# Patient Record
Sex: MALE | Race: BLACK OR AFRICAN AMERICAN | Employment: OTHER | URBAN - METROPOLITAN AREA
[De-identification: names, ages, dates, MRNs, and addresses within clinical notes are randomized per-mention and may not be internally consistent; named-entity substitution may affect disease eponyms.]

---

## 2017-01-27 ENCOUNTER — HOSPITAL ENCOUNTER (EMERGENCY)
Age: 62
Discharge: HOME | End: 2017-01-27
Attending: INTERNAL MEDICINE
Payer: MEDICARE

## 2017-01-27 VITALS
HEIGHT: 71 IN | DIASTOLIC BLOOD PRESSURE: 73 MMHG | RESPIRATION RATE: 18 BRPM | SYSTOLIC BLOOD PRESSURE: 168 MMHG | WEIGHT: 195 LBS | HEART RATE: 91 BPM | TEMPERATURE: 98.2 F | BODY MASS INDEX: 27.3 KG/M2 | OXYGEN SATURATION: 99 %

## 2017-01-27 DIAGNOSIS — K08.89 PAIN, DENTAL: Primary | ICD-10-CM

## 2017-01-27 PROCEDURE — 99282 EMERGENCY DEPT VISIT SF MDM: CPT

## 2017-01-27 RX ORDER — OXYCODONE AND ACETAMINOPHEN 5; 325 MG/1; MG/1
1 TABLET ORAL
Qty: 5 TAB | Refills: 0 | Status: SHIPPED | OUTPATIENT
Start: 2017-01-27 | End: 2017-04-03 | Stop reason: SDUPTHER

## 2017-01-27 RX ORDER — TRAMADOL HYDROCHLORIDE 50 MG/1
50 TABLET ORAL
Qty: 20 TAB | Refills: 0 | Status: SHIPPED | OUTPATIENT
Start: 2017-01-27 | End: 2018-05-23

## 2017-01-27 RX ORDER — PANTOPRAZOLE SODIUM 20 MG/1
20 TABLET, DELAYED RELEASE ORAL DAILY
Status: ON HOLD | COMMUNITY
End: 2018-10-09

## 2017-01-27 NOTE — Clinical Note
Dr. Melani Muñiz, [address] 
[phone] [facility]: 
[address] [facility], 101 [facility] 
[phone] Fillings, Cleanings, and Extractions 4815 [facility] [address], [address] 
[phone] Fillings, Cleanings, and Extractions [facility] [address] 
[phone] Fillings, Cleaning, and Extractions [facility] Department [address] [facility] 115 , 3947 [facility] 
[phone] [address], [address] [place] [address] Road 
[phone] Ages 3-18, if attending [facility] 450 [facility] 
201 [facility], 130[address] [facility] [address] 
[phone] Extractions, Fillings, [address] [facility], [address] [facility] [address] 
[phone] Oral Surgery - $70 required Cleanings, Fillings, Extractions - $100 [address],  [address] 
[phone] [employer] Only 1184 [facility] [employer] and [address], [address] Dental Clinic Open September to June

## 2017-01-27 NOTE — ED NOTES
Pt c/o dental pain \"tooth 14\" for which he is being treated by dentist with antibiotics. Reports he has follow up with dentist but that abx are not controlling pain.

## 2017-01-27 NOTE — ED TRIAGE NOTES
Pt states \" I have to pain to tooth number #14. I am on an antibiotic and they gave me ibuprofen but its just not helping the pain. \"

## 2017-01-27 NOTE — DISCHARGE INSTRUCTIONS
Dental Pain: After Your Visit  Your Care Instructions  The most common cause of dental pain is tooth decay. It can also be caused by an infection of the tooth (abscess) or gum, a tooth that has not broken all the way through the gum (impacted tooth), or a problem with the nerve-filled center of the tooth. Follow-up care is a key part of your treatment and safety. Be sure to make and go to all appointments, and call your doctor if you are having problems. Its also a good idea to know your test results and keep a list of the medicines you take. How can you care for yourself at home? · Contact a dentist for follow-up care. · Put ice or a cold pack on the outside of your mouth for 10 to 20 minutes at a time to reduce pain and swelling. Put a thin cloth between the ice and your skin. · Take an over-the-counter pain medicine, such as acetaminophen (Tylenol), ibuprofen (Advil, Motrin), or naproxen (Aleve). Read and follow all instructions on the label. · Do not take two or more pain medicines at the same time unless the doctor told you to. Many pain medicines have acetaminophen, which is Tylenol. Too much acetaminophen (Tylenol) can be harmful. · Rinse your mouth with warm salt water every 2 hours to help relieve pain and swelling from an infected tooth. Mix 1 teaspoon of salt in 8 ounces of water. · If your doctor prescribed antibiotics, take them as directed. Do not stop taking them just because you feel better. You need to take the full course of antibiotics. When should you call for help? Call your doctor now or seek immediate medical care if:  · You have signs of infection, such as:  ¨ Increased pain, swelling, warmth, or redness. ¨ Pus draining from the gum, tooth, or face. ¨ A fever. Watch closely for changes in your health, and be sure to contact your doctor if:  · You do not get better as expected. Where can you learn more?    Go to [employer]  Enter V964 in the search box to learn more about \"Dental Pain: After Your Visit. \"   © 7837-4919 Healthwise, Incorporated. Care instructions adapted under license by [employer] (which disclaims liability or warranty for this information). This care instruction is for use with your licensed healthcare professional. If you have questions about a medical condition or this instruction, always ask your healthcare professional. [address] any warranty or liability for your use of this information. Content Version: 41.7.796398;  Last Revised: May 17, 2013

## 2017-01-27 NOTE — ED PROVIDER NOTES
HPI Comments:   1:03 AM  59 y.o. male presents to ED C/O upper left sided dental pain onset 2 weeks ago. Pt states that he has pain to tooth number #14. Pt states that he was seen by his dentist and prescribed Ibuprofen and Amoxicillin without relief of symptoms. Pt states that he takes Oxycodone for his sciatica and has been taking the medication for the dental pain. Pt states that he has ran out of his prescription of Oxycodone and can't get a refill until next week. Pt states that he does see pain management. PMHx of HTN, dyslipidemia DM, and GERD. Pt denies any other Sx or complaints. Written by AMOS Marquez, as dictated by Isac Lopez PA-C      Patient is a 59 y.o. male presenting with dental problem. The history is provided by the patient. No  was used. Dental Pain    This is a new problem. The current episode started more than 1 week ago (2 weeks ago). The problem occurs constantly. The problem has not changed since onset. Treatments tried: Percocet. The treatment provided no relief.         Past Medical History:   Diagnosis Date    BPH (benign prostatic hyperplasia)     Chronic pain      BACK NEUROPATHY FEET HANDS    DM neuropathy, type II diabetes mellitus ([address]     DM type 2 causing CKD stage 3 ([address] 12/17/2012    DM type 2 causing vascular disease ([address]     Dyslipidemia     Foot ulcer due to secondary DM ([address] 12/1/2012    GERD (gastroesophageal reflux disease)     HTN     S/P BKA (below knee amputation) ([address]      right BKA due to non-healing ulcer    Sleep apnea     Thromboembolus ([address] 1970'S     BLOOD CLOT LEFT LEG       Past Surgical History:   Procedure Laterality Date    Echo stress  4/09     7 min; normal    Echo 2d adult  8/09     normal; LVEF 60%    Stress test myoview  8/09     walked 8:46; normal; LVEF 51%    Endoscopy, colon, diagnostic      Hx below knee amputation Right     Pr abdomen surgery proc unlisted       pilonidal cyst    Hx amputation  2012     left great toe    Hx amputation  2007     BKA right    Hx orthopaedic  2006     ACDF C4-C7    Hx cervical fusion  2009     x2         Family History:   Problem Relation Age of Onset    Hypertension Mother    [de-identified] Gout Mother     Cancer Father      leukemia    Diabetes Father     Hypertension Sister     Diabetes Maternal Grandmother     Hypertension Maternal Grandmother     Diabetes Paternal Grandmother     Hypertension Paternal Grandmother     Anesth Problems Neg Hx        Social History     Social History    Marital status: SINGLE     Spouse name: N/A    Number of children: N/A    Years of education: N/A     Occupational History    Not on file. Social History Main Topics    Smoking status: Never Smoker    Smokeless tobacco: Never Used    Alcohol use No    Drug use: No    Sexual activity: Yes     Partners: Female     Birth control/ protection: None     Other Topics Concern    Not on file     Social History Narrative         ALLERGIES: Adhesive tape-silicones and Sulfa (sulfonamide antibiotics)    Review of Systems   HENT: Positive for dental problem (dental pain). All other systems reviewed and are negative. Vitals:    01/27/17 0048   BP: 168/73   Pulse: 91   Resp: 18   Temp: 98.2 °F (36.8 °C)   SpO2: 99%   Weight: 88.5 kg (195 lb)   Height: 5' 11\" (1.803 m)            Physical Exam   Constitutional: Vital signs are normal. He appears well-developed and well-nourished. No distress. No facial swelling, able to open & close mouth easily; appears comfortable & in NAD   HENT:   Head: Normocephalic and atraumatic. Right Ear: External ear normal.   Left Ear: External ear normal.   Mouth/Throat: Uvula is midline and mucous membranes are normal. No oral lesions. No trismus in the jaw. Abnormal dentition. Dental caries present. No dental abscesses or uvula swelling. No oropharyngeal exudate.        Eyes: Conjunctivae are normal. Pupils are equal, round, and reactive to light. Neck: Normal range of motion. Neck supple. Musculoskeletal: Normal range of motion. Neurological: He is alert. Skin: Skin is warm and dry. Psychiatric: He has a normal mood and affect. His behavior is normal.   Nursing note and vitals reviewed. RESULTS:    No orders to display        Labs Reviewed - No data to display    No results found for this or any previous visit (from the past 12 hour(s)). MDM  Number of Diagnoses or Management Options    ED Course     MEDICATIONS GIVEN:  Medications - No data to display     Procedures    PROGRESS NOTE:  1:03 AM  Initial assessment performed. Written by Alida Lei ED Scribe, as dictated by Amrit Krueger PA-C    DISCHARGE NOTE:  1:12 AM  Viki Mcdonald Jr's  results have been reviewed with him. He has been counseled regarding his diagnosis, treatment, and plan. He verbally conveys understanding and agreement of the signs, symptoms, diagnosis, treatment and prognosis and additionally agrees to follow up as discussed. He also agrees with the care-plan and conveys that all of his questions have been answered. I have also provided discharge instructions for him that include: educational information regarding their diagnosis and treatment, and list of reasons why they would want to return to the ED prior to their follow-up appointment, should his condition change. Proper ED utilization discussed with the pt. The patient and/or family has been provided with education for proper Emergency Department utilization. CLINICAL IMPRESSION:    1.  Pain, dental        PLAN: DISCHARGE HOME    Follow-up Information     Follow up With Details Comments Contact Info    Please refer to list above for dental follow up  Schedule an appointment as soon as possible for a visit      THE [facility] EMERGENCY DEPT Go to As needed, If symptoms worsen [address] [id number]  [phone]          Current Discharge Medication List      START taking these medications    Details   traMADol (ULTRAM) 50 mg tablet Take 1 Tab by mouth every six (6) hours as needed for Pain. Max Daily Amount: 200 mg. Qty: 20 Tab, Refills: 0         CONTINUE these medications which have CHANGED    Details   oxyCODONE-acetaminophen (PERCOCET) 5-325 mg per tablet Take 1 Tab by mouth every four (4) hours as needed for Pain. Max Daily Amount: 6 Tabs. Qty: 5 Tab, Refills: 0         CONTINUE these medications which have NOT CHANGED    Details   pantoprazole (PROTONIX) 20 mg tablet Take 20 mg by mouth daily. rosuvastatin (CRESTOR) 40 mg tablet Take 1 Tab by mouth daily for 90 days. Qty: 90 Tab, Refills: 0    Associated Diagnoses: Dyslipidemia; Neuropathy      lisinopril (PRINIVIL, ZESTRIL) 10 mg tablet Take 1 Tab by mouth daily for 90 days. Qty: 90 Tab, Refills: 0      aspirin 81 mg chewable tablet Take 81 mg by mouth daily. insulin lispro (HUMALOG KWIKPEN) 100 unit/mL kwikpen Pt to inject 0.2 ml before breakfast, lunch and dinner please provide a 30 day supply  Qty: 1 Package, Refills: 2    Comments: This replaced Novolog denied due to insurance formulary      ergocalciferol (VITAMIN D2) 50,000 unit capsule Take 1 Cap by mouth every seven (7) days. Needs appt for further refills  Qty: 4 Cap, Refills: 0      ferrous sulfate 325 mg (65 mg iron) tablet Take 1 Tab by mouth two (2) times a day. Qty: 60 Tab, Refills: 5    Associated Diagnoses: Anemia      ketoconazole (NIZORAL) 2 % topical cream Apply  to affected area daily. Qty: 30 g, Refills: 3      docusate sodium (COLACE) 100 mg capsule Take 100 mg by mouth two (2) times daily as needed. pregabalin (LYRICA) 75 mg capsule Take 1 capsule nightly for 30 days  Qty: 90 Cap, Refills: 0    Associated Diagnoses: Neuropathy      albuterol (PROVENTIL HFA, VENTOLIN HFA, PROAIR HFA) 90 mcg/actuation inhaler Take 2 Puffs by inhalation every four (4) hours as needed for Wheezing for up to 30 days.   Qty: 1 Inhaler, Refills: 2      LANTUS SOLOSTAR 100 unit/mL (3 mL) pen INJECT 70 UNITS EACH NIGHT AS DIRECTED  Qty: 15 mL, Refills: 2    Associated Diagnoses: DM type 2 causing CKD stage 3 (HCC)      insulin aspart (NOVOLOG) 100 unit/mL inpn 0.2 mL by SubCUTAneous route Before breakfast, lunch, dinner and at bedtime for 30 days. Please give a month's supply  Qty: 24 mL, Refills: 3    Associated Diagnoses: DM type 2 causing CKD stage 3 (HCC)      TESTOSTERONE IM by IntraMUSCular route. simethicone (MYLICON) 80 mg chewable tablet Take 80 mg by mouth every six (6) hours as needed for Flatulence. omega-3 acid ethyl esters (LOVAZA) 1 gram capsule Take 2 capsules by mouth two (2) times daily (with meals) for 30 days. Qty: 120 capsule, Refills: 0    Comments: NO MORE REFILLS AFTER THIS. NEEDS APPT  Associated Diagnoses: Neuropathy; Dyslipidemia; Type II or unspecified type diabetes mellitus without mention of complication, uncontrolled             ATTESTATIONS:  This note is prepared by Giuliana Cardenas, acting as Scribe for Vickey Villalobos PA-C. Vickey Villalobos PA-C: The scribe's documentation has been prepared under my direction and personally reviewed by me in its entirety. I confirm that the note above accurately reflects all work, treatment, procedures, and medical decision making performed by me. I was personally available for consultation in the emergency department. I have reviewed the chart  and agree with the documentation recorded by the [facility], including the assessment, treatment plan, and disposition.

## 2017-01-31 DIAGNOSIS — G62.9 NEUROPATHY: Chronic | ICD-10-CM

## 2017-01-31 RX ORDER — OXYCODONE AND ACETAMINOPHEN 5; 325 MG/1; MG/1
1 TABLET ORAL
Qty: 5 TAB | Refills: 0 | Status: CANCELLED | OUTPATIENT
Start: 2017-01-31

## 2017-02-01 RX ORDER — OXYCODONE AND ACETAMINOPHEN 5; 325 MG/1; MG/1
1 TABLET ORAL
Qty: 90 TAB | Refills: 0 | Status: SHIPPED | OUTPATIENT
Start: 2017-02-01 | End: 2017-02-28 | Stop reason: SDUPTHER

## 2017-02-27 DIAGNOSIS — G62.9 NEUROPATHY: Chronic | ICD-10-CM

## 2017-02-27 DIAGNOSIS — E78.5 DYSLIPIDEMIA: ICD-10-CM

## 2017-02-27 RX ORDER — OXYCODONE AND ACETAMINOPHEN 5; 325 MG/1; MG/1
1 TABLET ORAL
Qty: 5 TAB | Refills: 0 | Status: CANCELLED | OUTPATIENT
Start: 2017-02-27

## 2017-02-28 RX ORDER — ROSUVASTATIN CALCIUM 40 MG/1
TABLET, COATED ORAL
Qty: 90 TAB | Refills: 0 | Status: SHIPPED | OUTPATIENT
Start: 2017-02-28 | End: 2018-01-24 | Stop reason: SDUPTHER

## 2017-02-28 RX ORDER — LISINOPRIL 10 MG/1
TABLET ORAL
Qty: 90 TAB | Refills: 0 | Status: SHIPPED | OUTPATIENT
Start: 2017-02-28 | End: 2018-01-24 | Stop reason: SDUPTHER

## 2017-02-28 RX ORDER — OXYCODONE AND ACETAMINOPHEN 5; 325 MG/1; MG/1
1 TABLET ORAL
Qty: 90 TAB | Refills: 0 | Status: SHIPPED | OUTPATIENT
Start: 2017-02-28 | End: 2017-06-30 | Stop reason: SDUPTHER

## 2017-04-04 RX ORDER — OXYCODONE AND ACETAMINOPHEN 5; 325 MG/1; MG/1
1 TABLET ORAL
Qty: 90 TAB | Refills: 0 | Status: SHIPPED | OUTPATIENT
Start: 2017-04-04 | End: 2017-04-26 | Stop reason: SDUPTHER

## 2017-04-05 ENCOUNTER — TELEPHONE (OUTPATIENT)
Dept: INTERNAL MEDICINE CLINIC | Age: 62
End: 2017-04-05

## 2017-04-05 NOTE — TELEPHONE ENCOUNTER
Pt dropped off a \"serious medical condition form\" for Dr Leisa Hidalgo to complete. I placed in drs mailbox.

## 2017-04-18 ENCOUNTER — OFFICE VISIT (OUTPATIENT)
Dept: INTERNAL MEDICINE CLINIC | Age: 62
End: 2017-04-18

## 2017-04-18 ENCOUNTER — HOSPITAL ENCOUNTER (OUTPATIENT)
Dept: LAB | Age: 62
Discharge: HOME | End: 2017-04-18
Payer: MEDICARE

## 2017-04-18 VITALS
HEART RATE: 96 BPM | TEMPERATURE: 98.3 F | SYSTOLIC BLOOD PRESSURE: 113 MMHG | WEIGHT: 183.4 LBS | OXYGEN SATURATION: 99 % | BODY MASS INDEX: 25.68 KG/M2 | RESPIRATION RATE: 14 BRPM | DIASTOLIC BLOOD PRESSURE: 55 MMHG | HEIGHT: 71 IN

## 2017-04-18 DIAGNOSIS — D64.9 ANEMIA, UNSPECIFIED TYPE: ICD-10-CM

## 2017-04-18 DIAGNOSIS — K21.9 GASTROESOPHAGEAL REFLUX DISEASE WITHOUT ESOPHAGITIS: ICD-10-CM

## 2017-04-18 DIAGNOSIS — Z79.4 TYPE 2 DIABETES MELLITUS WITH STAGE 3 CHRONIC KIDNEY DISEASE, WITH LONG-TERM CURRENT USE OF INSULIN (HCC): Primary | Chronic | ICD-10-CM

## 2017-04-18 DIAGNOSIS — L75.0 URINARY BODY ODOR: ICD-10-CM

## 2017-04-18 DIAGNOSIS — R63.4 WEIGHT LOSS: ICD-10-CM

## 2017-04-18 DIAGNOSIS — E11.22 TYPE 2 DIABETES MELLITUS WITH STAGE 3 CHRONIC KIDNEY DISEASE, WITH LONG-TERM CURRENT USE OF INSULIN (HCC): Primary | Chronic | ICD-10-CM

## 2017-04-18 DIAGNOSIS — I10 ESSENTIAL HYPERTENSION: Chronic | ICD-10-CM

## 2017-04-18 DIAGNOSIS — N18.30 TYPE 2 DIABETES MELLITUS WITH STAGE 3 CHRONIC KIDNEY DISEASE, WITH LONG-TERM CURRENT USE OF INSULIN (HCC): Primary | Chronic | ICD-10-CM

## 2017-04-18 PROCEDURE — 85027 COMPLETE CBC AUTOMATED: CPT

## 2017-04-18 PROCEDURE — 83036 HEMOGLOBIN GLYCOSYLATED A1C: CPT

## 2017-04-18 PROCEDURE — 36415 COLL VENOUS BLD VENIPUNCTURE: CPT

## 2017-04-18 PROCEDURE — 80053 COMPREHEN METABOLIC PANEL: CPT

## 2017-04-18 PROCEDURE — 84443 ASSAY THYROID STIM HORMONE: CPT

## 2017-04-18 NOTE — PROGRESS NOTES
HISTORY OF PRESENT ILLNESS  Griselda Mends is a 59 y.o. male. HPI     Diabetic Review of Systems - medication compliance: compliant all of the time, diabetic diet compliance: compliant most of the time, home glucose monitoring: is not performed because he needs strips for his new meter, further diabetic ROS: no polyuria or polydipsia, no chest pain, dyspnea or TIA's, no numbness, tingling or pain in extremities. Other symptoms and concerns: His last A1C was 11% in 12/2016. He has had labs with Dr. Robert Jay since 12/2016. Reports he has been watching what he is eating and using his insulin. He requests a prescription for the test strips and states he got a brand new meter from the [place]. His endocrinologist is Dr. Robert Jay. Dr. Robert Jay recommended pt see [facility] downstairs and he has appointment today. GERD-He had an endoscopy performed by Dr. Brandyn Dan which showed Schatzki's ring, his esophagus was stretched, and his medication was changed to Protonix. He denies cough since endoscopy and states that he was only coughing when he ate because he was having trouble swallowing. Pt denies shortness of breath. Hypertension ROS: taking medications as instructed, no medication side effects noted, no TIA's, no chest pain on exertion, no dyspnea on exertion, no swelling of ankles. New concerns: Stable- bp was 113/55 in the office today. Reports when he urinates he has air come out at the end of his stream. Pt states that this started 1-2 weeks ago and has not had this happen in the past. He has history of UTI's and states odor of his urine has changed. He denies problems urinating or pain. His urologist is Dr. Rolando Germain. Pt states that Dr. Robert Jay sent labs to Dr. Lloyd Tillman to discuss anemia and kidney function. Pt has lost 14 pounds since 12/2016. Reports his appetite is good and he is eating. Pt is having a lot of dental work performed and they are pulling his teeth on left side.  Pt is eating on right side and denies a change in his ability to eat. Pt denies bloody or darker colored stools, issues with BM's, and states he has had two colonoscopies in his life. Pt denies fever, chills, or shortness of breath. Review of Systems   All other systems reviewed and are negative. Physical Exam   Constitutional: He is oriented to person, place, and time. He appears well-developed and well-nourished. HENT:   Head: Normocephalic and atraumatic. Right Ear: External ear normal.   Left Ear: External ear normal.   Nose: Nose normal.   Mouth/Throat: Oropharynx is clear and moist.   Eyes: Conjunctivae and EOM are normal.   Neck: Normal range of motion. Neck supple. Carotid bruit is not present. No thyroid mass and no thyromegaly present. Cardiovascular: Normal rate, regular rhythm, normal heart sounds and intact distal pulses. Pulmonary/Chest: Effort normal and breath sounds normal.   Abdominal: Soft. Bowel sounds are normal.   Genitourinary: Testes normal.   Musculoskeletal: Normal range of motion. Right leg prosthesis   Neurological: He is alert and oriented to person, place, and time. He has normal strength. No cranial nerve deficit or sensory deficit. Skin: Skin is warm and dry. Psychiatric: He has a normal mood and affect. His behavior is normal. Judgment and thought content normal.   Nursing note and vitals reviewed. ASSESSMENT and PLAN  Tu Mackey was seen today for weight loss. Diagnoses and all orders for this visit:    Type 2 diabetes mellitus with stage 3 chronic kidney disease, with long-term current use of insulin ([facility]  Continue current medications and being followed by Dr. Yuly Brumfield. I wrote pt a prescription for strips. -     CBC W/O DIFF  -     METABOLIC PANEL, COMPREHENSIVE  -     HEMOGLOBIN A1C WITH EAG    Gastroesophageal reflux disease without esophagitis  Stable and improved since esophagus was stretched and medication switched to Protonix- Continue current medications.     Essential hypertension  BP is at goal. I do not recommend any change in medications. Urinary body odor  I will check urine today to rule out infection although I am concerned that some of the changes he is noticing with his urine are related to the drop in his kidney function. Pt will see nephrologist Dr. Jeet Ross. -     AMB POC URINALYSIS DIP STICK AUTO W/O MICRO    Anemia, unspecified type  Pt should do stool cards but I think the anemia is due to underlying kidney disease caused by the poorly controlled diabetes and seeing a nephrologist will be important   -     OCCULT BLOOD, IMMUNOASSAY (FIT)    Weight loss  Advised pt that he may be losing weight due to elevated sugars. His endoscopy was normal and he is followed by nephrologist. Will check labs today. He does not have any other systemic signs or symtpoms but has had dental work done which affects his ability to eat on one side so could cause some issues with dietary intake as well  -     TSH 3RD GENERATION      lab results and schedule of future lab studies reviewed with patient  reviewed diet, exercise and weight control  reviewed medications and side effects in detail     Written by Lonny Matta, as dictated by Blanca Nichols MD.     Current diagnosis and concerns discussed with pt at length. Understands risks and benefits or current treatment plan and medications and accepts the treatment and medication with any possible risks. Pt asks appropriate questions which were answered. Pt instructed to call with any concerns or problems.

## 2017-04-19 LAB
ALBUMIN SERPL-MCNC: 3.5 G/DL (ref 3.6–4.8)
ALBUMIN/GLOB SERPL: 0.9 {RATIO} (ref 1.2–2.2)
ALP SERPL-CCNC: 92 IU/L (ref 39–117)
ALT SERPL-CCNC: 13 IU/L (ref 0–44)
AST SERPL-CCNC: 12 IU/L (ref 0–40)
BILIRUB SERPL-MCNC: 0.2 MG/DL (ref 0–1.2)
BUN SERPL-MCNC: 30 MG/DL (ref 8–27)
BUN/CREAT SERPL: 17 (ref 10–24)
CALCIUM SERPL-MCNC: 9.3 MG/DL (ref 8.6–10.2)
CHLORIDE SERPL-SCNC: 104 MMOL/L (ref 96–106)
CO2 SERPL-SCNC: 21 MMOL/L (ref 18–29)
CREAT SERPL-MCNC: 1.79 MG/DL (ref 0.76–1.27)
ERYTHROCYTE [DISTWIDTH] IN BLOOD BY AUTOMATED COUNT: 14.5 % (ref 12.3–15.4)
EST. AVERAGE GLUCOSE BLD GHB EST-MCNC: 332 MG/DL
GLOBULIN SER CALC-MCNC: 3.9 G/DL (ref 1.5–4.5)
GLUCOSE SERPL-MCNC: 290 MG/DL (ref 65–99)
HBA1C MFR BLD: 13.2 % (ref 4.8–5.6)
HCT VFR BLD AUTO: 30.5 % (ref 37.5–51)
HGB BLD-MCNC: 9.7 G/DL (ref 12.6–17.7)
INTERPRETATION: NORMAL
Lab: NORMAL
MCH RBC QN AUTO: 28 PG (ref 26.6–33)
MCHC RBC AUTO-ENTMCNC: 31.8 G/DL (ref 31.5–35.7)
MCV RBC AUTO: 88 FL (ref 79–97)
PLATELET # BLD AUTO: 214 X10E3/UL (ref 150–379)
POTASSIUM SERPL-SCNC: 5.1 MMOL/L (ref 3.5–5.2)
PROT SERPL-MCNC: 7.4 G/DL (ref 6–8.5)
RBC # BLD AUTO: 3.47 X10E6/UL (ref 4.14–5.8)
SODIUM SERPL-SCNC: 139 MMOL/L (ref 134–144)
TSH SERPL DL<=0.005 MIU/L-ACNC: 3.44 UIU/ML (ref 0.45–4.5)
WBC # BLD AUTO: 4.6 X10E3/UL (ref 3.4–10.8)

## 2017-04-21 ENCOUNTER — TELEPHONE (OUTPATIENT)
Dept: INTERNAL MEDICINE CLINIC | Age: 62
End: 2017-04-21

## 2017-04-24 NOTE — TELEPHONE ENCOUNTER
Contacted pt and advised of lab results with follow ups needed with nephrology and endo. Pt understood and has made those appt. Pt sustained leg wound and is inquiring of going to wound clinic. Advised that our office has to refer and appt is needed. Appt provided.

## 2017-04-26 ENCOUNTER — OFFICE VISIT (OUTPATIENT)
Dept: INTERNAL MEDICINE CLINIC | Age: 62
End: 2017-04-26

## 2017-04-26 VITALS
DIASTOLIC BLOOD PRESSURE: 64 MMHG | HEIGHT: 71 IN | SYSTOLIC BLOOD PRESSURE: 126 MMHG | BODY MASS INDEX: 26.71 KG/M2 | WEIGHT: 190.8 LBS | OXYGEN SATURATION: 99 % | HEART RATE: 94 BPM | RESPIRATION RATE: 14 BRPM | TEMPERATURE: 98.2 F

## 2017-04-26 DIAGNOSIS — D64.9 ANEMIA, UNSPECIFIED: ICD-10-CM

## 2017-04-26 DIAGNOSIS — I10 ESSENTIAL HYPERTENSION: Chronic | ICD-10-CM

## 2017-04-26 DIAGNOSIS — Z79.4 TYPE 2 DIABETES MELLITUS WITH STAGE 3 CHRONIC KIDNEY DISEASE, WITH LONG-TERM CURRENT USE OF INSULIN (HCC): Primary | Chronic | ICD-10-CM

## 2017-04-26 DIAGNOSIS — N18.30 TYPE 2 DIABETES MELLITUS WITH STAGE 3 CHRONIC KIDNEY DISEASE, WITH LONG-TERM CURRENT USE OF INSULIN (HCC): Primary | Chronic | ICD-10-CM

## 2017-04-26 DIAGNOSIS — D50.9 IRON DEFICIENCY ANEMIA, UNSPECIFIED IRON DEFICIENCY ANEMIA TYPE: ICD-10-CM

## 2017-04-26 DIAGNOSIS — E11.22 TYPE 2 DIABETES MELLITUS WITH STAGE 3 CHRONIC KIDNEY DISEASE, WITH LONG-TERM CURRENT USE OF INSULIN (HCC): Primary | Chronic | ICD-10-CM

## 2017-04-26 DIAGNOSIS — L08.9 ABRASION, LEG W/ INFECTION, LEFT, INITIAL ENCOUNTER: ICD-10-CM

## 2017-04-26 DIAGNOSIS — S80.812A ABRASION, LEG W/ INFECTION, LEFT, INITIAL ENCOUNTER: ICD-10-CM

## 2017-04-26 DIAGNOSIS — M48.02 SPINAL STENOSIS IN CERVICAL REGION: ICD-10-CM

## 2017-04-26 RX ORDER — AMOXICILLIN AND CLAVULANATE POTASSIUM 875; 125 MG/1; MG/1
1 TABLET, FILM COATED ORAL EVERY 12 HOURS
Qty: 20 TAB | Refills: 0 | Status: SHIPPED | OUTPATIENT
Start: 2017-04-26 | End: 2017-12-20 | Stop reason: ALTCHOICE

## 2017-04-26 RX ORDER — OXYCODONE AND ACETAMINOPHEN 5; 325 MG/1; MG/1
1 TABLET ORAL
Qty: 90 TAB | Refills: 0 | Status: SHIPPED | OUTPATIENT
Start: 2017-04-26 | End: 2017-05-31 | Stop reason: SDUPTHER

## 2017-04-26 RX ORDER — LANOLIN ALCOHOL/MO/W.PET/CERES
325 CREAM (GRAM) TOPICAL 2 TIMES DAILY
Qty: 60 TAB | Refills: 5 | Status: SHIPPED | OUTPATIENT
Start: 2017-04-26 | End: 2018-05-07 | Stop reason: SDUPTHER

## 2017-04-26 NOTE — PROGRESS NOTES
HISTORY OF PRESENT ILLNESS  Griselda Mends is a 59 y.o. male. HPI     Diabetic Review of Systems - medication compliance: compliant all of the time, diabetic diet compliance: compliant most of the time, further diabetic ROS: no polyuria or polydipsia, no chest pain, dyspnea or TIA's, no numbness, tingling or pain in extremities. Other symptoms and concerns: His A1C was 13.2% on 4/18/17. Pt has had a difficult time finding a pharmacy that will fill his strips to fit his meter. He thinks he may need a new meter. Pt states that he is watching what he eats and will start eating smoothies with greens and decrease red meat intake. He does not eat fast food. Pt is followed by Dr. Robert Jay. Anemia- he continues to take iron. Pt will see nephrologist on 5/1/17. Hypertension ROS: taking medications as instructed, no medication side effects noted, no TIA's, no chest pain on exertion, no dyspnea on exertion, no swelling of ankles. New concerns: Stable- bp was 126/64 in the office today. Reports he thinks he bumped his leg although he is unsure due to neuropathy. Pt noticed a small abrasion on left lower leg and cleaned this with iodine and put dressing on it. He has noticed that abrasion started to spread and he has been cleaning this and keeping it wrapped. Pt has previously had wound care at [facility] and would like to go back as he has had issues with wounds in the past.    Back pain- pt continues to take Percocet. Review of Systems   All other systems reviewed and are negative. Physical Exam   Constitutional: He is oriented to person, place, and time. He appears well-developed and well-nourished. HENT:   Head: Normocephalic and atraumatic. Right Ear: External ear normal.   Left Ear: External ear normal.   Nose: Nose normal.   Mouth/Throat: Oropharynx is clear and moist.   Eyes: Conjunctivae and EOM are normal.   Neck: Normal range of motion. Neck supple. Carotid bruit is not present.  No thyroid mass and no thyromegaly present. Cardiovascular: Normal rate, regular rhythm, normal heart sounds and intact distal pulses. Pulmonary/Chest: Effort normal and breath sounds normal.   Abdominal: Soft. Bowel sounds are normal.   Musculoskeletal: Normal range of motion. Neurological: He is alert and oriented to person, place, and time. He has normal strength. No cranial nerve deficit or sensory deficit. Skin: Skin is warm and dry. Abrasion (2 x 2 cm on anterior left lower leg- pink and no discharge) noted. Psychiatric: He has a normal mood and affect. His behavior is normal. Judgment and thought content normal.   Nursing note and vitals reviewed. ASSESSMENT and PLAN  Emerald Palm was seen today for wound check. Diagnoses and all orders for this visit:    Type 2 diabetes mellitus with stage 3 chronic kidney disease, with long-term current use of insulin ([facility])  A1C was 13.2% and pt really needs to work on compliance with medications, checking his sugars, and following up with Dr. Junito Kaplan. Advised pt that we need to get his sugars under control now or his kidneys will be affected in a few years and he will need dialysis. Pt should wait to see if pharmacy fills the strips and I wrote pt a generic prescription for a glucometer and test strips if he needs to get a new one. Pt is working on his diet and will follow up with Dr. Junito Kaplan. Iron deficiency anemia, unspecified iron deficiency anemia type  Stable- Continue current medications and will see nephrologist on 5/1/17.  -     ferrous sulfate 325 mg (65 mg iron) tablet; Take 1 Tab by mouth two (2) times a day. Essential hypertension  BP is at goal. I do not recommend any change in medications. Anemia, unspecified  Stable- Continue current medications.     Abrasion, leg w/ infection, left, initial encounter  Abrasion does not look infected and I will call and get pt an appointment at [facility] [address] [facility]. Pt has had issues with wounds in the past and given his diabetes this abrasion should be monitored. -     amoxicillin-clavulanate (AUGMENTIN) 875-125 mg per tablet; Take 1 Tab by mouth every twelve (12) hours. Spinal stenosis in cervical region  Stable- continue taking Percocet as doing. He will not fill this prescription until next week. -     oxyCODONE-acetaminophen (PERCOCET) 5-325 mg per tablet; Take 1 Tab by mouth every four (4) hours as needed for Pain for up to 30 days. Max Daily Amount: 6 Tabs. lab results and schedule of future lab studies reviewed with patient  reviewed diet, exercise and weight control  reviewed medications and side effects in detail     Written by Jan Torres, as dictated by Leandro Mitchell MD.     Current diagnosis and concerns discussed with pt at length. Understands risks and benefits or current treatment plan and medications and accepts the treatment and medication with any possible risks. Pt asks appropriate questions which were answered. Pt instructed to call with any concerns or problems.

## 2017-05-05 LAB — HEMOCCULT STL QL IA: NEGATIVE

## 2017-05-31 DIAGNOSIS — M48.02 SPINAL STENOSIS IN CERVICAL REGION: ICD-10-CM

## 2017-06-01 RX ORDER — OXYCODONE AND ACETAMINOPHEN 5; 325 MG/1; MG/1
1 TABLET ORAL
Qty: 90 TAB | Refills: 0 | Status: SHIPPED | OUTPATIENT
Start: 2017-06-01 | End: 2017-06-30 | Stop reason: SDUPTHER

## 2017-06-30 DIAGNOSIS — M48.02 SPINAL STENOSIS IN CERVICAL REGION: ICD-10-CM

## 2017-06-30 RX ORDER — OXYCODONE AND ACETAMINOPHEN 5; 325 MG/1; MG/1
1 TABLET ORAL
Qty: 90 TAB | Refills: 0 | Status: SHIPPED | OUTPATIENT
Start: 2017-06-30 | End: 2017-08-02 | Stop reason: SDUPTHER

## 2017-06-30 RX ORDER — OXYCODONE AND ACETAMINOPHEN 5; 325 MG/1; MG/1
1 TABLET ORAL
Qty: 90 TAB | Refills: 0 | Status: CANCELLED | OUTPATIENT
Start: 2017-06-30 | End: 2017-07-30

## 2017-07-05 DIAGNOSIS — G62.9 NEUROPATHY: Chronic | ICD-10-CM

## 2017-07-05 RX ORDER — PREGABALIN 75 MG/1
CAPSULE ORAL
Qty: 30 CAP | Refills: 4 | OUTPATIENT
Start: 2017-07-05 | End: 2018-09-06 | Stop reason: SDUPTHER

## 2017-08-02 DIAGNOSIS — M48.02 SPINAL STENOSIS IN CERVICAL REGION: ICD-10-CM

## 2017-08-03 RX ORDER — OXYCODONE AND ACETAMINOPHEN 5; 325 MG/1; MG/1
1 TABLET ORAL
Qty: 90 TAB | Refills: 0 | Status: SHIPPED | OUTPATIENT
Start: 2017-08-03 | End: 2017-08-30 | Stop reason: SDUPTHER

## 2017-08-30 DIAGNOSIS — M48.02 SPINAL STENOSIS IN CERVICAL REGION: ICD-10-CM

## 2017-08-31 RX ORDER — OXYCODONE AND ACETAMINOPHEN 5; 325 MG/1; MG/1
1 TABLET ORAL
Qty: 90 TAB | Refills: 0 | Status: SHIPPED | OUTPATIENT
Start: 2017-08-31 | End: 2017-09-29 | Stop reason: SDUPTHER

## 2017-09-29 DIAGNOSIS — M48.02 SPINAL STENOSIS IN CERVICAL REGION: ICD-10-CM

## 2017-09-29 NOTE — TELEPHONE ENCOUNTER
Patient is requesting a refill on his oxyCODONE-acetaminophen (PERCOCET) 5-325 mg per tablet  His no is [phone]

## 2017-10-02 RX ORDER — OXYCODONE AND ACETAMINOPHEN 5; 325 MG/1; MG/1
1 TABLET ORAL
Qty: 90 TAB | Refills: 0 | Status: SHIPPED | OUTPATIENT
Start: 2017-10-02 | End: 2017-11-01 | Stop reason: SDUPTHER

## 2017-10-04 ENCOUNTER — TELEPHONE (OUTPATIENT)
Dept: INTERNAL MEDICINE CLINIC | Age: 62
End: 2017-10-04

## 2017-10-04 NOTE — TELEPHONE ENCOUNTER
Pt is a walk in today, requesting order to get prosthetic socket as the one he has is too big.  Wants it ordered thru [facility], [address]    Fax [phone]     Contact for pt [phone]

## 2017-10-20 ENCOUNTER — DOCUMENTATION ONLY (OUTPATIENT)
Dept: INTERNAL MEDICINE CLINIC | Age: 62
End: 2017-10-20

## 2017-11-01 DIAGNOSIS — M48.02 SPINAL STENOSIS IN CERVICAL REGION: ICD-10-CM

## 2017-11-02 RX ORDER — OXYCODONE AND ACETAMINOPHEN 5; 325 MG/1; MG/1
1 TABLET ORAL
Qty: 90 TAB | Refills: 0 | Status: SHIPPED | OUTPATIENT
Start: 2017-11-02 | End: 2017-11-30 | Stop reason: SDUPTHER

## 2017-11-21 ENCOUNTER — DOCUMENTATION ONLY (OUTPATIENT)
Dept: INTERNAL MEDICINE CLINIC | Age: 62
End: 2017-11-21

## 2017-11-30 DIAGNOSIS — M48.02 SPINAL STENOSIS IN CERVICAL REGION: ICD-10-CM

## 2017-11-30 NOTE — TELEPHONE ENCOUNTER
----- Message from Kenan Sheth sent at 11/30/2017 11:44 AM EST -----  Regarding: Dr. [facility] Telephone  Patient would like a Rx refill for oxycodone. Please call him back at [phone] when it is ready for .

## 2017-12-01 RX ORDER — OXYCODONE AND ACETAMINOPHEN 5; 325 MG/1; MG/1
1 TABLET ORAL
Qty: 90 TAB | Refills: 0 | Status: SHIPPED | OUTPATIENT
Start: 2017-12-01 | End: 2017-12-29 | Stop reason: SDUPTHER

## 2017-12-07 ENCOUNTER — DOCUMENTATION ONLY (OUTPATIENT)
Dept: INTERNAL MEDICINE CLINIC | Age: 62
End: 2017-12-07

## 2017-12-20 ENCOUNTER — OFFICE VISIT (OUTPATIENT)
Dept: INTERNAL MEDICINE CLINIC | Age: 62
End: 2017-12-20

## 2017-12-20 VITALS
SYSTOLIC BLOOD PRESSURE: 122 MMHG | WEIGHT: 187.8 LBS | TEMPERATURE: 98.7 F | RESPIRATION RATE: 14 BRPM | HEART RATE: 97 BPM | BODY MASS INDEX: 26.29 KG/M2 | HEIGHT: 71 IN | DIASTOLIC BLOOD PRESSURE: 66 MMHG | OXYGEN SATURATION: 99 %

## 2017-12-20 DIAGNOSIS — Z79.4 TYPE 2 DIABETES MELLITUS WITH STAGE 3 CHRONIC KIDNEY DISEASE, WITH LONG-TERM CURRENT USE OF INSULIN (HCC): Chronic | ICD-10-CM

## 2017-12-20 DIAGNOSIS — L08.9 TOE INFECTION: Primary | ICD-10-CM

## 2017-12-20 DIAGNOSIS — E11.22 TYPE 2 DIABETES MELLITUS WITH STAGE 3 CHRONIC KIDNEY DISEASE, WITH LONG-TERM CURRENT USE OF INSULIN (HCC): Chronic | ICD-10-CM

## 2017-12-20 DIAGNOSIS — N18.30 TYPE 2 DIABETES MELLITUS WITH STAGE 3 CHRONIC KIDNEY DISEASE, WITH LONG-TERM CURRENT USE OF INSULIN (HCC): Chronic | ICD-10-CM

## 2017-12-20 DIAGNOSIS — I10 ESSENTIAL HYPERTENSION: Chronic | ICD-10-CM

## 2017-12-20 RX ORDER — ALBUTEROL SULFATE 90 UG/1
AEROSOL, METERED RESPIRATORY (INHALATION)
Qty: 1 INHALER | Refills: 5 | Status: SHIPPED | OUTPATIENT
Start: 2017-12-20 | End: 2018-12-03 | Stop reason: SDUPTHER

## 2017-12-20 RX ORDER — LEVOFLOXACIN 500 MG/1
500 TABLET, FILM COATED ORAL DAILY
COMMUNITY
End: 2018-04-09 | Stop reason: ALTCHOICE

## 2017-12-20 RX ORDER — CEPHALEXIN 500 MG/1
500 CAPSULE ORAL 3 TIMES DAILY
Qty: 42 CAP | Refills: 0 | Status: SHIPPED | OUTPATIENT
Start: 2017-12-20 | End: 2018-04-09 | Stop reason: ALTCHOICE

## 2017-12-20 NOTE — PROGRESS NOTES
HISTORY OF PRESENT ILLNESS  Zulema Cano is a 57 y.o. male. Presents with wife. HPI   Patient reports infection in left big toe. He states he had half of toe amputated. He states he followed up with urgent care last week because he had noticed a soft callus where the amputation was. He states he went because there was drainage and a foul smell coming from the toe. Patient was put on Levaquin per urgent care. Patient states he followed up with [facility] wound care with Dr. Denisse Bunch. He states he had MRI that showed infection of toe. He recommend patient follow up with hospital to be admitted. Patient denies fever or chills. He states Levaquin has stopped drainage and smell. Diabetic Review of Systems - medication compliance: compliant all of the time, diabetic diet compliance: compliant most of the time, home glucose monitoring: is performed sporadically, further diabetic ROS: no polyuria or polydipsia, no chest pain, dyspnea or TIA's, no numbness, tingling or pain in extremities. Other symptoms and concerns: Patient reports he needs to manage insurance before he follows up with Dr. Harrison Roy. Patient states his sugars have been erratic and not maintained because of infection. Hypertension ROS: taking medications as instructed, no medication side effects noted, no TIA's, no chest pain on exertion, no dyspnea on exertion, no swelling of ankles. New concerns:  Patient's BP in office today is 122/56. Review of Systems   All other systems reviewed and are negative. Physical Exam   Constitutional: He is oriented to person, place, and time. He appears well-developed and well-nourished. HENT:   Head: Normocephalic and atraumatic. Right Ear: External ear normal.   Left Ear: External ear normal.   Nose: Nose normal.   Mouth/Throat: Oropharynx is clear and moist.   Eyes: Conjunctivae and EOM are normal.   Neck: Normal range of motion. Neck supple.    Cardiovascular: Normal rate, regular rhythm, normal heart sounds and intact distal pulses. Pulmonary/Chest: Effort normal and breath sounds normal.   Abdominal: Soft. Bowel sounds are normal.   Genitourinary: Testes normal.   Musculoskeletal: Normal range of motion. His toe was wrapped in a sock so it was not looked at   Neurological: He is alert and oriented to person, place, and time. Skin: Skin is warm and dry. Psychiatric: He has a normal mood and affect. His behavior is normal. Judgment and thought content normal.   Nursing note and vitals reviewed. ASSESSMENT and PLAN  Diagnoses and all orders for this visit:    1. Toe infection  Discussed patient should follow up with Orthopedic to discuss treatment plan. If patient develops fever or chills, patient will follow up with ER immediately. Will put patient on Keflex to help contain infection. -     cephALEXin (KEFLEX) 500 mg capsule; Take 1 Cap by mouth three (3) times daily.  -     CBC W/O DIFF    2. Type 2 diabetes mellitus with stage 3 chronic kidney disease, with long-term current use of insulin (HCC)  Sugars have not been maintained because of infection. Encouraged patient to follow up with endocrinology and to monitor diet more closely. Continue current medications.   -     HEMOGLOBIN A1C WITH EAG  -     MICROALBUMIN, UR, RAND    3. Essential hypertension  BP is at goal. I do not recommend any change in medications.  -     LIPID PANEL  -     METABOLIC PANEL, COMPREHENSIVE    lab results and schedule of future lab studies reviewed with patient  reviewed diet, exercise and weight control    Written by Carisa Jerez, as dictated by Alpesh Dutta MD.     Current diagnosis and concerns discussed with pt at length. Understands risks and benefits or current treatment plan and medications and accepts the treatment and medication with any possible risks. Pt asks appropriate questions which were answered. Pt instructed to call with any concerns or problems.

## 2017-12-21 ENCOUNTER — HOSPITAL ENCOUNTER (OUTPATIENT)
Dept: LAB | Age: 62
Discharge: HOME | End: 2017-12-21
Payer: MEDICARE

## 2017-12-21 PROCEDURE — 80053 COMPREHEN METABOLIC PANEL: CPT

## 2017-12-21 PROCEDURE — 85027 COMPLETE CBC AUTOMATED: CPT

## 2017-12-21 PROCEDURE — 36415 COLL VENOUS BLD VENIPUNCTURE: CPT

## 2017-12-21 PROCEDURE — 80061 LIPID PANEL: CPT

## 2017-12-21 PROCEDURE — 82043 UR ALBUMIN QUANTITATIVE: CPT

## 2017-12-22 ENCOUNTER — TELEPHONE (OUTPATIENT)
Dept: INTERNAL MEDICINE CLINIC | Age: 62
End: 2017-12-22

## 2017-12-22 LAB
ALBUMIN SERPL-MCNC: 3.2 G/DL (ref 3.6–4.8)
ALBUMIN/GLOB SERPL: 0.9 {RATIO} (ref 1.2–2.2)
ALP SERPL-CCNC: 99 IU/L (ref 39–117)
ALT SERPL-CCNC: 12 IU/L (ref 0–44)
AST SERPL-CCNC: 11 IU/L (ref 0–40)
BILIRUB SERPL-MCNC: <0.2 MG/DL (ref 0–1.2)
BUN SERPL-MCNC: 21 MG/DL (ref 8–27)
BUN/CREAT SERPL: 12 (ref 10–24)
CALCIUM SERPL-MCNC: 8.2 MG/DL (ref 8.6–10.2)
CHLORIDE SERPL-SCNC: 105 MMOL/L (ref 96–106)
CHOLEST SERPL-MCNC: 215 MG/DL (ref 100–199)
CO2 SERPL-SCNC: 22 MMOL/L (ref 18–29)
CREAT SERPL-MCNC: 1.76 MG/DL (ref 0.76–1.27)
ERYTHROCYTE [DISTWIDTH] IN BLOOD BY AUTOMATED COUNT: 13.3 % (ref 12.3–15.4)
EST. AVERAGE GLUCOSE BLD GHB EST-MCNC: 321 MG/DL
GLOBULIN SER CALC-MCNC: 3.7 G/DL (ref 1.5–4.5)
GLUCOSE SERPL-MCNC: 351 MG/DL (ref 65–99)
HBA1C MFR BLD: 12.8 % (ref 4.8–5.6)
HCT VFR BLD AUTO: 29.5 % (ref 37.5–51)
HDLC SERPL-MCNC: 29 MG/DL
HGB BLD-MCNC: 9 G/DL (ref 13–17.7)
INTERPRETATION, 910389: NORMAL
INTERPRETATION: NORMAL
LDLC SERPL CALC-MCNC: ABNORMAL MG/DL (ref 0–99)
Lab: NORMAL
MCH RBC QN AUTO: 27.7 PG (ref 26.6–33)
MCHC RBC AUTO-ENTMCNC: 30.5 G/DL (ref 31.5–35.7)
MCV RBC AUTO: 91 FL (ref 79–97)
MICROALBUMIN UR-MCNC: 700.6 UG/ML
PDF IMAGE, 910387: NORMAL
PLATELET # BLD AUTO: 395 X10E3/UL (ref 150–379)
POTASSIUM SERPL-SCNC: 4.7 MMOL/L (ref 3.5–5.2)
PROT SERPL-MCNC: 6.9 G/DL (ref 6–8.5)
RBC # BLD AUTO: 3.25 X10E6/UL (ref 4.14–5.8)
SODIUM SERPL-SCNC: 141 MMOL/L (ref 134–144)
TRIGL SERPL-MCNC: 447 MG/DL (ref 0–149)
VLDLC SERPL CALC-MCNC: ABNORMAL MG/DL (ref 5–40)
WBC # BLD AUTO: 7.1 X10E3/UL (ref 3.4–10.8)

## 2017-12-22 NOTE — TELEPHONE ENCOUNTER
Pt is seeing Dr. Naomi Miles for his toe and needs labs faxed over and any other information that pertains to his toe.  [phone]

## 2017-12-29 DIAGNOSIS — M48.02 SPINAL STENOSIS IN CERVICAL REGION: ICD-10-CM

## 2017-12-29 RX ORDER — OXYCODONE AND ACETAMINOPHEN 5; 325 MG/1; MG/1
1 TABLET ORAL
Qty: 90 TAB | Refills: 0 | Status: SHIPPED | OUTPATIENT
Start: 2017-12-29 | End: 2018-02-01 | Stop reason: SDUPTHER

## 2018-01-03 ENCOUNTER — DOCUMENTATION ONLY (OUTPATIENT)
Dept: INTERNAL MEDICINE CLINIC | Age: 63
End: 2018-01-03

## 2018-02-01 DIAGNOSIS — M48.02 SPINAL STENOSIS IN CERVICAL REGION: ICD-10-CM

## 2018-02-02 RX ORDER — OXYCODONE AND ACETAMINOPHEN 5; 325 MG/1; MG/1
1 TABLET ORAL
Qty: 90 TAB | Refills: 0 | Status: SHIPPED | OUTPATIENT
Start: 2018-02-02 | End: 2018-02-28 | Stop reason: SDUPTHER

## 2018-02-19 ENCOUNTER — DOCUMENTATION ONLY (OUTPATIENT)
Dept: INTERNAL MEDICINE CLINIC | Age: 63
End: 2018-02-19

## 2018-02-28 DIAGNOSIS — M48.02 SPINAL STENOSIS IN CERVICAL REGION: ICD-10-CM

## 2018-03-01 RX ORDER — OXYCODONE AND ACETAMINOPHEN 5; 325 MG/1; MG/1
1 TABLET ORAL
Qty: 90 TAB | Refills: 0 | Status: SHIPPED | OUTPATIENT
Start: 2018-03-01 | End: 2018-04-02 | Stop reason: SDUPTHER

## 2018-03-05 ENCOUNTER — DOCUMENTATION ONLY (OUTPATIENT)
Dept: INTERNAL MEDICINE CLINIC | Age: 63
End: 2018-03-05

## 2018-04-02 DIAGNOSIS — M48.02 SPINAL STENOSIS IN CERVICAL REGION: ICD-10-CM

## 2018-04-03 RX ORDER — OXYCODONE AND ACETAMINOPHEN 5; 325 MG/1; MG/1
1 TABLET ORAL
Qty: 90 TAB | Refills: 0 | Status: SHIPPED | OUTPATIENT
Start: 2018-04-03 | End: 2018-04-30 | Stop reason: SDUPTHER

## 2018-04-04 RX ORDER — INSULIN LISPRO 100 [IU]/ML
INJECTION, SOLUTION INTRAVENOUS; SUBCUTANEOUS
Qty: 15 ML | Refills: 0 | Status: SHIPPED | OUTPATIENT
Start: 2018-04-04 | End: 2018-07-25 | Stop reason: SDUPTHER

## 2018-04-05 ENCOUNTER — DOCUMENTATION ONLY (OUTPATIENT)
Dept: INTERNAL MEDICINE CLINIC | Age: 63
End: 2018-04-05

## 2018-04-09 ENCOUNTER — OFFICE VISIT (OUTPATIENT)
Dept: INTERNAL MEDICINE CLINIC | Age: 63
End: 2018-04-09

## 2018-04-09 VITALS
WEIGHT: 185 LBS | BODY MASS INDEX: 25.9 KG/M2 | SYSTOLIC BLOOD PRESSURE: 110 MMHG | HEART RATE: 92 BPM | OXYGEN SATURATION: 96 % | HEIGHT: 71 IN | TEMPERATURE: 97.3 F | RESPIRATION RATE: 14 BRPM | DIASTOLIC BLOOD PRESSURE: 52 MMHG

## 2018-04-09 DIAGNOSIS — I10 ESSENTIAL HYPERTENSION: Chronic | ICD-10-CM

## 2018-04-09 DIAGNOSIS — Z79.4 TYPE 2 DIABETES MELLITUS WITH STAGE 3 CHRONIC KIDNEY DISEASE, WITH LONG-TERM CURRENT USE OF INSULIN (HCC): Primary | Chronic | ICD-10-CM

## 2018-04-09 DIAGNOSIS — G62.9 NEUROPATHY: Chronic | ICD-10-CM

## 2018-04-09 DIAGNOSIS — E11.22 TYPE 2 DIABETES MELLITUS WITH STAGE 3 CHRONIC KIDNEY DISEASE, WITH LONG-TERM CURRENT USE OF INSULIN (HCC): Primary | Chronic | ICD-10-CM

## 2018-04-09 DIAGNOSIS — N18.30 TYPE 2 DIABETES MELLITUS WITH STAGE 3 CHRONIC KIDNEY DISEASE, WITH LONG-TERM CURRENT USE OF INSULIN (HCC): Primary | Chronic | ICD-10-CM

## 2018-04-09 DIAGNOSIS — Z89.412 STATUS POST AMPUTATION OF LEFT GREAT TOE (HCC): ICD-10-CM

## 2018-04-09 DIAGNOSIS — E78.5 DYSLIPIDEMIA: ICD-10-CM

## 2018-04-09 PROBLEM — Z89.619 STATUS POST ABOVE KNEE AMPUTATION: Status: ACTIVE | Noted: 2018-04-09

## 2018-04-09 NOTE — PROGRESS NOTES
HISTORY OF PRESENT ILLNESS  Sofie Bowen is a 57 y.o. male. HPI   Patient reports he needs to find new endocrinologist and podiatrist because of insurance change. Patient states amputation of toe on left foot has resolved and healed. Diabetic Review of Systems - medication compliance: compliant all of the time, diabetic diet compliance: compliant most of the time, home glucose monitoring: is performed regularly, further diabetic ROS: no polyuria or polydipsia, no chest pain, dyspnea or TIA's, no numbness, tingling or pain in extremities. Other symptoms and concerns: Last A1C was 12.8%. Patient reports sugars run in 200s when he in on his schedule. He notes sugars spike into 400s when he is not on his schedule. Patient notes neuropathy is more stable in cooler months, and is exacerbated in warmer seasons. Patient states he occasionally needs help with bathing and clothing hisself because of neuropathy in hands. Hypertension ROS: taking medications as instructed, no medication side effects noted, no TIA's, no chest pain on exertion, no dyspnea on exertion, no swelling of ankles. New concerns:  Patient's BP in office today is 110/52. Dyslipidemia:  Cardiovascular risk analysis - 57 y.o. male LDL goal is under 130. ROS: taking medications as instructed, no medication side effects noted, no TIA's, no chest pain on exertion, no dyspnea on exertion, no swelling of ankles. Tolerating meds, no myalgias or other side effects noted  New concerns: He continues rosuvastatin 40 mg.     Review of Systems   All other systems reviewed and are negative. Physical Exam   Constitutional: He is oriented to person, place, and time. He appears well-developed and well-nourished. HENT:   Head: Normocephalic and atraumatic.    Right Ear: External ear normal.   Left Ear: External ear normal.   Nose: Nose normal.   Mouth/Throat: Oropharynx is clear and moist.   Eyes: Conjunctivae and EOM are normal.   Neck: Normal range of motion. Neck supple. Cardiovascular: Normal rate, regular rhythm, normal heart sounds and intact distal pulses. Pulmonary/Chest: Effort normal and breath sounds normal.   Abdominal: Soft. Bowel sounds are normal.   Genitourinary: Testes normal.   Musculoskeletal: Normal range of motion. Right leg prosthesis, left foot toe amputation- decreased pulse, decreased sensation in left foot- callus formation   Neurological: He is alert and oriented to person, place, and time. Skin: Skin is warm and dry. Psychiatric: He has a normal mood and affect. His behavior is normal. Judgment and thought content normal.   Nursing note and vitals reviewed. ASSESSMENT and PLAN  Diagnoses and all orders for this visit:    1. Type 2 diabetes mellitus with stage 3 chronic kidney disease, with long-term current use of insulin (HCC)  Diabetes is poorly controlled and he will need to follow up with endocrinology, referred to Dr. Tanner Ware. Last A1C was 12.8%. Continue current medications. [facility] ref [id number]    2. Essential hypertension  BP is at goal. I do not recommend any change in medications. 3. Neuropathy  He has evidence of callus formation. 4. Dyslipidemia  Stable, patient tolerating meds, no myalgias. I do not recommend any change in medications. 5. Status post amputation of left great toe ([facility] [address]  Had previous foot ulceration on left foot with amputation of left greater toe.     lab results and schedule of future lab studies reviewed with patient  reviewed diet, exercise and weight control    Written by Marietta Guevara, as dictated by Marty Santacruz MD.     Current diagnosis and concerns discussed with pt at length. Understands risks and benefits or current treatment plan and medications and accepts the treatment and medication with any possible risks. Pt asks appropriate questions which were answered. Pt instructed to call with any concerns or problems.

## 2018-04-30 ENCOUNTER — TELEPHONE (OUTPATIENT)
Dept: INTERNAL MEDICINE CLINIC | Age: 63
End: 2018-04-30

## 2018-04-30 DIAGNOSIS — M48.02 SPINAL STENOSIS IN CERVICAL REGION: ICD-10-CM

## 2018-04-30 NOTE — TELEPHONE ENCOUNTER
Patient states [id number] [address] is requesting his office visit foot examination notes and his script for his diabetic shoes. Patient did not have a fax number states we should have it.

## 2018-05-01 RX ORDER — OXYCODONE AND ACETAMINOPHEN 5; 325 MG/1; MG/1
1 TABLET ORAL
Qty: 90 TAB | Refills: 0 | Status: SHIPPED | OUTPATIENT
Start: 2018-05-01 | End: 2018-05-23

## 2018-05-07 ENCOUNTER — TELEPHONE (OUTPATIENT)
Dept: INTERNAL MEDICINE CLINIC | Age: 63
End: 2018-05-07

## 2018-05-07 DIAGNOSIS — D50.9 IRON DEFICIENCY ANEMIA, UNSPECIFIED IRON DEFICIENCY ANEMIA TYPE: ICD-10-CM

## 2018-05-07 RX ORDER — LANOLIN ALCOHOL/MO/W.PET/CERES
CREAM (GRAM) TOPICAL
Qty: 60 TAB | Refills: 5 | Status: SHIPPED | OUTPATIENT
Start: 2018-05-07 | End: 2019-04-02 | Stop reason: SDUPTHER

## 2018-05-07 NOTE — TELEPHONE ENCOUNTER
Pt states that  got the form for diabetic shoes but they also need the Rx and exam notes to complete the process.

## 2018-05-20 ENCOUNTER — DOCUMENTATION ONLY (OUTPATIENT)
Dept: INTERNAL MEDICINE CLINIC | Age: 63
End: 2018-05-20

## 2018-05-23 ENCOUNTER — HOSPITAL ENCOUNTER (EMERGENCY)
Age: 63
Discharge: HOME | End: 2018-05-23
Attending: EMERGENCY MEDICINE
Payer: MEDICARE

## 2018-05-23 VITALS
BODY MASS INDEX: 27.58 KG/M2 | DIASTOLIC BLOOD PRESSURE: 75 MMHG | OXYGEN SATURATION: 100 % | WEIGHT: 197 LBS | RESPIRATION RATE: 16 BRPM | SYSTOLIC BLOOD PRESSURE: 104 MMHG | HEIGHT: 71 IN | TEMPERATURE: 98.2 F | HEART RATE: 94 BPM

## 2018-05-23 DIAGNOSIS — G89.29 OTHER CHRONIC PAIN: ICD-10-CM

## 2018-05-23 DIAGNOSIS — K08.89 DENTALGIA: ICD-10-CM

## 2018-05-23 DIAGNOSIS — R51.9 LEFT FACIAL PAIN: Primary | ICD-10-CM

## 2018-05-23 PROCEDURE — 99282 EMERGENCY DEPT VISIT SF MDM: CPT

## 2018-05-23 RX ORDER — AMOXICILLIN 500 MG/1
500 TABLET, FILM COATED ORAL 3 TIMES DAILY
Qty: 30 TAB | Refills: 0 | Status: SHIPPED | OUTPATIENT
Start: 2018-05-23 | End: 2018-06-02

## 2018-05-23 RX ORDER — HYDROCODONE BITARTRATE AND ACETAMINOPHEN 5; 325 MG/1; MG/1
1-2 TABLET ORAL
Qty: 8 TAB | Refills: 0 | Status: ON HOLD | OUTPATIENT
Start: 2018-05-23 | End: 2018-10-09

## 2018-05-23 NOTE — ED PROVIDER NOTES
[address]  EMERGENCY DEPARTMENT HISTORY AND PHYSICAL EXAM    Date: 5/23/2018  Patient Name: Ander Austin  YOB: 1955  Medical Record Number: [id number]     History of Presenting Illness     Chief Complaint   Patient presents with    Dental Pain       History Provided By: Patient    Chief Complaint: Dental pain  Duration: 1 Days  Timing:  Constant  Location: Left upper dentition  Quality: Aching  Severity: 10 out of 10  Modifying Factors: No relief with BC powder  Associated Symptoms: Left sided facial swelling    Additional History (Context):   5:34 PM  Ander Austin is a 57 y.o. male with PMHX HTN, dyslipidemia, BPH, DM, GERD, & chronic pain, who presents to the emergency department C/O left upper dental pain onset yesterday. Associated symptoms include left facial swelling. Has taken [facility] [employer] Powder without relief. Reports his dentist was unable to see him today and was instructed to go to ER or Urgent Care for abx. NKDA. Pt denies any other Sx or complaints. PCP: Jeyson Ordonez MD    Current Outpatient Prescriptions   Medication Sig Dispense Refill    HYDROcodone-acetaminophen (NORCO) 5-325 mg per tablet Take 1-2 Tabs by mouth every four (4) hours as needed for Pain. Max Daily Amount: 12 Tabs. 8 Tab 0    amoxicillin 500 mg tab Take 500 mg by mouth three (3) times daily for 10 days.  30 Tab 0    ferrous sulfate 325 mg (65 mg iron) tablet TAKE ONE TABLET BY MOUTH 2 TIMES A DAY 60 Tab 5    HUMALOG KWIKPEN INSULIN 100 unit/mL kwikpen INJECT 18 TO 30 UNITS SUBCUTANEOUSLY 3 TIMES DAILY BEFORE BREAKFAST, LUNCH, AND DINNER 15 mL 0    lisinopril (PRINIVIL, ZESTRIL) 10 mg tablet TAKE ONE TABLET BY MOUTH EVERY DAY 90 Tab 1    rosuvastatin (CRESTOR) 40 mg tablet TAKE ONE TABLET BY MOUTH EVERY DAY 90 Tab 1    VENTOLIN HFA 90 mcg/actuation inhaler TAKE 2 PUFFS BY INHALATION EVERY 4 HOURS AS NEEDED FOR WHEEZING 1 Inhaler 5    VITAMIN D2 50,000 unit capsule TAKE ONE CAPSULE BY MOUTH EVERY WEEK 4 Cap 4    LANTUS SOLOSTAR 100 unit/mL (3 mL) inpn INJECT 40 TO 50 UNITS SUBCUTANEOUSLY TWICE DAILY AS DIRECTED 15 mL 3    LYRICA 75 mg capsule TAKE ONE CAPSULE BY MOUTH EVERY NIGHT 30 Cap 4    pantoprazole (PROTONIX) 20 mg tablet Take 20 mg by mouth daily.  aspirin 81 mg chewable tablet Take 81 mg by mouth daily.  insulin aspart (NOVOLOG) 100 unit/mL inpn 0.2 mL by SubCUTAneous route Before breakfast, lunch, dinner and at bedtime for 30 days. Please give a month's supply 24 mL 3    pantoprazole (PROTONIX) 40 mg tablet Take 1 Tab by mouth daily for 30 days. 90 Tab 1    TESTOSTERONE IM by IntraMUSCular route.  simethicone (MYLICON) 80 mg chewable tablet Take 80 mg by mouth every six (6) hours as needed for Flatulence.  omega-3 acid ethyl esters (LOVAZA) 1 gram capsule Take 2 capsules by mouth two (2) times daily (with meals) for 30 days. 120 capsule 0    ketoconazole (NIZORAL) 2 % topical cream Apply  to affected area daily. 30 g 3    docusate sodium (COLACE) 100 mg capsule Take 100 mg by mouth two (2) times daily as needed.          Past History     Past Medical History:  Past Medical History:   Diagnosis Date    BPH (benign prostatic hyperplasia)     Chronic pain     BACK NEUROPATHY FEET HANDS    DM neuropathy, type II diabetes mellitus ([address]     DM type 2 causing CKD stage 3 ([address] 12/17/2012    DM type 2 causing vascular disease ([address]     Dyslipidemia     Foot ulcer due to secondary DM ([address] 12/1/2012    GERD (gastroesophageal reflux disease)     HTN     S/P BKA (below knee amputation) ([address]     right BKA due to non-healing ulcer    Sleep apnea     Thromboembolus ([address] 1970'S    BLOOD CLOT LEFT LEG       Past Surgical History:  Past Surgical History:   Procedure Laterality Date    ABDOMEN SURGERY PROC UNLISTED      pilonidal cyst    ECHO 2D ADULT  8/09    normal; LVEF 60%    ECHO STRESS  4/09    7 min; normal    ENDOSCOPY, COLON, DIAGNOSTIC      HX AMPUTATION  2012    left great toe    HX AMPUTATION  2007    BKA right    HX BELOW KNEE AMPUTATION Right     HX CERVICAL FUSION  2009    x2    HX ORTHOPAEDIC  2006    ACDF C4-C7    STRESS TEST MYOVIEW  8/09    walked 8:46; normal; LVEF 51%       Family History:  Family History   Problem Relation Age of Onset    Hypertension Mother    [facility] Gout Mother     Cancer Father      leukemia    Diabetes Father     Hypertension Sister     Diabetes Maternal Grandmother     Hypertension Maternal Grandmother     Diabetes Paternal Grandmother     Hypertension Paternal Grandmother     Anesth Problems Neg Hx        Social History:  Social History   Substance Use Topics    Smoking status: Never Smoker    Smokeless tobacco: Never Used    Alcohol use No       Allergies: Allergies   Allergen Reactions    Adhesive Tape-Silicones Hives    Sulfa (Sulfonamide Antibiotics) Swelling     Lips eyes         Review of Systems     Review of Systems   Constitutional: Negative for fever. HENT: Positive for dental problem and facial swelling. Skin: Negative for color change and wound. Neurological: Negative for headaches. Hematological: Negative for adenopathy. All other systems reviewed and are negative. Physical Exam     Vitals:    05/23/18 1708   BP: 104/75   Pulse: 94   Resp: 16   Temp: 98.2 °F (36.8 °C)   SpO2: 100%   Weight: 89.4 kg (197 lb)   Height: 5' 11\" (1.803 m)     Physical Exam   Constitutional: He is oriented to person, place, and time. He appears well-developed and well-nourished. No distress. AA male in NAD. Alert. Appears comfortable. In fast track room   HENT:   Head: Normocephalic and atraumatic. Right Ear: External ear normal.   Left Ear: External ear normal.   Nose: Nose normal. No mucosal edema or rhinorrhea. Mouth/Throat: Uvula is midline, oropharynx is clear and moist and mucous membranes are normal. Oral lesions present. No trismus in the jaw. No dental abscesses or uvula swelling.  No posterior oropharyngeal edema, posterior oropharyngeal erythema or tonsillar abscesses. Eyes: Conjunctivae are normal.   Neck: Normal range of motion. Cardiovascular: Normal rate, regular rhythm, normal heart sounds and intact distal pulses. Exam reveals no gallop and no friction rub. No murmur heard. Pulmonary/Chest: Effort normal. No accessory muscle usage. No tachypnea. He has no decreased breath sounds. He has no rhonchi. Musculoskeletal: Normal range of motion. Lymphadenopathy:     He has no cervical adenopathy. Neurological: He is alert and oriented to person, place, and time. Skin: Skin is warm and dry. He is not diaphoretic. Psychiatric: He has a normal mood and affect. Judgment normal.   Nursing note and vitals reviewed. Diagnostic Study Results     Labs -   No results found for this or any previous visit (from the past 12 hour(s)). Radiologic Studies -   No orders to display       Medications Given in the ED:  Medications - No data to display     Medical Decision Making     I am the first provider for this patient. I reviewed the vital signs, available nursing notes, past medical history, past surgical history, family history and social history. Records Reviewed: Nursing Notes    Vital Signs-Reviewed the patient's vital signs. Pulse Oximetry Analysis - Normal 100% on RA       Provider Notes (Medical Decision Making): dental caries, dental abscess, dental fx, TMJ, sinusitis    Procedures:  Procedures    ED Course:   5:34 PM Initial assessment performed. Pt and/or pt's family are aware of the plan of care and are in agreement. Diagnosis and Disposition     No dental abscess. Will tx for dental caries. Reasons to RTED discussed with pt. All questions answered. Pt feels comfortable going home at this time. Pt expressed understanding and he agrees with plan. Discharge Note:  5:38 PM  Viktoria Nevarez Jr's  results have been reviewed with him.   He has been counseled regarding his diagnosis, treatment, and plan. He verbally conveys understanding and agreement of the signs, symptoms, diagnosis, treatment and prognosis and additionally agrees to follow up as discussed. He also agrees with the care-plan and conveys that all of his questions have been answered. I have also provided discharge instructions for him that include: educational information regarding their diagnosis and treatment, and list of reasons why they would want to return to the ED prior to their follow-up appointment, should his condition change. He has been provided with education for proper emergency department utilization. Clinical Impression:    1. Left facial pain    2. Dentalgia    3. Other chronic pain        PLAN:  1. D/C Home  2. Discharge Medication List as of 5/23/2018  5:40 PM      START taking these medications    Details   HYDROcodone-acetaminophen (NORCO) 5-325 mg per tablet Take 1-2 Tabs by mouth every four (4) hours as needed for Pain. Max Daily Amount: 12 Tabs., Print, Disp-8 Tab, R-0      amoxicillin 500 mg tab Take 500 mg by mouth three (3) times daily for 10 days. , Print, Disp-30 Tab, R-0         CONTINUE these medications which have NOT CHANGED    Details   ferrous sulfate 325 mg (65 mg iron) tablet TAKE ONE TABLET BY MOUTH 2 TIMES A DAY, Normal, Disp-60 Tab, R-5      HUMALOG KWIKPEN INSULIN 100 unit/mL kwikpen INJECT 18 TO 30 UNITS SUBCUTANEOUSLY 3 TIMES DAILY BEFORE BREAKFAST, LUNCH, AND DINNER, Normal, Disp-15 mL, R-0      lisinopril (PRINIVIL, ZESTRIL) 10 mg tablet TAKE ONE TABLET BY MOUTH EVERY DAY, Normal, Disp-90 Tab, R-1      rosuvastatin (CRESTOR) 40 mg tablet TAKE ONE TABLET BY MOUTH EVERY DAY, Normal, Disp-90 Tab, R-1      VENTOLIN HFA 90 mcg/actuation inhaler TAKE 2 PUFFS BY INHALATION EVERY 4 HOURS AS NEEDED FOR WHEEZING, Normal, Disp-1 Inhaler, R-5      VITAMIN D2 50,000 unit capsule TAKE ONE CAPSULE BY MOUTH EVERY WEEK, Normal, Disp-4 Cap, R-4      LANTUS SOLOSTAR 100 unit/mL (3 mL) inpn INJECT 40 TO 50 UNITS SUBCUTANEOUSLY TWICE DAILY AS DIRECTED, Normal, Disp-15 mL, R-3      LYRICA 75 mg capsule TAKE ONE CAPSULE BY MOUTH EVERY NIGHT, Phone In, Disp-30 Cap, R-4      pantoprazole (PROTONIX) 20 mg tablet Take 20 mg by mouth daily. , Historical Med      aspirin 81 mg chewable tablet Take 81 mg by mouth daily. , Historical Med      insulin aspart (NOVOLOG) 100 unit/mL inpn 0.2 mL by SubCUTAneous route Before breakfast, lunch, dinner and at bedtime for 30 days. Please give a month's supply, Normal, Disp-24 mL, R-3      TESTOSTERONE IM by IntraMUSCular route., Historical Med      simethicone (MYLICON) 80 mg chewable tablet Take 80 mg by mouth every six (6) hours as needed for Flatulence., Historical Med      omega-3 acid ethyl esters (LOVAZA) 1 gram capsule Take 2 capsules by mouth two (2) times daily (with meals) for 30 days. , NormalNO MORE REFILLS AFTER THIS. NEEDS APPTDisp-120 capsule, R-0      ketoconazole (NIZORAL) 2 % topical cream Apply  to affected area daily. , Normal, Disp-30 g, R-3      docusate sodium (COLACE) 100 mg capsule Take 100 mg by mouth two (2) times daily as needed., Historical Med         STOP taking these medications       oxyCODONE-acetaminophen (PERCOCET) 5-325 mg per tablet Comments:   Reason for Stopping:             3.   Follow-up Information     Follow up With Details Comments Contact Info    DENTIST Schedule an appointment as soon as possible for a visit For follow up with dental reference listed     [facility] Go to As needed, if symptoms worsen [address] [address]  [phone]        _______________________________    Attestations: This note is prepared by Tia Huynh, acting as Scribe for Juan Watson PA-C. Juan Watson PA-C:  The scribe's documentation has been prepared under my direction and personally reviewed by me in its entirety.   I confirm that the note above accurately reflects all work, treatment, procedures, and medical decision making performed by me.  _______________________________

## 2018-05-23 NOTE — ED NOTES
Patient armband removed and shredded. Pt given scripts x2 with discharge instructions and verbalizes understanding. Pt discharged home ambulatory with family member, no distress noted.

## 2018-05-23 NOTE — DISCHARGE INSTRUCTIONS
Tooth and Gum Pain: Care Instructions  Your Care Instructions    The most common causes of dental pain are tooth decay and gum disease. Pain can also be caused by an infection of the tooth (abscess) or the gums. Or you may have pain from a broken or cracked tooth. Other causes of pain include infection and damage to a tooth from nervous grinding of your teeth. A wisdom tooth can be painful when it is coming in but cannot break through the gum. It can also be painful when the tooth is only partway in and extra gum tissue has formed around it. The tissue can get inflamed (pericoronitis), and sometimes it gets infected. Prompt dental care can help find the cause of your toothache and keep the tooth from dying or gum disease from getting worse. Self-care at home may reduce your pain and discomfort. Follow-up care is a key part of your treatment and safety. Be sure to make and go to all appointments, and call your dentist or doctor if you are having problems. It's also a good idea to know your test results and keep a list of the medicines you take. How can you care for yourself at home? · To reduce pain and facial swelling, put an ice or cold pack on the outside of your cheek for 10 to 20 minutes at a time. Put a thin cloth between the ice and your skin. Do not use heat. · If your doctor prescribed antibiotics, take them as directed. Do not stop taking them just because you feel better. You need to take the full course of antibiotics. · Ask your doctor if you can take an over-the-counter pain medicine, such as acetaminophen (Tylenol), ibuprofen (Advil, Motrin), or naproxen (Aleve). Be safe with medicines. Read and follow all instructions on the label. · Avoid very hot, cold, or sweet foods and drinks if they increase your pain. · Rinse your mouth with warm salt water every 2 hours to help relieve pain and swelling. Mix 1 teaspoon of salt in 8 ounces of water.   · Talk to your dentist about using special toothpaste for sensitive teeth. To reduce pain on contact with heat or cold or when brushing, brush with this toothpaste regularly or rub a small amount of the paste on the sensitive area with a clean finger 2 or 3 times a day. Floss gently between your teeth. · Do not smoke or use spit tobacco. Tobacco use can make gum problems worse, decreases your ability to fight infection in your gums, and delays healing. If you need help quitting, talk to your doctor about stop-smoking programs and medicines. These can increase your chances of quitting for good. When should you call for help? Call 911 anytime you think you may need emergency care. For example, call if:  ? · You have trouble breathing. ?Call your dentist or doctor now or seek immediate medical care if:  ? · You have signs of infection, such as:  ¨ Increased pain, swelling, warmth, or redness. ¨ Red streaks leading from the area. ¨ Pus draining from the area. ¨ A fever. ? Watch closely for changes in your health, and be sure to contact your doctor if:  ? · You do not get better as expected. Where can you learn more? Go to http://sanaz-colt.info/. Enter [id number] [id number] in the search box to learn more about \"Tooth and Gum Pain: Care Instructions. \"  Current as of: May 12, 2017  Content Version: 11.4  © 8512-4318 [employer]. Care instructions adapted under license by [employer] (which disclaims liability or warranty for this information). If you have questions about a medical condition or this instruction, always ask your healthcare professional. [place] [address] any warranty or liability for your use of this information.

## 2018-05-23 NOTE — ED TRIAGE NOTES
Patient complaining of pain to left upper teeth onset yesterday. Patient states he called his dentist today but they were unable to see him. Patient states he was instructed to go to an Urgent Care or the ED for pain medications and antibiotics.

## 2018-05-24 ENCOUNTER — PATIENT OUTREACH (OUTPATIENT)
Dept: INTERNAL MEDICINE CLINIC | Age: 63
End: 2018-05-24

## 2018-05-24 NOTE — PROGRESS NOTES
ED Follow-Up      Date/Time:  2018 2:59 PM    Patient was seen to [facility] on 2018 for Dental Pain. The physician discharge summary was available at the time of outreach. Patient was contacted within 1 business days of discharge. Top Challenges reviewed with the provider   Needs to follow up with Dentist     Method of communication with provider :none      Nurse Navigator (NN) contacted the patient by telephone to perform post hospital discharge assessment. Verified name and  with patient as identifiers. Provided introduction to self, and explanation of the Nurse Navigator role. Reviewed discharge instructions and red flags with patient who verbalized understanding. Patient given an opportunity to ask questions and does not have any further questions or concerns at this time. The patient agrees to contact the PCP office for questions related to their healthcare. NN provided contact information for future reference. Advance Care Planning:   Does patient have an Advance Directive:  not on file     Medication:     New Medications at Discharge: amoxicillin, hydrocodone  Changed Medications at Discharge: None  Discontinued Medications at Discharge: None    Confirmed he is taking antibiotic. Will schedule dental appointment at [place] [id number]. Current Outpatient Prescriptions   Medication Sig    amoxicillin 500 mg tab Take 500 mg by mouth three (3) times daily for 10 days.  HYDROcodone-acetaminophen (NORCO) 5-325 mg per tablet Take 1-2 Tabs by mouth every four (4) hours as needed for Pain. Max Daily Amount: 12 Tabs.     ferrous sulfate 325 mg (65 mg iron) tablet TAKE ONE TABLET BY MOUTH 2 TIMES A DAY    HUMALOG KWIKPEN INSULIN 100 unit/mL kwikpen INJECT 18 TO 30 UNITS SUBCUTANEOUSLY 3 TIMES DAILY BEFORE BREAKFAST, LUNCH, AND DINNER    lisinopril (PRINIVIL, ZESTRIL) 10 mg tablet TAKE ONE TABLET BY MOUTH EVERY DAY    rosuvastatin (CRESTOR) 40 mg tablet TAKE ONE TABLET BY MOUTH EVERY DAY    VENTOLIN HFA 90 mcg/actuation inhaler TAKE 2 PUFFS BY INHALATION EVERY 4 HOURS AS NEEDED FOR WHEEZING    VITAMIN D2 50,000 unit capsule TAKE ONE CAPSULE BY MOUTH EVERY WEEK    LANTUS SOLOSTAR 100 unit/mL (3 mL) inpn INJECT 40 TO 50 UNITS SUBCUTANEOUSLY TWICE DAILY AS DIRECTED    LYRICA 75 mg capsule TAKE ONE CAPSULE BY MOUTH EVERY NIGHT    pantoprazole (PROTONIX) 20 mg tablet Take 20 mg by mouth daily.  aspirin 81 mg chewable tablet Take 81 mg by mouth daily.  insulin aspart (NOVOLOG) 100 unit/mL inpn 0.2 mL by SubCUTAneous route Before breakfast, lunch, dinner and at bedtime for 30 days. Please give a month's supply    pantoprazole (PROTONIX) 40 mg tablet Take 1 Tab by mouth daily for 30 days.  TESTOSTERONE IM by IntraMUSCular route.  simethicone (MYLICON) 80 mg chewable tablet Take 80 mg by mouth every six (6) hours as needed for Flatulence.  omega-3 acid ethyl esters (LOVAZA) 1 gram capsule Take 2 capsules by mouth two (2) times daily (with meals) for 30 days.  ketoconazole (NIZORAL) 2 % topical cream Apply  to affected area daily.  docusate sodium (COLACE) 100 mg capsule Take 100 mg by mouth two (2) times daily as needed. No current facility-administered medications for this visit. There are no discontinued medications. PCP/Specialist follow up: No future appointments. Patient will call to follow up with Dr. Rebekah Borja but declines to schedule an appointment at this time.     Goals      Follow up with Dentist, Endocrinologist, and  Podiatrist. (pt-stated)            5/24/2018 Patient understands need to follow up with dentist. He did report elevated blood sugars and is waiting for appointment with Endocrinologist as well as the podiatrist. PAC

## 2018-06-01 DIAGNOSIS — M48.02 SPINAL STENOSIS IN CERVICAL REGION: ICD-10-CM

## 2018-06-01 DIAGNOSIS — R51.9 LEFT FACIAL PAIN: ICD-10-CM

## 2018-06-01 RX ORDER — OXYCODONE AND ACETAMINOPHEN 5; 325 MG/1; MG/1
1 TABLET ORAL
Qty: 90 TAB | Refills: 0 | OUTPATIENT
Start: 2018-06-01 | End: 2018-07-01

## 2018-06-04 RX ORDER — OXYCODONE AND ACETAMINOPHEN 5; 325 MG/1; MG/1
1 TABLET ORAL
Qty: 90 TAB | Refills: 0 | Status: SHIPPED | OUTPATIENT
Start: 2018-06-04 | End: 2018-06-29 | Stop reason: SDUPTHER

## 2018-06-05 ENCOUNTER — DOCUMENTATION ONLY (OUTPATIENT)
Dept: INTERNAL MEDICINE CLINIC | Age: 63
End: 2018-06-05

## 2018-06-29 DIAGNOSIS — M48.02 SPINAL STENOSIS IN CERVICAL REGION: ICD-10-CM

## 2018-06-29 RX ORDER — OXYCODONE AND ACETAMINOPHEN 5; 325 MG/1; MG/1
1 TABLET ORAL
Qty: 90 TAB | Refills: 0 | Status: SHIPPED | OUTPATIENT
Start: 2018-06-29 | End: 2018-07-31 | Stop reason: SDUPTHER

## 2018-06-29 NOTE — TELEPHONE ENCOUNTER
----- Message from Kathia Murcia sent at 6/28/2018  5:20 PM EDT -----  Regarding: Dr. Espinal Hernandez: [phone]  Pt need a refill of Rx Oxycodone, please contact when script is ready for pickup. Marylou Soriano

## 2018-07-06 ENCOUNTER — DOCUMENTATION ONLY (OUTPATIENT)
Dept: INTERNAL MEDICINE CLINIC | Age: 63
End: 2018-07-06

## 2018-07-24 ENCOUNTER — TELEPHONE (OUTPATIENT)
Dept: WOUND CARE | Age: 63
End: 2018-07-24

## 2018-07-25 ENCOUNTER — HOSPITAL ENCOUNTER (OUTPATIENT)
Dept: WOUND CARE | Age: 63
End: 2018-07-25

## 2018-07-25 ENCOUNTER — OFFICE VISIT (OUTPATIENT)
Dept: INTERNAL MEDICINE CLINIC | Age: 63
End: 2018-07-25

## 2018-07-25 ENCOUNTER — APPOINTMENT (OUTPATIENT)
Dept: WOUND CARE | Age: 63
End: 2018-07-25

## 2018-07-25 VITALS
HEART RATE: 91 BPM | DIASTOLIC BLOOD PRESSURE: 81 MMHG | BODY MASS INDEX: 25.2 KG/M2 | HEIGHT: 71 IN | SYSTOLIC BLOOD PRESSURE: 143 MMHG | OXYGEN SATURATION: 97 % | TEMPERATURE: 98 F | WEIGHT: 180 LBS | RESPIRATION RATE: 16 BRPM

## 2018-07-25 DIAGNOSIS — L97.912 ULCER OF RIGHT LOWER EXTREMITY WITH FAT LAYER EXPOSED (HCC): ICD-10-CM

## 2018-07-25 DIAGNOSIS — E11.22 DM TYPE 2 CAUSING CKD STAGE 3 (HCC): Chronic | ICD-10-CM

## 2018-07-25 DIAGNOSIS — N18.30 DM TYPE 2 CAUSING CKD STAGE 3 (HCC): Chronic | ICD-10-CM

## 2018-07-25 DIAGNOSIS — Z89.9 S/P AMPUTATION OF LIMB: ICD-10-CM

## 2018-07-25 DIAGNOSIS — S81.801A WOUND OF RIGHT LOWER EXTREMITY, INITIAL ENCOUNTER: Primary | ICD-10-CM

## 2018-07-25 RX ORDER — INSULIN GLARGINE 100 [IU]/ML
INJECTION, SOLUTION SUBCUTANEOUS
Qty: 15 ML | Refills: 0 | Status: SHIPPED | OUTPATIENT
Start: 2018-07-25 | End: 2018-10-05 | Stop reason: SDUPTHER

## 2018-07-25 RX ORDER — LISINOPRIL 10 MG/1
TABLET ORAL
Qty: 90 TAB | Refills: 0 | Status: SHIPPED | OUTPATIENT
Start: 2018-07-25 | End: 2019-04-01 | Stop reason: SDUPTHER

## 2018-07-25 RX ORDER — INSULIN LISPRO 100 [IU]/ML
INJECTION, SOLUTION INTRAVENOUS; SUBCUTANEOUS
Qty: 15 ML | Refills: 0 | Status: SHIPPED | OUTPATIENT
Start: 2018-07-25 | End: 2018-09-06 | Stop reason: SDUPTHER

## 2018-07-25 RX ORDER — SILVER SULFADIAZINE 10 G/1000G
CREAM TOPICAL DAILY
Qty: 85 G | Refills: 0 | Status: SHIPPED | OUTPATIENT
Start: 2018-07-25 | End: 2019-05-13 | Stop reason: SDUPTHER

## 2018-07-25 NOTE — PROGRESS NOTES
Jaleesa Lu is a 57 y.o. male who presents today for Laceration and Leg Pain  . 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 Today patient is here for f/u. Problem visit:  Jaelesa Lu is here for complaint of wound to amputated leg. Problem began 3 week(s) ago. Severity is moderate  Character of problem: Open medial wound. No pus or cellulitis. Associated symptoms include: mild discomfort. This started after new prosthetic in June. Has since stopped wearing this. Using topical treatment. BG have been stable. Was late for wound care today due to flat tire and was refused to be seen. Would like to make sure that this is doing well. ROS  Review of Systems   Constitutional: Negative for chills, fever and weight loss. Respiratory: Negative for cough and shortness of breath. Cardiovascular: Negative for chest pain, palpitations and leg swelling. Gastrointestinal: Negative for abdominal pain, nausea and vomiting. Skin:        Ulcer   Neurological: Negative. Endo/Heme/Allergies: Does not bruise/bleed easily. Psychiatric/Behavioral: Negative for depression. The patient is not nervous/anxious.         Visit Vitals    /81 (BP 1 Location: Left arm, BP Patient Position: Sitting)    Pulse 91    Temp 98 °F (36.7 °C) (Oral)    Resp 16    Ht 5' 11\" (1.803 m)    Wt 180 lb (81.6 kg)    SpO2 97%    BMI 25.1 kg/m2       Physical Exam   Constitutional: He is oriented to person, place, and time. He appears well-developed and well-nourished. HENT:   Head: Normocephalic and atraumatic. Eyes: EOM are normal. Pupils are equal, round, and reactive to light. Cardiovascular: Normal rate and regular rhythm. No murmur heard. Pulmonary/Chest: Effort normal and breath sounds normal. No respiratory distress. Musculoskeletal: Normal range of motion. Legs:  One healed ulcer and second open ulcer where noted. Good clean tissue. No surrounding cellulitis. R BKA. Neurological: He is alert and oriented to person, place, and time. Skin: Skin is warm and dry. He is not diaphoretic. Psychiatric: He has a normal mood and affect. His behavior is normal.         Current Outpatient Prescriptions   Medication Sig    silver sulfADIAZINE (SILVADENE) 1 % topical cream Apply  to affected area daily.  oxyCODONE-acetaminophen (PERCOCET) 5-325 mg per tablet Take 1 Tab by mouth every four (4) hours as needed for Pain for up to 30 days. Max Daily Amount: 6 Tabs.  glucose blood VI test strips (PRODIGY NO CODING) strip Use to test blood sugar twice daily.  Dx: E11.22, N18.3    ferrous sulfate 325 mg (65 mg iron) tablet TAKE ONE TABLET BY MOUTH 2 TIMES A DAY    HUMALOG KWIKPEN INSULIN 100 unit/mL kwikpen INJECT 18 TO 30 UNITS SUBCUTANEOUSLY 3 TIMES DAILY BEFORE BREAKFAST, LUNCH, AND DINNER    lisinopril (PRINIVIL, ZESTRIL) 10 mg tablet TAKE ONE TABLET BY MOUTH EVERY DAY    rosuvastatin (CRESTOR) 40 mg tablet TAKE ONE TABLET BY MOUTH EVERY DAY    VENTOLIN HFA 90 mcg/actuation inhaler TAKE 2 PUFFS BY INHALATION EVERY 4 HOURS AS NEEDED FOR WHEEZING    VITAMIN D2 50,000 unit capsule TAKE ONE CAPSULE BY MOUTH EVERY WEEK    LANTUS SOLOSTAR 100 unit/mL (3 mL) inpn INJECT 40 TO 50 UNITS SUBCUTANEOUSLY TWICE DAILY AS DIRECTED    LYRICA 75 mg capsule TAKE ONE CAPSULE BY MOUTH EVERY NIGHT    pantoprazole (PROTONIX) 20 mg tablet Take 20 mg by mouth daily.  aspirin 81 mg chewable tablet Take 81 mg by mouth daily.  insulin aspart (NOVOLOG) 100 unit/mL inpn 0.2 mL by SubCUTAneous route Before breakfast, lunch, dinner and at bedtime for 30 days. Please give a month's supply    docusate sodium (COLACE) 100 mg capsule Take 100 mg by mouth two (2) times daily as needed.  HYDROcodone-acetaminophen (NORCO) 5-325 mg per tablet Take 1-2 Tabs by mouth every four (4) hours as needed for Pain. Max Daily Amount: 12 Tabs.  pantoprazole (PROTONIX) 40 mg tablet Take 1 Tab by mouth daily for 30 days.  TESTOSTERONE IM by IntraMUSCular route.  simethicone (MYLICON) 80 mg chewable tablet Take 80 mg by mouth every six (6) hours as needed for Flatulence.  omega-3 acid ethyl esters (LOVAZA) 1 gram capsule Take 2 capsules by mouth two (2) times daily (with meals) for 30 days.  ketoconazole (NIZORAL) 2 % topical cream Apply  to affected area daily. No current facility-administered medications for this visit.          Past Medical History:   Diagnosis Date    BPH (benign prostatic hyperplasia)     Chronic pain     BACK NEUROPATHY FEET HANDS    DM neuropathy, type II diabetes mellitus ([address]     DM type 2 causing CKD stage 3 ([address] 12/17/2012    DM type 2 causing vascular disease ([address]     Dyslipidemia     Foot ulcer due to secondary DM ([address] 12/1/2012    GERD (gastroesophageal reflux disease)     HTN     S/P BKA (below knee amputation) ([address]     right BKA due to non-healing ulcer    Sleep apnea     Thromboembolus ([address] 1970'S    BLOOD CLOT LEFT LEG      Past Surgical History:   Procedure Laterality Date    ABDOMEN SURGERY PROC UNLISTED      pilonidal cyst    ECHO 2D ADULT  8/09    normal; LVEF 60%    ECHO STRESS  4/09    7 min; normal    ENDOSCOPY, COLON, DIAGNOSTIC      HX AMPUTATION  2012    left great toe    HX AMPUTATION  2007    BKA right    HX BELOW KNEE AMPUTATION Right     HX CERVICAL FUSION  2009    x2    HX ORTHOPAEDIC  2006    ACDF C4-C7    STRESS TEST MYOVIEW  8/09    walked 8:46; normal; LVEF 51%      Social History   Substance Use Topics    Smoking status: Never Smoker    Smokeless tobacco: Never Used    Alcohol use No      Family History   Problem Relation Age of Onset    Hypertension Mother    [facility] Gout Mother     Cancer Father      leukemia    Diabetes Father     Hypertension Sister     Diabetes Maternal Grandmother     Hypertension Maternal Grandmother     Diabetes Paternal Grandmother     Hypertension Paternal Grandmother     Anesth Problems Neg Hx         Allergies   Allergen Reactions    Adhesive Tape-Silicones Hives    Sulfa (Sulfonamide Antibiotics) Swelling     Lips eyes        Assessment/Plan  Diagnoses and all orders for this visit:    1. Wound of right lower extremity, initial encounter - 2/2 new prosthetic. No longer wearing this. Will get in to see wound care. In the meantime keep clean. -     silver sulfADIAZINE (SILVADENE) 1 % topical cream; Apply  to affected area daily. 2. S/P amputation of limb  -     silver sulfADIAZINE (SILVADENE) 1 % topical cream; Apply  to affected area daily. 3. Ulcer of right lower extremity with fat layer exposed ([address]  -     silver sulfADIAZINE (SILVADENE) 1 % topical cream; Apply  to affected area daily. Follow-up Disposition:  Return if symptoms worsen or fail to improve.     oMreno Dotson MD  7/25/2018

## 2018-07-25 NOTE — MR AVS SNAPSHOT
[address] [place] [address] 
 
 
 [address] [place] [address] 
[phone] Patient: Zulema Cano MRN: [id number] MARGE:[birth year] Visit Information Date & Time Provider Department Dept. Phone Encounter #  
 7/25/2018 11:45 AM Macy Childers MD [facility] [address] [id number] Upcoming Health Maintenance Date Due ZOSTER VACCINE AGE 60> 6/10/2013 EYE EXAM RETINAL OR DILATED Q1 4/18/2018 HEMOGLOBIN A1C Q6M 6/21/2018 MEDICARE YEARLY EXAM 7/24/2018 Influenza Age 5 to Adult 8/1/2018 FOOT EXAM Q1 12/20/2018 MICROALBUMIN Q1 12/21/2018 LIPID PANEL Q1 12/21/2018 COLONOSCOPY 5/3/2019 Pneumococcal 19-64 Highest Risk (3 of 3 - PPSV23) 10/3/2021 DTaP/Tdap/Td series (3 - Td) 11/7/2026 Allergies as of 7/25/2018  Review Complete On: 6/04/5308 By: Onofre Adan Severity Noted Reaction Type Reactions Adhesive Tape-silicones  88/63/8713    Hives Sulfa (Sulfonamide Antibiotics)  02/02/2012    Swelling Lips eyes Current Immunizations  Reviewed on 10/14/2014 Name Date Influenza Vaccine 10/14/2014 Influenza Vaccine (Quad) PF 10/3/2016, 11/4/2015 Pneumococcal Conjugate (PCV-13) 11/4/2015 Tdap 11/7/2016  3:41 PM, 2/12/2013 Not reviewed this visit You Were Diagnosed With   
  
 Codes Comments Wound of right lower extremity, initial encounter    -  Primary ICD-10-CM: F86.012I ICD-9-CM: 894.0 S/P amputation of limb     ICD-10-CM: Z89.9 ICD-9-CM: V49.5 Ulcer of right lower extremity with fat layer exposed ([facility]     ICD-10-CM: C22.684 ICD-9-CM: 707.10 Vitals BP Pulse Temp Resp Height(growth percentile) Weight(growth percentile) 143/81 (BP 1 Location: Left arm, BP Patient Position: Sitting) 91 98 °F (36.7 °C) (Oral) 16 5' 11\" (1.803 m) 180 lb (81.6 kg) SpO2 BMI Smoking Status 97% 25.1 kg/m2 Never Smoker Vitals History BMI and BSA Data Body Mass Index Body Surface Area  
 25.1 kg/m 2 2.02 m 2 Preferred Pharmacy Pharmacy Name Phone [facility] - [address] [phone] Your Updated Medication List  
  
   
This list is accurate as of 7/25/18 12:17 PM.  Always use your most recent med list.  
  
  
  
  
 aspirin 81 mg chewable tablet Take 81 mg by mouth daily. docusate sodium 100 mg capsule Commonly known as:  Kylie Ximena Take 100 mg by mouth two (2) times daily as needed. ferrous sulfate 325 mg (65 mg iron) tablet TAKE ONE TABLET BY MOUTH 2 TIMES A DAY  
  
 glucose blood VI test strips strip Commonly known as:  PRODIGY NO CODING Use to test blood sugar twice daily. Dx: E11.22, N18.3 HumaLOG KwikPen Insulin 100 unit/mL kwikpen Generic drug:  insulin lispro INJECT 18 TO 30 UNITS SUBCUTANEOUSLY 3 TIMES DAILY BEFORE BREAKFAST, LUNCH, AND DINNER HYDROcodone-acetaminophen 5-325 mg per tablet Commonly known as:  Pili MALDONADOyster Take 1-2 Tabs by mouth every four (4) hours as needed for Pain. Max Daily Amount: 12 Tabs. insulin aspart U-100 100 unit/mL Inpn Commonly known as:  NOVOLOG  
0.2 mL by SubCUTAneous route Before breakfast, lunch, dinner and at bedtime for 30 days. Please give a month's supply  
  
 ketoconazole 2 % topical cream  
Commonly known as:  NIZORAL Apply  to affected area daily. LANTUS SOLOSTAR U-100 INSULIN 100 unit/mL (3 mL) Inpn Generic drug:  insulin glargine INJECT 40 TO 50 UNITS SUBCUTANEOUSLY TWICE DAILY AS DIRECTED  
  
 lisinopril 10 mg tablet Commonly known as:  PRINIVIL, ZESTRIL  
TAKE ONE TABLET BY MOUTH EVERY DAY  
  
 LYRICA 75 mg capsule Generic drug:  pregabalin TAKE ONE CAPSULE BY MOUTH EVERY NIGHT  
  
 omega-3 acid ethyl esters 1 gram capsule Commonly known as:  Lien Quezada Take 2 capsules by mouth two (2) times daily (with meals) for 30 days. oxyCODONE-acetaminophen 5-325 mg per tablet Commonly known as:  PERCOCET Take 1 Tab by mouth every four (4) hours as needed for Pain for up to 30 days. Max Daily Amount: 6 Tabs. * pantoprazole 20 mg tablet Commonly known as:  PROTONIX Take 20 mg by mouth daily. * pantoprazole 40 mg tablet Commonly known as:  PROTONIX Take 1 Tab by mouth daily for 30 days. rosuvastatin 40 mg tablet Commonly known as:  CRESTOR  
TAKE ONE TABLET BY MOUTH EVERY DAY  
  
 silver sulfADIAZINE 1 % topical cream  
Commonly known as:  SILVADENE Apply  to affected area daily. simethicone 80 mg chewable tablet Commonly known as:  Osmany Crest Take 80 mg by mouth every six (6) hours as needed for Flatulence. TESTOSTERONE IM  
by IntraMUSCular route. VENTOLIN HFA 90 mcg/actuation inhaler Generic drug:  albuterol TAKE 2 PUFFS BY INHALATION EVERY 4 HOURS AS NEEDED FOR WHEEZING  
  
 VITAMIN D2 50,000 unit capsule Generic drug:  ergocalciferol TAKE ONE CAPSULE BY MOUTH EVERY WEEK * Notice: This list has 2 medication(s) that are the same as other medications prescribed for you. Read the directions carefully, and ask your doctor or other care provider to review them with you. Prescriptions Sent to Pharmacy Refills  
 silver sulfADIAZINE (SILVADENE) 1 % topical cream 0 Sig: Apply  to affected area daily. Class: Normal  
 Pharmacy: [address] #: [phone] Route: Topical  
  
Introducing [facility]! Dear Marla De Paz: 
Thank you for requesting a [employer] account. Our records indicate that you already have an active [employer] account. You can access your account anytime at [email or web address] Did you know that you can access your hospital and ER discharge instructions at any time in [employer]? You can also review all of your test results from your hospital stay or ER visit. Additional Information If you have questions, please visit the Frequently Asked Questions section of the [email or web address] website at [email or web address] Remember, [employer] is NOT to be used for urgent needs. For medical emergencies, dial 911. Now available from your iPhone and Android! Please provide this summary of care documentation to your next provider. Your primary care clinician is listed as Charlestine Goodell. If you have any questions after today's visit, please call [phone].

## 2018-07-31 ENCOUNTER — TELEPHONE (OUTPATIENT)
Dept: INTERNAL MEDICINE CLINIC | Age: 63
End: 2018-07-31

## 2018-07-31 DIAGNOSIS — M48.02 SPINAL STENOSIS IN CERVICAL REGION: ICD-10-CM

## 2018-07-31 NOTE — TELEPHONE ENCOUNTER
----- Message from Mathew Hamlin sent at 7/31/2018  4:55 PM EDT -----  Regarding: Dr. Charlie Jurado / Rx refill  Contact: [phone]  Pt requested a Rx refill on Oxycodone 325 mgs, Rx on file. Please call once ready for pickup.

## 2018-08-01 RX ORDER — OXYCODONE AND ACETAMINOPHEN 5; 325 MG/1; MG/1
1 TABLET ORAL
Qty: 90 TAB | Refills: 0 | Status: SHIPPED | OUTPATIENT
Start: 2018-08-01 | End: 2018-08-30 | Stop reason: SDUPTHER

## 2018-08-01 NOTE — TELEPHONE ENCOUNTER
Prescription approved & placed at the  for pick. V/m left for patient notifying his script is available.

## 2018-08-06 ENCOUNTER — DOCUMENTATION ONLY (OUTPATIENT)
Dept: INTERNAL MEDICINE CLINIC | Age: 63
End: 2018-08-06

## 2018-08-30 DIAGNOSIS — M48.02 SPINAL STENOSIS IN CERVICAL REGION: ICD-10-CM

## 2018-08-30 RX ORDER — OXYCODONE AND ACETAMINOPHEN 5; 325 MG/1; MG/1
1 TABLET ORAL
Qty: 90 TAB | Refills: 0 | Status: SHIPPED | OUTPATIENT
Start: 2018-08-30 | End: 2018-10-02 | Stop reason: SDUPTHER

## 2018-09-06 DIAGNOSIS — G62.9 NEUROPATHY: Chronic | ICD-10-CM

## 2018-09-06 RX ORDER — INSULIN LISPRO 100 [IU]/ML
INJECTION, SOLUTION INTRAVENOUS; SUBCUTANEOUS
Qty: 15 ML | Refills: 5 | Status: SHIPPED | OUTPATIENT
Start: 2018-09-06 | End: 2019-03-05 | Stop reason: SDUPTHER

## 2018-09-06 RX ORDER — PREGABALIN 75 MG/1
CAPSULE ORAL
Qty: 30 CAP | Refills: 5 | OUTPATIENT
Start: 2018-09-06 | End: 2019-12-03 | Stop reason: SDUPTHER

## 2018-09-07 ENCOUNTER — DOCUMENTATION ONLY (OUTPATIENT)
Dept: INTERNAL MEDICINE CLINIC | Age: 63
End: 2018-09-07

## 2018-10-01 DIAGNOSIS — M48.02 SPINAL STENOSIS IN CERVICAL REGION: ICD-10-CM

## 2018-10-01 DIAGNOSIS — R51.9 LEFT FACIAL PAIN: ICD-10-CM

## 2018-10-01 RX ORDER — HYDROCODONE BITARTRATE AND ACETAMINOPHEN 5; 325 MG/1; MG/1
1-2 TABLET ORAL
Qty: 8 TAB | Refills: 0 | Status: CANCELLED | OUTPATIENT
Start: 2018-10-01

## 2018-10-01 NOTE — TELEPHONE ENCOUNTER
----- Message from Lizzy Paz sent at 10/1/2018  1:00 PM EDT -----  Regarding: Dr. Natasha Beck  The patient is requesting to  Rx Oxycodone.  (c)[phone]

## 2018-10-02 RX ORDER — OXYCODONE AND ACETAMINOPHEN 5; 325 MG/1; MG/1
1 TABLET ORAL
Qty: 90 TAB | Refills: 0 | Status: SHIPPED | OUTPATIENT
Start: 2018-10-02 | End: 2018-10-29 | Stop reason: SDUPTHER

## 2018-10-08 NOTE — PERIOP NOTES
[address]  Endoscopy Preprocedure Instructions      1. On the day of your surgery, please report to registration located on the 2nd floor of the  [facility]. yes    2. You must have a responsible adult to drive you to the hospital, stay at the hospital during your procedure and drive you home. If they leave your procedure will not be started (no exceptions). yes    3. Do not have anything to eat or drink (including water, gum, mints, coffee, and juice) after midnight. This does not apply to the medications you were instructed to take by your physician. yesIf you are currently taking Plavix, Coumadin, Aspirin, or other blood-thinning agents, contact your physician for special instructions. yes,    4. If you are having a procedure that requires bowel prep: We recommend that you have only clear liquids the day before your procedure and begin your bowel prep by 5:00 pm.  You may continue to drink clear liquids until midnight. If for any reason you are not able to complete your prep please notify your physician immediately. yes    5. Have a list of all current medications, including vitamins, herbal supplements and any other over the counter medications. yes    6. If you wear glasses, contacts, dentures and/or hearing aids, they may be removed prior to procedure, please bring a case to store them in. yes    7. You should understand that if you do not follow these instructions your procedure may be cancelled. If your physical condition changes (I.e. fever, cold or flu) please contact your doctor as soon as possible. 8. It is important that you be on time.   If for any reason you are unable to keep your appointment please call )- the day of or your physicians office prior to your scheduled procedure

## 2018-10-09 ENCOUNTER — ANESTHESIA EVENT (OUTPATIENT)
Dept: ENDOSCOPY | Age: 63
End: 2018-10-09
Payer: MEDICARE

## 2018-10-09 ENCOUNTER — ANESTHESIA (OUTPATIENT)
Dept: ENDOSCOPY | Age: 63
End: 2018-10-09
Payer: MEDICARE

## 2018-10-09 ENCOUNTER — HOSPITAL ENCOUNTER (OUTPATIENT)
Age: 63
Setting detail: OUTPATIENT SURGERY
Discharge: HOME | End: 2018-10-09
Attending: SPECIALIST | Admitting: SPECIALIST
Payer: MEDICARE

## 2018-10-09 VITALS
RESPIRATION RATE: 13 BRPM | OXYGEN SATURATION: 100 % | BODY MASS INDEX: 25.9 KG/M2 | HEART RATE: 85 BPM | HEIGHT: 71 IN | SYSTOLIC BLOOD PRESSURE: 109 MMHG | DIASTOLIC BLOOD PRESSURE: 68 MMHG | TEMPERATURE: 98.5 F | WEIGHT: 185 LBS

## 2018-10-09 LAB
ANION GAP BLD CALC-SCNC: 4 MMOL/L (ref 10–20)
BUN BLD-MCNC: 32 MG/DL (ref 9–20)
CA-I BLD-MCNC: 1.12 MMOL/L (ref 1.12–1.32)
CHLORIDE BLD-SCNC: 118 MMOL/L (ref 98–107)
CO2 BLD-SCNC: 24 MMOL/L (ref 21–32)
CREAT BLD-MCNC: 2.3 MG/DL (ref 0.6–1.3)
GLUCOSE BLD STRIP.AUTO-MCNC: 205 MG/DL (ref 65–100)
GLUCOSE BLD-MCNC: 193 MG/DL (ref 65–100)
HCT VFR BLD CALC: 26 % (ref 36.6–50.3)
POTASSIUM BLD-SCNC: 3.9 MMOL/L (ref 3.5–5.1)
SERVICE CMNT-IMP: ABNORMAL
SERVICE CMNT-IMP: ABNORMAL
SODIUM BLD-SCNC: 140 MMOL/L (ref 136–145)

## 2018-10-09 PROCEDURE — C1726 CATH, BAL DIL, NON-VASCULAR: HCPCS | Performed by: SPECIALIST

## 2018-10-09 PROCEDURE — 82962 GLUCOSE BLOOD TEST: CPT

## 2018-10-09 PROCEDURE — 74011250636 HC RX REV CODE- 250/636

## 2018-10-09 PROCEDURE — 80047 BASIC METABLC PNL IONIZED CA: CPT

## 2018-10-09 PROCEDURE — 76060000031 HC ANESTHESIA FIRST 0.5 HR: Performed by: SPECIALIST

## 2018-10-09 PROCEDURE — 76040000019: Performed by: SPECIALIST

## 2018-10-09 PROCEDURE — 77030009426 HC FCPS BIOP ENDOSC BSC -B: Performed by: SPECIALIST

## 2018-10-09 PROCEDURE — 77030018712 HC DEV BLLN INFL BSC -B: Performed by: SPECIALIST

## 2018-10-09 PROCEDURE — 88305 TISSUE EXAM BY PATHOLOGIST: CPT | Performed by: SPECIALIST

## 2018-10-09 PROCEDURE — 74011250636 HC RX REV CODE- 250/636: Performed by: PHYSICIAN ASSISTANT

## 2018-10-09 RX ORDER — EPINEPHRINE 0.1 MG/ML
1 INJECTION INTRACARDIAC; INTRAVENOUS
Status: DISCONTINUED | OUTPATIENT
Start: 2018-10-09 | End: 2018-10-09 | Stop reason: HOSPADM

## 2018-10-09 RX ORDER — PROPOFOL 10 MG/ML
INJECTION, EMULSION INTRAVENOUS AS NEEDED
Status: DISCONTINUED | OUTPATIENT
Start: 2018-10-09 | End: 2018-10-09 | Stop reason: HOSPADM

## 2018-10-09 RX ORDER — LIDOCAINE HYDROCHLORIDE 20 MG/ML
INJECTION, SOLUTION EPIDURAL; INFILTRATION; INTRACAUDAL; PERINEURAL AS NEEDED
Status: DISCONTINUED | OUTPATIENT
Start: 2018-10-09 | End: 2018-10-09 | Stop reason: HOSPADM

## 2018-10-09 RX ORDER — MIDAZOLAM HYDROCHLORIDE 1 MG/ML
.25-5 INJECTION, SOLUTION INTRAMUSCULAR; INTRAVENOUS AS NEEDED
Status: DISCONTINUED | OUTPATIENT
Start: 2018-10-09 | End: 2018-10-09 | Stop reason: HOSPADM

## 2018-10-09 RX ORDER — DEXTROMETHORPHAN/PSEUDOEPHED 2.5-7.5/.8
1.2 DROPS ORAL
Status: DISCONTINUED | OUTPATIENT
Start: 2018-10-09 | End: 2018-10-09 | Stop reason: HOSPADM

## 2018-10-09 RX ORDER — ATROPINE SULFATE 0.1 MG/ML
0.5 INJECTION INTRAVENOUS
Status: DISCONTINUED | OUTPATIENT
Start: 2018-10-09 | End: 2018-10-09 | Stop reason: HOSPADM

## 2018-10-09 RX ORDER — NALOXONE HYDROCHLORIDE 0.4 MG/ML
0.4 INJECTION, SOLUTION INTRAMUSCULAR; INTRAVENOUS; SUBCUTANEOUS
Status: DISCONTINUED | OUTPATIENT
Start: 2018-10-09 | End: 2018-10-09 | Stop reason: HOSPADM

## 2018-10-09 RX ORDER — FLUMAZENIL 0.1 MG/ML
0.2 INJECTION INTRAVENOUS
Status: DISCONTINUED | OUTPATIENT
Start: 2018-10-09 | End: 2018-10-09 | Stop reason: HOSPADM

## 2018-10-09 RX ORDER — FENTANYL CITRATE 50 UG/ML
25 INJECTION, SOLUTION INTRAMUSCULAR; INTRAVENOUS AS NEEDED
Status: DISCONTINUED | OUTPATIENT
Start: 2018-10-09 | End: 2018-10-09 | Stop reason: HOSPADM

## 2018-10-09 RX ORDER — SODIUM CHLORIDE 9 MG/ML
50 INJECTION, SOLUTION INTRAVENOUS CONTINUOUS
Status: DISCONTINUED | OUTPATIENT
Start: 2018-10-09 | End: 2018-10-09 | Stop reason: HOSPADM

## 2018-10-09 RX ADMIN — PROPOFOL 50 MG: 10 INJECTION, EMULSION INTRAVENOUS at 07:28

## 2018-10-09 RX ADMIN — SODIUM CHLORIDE: 9 INJECTION, SOLUTION INTRAVENOUS at 07:00

## 2018-10-09 RX ADMIN — PROPOFOL 50 MG: 10 INJECTION, EMULSION INTRAVENOUS at 07:26

## 2018-10-09 RX ADMIN — LIDOCAINE HYDROCHLORIDE 60 MG: 20 INJECTION, SOLUTION EPIDURAL; INFILTRATION; INTRACAUDAL; PERINEURAL at 07:26

## 2018-10-09 NOTE — PROCEDURES
[address]  [phone]      2018    Esophagogastroduodenoscopy (EGD) Procedure Note  Marina Villagomez  : 1955  Shamar Acevedo Medical Record Number: [id number]      Indications:    Dysphagia/odynophagia  Referring Physician:  Isabella Wick MD  Anesthesia/Sedation:  Conscious sedation/deep sedation/monitored anesthesia -- see notes. Endoscopist:  Dr. Tamara Lambert  Complications:  None  Estimated Blood Loss:  None    Permit:  The indications, risks, benefits and alternatives were reviewed with the patient or their decision maker who was provided an opportunity to ask questions and all questions were answered. The specific risks of esophagogastroduodenoscopy with conscious sedation were reviewed, including but not limited to anesthetic complication, bleeding, adverse drug reaction, missed lesion, infection, IV site reactions, and intestinal perforation which would lead to the need for surgical repair. Alternatives to EGD including radiographic imaging, observation without testing, or laboratory testing were reviewed as well as the limitations of those alternatives discussed. After considering the options and having all their questions answered, the patient or their decision maker provided both verbal and written consent to proceed. Procedure in Detail:  After obtaining informed consent, positioning of the patient in the left lateral decubitus position, and conduction of a pre-procedure pause or \"time out\" the endoscope was introduced into the mouth and advanced to the duodenum. A careful inspection was made, and findings or interventions are described below. Findings:   Esophagus:Hiatal hernia 2cm and above this an acquired Schatzki's ring which is biopsied and dilated with 20mm balloon. Stomach: Food in stomach but pylorus is wide open.   Duodenum/jejunum: normal      Specimens: See above    Impression: Hiatus hernia, ring, food in stomach           Recommendations:  -Acid suppression with a proton pump inhibitor. , -GE study      Thank you for entrusting me with this patient's care. Please do not hesitate to contact me with any questions or if I can be of assistance with any of your other patients' GI needs.   Signed By: Vickie Valerio MD                        October 9, 2018     Surgical assistant none

## 2018-10-09 NOTE — PERIOP NOTES
CRE balloon dilatation of the esophagus   18 mm Balloon inflated to 3 ATMs and held for 30 seconds. 19 mm Balloon inflated to 4.5 ATMs and held for 30 seconds. 30 mm Balloon inflated to 6 ATMs and held for 30 seconds. No subcutaneous crepitus of the chest or cervical region was noted post dilatation.

## 2018-10-09 NOTE — INTERVAL H&P NOTE
Pre-Endoscopy H&P Update  Chief complaint/HPI/ROS:  The indication for the procedure, the patient's history and the patient's current medications are reviewed prior to the procedure and that data is reported on the H&P to which this document is attached. Any significant complaints with regard to organ systems will be noted, and if not mentioned then a review of systems is not contributory.   Past Medical History:   Diagnosis Date    Arthritis     BPH (benign prostatic hyperplasia)     Chronic kidney disease     Chronic pain     BACK NEUROPATHY FEET HANDS    DM neuropathy, type II diabetes mellitus ([address]     DM type 2 causing CKD stage 3 ([address] 12/17/2012    DM type 2 causing vascular disease ([address]     Dyslipidemia     Foot ulcer due to secondary DM ([address] 12/1/2012    GERD (gastroesophageal reflux disease)     HTN     Ill-defined condition 2013    MRSA in wound right leg (BKA)    S/P BKA (below knee amputation) ([address]     right BKA due to non-healing ulcer    Sleep apnea     Thromboembolus ([facility] 1970[facility]S    BLOOD CLOT LEFT LEG      Past Surgical History:   Procedure Laterality Date    ABDOMEN SURGERY PROC UNLISTED      pilonidal cyst    ECHO 2D ADULT  8/09    normal; LVEF 60%    ECHO STRESS  4/09    7 min; normal    ENDOSCOPY, COLON, DIAGNOSTIC      HX AMPUTATION  2012    left great toe    HX AMPUTATION  2007    BKA right    HX BELOW KNEE AMPUTATION Right     HX CERVICAL FUSION  2009    x2    HX ORTHOPAEDIC  2006    ACDF C4-C7    STRESS TEST MYOVIEW  8/09    walked 8:46; normal; LVEF 51%     Social   Social History   Substance Use Topics    Smoking status: Never Smoker    Smokeless tobacco: Never Used    Alcohol use No      Family History   Problem Relation Age of Onset    Hypertension Mother    [facility] Gout Mother     Cancer Father      leukemia    Diabetes Father     Hypertension Sister     Diabetes Maternal Grandmother     Hypertension Maternal Grandmother     Diabetes Paternal Grandmother  Hypertension Paternal Grandmother     Anesth Problems Neg Hx       Allergies   Allergen Reactions    Adhesive Tape-Silicones Hives    Sulfa (Sulfonamide Antibiotics) Swelling     Lips eyes      Prior to Admission Medications   Prescriptions Last Dose Informant Patient Reported? Taking? HUMALOG KWIKPEN INSULIN 100 unit/mL kwikpen 10/8/2018 at pm  No Yes   Sig: INJECT 18 TO 30 UNITS SUBCUTANEOUSLY 3 TIMES DAILY BEFORE BREAKFAST, LUNCH, AND DINNER   LANTUS SOLOSTAR U-100 INSULIN 100 unit/mL (3 mL) inpn 10/7/2018 at pm  No Yes   Sig: INJECT 40 TO 50 UNITS SUBCUTANEOUSLY TWICE DAILY AS DIRECTED   LYRICA 75 mg capsule 9/9/2018 at Unknown time  No Yes   Sig: TAKE ONE CAPSULE BY MOUTH EVERY NIGHT   VENTOLIN HFA 90 mcg/actuation inhaler 10/8/2018 at pm  No Yes   Sig: TAKE 2 PUFFS BY INHALATION EVERY 4 HOURS AS NEEDED FOR WHEEZING   VITAMIN D2 50,000 unit capsule 10/8/2018 at am  No Yes   Sig: TAKE ONE CAPSULE BY MOUTH EVERY WEEK   aspirin 81 mg chewable tablet 9/9/2018 at Unknown time  Yes Yes   Sig: Take 81 mg by mouth daily. ferrous sulfate 325 mg (65 mg iron) tablet 9/9/2018 at Unknown time  No Yes   Sig: TAKE ONE TABLET BY MOUTH 2 TIMES A DAY   glucose blood VI test strips (PRODIGY NO CODING) strip 10/9/2018 at Unknown time  No Yes   Sig: Use to test blood sugar twice daily. Dx: E11.22, N18.3   ketoconazole (NIZORAL) 2 % topical cream 9/9/2018 at Unknown time  No Yes   Sig: Apply  to affected area daily. lisinopril (PRINIVIL, ZESTRIL) 10 mg tablet 10/8/2018 at pm  No Yes   Sig: TAKE ONE TABLET BY MOUTH EVERY DAY   oxyCODONE-acetaminophen (PERCOCET) 5-325 mg per tablet 10/6/2018 at pm  No Yes   Sig: Take 1 Tab by mouth every four (4) hours as needed for Pain for up to 30 days. Max Daily Amount: 6 Tabs. pantoprazole (PROTONIX) 40 mg tablet 10/9/2018 at am  No Yes   Sig: Take 1 Tab by mouth daily for 30 days.    rosuvastatin (CRESTOR) 40 mg tablet 10/7/2018 at am  No Yes   Sig: TAKE ONE TABLET BY MOUTH EVERY DAY   silver sulfADIAZINE (SILVADENE) 1 % topical cream 10/9/2018 at am  No Yes   Sig: Apply  to affected area daily. simethicone (MYLICON) 80 mg chewable tablet 10/6/2018 at am  Yes Yes   Sig: Take 80 mg by mouth every six (6) hours as needed for Flatulence. Facility-Administered Medications: None       PHYSICAL EXAM:  The patient is examined immediately prior to the procedure. Visit Vitals    /60    Pulse 93    Temp 98.4 °F (36.9 °C)    Resp 13    Ht 5' 11\" (1.803 m)    Wt 83.9 kg (185 lb)    SpO2 98%    BMI 25.8 kg/m2     Gen: Appears comfortable, no distress. Pulm: comfortable respirations with no abnormal audible breath sounds  HEART: well perfused, no abnormal audible heart sounds  GI: abdomen flat. PLAN:  Informed consent discussion held, patient afforded an opportunity to ask questions and all questions answered. After being advised of the risks, benefits, and alternatives, the patient requested that we proceed and indicated so on a written consent form. Will proceed with procedure as planned.   Tamara Lambert MD

## 2018-10-09 NOTE — DISCHARGE INSTRUCTIONS
[address] [facility] YOLIS Harper MD  [phone]      October 9, 2018     Allison Page  YOB: 1955    ENDOSCOPY DISCHARGE INSTRUCTIONS    If there is redness at IV site you should apply warm compress to area. If redness or soreness persist contact Dr. Vicky Harper' or your primary care doctor. Gaseous discomfort may develop, but walking, belching will help relieve this. You may not operate a vehicle for 12 hours  You may not operate machinery or dangerous appliances for rest of today  You may not drink alcoholic beverages for 12 hours  Avoid making any critical decisions for 24 hours    DIET:  You may resume your normal diet, but some patients find that heavy or large meals may lead to indigestion or vomiting. I suggest a light meal as first food intake. MEDICATIONS:  The use of some over-the-counter pain medication may lead to bleeding after biopsies or other procedures you may have had done. Tylenol (also called acetaminophen) is safe to take and will not lead to bleeding. Based on the procedure you had today you may not safely take aspirin or aspirin-like products for the next ten (10) days. ACTIVITY:  You may resume your normal household activities, but it is recommended that you spend the remainder of the day resting -  avoid any strenuous activity. CALL DR. Bhargavi Barrett' OFFICE IF:  Increasing pain, nausea, vomiting  Abdominal distension (swelling)  Significant new or increased bleeding (oral or rectal)  Fever/Chills  Chest pain/shortness of breath                   Additional instructions:   No aspirin 10 days  We found that the esophagus ring had come back, so we dilated/stretched it again today. Also there is food in the stomach, which is unexpected. My office will arrange an xray test to check to see if the stomach is emptying too slowly. It was an honor to be your doctor today.   Please let me or my office staff know if you have any feedback about today's procedure.     Araceli Mitchell MD

## 2018-10-09 NOTE — PERIOP NOTES
Patient resting comfortably on stretcher, HOB elevated, VS stable, call bell within reach, wife at bedside, waiting for procedure start, will continue to monitor pt.

## 2018-10-09 NOTE — H&P
Date: 2018 3:00 PM   Patient Name: Zoila Kinsey. Account #: [id number]    Gender: Male    (age): 1955 (70)       Provider:     Joanie Cash. Dori Garcia MD        Referring Physician:     Minoo Sahu MD  [address] [place] [address]  [phone] (phone)  [phone] (fax)        Chief Complaint: Dysphagia           History of Present Illness:   70year old with trouble swallowing, located at xiphod. Solds > liquids. No weight loss or blood in stool. Also complains of loose stool with occasional fecal incontinence. ? 70year old with trouble swallowing, located at xiphod. Solds > liquids. No weight loss or blood in stool. Also complains of loose stool with occasional fecal incontinence. ? Past Medical History      Medical Conditions: Anemia  Arthritis  Daily activites, walking, moving from bed to chair?right leg B.K.A. Diabetes Mellitus  High blood pressure  High cholesterol  Kidney disease  MRSA  Sleep apnea   Surgical Procedures:    Dx Studies: Colonoscopy   Medications: Albuterol 90 mcg/actuation Inhaler 2 Puffs Q4h  cefadroxil 500 mg  docusate sodium 100 mg Take 1 tablet by mouth twice a day  Lantus U-100 Insulin 100 unit/mL Inject 70 units subcutaneously every night  lisinopril 10 mg Take 1 tablet by mouth once a day  Lyrica 75 mg Take 1 capsule by mouth once a day  Novolog 20 unit Inject 1 unit subcutaneously single dose before meals  oxycodone-acetaminophen 5-325 mg Take 1 tablet by mouth once a day as needed  pantoprazole 40 mg TAKE ONE TABLET BY MOUTH EVERY DAY BEFORE BREAKFAST  rosuvastatin 40 mg Take 1 tablet by mouth once a day   Allergies: Adhesive Tape  Sulfa (Sulfonamide Antibiotics)   Immunizations: No Immunizations      Social History      Alcohol: None   Tobacco: Never smoker   Drugs: None   Exercise: Exercise 3 or more times a week. Walking 1-3 miles 1x times a day. Caffeine: Coffee or Tea # of cups per day:1. Weekly.    Marital Status: Occupation:               Family History No history of Celiac sprue, Colon Cancer, Colon Polyps, Esophageal Cancer, GI Cancers, Inflammatory bowel disease (Crohn's or Ulcerative Colitis), Liver disease, Pancreatic Cancer, Stomach Cancer  Other: Diagnosed with Pancreatitis; Review of Systems:   Cardiovascular: Presents suffers from Jaylan Schein. Denies chest pain, irregular heart beat, palpitations, peripheral edema, syncope. Constitutional: Presents suffers from weight loss. Denies fatigue, fever, loss of appetite, weight gain. ENMT: Denies nose bleeds, sore throat, hearing loss. Endocrine: Denies excessive thirst, heat intolerance. Eyes: Denies loss of vision. Gastrointestinal: Presents suffers from change in bowel habits, diarrhea, Stool incontinence. Denies abdominal pain, abdominal swelling, constipation, Bloating/gas, heartburn, jaundice, nausea, rectal bleeding, stomach cramps, vomiting, dysphagia, rectal pain, hematemesis. Genitourinary: Denies dark urine, dysuria, frequent urination, hematuria, incontinence. Hematologic/Lymphatic: Denies easy bruising, prolonged bleeding. Integumentary: Presents suffers from itching, rashes. Denies sun sensitivity. Musculoskeletal: Presents suffers from arthritis, back pain, joint pain. Denies gout, muscle weakness, stiffness. Neurological: Denies dizziness, fainting, frequent headaches, memory loss. Psychiatric: Denies anxiety, depression, difficulty sleeping, hallucinations, nervousness, panic attacks, paranoia. Respiratory: Presents suffers from cough. Denies dyspnea, wheezing. Vital Signs:   BP  (mmHg)  Pulse  (ppm) Rhythm Weight (lbs/oz) Height (ft/in) BMI Resp/min Temp   134/73 94 Regular 184 /  5 / 11 25.66 12 97.3 (F)      Physical Exam:   Constitutional:      Appearance: No distress, appears comfortable. Communication: Understands/receives spoken information. Skin:      Inspection: No rash, no jaundice.    Head/face: Inspection: Normacephalic, atraumatic. Eyes:      Conjunctivae/lids: Normal.   Pupils/irises: Pupils equal, round and normal.   ENMT:      External: Normal.   Hearing: Normal.   Neck:      Neck: Normal appearance, trachea midline. Jugular veins: No JVD noted. Respiratory:      Effort: Normal respiratory effort, comfortable, speaks in complete sentences. Musculoskeletal:      Gait/station: normal.   Digits/nails: Normal, no spooning of nails, clubbing, or splinter hemorrhages, no clubbing, cyanosis, petechiae or other inflammatory conditions. Psychiatric:      Judgment/insight: Normal, normal judgement, normal insight. Orientation: oriented to time, space and person. Lab Results: No Electronic Results      Impressions: Esophageal dysphagia  Diarrhea, unspecified? ? Esophageal dysphagia? ?  ? ? Diarrhea, unspecified? Assessment: ?         Plan: Upper Endoscopy  Align 4 mg Take 1 capsule by mouth once a day? ? Upper Endoscopy? ?  ? ? Align? ?4 mg? ? Take 1 capsule by mouth once a day? ? Risk & Medical Necessity: The patient requires Low to Moderate Severity care for this visit. Diagnosis and management options are Minimal. The amount of data reviewed and/or ordered is Minimal/None. The level of risk is Minimal.           Notes:              Rosie Mcleod. Yumiko Grijalva MD     Electronically signed on 2018 4:06:25 PM by Rosie Mcleod.  Yumiko Grijalva MD                                 Tran Blake, MRN [id number],  1955 Follow Up, 2018

## 2018-10-09 NOTE — ANESTHESIA POSTPROCEDURE EVALUATION
Post-Anesthesia Evaluation and Assessment    Patient: Buck Bellamy MRN: [id number]  SSN: [id number]    YOB: 1955  Age: 70 y.o. Sex: male       Cardiovascular Function/Vital Signs  Visit Vitals    /68    Pulse 85    Temp 36.9 °C (98.5 °F)    Resp 13    Ht 5' 11\" (1.803 m)    Wt 83.9 kg (185 lb)    SpO2 100%    BMI 25.8 kg/m2       Patient is status post MAC anesthesia for Procedure(s):  ESOPHAGOGASTRODUODENOSCOPY (EGD)  ESOPHAGOGASTRODUODENAL (EGD) BIOPSY  ESOPHAGEAL DILATION. Nausea/Vomiting: None    Postoperative hydration reviewed and adequate. Pain:  Pain Scale 1: Numeric (0 - 10) (10/09/18 0802)  Pain Intensity 1: 0 (10/09/18 0802)   Managed    Neurological Status: At baseline    Mental Status and Level of Consciousness: Arousable    Pulmonary Status:   O2 Device: Room air (10/09/18 0802)   Adequate oxygenation and airway patent    Complications related to anesthesia: None    Post-anesthesia assessment completed.  No concerns    Signed By: Yanet Merrill MD     October 9, 2018

## 2018-10-09 NOTE — ANESTHESIA PREPROCEDURE EVALUATION
Anesthetic History   No history of anesthetic complications            Review of Systems / Medical History  Patient summary reviewed and pertinent labs reviewed    Pulmonary        Sleep apnea: No treatment    Asthma : well controlled    Comments: Pt has GABI - has been noncompliant with CPAP  Asthma - mild, uses albuterol several times per month   Neuro/Psych   Within defined limits           Cardiovascular    Hypertension          Hyperlipidemia    Exercise tolerance: >4 METS  Comments: PVD - s/p BKA   GI/Hepatic/Renal     GERD    Renal disease: CRI      Comments: GERD - well controlled  Dysphagia  Pt has a history of CRI - pt was last seen by nephrology 2 weeks ago with labs drawn. Per pt, dr Yanet King reported that renal function has improved.   Labs OK per pt report Endo/Other    Diabetes: well controlled, type 2    Arthritis     Other Findings   Comments:  - took 12 U novolog this am (half of morning dose)    OA   H/o cervical fusion x 2         Physical Exam    Airway  Mallampati: II  TM Distance: 4 - 6 cm  Neck ROM: normal range of motion   Mouth opening: Normal     Cardiovascular  Regular rate and rhythm,  S1 and S2 normal,  no murmur, click, rub, or gallop  Rhythm: regular  Rate: normal         Dental  No notable dental hx    Comments: Several missing teeth   Pulmonary  Breath sounds clear to auscultation               Abdominal         Other Findings            Anesthetic Plan    ASA: 3  Anesthesia type: MAC          Induction: Intravenous  Anesthetic plan and risks discussed with: Patient      Allergies - adhesive and sulfa  Pt brought albuterol - will take 2 puffs prior to procedure (now)  K 3.9

## 2018-10-09 NOTE — ROUTINE PROCESS
Angela Ortega  1955  [id number]    Situation:  Verbal report received from: Cristobal Chu RN  Procedure: Procedure(s):  ESOPHAGOGASTRODUODENOSCOPY (EGD)  ESOPHAGOGASTRODUODENAL (EGD) BIOPSY  ESOPHAGEAL DILATION    Background:    Preoperative diagnosis: ESOPHAGEAL DYSPHAGIA  Postoperative diagnosis: hiatal hernia, food in stomach, schatskis ring    :  Dr. Abundio Obregon  Assistant(s): Endoscopy Technician-1: Teresa Muñoz  Endoscopy RN-1: Aamir Farooq RN    Specimens:   ID Type Source Tests Collected by Time Destination   1 : biopsy GE junction Preservative   Vickie Valerio MD 10/9/2018 0732 Pathology     H. Pylori  no    Assessment:  Intra-procedure medications     Anesthesia gave intra-procedure sedation and medications, see anesthesia flow sheet yes    Intravenous fluids: NS@ KVO     Vital signs stable     Abdominal assessment: round and soft   No crepitus felt around collarbones    Recommendation:  Discharge patient per MD order. Family or Friend   Permission to share finding with family or friend yes    Endoscopy discharge instructions have been reviewed and given to patient and spouse. The patient and spouse verbalized understanding and acceptance of instructions.

## 2018-10-09 NOTE — IP AVS SNAPSHOT
[address] [facility] 104 1007 [facility] 
[phone] Patient: Magno Means MRN: [id number] About your hospitalization You were admitted on:  October 9, 2018 You last received care in the:  [facility] ENDOSCOPY You were discharged on:  October 9, 2018 Why you were hospitalized Your primary diagnosis was:  Not on File Follow-up Information None Your Scheduled Appointments Thursday November 08, 2018  1:30 PM EST New Patient with MD Eulogio [facility]) [facility] [address] [address] [phone] Discharge Orders None A check madhuri indicates which time of day the medication should be taken. My Medications CONTINUE taking these medications Instructions Each Dose to Equal  
 Morning Noon Evening Bedtime  
 aspirin 81 mg chewable tablet Your last dose was: Your next dose is: Take 81 mg by mouth daily. 81 mg  
    
   
   
   
  
 ferrous sulfate 325 mg (65 mg iron) tablet Your last dose was: Your next dose is: TAKE ONE TABLET BY MOUTH 2 TIMES A DAY  
     
   
   
   
  
 glucose blood VI test strips strip Commonly known as:  PRODIGY NO CODING Your last dose was: Your next dose is:    
   
   
 Use to test blood sugar twice daily. Dx: E11.22, N18.3 HumaLOG KwikPen Insulin 100 unit/mL kwikpen Generic drug:  insulin lispro Your last dose was: Your next dose is: INJECT 18 TO 30 UNITS SUBCUTANEOUSLY 3 TIMES DAILY BEFORE BREAKFAST, LUNCH, AND DINNER  
     
   
   
   
  
 ketoconazole 2 % topical cream  
Commonly known as:  NIZORAL Your last dose was: Your next dose is:    
   
   
 Apply  to affected area daily. LANTUS SOLOSTAR U-100 INSULIN 100 unit/mL (3 mL) Inpn Generic drug:  insulin glargine Your last dose was: Your next dose is: INJECT 40 TO 50 UNITS SUBCUTANEOUSLY TWICE DAILY AS DIRECTED  
     
   
   
   
  
 lisinopril 10 mg tablet Commonly known as:  Tj Anderson Your last dose was: Your next dose is: TAKE ONE TABLET BY MOUTH EVERY DAY  
     
   
   
   
  
 LYRICA 75 mg capsule Generic drug:  pregabalin Your last dose was: Your next dose is: TAKE ONE CAPSULE BY MOUTH EVERY NIGHT  
     
   
   
   
  
 oxyCODONE-acetaminophen 5-325 mg per tablet Commonly known as:  PERCOCET Your last dose was: Your next dose is: Take 1 Tab by mouth every four (4) hours as needed for Pain for up to 30 days. Max Daily Amount: 6 Tabs. 1 Tab  
    
   
   
   
  
 pantoprazole 40 mg tablet Commonly known as:  PROTONIX Your last dose was: Your next dose is: Take 1 Tab by mouth daily for 30 days. 40 mg  
    
   
   
   
  
 rosuvastatin 40 mg tablet Commonly known as:  CRESTOR Your last dose was: Your next dose is: TAKE ONE TABLET BY MOUTH EVERY DAY  
     
   
   
   
  
 silver sulfADIAZINE 1 % topical cream  
Commonly known as:  SILVADENE Your last dose was: Your next dose is:    
   
   
 Apply  to affected area daily. simethicone 80 mg chewable tablet Commonly known as:  Iván Bermudez Your last dose was: Your next dose is: Take 80 mg by mouth every six (6) hours as needed for Flatulence. 80 mg VENTOLIN HFA 90 mcg/actuation inhaler Generic drug:  albuterol Your last dose was: Your next dose is: TAKE 2 PUFFS BY INHALATION EVERY 4 HOURS AS NEEDED FOR WHEEZING  
     
   
   
   
  
 VITAMIN D2 50,000 unit capsule Generic drug:  ergocalciferol Your last dose was: Your next dose is: TAKE ONE CAPSULE BY MOUTH EVERY WEEK Opioid Education Prescription Opioids: What You Need to Know: 
 
Prescription opioids can be used to help relieve moderate-to-severe pain and are often prescribed following a surgery or injury, or for certain health conditions. These medications can be an important part of treatment but also come with serious risks. Opioids are strong pain medicines. Examples include hydrocodone, oxycodone, fentanyl, and morphine. Heroin is an example of an illegal opioid. It is important to work with your health care provider to make sure you are getting the safest, most effective care. WHAT ARE THE RISKS AND SIDE EFFECTS OF OPIOID USE? Prescription opioids carry serious risks of addiction and overdose, especially with prolonged use. An opioid overdose, often marked by slow breathing, can cause sudden death. The use of prescription opioids can have a number of side effects as well, even when taken as directed. · Tolerance-meaning you might need to take more of a medication for the same pain relief · Physical dependence-meaning you have symptoms of withdrawal when the medication is stopped. Withdrawal symptoms can include nausea, sweating, chills, diarrhea, stomach cramps, and muscle aches. Withdrawal can last up to several weeks, depending on which drug you took and how long you took it. · Increased sensitivity to pain · Constipation · Nausea, vomiting, and dry mouth · Sleepiness and dizziness · Confusion · Depression · Low levels of testosterone that can result in lower sex drive, energy, and strength · Itching and sweating RISKS ARE GREATER WITH:      
· History of drug misuse, substance use disorder, or overdose · Mental health conditions (such as depression or anxiety) · Sleep apnea · Older age (72 years or older) · Pregnancy Avoid alcohol while taking prescription opioids. Also, unless specifically advised by your health care provider, medications to avoid include: · Benzodiazepines (such as Xanax or Valium) · Muscle relaxants (such as Soma or Flexeril) · Hypnotics (such as Ambien or Lunesta) · Other prescription opioids KNOW YOUR OPTIONS Talk to your health care provider about ways to manage your pain that don't involve prescription opioids. Some of these options may actually work better and have fewer risks and side effects. Consult your physician before adding or stopping any medications, treatments, or physical activity. Options may include: 
· Pain relievers such as acetaminophen, ibuprofen, and naproxen · Some medications that are also used for depression or seizures · Physical therapy and exercise · Counseling to help patients learn how to cope better with triggers of pain and stress. · Application of heat or cold compress · Massage therapy · Relaxation techniques Be Informed Make sure you know the name of your medication, how much and how often to take it, and its potential risks & side effects. IF YOU ARE PRESCRIBED OPIOIDS FOR PAIN: 
· Never take opioids in greater amounts or more often than prescribed. Remember the goal is not to be pain-free but to manage your pain at a tolerable level. · Follow up with your primary care provider to: · Work together to create a plan on how to manage your pain. · Talk about ways to help manage your pain that don't involve prescription opioids. · Talk about any and all concerns and side effects. · Help prevent misuse and abuse. · Never sell or share prescription opioids · Help prevent misuse and abuse. · Store prescription opioids in a secure place and out of reach of others (this may include visitors, children, friends, and family).  
· Safely dispose of unused/unwanted prescription opioids: Find your community drug take-back program or your pharmacy mail-back program, or flush them down the toilet, following guidance from the Food and Drug Administration (www.fda.gov/Drugs/ResourcesForYou). · Visit www.cdc.gov/drugoverdose to learn about the risks of opioid abuse and overdose. · If you believe you may be struggling with addiction, tell your health care provider and ask for guidance or call [employer] at 7-003-448-VKMC. Discharge Instructions [address] Mary Ann Wilson. Wallace Nguyen, [address] [place] [address] 
[phone] October 9, 2018 Jaron Reed 
YOB: 1955 ENDOSCOPY DISCHARGE INSTRUCTIONS If there is redness at IV site you should apply warm compress to area. If redness or soreness persist contact Dr. Wallace Nguyen' or your primary care doctor. Gaseous discomfort may develop, but walking, belching will help relieve this. You may not operate a vehicle for 12 hours You may not operate machinery or dangerous appliances for rest of today You may not drink alcoholic beverages for 12 hours Avoid making any critical decisions for 24 hours DIET: 
You may resume your normal diet, but some patients find that heavy or large meals may lead to indigestion or vomiting. I suggest a light meal as first food intake. MEDICATIONS: 
The use of some over-the-counter pain medication may lead to bleeding after biopsies or other procedures you may have had done. Tylenol (also called acetaminophen) is safe to take and will not lead to bleeding. Based on the procedure you had today you may not safely take aspirin or aspirin-like products for the next ten (10) days. ACTIVITY: 
You may resume your normal household activities, but it is recommended that you spend the remainder of the day resting -  avoid any strenuous activity. CALL DR. Krupa Cardona' OFFICE IF: Increasing pain, nausea, vomiting Abdominal distension (swelling) Significant new or increased bleeding (oral or rectal) Fever/Chills Chest pain/shortness of breath Additional instructions:  
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 Also there is food in the stomach, which is unexpected. My office will arrange an xray test to check to see if the stomach is emptying too slowly. It was an honor to be your doctor today. Please let me or my office staff know if you have any feedback about today's procedure. Samantha Villegas MD 
 
 
 
 
ACO Transitions of Care Introducing [employer] [address] Reny Gonsalez offers a voluntary care coordination program to provide high quality service and care to [facility] fee-for-service beneficiaries. Lynnette Hardwick was designed to help you enhance your health and well-being through the following services: ? Transitions of Care  support for individuals who are transitioning from one care setting to another (example: Hospital to home). ? Chronic and Complex Care Coordination  support for individuals and caregivers of those with serious or chronic illnesses or with more than one chronic (ongoing) condition and those who take a number of different medications. If you meet specific medical criteria, a [facility] may call you directly to coordinate your care with your primary care physician and your other care providers. For questions about the [facility] programs, please, contact your physicians office. For general questions or additional information about Accountable Care Organizations: 
Please visit www.medicare.gov/acos. html or call 1-800-MEDICARE (6-153.476.3493) TTY users should call 9-132.276.1749. Introducing [facility] & HEALTH SERVICES! Dear Trell Moy: Thank you for requesting a Solum account. Our records indicate that you already have an active Solum account. You can access your account anytime at [email or web address] Did you know that you can access your hospital and ER discharge instructions at any time in Solum? You can also review all of your test results from your hospital stay or ER visit. Additional Information If you have questions, please visit the Frequently Asked Questions section of the Solum website at [email or web address] Remember, Solum is NOT to be used for urgent needs. For medical emergencies, dial 911. Now available from your iPhone and Android! Introducing Sebastián He As a [facility] patient, I wanted to make you aware of our electronic visit tool called Sebastián He. [employer] allows you to connect within minutes with a medical provider 24 hours a day, seven days a week via a mobile device or tablet or logging into a secure website from your computer. You can access Sebastián He from anywhere in the [place]. A virtual visit might be right for you when you have a simple condition and feel like you just dont want to get out of bed, or cant get away from work for an appointment, when your regular [facility] provider is not available (evenings, weekends or holidays), or when youre out of town and need minor care. Electronic visits cost only $49 and if the [employer] provider determines a prescription is needed to treat your condition, one can be electronically transmitted to a nearby pharmacy*. Please take a moment to enroll today if you have not already done so. The enrollment process is free and takes just a few minutes. To enroll, please download the [employer] guilherme to your tablet or phone, or visit [email or web address] to enroll on your computer. And, as an [facility] patient with a [employer] account, the results of your visits will be scanned into your electronic medical record and your primary care provider will be able to view the scanned results. We urge you to continue to see your regular Karena Jung provider for your ongoing medical care. And while your primary care provider may not be the one available when you seek a Sebastián He virtual visit, the peace of mind you get from getting a real diagnosis real time can be priceless. For more information on Sebastián Maloneyfin, view our Frequently Asked Questions (FAQs) at [email or web address]. Sincerely, 
 
Carolyn Mobley MD 
Chief Medical Officer Tatiana Reny Gonsalez *:  certain medications cannot be prescribed via Sebastián Haiderfin Providers Seen During Your Hospitalization Provider Specialty Primary office phone Yuridia France MD Gastroenterology [phone] Your Primary Care Physician (PCP) Primary Care Physician Office Phone Office Fax GRISELDA, [address] [phone] You are allergic to the following Allergen Reactions Adhesive Tape-Silicones Hives Sulfa (Sulfonamide Antibiotics) Swelling Lips eyes Recent Documentation Height Weight BMI Smoking Status 1.803 m 83.9 kg 25.8 kg/m2 Never Smoker Emergency Contacts Name Discharge Info Relation Home Work Mobile Arthur Simple CAREGIVER [3] Girlfriend [18] [phone] [facility] DISCHARGE CAREGIVER [3] Parent [1] [phone] Patient Belongings The following personal items are in your possession at time of discharge: 
  Dental Appliances: None  Visual Aid: None Please provide this summary of care documentation to your next provider.  
  
  
 
  
Signatures-by signing, you are acknowledging that this After Visit Summary has been reviewed with you and you have received a copy. Patient Signature:  ____________________________________________________________ Date:  ____________________________________________________________  
  
Larey Clutter Provider Signature:  ____________________________________________________________ Date:  ____________________________________________________________

## 2018-10-09 NOTE — PERIOP NOTES
Patient tolerated procedure without problems. Abdomen soft and patient arousable and voices no complaints Report received from CRNA, see anesthesia note. Patient transported to endoscopy recovery area and verbal bedside report given to Aggie Hutson RN.

## 2018-10-16 ENCOUNTER — DOCUMENTATION ONLY (OUTPATIENT)
Dept: INTERNAL MEDICINE CLINIC | Age: 63
End: 2018-10-16

## 2018-10-18 ENCOUNTER — HOSPITAL ENCOUNTER (OUTPATIENT)
Dept: NUCLEAR MEDICINE | Age: 63
Discharge: HOME | End: 2018-10-18
Attending: SPECIALIST
Payer: MEDICARE

## 2018-10-18 DIAGNOSIS — R13.19 ESOPHAGEAL DYSPHAGIA: ICD-10-CM

## 2018-10-18 DIAGNOSIS — R19.7 DIARRHEA: ICD-10-CM

## 2018-10-18 PROCEDURE — 78264 GASTRIC EMPTYING IMG STUDY: CPT

## 2018-10-29 DIAGNOSIS — M48.02 SPINAL STENOSIS IN CERVICAL REGION: ICD-10-CM

## 2018-10-30 RX ORDER — OXYCODONE AND ACETAMINOPHEN 5; 325 MG/1; MG/1
1 TABLET ORAL
Qty: 90 TAB | Refills: 0 | Status: SHIPPED | OUTPATIENT
Start: 2018-10-30 | End: 2018-11-30 | Stop reason: SDUPTHER

## 2018-11-08 ENCOUNTER — OFFICE VISIT (OUTPATIENT)
Dept: ENDOCRINOLOGY | Age: 63
End: 2018-11-08

## 2018-11-08 VITALS
WEIGHT: 179.8 LBS | BODY MASS INDEX: 25.17 KG/M2 | SYSTOLIC BLOOD PRESSURE: 117 MMHG | DIASTOLIC BLOOD PRESSURE: 68 MMHG | HEIGHT: 71 IN | HEART RATE: 90 BPM

## 2018-11-08 DIAGNOSIS — E11.42 TYPE 2 DIABETES MELLITUS WITH DIABETIC POLYNEUROPATHY, WITH LONG-TERM CURRENT USE OF INSULIN (HCC): Primary | ICD-10-CM

## 2018-11-08 DIAGNOSIS — Z79.4 TYPE 2 DIABETES MELLITUS WITH DIABETIC POLYNEUROPATHY, WITH LONG-TERM CURRENT USE OF INSULIN (HCC): Primary | ICD-10-CM

## 2018-11-08 DIAGNOSIS — I10 ESSENTIAL HYPERTENSION: ICD-10-CM

## 2018-11-08 NOTE — PATIENT INSTRUCTIONS
1) Lantus 60 units every night.   2) Humalog 20 units with each meal  Humalog \"sliding scale\"  Blood sugar  Extra Insulin Dose  150-199   2 units  200-249   4 units  250-299   6 units  300-349   8 units  350-399   10 unit  400-449   12 units  450-499   14 units  500-549   16 units  550 +     18 units

## 2018-11-08 NOTE — PROGRESS NOTES
Chief Complaint   Patient presents with    Diabetes     pcp and pharmacy verified   Records reviewed  History of Present Illness: Leigha Erickson is a 70 y.o. male who I was asked to see in consult, byu Dr. Benedict Kawasaki for evaluation of diabetes. Will request records from Dr. Buck Miller. Was diagnosed with diabetes \"over 30 years ago\". He was previously followed another endocrinologist, but he can not recall his name. Current regimen is Lantus 50-70 units in the AM or HS. \"Normaly I take it at bedtime\" and Humalog 20 units with each meal plus \"sliding scale\". Checks blood sugars fasting and HS. His FBGs have been in the 170's range and his HS BGs run in the 250's range. A typical day is as follows:  Pt wakes around 730AM  - breakfast: He has breakfast around 9AM, this AM he had a bowl of cereal and milk. - He denies mid-morning snacking  - lunch: He has lunch around 2PM, yesterday he had a chicken sandwich, fries and fruit punch  - He denies mid-afternoon snacking.  - dinner: He has dinner around 6PM, last night he had baked chicken, green peas, mashed potatoes and cranberry juice. - He will have an HS snack, last night he had PB crackers and coffee (splenda). Exercise consists of walking. Pt has Right BKA in 2011 for a non-healing ulcer. Pt has hx of CKD, he is followed at [facility]. \"I just saw him two months ago, he said my kidney function has improved some\". He follows with Dr. Alex Montilla. Last eye exam was September 2018, no hx of retinopathy, will request these records. Pt has hx of neuropathy in his hands and feet. Pt has been to a DM nutritionist and \"I am good from that standpoint\".     Past Medical History:   Diagnosis Date    Arthritis     BPH (benign prostatic hyperplasia)     Chronic kidney disease     Chronic pain     BACK NEUROPATHY FEET HANDS    DM neuropathy, type II diabetes mellitus ([address]     DM type 2 causing CKD stage 3 ([address] 12/17/2012    DM type [facility] vascular disease [facility]     Dyslipidemia     Foot ulcer due to secondary DM ([facility] 12/1/2012    GERD (gastroesophageal reflux disease)     HTN     Ill-defined condition 2013    MRSA in wound right leg (BKA)    S/P BKA (below knee amputation) ([facility]     right BKA due to non-healing ulcer    Sleep apnea     Thromboembolus ([facility] 1970'S    BLOOD CLOT LEFT LEG     Past Surgical History:   Procedure Laterality Date    ABDOMEN SURGERY PROC UNLISTED      pilonidal cyst    ECHO 2D ADULT  8/09    normal; LVEF 60%    ECHO STRESS  4/09    7 min; normal    ENDOSCOPY, COLON, DIAGNOSTIC      HX AMPUTATION  2012    left great toe    HX AMPUTATION  2007    BKA right    HX BELOW KNEE AMPUTATION Right     HX CERVICAL FUSION  2009    x2    HX ORTHOPAEDIC  2006    ACDF C4-C7    STRESS TEST MYOVIEW  8/09    walked 8:46; normal; LVEF 51%     Current Outpatient Medications   Medication Sig    oxyCODONE-acetaminophen (PERCOCET) 5-325 mg per tablet Take 1 Tab by mouth every four (4) hours as needed for Pain for up to 30 days. Max Daily Amount: 6 Tabs.  LANTUS SOLOSTAR U-100 INSULIN 100 unit/mL (3 mL) inpn INJECT 40 TO 50 UNITS SUBCUTANEOUSLY TWICE DAILY AS DIRECTED    HUMALOG KWIKPEN INSULIN 100 unit/mL kwikpen INJECT 18 TO 30 UNITS SUBCUTANEOUSLY 3 TIMES DAILY BEFORE BREAKFAST, LUNCH, AND DINNER    LYRICA 75 mg capsule TAKE ONE CAPSULE BY MOUTH EVERY NIGHT    lisinopril (PRINIVIL, ZESTRIL) 10 mg tablet TAKE ONE TABLET BY MOUTH EVERY DAY    silver sulfADIAZINE (SILVADENE) 1 % topical cream Apply  to affected area daily.  glucose blood VI test strips (PRODIGY NO CODING) strip Use to test blood sugar twice daily.  Dx: E11.22, N18.3    ferrous sulfate 325 mg (65 mg iron) tablet TAKE ONE TABLET BY MOUTH 2 TIMES A DAY    rosuvastatin (CRESTOR) 40 mg tablet TAKE ONE TABLET BY MOUTH EVERY DAY    VENTOLIN HFA 90 mcg/actuation inhaler TAKE 2 PUFFS BY INHALATION EVERY 4 HOURS AS NEEDED FOR WHEEZING    VITAMIN D2 50,000 unit capsule TAKE ONE CAPSULE BY MOUTH EVERY WEEK    aspirin 81 mg chewable tablet Take 81 mg by mouth daily.  pantoprazole (PROTONIX) 40 mg tablet Take 1 Tab by mouth daily for 30 days. No current facility-administered medications for this visit.       Allergies   Allergen Reactions    Adhesive Tape-Silicones Hives    Sulfa (Sulfonamide Antibiotics) Swelling     Lips eyes     Family History   Problem Relation Age of Onset    Hypertension Mother    [facility] G[facility] Mother     Cancer Father         leukemia    Diabetes Father     Hypertension Sister     Diabetes Maternal Grandmother     Hypertension Maternal Grandmother     Diabetes Paternal Grandmother     Hypertension Paternal Grandmother     Anesth Problems Neg Hx      Social History     Socioeconomic History    Marital status: SINGLE     Spouse name: Not on file    Number of children: Not on file    Years of education: Not on file    Highest education level: Not on file   Social Needs    Financial resource strain: Not on file    Food insecurity - worry: Not on file    Food insecurity - inability: Not on file   [employer] needs - medical: Not on file   [employer] needs - non-medical: Not on file   Occupational History    Not on file   Tobacco Use    Smoking status: Never Smoker    Smokeless tobacco: Never Used   Substance and Sexual Activity    Alcohol use: No    Drug use: No    Sexual activity: Yes     Partners: Female     Birth control/protection: None   Other Topics Concern    Not on file   Social History Narrative    Not on file     Review of Systems:  - Constitutional Symptoms: no fevers, chills, weight loss  - Eyes: no blurry vision or double vision  - Cardiovascular: no chest pain or palpitations  - Respiratory: no cough or shortness of breath  - Gastrointestinal: no dysphagia or abdominal pain  - Musculoskeletal: no joint pains or weakness  - Integumentary: no rashes  - Neurological: no numbness, tingling, or headaches  - Psychiatric: no depression or anxiety  - Endocrine: no heat or cold intolerance, no polyuria or polydipsia    Physical Examination:  Blood pressure 117/68, pulse 90, height 5' 11\" (1.803 m), weight 179 lb 12.8 oz (81.6 kg).   - General: pleasant, no distress, good eye contact  - HEENT: no exopthalmos, no periorbital edema, no scleral/conjunctival injection, EOMI, no lid lag or stare  - Neck: supple, no thyromegaly, masses, lymph nodes, or carotid bruits, no supraclavicular or dorsocervical fat pads  - Cardiovascular: regular, normal rate, normal S1 and S2, no murmurs/rubs/gallops, 2+ dorsalis pedis pulses bilaterally  - Respiratory: clear to auscultation bilaterally  - Gastrointestinal: soft, nontender, nondistended, no masses, no hepatosplenomegaly  - Musculoskeletal: no proximal muscle weakness in upper or lower extremities  - Integumentary: no acanthosis nigricans,, no rashes, no edema, no foot ulcers on left foot  - Neurological: DTR 2 + no tremors  - Psychiatric: normal mood and affect    Diabetic foot exam:     Left Foot:   Visual Exam: amputation- Great toe   Pulse DP: 2+ (normal)   Filament test: absent sensation    Vibratory sensation: diminished      Right Foot:   Visual Exam: amputation- Right BKA         Data Reviewed:   Component      Latest Ref Rng & Units 10/9/2018 12/21/2017 12/21/2017           7:16 AM  8:11 AM  8:11 AM   Calcium, ionized (POC)      1.12 - 1.32 mmol/L 1.12     Sodium (POC)      136 - 145 mmol/L 140     Potassium (POC)      3.5 - 5.1 mmol/L 3.9     Chloride, POC      98 - 107 mmol/L 118 (H)     CO2 (POC)      21 - 32 mmol/L 24     Anion gap, POC      10 - 20 mmol/L 4 (L)     GLUCOSE,FAST - POC      65 - 100 mg/dL 193 (H)     BUN (POC)      9 - 20 mg/dL 32 (H)     Creatinine, POC      0.6 - 1.3 mg/dL 2.3 (H)     GFRAA, POC      >60 ml/min/1.73m2 35 (L)     GFRNA, POC      >60 ml/min/1.73m2 29 (L)     Hematocrit (POC)      36.6 - 50.3 % 26 (L)     Comment       Comment Not Indicated. Hemoglobin A1c, (calculated)      4.8 - 5.6 %   12.8 (H)   Estimated average glucose      mg/dL   321   Microalbumin, urine      Not Estab. ug/mL  700.6      Component      Latest Ref Rng & Units 12/21/2017           8:11 AM   Cholesterol, total      100 - 199 mg/dL 215 (H)   Triglyceride      0 - 149 mg/dL 447 (H)   HDL Cholesterol      >39 mg/dL 29 (L)   VLDL, calculated      5 - 40 mg/dL Comment   LDL, calculated      0 - 99 mg/dL Comment       Assessment/Plan:   1. Type 2 diabetes mellitus with diabetic polyneuropathy, with long-term current use of insulin ([address]    2. Essential hypertension    1) Pt reports his FBGs are in the 170's and HS BGs in the 250's. Pt instructed to take Lantus 60 units EVERY NIGHT and to take Humalog 20 units WITH EACH MEAL plus at 2:50 correction for BGs > 150. Pt to check his BGs 4 times per day and mail his BG logs to me in 2 weeks. With his hx of amputations and neuropathy, he would benefit from DM shoes and inserts. Will check an A1C and CMP today. 2) BP at goal on his current regimen. Pt is on Lisinopril for Renal protection. Will check an BARBI today. Pt voices understanding and agreement with the plan. RTC 3 months    Patient Instructions   1) Lantus 60 units every night. 2) Humalog 20 units with each meal  Humalog \"sliding scale\"  Blood sugar  Extra Insulin Dose  150-199   2 units  200-249   4 units  250-299   6 units  300-349   8 units  350-399   10 unit  400-449   12 units  450-499   14 units  500-549   16 units  550 +     18 units    Follow-up Disposition:  Return in about 3 months (around 2/8/2019).     Copy sent to:  Dr. Beba Bragg

## 2018-11-08 NOTE — LETTER
11/8/2018 2:07 PM 
 
Patient:  Stanislav Tirado YOB: 1955 Date of Visit: 11/8/2018 Dear MD [place] [address] [address] [facility] [place] [address] VIA In Basket [place] MD 
[address] [facility] [address] [facility] [address] [address] VIA Facsimile: [phone] 
 : Thank you for referring Mr. Stanislav Tirado to me for evaluation/treatment. Below are the relevant portions of my assessment and plan of care. If you have questions, please do not hesitate to call me. I look forward to following Mr. Hayden Rico along with you. Sincerely, Luis Pastrana MD

## 2018-11-09 LAB
ALBUMIN SERPL-MCNC: 3.9 G/DL (ref 3.6–4.8)
ALBUMIN/CREAT UR: 690.5 MG/G CREAT (ref 0–30)
ALBUMIN/GLOB SERPL: 1.3 {RATIO} (ref 1.2–2.2)
ALP SERPL-CCNC: 89 IU/L (ref 39–117)
ALT SERPL-CCNC: 14 IU/L (ref 0–44)
AST SERPL-CCNC: 15 IU/L (ref 0–40)
BILIRUB SERPL-MCNC: 0.2 MG/DL (ref 0–1.2)
BUN SERPL-MCNC: 23 MG/DL (ref 8–27)
BUN/CREAT SERPL: 12 (ref 10–24)
CALCIUM SERPL-MCNC: 8.8 MG/DL (ref 8.6–10.2)
CHLORIDE SERPL-SCNC: 106 MMOL/L (ref 96–106)
CO2 SERPL-SCNC: 25 MMOL/L (ref 20–29)
CREAT SERPL-MCNC: 2 MG/DL (ref 0.76–1.27)
CREAT UR-MCNC: 113.4 MG/DL
EST. AVERAGE GLUCOSE BLD GHB EST-MCNC: 283 MG/DL
GLOBULIN SER CALC-MCNC: 3.1 G/DL (ref 1.5–4.5)
GLUCOSE SERPL-MCNC: 322 MG/DL (ref 65–99)
HBA1C MFR BLD: 11.5 % (ref 4.8–5.6)
INTERPRETATION: NORMAL
Lab: NORMAL
MICROALBUMIN UR-MCNC: 783 UG/ML
POTASSIUM SERPL-SCNC: 5.2 MMOL/L (ref 3.5–5.2)
PROT SERPL-MCNC: 7 G/DL (ref 6–8.5)
SODIUM SERPL-SCNC: 144 MMOL/L (ref 134–144)

## 2018-11-30 DIAGNOSIS — M48.02 SPINAL STENOSIS IN CERVICAL REGION: ICD-10-CM

## 2018-12-03 RX ORDER — OXYCODONE AND ACETAMINOPHEN 5; 325 MG/1; MG/1
1 TABLET ORAL
Qty: 90 TAB | Refills: 0 | Status: SHIPPED | OUTPATIENT
Start: 2018-12-03 | End: 2019-01-02 | Stop reason: SDUPTHER

## 2018-12-03 RX ORDER — ALBUTEROL SULFATE 90 UG/1
AEROSOL, METERED RESPIRATORY (INHALATION)
Qty: 1 INHALER | Refills: 3 | Status: SHIPPED | OUTPATIENT
Start: 2018-12-03 | End: 2019-04-01 | Stop reason: SDUPTHER

## 2018-12-06 ENCOUNTER — DOCUMENTATION ONLY (OUTPATIENT)
Dept: INTERNAL MEDICINE CLINIC | Age: 63
End: 2018-12-06

## 2019-01-02 DIAGNOSIS — M48.02 SPINAL STENOSIS IN CERVICAL REGION: ICD-10-CM

## 2019-01-02 RX ORDER — OXYCODONE AND ACETAMINOPHEN 5; 325 MG/1; MG/1
1 TABLET ORAL
Qty: 90 TAB | Refills: 0 | Status: SHIPPED | OUTPATIENT
Start: 2019-01-02 | End: 2019-02-01 | Stop reason: SDUPTHER

## 2019-01-02 NOTE — TELEPHONE ENCOUNTER
Patient called to request a refill on oxycodone 5-325 mg per tablet     Please call patient when ready for .     [phone]

## 2019-01-05 RX ORDER — PEN NEEDLE, DIABETIC 32GX 5/32"
NEEDLE, DISPOSABLE MISCELLANEOUS
Qty: 100 PEN NEEDLE | Refills: 3 | Status: SHIPPED | OUTPATIENT
Start: 2019-01-05 | End: 2019-04-02 | Stop reason: SDUPTHER

## 2019-01-11 ENCOUNTER — DOCUMENTATION ONLY (OUTPATIENT)
Dept: INTERNAL MEDICINE CLINIC | Age: 64
End: 2019-01-11

## 2019-02-01 DIAGNOSIS — M48.02 SPINAL STENOSIS IN CERVICAL REGION: ICD-10-CM

## 2019-02-01 RX ORDER — OXYCODONE AND ACETAMINOPHEN 5; 325 MG/1; MG/1
1 TABLET ORAL
Qty: 90 TAB | Refills: 0 | Status: SHIPPED | OUTPATIENT
Start: 2019-02-01 | End: 2019-02-27 | Stop reason: SDUPTHER

## 2019-02-01 NOTE — TELEPHONE ENCOUNTER
Patient called to request RX  for his pain medication.      Oxycodone-acetaminophen 5-325 mg per tablet     Please call when ready for .   [phone]

## 2019-02-04 ENCOUNTER — TELEPHONE (OUTPATIENT)
Dept: INTERNAL MEDICINE CLINIC | Age: 64
End: 2019-02-04

## 2019-02-04 NOTE — TELEPHONE ENCOUNTER
Pt dropped off Serious Medical Condition Form for Dr Lilian Dominguez to complete and mail. I placed in drs mailbox.

## 2019-02-08 ENCOUNTER — DOCUMENTATION ONLY (OUTPATIENT)
Dept: INTERNAL MEDICINE CLINIC | Age: 64
End: 2019-02-08

## 2019-02-13 ENCOUNTER — TELEPHONE (OUTPATIENT)
Dept: INTERNAL MEDICINE CLINIC | Age: 64
End: 2019-02-13

## 2019-02-13 NOTE — TELEPHONE ENCOUNTER
----- Message from Alvarez Piedad sent at 2/13/2019  3:04 PM EST -----  Regarding: Dr. Licha Ferguson states that his electric company has received the form needed but it was very much incomplete. They would need the form to be resent with the information completed.  Phone: [phone]

## 2019-02-27 DIAGNOSIS — M48.02 SPINAL STENOSIS IN CERVICAL REGION: ICD-10-CM

## 2019-02-27 NOTE — TELEPHONE ENCOUNTER
----- Message from Katya Cadet sent at 2/27/2019  2:56 PM EST -----  Regarding: Guy Hernandes MD/ telephone  Pt would need a prescription for oxycodone. Pt would need to know when he can come and pick that up. Pt contact [phone].

## 2019-03-01 RX ORDER — OXYCODONE AND ACETAMINOPHEN 5; 325 MG/1; MG/1
1 TABLET ORAL
Qty: 90 TAB | Refills: 0 | Status: SHIPPED | OUTPATIENT
Start: 2019-03-01 | End: 2019-03-29 | Stop reason: SDUPTHER

## 2019-03-08 ENCOUNTER — DOCUMENTATION ONLY (OUTPATIENT)
Dept: INTERNAL MEDICINE CLINIC | Age: 64
End: 2019-03-08

## 2019-03-29 DIAGNOSIS — M48.02 SPINAL STENOSIS IN CERVICAL REGION: ICD-10-CM

## 2019-03-29 NOTE — TELEPHONE ENCOUNTER
Patient needs to  script on Monday, was informed both PCP and her nurse are gone for the day       Please call patient at [phone] when script is ready for

## 2019-04-01 ENCOUNTER — TELEPHONE (OUTPATIENT)
Dept: INTERNAL MEDICINE CLINIC | Age: 64
End: 2019-04-01

## 2019-04-01 DIAGNOSIS — D50.9 IRON DEFICIENCY ANEMIA, UNSPECIFIED IRON DEFICIENCY ANEMIA TYPE: ICD-10-CM

## 2019-04-01 DIAGNOSIS — K21.9 GASTROESOPHAGEAL REFLUX DISEASE WITHOUT ESOPHAGITIS: ICD-10-CM

## 2019-04-01 RX ORDER — OXYCODONE AND ACETAMINOPHEN 5; 325 MG/1; MG/1
1 TABLET ORAL
Qty: 90 TAB | Refills: 0 | Status: SHIPPED | OUTPATIENT
Start: 2019-04-01 | End: 2019-04-30 | Stop reason: SDUPTHER

## 2019-04-01 RX ORDER — LISINOPRIL 10 MG/1
TABLET ORAL
Qty: 90 TAB | Refills: 0 | Status: SHIPPED | OUTPATIENT
Start: 2019-04-01 | End: 2019-08-05 | Stop reason: SDUPTHER

## 2019-04-01 RX ORDER — ALBUTEROL SULFATE 90 UG/1
AEROSOL, METERED RESPIRATORY (INHALATION)
Qty: 1 INHALER | Refills: 4 | Status: SHIPPED | OUTPATIENT
Start: 2019-04-01 | End: 2020-05-29 | Stop reason: SDUPTHER

## 2019-04-02 RX ORDER — LANOLIN ALCOHOL/MO/W.PET/CERES
CREAM (GRAM) TOPICAL
Qty: 180 TAB | Refills: 0 | Status: SHIPPED | OUTPATIENT
Start: 2019-04-02 | End: 2020-09-18

## 2019-04-02 RX ORDER — PANTOPRAZOLE SODIUM 40 MG/1
40 TABLET, DELAYED RELEASE ORAL DAILY
Qty: 90 TAB | Refills: 0 | Status: SHIPPED | OUTPATIENT
Start: 2019-04-02 | End: 2019-08-05 | Stop reason: SDUPTHER

## 2019-04-02 RX ORDER — PEN NEEDLE, DIABETIC 31 GX3/16"
NEEDLE, DISPOSABLE MISCELLANEOUS
Qty: 100 PEN NEEDLE | Refills: 3 | Status: SHIPPED | OUTPATIENT
Start: 2019-04-02 | End: 2019-11-20 | Stop reason: SDUPTHER

## 2019-04-04 ENCOUNTER — DOCUMENTATION ONLY (OUTPATIENT)
Dept: INTERNAL MEDICINE CLINIC | Age: 64
End: 2019-04-04

## 2019-04-15 ENCOUNTER — TELEPHONE (OUTPATIENT)
Dept: INTERNAL MEDICINE CLINIC | Age: 64
End: 2019-04-15

## 2019-04-15 NOTE — TELEPHONE ENCOUNTER
----- Message from Neha Griggs sent at 4/15/2019  3:12 PM EDT -----  Regarding: DR Brian Head / TELEPHONE  Pt is follow up on an order requested earlier in the month to get a fitting for a prosthetic leg.     Best contact: [phone]

## 2019-04-30 DIAGNOSIS — M48.02 SPINAL STENOSIS IN CERVICAL REGION: ICD-10-CM

## 2019-04-30 RX ORDER — OXYCODONE AND ACETAMINOPHEN 5; 325 MG/1; MG/1
1 TABLET ORAL
Qty: 90 TAB | Refills: 0 | Status: SHIPPED | OUTPATIENT
Start: 2019-04-30 | End: 2019-06-06 | Stop reason: SDUPTHER

## 2019-04-30 NOTE — TELEPHONE ENCOUNTER
Patient called requesting a refill on Percocet 5/325 Mg per tablet - 6 tabs a day for . Patient also would like to know if RX to [facility] was done and faxed for leg?      PT Contact#  [phone]

## 2019-05-01 NOTE — TELEPHONE ENCOUNTER
Contact pt and advised that the order had been faxed. Representative at [employer] [facility] is stating they cannot find. Paperwork resent.

## 2019-05-08 ENCOUNTER — DOCUMENTATION ONLY (OUTPATIENT)
Dept: INTERNAL MEDICINE CLINIC | Age: 64
End: 2019-05-08

## 2019-05-11 ENCOUNTER — HOSPITAL ENCOUNTER (EMERGENCY)
Age: 64
Discharge: LEFT BEFORE DISCHARGE | End: 2019-05-11
Attending: EMERGENCY MEDICINE
Payer: MEDICAID

## 2019-05-11 VITALS
DIASTOLIC BLOOD PRESSURE: 88 MMHG | HEART RATE: 87 BPM | SYSTOLIC BLOOD PRESSURE: 109 MMHG | HEIGHT: 71 IN | BODY MASS INDEX: 27.3 KG/M2 | RESPIRATION RATE: 20 BRPM | TEMPERATURE: 98.5 F | WEIGHT: 195 LBS | OXYGEN SATURATION: 100 %

## 2019-05-11 PROCEDURE — 75810000275 HC EMERGENCY DEPT VISIT NO LEVEL OF CARE

## 2019-05-13 DIAGNOSIS — Z89.9 S/P AMPUTATION OF LIMB: ICD-10-CM

## 2019-05-13 DIAGNOSIS — S81.801A WOUND OF RIGHT LOWER EXTREMITY, INITIAL ENCOUNTER: ICD-10-CM

## 2019-05-13 DIAGNOSIS — L97.912 ULCER OF RIGHT LOWER EXTREMITY WITH FAT LAYER EXPOSED (HCC): ICD-10-CM

## 2019-05-14 RX ORDER — SILVER SULFADIAZINE 10 G/1000G
CREAM TOPICAL DAILY
Qty: 85 G | Refills: 0 | Status: SHIPPED | OUTPATIENT
Start: 2019-05-14 | End: 2020-05-29 | Stop reason: SDUPTHER

## 2019-06-03 ENCOUNTER — TELEPHONE (OUTPATIENT)
Dept: INTERNAL MEDICINE CLINIC | Age: 64
End: 2019-06-03

## 2019-06-03 NOTE — TELEPHONE ENCOUNTER
----- Message from Sal Hua sent at 6/3/2019 11:42 AM EDT -----  Regarding: Dr. Torie Geiger or Refill   Pt is requesting to  a refill for \"Oxycodone\". Pt's best contact is [phone].

## 2019-06-04 ENCOUNTER — TELEPHONE (OUTPATIENT)
Dept: INTERNAL MEDICINE CLINIC | Age: 64
End: 2019-06-04

## 2019-06-04 NOTE — TELEPHONE ENCOUNTER
Patient has to reschedule appointment and the next available with Dr. Neha Christie is 6/10/19. He stated he cannot wait that long as he will be out of Oxycodone. Requesting a call from the nurse. Thanks.

## 2019-06-05 NOTE — TELEPHONE ENCOUNTER
----- Message from Jostin Bonilla Page sent at 6/5/2019 11:13 AM EDT -----  Regarding: Dr. Jese Adams is requesting a return call, regarding  for Rx \"Oxycodone\". Pt tried to schedule appt, no appt was available in preferred timeframe. Pt states they left message on yesterday and noone return call. Best contact number [phone].

## 2019-06-06 ENCOUNTER — HOSPITAL ENCOUNTER (OUTPATIENT)
Dept: LAB | Age: 64
Discharge: HOME | End: 2019-06-06
Payer: MEDICARE

## 2019-06-06 ENCOUNTER — OFFICE VISIT (OUTPATIENT)
Dept: INTERNAL MEDICINE CLINIC | Age: 64
End: 2019-06-06

## 2019-06-06 VITALS
BODY MASS INDEX: 25.37 KG/M2 | DIASTOLIC BLOOD PRESSURE: 80 MMHG | HEART RATE: 91 BPM | SYSTOLIC BLOOD PRESSURE: 130 MMHG | WEIGHT: 181.2 LBS | HEIGHT: 71 IN | TEMPERATURE: 98 F | RESPIRATION RATE: 18 BRPM | OXYGEN SATURATION: 100 %

## 2019-06-06 DIAGNOSIS — Z79.4 TYPE 2 DIABETES MELLITUS WITH STAGE 3 CHRONIC KIDNEY DISEASE, WITH LONG-TERM CURRENT USE OF INSULIN (HCC): ICD-10-CM

## 2019-06-06 DIAGNOSIS — M54.41 CHRONIC BILATERAL LOW BACK PAIN WITH BILATERAL SCIATICA: ICD-10-CM

## 2019-06-06 DIAGNOSIS — M54.42 CHRONIC BILATERAL LOW BACK PAIN WITH BILATERAL SCIATICA: ICD-10-CM

## 2019-06-06 DIAGNOSIS — G89.29 CHRONIC BILATERAL LOW BACK PAIN WITH BILATERAL SCIATICA: ICD-10-CM

## 2019-06-06 DIAGNOSIS — I10 ESSENTIAL HYPERTENSION: ICD-10-CM

## 2019-06-06 DIAGNOSIS — N18.30 CKD (CHRONIC KIDNEY DISEASE) STAGE 3, GFR 30-59 ML/MIN (HCC): ICD-10-CM

## 2019-06-06 DIAGNOSIS — Z00.00 MEDICARE ANNUAL WELLNESS VISIT, SUBSEQUENT: Primary | ICD-10-CM

## 2019-06-06 DIAGNOSIS — S81.801S WOUND OF RIGHT LOWER EXTREMITY, SEQUELA: ICD-10-CM

## 2019-06-06 DIAGNOSIS — E11.22 TYPE 2 DIABETES MELLITUS WITH STAGE 3 CHRONIC KIDNEY DISEASE, WITH LONG-TERM CURRENT USE OF INSULIN (HCC): ICD-10-CM

## 2019-06-06 DIAGNOSIS — N18.30 TYPE 2 DIABETES MELLITUS WITH STAGE 3 CHRONIC KIDNEY DISEASE, WITH LONG-TERM CURRENT USE OF INSULIN (HCC): ICD-10-CM

## 2019-06-06 DIAGNOSIS — E78.5 DYSLIPIDEMIA: ICD-10-CM

## 2019-06-06 PROCEDURE — 82043 UR ALBUMIN QUANTITATIVE: CPT

## 2019-06-06 PROCEDURE — 83036 HEMOGLOBIN GLYCOSYLATED A1C: CPT

## 2019-06-06 PROCEDURE — 80061 LIPID PANEL: CPT

## 2019-06-06 PROCEDURE — 85027 COMPLETE CBC AUTOMATED: CPT

## 2019-06-06 PROCEDURE — 36415 COLL VENOUS BLD VENIPUNCTURE: CPT

## 2019-06-06 PROCEDURE — 80053 COMPREHEN METABOLIC PANEL: CPT

## 2019-06-06 RX ORDER — INSULIN LISPRO 100 [IU]/ML
INJECTION, SOLUTION INTRAVENOUS; SUBCUTANEOUS
Qty: 15 ML | Refills: 0 | Status: SHIPPED | OUTPATIENT
Start: 2019-06-06 | End: 2019-07-02 | Stop reason: SDUPTHER

## 2019-06-06 RX ORDER — OXYCODONE AND ACETAMINOPHEN 5; 325 MG/1; MG/1
1 TABLET ORAL
Qty: 90 TAB | Refills: 0 | Status: SHIPPED | OUTPATIENT
Start: 2019-06-06 | End: 2019-07-01 | Stop reason: SDUPTHER

## 2019-06-06 NOTE — PROGRESS NOTES
HISTORY OF PRESENT ILLNESS  Quique Baez is a 70 y.o. male. HPI   Blister: Pt notes he had a blister on his right leg from his prosthetic leg. He notes it healed up well and he is getting a better fitting prosthetic socket and a thicker sleeve. Sciatica: Pt reports his sciatica lately has been more severe. He has been increasing his usage of Percocet (2-5/day). He notes the [facility] doesn't want to do surgery but is going to try acupuncture. Hypertension ROS: taking medications as instructed, no medication side effects noted, no TIA's, no chest pain on exertion, no dyspnea on exertion, no swelling of ankles. New concerns: BP in office today is 187/99. Pt reports that his BP has been great every time he went to the doctors avg of 130/80. Diabetic Review of Systems - further diabetic ROS: no polyuria or polydipsia, no chest pain, dyspnea or TIA's, no numbness, tingling or pain in extremities. Other symptoms and concerns: Pt notes he has a new treatment and specialist at the [facility]. CKD: Pt notes improvement. Pt continues with his nephrologist.       Review of Systems   All other systems reviewed and are negative. Physical Exam   Constitutional: He is oriented to person, place, and time. He appears well-developed and well-nourished. HENT:   Head: Normocephalic and atraumatic. Right Ear: External ear normal.   Left Ear: External ear normal.   Nose: Nose normal.   Mouth/Throat: Oropharynx is clear and moist.   Eyes: Pupils are equal, round, and reactive to light. Conjunctivae and EOM are normal.   Neck: Normal range of motion. Neck supple. Cardiovascular: Normal rate, regular rhythm, normal heart sounds and intact distal pulses. Pulmonary/Chest: Effort normal and breath sounds normal.   Abdominal: Soft.  Bowel sounds are normal.   Genitourinary: Rectum normal, prostate normal, testes normal and penis normal. Rectal exam shows anal tone normal.   Musculoskeletal:   Right lower leg prosthesis Neurological: He is alert and oriented to person, place, and time. Skin: Skin is warm and dry. Psychiatric: He has a normal mood and affect. His behavior is normal. Judgment and thought content normal.   Nursing note and vitals reviewed. ASSESSMENT and PLAN  Diagnoses and all orders for this visit:    1. Type 2 diabetes mellitus with stage 3 chronic kidney disease, with long-term current use of insulin ([address]  Discussed with pt that the probable underlying cause for his sx is the DM and how important it is to get his DM under control. Pt will continue to f/u with specialist.   -     METABOLIC PANEL, COMPREHENSIVE  -     MICROALBUMIN, UR, RAND W/ MICROALB/CREAT RATIO  -     HEMOGLOBIN A1C WITH EAG    2. CKD (chronic kidney disease) stage 3, GFR 30-59 ml/min (HCC)  Stable and well-managed. Pt will continue to f/u with nephrologist.   -     CBC W/O DIFF    3. Essential hypertension  BP is at goal at home. I do not recommend any change in medications. 4. Dyslipidemia  Stable, patient is tolerating pain medications, no myalgias. I do not recommend any change in medications.   -     LIPID PANEL    5. Wound of right lower extremity, sequela  Stable and well-managed. Will continue to monitor for improvements or changes. Pt will continue to f/u with specialist.     6. Chronic bilateral low back pain with bilateral sciatica  Discussed that other options such as spinal injections should be considered as a long term treatment. Decided that if he is to continue with Percocet he will have to come into the office to f/u every 3 mo. -     oxyCODONE-acetaminophen (PERCOCET) 5-325 mg per tablet; Take 1 Tab by mouth every four (4) hours as needed for Pain for up to 30 days. Max Daily Amount: 6 Tabs. Lab results and schedule of future lab studies reviewed with patient. Reviewed diet, exercise and weight control.     Written by Perfecto Barbosa, as dictated by Tommie Victor MD.     Current diagnosis and concerns discussed with pt at length. Understands risks and benefits or current treatment plan and medications and accepts the treatment and medication with any possible risks. Pt asks appropriate questions which were answered. Pt instructed to call with any concerns or problems. This note will not be viewable in [address]

## 2019-06-06 NOTE — PATIENT INSTRUCTIONS
Medicare Wellness Visit, Male    The best way to live healthy is to have a lifestyle where you eat a well-balanced diet, exercise regularly, limit alcohol use, and quit all forms of tobacco/nicotine, if applicable. Regular preventive services are another way to keep healthy. Preventive services (vaccines, screening tests, monitoring & exams) can help personalize your care plan, which helps you manage your own care. Screening tests can find health problems at the earliest stages, when they are easiest to treat. [address] [facility] follows the current, evidence-based guidelines published by the [facility] Reynaldo Jovani ([facility]) when recommending preventive services for our patients. Because we follow these guidelines, sometimes recommendations change over time as research supports it. (For example, a prostate screening blood test is no longer routinely recommended for men with no symptoms.)  Of course, you and your doctor may decide to screen more often for some diseases, based on your risk and co-morbidities (chronic disease you are already diagnosed with). Preventive services for you include:  - Medicare offers their members a free annual wellness visit, which is time for you and your primary care provider to discuss and plan for your preventive service needs. Take advantage of this benefit every year!  -All adults over age 72 should receive the recommended pneumonia vaccines. Current USPSTF guidelines recommend a series of two vaccines for the best pneumonia protection.   -All adults should have a flu vaccine yearly and an ECG.  All adults age 61 and older should receive a shingles vaccine once in their lifetime.    -All adults age 38-68 who are overweight should have a diabetes screening test once every three years.   -Other screening tests & preventive services for persons with diabetes include: an eye exam to screen for diabetic retinopathy, a kidney function test, a foot exam, and stricter control over your cholesterol.   -Cardiovascular screening for adults with routine risk involves an electrocardiogram (ECG) at intervals determined by the provider.   -Colorectal cancer screening should be done for adults age 54-65 with no increased risk factors for colorectal cancer. There are a number of acceptable methods of screening for this type of cancer. Each test has its own benefits and drawbacks. Discuss with your provider what is most appropriate for you during your annual wellness visit. The different tests include: colonoscopy (considered the best screening method), a fecal occult blood test, a fecal DNA test, and sigmoidoscopy.  -All adults born between [facility] should be screened once for Hepatitis C.  -An Abdominal Aortic Aneurysm (AAA) Screening is recommended for men age 73-68 who has ever smoked in their lifetime.      Here is a list of your current Health Maintenance items (your personalized list of preventive services) with a due date:  Health Maintenance Due   Topic Date Due    Shingles Vaccine (1 of 2) 08/10/2003    Pneumococcal Vaccine (2 of 2 - PPSV23) 08/10/2018    Annual Well Visit  10/29/2018    Cholesterol Test   12/21/2018    Colonoscopy  05/03/2019    Hemoglobin A1C    05/08/2019

## 2019-06-07 LAB
ALBUMIN SERPL-MCNC: 3.6 G/DL (ref 3.6–4.8)
ALBUMIN/CREAT UR: 1367.8 MG/G CREAT (ref 0–30)
ALBUMIN/GLOB SERPL: 1 {RATIO} (ref 1.2–2.2)
ALP SERPL-CCNC: 105 IU/L (ref 39–117)
ALT SERPL-CCNC: 14 IU/L (ref 0–44)
AST SERPL-CCNC: 13 IU/L (ref 0–40)
BILIRUB SERPL-MCNC: 0.3 MG/DL (ref 0–1.2)
BUN SERPL-MCNC: 23 MG/DL (ref 8–27)
BUN/CREAT SERPL: 14 (ref 10–24)
CALCIUM SERPL-MCNC: 9.3 MG/DL (ref 8.6–10.2)
CHLORIDE SERPL-SCNC: 101 MMOL/L (ref 96–106)
CHOLEST SERPL-MCNC: 290 MG/DL (ref 100–199)
CO2 SERPL-SCNC: 23 MMOL/L (ref 20–29)
CREAT SERPL-MCNC: 1.62 MG/DL (ref 0.76–1.27)
CREAT UR-MCNC: 33.9 MG/DL
ERYTHROCYTE [DISTWIDTH] IN BLOOD BY AUTOMATED COUNT: 14.1 % (ref 12.3–15.4)
EST. AVERAGE GLUCOSE BLD GHB EST-MCNC: 303 MG/DL
GLOBULIN SER CALC-MCNC: 3.6 G/DL (ref 1.5–4.5)
GLUCOSE SERPL-MCNC: 418 MG/DL (ref 65–99)
HBA1C MFR BLD: 12.2 % (ref 4.8–5.6)
HCT VFR BLD AUTO: 30.8 % (ref 37.5–51)
HDLC SERPL-MCNC: 38 MG/DL
HGB BLD-MCNC: 10 G/DL (ref 13–17.7)
INTERPRETATION, 910389: NORMAL
INTERPRETATION: NORMAL
LDLC SERPL CALC-MCNC: ABNORMAL MG/DL (ref 0–99)
Lab: NORMAL
MCH RBC QN AUTO: 29 PG (ref 26.6–33)
MCHC RBC AUTO-ENTMCNC: 32.5 G/DL (ref 31.5–35.7)
MCV RBC AUTO: 89 FL (ref 79–97)
MICROALBUMIN UR-MCNC: 463.7 UG/ML
PDF IMAGE, 910387: NORMAL
PLATELET # BLD AUTO: 216 X10E3/UL (ref 150–450)
POTASSIUM SERPL-SCNC: 5.1 MMOL/L (ref 3.5–5.2)
PROT SERPL-MCNC: 7.2 G/DL (ref 6–8.5)
RBC # BLD AUTO: 3.45 X10E6/UL (ref 4.14–5.8)
SODIUM SERPL-SCNC: 138 MMOL/L (ref 134–144)
TRIGL SERPL-MCNC: 599 MG/DL (ref 0–149)
VLDLC SERPL CALC-MCNC: ABNORMAL MG/DL (ref 5–40)
WBC # BLD AUTO: 5.1 X10E3/UL (ref 3.4–10.8)

## 2019-07-01 DIAGNOSIS — M54.41 CHRONIC BILATERAL LOW BACK PAIN WITH BILATERAL SCIATICA: ICD-10-CM

## 2019-07-01 DIAGNOSIS — G89.29 CHRONIC BILATERAL LOW BACK PAIN WITH BILATERAL SCIATICA: ICD-10-CM

## 2019-07-01 DIAGNOSIS — M54.42 CHRONIC BILATERAL LOW BACK PAIN WITH BILATERAL SCIATICA: ICD-10-CM

## 2019-07-01 RX ORDER — OXYCODONE AND ACETAMINOPHEN 5; 325 MG/1; MG/1
1 TABLET ORAL
Qty: 90 TAB | Refills: 0 | Status: SHIPPED | OUTPATIENT
Start: 2019-07-01 | End: 2019-07-31 | Stop reason: SDUPTHER

## 2019-07-01 NOTE — TELEPHONE ENCOUNTER
Pt requesting refill on Oxycodone to be picked up today. Please let me know when script is ready. Thanks.

## 2019-07-02 DIAGNOSIS — N18.30 DM TYPE 2 CAUSING CKD STAGE 3 (HCC): Chronic | ICD-10-CM

## 2019-07-02 DIAGNOSIS — E11.22 DM TYPE 2 CAUSING CKD STAGE 3 (HCC): Chronic | ICD-10-CM

## 2019-07-02 RX ORDER — INSULIN GLARGINE 100 [IU]/ML
INJECTION, SOLUTION SUBCUTANEOUS
Qty: 15 ML | Refills: 0 | Status: SHIPPED | OUTPATIENT
Start: 2019-07-02 | End: 2019-08-05 | Stop reason: SDUPTHER

## 2019-07-02 RX ORDER — INSULIN LISPRO 100 [IU]/ML
INJECTION, SOLUTION INTRAVENOUS; SUBCUTANEOUS
Qty: 15 ML | Refills: 0 | Status: SHIPPED | OUTPATIENT
Start: 2019-07-02 | End: 2019-08-05 | Stop reason: SDUPTHER

## 2019-07-15 ENCOUNTER — DOCUMENTATION ONLY (OUTPATIENT)
Dept: INTERNAL MEDICINE CLINIC | Age: 64
End: 2019-07-15

## 2019-07-31 DIAGNOSIS — M54.41 CHRONIC BILATERAL LOW BACK PAIN WITH BILATERAL SCIATICA: ICD-10-CM

## 2019-07-31 DIAGNOSIS — G89.29 CHRONIC BILATERAL LOW BACK PAIN WITH BILATERAL SCIATICA: ICD-10-CM

## 2019-07-31 DIAGNOSIS — M54.42 CHRONIC BILATERAL LOW BACK PAIN WITH BILATERAL SCIATICA: ICD-10-CM

## 2019-07-31 RX ORDER — OXYCODONE AND ACETAMINOPHEN 5; 325 MG/1; MG/1
1 TABLET ORAL
Qty: 90 TAB | Refills: 0 | Status: SHIPPED | OUTPATIENT
Start: 2019-07-31 | End: 2019-08-27 | Stop reason: SDUPTHER

## 2019-07-31 NOTE — TELEPHONE ENCOUNTER
Patient called requesting a refill on his Percocet 5/325 MG Per Tablet.  Please call patient to advise when at  for . [phone]

## 2019-08-05 DIAGNOSIS — N18.30 DM TYPE 2 CAUSING CKD STAGE 3 (HCC): Chronic | ICD-10-CM

## 2019-08-05 DIAGNOSIS — K21.9 GASTROESOPHAGEAL REFLUX DISEASE WITHOUT ESOPHAGITIS: ICD-10-CM

## 2019-08-05 DIAGNOSIS — E11.22 DM TYPE 2 CAUSING CKD STAGE 3 (HCC): Chronic | ICD-10-CM

## 2019-08-05 RX ORDER — INSULIN GLARGINE 100 [IU]/ML
INJECTION, SOLUTION SUBCUTANEOUS
Qty: 15 ML | Refills: 0 | Status: SHIPPED | OUTPATIENT
Start: 2019-08-05 | End: 2019-08-22 | Stop reason: SDUPTHER

## 2019-08-05 RX ORDER — LISINOPRIL 10 MG/1
TABLET ORAL
Qty: 90 TAB | Refills: 0 | Status: SHIPPED | OUTPATIENT
Start: 2019-08-05 | End: 2020-06-23 | Stop reason: SDUPTHER

## 2019-08-05 RX ORDER — INSULIN LISPRO 100 [IU]/ML
INJECTION, SOLUTION INTRAVENOUS; SUBCUTANEOUS
Qty: 15 ML | Refills: 0 | Status: SHIPPED | OUTPATIENT
Start: 2019-08-05 | End: 2019-08-22 | Stop reason: SDUPTHER

## 2019-08-05 RX ORDER — PANTOPRAZOLE SODIUM 40 MG/1
TABLET, DELAYED RELEASE ORAL
Qty: 90 TAB | Refills: 0 | Status: SHIPPED | OUTPATIENT
Start: 2019-08-05 | End: 2019-11-20 | Stop reason: SDUPTHER

## 2019-08-08 ENCOUNTER — DOCUMENTATION ONLY (OUTPATIENT)
Dept: INTERNAL MEDICINE CLINIC | Age: 64
End: 2019-08-08

## 2019-08-26 DIAGNOSIS — E11.22 DM TYPE 2 CAUSING CKD STAGE 3 (HCC): Chronic | ICD-10-CM

## 2019-08-26 DIAGNOSIS — N18.30 DM TYPE 2 CAUSING CKD STAGE 3 (HCC): Chronic | ICD-10-CM

## 2019-08-26 RX ORDER — INSULIN GLARGINE 100 [IU]/ML
INJECTION, SOLUTION SUBCUTANEOUS
Qty: 15 ML | Refills: 0 | Status: SHIPPED | OUTPATIENT
Start: 2019-08-26 | End: 2019-09-23 | Stop reason: SDUPTHER

## 2019-08-26 RX ORDER — INSULIN LISPRO 100 [IU]/ML
INJECTION, SOLUTION INTRAVENOUS; SUBCUTANEOUS
Qty: 15 ML | Refills: 0 | Status: SHIPPED | OUTPATIENT
Start: 2019-08-26 | End: 2019-09-23 | Stop reason: SDUPTHER

## 2019-08-27 DIAGNOSIS — G89.29 CHRONIC BILATERAL LOW BACK PAIN WITH BILATERAL SCIATICA: ICD-10-CM

## 2019-08-27 DIAGNOSIS — M54.42 CHRONIC BILATERAL LOW BACK PAIN WITH BILATERAL SCIATICA: ICD-10-CM

## 2019-08-27 DIAGNOSIS — M54.41 CHRONIC BILATERAL LOW BACK PAIN WITH BILATERAL SCIATICA: ICD-10-CM

## 2019-08-27 NOTE — TELEPHONE ENCOUNTER
Patient called to request a refill on his pain medication.      Percocet 5/325 mg per tablet     Please call patient when ready for .   [phone]

## 2019-08-28 RX ORDER — OXYCODONE AND ACETAMINOPHEN 5; 325 MG/1; MG/1
1 TABLET ORAL
Qty: 90 TAB | Refills: 0 | Status: SHIPPED | OUTPATIENT
Start: 2019-08-28 | End: 2019-10-03 | Stop reason: SDUPTHER

## 2019-08-28 NOTE — TELEPHONE ENCOUNTER
Attempted to call patient to tell him his rx is ready for  at the . No answer and v/m is full. Rx put at .

## 2019-08-30 ENCOUNTER — DOCUMENTATION ONLY (OUTPATIENT)
Dept: INTERNAL MEDICINE CLINIC | Age: 64
End: 2019-08-30

## 2019-09-23 DIAGNOSIS — N18.30 DM TYPE 2 CAUSING CKD STAGE 3 (HCC): Chronic | ICD-10-CM

## 2019-09-23 DIAGNOSIS — E11.22 DM TYPE 2 CAUSING CKD STAGE 3 (HCC): Chronic | ICD-10-CM

## 2019-09-23 RX ORDER — INSULIN GLARGINE 100 [IU]/ML
INJECTION, SOLUTION SUBCUTANEOUS
Qty: 15 ML | Refills: 0 | Status: SHIPPED | OUTPATIENT
Start: 2019-09-23 | End: 2019-10-23 | Stop reason: SDUPTHER

## 2019-09-23 RX ORDER — INSULIN LISPRO 100 [IU]/ML
INJECTION, SOLUTION INTRAVENOUS; SUBCUTANEOUS
Qty: 15 ML | Refills: 0 | Status: SHIPPED | OUTPATIENT
Start: 2019-09-23 | End: 2019-10-23 | Stop reason: SDUPTHER

## 2019-10-03 DIAGNOSIS — M54.42 CHRONIC BILATERAL LOW BACK PAIN WITH BILATERAL SCIATICA: ICD-10-CM

## 2019-10-03 DIAGNOSIS — M54.41 CHRONIC BILATERAL LOW BACK PAIN WITH BILATERAL SCIATICA: ICD-10-CM

## 2019-10-03 DIAGNOSIS — G89.29 CHRONIC BILATERAL LOW BACK PAIN WITH BILATERAL SCIATICA: ICD-10-CM

## 2019-10-03 RX ORDER — OXYCODONE AND ACETAMINOPHEN 5; 325 MG/1; MG/1
1 TABLET ORAL
Qty: 90 TAB | Refills: 0 | Status: SHIPPED | OUTPATIENT
Start: 2019-10-03 | End: 2019-10-31 | Stop reason: SDUPTHER

## 2019-10-03 NOTE — TELEPHONE ENCOUNTER
Patient called to request a refill on his pain  Medication for . Please call patient to advise when ready.    [phone]      Percocet 5 MG Tablet

## 2019-10-04 ENCOUNTER — DOCUMENTATION ONLY (OUTPATIENT)
Dept: INTERNAL MEDICINE CLINIC | Age: 64
End: 2019-10-04

## 2019-10-04 RX ORDER — ERGOCALCIFEROL 1.25 MG/1
CAPSULE ORAL
Qty: 4 CAP | Refills: 0 | Status: SHIPPED | OUTPATIENT
Start: 2019-10-04 | End: 2019-11-02 | Stop reason: SDUPTHER

## 2019-10-23 DIAGNOSIS — E11.22 DM TYPE 2 CAUSING CKD STAGE 3 (HCC): Chronic | ICD-10-CM

## 2019-10-23 DIAGNOSIS — N18.30 DM TYPE 2 CAUSING CKD STAGE 3 (HCC): Chronic | ICD-10-CM

## 2019-10-23 RX ORDER — INSULIN LISPRO 100 [IU]/ML
INJECTION, SOLUTION INTRAVENOUS; SUBCUTANEOUS
Qty: 15 ML | Refills: 0 | Status: SHIPPED | OUTPATIENT
Start: 2019-10-23 | End: 2019-11-21 | Stop reason: SDUPTHER

## 2019-10-23 RX ORDER — INSULIN LISPRO 100 [IU]/ML
INJECTION, SOLUTION INTRAVENOUS; SUBCUTANEOUS
Qty: 15 ML | Refills: 0 | Status: SHIPPED | OUTPATIENT
Start: 2019-10-23 | End: 2019-10-23 | Stop reason: SDUPTHER

## 2019-10-23 RX ORDER — INSULIN GLARGINE 100 [IU]/ML
INJECTION, SOLUTION SUBCUTANEOUS
Qty: 15 ML | Refills: 0 | Status: SHIPPED | OUTPATIENT
Start: 2019-10-23 | End: 2019-11-21 | Stop reason: SDUPTHER

## 2019-10-31 DIAGNOSIS — M54.42 CHRONIC BILATERAL LOW BACK PAIN WITH BILATERAL SCIATICA: ICD-10-CM

## 2019-10-31 DIAGNOSIS — G89.29 CHRONIC BILATERAL LOW BACK PAIN WITH BILATERAL SCIATICA: ICD-10-CM

## 2019-10-31 DIAGNOSIS — M54.41 CHRONIC BILATERAL LOW BACK PAIN WITH BILATERAL SCIATICA: ICD-10-CM

## 2019-10-31 RX ORDER — OXYCODONE AND ACETAMINOPHEN 5; 325 MG/1; MG/1
1 TABLET ORAL
Qty: 90 TAB | Refills: 0 | Status: SHIPPED | OUTPATIENT
Start: 2019-10-31 | End: 2019-12-03 | Stop reason: SDUPTHER

## 2019-10-31 NOTE — TELEPHONE ENCOUNTER
Patient called requesting a refill for his Percocet 5-325 MG per tablet     Please call patient when script is ready for . [phone]

## 2019-11-20 DIAGNOSIS — K21.9 GASTROESOPHAGEAL REFLUX DISEASE WITHOUT ESOPHAGITIS: ICD-10-CM

## 2019-11-20 RX ORDER — PEN NEEDLE, DIABETIC 31 GX3/16"
NEEDLE, DISPOSABLE MISCELLANEOUS
Qty: 100 PEN NEEDLE | Refills: 3 | Status: SHIPPED | OUTPATIENT
Start: 2019-11-20 | End: 2020-07-27

## 2019-11-20 RX ORDER — PANTOPRAZOLE SODIUM 40 MG/1
TABLET, DELAYED RELEASE ORAL
Qty: 90 TAB | Refills: 0 | Status: SHIPPED | OUTPATIENT
Start: 2019-11-20 | End: 2020-10-13

## 2019-11-29 DIAGNOSIS — G89.29 CHRONIC BILATERAL LOW BACK PAIN WITH BILATERAL SCIATICA: ICD-10-CM

## 2019-11-29 DIAGNOSIS — M54.42 CHRONIC BILATERAL LOW BACK PAIN WITH BILATERAL SCIATICA: ICD-10-CM

## 2019-11-29 DIAGNOSIS — M54.41 CHRONIC BILATERAL LOW BACK PAIN WITH BILATERAL SCIATICA: ICD-10-CM

## 2019-11-29 NOTE — TELEPHONE ENCOUNTER
Reached out to patient to inquire if medication was needed prior to PCP leaving for the weekend, per patient requesting to  medicationk Monday 12/02.

## 2019-11-29 NOTE — TELEPHONE ENCOUNTER
----- Message from Roselyn Hernandez sent at 11/29/2019 12:32 PM EST -----  Regarding: Dr. Henrietta Tejeda / Dylon Monique contact number(s): [phone]  Name of medication and dosage if known: Oxycodone - unknown  Is patient out of this medication (yes/no): Yes  Pharmacy name: N/A  Pharmacy listed in chart? (yes/no): N/A  Pharmacy phone number: N/A  Pharmacy Fax Number : N/A  Date of last visit: June 06, 2019  Details to clarify: Pt. Will  prescription from office when ready.

## 2019-12-02 RX ORDER — OXYCODONE AND ACETAMINOPHEN 5; 325 MG/1; MG/1
1 TABLET ORAL
Qty: 90 TAB | Refills: 0 | Status: CANCELLED | OUTPATIENT
Start: 2019-12-02 | End: 2020-01-01

## 2019-12-03 ENCOUNTER — OFFICE VISIT (OUTPATIENT)
Dept: INTERNAL MEDICINE CLINIC | Age: 64
End: 2019-12-03

## 2019-12-03 VITALS
HEIGHT: 71 IN | WEIGHT: 177 LBS | BODY MASS INDEX: 24.78 KG/M2 | RESPIRATION RATE: 16 BRPM | OXYGEN SATURATION: 100 % | HEART RATE: 95 BPM | TEMPERATURE: 98.1 F | DIASTOLIC BLOOD PRESSURE: 62 MMHG | SYSTOLIC BLOOD PRESSURE: 91 MMHG

## 2019-12-03 DIAGNOSIS — I10 ESSENTIAL HYPERTENSION: ICD-10-CM

## 2019-12-03 DIAGNOSIS — G62.9 NEUROPATHY: Chronic | ICD-10-CM

## 2019-12-03 DIAGNOSIS — Z23 ENCOUNTER FOR IMMUNIZATION: ICD-10-CM

## 2019-12-03 DIAGNOSIS — M54.41 CHRONIC BILATERAL LOW BACK PAIN WITH BILATERAL SCIATICA: ICD-10-CM

## 2019-12-03 DIAGNOSIS — E11.22 TYPE 2 DIABETES MELLITUS WITH STAGE 3 CHRONIC KIDNEY DISEASE, WITH LONG-TERM CURRENT USE OF INSULIN (HCC): Primary | ICD-10-CM

## 2019-12-03 DIAGNOSIS — K21.9 GASTROESOPHAGEAL REFLUX DISEASE WITHOUT ESOPHAGITIS: ICD-10-CM

## 2019-12-03 DIAGNOSIS — G89.29 CHRONIC BILATERAL LOW BACK PAIN WITH BILATERAL SCIATICA: ICD-10-CM

## 2019-12-03 DIAGNOSIS — M54.42 CHRONIC BILATERAL LOW BACK PAIN WITH BILATERAL SCIATICA: ICD-10-CM

## 2019-12-03 DIAGNOSIS — Z79.4 TYPE 2 DIABETES MELLITUS WITH STAGE 3 CHRONIC KIDNEY DISEASE, WITH LONG-TERM CURRENT USE OF INSULIN (HCC): Primary | ICD-10-CM

## 2019-12-03 DIAGNOSIS — N18.30 TYPE 2 DIABETES MELLITUS WITH STAGE 3 CHRONIC KIDNEY DISEASE, WITH LONG-TERM CURRENT USE OF INSULIN (HCC): Primary | ICD-10-CM

## 2019-12-03 DIAGNOSIS — Z89.9 S/P AMPUTATION OF LIMB: ICD-10-CM

## 2019-12-03 RX ORDER — PREGABALIN 75 MG/1
75 CAPSULE ORAL 2 TIMES DAILY
Qty: 30 CAP | Refills: 5 | Status: SHIPPED | OUTPATIENT
Start: 2019-12-03 | End: 2020-05-29 | Stop reason: SDUPTHER

## 2019-12-03 RX ORDER — OXYCODONE AND ACETAMINOPHEN 5; 325 MG/1; MG/1
1 TABLET ORAL
Qty: 90 TAB | Refills: 0 | Status: SHIPPED | OUTPATIENT
Start: 2019-12-03 | End: 2020-01-03 | Stop reason: SDUPTHER

## 2019-12-03 NOTE — PROGRESS NOTES
HISTORY OF PRESENT ILLNESS  Blas Serrato is a 75 y.o. male. HPI Presents with wife  Hypertension ROS: taking medications as instructed, no medication side effects noted, no TIA's, no chest pain on exertion, no dyspnea on exertion, no swelling of ankles. New concerns: BP in office today is 91/62. Pt reports that he was only taking his BP meds PRN. His nephrologist increased his meds due to increased retinal pressure. Diabetic Review of Systems - further diabetic ROS: no polyuria or polydipsia, no chest pain, dyspnea or TIA's, no numbness, tingling or pain in extremities. Other symptoms and concerns: He notes his a1c was 8%. His BG is never above 200. He notes he DC fast food and has started to eat healthier foods. Low back pain: Pt reports that his back \"acts up\" often. Continues with Percocet PRN. He notes using Percocet before his long walks to avoid pain. Neuropathy: Stable, pt continues to comply with Lyrica. GERD: Stable, pt continues to comply with Protonix. Denies receiving flu vaccine. Denies f/c. Cannot remember when his last colonoscopy was (Dr. Ezio Piper). Review of Systems   All other systems reviewed and are negative. Physical Exam  Vitals signs and nursing note reviewed. Constitutional:       Appearance: He is well-developed. HENT:      Head: Normocephalic and atraumatic. Right Ear: External ear normal.      Left Ear: External ear normal.      Nose: Nose normal.   Eyes:      Conjunctiva/sclera: Conjunctivae normal.      Pupils: Pupils are equal, round, and reactive to light. Neck:      Musculoskeletal: Normal range of motion and neck supple. Cardiovascular:      Rate and Rhythm: Normal rate and regular rhythm. Heart sounds: Normal heart sounds. Pulmonary:      Effort: Pulmonary effort is normal.      Breath sounds: Normal breath sounds. Abdominal:      General: Bowel sounds are normal.      Palpations: Abdomen is soft.    Genitourinary:     Penis: Normal. Scrotum/Testes: Normal.      Prostate: Normal.      Rectum: Normal. Normal anal tone. Musculoskeletal: Normal range of motion. Skin:     General: Skin is warm and dry. Neurological:      Mental Status: He is alert and oriented to person, place, and time. Psychiatric:         Behavior: Behavior normal.         Thought Content: Thought content normal.         Judgment: Judgment normal.         ASSESSMENT and PLAN  Diagnoses and all orders for this visit:    1. Type 2 diabetes mellitus with stage 3 chronic kidney disease, with long-term current use of insulin (HCC)  Sugars stable with improvement in a1c (decreased from 12.2% to 8%). Encouraged pt to continue to follow diabetic diet and monitor sugars. 2. Neuropathy  Stable and well-managed with Lyrica. No change in medications. -     pregabalin (LYRICA) 75 mg capsule; Take 1 Cap by mouth two (2) times a day. Max Daily Amount: 150 mg.    3. Essential hypertension  BP is at goal. I do not recommend any change in medications. 4. Encounter for immunization  Administered high dose flu injection today in office.   -     INFLUENZA VACCINE INACTIVATED (IIV), SUBUNIT, ADJUVANTED, IM    5. Chronic bilateral low back pain with bilateral sciatica  Stable and well-managed. No change in medications. Informed pt that he is on the max dose of Percocet- and if it stops working there is not many options. Will continue to monitor for improvements or changes. -     oxyCODONE-acetaminophen (PERCOCET) 5-325 mg per tablet; Take 1 Tab by mouth every four (4) hours as needed for Pain for up to 30 days. Max Daily Amount: 6 Tabs. 6. Gastroesophageal reflux disease without esophagitis  Stable and well-managed with Protonix. No change in medications. Advised pt to call Dr. Ezio Piper and check if he is due for a colonoscopy. Advised pt to give out office a copy of his most recent labs and eye exam report from the [place]. Pt is due for f/u in 3 mo.      Lab results and schedule of future lab studies reviewed with patient. Reviewed diet, exercise and weight control. Written by Sailaja Ceron, as dictated by Emeline Shone, MD.     Current diagnosis and concerns discussed with pt at length. Understands risks and benefits or current treatment plan and medications and accepts the treatment and medication with any possible risks. Pt asks appropriate questions which were answered. Pt instructed to call with any concerns or problems. This note will not be viewable in [address]

## 2020-01-03 DIAGNOSIS — M54.41 CHRONIC BILATERAL LOW BACK PAIN WITH BILATERAL SCIATICA: ICD-10-CM

## 2020-01-03 DIAGNOSIS — M54.42 CHRONIC BILATERAL LOW BACK PAIN WITH BILATERAL SCIATICA: ICD-10-CM

## 2020-01-03 DIAGNOSIS — G89.29 CHRONIC BILATERAL LOW BACK PAIN WITH BILATERAL SCIATICA: ICD-10-CM

## 2020-01-03 RX ORDER — OXYCODONE AND ACETAMINOPHEN 5; 325 MG/1; MG/1
1 TABLET ORAL
Qty: 90 TAB | Refills: 0 | Status: SHIPPED | OUTPATIENT
Start: 2020-01-03 | End: 2020-01-31 | Stop reason: SDUPTHER

## 2020-01-03 NOTE — TELEPHONE ENCOUNTER
Patient is requesting to  his script today from the pharmacy , he can be reached at [phone] when the script has been sent in to the pharmacy on file

## 2020-01-31 DIAGNOSIS — M54.41 CHRONIC BILATERAL LOW BACK PAIN WITH BILATERAL SCIATICA: ICD-10-CM

## 2020-01-31 DIAGNOSIS — G89.29 CHRONIC BILATERAL LOW BACK PAIN WITH BILATERAL SCIATICA: ICD-10-CM

## 2020-01-31 DIAGNOSIS — M54.42 CHRONIC BILATERAL LOW BACK PAIN WITH BILATERAL SCIATICA: ICD-10-CM

## 2020-01-31 RX ORDER — OXYCODONE AND ACETAMINOPHEN 5; 325 MG/1; MG/1
1 TABLET ORAL
Qty: 90 TAB | Refills: 0 | Status: SHIPPED | OUTPATIENT
Start: 2020-01-31 | End: 2020-03-04 | Stop reason: SDUPTHER

## 2020-01-31 NOTE — TELEPHONE ENCOUNTER
----- Message from Eli First sent at 1/31/2020 10:00 AM EST -----  Regarding: Dr. Kyle Johansen / Jailyn Keenan  Mr. Blake is requesting a refill for Oxycodone - 5/325 mg be sent to his pharmacy on file. Patient is not completely of medication, he is requesting the subsequent refill. Pt's best contact number is [phone].

## 2020-03-04 ENCOUNTER — TELEPHONE (OUTPATIENT)
Dept: INTERNAL MEDICINE CLINIC | Age: 65
End: 2020-03-04

## 2020-03-04 ENCOUNTER — OFFICE VISIT (OUTPATIENT)
Dept: INTERNAL MEDICINE CLINIC | Age: 65
End: 2020-03-04

## 2020-03-04 VITALS
DIASTOLIC BLOOD PRESSURE: 80 MMHG | HEIGHT: 71 IN | SYSTOLIC BLOOD PRESSURE: 130 MMHG | HEART RATE: 94 BPM | WEIGHT: 184.8 LBS | BODY MASS INDEX: 25.87 KG/M2 | RESPIRATION RATE: 16 BRPM | TEMPERATURE: 97.7 F | OXYGEN SATURATION: 100 %

## 2020-03-04 DIAGNOSIS — E78.5 DYSLIPIDEMIA: ICD-10-CM

## 2020-03-04 DIAGNOSIS — Z89.9 S/P AMPUTATION OF LIMB: ICD-10-CM

## 2020-03-04 DIAGNOSIS — M54.41 CHRONIC BILATERAL LOW BACK PAIN WITH BILATERAL SCIATICA: ICD-10-CM

## 2020-03-04 DIAGNOSIS — G89.29 CHRONIC BILATERAL LOW BACK PAIN WITH BILATERAL SCIATICA: ICD-10-CM

## 2020-03-04 DIAGNOSIS — I10 ESSENTIAL HYPERTENSION: ICD-10-CM

## 2020-03-04 DIAGNOSIS — E11.59 DM TYPE 2 CAUSING VASCULAR DISEASE (HCC): Primary | ICD-10-CM

## 2020-03-04 DIAGNOSIS — K21.9 GASTROESOPHAGEAL REFLUX DISEASE WITHOUT ESOPHAGITIS: ICD-10-CM

## 2020-03-04 DIAGNOSIS — M54.42 CHRONIC BILATERAL LOW BACK PAIN WITH BILATERAL SCIATICA: ICD-10-CM

## 2020-03-04 DIAGNOSIS — Z12.11 SCREENING FOR COLON CANCER: ICD-10-CM

## 2020-03-04 PROBLEM — H52.4 PRESBYOPIA: Status: ACTIVE | Noted: 2017-04-25

## 2020-03-04 PROBLEM — E11.9 TYPE 2 DIABETES MELLITUS WITHOUT RETINOPATHY (HCC): Status: ACTIVE | Noted: 2017-04-25

## 2020-03-04 RX ORDER — OXYCODONE AND ACETAMINOPHEN 10; 325 MG/1; MG/1
TABLET ORAL
COMMUNITY
End: 2020-05-29 | Stop reason: SDUPTHER

## 2020-03-04 RX ORDER — OXYCODONE AND ACETAMINOPHEN 5; 325 MG/1; MG/1
1 TABLET ORAL
Qty: 90 TAB | Refills: 0 | Status: SHIPPED | OUTPATIENT
Start: 2020-03-04 | End: 2020-04-02 | Stop reason: SDUPTHER

## 2020-03-04 NOTE — PROGRESS NOTES
HISTORY OF PRESENT ILLNESS  Adelia Solomon is a 75 y.o. male who presents with wife. HPI  Diabetic Review of Systems - medication compliance: compliant all of the time, home glucose monitoring: is performed regularly, further diabetic ROS: no polyuria or polydipsia, no chest pain, dyspnea or TIA's, no numbness, tingling or pain in extremities. Other symptoms and concerns: Pt's last a1c was 12.2 with avg BG of 303 on 6/07/19. Continues to f/u through the [facility]. He is going in next week to do labs. He has been checking his BG regularly and his values are usually in the mid 150's with the highest value being 232. Continues to f/u with nephrologist.      Hypertension ROS: taking medications as instructed, no medication side effects noted, no TIA's, no chest pain on exertion, no dyspnea on exertion, no swelling of ankles. New concerns: BP in office today is 163/74. Continues on Lisinopril 10 mg. He notes his BG is usually at goal.     Low back pain: Stable with no relief noted with acupuncture through [facility]. He has only gone a few times and is not sure if it works. Continues on Percocet no more than BID. S/p limb amputation: Stable. He notes that he needs new liners and sleeves for his prosthetic. Neuropathy: Stable, pt continues to comply with Lyrica 75 mg. General health: He notes during his last eye exam there was some slight DM changes, so he is getting photos and scans of his eyes on the 19th. He cannot remember when he last got his colonoscopy with Dr Dasia Cornell. Of note, he had an upper endoscopy with Dr. Citlali Zhang in 10/08/18 and upon further chart review, found colonoscopy records from 10/12/12 from [facility] with Dr. Dasia Cornell. His colonoscopy was normal, with no polyps and is not due until 2022. Review of Systems   All other systems reviewed and are negative. Physical Exam  Constitutional:       Appearance: Normal appearance.    HENT:      Right Ear: Hearing, tympanic membrane and external ear normal. Left Ear: Hearing, tympanic membrane and external ear normal.      Mouth/Throat:      Mouth: Mucous membranes are moist.      Pharynx: Oropharynx is clear. Cardiovascular:      Rate and Rhythm: Normal rate and regular rhythm. Pulmonary:      Effort: Pulmonary effort is normal.      Breath sounds: Normal breath sounds and air entry. Musculoskeletal: Normal range of motion. Skin:     General: Skin is warm and dry. Neurological:      General: No focal deficit present. Mental Status: He is alert and oriented to person, place, and time. Psychiatric:         Mood and Affect: Mood normal.         Behavior: Behavior normal.         ASSESSMENT and PLAN  Diagnoses and all orders for this visit:    1. DM type 2 causing vascular disease ([facility] [address]  Sugars presumed stable, pt usually does labs at the [place]. Ordered labs since we do not have records since June. Continue to follow diabetic diet and monitor sugars.   -     MICROALBUMIN, UR, RAND W/ MICROALB/CREAT RATIO; Future  -     HEMOGLOBIN A1C WITH EAG; Future    2. Essential hypertension  BP is at goal. I do not recommend any change in medications.   -     METABOLIC PANEL, COMPREHENSIVE; Future    3. Dyslipidemia  Presumed stable, will recheck labs today. Patient is tolerating medications with no myalgias. I do not recommend any change in treatment plan. -     LIPID PANEL; Future    4. S/P amputation of limb  Stable and well-managed. No change in medications. Wrote a script for new liners for pt's prosthetic.   -     oxyCODONE-acetaminophen (PERCOCET) 5-325 mg per tablet; Take 1 Tab by mouth every four (4) hours as needed for Pain for up to 30 days. Max Daily Amount: 6 Tabs. 5. Gastroesophageal reflux disease without esophagitis  Stable and well-managed. No change in medications. -     CBC W/O DIFF; Future    6. Chronic bilateral low back pain with bilateral sciatica  Stable and well-managed. No change in medications.  Discussed with pt that most people seem to notice a difference in pain after 4-6 weeks of regular acupuncture visits, and not immediately. -     oxyCODONE-acetaminophen (PERCOCET) 5-325 mg per tablet; Take 1 Tab by mouth every four (4) hours as needed for Pain for up to 30 days. Max Daily Amount: 6 Tabs. 7. Screening for colon cancer  Ordered stool cards to r/o underlying concerns. Pt is due for colonoscopy in 2022. Will continue to monitor for improvements or changes. -     OCCULT BLOOD IMMUNOASSAY,DIAGNOSTIC; Future    Lab results and schedule of future lab studies reviewed with patient. Reviewed diet, exercise and weight control. Written by Gaby Snow, as dictated by Alpesh Dutta MD.     Current diagnosis and concerns discussed with pt at length. Understands risks and benefits or current treatment plan and medications and accepts the treatment and medication with any possible risks. Pt asks appropriate questions which were answered. Pt instructed to call with any concerns or problems. This note will not be viewable in [address]

## 2020-03-04 NOTE — TELEPHONE ENCOUNTER
Can you put in colon report in  from 10/10/2012- encounter see from hospital - not sure who does it but since it is [facility]

## 2020-03-17 ENCOUNTER — TELEPHONE (OUTPATIENT)
Dept: INTERNAL MEDICINE CLINIC | Age: 65
End: 2020-03-17

## 2020-03-17 RX ORDER — CEPHALEXIN 500 MG/1
500 CAPSULE ORAL 4 TIMES DAILY
Qty: 40 CAP | Refills: 0 | Status: SHIPPED | OUTPATIENT
Start: 2020-03-17 | End: 2020-03-17 | Stop reason: SDUPTHER

## 2020-03-17 RX ORDER — CEPHALEXIN 500 MG/1
500 CAPSULE ORAL 4 TIMES DAILY
Qty: 40 CAP | Refills: 0 | Status: SHIPPED | OUTPATIENT
Start: 2020-03-17 | End: 2020-06-09

## 2020-03-17 NOTE — TELEPHONE ENCOUNTER
Pt cut his arm open and was seen at [facility]. He received 6 stitches and requesting antibiotics be sent to [facility]) due to him having Diabetes (easy to get infections). Please inform pt once sent to pharmacy. Thanks.

## 2020-03-23 ENCOUNTER — OFFICE VISIT (OUTPATIENT)
Dept: INTERNAL MEDICINE CLINIC | Age: 65
End: 2020-03-23

## 2020-03-23 VITALS
SYSTOLIC BLOOD PRESSURE: 148 MMHG | WEIGHT: 185 LBS | RESPIRATION RATE: 16 BRPM | OXYGEN SATURATION: 99 % | HEART RATE: 95 BPM | TEMPERATURE: 98.1 F | DIASTOLIC BLOOD PRESSURE: 84 MMHG | HEIGHT: 71 IN | BODY MASS INDEX: 25.9 KG/M2

## 2020-03-23 DIAGNOSIS — E78.5 DYSLIPIDEMIA: ICD-10-CM

## 2020-03-23 DIAGNOSIS — E11.59 DM TYPE 2 CAUSING VASCULAR DISEASE (HCC): Primary | ICD-10-CM

## 2020-03-23 DIAGNOSIS — I10 ESSENTIAL HYPERTENSION: ICD-10-CM

## 2020-03-23 DIAGNOSIS — Z48.02 VISIT FOR SUTURE REMOVAL: ICD-10-CM

## 2020-03-23 NOTE — PROGRESS NOTES
HISTORY OF PRESENT ILLNESS  Jaleesa Lu is a 75 y.o. male. HPI  Pt was sawing a pipe and it went out of hand. He presented to [facility] ED on 3/15/20 and had his wound cleaned and sutured. He notes that there was some drainage at first but it completely dissipated after complying with his abx that were called in by our office. Hypertension ROS: taking medications as instructed, no medication side effects noted, no TIA's, no chest pain on exertion, no dyspnea on exertion, no swelling of ankles. New concerns: BP in office today is 148/84. Diabetic Review of Systems - further diabetic ROS: no polyuria or polydipsia, no chest pain, dyspnea or TIA's, no numbness, tingling or pain in extremities. Other symptoms and concerns: He notes his BG have been stable (approx 120) and he has been careful with his diet choices. Hyperlipidemia:  Cardiovascular risk analysis - 75 y.o. male LDL goal is under 100. ROS: taking medications as instructed, no medication side effects noted, no TIA's, no chest pain on exertion, no dyspnea on exertion, no swelling of ankles. Tolerating meds, no myalgias or other side effects noted  New concerns: Pt's last LDL was unable to be calculated due to elevated triglycerides (599) on 6/06/19. Pt reports he went to [facility] in [place] near [address]- possibly this one [facility], [address] [facility] [phone]. Review of Systems   All other systems reviewed and are negative. Physical Exam  Constitutional:       Appearance: Normal appearance. HENT:      Right Ear: Hearing, tympanic membrane and external ear normal.      Left Ear: Hearing, tympanic membrane and external ear normal.      Mouth/Throat:      Mouth: Mucous membranes are moist.      Pharynx: Oropharynx is clear. Cardiovascular:      Rate and Rhythm: Normal rate and regular rhythm.    Pulmonary:      Effort: Pulmonary effort is normal.      Breath sounds: Normal breath sounds and air entry. Musculoskeletal: Normal range of motion. Skin:     General: Skin is warm and dry. Findings: Wound (Healing well) present. Comments: Removed 6 sutures- wound healing well with no infection noted. Neurological:      General: No focal deficit present. Mental Status: He is alert and oriented to person, place, and time. Psychiatric:         Mood and Affect: Mood normal.         Behavior: Behavior normal.         ASSESSMENT and PLAN  Diagnoses and all orders for this visit:    1. DM type 2 causing vascular disease ([facility] [address]  Sugars presumed stable, waiting on labs to come back. Continue to follow diabetic diet and monitor sugars. 2. Essential hypertension  BP is at goal. I do not recommend any change in medications. 3. Dyslipidemia  Presumed stable, waiting on lab results to come back. Patient is tolerating medications with no myalgias. I do not recommend any change in treatment plan. 4. Visit for suture removal  Removed 6 sutures today in office with great signs of healing. Informed pt that if he notices any change, f/c, and other signs of infection to call our office immediately. Will continue to monitor for improvements or changes.  -     REMOVAL OF SUTURES     Lab results and schedule of future lab studies reviewed with patient. Reviewed diet, exercise and weight control. Written by Jadiel Campos, as dictated by Claire Campos MD.     Current diagnosis and concerns discussed with pt at length. Understands risks and benefits or current treatment plan and medications and accepts the treatment and medication with any possible risks. Pt asks appropriate questions which were answered. Pt instructed to call with any concerns or problems.

## 2020-03-28 ENCOUNTER — HOSPITAL ENCOUNTER (OUTPATIENT)
Dept: LAB | Age: 65
Discharge: HOME | End: 2020-03-28
Payer: MEDICARE

## 2020-03-28 PROCEDURE — 82270 OCCULT BLOOD FECES: CPT

## 2020-03-31 LAB
ALBUMIN SERPL-MCNC: 3.6 G/DL (ref 3.8–4.8)
ALBUMIN/CREAT UR: 1210 MG/G CREAT (ref 0–29)
ALBUMIN/GLOB SERPL: 1.2 {RATIO} (ref 1.2–2.2)
ALP SERPL-CCNC: 83 IU/L (ref 39–117)
ALT SERPL-CCNC: 12 IU/L (ref 0–44)
AST SERPL-CCNC: 11 IU/L (ref 0–40)
BILIRUB SERPL-MCNC: 0.2 MG/DL (ref 0–1.2)
BUN SERPL-MCNC: 29 MG/DL (ref 8–27)
BUN/CREAT SERPL: 15 (ref 10–24)
CALCIUM SERPL-MCNC: 8.5 MG/DL (ref 8.6–10.2)
CHLORIDE SERPL-SCNC: 108 MMOL/L (ref 96–106)
CHOLEST SERPL-MCNC: 239 MG/DL (ref 100–199)
CO2 SERPL-SCNC: 19 MMOL/L (ref 20–29)
CREAT SERPL-MCNC: 1.96 MG/DL (ref 0.76–1.27)
CREAT UR-MCNC: 95.5 MG/DL
ERYTHROCYTE [DISTWIDTH] IN BLOOD BY AUTOMATED COUNT: 13.9 % (ref 11.6–15.4)
EST. AVERAGE GLUCOSE BLD GHB EST-MCNC: 280 MG/DL
GLOBULIN SER CALC-MCNC: 2.9 G/DL (ref 1.5–4.5)
GLUCOSE SERPL-MCNC: 281 MG/DL (ref 65–99)
HBA1C MFR BLD: 11.4 % (ref 4.8–5.6)
HCT VFR BLD AUTO: 29.2 % (ref 37.5–51)
HDLC SERPL-MCNC: 40 MG/DL
HGB BLD-MCNC: 9.7 G/DL (ref 13–17.7)
INTERPRETATION, 910389: NORMAL
INTERPRETATION: NORMAL
LDLC SERPL CALC-MCNC: 130 MG/DL (ref 0–99)
Lab: NORMAL
MCH RBC QN AUTO: 28.7 PG (ref 26.6–33)
MCHC RBC AUTO-ENTMCNC: 33.2 G/DL (ref 31.5–35.7)
MCV RBC AUTO: 86 FL (ref 79–97)
MICROALBUMIN UR-MCNC: 1155.2 UG/ML
PDF IMAGE, 910387: NORMAL
PLATELET # BLD AUTO: 196 X10E3/UL (ref 150–450)
POTASSIUM SERPL-SCNC: 5 MMOL/L (ref 3.5–5.2)
PROT SERPL-MCNC: 6.5 G/DL (ref 6–8.5)
RBC # BLD AUTO: 3.38 X10E6/UL (ref 4.14–5.8)
SODIUM SERPL-SCNC: 138 MMOL/L (ref 134–144)
TRIGL SERPL-MCNC: 345 MG/DL (ref 0–149)
VLDLC SERPL CALC-MCNC: 69 MG/DL (ref 5–40)
WBC # BLD AUTO: 5.1 X10E3/UL (ref 3.4–10.8)

## 2020-04-02 DIAGNOSIS — Z89.9 S/P AMPUTATION OF LIMB: ICD-10-CM

## 2020-04-02 DIAGNOSIS — M54.42 CHRONIC BILATERAL LOW BACK PAIN WITH BILATERAL SCIATICA: ICD-10-CM

## 2020-04-02 DIAGNOSIS — M54.41 CHRONIC BILATERAL LOW BACK PAIN WITH BILATERAL SCIATICA: ICD-10-CM

## 2020-04-02 DIAGNOSIS — G89.29 CHRONIC BILATERAL LOW BACK PAIN WITH BILATERAL SCIATICA: ICD-10-CM

## 2020-04-02 RX ORDER — OXYCODONE AND ACETAMINOPHEN 5; 325 MG/1; MG/1
1 TABLET ORAL
Qty: 90 TAB | Refills: 0 | Status: SHIPPED | OUTPATIENT
Start: 2020-04-02 | End: 2020-05-06 | Stop reason: SDUPTHER

## 2020-04-02 NOTE — TELEPHONE ENCOUNTER
----- Message from Merline Ambrosia sent at 4/2/2020 10:21 AM EDT -----  Regarding: Dr. Sheree Worrell: [phone] (if not patient): N/A  Relationship of caller (if not patient): N/A  Best contact number(s): [phone]  Name of medication and dosage if known: Oxycodone, pt was unsure of dosage  Is patient out of this medication (yes/no): Yes  Pharmacy name: Pt stated it is in the building downstairs from [address] [place] [address] listed in chart? (yes/no): Yes  Pharmacy phone number: N/A  Date of last visit: March 23, 2020  Details to clarify the request: N/A

## 2020-04-03 DIAGNOSIS — N18.30 DM TYPE 2 CAUSING CKD STAGE 3 (HCC): Chronic | ICD-10-CM

## 2020-04-03 DIAGNOSIS — E11.22 DM TYPE 2 CAUSING CKD STAGE 3 (HCC): Chronic | ICD-10-CM

## 2020-04-03 RX ORDER — INSULIN GLARGINE 100 [IU]/ML
INJECTION, SOLUTION SUBCUTANEOUS
Qty: 15 ML | Refills: 3 | Status: SHIPPED | OUTPATIENT
Start: 2020-04-03 | End: 2020-04-28

## 2020-04-03 RX ORDER — INSULIN LISPRO 100 [IU]/ML
INJECTION, SOLUTION INTRAVENOUS; SUBCUTANEOUS
Qty: 15 ML | Refills: 3 | Status: SHIPPED | OUTPATIENT
Start: 2020-04-03 | End: 2020-04-28

## 2020-04-03 RX ORDER — ERGOCALCIFEROL 1.25 MG/1
CAPSULE ORAL
Qty: 4 CAP | Refills: 3 | Status: SHIPPED | OUTPATIENT
Start: 2020-04-03 | End: 2020-05-29 | Stop reason: SDUPTHER

## 2020-04-03 NOTE — TELEPHONE ENCOUNTER
Patient requesting medication refills to be picked up on Monday April 6, 2020 please. Patient's pharmacy is [facility] [place]. Patient verbalized appreciation of assistance. Refill request forwarded to PCP.

## 2020-04-05 LAB — HEMOCCULT STL QL IA: NEGATIVE

## 2020-04-24 ENCOUNTER — TELEPHONE (OUTPATIENT)
Dept: INTERNAL MEDICINE CLINIC | Age: 65
End: 2020-04-24

## 2020-04-24 NOTE — TELEPHONE ENCOUNTER
Pt would like a call from nurse today regarding his right arm. A vein is his arm is itching and then gets bigger. It does go down but then itches again. Please call [phone] to advise.

## 2020-04-24 NOTE — TELEPHONE ENCOUNTER
Spoke with patient and he states that he does not recall brushing up against anything that may be causing an allergic reaction. He did confirm applying hydrocortisone cream to the area and it did seem to help. Advised patient to monitor the area and continue to apply cream.  Also recommended he take a benadryl or zyrtec to see if that improves the area. Pt will call the office early Monday if he is still having the issue.

## 2020-04-28 DIAGNOSIS — N18.30 DM TYPE 2 CAUSING CKD STAGE 3 (HCC): Chronic | ICD-10-CM

## 2020-04-28 DIAGNOSIS — E11.22 DM TYPE 2 CAUSING CKD STAGE 3 (HCC): Chronic | ICD-10-CM

## 2020-04-28 RX ORDER — INSULIN LISPRO 100 [IU]/ML
INJECTION, SOLUTION INTRAVENOUS; SUBCUTANEOUS
Qty: 15 ML | Refills: 0 | Status: SHIPPED | OUTPATIENT
Start: 2020-04-28 | End: 2020-04-29

## 2020-04-28 RX ORDER — INSULIN GLARGINE 100 [IU]/ML
INJECTION, SOLUTION SUBCUTANEOUS
Qty: 15 ML | Refills: 0 | Status: SHIPPED | OUTPATIENT
Start: 2020-04-28 | End: 2020-04-29

## 2020-05-04 DIAGNOSIS — E11.22 DM TYPE 2 CAUSING CKD STAGE 3 (HCC): Chronic | ICD-10-CM

## 2020-05-04 DIAGNOSIS — N18.30 DM TYPE 2 CAUSING CKD STAGE 3 (HCC): Chronic | ICD-10-CM

## 2020-05-04 RX ORDER — INSULIN LISPRO 100 [IU]/ML
INJECTION, SOLUTION INTRAVENOUS; SUBCUTANEOUS
Qty: 15 ML | Refills: 0 | Status: SHIPPED | OUTPATIENT
Start: 2020-05-04 | End: 2020-08-26

## 2020-05-04 RX ORDER — INSULIN GLARGINE 100 [IU]/ML
INJECTION, SOLUTION SUBCUTANEOUS
Qty: 15 ML | Refills: 0 | Status: SHIPPED | OUTPATIENT
Start: 2020-05-04 | End: 2020-08-26

## 2020-05-06 DIAGNOSIS — Z89.9 S/P AMPUTATION OF LIMB: ICD-10-CM

## 2020-05-06 DIAGNOSIS — G89.29 CHRONIC BILATERAL LOW BACK PAIN WITH BILATERAL SCIATICA: ICD-10-CM

## 2020-05-06 DIAGNOSIS — M54.42 CHRONIC BILATERAL LOW BACK PAIN WITH BILATERAL SCIATICA: ICD-10-CM

## 2020-05-06 DIAGNOSIS — M54.41 CHRONIC BILATERAL LOW BACK PAIN WITH BILATERAL SCIATICA: ICD-10-CM

## 2020-05-06 RX ORDER — OXYCODONE AND ACETAMINOPHEN 10; 325 MG/1; MG/1
1 TABLET ORAL
Status: CANCELLED | OUTPATIENT
Start: 2020-05-06

## 2020-05-06 RX ORDER — OXYCODONE AND ACETAMINOPHEN 5; 325 MG/1; MG/1
1 TABLET ORAL
Qty: 90 TAB | Refills: 0 | Status: SHIPPED | OUTPATIENT
Start: 2020-05-06 | End: 2020-05-29 | Stop reason: ALTCHOICE

## 2020-05-06 NOTE — TELEPHONE ENCOUNTER
----- Message from Dallas Mcgarry sent at 5/6/2020  1:09 PM EDT -----  Regarding: Dr. Adelia Valentino refill  Contact: [phone] (if not patient):n/a  Relationship of caller (if not patient):n/a  Best contact number(s): [phone]  Name of medication and dosage if known: \" OXYCODONE\"   Is patient out of this medication (yes/no):yes  Pharmacy name: [facility]  [address] listed in chart? (yes/no):YES  Pharmacy phone number :n/a  Date of last visit:3/23/20  Details to clarify the request:n/a

## 2020-05-28 ENCOUNTER — HOSPITAL ENCOUNTER (OUTPATIENT)
Dept: GENERAL RADIOLOGY | Age: 65
Discharge: HOME | End: 2020-05-28
Attending: PODIATRIST
Payer: MEDICARE

## 2020-05-28 DIAGNOSIS — L97.914 NON-PRESSURE CHRONIC ULCER OF RIGHT LOWER LEG WITH NECROSIS OF BONE (HCC): ICD-10-CM

## 2020-05-28 PROCEDURE — 73590 X-RAY EXAM OF LOWER LEG: CPT

## 2020-05-29 ENCOUNTER — VIRTUAL VISIT (OUTPATIENT)
Dept: INTERNAL MEDICINE CLINIC | Age: 65
End: 2020-05-29

## 2020-05-29 VITALS — DIASTOLIC BLOOD PRESSURE: 66 MMHG | HEART RATE: 95 BPM | TEMPERATURE: 97.3 F | SYSTOLIC BLOOD PRESSURE: 171 MMHG

## 2020-05-29 DIAGNOSIS — L97.912 ULCER OF RIGHT LOWER EXTREMITY WITH FAT LAYER EXPOSED (HCC): ICD-10-CM

## 2020-05-29 DIAGNOSIS — G62.9 NEUROPATHY: ICD-10-CM

## 2020-05-29 DIAGNOSIS — Z89.9 S/P AMPUTATION OF LIMB: ICD-10-CM

## 2020-05-29 DIAGNOSIS — M54.42 CHRONIC BILATERAL LOW BACK PAIN WITH BILATERAL SCIATICA: ICD-10-CM

## 2020-05-29 DIAGNOSIS — E78.5 DYSLIPIDEMIA: ICD-10-CM

## 2020-05-29 DIAGNOSIS — I10 ESSENTIAL HYPERTENSION: ICD-10-CM

## 2020-05-29 DIAGNOSIS — M54.41 CHRONIC BILATERAL LOW BACK PAIN WITH BILATERAL SCIATICA: ICD-10-CM

## 2020-05-29 DIAGNOSIS — G89.29 CHRONIC BILATERAL LOW BACK PAIN WITH BILATERAL SCIATICA: ICD-10-CM

## 2020-05-29 DIAGNOSIS — E11.59 DM TYPE 2 CAUSING VASCULAR DISEASE (HCC): Primary | ICD-10-CM

## 2020-05-29 RX ORDER — SILVER SULFADIAZINE 10 G/1000G
CREAM TOPICAL DAILY
Qty: 85 G | Refills: 0 | Status: SHIPPED | OUTPATIENT
Start: 2020-05-29 | End: 2020-12-07 | Stop reason: SDUPTHER

## 2020-05-29 RX ORDER — ALBUTEROL SULFATE 90 UG/1
2 AEROSOL, METERED RESPIRATORY (INHALATION)
Qty: 1 INHALER | Refills: 4 | Status: SHIPPED | OUTPATIENT
Start: 2020-05-29 | End: 2020-12-07 | Stop reason: SDUPTHER

## 2020-05-29 RX ORDER — OXYCODONE AND ACETAMINOPHEN 10; 325 MG/1; MG/1
1 TABLET ORAL
Qty: 90 TAB | Refills: 0 | Status: SHIPPED | OUTPATIENT
Start: 2020-05-29 | End: 2020-05-29 | Stop reason: CLARIF

## 2020-05-29 RX ORDER — OXYCODONE AND ACETAMINOPHEN 5; 325 MG/1; MG/1
1 TABLET ORAL
Qty: 90 TAB | Refills: 0 | Status: SHIPPED | OUTPATIENT
Start: 2020-05-29 | End: 2020-06-30 | Stop reason: SDUPTHER

## 2020-05-29 RX ORDER — PREGABALIN 75 MG/1
75 CAPSULE ORAL 2 TIMES DAILY
Qty: 30 CAP | Refills: 5 | Status: SHIPPED | OUTPATIENT
Start: 2020-05-29 | End: 2021-02-01

## 2020-05-29 RX ORDER — ERGOCALCIFEROL 1.25 MG/1
CAPSULE ORAL
Qty: 4 CAP | Refills: 3 | Status: SHIPPED | OUTPATIENT
Start: 2020-05-29 | End: 2020-10-02

## 2020-05-29 RX ORDER — OXYCODONE AND ACETAMINOPHEN 5; 325 MG/1; MG/1
1 TABLET ORAL
Qty: 90 TAB | Refills: 0 | Status: SHIPPED | OUTPATIENT
Start: 2020-05-29 | End: 2020-05-29 | Stop reason: SDUPTHER

## 2020-05-29 NOTE — PROGRESS NOTES
HISTORY OF PRESENT ILLNESS  Yandel Nye is a 75 y.o. male. HPI   R leg ulcer: Pt reports that he has an infection in his amputation site and is complying with Keflex regimen. He notes that yesterday he saw podiatrist and wound care (Dr. Ni Contreras) Monday and yesterday. He got an XR and has an MRI scheduled for 6/04. He is concerned because he thinks he is able to see the bone in his infection. Pt requests refill of Silvadene. R leg amputation: Stable but currently infected. Pt reports that he needs to receive a new prosthetic to further prevent future infections and ulcers. Diabetic Review of Systems - further diabetic ROS: no polyuria or polydipsia, no chest pain, dyspnea or TIA's. Other symptoms and concerns: Pt's last a1c was 11.4 on 3/30/20 with an estimated BG of 280. Pt reports that he has been checking his BG often due to his wounds. He inquires about his most recent a1c. Pt thinks that he has been doing well with his DM for the last month. Continues on Lantus BID. Hypertension ROS: taking medications as instructed, no medication side effects noted, no TIA's, no chest pain on exertion, no dyspnea on exertion, no swelling of ankles. New concerns: BP at home today is 171/66. Pt thinks this might be due to his infection. Continues on Lisinopril 10 mg. Hyperlipidemia:  Cardiovascular risk analysis - 75 y.o. male LDL goal is under 100. ROS: taking medications as instructed, no medication side effects noted, no TIA's, no chest pain on exertion, no dyspnea on exertion, no swelling of ankles. Tolerating meds, no myalgias or other side effects noted. New concerns: Pt's last LDL was 130 on 3/30/20. Continues on Rosuvastatin 40 mg. Neuropathy: Stable, pt continues to comply with Lyrica in a stable and consistent pattern. Pt requests a refill. Back pain: Stable, pt continues to comply with Percocet BID in a stable and consistent pattern. Pt requests a refill.     Of note, pt requests a refill of his albuterol inhaler. Pt does not have any other acute concerns today. Review of Systems   All other systems reviewed and are negative. Physical Exam  Vitals signs reviewed. Constitutional:       General: He is not in acute distress. Appearance: Normal appearance. He is not ill-appearing, toxic-appearing or diaphoretic. HENT:      Right Ear: Hearing normal.      Left Ear: Hearing normal.      Nose: Nose normal.      Mouth/Throat:      Mouth: Mucous membranes are moist.      Pharynx: Oropharynx is clear. Eyes:      Conjunctiva/sclera: Conjunctivae normal.   Neck:      Musculoskeletal: Normal range of motion. Pulmonary:      Effort: No respiratory distress. Breath sounds: Normal air entry. Musculoskeletal: Normal range of motion. Skin:     General: Skin is warm and dry. Neurological:      General: No focal deficit present. Mental Status: He is alert and oriented to person, place, and time. Mental status is at baseline. Psychiatric:         Mood and Affect: Mood normal.         Behavior: Behavior normal.         Thought Content: Thought content normal.         Judgment: Judgment normal.         ASSESSMENT and PLAN  Diagnoses and all orders for this visit:    1. DM type 2 causing vascular disease ([address]  Sugars presumed stable. Continue to follow diabetic diet and monitor sugars. Discussed with pt that he should come back in for labs in 1 month since he has been closely monitoring his DM. Advised pt to continue to monitor his BG closely. Will continue to monitor for improvements or changes. 2. Essential hypertension  BP is at elevated and not at goal- likely due to infection. I do not recommend any change in medications. 3. Dyslipidemia  Stable, patient is tolerating medications, no myalgias. I do not recommend any change in medications. 4. Neuropathy  Refilled Lyrica. Stable and well-managed with Lyrica 75 mg BID in a stable and consistent pattern.  No change in medications. -     pregabalin (Lyrica) 75 mg capsule; Take 1 Cap by mouth two (2) times a day. Max Daily Amount: 150 mg.    5. Chronic bilateral low back pain with bilateral sciatica  Refilled Percocet. Stable and well-managed with Percocet BID in a stable and consistent pattern. No change in medications. -     oxyCODONE-acetaminophen (Percocet) 5-325 mg per tablet; Take 1 Tab by mouth every four (4) hours as needed for Pain for up to 30 days. Max Daily Amount: 6 Tabs. 6. Ulcer of right lower extremity with fat layer exposed ([address]  Pt is currently on Keflex. Closely monitored by wound care. Pt saw Dr. Eunice Guevara at wound care on Monday and again yesterday. Pt has an ulcer on her R LE near his amputation site. Pt continues to f/u with wound care. Prescribed Silvadene. Reminded pt how important it is to keep his BG under control for proper healing. Will continue to monitor for improvements or changes. -     silver sulfADIAZINE (SILVADENE) 1 % topical cream; Apply  to affected area daily. 7. S/P amputation of limb  Refilled Percocet. Stable and well-managed with Percocet BID in a stable and consistent pattern. No change in medications. -     oxyCODONE-acetaminophen (Percocet) 5-325 mg per tablet; Take 1 Tab by mouth every four (4) hours as needed for Pain for up to 30 days. Max Daily Amount: 6 Tabs. Other orders  -     albuterol (Ventolin HFA) 90 mcg/actuation inhaler; Take 2 Puffs by inhalation every four (4) hours as needed for Wheezing.  -     ergocalciferol (Vitamin D2) 1,250 mcg (50,000 unit) capsule; TAKE ONE CAPSULE BY MOUTH EVERY WEEK    Lab results and schedule of future lab studies reviewed with patient. Reviewed diet, exercise and weight control. Written by Jeannette Arroyo, as dictated by Concetta Jo MD.     Current diagnosis and concerns discussed with pt at length.  Understands risks and benefits or current treatment plan and medications and accepts the treatment and medication with any possible risks. Pt asks appropriate questions which were answered. Pt instructed to call with any concerns or problems.

## 2020-06-02 ENCOUNTER — HOSPITAL ENCOUNTER (OUTPATIENT)
Dept: WOUND CARE | Age: 65
Discharge: HOME | End: 2020-06-02
Admitting: PODIATRIST
Payer: MEDICARE

## 2020-06-02 VITALS
DIASTOLIC BLOOD PRESSURE: 57 MMHG | HEART RATE: 95 BPM | RESPIRATION RATE: 18 BRPM | TEMPERATURE: 97.8 F | SYSTOLIC BLOOD PRESSURE: 119 MMHG

## 2020-06-02 PROCEDURE — 74011000250 HC RX REV CODE- 250: Performed by: PODIATRIST

## 2020-06-02 PROCEDURE — 99203 OFFICE O/P NEW LOW 30 MIN: CPT | Performed by: PODIATRIST

## 2020-06-02 RX ADMIN — Medication: at 14:41

## 2020-06-02 NOTE — WOUND CARE
[address] [facility]:     [address] [facility] [address] [facility] [address] f: [phone] p: [phone] [email or web address]     Ordering Center:     [address]  [address] [place] [address] [phone]  WOUND CARE [phone]  FAX NUMBER [phone]    Patient Information:      Kassidy Gee [place] [id number] [phone] (M)  [phone] (H)  : 1955  AGE: 75 y.o. GENDER: male   TODAYS DATE:  2020    Insurance:      PRIMARY INSURANCE:  Plan: @PrimPlan@  Coverage: @PrimCov@  Effective Date: @Primeffdate@  [id number] - (Medicare)    SECONDARY INSURANCE:  Plan: @SecPlan@  Coverage: @SecCov@  Effective Date: @Seceffdate@  [unfilled]    [unfilled]   [unfilled]     Patient Wound Information:      Problem List Items Addressed This Visit     None          WOUNDS REQUIRING DRESSING SUPPLIES:     Wound Foot Left (Active)   Number of days:        Wound Neck Posterior;Mid (Active)   Number of days:        Wound Leg Lower Right;Lateral (Active)   Dressing Status  Removed 2020  2:39 PM   Dressing Type  Gauze;Gauze wrap (greg) 2020  2:39 PM   Wound Length (cm) 1.5 cm 2020  2:39 PM   Wound Width (cm) 1 cm 2020  2:39 PM   Wound Depth (cm) 1.4 2020  2:39 PM   Wound Surface area (cm^2) 1.5 cm^2 2020  2:39 PM   Condition of Base Other (comment) 2020  2:39 PM   Condition of Edges Open 2020  2:39 PM   Drainage Amount  Moderate 2020  2:39 PM   Drainage Color Serosanguinous 2020  2:39 PM   Wound Odor None 2020  2:39 PM   Periwound Skin Condition Intact; Other (comment) 2020  2:39 PM   Dressing Type Applied Gauze;Gauze wrap (greg); Absorptive 2020  3:30 PM   Procedure Tolerated Well 2020  3:13 PM   Number of days: 0        Supplies Requested :      WOUND #:1   PRIMARY DRESSING:  Other: Mesalt Packing strip    4X4 gauze pad, Conforming roll gauze and Other Optilock      FREQUENCY OF DRESSING CHANGES:  Daily         ADDITIONAL ITEMS:  [] Gloves Small  [] Gloves Medium [x] Gloves Large [] Gloves Laguerre Last  [] Tape 1\" [] Tape 2\" [] Tape 3\"  [x] Medipore Tape  [x] Saline  [] Skin Prep   [] Adhesive Remover   [x] Cotton Tip Applicators   [] Other:    Patient Wound(s) Debrided: [x] Yes if yes please add date 6/2/2020   [] No    Debribement Type: Non-excisional/Selective    Patient currently being seen by Home Health: [] Yes   [x] No    Duration for needed supplies:  []15  []30  [x]60  []90 Days    Provider Information:      PROVIDER'S NAME: Dr. Greg Joe     NPI: [id number]

## 2020-06-02 NOTE — LETTER
Letter of Medical Necessity [address] [facility] RECORD NUMBER:  [id number] AGE: 75 y.o. GENDER: male : 1955 TODAYS DATE:  6/15/2020 To Whom It May Concern: 
 
I am writing to notify you of my intent to treat Mr. Hayden Terrazas with Hyperbaric Oxygen Therapy, which is used to treat Non pressure chronic ulcer right leg to the level of bone (L97.914) 
  
Nichole Grade 3 ulcer The patients medical history is as follows: 
 
Pt evaluated and treated. - Exposed fibula through wound right leg. No acute infection. 
- Preformed wound debridement with bone biopsy. Biopsy results sent for culture. 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 
  
Subjective: 
Pt complains of wound to right amputation site. At present he is packing the wound with mesalt. Pt denies a recent h/o fever, chills, nausea, vomiting, chest pain, shortness of breath.   
  
HPI: Mr. Bree Oneil is a 74 yo AA male with a PMH of arthritis, BPH, CKD, chronic pain, DM2, dyslipidemia, GERD, HTN, s/p rt BKA, sleep apnea, thromboembolus lt leg who presents with a right BKA stump wound. Wound formed from ill fitting prosthetic. Pt has had wounds in the past from in this same manner. General Description of HBO Therapy: Treatment consists of a patient entering a HBO Therapy monoplace chamber for a physician prescribed therapeutic oxygen level and length of time. The patient will breathe 100% oxygen and the chambers surrounding air pressure will be raised to two (2) to three (3) times normal air pressure. The treatment results in a higher than normal level of oxygen in the patients blood and body tissues; this level will persist during treatment and for variable times after the treatment. Potential Benefits of HBO Therapy:  
 Enhance wound healing  Reduce risk of amputation and infection  Reduce symptoms related to post radiation therapy I believe my patient will benefit from this therapy. Please feel free to contact the [address] [facility],[address] if additional information is required to process my request for coverage. Sincerely, 
Dr. Harpreet Larios [address] [place] [address] Pkwy and Hyperbaric Oxygen Therapy Provider

## 2020-06-02 NOTE — WOUND CARE
06/02/20 1439   Wound Leg Lower Right;Lateral   Date First Assessed/Time First Assessed: 06/02/20 1439   POA: Yes  Location: Leg Lower  Wound Description (Optional): #1  Orientation: Right;Lateral   Dressing Status  Removed   Dressing Type  Gauze;Gauze wrap (greg)  (gentian violet)   Wound Length (cm) 1.5 cm   Wound Width (cm) 1 cm   Wound Depth (cm) 1.4   Wound Surface area (cm^2) 1.5 cm^2   Condition of Base Other (comment)  (gentian violet staining)   Condition of Edges Open   Drainage Amount  Moderate   Drainage Color Serosanguinous   Wound Odor None   Periwound Skin Condition Intact; Other (comment)  (gentian violet staining)     Visit Vitals  /57   Pulse 95   Temp 97.8 °F (36.6 °C)   Resp 18

## 2020-06-02 NOTE — WOUND CARE
06/02/20 1530   Wound Leg Lower Right;Lateral   Date First Assessed/Time First Assessed: 06/02/20 1439   POA: Yes  Location: Leg Lower  Wound Description (Optional): #1  Orientation: Right;Lateral   Dressing Type Applied Gauze;Gauze wrap (greg); Absorptive  (mesalt packing)   Discharge Condition: Stable     Pain: 0    Ambulatory Status: Walking    Discharge Destination: Home     Transportation: Car    Accompanied by: Self  and Family/Caregiver     Discharge instructions reviewed with Patient and Family/Caregiver  and copy or written instructions have been provided. All questions/concerns have been addressed at this time.

## 2020-06-03 NOTE — WOUND CARE
Jeannine Marte, DPM - Henrene Richardson K. Brooks Barthel. 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 - Exposed fibula through wound. No acute infection.  - Dressing consisting of mesalt packing  - Pt has returned to wearing his old prosthetic   - F/u MRI results  - F/U 1 week. Subjective:  Pt complains of wound to right amputation site. At present he is packing the wound with mesalt. He recently had an x-ray and is currently awaiting an MRI. Pt denies a recent h/o fever, chills, nausea, vomiting, chest pain, shortness of breath. HPI: Mr. Danish Potter is a 74 yo AA male with a PMH of arthritis, BPH, CKD, chronic pain, DM2, dyslipidemia, GERD, HTN, s/p rt BKA, sleep apnea, thromboembolus lt leg who presents with a right BKA stump wound. Wound formed from ill fitting prosthetic. Pt has had wounds in the past from in this same manner. ROS:  Consitutional: no weight loss, night sweats, fatigue / malaise / lethargy. Musculoskeletal: no joint / extremity pain, misalignment, stiffness, decreased ROM, crepitus. Integument: No pruritis, rashes, lesions, right BKA stump wound.   Psychiatric: No depression, anxiety, paranoia      History:  Wound Care  Allergies   Allergen Reactions    Adhesive Tape-Silicones Hives    Sulfa (Sulfonamide Antibiotics) Swelling     Lips eyes     Family History   Problem Relation Age of Onset    Hypertension Mother    [de-identified] Gout Mother     Cancer Father         leukemia    Diabetes Father     Hypertension Sister     Diabetes Maternal Grandmother     Hypertension Maternal Grandmother     Diabetes Paternal Grandmother     Hypertension Paternal Grandmother     Anesth Problems Neg Hx       Past Medical History: Diagnosis Date    Arthritis     BPH (benign prostatic hyperplasia)     Chronic kidney disease     Chronic pain     BACK NEUROPATHY FEET HANDS    DM neuropathy, type II diabetes mellitus ([facility]     DM type 2 causing CKD stage 3 ([facility] 12/17/2012    DM type 2 causing vascular disease ([facility]     Dyslipidemia     Foot ulcer due to secondary DM ([facility] 12/1/2012    GERD (gastroesophageal reflux disease)     HTN     Ill-defined condition 2013    MRSA in wound right leg (BKA)    S/P BKA (below knee amputation) ([facility]     right BKA due to non-healing ulcer    Sleep apnea     Thromboembolus ([facility] 1970'S    BLOOD CLOT LEFT LEG     Past Surgical History:   Procedure Laterality Date    ABDOMEN SURGERY PROC UNLISTED      pilonidal cyst    ECHO 2D ADULT  8/09    normal; LVEF 60%    ECHO STRESS  4/09    7 min; normal    ENDOSCOPY, COLON, DIAGNOSTIC      HX AMPUTATION  2012    left great toe    HX AMPUTATION  2007    BKA right    HX BELOW KNEE AMPUTATION Right     HX CERVICAL FUSION  2009    x2    HX ORTHOPAEDIC  2006    ACDF C4-C7    STRESS TEST MYOVIEW  8/09    walked 8:46; normal; LVEF 51%     Social History     Tobacco Use    Smoking status: Never Smoker    Smokeless tobacco: Never Used   Substance Use Topics    Alcohol use: No       Social History     Substance and Sexual Activity   Alcohol Use No     Social History     Substance and Sexual Activity   Drug Use No      Social History     Tobacco Use   Smoking Status Never Smoker   Smokeless Tobacco Never Used     Current Outpatient Medications   Medication Sig    silver sulfADIAZINE (SILVADENE) 1 % topical cream Apply  to affected area daily.  albuterol (Ventolin HFA) 90 mcg/actuation inhaler Take 2 Puffs by inhalation every four (4) hours as needed for Wheezing.  pregabalin (Lyrica) 75 mg capsule Take 1 Cap by mouth two (2) times a day.  Max Daily Amount: 150 mg.    ergocalciferol (Vitamin D2) 1,250 mcg (50,000 unit) capsule TAKE ONE CAPSULE BY MOUTH EVERY WEEK    oxyCODONE-acetaminophen (Percocet) 5-325 mg per tablet Take 1 Tab by mouth every four (4) hours as needed for Pain for up to 30 days. Max Daily Amount: 6 Tabs.  Lantus Solostar U-100 Insulin 100 unit/mL (3 mL) inpn INJECT 40 TO 50 UNITS SUBCUTANEOUSLY TWICE DAILY AS DIRECTED    insulin lispro (HUMALOG) 100 unit/mL kwikpen INJECT 18 TO 30 UNITS SUBCUTANEOUSLY 3 TIMES DAILY BEFORE BREAKFAST, LUNCH, AND DINNER    cephALEXin (KEFLEX) 500 mg capsule Take 1 Cap by mouth four (4) times daily.  glucose blood VI test strips (PRODIGY NO CODING) strip TEST BLOOD SUGAR 2 TIMES A DAY    pantoprazole (PROTONIX) 40 mg tablet TAKE ONE TABLET BY MOUTH EVERY DAY BEFORE BREAKFAST    Insulin Needles, Disposable, (OLIVIA PEN NEEDLE) 32 gauge x 5/32\" ndle USE AS DIRECTED 4 TIMES DAILY    lisinopril (PRINIVIL, ZESTRIL) 10 mg tablet TAKE ONE TABLET BY MOUTH EVERY DAY    ferrous sulfate 325 mg (65 mg iron) tablet TAKE ONE TABLET BY MOUTH 2 TIMES A DAY    rosuvastatin (CRESTOR) 40 mg tablet TAKE ONE TABLET BY MOUTH EVERY DAY    aspirin 81 mg chewable tablet Take 81 mg by mouth daily. No current facility-administered medications for this encounter. Objective:  Visit Vitals  /57   Pulse 95   Temp 97.8 °F (36.6 °C)   Resp 18       Vascular:  left LE  DP 1/4; PT 1/4  capillary fill time brisk, pitting edema is present, skin temperature is cool, varicosities are present. Dermatological:    Wound: 1  Location: right BKA stump   Measurements: per RN note  Margins: intact  Drainage: serous  Odor: no  Wound base: graunular  Lymphangitic streaking? No.  Undermining? No.  Sinus tracts? No.  Exposed bone? yes  Subcutaneous crepitation on palpation? No.    Skin is dry and scaly, exhibits hemosiderin deposition. There is no maceration of the interspaces of the feet b/l.       Neurological:  DTR are present, protective sensation per 5.07 [place] Mauricio monofilament is intact, patient is AAOx3, mood is normal. Epicritic sensation is intact. Orthopedic:  Left LE are symmetric, ROM of ankle, STJ, 1st MTPJ is limited, MMT 5 out of 5 for B/L LE. Right BKA. Has prosthesis. Constitutional: Pt is a well developed, pleasant AA male. Lymphatics: negative tenderness to palpation of neck/axillary/inguinal nodes.     Imaging / Labs / Cx / Px:  5/28 tib/fib XR: soft tissue ulcer extending to the rt distal fibula

## 2020-06-04 ENCOUNTER — HOSPITAL ENCOUNTER (OUTPATIENT)
Dept: MRI IMAGING | Age: 65
Discharge: HOME | End: 2020-06-04
Attending: PODIATRIST
Payer: MEDICARE

## 2020-06-04 DIAGNOSIS — L98.499 DIABETES MELLITUS WITH SKIN ULCER (HCC): ICD-10-CM

## 2020-06-04 DIAGNOSIS — E11.622 DIABETES MELLITUS WITH SKIN ULCER (HCC): ICD-10-CM

## 2020-06-04 DIAGNOSIS — L97.914 NON-PRESSURE CHRONIC ULCER OF RIGHT LOWER LEG WITH NECROSIS OF BONE (HCC): ICD-10-CM

## 2020-06-04 PROCEDURE — 74011250636 HC RX REV CODE- 250/636: Performed by: PODIATRIST

## 2020-06-04 PROCEDURE — 73720 MRI LWR EXTREMITY W/O&W/DYE: CPT

## 2020-06-04 PROCEDURE — A9575 INJ GADOTERATE MEGLUMI 0.1ML: HCPCS | Performed by: PODIATRIST

## 2020-06-04 RX ORDER — GADOTERATE MEGLUMINE 376.9 MG/ML
18 INJECTION INTRAVENOUS
Status: COMPLETED | OUTPATIENT
Start: 2020-06-04 | End: 2020-06-04

## 2020-06-04 RX ADMIN — GADOTERATE MEGLUMINE 18 ML: 376.9 INJECTION INTRAVENOUS at 17:32

## 2020-06-09 ENCOUNTER — HOSPITAL ENCOUNTER (OUTPATIENT)
Dept: WOUND CARE | Age: 65
Discharge: HOME | End: 2020-06-09
Payer: MEDICARE

## 2020-06-09 VITALS
TEMPERATURE: 97 F | DIASTOLIC BLOOD PRESSURE: 88 MMHG | RESPIRATION RATE: 20 BRPM | HEART RATE: 103 BPM | SYSTOLIC BLOOD PRESSURE: 187 MMHG

## 2020-06-09 PROCEDURE — 74011000250 HC RX REV CODE- 250: Performed by: PODIATRIST

## 2020-06-09 PROCEDURE — 11044 DBRDMT BONE 1ST 20 SQ CM/<: CPT | Performed by: PODIATRIST

## 2020-06-09 RX ADMIN — Medication: at 15:04

## 2020-06-09 NOTE — WOUND CARE
Brianna Cody DPM - Andrew Vega. 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 - Exposed fibula through wound right leg. No acute infection.  - Preformed wound debridement with bone biopsy. Biopsy results sent for culture. - Rx Keflex  - Dressing consisting of mesalt packing  - Pt has returned to wearing his old prosthetic   - 6/4 Rt Tib/Fib MRI result findings compatible with OM of fibula  - Looking to start patient in HBO therapy  - F/U 1 week. Subjective:  Pt complains of wound to right amputation site. At present he is packing the wound with mesalt. Pt denies a recent h/o fever, chills, nausea, vomiting, chest pain, shortness of breath. HPI: Mr. Stephanie Gallardo is a 74 yo AA male with a PMH of arthritis, BPH, CKD, chronic pain, DM2, dyslipidemia, GERD, HTN, s/p rt BKA, sleep apnea, thromboembolus lt leg who presents with a right BKA stump wound. Wound formed from ill fitting prosthetic. Pt has had wounds in the past from in this same manner. ROS:  Consitutional: no weight loss, night sweats, fatigue / malaise / lethargy. Musculoskeletal: no joint / extremity pain, misalignment, stiffness, decreased ROM, crepitus. Integument: No pruritis, rashes, lesions, right BKA stump wound.   Psychiatric: No depression, anxiety, paranoia      History:  Wound Care  Allergies   Allergen Reactions    Adhesive Tape-Silicones Hives    Sulfa (Sulfonamide Antibiotics) Swelling     Lips eyes     Family History   Problem Relation Age of Onset    Hypertension Mother    [facility] Gout Mother     Cancer Father         leukemia    Diabetes Father     Hypertension Sister     Diabetes Maternal Grandmother     Hypertension Maternal Grandmother     Diabetes Paternal Grandmother     Hypertension Paternal Grandmother     Anesth Problems Neg Hx       Past Medical History:   Diagnosis Date    Arthritis     BPH (benign prostatic hyperplasia)     Chronic kidney disease     Chronic pain     BACK NEUROPATHY FEET HANDS    DM neuropathy, type II diabetes mellitus ([facility]     DM type 2 causing CKD stage 3 ([address] 12/17/2012    DM type 2 causing vascular disease ([facility] [facility]     Dyslipidemia     Foot ulcer due to secondary DM ([facility] [facility] 12/1/2012    GERD (gastroesophageal reflux disease)     HTN     Ill-defined condition 2013    MRSA in wound right leg (BKA)    S/P BKA (below knee amputation) ([address]     right BKA due to non-healing ulcer    Sleep apnea     Thromboembolus ([facility] [facility] 1970'S    BLOOD CLOT LEFT LEG     Past Surgical History:   Procedure Laterality Date    ABDOMEN SURGERY PROC UNLISTED      pilonidal cyst    ECHO 2D ADULT  8/09    normal; LVEF 60%    ECHO STRESS  4/09    7 min; normal    ENDOSCOPY, COLON, DIAGNOSTIC      HX AMPUTATION  2012    left great toe    HX AMPUTATION  2007    BKA right    HX BELOW KNEE AMPUTATION Right     HX CERVICAL FUSION  2009    x2    HX ORTHOPAEDIC  2006    ACDF C4-C7    STRESS TEST MYOVIEW  8/09    walked 8:46; normal; LVEF 51%     Social History     Tobacco Use    Smoking status: Never Smoker    Smokeless tobacco: Never Used   Substance Use Topics    Alcohol use: No       Social History     Substance and Sexual Activity   Alcohol Use No     Social History     Substance and Sexual Activity   Drug Use No      Social History     Tobacco Use   Smoking Status Never Smoker   Smokeless Tobacco Never Used     Current Outpatient Medications   Medication Sig    pregabalin (Lyrica) 75 mg capsule Take 1 Cap by mouth two (2) times a day.  Max Daily Amount: 150 mg.    ergocalciferol (Vitamin D2) 1,250 mcg (50,000 unit) capsule TAKE ONE CAPSULE BY MOUTH EVERY WEEK    oxyCODONE-acetaminophen (Percocet) 5-325 mg per tablet Take 1 Tab by mouth every four (4) hours as needed for Pain for up to 30 days. Max Daily Amount: 6 Tabs.  Lantus Solostar U-100 Insulin 100 unit/mL (3 mL) inpn INJECT 40 TO 50 UNITS SUBCUTANEOUSLY TWICE DAILY AS DIRECTED    insulin lispro (HUMALOG) 100 unit/mL kwikpen INJECT 18 TO 30 UNITS SUBCUTANEOUSLY 3 TIMES DAILY BEFORE BREAKFAST, LUNCH, AND DINNER    pantoprazole (PROTONIX) 40 mg tablet TAKE ONE TABLET BY MOUTH EVERY DAY BEFORE BREAKFAST    lisinopril (PRINIVIL, ZESTRIL) 10 mg tablet TAKE ONE TABLET BY MOUTH EVERY DAY    ferrous sulfate 325 mg (65 mg iron) tablet TAKE ONE TABLET BY MOUTH 2 TIMES A DAY    rosuvastatin (CRESTOR) 40 mg tablet TAKE ONE TABLET BY MOUTH EVERY DAY    aspirin 81 mg chewable tablet Take 81 mg by mouth daily.  silver sulfADIAZINE (SILVADENE) 1 % topical cream Apply  to affected area daily.  albuterol (Ventolin HFA) 90 mcg/actuation inhaler Take 2 Puffs by inhalation every four (4) hours as needed for Wheezing.  glucose blood VI test strips (PRODIGY NO CODING) strip TEST BLOOD SUGAR 2 TIMES A DAY    Insulin Needles, Disposable, (OLIVIA PEN NEEDLE) 32 gauge x 5/32\" ndle USE AS DIRECTED 4 TIMES DAILY     No current facility-administered medications for this encounter. Objective:  Visit Vitals  /88   Pulse (!) 103   Temp 97 °F (36.1 °C)   Resp 20       Vascular:  left LE  DP 1/4; PT 1/4  capillary fill time brisk, pitting edema is present, skin temperature is cool, varicosities are present. Dermatological:    Wound: 1  Location: right BKA stump   Measurements: per RN note  Margins: intact  Drainage: serous  Odor: no  Wound base: graunular  Lymphangitic streaking? No.  Undermining? No.  Sinus tracts? No.  Exposed bone? yes  Subcutaneous crepitation on palpation? No.    Skin is dry and scaly, exhibits hemosiderin deposition. There is no maceration of the interspaces of the feet b/l.       Neurological:  DTR are present, protective sensation per 5.07 [place] Mauricio monofilament is intact, patient is AAOx3, mood is normal. Epicritic sensation is intact. Orthopedic:  Left LE are symmetric, ROM of ankle, STJ, 1st MTPJ is limited, MMT 5 out of 5 for B/L LE. Right BKA. Has prosthesis. Constitutional: Pt is a well developed, pleasant AA male. Lymphatics: negative tenderness to palpation of neck/axillary/inguinal nodes. Imaging / Labs / Cx / Px:  5/28 tib/fib XR: soft tissue ulcer extending to the rt distal fibula    Procedure Note:  Excisional debridement through level of bone. Location / Ulcer: left leg  Indication: to remove non-viable tissue from wound bed. Consent in chart. Anesthesia: 4% xylocaine gel  Instrument: roncheur and curette   Bleeding: minimal  Hemostasis: pressure  Pre-Procedure Pain: 1  Post-Procedure Pain: 2  Area debrided < 20 cm sq. Pre-Debridement measurements: see nursing notes  Post-Debridement measurements: see nursing notes  This is part of a series of staged procedures in an attempt at limb salvage.

## 2020-06-09 NOTE — WOUND CARE
06/09/20 1541   Wound Leg Lower Right;Lateral   Date First Assessed/Time First Assessed: 06/02/20 1439   POA: Yes  Location: Leg Lower  Wound Description (Optional): #1  Orientation: Right;Lateral   Dressing Type Applied Gauze;Gauze wrap (greg); Special tape (comment)  (mesalt packing)   Procedure Tolerated Well   Discharge Condition: Stable     Pain: 0    Ambulatory Status: Crutches     Discharge Destination: Home     Transportation: Car    Accompanied by: Family/Caregiver     Discharge instructions reviewed with Patient and Family/Caregiver  and copy or written instructions have been provided. All questions/concerns have been addressed at this time.

## 2020-06-09 NOTE — WOUND CARE
06/09/20 1501   Wound Leg Lower Right;Lateral   Date First Assessed/Time First Assessed: 06/02/20 1439   POA: Yes  Location: Leg Lower  Wound Description (Optional): #1  Orientation: Right;Lateral   Dressing Status  Removed   Dressing Type  Gauze;Gauze wrap (greg);Packing   Wound Length (cm) 1.4 cm   Wound Width (cm) 1.3 cm   Wound Depth (cm) 1.4   Wound Surface area (cm^2) 1.82 cm^2   Change in Wound Size % -21.33   Condition of Base Bone exposed;Pink;Slough   Condition of Edges Open   Direction of Tunnel 12 o'clock;4 o'clock   Depth of Tunnel/Sinus (cm) 1.3 cm  (4 o'clock- 1. 4)   Drainage Amount  Moderate   Drainage Color Serosanguinous   Wound Odor None   Cleansing and Cleansing Agents  Normal saline     Visit Vitals  /88   Pulse (!) 103   Temp 97 °F (36.1 °C)   Resp 20

## 2020-06-09 NOTE — WOUND CARE
RN HYPERBARIC OXYGEN THERAPY RISK ASSESSMENT TOOL   [facility]     Mandy Mc  MEDICAL RECORD NUMBER:  [id number]  AGE: 75 y.o.    GENDER: male  : 1955  EPISODE DATE:  2020       PAST MEDICAL HISTORY    Past Medical History:   Diagnosis Date    Arthritis     BPH (benign prostatic hyperplasia)     Chronic kidney disease     Chronic pain     BACK NEUROPATHY FEET HANDS    DM neuropathy, type II diabetes mellitus ([facility]     DM type 2 causing CKD stage 3 ([facility] 2012    DM type 2 causing vascular disease ([facility]     Dyslipidemia     Foot ulcer due to secondary DM ([facility] 2012    GERD (gastroesophageal reflux disease)     HTN     Ill-defined condition     MRSA in wound right leg (BKA)    S/P BKA (below knee amputation) ([facility]     right BKA due to non-healing ulcer    Sleep apnea     Thromboembolus ([facility]     BLOOD CLOT LEFT LEG        PAST SURGICAL HISTORY  Past Surgical History:   Procedure Laterality Date    ABDOMEN SURGERY PROC UNLISTED      pilonidal cyst    ECHO 2D ADULT      normal; LVEF 60%    ECHO STRESS      7 min; normal    ENDOSCOPY, COLON, DIAGNOSTIC      HX AMPUTATION  2012    left great toe    HX AMPUTATION  2007    BKA right    HX BELOW KNEE AMPUTATION Right     HX CERVICAL FUSION  2009    x2    HX ORTHOPAEDIC  2006    ACDF C4-C7    STRESS TEST MYOVIEW      walked 8:46; normal; LVEF 51%       FAMILY HISTORY  Family History   Problem Relation Age of Onset    Hypertension Mother    Dossie Tiara Gout Mother     Cancer Father         leukemia    Diabetes Father     Hypertension Sister     Diabetes Maternal Grandmother     Hypertension Maternal Grandmother     Diabetes Paternal Grandmother     Hypertension Paternal Grandmother     Anesth Problems Neg Hx        SOCIAL HISTORY  Social History     Tobacco Use    Smoking status: Never Smoker    Smokeless tobacco: Never Used   Substance Use Topics    Alcohol use: No    Drug use: No       ALLERGIES  Allergies   Allergen Reactions    Adhesive Tape-Silicones Hives    Sulfa (Sulfonamide Antibiotics) Swelling     Lips eyes       MEDICATIONS  Current Outpatient Medications on File Prior to Encounter   Medication Sig Dispense Refill    pregabalin (Lyrica) 75 mg capsule Take 1 Cap by mouth two (2) times a day. Max Daily Amount: 150 mg. 30 Cap 5    ergocalciferol (Vitamin D2) 1,250 mcg (50,000 unit) capsule TAKE ONE CAPSULE BY MOUTH EVERY WEEK 4 Cap 3    oxyCODONE-acetaminophen (Percocet) 5-325 mg per tablet Take 1 Tab by mouth every four (4) hours as needed for Pain for up to 30 days. Max Daily Amount: 6 Tabs. 90 Tab 0    Lantus Solostar U-100 Insulin 100 unit/mL (3 mL) inpn INJECT 40 TO 50 UNITS SUBCUTANEOUSLY TWICE DAILY AS DIRECTED 15 mL 0    insulin lispro (HUMALOG) 100 unit/mL kwikpen INJECT 18 TO 30 UNITS SUBCUTANEOUSLY 3 TIMES DAILY BEFORE BREAKFAST, LUNCH, AND DINNER 15 mL 0    pantoprazole (PROTONIX) 40 mg tablet TAKE ONE TABLET BY MOUTH EVERY DAY BEFORE BREAKFAST 90 Tab 0    lisinopril (PRINIVIL, ZESTRIL) 10 mg tablet TAKE ONE TABLET BY MOUTH EVERY DAY 90 Tab 0    ferrous sulfate 325 mg (65 mg iron) tablet TAKE ONE TABLET BY MOUTH 2 TIMES A  Tab 0    rosuvastatin (CRESTOR) 40 mg tablet TAKE ONE TABLET BY MOUTH EVERY DAY 90 Tab 1    aspirin 81 mg chewable tablet Take 81 mg by mouth daily.  silver sulfADIAZINE (SILVADENE) 1 % topical cream Apply  to affected area daily. 85 g 0    albuterol (Ventolin HFA) 90 mcg/actuation inhaler Take 2 Puffs by inhalation every four (4) hours as needed for Wheezing. 1 Inhaler 4    [DISCONTINUED] cephALEXin (KEFLEX) 500 mg capsule Take 1 Cap by mouth four (4) times daily.  40 Cap 0    glucose blood VI test strips (PRODIGY NO CODING) strip TEST BLOOD SUGAR 2 TIMES A DAY 50 Strip 0    Insulin Needles, Disposable, (OLIVIA PEN NEEDLE) 32 gauge x 5/32\" ndle USE AS DIRECTED 4 TIMES DAILY 100 Pen Needle 3     No current facility-administered medications on file prior to encounter. LABS  HgBA1c:  No components found for: LABA1C         Please add comments to any \"yes\" answers. Do you have a history of: Comments   Seizure NO   Congenital spherocytosis NO   Optic Neuritis NO   Cataracts YES reports small cataracts developing   Eye Surgery NO   Ear problems NO   Ear reconstructive surgery NO   Sinus problems YES related to seasonal allergies   Asthma YES reports occasional wheezing   Bronchitis YES last episode 5 yrs ago   Emphysema NO   Pneumothorax NO   Tuberculosis NO   Other lung problems NO   Hypertension YES HTN   Pacemaker/AICD NO                                             Congestive heart failure NO   EF% >30% NO   Any implanted device YES neck fusion/upper lumbar with hardware                                              Dialysis NO   Current pregnancy NO   Claustrophobia NO   Diabetes YES insulin dependent   Currently using these medications:     a. Disulfiram (Antabuse®) NO    b. Mafenide acetate        (Sulfamylon®) NO    c.  Diuretics for CHF NO    d. Amiodarone dose > 400mg/day NO    e. Lanoxin/Digoxin NO    f.  Current Steroid use     NO   Cancer:  NO    a. Surgery for Cancer NO    b. Radiation therapy NO    c.  Chemotherapy NO    If yes, did you receive:     a. Doxorubicin (Adriamycin®) NO    b.   Cisplatin (Platinol AQ®) NO    c.  Bleomycin (Blenoxane®) NO     The above was reviewed with: Patient    Electronically signed by Vianey Mckay RN  on 6/9/2020 at 3:42 PM

## 2020-06-09 NOTE — DISCHARGE INSTRUCTIONS
Discharge Instructions for  [facility]  [place] [address], [address] [facility]  Telephone: [phone]    NAME:  Scar Singh OF BIRTH:  1955  DATE:  2020    [] Wound and dressing supply provider: {RITO provider:[id number]}          Wound Cleansing:   Do not scrub or use excessive force. Cleanse wound prior to applying a clean dressing with:    [x] Normal Saline   [] Keep Wound Dry in Shower      [x] Wound Cleanser (***)  [] May Shower at Discharge   [] cleanse with Beny Shelly and Beny Shelly baby shampoo lather leave 2-3 then rinse with water, pat dry and redress wound. Dressings:           Wound Location left lat leg     Apply Primary Dressin [address]                                                   []     Change dressing:   [x] Daily      [] Every Other Day   [] Three times per week  [] Once a week   [] Do Not Change Dressing     [] Other:    Off-Loading:   [x] Off-loading when [x] walking  [] in bed [] sitting    Dietary:  [x] Diet as tolerated: [] Diabetic Diet:   [] Increase Protein: examples ( Meat, cheese, eggs, greek yogurt, premier protein drink, fish, nuts )   [] Other:    Return Appointment:  [] Wound assessment with Nurse at wound center in *** days     [] Return Appointment: With Dr. Chinyere Posada 1 WEEK    [] Ordered tests: {OP Wound Vascular Studies:51593} {OP Wound F/U Imagin}     Electronically signed on 2020 at 10:37 AM     [address] [place] [address] Information: Should you experience any significant changes in your wound(s) or have questions about your wound care, please contact the [facility] at [address] [place] [address] 8:00 am - 4:30. If you need help with your wound outside these hours and cannot wait until we are again available, contact your PCP or go to the hospital emergency room.      PLEASE NOTE: IF YOU ARE UNABLE TO OBTAIN WOUND SUPPLIES, CONTINUE TO USE THE SUPPLIES YOU HAVE AVAILABLE UNTIL YOU ARE ABLE TO REACH US. IT IS MOST IMPORTANT TO KEEP THE WOUND COVERED AT ALL TIMES.      Physician Signature:_______________________  Dr. Polo Res

## 2020-06-10 PROBLEM — E11.69 DIABETIC FOOT ULCER WITH OSTEOMYELITIS (HCC): Status: ACTIVE | Noted: 2020-06-10

## 2020-06-10 PROBLEM — E11.621 DIABETIC FOOT ULCER WITH OSTEOMYELITIS (HCC): Status: ACTIVE | Noted: 2020-06-10

## 2020-06-10 PROBLEM — M86.9 DIABETIC FOOT ULCER WITH OSTEOMYELITIS (HCC): Status: ACTIVE | Noted: 2020-06-10

## 2020-06-10 PROBLEM — L97.214: Status: ACTIVE | Noted: 2020-06-10

## 2020-06-10 PROBLEM — L97.509 DIABETIC FOOT ULCER WITH OSTEOMYELITIS (HCC): Status: ACTIVE | Noted: 2020-06-10

## 2020-06-11 DIAGNOSIS — E78.5 DYSLIPIDEMIA: ICD-10-CM

## 2020-06-11 DIAGNOSIS — G62.9 NEUROPATHY: Chronic | ICD-10-CM

## 2020-06-11 RX ORDER — ROSUVASTATIN CALCIUM 40 MG/1
40 TABLET, COATED ORAL DAILY
Qty: 90 TAB | Refills: 1 | Status: SHIPPED | OUTPATIENT
Start: 2020-06-11 | End: 2020-12-10

## 2020-06-11 NOTE — TELEPHONE ENCOUNTER
Pt requesting an order for wound care to have an EKG done as he cannot start treatment until they get the results. Pt needs the order asap and asked to be notified once order has been placed. Thanks.

## 2020-06-15 ENCOUNTER — TELEPHONE (OUTPATIENT)
Dept: INTERNAL MEDICINE CLINIC | Age: 65
End: 2020-06-15

## 2020-06-15 NOTE — TELEPHONE ENCOUNTER
Spoke with patient and he states that he has an appointment with wound care on Thursday and they are requesting an EKG be done prior to his appt. They are going to be doing treatments in the hyperbaric chamber. Appt scheduled with Marla Marcial NP as Dr. Stefan Acevedo did not have any available appts. Pt understood and was thankful for the call.

## 2020-06-15 NOTE — TELEPHONE ENCOUNTER
----- Message from Joey Hawkins sent at 6/15/2020  4:08 PM EDT -----  Regarding: Mery Spencer MD  General Message/Vendor Calls    Caller's first and last name: Alivia Fernandez        Reason for call: Treatment planning      Callback required yes/no and why: Yes      Best contact number(s): [phone]      Details to clarify the request: calling to discuss pt's treatment plan and scheduling.        Darío Oconnor

## 2020-06-15 NOTE — TELEPHONE ENCOUNTER
----- Message from [employer] sent at 6/15/2020 10:26 AM EDT -----  Regarding: /Telephone  General Message/Vendor Calls    Caller's first and last name:      Reason for call:  Needs an EKG set up    Callback required yes/no and why:  Yes, to get EKG set up, Pt needs to have this done before his wound care appointment on the 17th. Pt had a message sent over last week but has not heard anything back yet.       Best contact number(s):  [phone]    Details to clarify the request:      [employer]

## 2020-06-16 ENCOUNTER — TELEPHONE (OUTPATIENT)
Dept: WOUND CARE | Age: 65
End: 2020-06-16

## 2020-06-16 ENCOUNTER — OFFICE VISIT (OUTPATIENT)
Dept: INTERNAL MEDICINE CLINIC | Age: 65
End: 2020-06-16

## 2020-06-16 ENCOUNTER — HOSPITAL ENCOUNTER (OUTPATIENT)
Dept: GENERAL RADIOLOGY | Age: 65
Discharge: HOME | End: 2020-06-16
Attending: PODIATRIST
Payer: MEDICARE

## 2020-06-16 VITALS
DIASTOLIC BLOOD PRESSURE: 83 MMHG | HEART RATE: 98 BPM | OXYGEN SATURATION: 98 % | WEIGHT: 184 LBS | SYSTOLIC BLOOD PRESSURE: 146 MMHG | HEIGHT: 71 IN | BODY MASS INDEX: 25.76 KG/M2 | TEMPERATURE: 98.6 F | RESPIRATION RATE: 18 BRPM

## 2020-06-16 DIAGNOSIS — Z79.4 TYPE 2 DIABETES MELLITUS WITH OTHER SKIN ULCER, WITH LONG-TERM CURRENT USE OF INSULIN (HCC): ICD-10-CM

## 2020-06-16 DIAGNOSIS — E11.622 TYPE 2 DIABETES MELLITUS WITH OTHER SKIN ULCER, WITH LONG-TERM CURRENT USE OF INSULIN (HCC): ICD-10-CM

## 2020-06-16 DIAGNOSIS — F41.9 ANXIETY: ICD-10-CM

## 2020-06-16 DIAGNOSIS — Z01.818 PRE-OP EXAM: ICD-10-CM

## 2020-06-16 DIAGNOSIS — L97.914 NON-PRESSURE CHRONIC ULCER OF RIGHT LOWER LEG WITH NECROSIS OF BONE (HCC): Primary | ICD-10-CM

## 2020-06-16 LAB — ELECTROCARDIOGRAM,EKG: NORMAL

## 2020-06-16 PROCEDURE — 71046 X-RAY EXAM CHEST 2 VIEWS: CPT

## 2020-06-16 RX ORDER — BUSPIRONE HYDROCHLORIDE 5 MG/1
5 TABLET ORAL
Qty: 90 TAB | Refills: 0 | Status: SHIPPED | OUTPATIENT
Start: 2020-06-16 | End: 2020-07-29

## 2020-06-16 NOTE — PROGRESS NOTES
HISTORY OF PRESENT ILLNESS  Omer Campoverde. is a 75 y.o. male. HPI  Patient of Dr Elba Dexter who presents to have EKG done in office in preparation for Hyperbaric treatment course for chronic ulcer to his right LE amputation site. Reports he will have treatments 5 days per week for 6 weeks and then reassess. Wound culture is pending and he was told that he most likely will need IV antibiotics through port or PICC line. Reports he was able to locate Dr Anayeli Chavira, who was the original orthopedic surgeon who performed his amputation and his office is located in [facility]. He has been in contact with him and will be keeping him informed about progress of ulcer healing. Has been feeling more anxious over the past month since he started having issues with ulcers and is unable to shut his mind down at night due to racing thoughts. Finds that he has been more \"snappy\" at family and this is unlike him. He is reluctant to take medication and reports that Amitriptyline that he has been given in the past made him too drowsy and he does not want to take anything similar to that. Reports he is more worried about current health issues. Denies depression or harmful thoughts towards self or others. Reports his blood sugars have been well controlled and he is getting fasting sugars in low 100's. He has been very diligent about his diet in light of his wound and is requesting name of another endocrinologist.  He has seen Dr Kristi Crowley in the past but was told that office is no longer accepting his insurance.      Patient Active Problem List   Diagnosis Code    Neuropathy G62.9    HTN (hypertension) I10    Dyslipidemia E78.5    DM type 2 causing vascular disease ([facility] E11.59    DM neuropathy, type II diabetes mellitus ([facility] E11.40    Foot ulcer due to secondary DM ([facility] E13.621, L97.509    Anemia, unspecified D64.9    ARF (acute renal failure) (HCC) N17.9    DM type 2 causing CKD stage 3 (HCC) E11.22, N18.3  CKD (chronic kidney disease) stage 3, GFR 30-59 ml/min ([facility]) N18.3    Disc herniation [id number]    Spinal stenosis in cervical region M48.02    GABI (obstructive sleep apnea) G47.33    Status post above knee amputation Z89.619    Status post amputation of left great toe ([facility]) Z89.412    Cataract, nuclear [id number]    Presbyopia H52.4    Type 2 diabetes mellitus without retinopathy ([facility] E11.9    Diabetic foot ulcer with osteomyelitis ([facility] E11.621, E11.69, L97.509, M86.9    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 ([facility] L97.214     Past Surgical History:   Procedure Laterality Date    ABDOMEN SURGERY PROC UNLISTED      pilonidal cyst    ECHO 2D ADULT  8/09    normal; LVEF 60%    ECHO STRESS  4/09    7 min; normal    ENDOSCOPY, COLON, DIAGNOSTIC      HX AMPUTATION  2012    left great toe    HX AMPUTATION  2007    BKA right    HX BELOW KNEE AMPUTATION Right     HX CERVICAL FUSION  2009    x2    HX ORTHOPAEDIC  2006    ACDF C4-C7    STRESS TEST MYOVIEW  8/09    walked 8:46; normal; LVEF 51%     Social History     Socioeconomic History    Marital status:      Spouse name: Not on file    Number of children: Not on file    Years of education: Not on file    Highest education level: Not on file   Occupational History    Not on file   Social Needs    Financial resource strain: Not on file    Food insecurity     Worry: Not on file     Inability: Not on file    Transportation needs     Medical: Not on file     Non-medical: Not on file   Tobacco Use    Smoking status: Never Smoker    Smokeless tobacco: Never Used   Substance and Sexual Activity    Alcohol use: No    Drug use: No    Sexual activity: Yes     Partners: Female     Birth control/protection: None   Lifestyle    Physical activity     Days per week: Not on file     Minutes per session: Not on file    Stress: Not on file   Relationships    Social connections     Talks on phone: Not on file     Gets together: Not on file     Attends [employer] service: Not on file     Active member of club or organization: Not on file     Attends meetings of clubs or organizations: Not on file     Relationship status: Not on file    Intimate partner violence     Fear of current or ex partner: Not on file     Emotionally abused: Not on file     Physically abused: Not on file     Forced sexual activity: Not on file   Other Topics Concern    Not on file   Social History Narrative    Not on file     Family History   Problem Relation Age of Onset    Hypertension Mother    [facility] Gout Mother     Cancer Father         leukemia    Diabetes Father     Hypertension Sister     Diabetes Maternal Grandmother     Hypertension Maternal Grandmother     Diabetes Paternal Grandmother     Hypertension Paternal Grandmother     Anesth Problems Neg Hx      Current Outpatient Medications   Medication Sig    busPIRone (BUSPAR) 5 mg tablet Take 1 Tab by mouth three (3) times daily as needed (anxiety).  rosuvastatin (CRESTOR) 40 mg tablet Take 1 Tab by mouth daily.  silver sulfADIAZINE (SILVADENE) 1 % topical cream Apply  to affected area daily.  albuterol (Ventolin HFA) 90 mcg/actuation inhaler Take 2 Puffs by inhalation every four (4) hours as needed for Wheezing.  pregabalin (Lyrica) 75 mg capsule Take 1 Cap by mouth two (2) times a day. Max Daily Amount: 150 mg.    ergocalciferol (Vitamin D2) 1,250 mcg (50,000 unit) capsule TAKE ONE CAPSULE BY MOUTH EVERY WEEK    oxyCODONE-acetaminophen (Percocet) 5-325 mg per tablet Take 1 Tab by mouth every four (4) hours as needed for Pain for up to 30 days. Max Daily Amount: 6 Tabs.     Lantus Solostar U-100 Insulin 100 unit/mL (3 mL) inpn INJECT 40 TO 50 UNITS SUBCUTANEOUSLY TWICE DAILY AS DIRECTED    insulin lispro (HUMALOG) 100 unit/mL kwikpen INJECT 18 TO 30 UNITS SUBCUTANEOUSLY 3 TIMES DAILY BEFORE BREAKFAST, LUNCH, AND DINNER    pantoprazole (PROTONIX) 40 mg tablet TAKE ONE TABLET BY MOUTH EVERY DAY BEFORE BREAKFAST    Insulin Needles, Disposable, (OLIVIA PEN NEEDLE) 32 gauge x 5/32\" ndle USE AS DIRECTED 4 TIMES DAILY    lisinopril (PRINIVIL, ZESTRIL) 10 mg tablet TAKE ONE TABLET BY MOUTH EVERY DAY    ferrous sulfate 325 mg (65 mg iron) tablet TAKE ONE TABLET BY MOUTH 2 TIMES A DAY    aspirin 81 mg chewable tablet Take 81 mg by mouth daily.  glucose blood VI test strips (Prodigy No Coding) strip TEST BLOOD SUGAR 2 TIMES A DAY     No current facility-administered medications for this visit. Allergies   Allergen Reactions    Adhesive Tape-Silicones Hives    Sulfa (Sulfonamide Antibiotics) Swelling     Lips eyes     Immunization History   Administered Date(s) Administered    Influenza Vaccine 10/14/2014    Influenza Vaccine (Quad) PF 11/04/2015, 10/03/2016    Influenza Vaccine (Tri) Adjuvanted 12/03/2019    Pneumococcal Conjugate (PCV-13) 11/04/2015    Pneumococcal Polysaccharide (PPSV-23) 04/06/2019    Tdap 02/12/2013, 11/07/2016        Review of Systems   Constitutional: Negative for chills and fever. HENT: Negative for congestion and sore throat. Respiratory: Negative for cough and shortness of breath. Cardiovascular: Negative for chest pain and palpitations. Gastrointestinal: Negative for nausea and vomiting. Musculoskeletal: Negative for joint pain. /83   Pulse 98   Temp 98.6 °F (37 °C)   Resp 18   Ht 5' 11\" (1.803 m)   Wt 184 lb (83.5 kg)   SpO2 98%   BMI 25.66 kg/m²   Physical Exam  Vitals signs and nursing note reviewed. Constitutional:       Appearance: Normal appearance. Neck:      Musculoskeletal: Normal range of motion and neck supple. Cardiovascular:      Rate and Rhythm: Normal rate and regular rhythm. Pulmonary:      Effort: Pulmonary effort is normal.      Breath sounds: Normal breath sounds. Musculoskeletal:      Comments: Right LE amputation   Neurological:      General: No focal deficit present.       Mental Status: He is alert and oriented to person, place, and time. Psychiatric:         Attention and Perception: Attention and perception normal.         Mood and Affect: Mood is anxious. Speech: Speech normal.         ASSESSMENT and PLAN  Diagnoses and all orders for this visit:    1. Non-pressure chronic ulcer of right lower leg with necrosis of bone (HCC) -- EKG done in office today in preparation for Hyperbaric treatment course. 2. Pre-op exam  -     AMB POC EKG ROUTINE W/ 12 LEADS, INTER & REP    3. Anxiety -- will start with low dose Buspar and avoid Benzos in light of chronic narcotic use. -     busPIRone (BUSPAR) 5 mg tablet; Take 1 Tab by mouth three (3) times daily as needed (anxiety). 4. Type 2 diabetes mellitus with other skin ulcer, with long-term current use of insulin ([address] -to have labs drawn in 2 weeks. -     METABOLIC PANEL, COMPREHENSIVE; Future  -     HEMOGLOBIN A1C WITH EAG;  Future      lab results and schedule of future lab studies reviewed with patient  reviewed diet, exercise and weight control  reviewed medications and side effects in detail  specific diabetic recommendations: glycohemoglobin and other lab monitoring discussed and long term diabetic complications discussed

## 2020-06-16 NOTE — TELEPHONE ENCOUNTER
New Patient Appointment Reminder and COVID-23 Screening     Have you or anyone in your house hold been tested for or have had confirmed positive Covid-19 test or suspected of or have you been around anyone that has had confirmed or suspected Covid-19? No     Does Patient have fever and/or lower respiratory symptoms? (shortness of breath, difficulty breathing, cough) If yes continue with travel screening questions and cancel patient appointment, and refer to to PCP or ED. No     Referred to PCP or to the closest ED and notified ED of pending patient arrival:  No   Open travel questions and document patient responses. If No stop here and give patient reminder time for appointment and ask them please limit to having only 1 person accompany to appointment if necessary, no children allowed at visits. Remind all patients and caretakers they must wear a mask when entering into the wound clinic. Reminder Appointment time given: Yes    If Yes then please ask travel screening questions and cancel patient appointment:     Do they have the following?  eatures & Epidemiologic Risk  Fever or signs/symptoms of lower  respiratory illness (e.g., cough or  shortness of breath)  AND  Close contact with a laboratoryconfirmed  COVID-19 patient within  14 days of symptom onset    Fever and signs/symptoms of lower  respiratory illness (e.g., cough or  shortness of breath) requiring  hospitalization  AND  History of travel from affected  geographic areas within 14 days of  symptom onset (current areas include:  [place], [place], [place], [facility], [place])    Fever with severe acute lower respiratory  illness (e.g., pneumonia, ARDS) requiring  hospitalization and without alternative  explanatory diagnosis (e.g., influenza)  AND No source of exposure identified    ns and document patient responses. Not COVID-19. Proceed with scheduling  appointment    1. Provider instructs patient to travel directly to inpatient ED.   2. Provider to notify local ED of patients pending arrival.  3. Notify local health department and infection prevention immediately. 4. Obtain exposure list to include staff, others in waiting room and transport staff and  submit to infection prevention. Communication to the patient:   Due to your recent history and reported symptoms, we ask that you go to an emergency  department for further evaluation.  You should wear a facemask when you are in the same room with other people and  when you visit a health care provider. If you cannot wear a facemask, other practical  means of protection can be used, such as a wash cloth.  Cover your mouth and nose with a tissue when you cough or sneeze, or you can  cough or sneeze into your sleeve. Throw used tissues away, and immediately wash  your hands with soap and water for at least 20 seconds.  Wash your hands often and thoroughly with soap and water for at least 20 seconds. You can use an alcohol-based hand , if soap and water are not available.

## 2020-06-16 NOTE — PATIENT INSTRUCTIONS

## 2020-06-16 NOTE — TELEPHONE ENCOUNTER
Spoke with Shyann and she just wanted to confirm that patient was coming in to get an EKG done. Advised her that he is scheduled today. She understood and was thankful for the return call.

## 2020-06-17 ENCOUNTER — HOSPITAL ENCOUNTER (OUTPATIENT)
Dept: WOUND CARE | Age: 65
Discharge: HOME | End: 2020-06-17
Payer: MEDICARE

## 2020-06-17 VITALS
SYSTOLIC BLOOD PRESSURE: 169 MMHG | DIASTOLIC BLOOD PRESSURE: 77 MMHG | HEART RATE: 99 BPM | TEMPERATURE: 97.3 F | RESPIRATION RATE: 20 BRPM

## 2020-06-17 DIAGNOSIS — E11.69 DIABETIC FOOT ULCER WITH OSTEOMYELITIS (HCC): ICD-10-CM

## 2020-06-17 DIAGNOSIS — L97.509 DIABETIC FOOT ULCER WITH OSTEOMYELITIS (HCC): ICD-10-CM

## 2020-06-17 DIAGNOSIS — M86.9 DIABETIC FOOT ULCER WITH OSTEOMYELITIS (HCC): ICD-10-CM

## 2020-06-17 DIAGNOSIS — L97.214 NON-PRESSURE CHRONIC ULCER OF RIGHT CALF WITH NECROSIS OF BONE (HCC): ICD-10-CM

## 2020-06-17 DIAGNOSIS — E11.621 DIABETIC FOOT ULCER WITH OSTEOMYELITIS (HCC): ICD-10-CM

## 2020-06-17 PROCEDURE — 11047 DBRDMT BONE EACH ADDL: CPT

## 2020-06-17 PROCEDURE — 11044 DBRDMT BONE 1ST 20 SQ CM/<: CPT

## 2020-06-17 PROCEDURE — 74011000250 HC RX REV CODE- 250: Performed by: INTERNAL MEDICINE

## 2020-06-17 RX ADMIN — Medication: at 10:49

## 2020-06-17 NOTE — PROGRESS NOTES
[facility] Infectious Diseases Outpatient  IV Antibiotic Orders    1. Diagnosis:  Rt fibula osteomyelitis, CKD, DM  2. Routine PICC/ Murali/ Portacath Care including PRN cath-flow/activase to declot   3. Antibiotic:  Daptomycin 450 mg IV q 24 hours through 8/6/20    4. Last labs  Lab Results   Component Value Date/Time    WBC 5.1 03/30/2020 02:32 PM    Hemoglobin, POC 11.2 (L) 08/20/2014 11:05 PM    HGB 9.7 (L) 03/30/2020 02:32 PM    Hematocrit (POC) 26 (L) 10/09/2018 07:16 AM    Hematocrit, POC 33 (L) 08/20/2014 11:05 PM    HCT 29.2 (L) 03/30/2020 02:32 PM    PLATELET 147 48/37/2677 02:32 PM    MCV 86 03/30/2020 02:32 PM     Lab Results   Component Value Date/Time    Sodium 138 03/30/2020 02:32 PM    Potassium 5.0 03/30/2020 02:32 PM    Chloride 108 (H) 03/30/2020 02:32 PM    CO2 19 (L) 03/30/2020 02:32 PM    Anion gap 8 02/19/2016 07:50 PM    Glucose 281 (H) 03/30/2020 02:32 PM    BUN 29 (H) 03/30/2020 02:32 PM    Creatinine 1.96 (H) 03/30/2020 02:32 PM    BUN/Creatinine ratio 15 03/30/2020 02:32 PM    GFR est AA 40 (L) 03/30/2020 02:32 PM    GFR est non-AA 35 (L) 03/30/2020 02:32 PM    Calcium 8.5 (L) 03/30/2020 02:32 PM    Bilirubin, total 0.2 03/30/2020 02:32 PM    Alk. phosphatase 83 03/30/2020 02:32 PM    Protein, total 6.5 03/30/2020 02:32 PM    Albumin 3.6 (L) 03/30/2020 02:32 PM    Globulin 4.5 (H) 02/19/2016 07:50 PM    A-G Ratio 1.2 03/30/2020 02:32 PM    ALT (SGPT) 12 03/30/2020 02:32 PM         5. ___ Weekly Labs:          _+__CBC/diff/platelets   _+__CPK   _+__BUN/CRT   _+__ESR   _+__CRP         6. Fax Final lab results and critical values to Mike @[phone]  7. Call urgent lab results to [phone]    8. Allergies: Allergies   Allergen Reactions    Adhesive Tape-Silicones Hives    Sulfa (Sulfonamide Antibiotics) Swelling     Lips eyes     9. Pharmacy: Consult for home IV an [facility] 46 antibiotics         10.  Pharmacy Consult for Vancomycin/Aminoglycoside Dosing  11. First Dose protocol as needed.    12. At end of therapy send patient to IR for [facility] removal     Home Health: Call Angio at [facility] to schedule Murali Removal :  P: [phone]    Fax: [phone] or [phone]        Geovanny Batista MD

## 2020-06-17 NOTE — DISCHARGE INSTRUCTIONS
Discharge Instructions for  [facility]  [place] [address], [address] [facility] [address]  Telephone: [phone]    NAME:  Maya Sandoval OF BIRTH:  1955  DATE:  2020    [] Wound and dressing supply provider: {RITO provider:38919}          Wound Cleansing:   Do not scrub or use excessive force. Cleanse wound prior to applying a clean dressing with:    [x] Normal Saline   [] Keep Wound Dry in Shower      [x] Wound Cleanser (***)  [] May Shower at Discharge   [] cleanse with Emi Metro and Emi Metro baby shampoo lather leave 2-3 then rinse with water, pat dry and redress wound. Dressings:           Wound Location right lat leg     Apply Primary Dressin [address]                                                   []     Change dressing:   [x] Daily      [] Every Other Day   [] Three times per week  [] Once a week   [] Do Not Change Dressing     [] Other:    Off-Loading:   [x] Off-loading when [x] walking  [] in bed [] sitting    Dietary:  [x] Diet as tolerated: [] Diabetic Diet:   [] Increase Protein: examples ( Meat, cheese, eggs, greek yogurt, premier protein drink, fish, nuts )   [] Other:    Return Appointment:  [] Wound assessment with Nurse at wound center in *** days     [] Return Appointment: With Dr. Harpreet Larios 1 WEEK    [] Ordered tests: {OP Wound Vascular Studies:39735} {OP Wound F/U Imagin}     Electronically signed on 2020 at 10:37 AM     [address] [place] [address] Road Information: Should you experience any significant changes in your wound(s) or have questions about your wound care, please contact the [facility] Main at [address] [place] [address] 8:00 am - 4:30. If you need help with your wound outside these hours and cannot wait until we are again available, contact your PCP or go to the hospital emergency room.      PLEASE NOTE: IF YOU ARE UNABLE TO OBTAIN WOUND SUPPLIES, CONTINUE TO USE THE SUPPLIES YOU HAVE AVAILABLE UNTIL YOU ARE ABLE TO REACH US. IT IS MOST IMPORTANT TO KEEP THE WOUND COVERED AT ALL TIMES.      Physician Signature:_______________________  Dr. Moe Lee

## 2020-06-17 NOTE — WOUND CARE
06/17/20 1046   Wound Leg Lower Right;Lateral   Date First Assessed/Time First Assessed: 06/02/20 1439   POA: Yes  Location: Leg Lower  Wound Description (Optional): #1  Orientation: Right;Lateral   Dressing Status  Removed   Dressing Type  Packing;Gauze   Wound Length (cm) 1.5 cm   Wound Width (cm) 1.2 cm   Wound Depth (cm) 1   Wound Surface area (cm^2) 1.8 cm^2   Change in Wound Size % -20   Condition of Base Pink;Slough; Bone exposed   Condition of Edges Open   Direction of Tunnel 4 o'clock;12 o'clock   Depth of Tunnel/Sinus (cm) 1.5 cm  (12 o'clock-1.7)   Drainage Amount  Moderate   Drainage Color Serosanguinous   Wound Odor None   Periwound Skin Condition Macerated   Cleansing and Cleansing Agents  Normal saline     Visit Vitals  /77 (BP 1 Location: Left arm, BP Patient Position: Sitting)   Pulse 99   Temp 97.3 °F (36.3 °C)   Resp 20

## 2020-06-17 NOTE — WOUND CARE
[address]:     [employer] [id number] [facility], [address] [place] [address] [facility] [place] p: [phone] f: [phone]    Ordering Center:     [address]  [address] [id number]  WOUND CARE [phone]  FAX NUMBER [phone]    Patient Information:      Russel Redding [facility]    [phone]  : 1955  AGE: 75 y.o. 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 ([address]          WOUNDS REQUIRING DRESSING SUPPLIES:     Wound Foot Left (Active)   Number of days: 2754       Wound Neck Posterior;Mid (Active)   Number of days: 2043       Wound Leg Lower Right;Lateral (Active)   Dressing Status  Removed 2020 10:46 AM   Dressing Type  Packing;Gauze 2020 10:46 AM   Wound Length (cm) 1.5 cm 2020 10:46 AM   Wound Width (cm) 1.2 cm 2020 10:46 AM   Wound Depth (cm) 1 2020 10:46 AM   Wound Surface area (cm^2) 1.8 cm^2 2020 10:46 AM   Change in Wound Size % -20 2020 10:46 AM   Condition of Base Pink;Slough; Bone exposed 2020 10:46 AM   Condition of Edges Open 2020 10:46 AM   Direction of Tunnel 4 o'clock;12 o'clock 2020 10:46 AM   Depth of Tunnel/Sinus (cm) 1.5 cm 2020 10:46 AM   Drainage Amount  Moderate 2020 10:46 AM   Drainage Color Serosanguinous 2020 10:46 AM   Wound Odor None 2020 10:46 AM   Periwound Skin Condition Macerated 2020 10:46 AM   Cleansing and Cleansing Agents  Normal saline 2020 10:46 AM   Dressing Type Applied Packing;Gauze;Gauze wrap (greg) 6/17/2020 12:11 PM   Wound Procedure Type Debridement- Surgical 6/9/2020  3:29 PM   Procedure Time Out 4697 6/9/2020  3:29 PM   Consent Obtained  Yes 6/9/2020  3:29 PM   Procedure Bleeding Minimal 6/9/2020  3:29 PM   Procedure Hemostasis  Pressure 6/9/2020  3:29 PM   Type of Tissue Removed  Devitalized 6/9/2020  3:29 PM   Procedure Instrument  Rongeur 6/9/2020  3:29 PM   Procedure Pain Scale Numeric 0/10 6/9/2020  3:29 PM   Debridement Procedure Performed by charlette 6/9/2020  3:29 PM   Post-Procedure Length (cm) 1.4 cm 6/9/2020  3:29 PM   Post-Procedure Width (cm) 1.3 cm 6/9/2020  3:29 PM   Post-Procedure Depth (cm) 1.5 cm 6/9/2020  3:29 PM   Post-Procedure Volume (cm^3) 2.73 cm^3 6/9/2020  3:29 PM   Post-Procedure Surface Area (cm^2) 1.82 cm^2 6/9/2020  3:29 PM   Procedure Tolerated Well 6/9/2020  3:41 PM   Number of days: 15        Supplies Requested :      WOUND #:1   PRIMARY DRESSING:  Other: Mesalt packing    4X4 gauze pad and Roll gauze     FREQUENCY OF DRESSING CHANGES:  Daily         ADDITIONAL ITEMS:  [] Gloves Small  [x] Gloves Medium [] Gloves Large [] Gloves Jessica Bailee  [] Tape 1\" [] Tape 2\" [] Tape 3\"  [x] Medipore Tape  [x] Saline  [] Skin Prep   [] Adhesive Remover   [] Cotton Tip Applicators   [] Other:    Patient Wound(s) Debrided: [x] Yes if yes please add date 6/17/2020   [] No    Debribement Type:  Other bone debridement     Patient currently being seen by Home Health: [] Yes   [x] No    Duration for needed supplies:  []15  []30  []60  [x]90 Days    Provider Information:      PROVIDER'S NAME: Dr. Mariajose Colón    NPI: [id number]

## 2020-06-17 NOTE — WOUND CARE
06/17/20 1211   Wound Leg Lower Right;Lateral   Date First Assessed/Time First Assessed: 06/02/20 1439   POA: Yes  Location: Leg Lower  Wound Description (Optional): #1  Orientation: Right;Lateral   Dressing Type Applied Packing;Gauze;Gauze wrap (greg)  (mesalt packing)   Discharge Condition: Stable     Pain: 0    Ambulatory Status: Crutches     Discharge Destination: Home     Transportation: Car    Accompanied by: Self  and Family/Caregiver     Discharge instructions reviewed with Patient and Family/Caregiver  and copy or written instructions have been provided. All questions/concerns have been addressed at this time.

## 2020-06-17 NOTE — WOUND CARE
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 Exposed bone) and he will need [facility] cath placement. Indications, complications, risk, benefits, alternatives dw pt, voiced understanding, and agree with current plan  Continue to follow up with  for wound care  Follow up with me at 6 weeks for Abx      Patient is a 75 y.o. male who was admitted on 6/17/2020 with complaints of rt stump wound and fibula osteomyelitis. Patient has this wound on rt stump for 1 months with some drainage. NO fever/chills. Bone is exposed. He is on PO keflex. Wound started with friction from his prosthetic boot. He is known to have few MRSA infection in past but nothing recently.       Patient Active Problem List   Diagnosis Code    Neuropathy G62.9    HTN (hypertension) I10    Dyslipidemia E78.5    DM type 2 causing vascular disease ([address] E11.59    DM neuropathy, type II diabetes mellitus ([address] E11.40    Foot ulcer due to secondary DM ([address] E13.621, L97.509    Anemia, unspecified D64.9    ARF (acute renal failure) (HCC) N17.9    DM type 2 causing CKD stage 3 (HCC) E11.22, N18.3    CKD (chronic kidney disease) stage 3, GFR 30-59 ml/min (HCC) N18.3    Disc herniation [id number]    Spinal stenosis in cervical region M48.02    GABI (obstructive sleep apnea) G47.33    Status post above knee amputation Z89.619    Status post amputation of left great toe (HCC) Z89.412    Cataract, nuclear [id number]    Presbyopia H52.4    Type 2 diabetes mellitus without retinopathy ([address] E11.9    Diabetic foot ulcer with osteomyelitis ([address] E11.621, E11.69, L97.509, M86.9    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 ([address] L97.214     Past Medical History:   Diagnosis Date    Arthritis     BPH (benign prostatic hyperplasia)     Chronic kidney disease     Chronic pain     BACK NEUROPATHY FEET HANDS    DM neuropathy, type II diabetes mellitus ([address]     DM type 2 causing CKD stage 3 ([address] 12/17/2012    DM type 2 causing vascular disease ([address]     Dyslipidemia     Foot ulcer due to secondary DM ([address] 12/1/2012    GERD (gastroesophageal reflux disease)     HTN     Ill-defined condition 2013    MRSA in wound right leg (BKA)    S/P BKA (below knee amputation) ([address]     right BKA due to non-healing ulcer    Sleep apnea     Thromboembolus ([address] 1970'S    BLOOD CLOT LEFT LEG      Family History   Problem Relation Age of Onset    Hypertension Mother    [address] [facility] [address] Gout Mother     Cancer Father         leukemia    Diabetes Father     Hypertension Sister     Diabetes Maternal Grandmother     Hypertension Maternal Grandmother     Diabetes Paternal Grandmother     Hypertension Paternal Grandmother     Anesth Problems Neg Hx       Social History     Tobacco Use    Smoking status: Never Smoker    Smokeless tobacco: Never Used   Substance Use Topics    Alcohol use: No     Past Surgical History:   Procedure Laterality Date    ABDOMEN SURGERY PROC UNLISTED      pilonidal cyst    ECHO 2D ADULT  8/09    normal; LVEF 60%    ECHO STRESS  4/09    7 min; normal    ENDOSCOPY, COLON, DIAGNOSTIC      HX AMPUTATION  2012    left great toe    HX AMPUTATION  2007    BKA right    HX BELOW KNEE AMPUTATION Right     HX CERVICAL FUSION  2009    x2    HX ORTHOPAEDIC  2006    ACDF C4-C7    STRESS TEST MYOVIEW  8/09    walked 8:46; normal; LVEF 51%      Current Outpatient Medications   Medication Sig Dispense    busPIRone (BUSPAR) 5 mg tablet Take 1 Tab by mouth three (3) times daily as needed (anxiety). 90 Tab    rosuvastatin (CRESTOR) 40 mg tablet Take 1 Tab by mouth daily. 90 Tab    pregabalin (Lyrica) 75 mg capsule Take 1 Cap by mouth two (2) times a day. Max Daily Amount: 150 mg. 30 Cap    ergocalciferol (Vitamin D2) 1,250 mcg (50,000 unit) capsule TAKE ONE CAPSULE BY MOUTH EVERY WEEK 4 Cap    pantoprazole (PROTONIX) 40 mg tablet TAKE ONE TABLET BY MOUTH EVERY DAY BEFORE BREAKFAST 90 Tab    lisinopril (PRINIVIL, ZESTRIL) 10 mg tablet TAKE ONE TABLET BY MOUTH EVERY DAY 90 Tab    ferrous sulfate 325 mg (65 mg iron) tablet TAKE ONE TABLET BY MOUTH 2 TIMES A  Tab    aspirin 81 mg chewable tablet Take 81 mg by mouth daily.  glucose blood VI test strips (Prodigy No Coding) strip TEST BLOOD SUGAR 2 TIMES A DAY 50 Strip    silver sulfADIAZINE (SILVADENE) 1 % topical cream Apply  to affected area daily. 85 g    albuterol (Ventolin HFA) 90 mcg/actuation inhaler Take 2 Puffs by inhalation every four (4) hours as needed for Wheezing. 1 Inhaler    oxyCODONE-acetaminophen (Percocet) 5-325 mg per tablet Take 1 Tab by mouth every four (4) hours as needed for Pain for up to 30 days. Max Daily Amount: 6 Tabs. 90 Tab    Lantus Solostar U-100 Insulin 100 unit/mL (3 mL) inpn INJECT 40 TO 50 UNITS SUBCUTANEOUSLY TWICE DAILY AS DIRECTED 15 mL    insulin lispro (HUMALOG) 100 unit/mL kwikpen INJECT 18 TO 30 UNITS SUBCUTANEOUSLY 3 TIMES DAILY BEFORE BREAKFAST, LUNCH, AND DINNER 15 mL    Insulin Needles, Disposable, (OLIVIA PEN NEEDLE) 32 gauge x 5/32\" ndle USE AS DIRECTED 4 TIMES DAILY 100 Pen Needle     No current facility-administered medications for this encounter.       Allergies   Allergen Reactions    Adhesive Tape-Silicones Hives    Sulfa (Sulfonamide Antibiotics) Swelling     Lips eyes        Review of Systems:  A comprehensive review of systems was negative except for that written in the History of Present Illness. Other than noted in the HPI above, a 12 point review of systems is negative for any other constitutional, opthalmologic, ENT, cardiovascular, pulmonary, gastrointestinal, urinary, neurologic, psychiatric, lymphatic, hematologic, oncologic, integument or musculoskeletal issues. Exam:  General:  alert, cooperative, no distress, appears stated age  [de-identified]: pallor, NO icteus  Chest: CTA b/l  Heart: RRR S1S2  Abdomen: soft, NT, ND, BS+  Extremities: rt stump lateral aspect wound 1.5 x 1.2 x 1cm deep wound, bone exposed, NO drainage    Procedure Note:  Bone culture bone. Location / Ulcer: Rt leg  Indication: to remove non-viable tissue from wound bed. Consent in chart. Anesthesia: 4% xylocaine gel  Instrument: roncheur and curette   Bleeding: minimal  Hemostasis: pressure  Pre-Procedure Pain: 1  Post-Procedure Pain: 2  Area debrided < 20 cm sq. Pre-Debridement measurements: see nursing notes  Post-Debridement measurements: see nursing notes  This is part of a series of staged procedures in an attempt at limb salvage. Objective:   Blood pressure 169/77, pulse 99, temperature 97.3 °F (36.3 °C), resp. rate 20. Temp (24hrs), Av.3 °F (36.3 °C), Min:97.3 °F (36.3 °C), Max:97.3 °F (36.3 °C)      Microbiology:      Lab Results   Component Value Date/Time    Culture result: MRSA PRESENT 2016 09:40 AM    Culture result:  2016 09:40 AM         Screening of patient nares for MRSA is for surveillance purposes and, if positive, to facilitate isolation considerations in high risk settings. It is not intended for automatic decolonization interventions per se as regimens are not sufficiently effective to warrant routine use.     Culture result: MRSA NOT PRESENT 10/22/2014 12:15 PM    Culture result:  10/22/2014 12:15 PM         Screening of patient nares for MRSA is for surveillance purposes and, if positive, to facilitate isolation considerations in high risk settings. It is not intended for automatic decolonization interventions per se as regimens are not sufficiently effective to warrant routine use. Studies:  MRI: 6/4/20 with wound deep to bone and fibula osteomyelitis      We appreciate your consult and the opportunity to participate in your patient's care. Please call us with any questions or concerns.        Padmini Nguyễn MD    Signed By: June 17, 2020 June 17, 2020

## 2020-06-18 ENCOUNTER — TELEPHONE (OUTPATIENT)
Dept: INTERNAL MEDICINE CLINIC | Age: 65
End: 2020-06-18

## 2020-06-18 NOTE — PROGRESS NOTES
Patient had EKG with PCP this week, writer called MD to see if cardiac clearance was needed prior to HBO and nurse Alek Can returned call stating MD did not think patient needed cardiac clearance and she felt that patient was able to be cleared for HBO. Labs, and EKG called to [facility] who has verbally cleared patient to start HBO on Monday. Labs faxed to PCP. Patient notified of the above.

## 2020-06-18 NOTE — TELEPHONE ENCOUNTER
Left v/m for Crystal that Dr. Timmy Schultz feels comfortable clearing Mr. Blake according to his recent EKG and OV note from his visit with Rohit Chin NP. She is to return my call if she has any further questions.

## 2020-06-19 ENCOUNTER — HOSPITAL ENCOUNTER (OUTPATIENT)
Dept: INTERVENTIONAL RADIOLOGY/VASCULAR | Age: 65
Discharge: HOME | End: 2020-06-19
Attending: INTERNAL MEDICINE | Admitting: STUDENT IN AN ORGANIZED HEALTH CARE EDUCATION/TRAINING PROGRAM
Payer: MEDICARE

## 2020-06-19 VITALS
RESPIRATION RATE: 16 BRPM | BODY MASS INDEX: 25.9 KG/M2 | TEMPERATURE: 98.4 F | DIASTOLIC BLOOD PRESSURE: 90 MMHG | OXYGEN SATURATION: 98 % | SYSTOLIC BLOOD PRESSURE: 186 MMHG | HEART RATE: 91 BPM | HEIGHT: 71 IN | WEIGHT: 185 LBS

## 2020-06-19 PROCEDURE — 36558 INSERT TUNNELED CV CATH: CPT

## 2020-06-19 PROCEDURE — C1751 CATH, INF, PER/CENT/MIDLINE: HCPCS

## 2020-06-19 PROCEDURE — 77030002996 HC SUT SLK J&J -A

## 2020-06-19 PROCEDURE — 74011000250 HC RX REV CODE- 250: Performed by: STUDENT IN AN ORGANIZED HEALTH CARE EDUCATION/TRAINING PROGRAM

## 2020-06-19 PROCEDURE — 77030010507 HC ADH SKN DERMBND J&J -B

## 2020-06-19 PROCEDURE — C1894 INTRO/SHEATH, NON-LASER: HCPCS

## 2020-06-19 PROCEDURE — 74011250636 HC RX REV CODE- 250/636: Performed by: STUDENT IN AN ORGANIZED HEALTH CARE EDUCATION/TRAINING PROGRAM

## 2020-06-19 RX ORDER — HEPARIN 100 UNIT/ML
300 SYRINGE INTRAVENOUS AS NEEDED
Status: DISCONTINUED | OUTPATIENT
Start: 2020-06-19 | End: 2020-06-19 | Stop reason: HOSPADM

## 2020-06-19 RX ORDER — SODIUM CHLORIDE 9 MG/ML
50 INJECTION, SOLUTION INTRAVENOUS CONTINUOUS
Status: DISCONTINUED | OUTPATIENT
Start: 2020-06-19 | End: 2020-06-19 | Stop reason: HOSPADM

## 2020-06-19 RX ORDER — LIDOCAINE HYDROCHLORIDE 20 MG/ML
20 INJECTION, SOLUTION INFILTRATION; PERINEURAL ONCE
Status: COMPLETED | OUTPATIENT
Start: 2020-06-19 | End: 2020-06-19

## 2020-06-19 RX ORDER — FENTANYL CITRATE 50 UG/ML
100 INJECTION, SOLUTION INTRAMUSCULAR; INTRAVENOUS
Status: DISCONTINUED | OUTPATIENT
Start: 2020-06-19 | End: 2020-06-19

## 2020-06-19 RX ADMIN — HEPARIN 500 UNITS: 100 SYRINGE at 15:18

## 2020-06-19 RX ADMIN — FENTANYL CITRATE 50 MCG: 50 INJECTION INTRAMUSCULAR; INTRAVENOUS at 15:02

## 2020-06-19 RX ADMIN — SODIUM CHLORIDE 50 ML/HR: 900 INJECTION, SOLUTION INTRAVENOUS at 14:00

## 2020-06-19 RX ADMIN — LIDOCAINE HYDROCHLORIDE 10 ML: 20 INJECTION, SOLUTION INFILTRATION; PERINEURAL at 15:16

## 2020-06-19 RX ADMIN — FENTANYL CITRATE 25 MCG: 50 INJECTION INTRAMUSCULAR; INTRAVENOUS at 15:10

## 2020-06-19 NOTE — H&P
Radiology History and Physical    Patient: Fay Leslie. 75 y.o. male       Chief Complaint: No chief complaint on file.       History of Present Illness: Powerline placement for antibiotics    History:    Past Medical History:   Diagnosis Date    Arthritis     BPH (benign prostatic hyperplasia)     Chronic kidney disease     Chronic pain     BACK NEUROPATHY FEET HANDS    DM neuropathy, type II diabetes mellitus [facility]     DM type 2 causing CKD stage 3 ([facility] 12/17/2012    DM type 2 causing vascular disease ([facility]     Dyslipidemia     Foot ulcer due to secondary DM ([facility] 12/1/2012    GERD (gastroesophageal reflux disease)     HTN     Ill-defined condition 2013    MRSA in wound right leg (BKA)    S/P BKA (below knee amputation) ([facility]     right BKA due to non-healing ulcer    Sleep apnea     Thromboembolus ([facility] 1970'S    BLOOD CLOT LEFT LEG     Family History   Problem Relation Age of Onset    Hypertension Mother    [facility] Gout Mother     Cancer Father         leukemia    Diabetes Father     Hypertension Sister     Diabetes Maternal Grandmother     Hypertension Maternal Grandmother     Diabetes Paternal Grandmother     Hypertension Paternal Grandmother     Anesth Problems Neg Hx      Social History     Socioeconomic History    Marital status:      Spouse name: Not on file    Number of children: Not on file    Years of education: Not on file    Highest education level: Not on file   Occupational History    Not on file   Social Needs    Financial resource strain: Not on file    Food insecurity     Worry: Not on file     Inability: Not on file    Transportation needs     Medical: Not on file     Non-medical: Not on file   Tobacco Use    Smoking status: Never Smoker    Smokeless tobacco: Never Used   Substance and Sexual Activity    Alcohol use: No    Drug use: No    Sexual activity: Yes     Partners: Female     Birth control/protection: None   Lifestyle    Physical activity     Days per week: Not on file     Minutes per session: Not on file    Stress: Not on file   Relationships    Social connections     Talks on phone: Not on file     Gets together: Not on file     Attends Orthodox service: Not on file     Active member of club or organization: Not on file     Attends meetings of clubs or organizations: Not on file     Relationship status: Not on file    Intimate partner violence     Fear of current or ex partner: Not on file     Emotionally abused: Not on file     Physically abused: Not on file     Forced sexual activity: Not on file   Other Topics Concern    Not on file   Social History Narrative    Not on file       Allergies: Allergies   Allergen Reactions    Adhesive Tape-Silicones Hives    Sulfa (Sulfonamide Antibiotics) Swelling     Lips eyes       Current Medications:  No current facility-administered medications for this encounter. Physical Exam:  There were no vitals taken for this visit. GENERAL: alert, cooperative, no distress, appears stated age  LUNG: clear to auscultation bilaterally  HEART: regular rate and rhythm  ABD: Non tender, non distended. Alerts:    Hospital Problems  Date Reviewed: 6/17/2020    None          Laboratory:    No results for input(s): HGB, HCT, WBC, PLT, INR, BUN, CREA, K, CRCLT, HGBEXT, HCTEXT, PLTEXT, INREXT in the last 72 hours. No lab exists for component: PTT, PT      Plan of Care/Planned Procedure:  Risks, benefits, and alternatives reviewed with patient and he agrees to proceed with the procedure. Deemed appropriate or moderate sedation with versed and fentanyl.       Avis Mohamud MD

## 2020-06-19 NOTE — ROUTINE PROCESS
Pt. Discharged to home and transported to d/c lot via w/c. Post central line discharge instructions given and emergency sx to call for as well as phone number for problems given. powerline card given to pt.

## 2020-06-19 NOTE — PROGRESS NOTES
Pt arrives via w/c to angio department accompanied by wife for powerline procedure. All assessments completed and consent was reviewed. Education given was regarding procedure, narcotic sedation, post-procedure care and  management/follow-up. Opportunity for questions was provided and all questions and concerns were addressed.

## 2020-06-19 NOTE — DISCHARGE INSTRUCTIONS
Patient Education        Peripherally Inserted Central Catheter [facility]): Care Instructions  Your Care Instructions     A peripherally inserted central catheter (PICC) is a soft, flexible tube that runs under your skin from a vein in your arm to a large vein near your heart. One end of the catheter stays outside your body. It is a type of central venous catheter, or central venous line. You may have it for weeks or months. A PICC is used to give you medicine, blood products, nutrients, or fluids. A PICC makes doing these things more comfortable for you because they are put directly into the catheter. So you will not be stuck with a needle every time. A PICC may be used to draw blood for tests only if another vein, such as in the hand or arm, can't be used. The end of the PICC sometimes has two or three openings so that you can get more than one type of fluid or medicine at a time. Your doctor may give you medicine to make you feel relaxed. You may feel a little pain when your doctor numbs your arm. Your doctor will then thread the catheter up a vein in your arm to a larger vein. You will not feel any pain. The doctor may use stitches or other devices to hold the catheter in place where it exits your arm. After the procedure, the site may be sore for a day or two. Follow-up care is a key part of your treatment and safety. Be sure to make and go to all appointments, and call your doctor if you are having problems. It's also a good idea to know your test results and keep a list of the medicines you take. How can you care for yourself at home? · Do not wear jewelry, such as necklaces, that can catch on the catheter. · If the catheter breaks, follow the instructions your doctor gave you. If you have no instructions, clamp or tie off the catheter. Then see a doctor as soon as possible. · To help prevent infection, take a shower instead of a bath. Do not go swimming with the catheter. · Try to keep the area dry. When you shower, cover the area with waterproof material, such as plastic wrap. · Never touch the open end of the catheter if the cap is off. · Never use scissors, knives, pins, or other sharp objects near the catheter or other tubing. · If your catheter has a clamp, keep it clamped when you are not using it. · Fasten or tape the catheter to your body to prevent pulling or dangling. · Avoid clothing that rubs or pulls on your catheter. · Avoid bending or crimping your catheter. · Always wash your hands before you touch your catheter. · Wear loose clothing over the catheter for the first 10 to 14 days. When getting dressed, be careful not to pull on the catheter. How to change the dressing  Since the PICC is in one of your arms, you will not be able to change the dressing on your own. You will need someone to help you change the dressing using the same instructions that your doctor or nurse gave you. Your PICC dressing should be changed at least once a week. If the dressing becomes loose, wet, or dirty, it must be changed more often to prevent infection. Your doctor may also give you instructions for when to change the dressing. Be sure you have all your supplies ready. These include medical tape, a surgical mask, sterile gloves, and your dressing kit. The names and brands of the items will vary. Your doctor or nurse may give you specific instructions for changing the dressing. Here are basic tips for how to change the dressing. You will need help changing it. 1. Wash your hands with soap and water for 15 seconds. Dry your hands with paper towels. 2. Put on the surgical mask. 3. Loosen and remove your old dressing. Peel the dressing toward the PICC, not away from it. You may need to use an adhesive remover if your dressing does not come off easily. 4. Look at the site carefully for redness, swelling, drainage, tenderness, or warmth. If you notice any of these, call your doctor.   5. Wash your hands again, and open your dressing kit. Put on the sterile gloves. 6. Clean the site with the supplies in the dressing kit. 7. Use the dressing that your doctor gave you, and place it over the site. 8. Tape the PICC tubing to your skin so that it does not dangle or pull. When should you call for help? [id number] anytime you think you may need emergency care. For example, call if:  · You passed out (lost consciousness). · You have severe trouble breathing. · You have sudden chest pain and shortness of breath, or you cough up blood. · You have a fast or uneven pulse. Call your doctor now or seek immediate medical care if:  · You have signs of infection, such as:  ? Increased pain, swelling, warmth, or redness. ? Red streaks leading from the area. ? Pus or blood draining from the area. ? A fever. · You have swelling in your face, chest, neck, or arm on the side where the catheter is. · You have signs of a blood clot, such as bulging veins near the catheter. · Your catheter is leaking, cracked, or clogged. · You feel resistance when you inject medicine or fluids into your catheter. · Your catheter is out of place. This may happen after severe coughing or vomiting, or if you pull on the catheter. · You have chest pain or shortness of breath. Watch closely for changes in your health, and be sure to contact your doctor if:  · You have any concerns about your catheter. Where can you learn more? Go to http://sanaz-colt.info/  Enter L935 in the search box to learn more about \"Peripherally Inserted Central Catheter (PICC): Care Instructions. \"  Current as of: June 26, 2019               Content Version: 12.5  © 2943-6841 [employer]. Care instructions adapted under license by [employer] (which disclaims liability or warranty for this information).  If you have questions about a medical condition or this instruction, always ask your healthcare professional. Noah Page, [facility] disclaims any warranty or liability for your use of this information. Side effects of sedation medications and other medications used today have been reviewed. Notify us of nausea, itching, hives, dizziness, or anything else out of the ordinary. Should you experience any of these significant changes, please call [phone] between the hours of 7:30 am and 10 pm or [phone] after hours. After hours, ask the  to page the X-ray Technologist, and describe the problem to the technologist.    Side effects of sedation medications and other medications used today have been reviewed. Notify us of nausea, itching, hives, dizziness, or anything else out of the ordinary. Should you experience any of these significant changes, please call [phone] between the hours of 7:30 am and 10 pm or [phone] after hours.  After hours, ask the  to page the [address] Technologist, and describe the problem to the technologist.

## 2020-06-22 ENCOUNTER — TELEPHONE (OUTPATIENT)
Dept: INTERNAL MEDICINE CLINIC | Age: 65
End: 2020-06-22

## 2020-06-22 ENCOUNTER — HOSPITAL ENCOUNTER (OUTPATIENT)
Dept: WOUND CARE | Age: 65
Discharge: HOME | End: 2020-06-22
Payer: MEDICARE

## 2020-06-22 VITALS
TEMPERATURE: 97.7 F | DIASTOLIC BLOOD PRESSURE: 97 MMHG | SYSTOLIC BLOOD PRESSURE: 200 MMHG | HEART RATE: 100 BPM | RESPIRATION RATE: 16 BRPM

## 2020-06-22 PROBLEM — L98.499 DIABETES MELLITUS WITH SKIN ULCER (HCC): Status: ACTIVE | Noted: 2020-06-22

## 2020-06-22 PROBLEM — E11.621 DIABETIC FOOT ULCER WITH OSTEOMYELITIS (HCC): Status: RESOLVED | Noted: 2020-06-10 | Resolved: 2020-06-22

## 2020-06-22 PROBLEM — M86.9 DIABETIC FOOT ULCER WITH OSTEOMYELITIS (HCC): Status: RESOLVED | Noted: 2020-06-10 | Resolved: 2020-06-22

## 2020-06-22 PROBLEM — E11.622 DIABETES MELLITUS WITH SKIN ULCER (HCC): Status: ACTIVE | Noted: 2020-06-22

## 2020-06-22 PROBLEM — E11.69 DIABETIC FOOT ULCER WITH OSTEOMYELITIS (HCC): Status: RESOLVED | Noted: 2020-06-10 | Resolved: 2020-06-22

## 2020-06-22 PROBLEM — L97.509 DIABETIC FOOT ULCER WITH OSTEOMYELITIS (HCC): Status: RESOLVED | Noted: 2020-06-10 | Resolved: 2020-06-22

## 2020-06-22 PROBLEM — M86.461: Status: ACTIVE | Noted: 2020-06-22

## 2020-06-22 LAB
GLUCOSE BLD STRIP.AUTO-MCNC: 146 MG/DL (ref 65–100)
GLUCOSE BLD STRIP.AUTO-MCNC: 256 MG/DL (ref 65–100)
SERVICE CMNT-IMP: ABNORMAL
SERVICE CMNT-IMP: ABNORMAL

## 2020-06-22 PROCEDURE — 82962 GLUCOSE BLOOD TEST: CPT

## 2020-06-22 NOTE — TELEPHONE ENCOUNTER
Received phone call from [facility] wound clinic stating Pt was unable to tolerate hyperbaric O2 therapy and will be referred to ENT to have ear tubes placed. Analilia states Pt's BP is 200/97. Recent abnormal lab results have been faxed to office. Advised Analilia as per Dr Michell Us Pt did not need to go to ED, and he could continue therapy if able to tolerate. F/u has been scheduled for 6/23/20.

## 2020-06-22 NOTE — HYPERBARIC MEDICINE NOTE
Patient in for HBO therapy, upon start of treatment patient c/o ear pain and pressure. TEED was R-0 and L-0 prior to treatment. Patient was unable to clear ears using valsava or by drinking water. MD was notified and at just under 1.5 ROSALINDA treatment was terminated. TEED was then L-0 and R-1. Patients blood pressure was elevated at the end of treatment. Rechecked and was still elevated at 200/97. Patient denied headache or dizziness. MD advised he wanted staff to notify PCP. PCP was notified of B/P and also reviewed labs and per her nurse said it was ok to discharge patient home and he will follow up in her office tomorrow. Wound MD notified and is in agreement.

## 2020-06-23 ENCOUNTER — OFFICE VISIT (OUTPATIENT)
Dept: INTERNAL MEDICINE CLINIC | Age: 65
End: 2020-06-23

## 2020-06-23 VITALS
DIASTOLIC BLOOD PRESSURE: 82 MMHG | TEMPERATURE: 98.3 F | OXYGEN SATURATION: 98 % | SYSTOLIC BLOOD PRESSURE: 158 MMHG | HEART RATE: 96 BPM | WEIGHT: 175 LBS | HEIGHT: 71 IN | RESPIRATION RATE: 16 BRPM | BODY MASS INDEX: 24.5 KG/M2

## 2020-06-23 DIAGNOSIS — N18.30 CKD (CHRONIC KIDNEY DISEASE) STAGE 3, GFR 30-59 ML/MIN (HCC): ICD-10-CM

## 2020-06-23 DIAGNOSIS — L97.914 NON-PRESSURE CHRONIC ULCER OF RIGHT LOWER LEG WITH NECROSIS OF BONE (HCC): Primary | ICD-10-CM

## 2020-06-23 DIAGNOSIS — I10 ESSENTIAL HYPERTENSION: ICD-10-CM

## 2020-06-23 DIAGNOSIS — R61 NIGHT SWEATS: ICD-10-CM

## 2020-06-23 DIAGNOSIS — E11.59 DM TYPE 2 CAUSING VASCULAR DISEASE (HCC): ICD-10-CM

## 2020-06-23 RX ORDER — LISINOPRIL 10 MG/1
10 TABLET ORAL 2 TIMES DAILY
Qty: 180 TAB | Refills: 1 | Status: SHIPPED | OUTPATIENT
Start: 2020-06-23 | End: 2020-12-07 | Stop reason: SDUPTHER

## 2020-06-23 NOTE — PROGRESS NOTES
HISTORY OF PRESENT ILLNESS  Daxa Ortiz is a 75 y.o. male. HPI   Hyperlipidemia:  Cardiovascular risk analysis - 75 y.o. male LDL goal is under 100. ROS: taking medications as instructed, no medication side effects noted, no TIA's, no chest pain on exertion, no dyspnea on exertion, no swelling of ankles. Tolerating meds, no myalgias or other side effects noted. New concerns: Pt's last LDL was 130 on 3/30/20. Continues on Crestor. Diabetic Review of Systems - further diabetic ROS: no polyuria or polydipsia, no chest pain, dyspnea or TIA's, no numbness, tingling or pain in extremities. Other symptoms and concerns: Pt's last a1c was 11.4 on 3/30/20 with an estimated BG of 280. Pt notes that yesterday his BG was 140. Pt was given Ensure at his hypobaric wound therapy appt to raise his BG. Continues on Lantus, Humalog. Pt inquires about seeing an endocrinologist to manage his A1c.     R leg Wound: Pt notes that he was refused hypobaric wound therapy yesterday since his BP was elevated in office. Specialists asked PCP to evaluate him. Pt reports ear pain and bleeding during hypobaric wound therapy yesterday. Pt states that he is going to be f/u with ENT to address his ear pain and bleeding during therapy. Continues on Percocet and Lyrica. Hypertension ROS: taking medications as instructed, no medication side effects noted, no TIA's, no chest pain on exertion, no dyspnea on exertion, no swelling of ankles. New concerns: BP in office today is 158/82. Pt reports that his BP has been elevated at home and when went to get hypobaric therapy yesterday. Continues on lisinopril. Night sweats: Pt reports night sweats intermittent for a year. He says she changes his shirt 3x a night. CKD: Pt follows with Dr. Eric Jin to address his kidney function and discuss his abx regimen. Review of Systems   All other systems reviewed and are negative. Physical Exam  Vitals signs reviewed.    Constitutional: General: He is not in acute distress. Appearance: Normal appearance. He is not ill-appearing, toxic-appearing or diaphoretic. HENT:      Right Ear: Hearing normal.      Left Ear: Hearing normal.      Nose: Nose normal.      Mouth/Throat:      Mouth: Mucous membranes are moist.      Pharynx: Oropharynx is clear. Eyes:      Conjunctiva/sclera: Conjunctivae normal.   Neck:      Musculoskeletal: Normal range of motion. Pulmonary:      Effort: No respiratory distress. Breath sounds: Normal air entry. Musculoskeletal:      Comments: rle prosthesis in place   Skin:     General: Skin is warm and dry. Neurological:      General: No focal deficit present. Mental Status: He is alert and oriented to person, place, and time. Mental status is at baseline. Psychiatric:         Mood and Affect: Mood normal.         Behavior: Behavior normal.         Thought Content: Thought content normal.         Judgment: Judgment normal.         ASSESSMENT and PLAN  Diagnoses and all orders for this visit:    1. 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 Will continue to monitor for improvements or changes. 2. DM type 2 causing vascular disease (HCC)  Elevated A1c and uncontrolled BG. Directed pt to f/u with endocrinologist to control his BG. Will continue to monitor for improvements or changes.  -     REFERRAL TO ENDOCRINOLOGY    3. Essential hypertension  Elevated B. Increased pt's lisinopril to 10 mg BID. Will continue to monitor for improvements or changes. -     lisinopriL (PRINIVIL, ZESTRIL) 10 mg tablet; Take 1 Tab by mouth two (2) times a day. 4. CKD (chronic kidney disease) stage 3, GFR 30-59 ml/min ([facility])  Followed by Dr. Gerard Thakur.  Pt has an upcoming appt to address his kidney function. Will continue to monitor for improvements or changes. 5. Night sweats  Stable with no recent changes. Discussed with pt that since his night sweats have been unchanged over a year that I do not recommend treatment due to his existing conditions and current medication compliance. Informed pt that his uncontrolled BG could be contributing to his night sweats and advised him to ask about it during his next endocrinology visit. Will continue to monitor for improvements or changes. Lab results and schedule of future lab studies reviewed with patient. Reviewed diet, exercise and weight control. Written by Tamiko Das, as dictated by Gerson Urias MD.     Current diagnosis and concerns discussed with pt at length. Understands risks and benefits or current treatment plan and medications and accepts the treatment and medication with any possible risks. Pt asks appropriate questions which were answered. Pt instructed to call with any concerns or problems.

## 2020-06-28 NOTE — WOUND CARE
HBO PROGRESS NOTE  NAME: Marycarmen Myers. MEDICAL RECORD NUMBER:  [id number]  AGE: 75 y.o. GENDER: male  : 1955  EPISODE DATE:  2020     Subjective     HBO Treatment Number: 1 out of Total Treatments: 30    HBO Diagnosis:  Indications: Lower Extremity Diabetic Wound ___(site)  Safety checks performed prior to treatment. See doc flowsheets for documentation. Objective           Lab Results   Component Value Date/Time    Glucose (POC) 256 (H) 2020 02:21 PM    Glucose (POC) 146 (H) 2020 01:31 PM       Pre treatment Vital Signs       Temp: 98.9 °F (37.2 °C)     Pulse (Heart Rate): 100     Resp Rate: 16   BP: 150/76       Post treatment Vital Signs  Temp: 97.7 °F (36.5 °C)  Pulse (Heart Rate): 100  Resp Rate: 16  BP: (!) 200/97(MD aware, attempting to call PCP)      Assessment        Physical Exam:  General Appearance: healthy, alert, well developed and well nourished    Tympanic Membrane Assessment:  Pre-Treatment Left Left: Grade 0 Right Right: Grade 0  Post-treatment Left Left: Grade 0 Right Right: Grade I    Pulmonary/Chest: lungs clear to auscultation, breath sounds equal and symmetric, no rhonchi, rales or wheezes    Cardiovascular: regular rate and rhythm    Patient place in a fully body Monoplace serial number Chamber #: [id number]       Treatment Start Time:  Door Closed      Pressure Reached  ROSALINDA Depth: 2.5 ROSALINDA  Number of Air Breaks: None        Decompression Time   Treatment End Time: 1410 Door Opened         Length of Treatment: 90 Minutes    Adverse Event: Treatment Completion Status: Treatment Aborted/Not Restarted Due to Adverse Event(MD notified, patient unable to tolerate ear pressure, valsava technique and all other ear clearing techniques did not releive pressure.)    I was present on these premises and immediately available to furnish assistance & direction throughout the procedure.      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-L97.214   Chronic osteomyelitis with draining sinus, right tibia and fibula-M86.461  Type 2 diabetes mellitus with other skin ulcer-E11.622     Plan          Jax Encarnacion. is a 75 y.o. male  did not successfully complete today's hyperbaric oxygen treatment at [address] In my clinical judgement, ongoing HBO therapy is necessary at this time, given a threat to patient function, limb or life from the current condition. Supervision and attendance of Hyperbaric Oxygen Therapy provided. Continue HBO treatment as outlined in the treatment plan after visit with ENT. Patient's PCP was reached prior to patient leaving and blood pressure readings were discussed. PCP said to let patient go home and they would follow up with patient. Hyperbaric Oxygen: Jax Encarnacion. Did not tolerate Treatment Number: 1 today.      Electronically signed by Bruce Carreon MD on 6/22/2020 at 4:37 PM

## 2020-06-29 ENCOUNTER — HOSPITAL ENCOUNTER (OUTPATIENT)
Dept: WOUND CARE | Age: 65
Discharge: HOME | End: 2020-06-29
Payer: MEDICARE

## 2020-06-29 VITALS
SYSTOLIC BLOOD PRESSURE: 186 MMHG | TEMPERATURE: 98.3 F | DIASTOLIC BLOOD PRESSURE: 88 MMHG | RESPIRATION RATE: 16 BRPM | HEART RATE: 94 BPM

## 2020-06-29 LAB
GLUCOSE BLD STRIP.AUTO-MCNC: 272 MG/DL (ref 65–100)
GLUCOSE BLD STRIP.AUTO-MCNC: 273 MG/DL (ref 65–100)
SERVICE CMNT-IMP: ABNORMAL
SERVICE CMNT-IMP: ABNORMAL

## 2020-06-29 PROCEDURE — G0277 HBOT, FULL BODY CHAMBER, 30M: HCPCS | Performed by: FAMILY MEDICINE

## 2020-06-29 PROCEDURE — 82962 GLUCOSE BLOOD TEST: CPT

## 2020-06-29 NOTE — DISCHARGE INSTRUCTIONS
Discharge Instructions for  Hyperbaric Oxygen Therapy  [address] [facility] [address]  Franny Pickens  Telephone: [phone]    NAME:  Roderick García. YOB: 1955  MEDICAL RECORD NUMBER:  [id number]  DATE:  6/29/2020    You have been treated with 100% oxygen in the Hyperbaric Chamber at the [address] [place] [address]. There are usually no adverse effects or problems which follow this treatment. However, for comfort and safety, we strongly recommend these guidelines are followed:     It is perfectly normal to feel tired after a hyperbaric treatment. This tiredness should go away 2 to 3 days after your last treatment.  Avoid heavy exertion, exercise or other unnecessary activity if you are feeling tired.  Some people develop a feeling of fullness or stuffiness in their ears. This is a result of a small amount of fluid build-up in the middle ear. You may take an over the counter decongestant if approved by your doctor. Follow the instructions in the medicine package for the amount to take. If this problem lasts for more than 48 hours, please call your personal doctor. You also may call or return to the [facility] and have a Hyperbaric doctor examine you.  In rare cases, patients may have so called late effects after the treatments. Please call the [address] [place] [address] Road if you have any of these symptoms:    [x]Headache that is not relieved by over the counter pain medicine. [x]Changes in vision. During the course of many hyperbaric treatments (20 or more) some patients may complain of a temporary problem with sharp focus on distant objects. This is a result of changes in the shape of the lens. Your vision should return to normal in 6 to 8 weeks. [x]Severe pain in your ears. [x]Nausea or vomiting. [x]Difficulty concentrating.   [x]Clumsiness, difficulty walking or numbness and tingling of your arms and legs. [x]If you are on prescription medicines from your personal doctor, you may continue to take these as directed. Wound Care Center Information:    If you have questions or develop any of the symptoms mentioned, please contact the [facility] at [phone] [address]  8:00 am - 4:30 pm.  If you need help outside these hours and cannot wait until we are again available, contact your PCP or go to the hospital emergency room. Patient Signature:_______________________Date:_________Time:________    [] Patient unable to sign Discharge Instructions given to ECF/Transportation/POA    The information contained in the After Visit Summary has been reviewed with me, the patient and/or responsible adult, by my health care provider(s). I had the opportunity to ask questions regarding this information.   I have elected to receive;  [x]  After Visit Summary  [x]  Comprehensive Discharge Instruction        Electronically signed Uriel Galeana LPN on 8/37/7304 at 8:38 PM

## 2020-06-30 ENCOUNTER — HOSPITAL ENCOUNTER (OUTPATIENT)
Dept: WOUND CARE | Age: 65
Discharge: HOME | End: 2020-06-30
Payer: MEDICARE

## 2020-06-30 ENCOUNTER — TELEPHONE (OUTPATIENT)
Dept: INTERNAL MEDICINE CLINIC | Age: 65
End: 2020-06-30

## 2020-06-30 VITALS
DIASTOLIC BLOOD PRESSURE: 85 MMHG | HEART RATE: 89 BPM | RESPIRATION RATE: 18 BRPM | SYSTOLIC BLOOD PRESSURE: 186 MMHG | TEMPERATURE: 97.5 F

## 2020-06-30 VITALS
DIASTOLIC BLOOD PRESSURE: 72 MMHG | RESPIRATION RATE: 18 BRPM | SYSTOLIC BLOOD PRESSURE: 121 MMHG | HEART RATE: 99 BPM | TEMPERATURE: 97.4 F

## 2020-06-30 DIAGNOSIS — M54.41 CHRONIC BILATERAL LOW BACK PAIN WITH BILATERAL SCIATICA: ICD-10-CM

## 2020-06-30 DIAGNOSIS — M54.42 CHRONIC BILATERAL LOW BACK PAIN WITH BILATERAL SCIATICA: ICD-10-CM

## 2020-06-30 DIAGNOSIS — G89.29 CHRONIC BILATERAL LOW BACK PAIN WITH BILATERAL SCIATICA: ICD-10-CM

## 2020-06-30 DIAGNOSIS — Z89.9 S/P AMPUTATION OF LIMB: Primary | ICD-10-CM

## 2020-06-30 DIAGNOSIS — E11.59 DM TYPE 2 CAUSING VASCULAR DISEASE (HCC): Primary | ICD-10-CM

## 2020-06-30 DIAGNOSIS — Z89.9 S/P AMPUTATION OF LIMB: ICD-10-CM

## 2020-06-30 LAB
GLUCOSE BLD STRIP.AUTO-MCNC: 232 MG/DL (ref 65–100)
GLUCOSE BLD STRIP.AUTO-MCNC: 281 MG/DL (ref 65–100)
SERVICE CMNT-IMP: ABNORMAL
SERVICE CMNT-IMP: ABNORMAL

## 2020-06-30 PROCEDURE — 11042 DBRDMT SUBQ TIS 1ST 20SQCM/<: CPT | Performed by: PODIATRIST

## 2020-06-30 PROCEDURE — 82962 GLUCOSE BLOOD TEST: CPT

## 2020-06-30 PROCEDURE — 74011000250 HC RX REV CODE- 250: Performed by: PODIATRIST

## 2020-06-30 PROCEDURE — G0277 HBOT, FULL BODY CHAMBER, 30M: HCPCS | Performed by: PODIATRIST

## 2020-06-30 RX ORDER — OXYCODONE AND ACETAMINOPHEN 5; 325 MG/1; MG/1
1 TABLET ORAL
Qty: 90 TAB | Refills: 0 | Status: SHIPPED | OUTPATIENT
Start: 2020-06-30 | End: 2020-08-03 | Stop reason: SDUPTHER

## 2020-06-30 RX ADMIN — Medication: at 13:20

## 2020-06-30 NOTE — WOUND CARE
Lashay Louise DPM - Bunny Matson. [place], [address] [facility]     Assessment/Plan:  Type 2 Diabetes with leg ulcer (E11.622)    Non pressure chronic ulcer right leg to the level of bone (L97.914)    Nichole Grade 3 ulcer    - Pt evaluated and treated. - Pt presents with right BKA stump wound. Stagnant.  - Preformed wound debridement. See procedure note below  - Pt receiving abx via PICC  - Pt has started daily HBO  - Dressing consisting of mesalt packing  - Pt has returned to wearing his old prosthetic   - 6/4 Rt Tib/Fib MRI result findings compatible with OM of fibula  - F/U 1 week. Subjective:  Pt complains of wound to right amputation site. At present he is packing the wound with mesalt. Pt denies a recent h/o fever, chills, nausea, vomiting, chest pain, shortness of breath. HPI: Mr. Andrea Landon is a 74 yo AA male with a PMH of arthritis, BPH, CKD, chronic pain, DM2, dyslipidemia, GERD, HTN, s/p rt BKA, sleep apnea, thromboembolus lt leg who presents with a right BKA stump wound. Wound formed from ill fitting prosthetic. Pt has had wounds in the past from in this same manner. ROS:  Consitutional: no weight loss, night sweats, fatigue / malaise / lethargy. Musculoskeletal: no joint / extremity pain, misalignment, stiffness, decreased ROM, crepitus. Integument: No pruritis, rashes, lesions, right BKA stump wound.   Psychiatric: No depression, anxiety, paranoia      History:  Wound Care  Allergies   Allergen Reactions    Adhesive Tape-Silicones Hives    Sulfa (Sulfonamide Antibiotics) Swelling     Lips eyes     Family History   Problem Relation Age of Onset    Hypertension Mother    [facility] Gout Mother     Cancer Father         leukemia    Diabetes Father     Hypertension Sister     Diabetes Maternal Grandmother     Hypertension Maternal Grandmother     Diabetes Paternal Grandmother     Hypertension Paternal Grandmother     Anesth Problems Neg Hx       Past Medical History:   Diagnosis Date    Arthritis     BPH (benign prostatic hyperplasia)     Chronic kidney disease     Chronic pain     BACK NEUROPATHY FEET HANDS    DM neuropathy, type II diabetes mellitus [facility]     DM type 2 causing CKD stage 3 ([facility] 12/17/2012    DM type 2 causing vascular disease ([facility]     Dyslipidemia     Foot ulcer due to secondary DM ([facility] 12/1/2012    GERD (gastroesophageal reflux disease)     HTN     Ill-defined condition 2013    MRSA in wound right leg (BKA)    S/P BKA (below knee amputation) ([facility]     right BKA due to non-healing ulcer    Sleep apnea     Thromboembolus ([facility] 1970'S    BLOOD CLOT LEFT LEG     Past Surgical History:   Procedure Laterality Date    ABDOMEN SURGERY PROC UNLISTED      pilonidal cyst    ECHO 2D ADULT  8/09    normal; LVEF 60%    ECHO STRESS  4/09    7 min; normal    ENDOSCOPY, COLON, DIAGNOSTIC      HX AMPUTATION  2012    left great toe    HX AMPUTATION  2007    BKA right    HX BELOW KNEE AMPUTATION Right     HX CERVICAL FUSION  2009    x2    HX ORTHOPAEDIC  2006    ACDF C4-C7    IR INSERT TUNL CVC W/O PORT OVER 5 YR  6/19/2020    STRESS TEST MYOVIEW  8/09    walked 8:46; normal; LVEF 51%     Social History     Tobacco Use    Smoking status: Never Smoker    Smokeless tobacco: Never Used   Substance Use Topics    Alcohol use: No       Social History     Substance and Sexual Activity   Alcohol Use No     Social History     Substance and Sexual Activity   Drug Use No      Social History     Tobacco Use   Smoking Status Never Smoker   Smokeless Tobacco Never Used     Current Outpatient Medications   Medication Sig    oxyCODONE-acetaminophen (Percocet) 5-325 mg per tablet Take 1 Tab by mouth every four (4) hours as needed for Pain for up to 30 days. Max Daily Amount: 6 Tabs.     lisinopriL (PRINIVIL, ZESTRIL) 10 mg tablet Take 1 Tab by mouth two (2) times a day.  busPIRone (BUSPAR) 5 mg tablet Take 1 Tab by mouth three (3) times daily as needed (anxiety).  glucose blood VI test strips (Prodigy No Coding) strip TEST BLOOD SUGAR 2 TIMES A DAY    rosuvastatin (CRESTOR) 40 mg tablet Take 1 Tab by mouth daily.  silver sulfADIAZINE (SILVADENE) 1 % topical cream Apply  to affected area daily.  albuterol (Ventolin HFA) 90 mcg/actuation inhaler Take 2 Puffs by inhalation every four (4) hours as needed for Wheezing.  pregabalin (Lyrica) 75 mg capsule Take 1 Cap by mouth two (2) times a day. Max Daily Amount: 150 mg.    ergocalciferol (Vitamin D2) 1,250 mcg (50,000 unit) capsule TAKE ONE CAPSULE BY MOUTH EVERY WEEK    Lantus Solostar U-100 Insulin 100 unit/mL (3 mL) inpn INJECT 40 TO 50 UNITS SUBCUTANEOUSLY TWICE DAILY AS DIRECTED    insulin lispro (HUMALOG) 100 unit/mL kwikpen INJECT 18 TO 30 UNITS SUBCUTANEOUSLY 3 TIMES DAILY BEFORE BREAKFAST, LUNCH, AND DINNER    pantoprazole (PROTONIX) 40 mg tablet TAKE ONE TABLET BY MOUTH EVERY DAY BEFORE BREAKFAST    Insulin Needles, Disposable, (OLIVIA PEN NEEDLE) 32 gauge x 5/32\" ndle USE AS DIRECTED 4 TIMES DAILY    ferrous sulfate 325 mg (65 mg iron) tablet TAKE ONE TABLET BY MOUTH 2 TIMES A DAY    aspirin 81 mg chewable tablet Take 81 mg by mouth daily. No current facility-administered medications for this encounter. Objective:  Visit Vitals  /72   Pulse 99   Temp 97.4 °F (36.3 °C)   Resp 18       Vascular:  left LE  DP 1/4; PT 1/4  capillary fill time brisk, pitting edema is present, skin temperature is cool, varicosities are present. Dermatological:    Wound: 1  Location: right BKA stump   Measurements: per RN note  Margins: intact  Drainage: serous  Odor: no  Wound base: graunular  Lymphangitic streaking? No.  Undermining? yes  Sinus tracts? No.  Exposed bone? yes  Subcutaneous crepitation on palpation? No.    Skin is dry and scaly, exhibits hemosiderin deposition. There is no maceration of the interspaces of the feet b/l. Neurological:  DTR are present, protective sensation per 5.07 [place] Mauricio monofilament is intact, patient is AAOx3, mood is normal. Epicritic sensation is intact. Orthopedic:  Left LE are symmetric, ROM of ankle, STJ, 1st MTPJ is limited, MMT 5 out of 5 for B/L LE. Right BKA. Has prosthesis. Constitutional: Pt is a well developed, pleasant AA male. Lymphatics: negative tenderness to palpation of neck/axillary/inguinal nodes. Imaging / Labs / Cx / Px:  5/28 tib/fib XR: soft tissue ulcer extending to the rt distal fibula    Procedure Note:  Excisional debridement through level of subcutaneous fat. Location / Ulcer: left leg  Indication: to remove non-viable tissue from wound bed. Consent in chart. Anesthesia: 4% xylocaine gel  Instrument: roncheur and curette   Bleeding: minimal  Hemostasis: pressure  Pre-Procedure Pain: 1  Post-Procedure Pain: 2  Area debrided < 20 cm sq. Pre-Debridement measurements: see nursing notes  Post-Debridement measurements: see nursing notes  This is part of a series of staged procedures in an attempt at limb salvage.

## 2020-06-30 NOTE — WOUND CARE
HBO PROGRESS NOTE  NAME: Checo Workman MEDICAL RECORD NUMBER:  [id number]  AGE: 75 y.o. GENDER: male  : 1955  EPISODE DATE:  2020     Subjective     HBO Treatment Number: 2 out of Total Treatments: 30    HBO Diagnosis:  Indications: Lower Extremity Diabetic Wound ___(site)(RLE)  Safety checks performed prior to treatment. See doc flowsheets for documentation. Objective           Lab Results   Component Value Date/Time    Glucose (POC) 281 (H) 2020 11:32 AM    Glucose (POC) 232 (H) 2020 08:48 AM       Pre treatment Vital Signs       Temp: 97.4 °F (36.3 °C)     Pulse (Heart Rate): 99     Resp Rate: 16   BP: 138/66       Post treatment Vital Signs  Temp: 97.5 °F (36.4 °C)  Pulse (Heart Rate): 89  Resp Rate: 18  BP: 186/85      Assessment        Physical Exam:  General Appearance: alert    Tympanic Membrane Assessment:  Pre-Treatment Left   Right    Post-treatment Left Left: Grade 0 Right Right: Grade 0      Patient place in a fully body Monoplace serial number Chamber #: [id number]       Treatment Start Time: 3734 Door Closed    Pressure Reached Time: 0928 Pressure Reached  ROSALINDA Depth: 2.5 ROSALINDA  Number of Air Breaks: Two 5 minute air breaks      Decompression Time: 1108 Decompression Time   Treatment End Time: 59 Door Opened    Symptoms Noted During Treatment: Nausea    Length of Treatment: 90 Minutes    Adverse Event: Treatment Completion Status: Treatment Completed without Adverse Event    I was present on these premises and immediately available to furnish assistance & direction throughout the procedure. Plan          Checo Miguel. is a 75 y.o. male  did successfully complete today's hyperbaric oxygen treatment at [address] In my clinical judgement, ongoing HBO therapy is necessary at this time, given a threat to patient function, limb or life from the current condition.       Supervision and attendance of Hyperbaric Oxygen Therapy provided. Continue HBO treatment as outlined in the treatment plan. Hyperbaric Oxygen: [employer] [employer]. tolerated Treatment Number: 2 well today without complications.      Electronically signed by Veronica Rizzo DPM on 6/30/2020 at 12:07 PM

## 2020-06-30 NOTE — DISCHARGE INSTRUCTIONS
Discharge Instructions for  [facility]  [place], [address] [facility] [address]  Telephone: [phone]    NAME:  Richard Ying OF BIRTH:  1955  DATE:  2020      Probiotics while on antibiotics  Will call Angio at [facility] to set up Murali catheter placement and place order with [employer] for antibiotic therapy. [x] Wound and dressing supply provider: [facility]          Wound Cleansing:   Do not scrub or use excessive force. Cleanse wound prior to applying a clean dressing with:    [x] Normal Saline   [] Keep Wound Dry in Shower      [x] Wound Cleanser (***)  [] May Shower at Discharge   [] cleanse with Acosta Gammons and Acosta Gammons baby shampoo lather leave 2-3 then rinse with water, pat dry and redress wound. Dressings:           Wound Location right lat leg     Apply Primary Dressin [address]                                                   []     Change dressing:   [x] Daily      [] Every Other Day   [] Three times per week  [] Once a week   [] Do Not Change Dressing     [] Other:    Off-Loading:   [x] Off-loading when [x] walking  [] in bed [] sitting    Dietary:  [x] Diet as tolerated: [] Diabetic Diet:   [] Increase Protein: examples ( Meat, cheese, eggs, greek yogurt, premier protein drink, fish, nuts )   [] Other:    Return Appointment:  [] Wound assessment with Nurse at wound center in *** days     [] Return Appointment: With Dr. Lani Silva 1 WEEK, 8 weeks with Dr. Mayda Pratt at [facility] call [phone]    [] Ordered tests: {OP Wound Vascular Studies:98297} {OP Wound F/U Imagin}     Electronically signed on 2020     [address] Information: Should you experience any significant changes in your wound(s) or have questions about your wound care, please contact the [facility] at [address] [place] [address] 8:00 am - 4:30.   If you need help with your wound outside these hours and cannot wait until we are again available, contact your PCP or go to the hospital emergency room. PLEASE NOTE: IF YOU ARE UNABLE TO OBTAIN WOUND SUPPLIES, CONTINUE TO USE THE SUPPLIES YOU HAVE AVAILABLE UNTIL YOU ARE ABLE TO REACH US. IT IS MOST IMPORTANT TO KEEP THE WOUND COVERED AT ALL TIMES.      Physician Signature:_______________________  Dr. David Germain

## 2020-06-30 NOTE — WOUND CARE
06/30/20 1338   Wound Leg Lower Right;Lateral   Date First Assessed/Time First Assessed: 06/02/20 1439   POA: Yes  Location: Leg Lower  Wound Description (Optional): #1  Orientation: Right;Lateral   Dressing Type Applied Packing;Gauze;Gauze wrap (greg); Special tape (comment)  (mesalt packing)   Procedure Tolerated Well   Discharge Condition: Stable     Pain: 0    Ambulatory Status: Crutches     Discharge Destination: Home     Transportation: Car    Accompanied by: Family/Caregiver     Discharge instructions reviewed with Patient and Family/Caregiver  and copy or written instructions have been provided. All questions/concerns have been addressed at this time.

## 2020-06-30 NOTE — WOUND CARE
06/30/20 1315   Wound Leg Lower Right;Lateral   Date First Assessed/Time First Assessed: 06/02/20 1439   POA: Yes  Location: Leg Lower  Wound Description (Optional): #1  Orientation: Right;Lateral   Dressing Status  Removed   Dressing Type  Gauze;Gauze wrap (greg);Packing; Absorptive   Wound Length (cm) 1.5 cm   Wound Width (cm) 1.1 cm   Wound Depth (cm) 1   Wound Surface area (cm^2) 1.65 cm^2   Change in Wound Size % -10   Condition of Base Bone exposed;Pink;Slough   Condition of Edges Rolled/curled   Direction of Undermining 1 o'clock;12 o'clock   Depth of Undermining (cm) 2.1   Drainage Amount  Moderate   Drainage Color Serosanguinous   Wound Odor None   Periwound Skin Condition Intact; Macerated   Cleansing and Cleansing Agents  Normal saline     Visit Vitals  /72   Pulse 99   Temp 97.4 °F (36.3 °C)   Resp 18

## 2020-07-01 ENCOUNTER — HOSPITAL ENCOUNTER (OUTPATIENT)
Dept: WOUND CARE | Age: 65
Discharge: HOME | End: 2020-07-01
Payer: MEDICARE

## 2020-07-01 VITALS
DIASTOLIC BLOOD PRESSURE: 83 MMHG | RESPIRATION RATE: 16 BRPM | HEART RATE: 99 BPM | TEMPERATURE: 96.9 F | SYSTOLIC BLOOD PRESSURE: 189 MMHG

## 2020-07-01 LAB
GLUCOSE BLD STRIP.AUTO-MCNC: 102 MG/DL (ref 65–100)
GLUCOSE BLD STRIP.AUTO-MCNC: 121 MG/DL (ref 65–100)
GLUCOSE BLD STRIP.AUTO-MCNC: 216 MG/DL (ref 65–100)
GLUCOSE BLD STRIP.AUTO-MCNC: 96 MG/DL (ref 65–100)
SERVICE CMNT-IMP: ABNORMAL
SERVICE CMNT-IMP: NORMAL

## 2020-07-01 PROCEDURE — G0277 HBOT, FULL BODY CHAMBER, 30M: HCPCS | Performed by: EMERGENCY MEDICINE

## 2020-07-01 PROCEDURE — 82962 GLUCOSE BLOOD TEST: CPT

## 2020-07-01 NOTE — WOUND CARE
This is a late entry note. 6/22/20  Wound Cx results showing MRSA, Enterococcus, Enterobacter  Continue Daptomycin, called [facility] and called patient as well, to start Cefepime IV with Daptomycin.

## 2020-07-01 NOTE — PROGRESS NOTES
HBO PROGRESS NOTE  NAME: Chinyere Terrell MEDICAL RECORD NUMBER:  [id number]  AGE: 75 y.o. GENDER: male  : 1955  EPISODE DATE:  2020     Subjective     HBO Treatment Number: 3 out of Total Treatments: 30    HBO Diagnosis:  Indications: Lower Extremity Diabetic Wound ___(site)(RLE)  Safety checks performed prior to treatment. See doc flowsheets for documentation. Objective           Lab Results   Component Value Date/Time    Glucose (POC) 216 (H) 2020 04:28 PM    Glucose (POC) 121 (H) 2020 01:53 PM       Pre treatment Vital Signs       Temp: 97.7 °F (36.5 °C)     Pulse (Heart Rate): 99     Resp Rate: 16   BP: 135/80       Post treatment Vital Signs  Temp: 96.9 °F (36.1 °C)  Pulse (Heart Rate): 99  Resp Rate: 16  BP: 189/83      Assessment        Physical Exam:  General Appearance: alert, well developed and in no apparent distress    Tympanic Membrane Assessment:  Pre-Treatment Left Left: Grade 0 Right Right: Grade 0  Post-treatment Left Left: Grade 0 Right Right: Grade 0    Pulmonary/Chest: no accessory muscle use    Cardiovascular: normal    Patient place in a fully body Monoplace serial number Chamber #: [id number]       Treatment Start Time: 2538 Door Closed    Pressure Reached Time: 1428 Pressure Reached  ROSALINDA Depth: 2.5 ROSALINDA  Number of Air Breaks: Two 5 minute air breaks      Decompression Time: 1608 Decompression Time     Door Opened    Symptoms Noted During Treatment: None    Length of Treatment: 90 Minutes    Adverse Event: Treatment Completion Status: Treatment Completed without Adverse Event    I was present on these premises and immediately available to furnish assistance & direction throughout the procedure. Plan          Chinyere Gar. is a 75 y.o. male  did successfully complete today's hyperbaric oxygen treatment at [address]     In my clinical judgement, ongoing HBO therapy is necessary at this time, given a threat to patient function, limb or life from the current condition. Supervision and attendance of Hyperbaric Oxygen Therapy provided. Continue HBO treatment as outlined in the treatment plan. Hyperbaric Oxygen: Sonny Helms. tolerated Treatment Number: 3 well today without complications.      Electronically signed by Lucendia Paget., MD on 7/1/2020 at 4:51 PM

## 2020-07-02 ENCOUNTER — HOSPITAL ENCOUNTER (OUTPATIENT)
Dept: WOUND CARE | Age: 65
Discharge: HOME | End: 2020-07-02

## 2020-07-05 NOTE — WOUND CARE
HBO PROGRESS NOTE  NAME: Aysha Kelsey MEDICAL RECORD NUMBER:  [id number]  AGE: 75 y.o. GENDER: male  : 1955  EPISODE DATE:  2020     Subjective     HBO Treatment Number: 1 out of Total Treatments: 30    HBO Diagnosis:  Indications: Lower Extremity Diabetic Wound ___(site)(RLE)  Safety checks performed prior to treatment. See doc flowsheets for documentation. Objective           Lab Results   Component Value Date/Time    Glucose (POC) 216 (H) 2020 04:28 PM    Glucose (POC) 121 (H) 2020 01:53 PM       Pre treatment Vital Signs       Temp: 98.7 °F (37.1 °C)     Pulse (Heart Rate): 99     Resp Rate: 16   BP: 146/71       Post treatment Vital Signs  Temp: 98.3 °F (36.8 °C)  Pulse (Heart Rate): 94  Resp Rate: 16  BP: 186/88      Assessment        Physical Exam:  General Appearance: healthy, alert, well developed and well nourished    Tympanic Membrane Assessment:  Pre-Treatment Left Left: Grade 0 Right Right: Grade 0  Post-treatment Left Left: Grade 0 Right Right: Grade 0    Pulmonary/Chest: lungs clear to auscultation, breath sounds equal and symmetric, no rhonchi, rales or wheezes, no accessory muscle use    Cardiovascular: regular rate and rhythm    Patient place in a fully body Monoplace serial number Chamber #: [id number]       Treatment Start Time: 8207 Door Closed    Pressure Reached Time: 1425 Pressure Reached  ROSALINDA Depth: 2.5 ROSALINDA  Number of Air Breaks: Two 5 minute air breaks      Decompression Time: 1605 Decompression Time   Treatment End Time: 1628 Door Opened    Symptoms Noted During Treatment: None    Length of Treatment: 90 Minutes    Adverse Event: Treatment Completion Status: Treatment Completed without Adverse Event    I was present on these premises and immediately available to furnish assistance & direction throughout the procedure.      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-L97.214   Chronic osteomyelitis with draining sinus, right tibia and RXRNIR-N95.535  Type 2 diabetes mellitus with other skin ulcer-E11.622        Plan          Thor Gonzalez. is a 75 y.o. male  did successfully complete today's hyperbaric oxygen treatment at [address] In my clinical judgement, ongoing HBO therapy is necessary at this time, given a threat to patient function, limb or life from the current condition. Supervision and attendance of Hyperbaric Oxygen Therapy provided. Continue HBO treatment as outlined in the treatment plan. Hyperbaric Oxygen: Thor Allen tolerated Treatment Number: 1 well today without complications.      Electronically signed by Umberto Muhammad MD on 6/29/2020 at 7:48 PM

## 2020-07-06 ENCOUNTER — HOSPITAL ENCOUNTER (OUTPATIENT)
Dept: WOUND CARE | Age: 65
Discharge: HOME | End: 2020-07-06
Payer: MEDICARE

## 2020-07-06 VITALS
DIASTOLIC BLOOD PRESSURE: 64 MMHG | HEART RATE: 100 BPM | SYSTOLIC BLOOD PRESSURE: 140 MMHG | RESPIRATION RATE: 14 BRPM | TEMPERATURE: 98.3 F

## 2020-07-06 LAB
GLUCOSE BLD STRIP.AUTO-MCNC: 188 MG/DL (ref 65–100)
GLUCOSE BLD STRIP.AUTO-MCNC: 243 MG/DL (ref 65–100)
SERVICE CMNT-IMP: ABNORMAL
SERVICE CMNT-IMP: ABNORMAL

## 2020-07-06 PROCEDURE — 82962 GLUCOSE BLOOD TEST: CPT

## 2020-07-06 PROCEDURE — G0277 HBOT, FULL BODY CHAMBER, 30M: HCPCS | Performed by: FAMILY MEDICINE

## 2020-07-07 ENCOUNTER — HOSPITAL ENCOUNTER (OUTPATIENT)
Dept: WOUND CARE | Age: 65
Discharge: HOME | End: 2020-07-07
Payer: MEDICARE

## 2020-07-07 VITALS — SYSTOLIC BLOOD PRESSURE: 181 MMHG | TEMPERATURE: 98.3 F | DIASTOLIC BLOOD PRESSURE: 88 MMHG | RESPIRATION RATE: 16 BRPM

## 2020-07-07 VITALS
SYSTOLIC BLOOD PRESSURE: 141 MMHG | RESPIRATION RATE: 14 BRPM | HEART RATE: 100 BPM | TEMPERATURE: 97.6 F | DIASTOLIC BLOOD PRESSURE: 66 MMHG

## 2020-07-07 LAB
GLUCOSE BLD STRIP.AUTO-MCNC: 274 MG/DL (ref 65–100)
GLUCOSE BLD STRIP.AUTO-MCNC: 314 MG/DL (ref 65–100)
SERVICE CMNT-IMP: ABNORMAL
SERVICE CMNT-IMP: ABNORMAL

## 2020-07-07 PROCEDURE — 11043 DBRDMT MUSC&/FSCA 1ST 20/<: CPT | Performed by: PODIATRIST

## 2020-07-07 PROCEDURE — 82962 GLUCOSE BLOOD TEST: CPT

## 2020-07-07 PROCEDURE — G0277 HBOT, FULL BODY CHAMBER, 30M: HCPCS | Performed by: PODIATRIST

## 2020-07-07 PROCEDURE — 74011000250 HC RX REV CODE- 250: Performed by: PODIATRIST

## 2020-07-07 RX ADMIN — Medication: at 16:05

## 2020-07-07 NOTE — WOUND CARE
HBO PROGRESS NOTE  NAME: Citlali Branch MEDICAL RECORD NUMBER:  [id number]  AGE: 75 y.o. GENDER: male  : 1955  EPISODE DATE:  2020     Subjective     HBO Treatment Number: 4 out of Total Treatments: 30    HBO Diagnosis:  Indications: Lower Extremity Diabetic Wound ___(site)(RLE)  Safety checks performed prior to treatment. See doc flowsheets for documentation. Objective           Lab Results   Component Value Date/Time    Glucose (POC) 243 (H) 2020 03:30 PM    Glucose (POC) 188 (H) 2020 12:39 PM       Pre treatment Vital Signs       Temp: 98.3 °F (36.8 °C)     Pulse (Heart Rate): 100     Resp Rate: 14   BP: 140/64       Post treatment Vital Signs  Temp: 98.3 °F (36.8 °C)  Pulse (Heart Rate): 100  Resp Rate: 14  BP: 140/64      Assessment        Physical Exam:  General Appearance: healthy, alert, well developed and well nourished    Tympanic Membrane Assessment:  Pre-Treatment Left Left: Grade 0 Right Right: Grade 0  Post-treatment Left Left: Grade 0 Right Right: Grade 0    Pulmonary/Chest: lungs clear to auscultation, breath sounds equal and symmetric, no rhonchi, rales or wheezes, no accessory muscle use    Cardiovascular: regular rate and rhythm    Patient place in a fully body Monoplace serial number Chamber #: [id number]       Treatment Start Time: 4568 Door Closed    Pressure Reached Time: 1323 Pressure Reached  ROSALINDA Depth: 2.5 ROSALINDA  Number of Air Breaks: Two 5 minute air breaks        Decompression Time   Treatment End Time: 1521 Door Opened    Symptoms Noted During Treatment: None    Length of Treatment: 90 Minutes    Adverse Event: Treatment Completion Status: Treatment Completed without Adverse Event    I was present on these premises and immediately available to furnish assistance & direction throughout the procedure.      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 is a 75 y.o. male  did successfully complete today's hyperbaric oxygen treatment at [address] In my clinical judgement, ongoing HBO therapy is necessary at this time, given a threat to patient function, limb or life from the current condition. Supervision and attendance of Hyperbaric Oxygen Therapy provided. Continue HBO treatment as outlined in the treatment plan. Hyperbaric Oxygen: Cristopher Coughlin. tolerated Treatment Number: 4 well today without complications.      Electronically signed by Adilia Schneider MD on 7/6/2020 at 8:26 PM

## 2020-07-07 NOTE — HYPERBARIC MEDICINE NOTE
HBO PROGRESS NOTE  NAME: Samanta Sinclair MEDICAL RECORD NUMBER:  [id number]  AGE: 75 y.o. GENDER: male  : 1955  EPISODE DATE:  2020     Subjective     HBO 5 out of 30    HBO Diagnosis: Nichole Grade III ulceration, Type II DM with foot ulcer     Safety checks performed prior to treatment. See doc flowsheets for documentation. Objective           Lab Results   Component Value Date/Time    Glucose (POC) 274 (H) 2020 03:51 PM    Glucose (POC) 314 (H) 2020 01:22 PM       Pre treatment Vital Signs       Temp: 98.3 °F (36.8 °C)           Resp Rate: 16   BP: 181/88       Post treatment Vital Signs  Temp: 98.3 °F (36.8 °C)     Resp Rate: 16  BP: 181/88      Assessment        Physical Exam:  General Appearance: healthy, alert, well developed and in no apparent distress    Tympanic Membrane Assessment:  Pre-Treatment- ear tubes in place both ears   Post-treatment ear tubes in place both ears     Pulmonary/Chest: lungs clear to auscultation, no accessory muscle use    Cardiovascular: normal    Patient place in a fully body Monoplace serial number           Door Closed      Pressure Reached    ROSALINDA  Number of Air Breaks: Two 5 minute air breaks      Decompression Time: 1532 Decompression Time     Door Opened        Adverse Event:      I was present on these premises and immediately available to furnish assistance & direction throughout the procedure. Plan          Samanta Sinclair is a 75 y.o. male  did successfully complete today's hyperbaric oxygen treatment at [address] In my clinical judgement, ongoing HBO therapy is necessary at this time, given a threat to patient function, limb or life from the current condition. Supervision and attendance of Hyperbaric Oxygen Therapy provided. Continue HBO treatment as outlined in the treatment plan. Hyperbaric Oxygen: Samanta Sinclair tolerated   well today without complications.      Electronically signed by Letitia Rivera DPM on 7/7/2020 at 4:04 PM

## 2020-07-07 NOTE — WOUND CARE
07/07/20 1601   Wound Leg Lower Right;Lateral   Date First Assessed/Time First Assessed: 06/02/20 1439   POA: Yes  Location: Leg Lower  Wound Description (Optional): #1  Orientation: Right;Lateral   Dressing Status  Old drainage   Dressing Type  Gauze;Gauze wrap (greg)   Wound Length (cm) 1.2 cm   Wound Width (cm) 1.2 cm   Wound Depth (cm) 1.2   Wound Surface area (cm^2) 1.44 cm^2   Change in Wound Size % 4   Condition of Base Bone exposed;Pink   Condition of Edges Rolled/curled   Direction of Undermining 1 o'clock;12 o'clock   Depth of Undermining (cm) 2.6   Drainage Amount  Moderate   Drainage Color Serosanguinous   Wound Odor None   Periwound Skin Condition Intact; Macerated   Cleansing and Cleansing Agents  Normal saline   T 98.3  P 93  R 16  B/P 181/88

## 2020-07-07 NOTE — HYPERBARIC MEDICINE NOTE
Patient declined printed discharge instructions, reviewed verbally.  Patient to be seen for wound visit post HBO

## 2020-07-07 NOTE — WOUND CARE
07/07/20 1633   Wound Leg Lower Right;Lateral   Date First Assessed/Time First Assessed: 06/02/20 1439   POA: Yes  Location: Leg Lower  Wound Description (Optional): #1  Orientation: Right;Lateral   Dressing Type Applied Packing;Gauze;Gauze wrap (greg)  (mesalt packing)   Discharge Condition: Stable     Pain: 0    Ambulatory Status: Crutches     Discharge Destination: Home     Transportation: Car    Accompanied by: Self     Discharge instructions reviewed with Patient and copy or written instructions have been provided. All questions/concerns have been addressed at this time.

## 2020-07-08 ENCOUNTER — HOSPITAL ENCOUNTER (OUTPATIENT)
Dept: WOUND CARE | Age: 65
Discharge: HOME | End: 2020-07-08
Payer: MEDICARE

## 2020-07-08 VITALS
HEART RATE: 96 BPM | SYSTOLIC BLOOD PRESSURE: 136 MMHG | RESPIRATION RATE: 14 BRPM | DIASTOLIC BLOOD PRESSURE: 66 MMHG | TEMPERATURE: 97.8 F

## 2020-07-08 LAB
GLUCOSE BLD STRIP.AUTO-MCNC: 202 MG/DL (ref 65–100)
GLUCOSE BLD STRIP.AUTO-MCNC: 206 MG/DL (ref 65–100)
SERVICE CMNT-IMP: ABNORMAL
SERVICE CMNT-IMP: ABNORMAL

## 2020-07-08 PROCEDURE — G0277 HBOT, FULL BODY CHAMBER, 30M: HCPCS | Performed by: EMERGENCY MEDICINE

## 2020-07-08 PROCEDURE — 82962 GLUCOSE BLOOD TEST: CPT

## 2020-07-08 NOTE — DISCHARGE INSTRUCTIONS
Discharge Instructions for  [facility]  [address] [place], [address] [facility]  Telephone: [phone]    NAME:  Amber Come OF BIRTH:  1955  DATE:  2020      Probiotics while on antibiotics  Will call Angio at [facility] to set up Murali catheter placement and place order with [employer] for antibiotic therapy. [x] Wound and dressing supply provider: [facility]          Wound Cleansing:   Do not scrub or use excessive force. Cleanse wound prior to applying a clean dressing with:    [x] Normal Saline   [] Keep Wound Dry in Shower      [x] Wound Cleanser (***)  [] May Shower at Discharge   [] cleanse with Christine Pheasant and Christine Pheasant baby shampoo lather leave 2-3 then rinse with water, pat dry and redress wound. Dressings:           Wound Location right lat leg     Apply Primary Dressin [facility],Ground Floor TAPE IN OFFICE                                                  [x] HOME HEALTH: WOUND VAC SET  [facility] ON CONSTANT VAC BASE OF WOUND A NONADHERENT THEN WHITE FOAM BLACK GRANULAR FOAM  Change dressing:   [x] Daily      [] Every Other Day   [] Three times per week  [] Once a week   [] Do Not Change Dressing     [] Other:    Off-Loading:   [x] Off-loading when [x] walking  [] in bed [] sitting    Dietary:  [x] Diet as tolerated: [] Diabetic Diet:   [] Increase Protein: examples ( Meat, cheese, eggs, greek yogurt, premier protein drink, fish, nuts )   [] Other:    Return Appointment:      [x] Return Appointment: With Dr. Cassandra Pascual 1 WEEK,          Electronically signed on 2020     [address] [place] [address] Information: Should you experience any significant changes in your wound(s) or have questions about your wound care, please contact the [facility] Main at [address] [place] [address] 8:00 am - 4:30.   If you need help with your wound outside these hours and cannot wait until we are again available, contact your PCP or go to the hospital emergency room. PLEASE NOTE: IF YOU ARE UNABLE TO OBTAIN WOUND SUPPLIES, CONTINUE TO USE THE SUPPLIES YOU HAVE AVAILABLE UNTIL YOU ARE ABLE TO REACH US. IT IS MOST IMPORTANT TO KEEP THE WOUND COVERED AT ALL TIMES.      Physician Signature:_______________________  Dr. Donell uL

## 2020-07-10 ENCOUNTER — HOSPITAL ENCOUNTER (OUTPATIENT)
Dept: WOUND CARE | Age: 65
Discharge: HOME | End: 2020-07-10
Payer: MEDICARE

## 2020-07-10 VITALS
SYSTOLIC BLOOD PRESSURE: 166 MMHG | DIASTOLIC BLOOD PRESSURE: 77 MMHG | HEART RATE: 97 BPM | TEMPERATURE: 96.9 F | RESPIRATION RATE: 16 BRPM

## 2020-07-10 LAB
GLUCOSE BLD STRIP.AUTO-MCNC: 380 MG/DL (ref 65–100)
GLUCOSE BLD STRIP.AUTO-MCNC: 383 MG/DL (ref 65–100)
SERVICE CMNT-IMP: ABNORMAL
SERVICE CMNT-IMP: ABNORMAL

## 2020-07-10 PROCEDURE — G0277 HBOT, FULL BODY CHAMBER, 30M: HCPCS | Performed by: FAMILY MEDICINE

## 2020-07-10 PROCEDURE — 82962 GLUCOSE BLOOD TEST: CPT

## 2020-07-10 NOTE — PROGRESS NOTES
HBO PROGRESS NOTE  NAME: Amrit Pop MEDICAL RECORD NUMBER:  [id number]  AGE: 75 y.o. GENDER: male  : 1955  EPISODE DATE:  7/10/2020     Subjective     HBO Treatment Number: 7 out of Total Treatments: 30    HBO Diagnosis:  Indications: Lower Extremity Diabetic Wound ___(site)  Safety checks performed prior to treatment. See doc flowsheets for documentation. Objective           Lab Results   Component Value Date/Time    Glucose (POC) 380 (H) 07/10/2020 11:23 AM    Glucose (POC) 383 (H) 07/10/2020 08:47 AM       Pre treatment Vital Signs       Temp: 96.9 °F (36.1 °C)     Pulse (Heart Rate): 97     Resp Rate: 16   BP: 166/77       Post treatment Vital Signs  Temp: 96.9 °F (36.1 °C)  Pulse (Heart Rate): 97  Resp Rate: 16  BP: 166/77      Assessment        Physical Exam:  General Appearance: healthy and in no apparent distress    Tympanic Membrane Assessment:  Pre-Treatment Left   Right Right: Grade 0  Post-treatment Left   Right      Pulmonary/Chest: no accessory muscle use    Cardiovascular: normal    Patient place in a fully body Monoplace serial number Chamber #: [id number]       Treatment Start Time: 8113 Door Closed    Pressure Reached Time: 0923 Pressure Reached  ROSALINDA Depth: 2.5 ROSALINDA  Number of Air Breaks: Two 5 minute air breaks      Decompression Time: 1103 Decompression Time   Treatment End Time: 1118 Door Opened              Adverse Event: Treatment Completion Status: Treatment Completed without Adverse Event    I was present on these premises and immediately available to furnish assistance & direction throughout the procedure. Plan          Amrit Pop is a 75 y.o. male  did successfully complete today's hyperbaric oxygen treatment at [address] In my clinical judgement, ongoing HBO therapy is necessary at this time, given a threat to patient function, limb or life from the current condition.       Supervision and attendance of Hyperbaric Oxygen Therapy provided. Continue HBO treatment as outlined in the treatment plan. Hyperbaric Oxygen: Janelle Nuñez. tolerated Treatment Number: 7 well today without complications.      Electronically signed by Romayne Berliner, MD on 7/10/2020 at 11:34 AM

## 2020-07-13 ENCOUNTER — HOSPITAL ENCOUNTER (OUTPATIENT)
Dept: WOUND CARE | Age: 65
Discharge: HOME | End: 2020-07-13
Payer: MEDICARE

## 2020-07-13 VITALS
TEMPERATURE: 97.5 F | HEART RATE: 94 BPM | RESPIRATION RATE: 18 BRPM | SYSTOLIC BLOOD PRESSURE: 186 MMHG | DIASTOLIC BLOOD PRESSURE: 84 MMHG

## 2020-07-13 LAB
GLUCOSE BLD STRIP.AUTO-MCNC: 211 MG/DL (ref 65–100)
GLUCOSE BLD STRIP.AUTO-MCNC: 213 MG/DL (ref 65–100)
SERVICE CMNT-IMP: ABNORMAL
SERVICE CMNT-IMP: ABNORMAL

## 2020-07-13 PROCEDURE — G0277 HBOT, FULL BODY CHAMBER, 30M: HCPCS | Performed by: FAMILY MEDICINE

## 2020-07-13 PROCEDURE — 82962 GLUCOSE BLOOD TEST: CPT

## 2020-07-13 NOTE — WOUND CARE
HBO PROGRESS NOTE  NAME: Jremaine Joshua MEDICAL RECORD NUMBER:  [id number]  AGE: 75 y.o. GENDER: male  : 1955  EPISODE DATE:  2020     Subjective     HBO Treatment Number: 8 out of Total Treatments: 30    HBO Diagnosis:  Indications: Lower Extremity Diabetic Wound ___(site)(RLE)  Safety checks performed prior to treatment. See doc flowsheets for documentation. Objective           Lab Results   Component Value Date/Time    Glucose (POC) 211 (H) 2020 03:26 PM    Glucose (POC) 213 (H) 2020 12:50 PM       Pre treatment Vital Signs       Temp: 97.5 °F (36.4 °C)     Pulse (Heart Rate): 74     Resp Rate: 14   BP: 145/82       Post treatment Vital Signs  Temp: 97.5 °F (36.4 °C)  Pulse (Heart Rate): 94  Resp Rate: 18  BP: 186/84      Assessment        Physical Exam:  General Appearance: healthy, alert, well developed and well nourished    Tympanic Membrane Assessment:  Pre-Treatment  Left: Grade 0  Right: Grade 0  Post-treatment Left  Grade 0  Right  Grade 0    Pulmonary/Chest: lungs clear to auscultation, breath sounds equal and symmetric, no rhonchi, rales or wheezes, no accessory muscle use    Cardiovascular: regular rate and rhythm    Patient place in a fully body Monoplace serial number Chamber #: [id number]       Treatment Start Time: 7856 Door Closed    Pressure Reached Time: 1321 Pressure Reached  ROSALINDA Depth: 2.5 ROSALINDA  Number of Air Breaks: Two 5 minute air breaks      Decompression Time: 1501 Decompression Time   Treatment End Time: 1518 Door Opened         Length of Treatment: 90 Minutes    Adverse Event: Treatment Completion Status: Treatment Completed without Adverse Event    I was present on these premises and immediately available to furnish assistance & direction throughout the procedure.      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 is a 75 y.o. male  did successfully complete today's hyperbaric oxygen treatment at [address] In my clinical judgement, ongoing HBO therapy is necessary at this time, given a threat to patient function, limb or life from the current condition. Supervision and attendance of Hyperbaric Oxygen Therapy provided. Continue HBO treatment as outlined in the treatment plan. Hyperbaric Oxygen: Amrit Summers. tolerated Treatment Number: 8 well today without complications.      Electronically signed by Nathaniel Ferraro MD on 7/13/2020 at 7:51 PM

## 2020-07-14 ENCOUNTER — HOSPITAL ENCOUNTER (OUTPATIENT)
Dept: WOUND CARE | Age: 65
Discharge: HOME | End: 2020-07-14
Payer: MEDICARE

## 2020-07-14 VITALS
BODY MASS INDEX: 26.6 KG/M2 | HEIGHT: 71 IN | RESPIRATION RATE: 15 BRPM | TEMPERATURE: 97.4 F | HEART RATE: 92 BPM | SYSTOLIC BLOOD PRESSURE: 178 MMHG | DIASTOLIC BLOOD PRESSURE: 90 MMHG | WEIGHT: 190 LBS

## 2020-07-14 LAB
GLUCOSE BLD STRIP.AUTO-MCNC: 141 MG/DL (ref 65–100)
GLUCOSE BLD STRIP.AUTO-MCNC: 160 MG/DL (ref 65–100)
SERVICE CMNT-IMP: ABNORMAL
SERVICE CMNT-IMP: ABNORMAL

## 2020-07-14 PROCEDURE — G0277 HBOT, FULL BODY CHAMBER, 30M: HCPCS | Performed by: PODIATRIST

## 2020-07-14 PROCEDURE — 82962 GLUCOSE BLOOD TEST: CPT

## 2020-07-14 PROCEDURE — 11042 DBRDMT SUBQ TIS 1ST 20SQCM/<: CPT

## 2020-07-14 NOTE — WOUND CARE
07/14/20 1537   Wound Leg Lower Right;Lateral   Date First Assessed/Time First Assessed: 06/02/20 1439   POA: Yes  Location: Leg Lower  Wound Description (Optional): #1  Orientation: Right;Lateral   Dressing Status  Old drainage   Dressing Type  Absorptive;Packing;Gauze;Gauze wrap (greg)   Wound Length (cm) 1 cm   Wound Width (cm) 1 cm   Wound Depth (cm) 1.2   Wound Surface area (cm^2) 1 cm^2   Change in Wound Size % 33.33   Condition of Base Bone exposed;Pink   Condition of Edges Open   Direction of Undermining 1 o'clock;12 o'clock   Depth of Undermining (cm) 2.6   Drainage Amount  Moderate   Drainage Color Serosanguinous   Wound Odor None   Periwound Skin Condition Intact   Cleansing and Cleansing Agents  Normal saline       T97.4  P92  R 15  B/P 178/90  Pain 0/0

## 2020-07-14 NOTE — WOUND CARE
07/14/20 1612   Wound Leg Lower Right;Lateral   Date First Assessed/Time First Assessed: 06/02/20 1439   POA: Yes  Location: Leg Lower  Wound Description (Optional): #1  Orientation: Right;Lateral   Dressing Type Applied Collagens/cell matrix; Vacuum dressing   Discharge Condition: Stable     Pain: 0    Ambulatory Status: Walking    Discharge Destination: Home     Transportation: Car    Accompanied by: Self     Discharge instructions reviewed with Patient and copy or written instructions have been provided. All questions/concerns have been addressed at this time.

## 2020-07-14 NOTE — DISCHARGE INSTRUCTIONS
Discharge Instructions for  [facility]  [place] [address], [address] [facility]  Telephone: [phone]    NAME:  Shaneka Keller. YOB: 1955  DATE:  7/14/2020    : Katya Morris RN    [] Wound and dressing supply provider: {DME provider:56712}    [] Home Healthcare: {Home Health :39941}    [] Supplements : {VITAMINS W (Optional):98668}    Wound Cleansing:   Do not scrub or use excessive force. Cleanse wound prior to applying a clean dressing with:    [] Normal Saline   [x] Keep Wound Dry in Shower      [] Wound Cleanser (***)  [] Cleanse wound with Mild Soap & Water (ie:yellow dial)  [] May Shower at Discharge   [] Do not shower  [] cleanse with Florestine Jo and Florestine Jo baby shampoo lather leave 2-3 then rinse with water, pat dry and redress wound. Topical Treatments:  Do not apply lotions, creams, or ointments to wound bed unless directed.      [] Apply moisturizing lotion {arias lotions :31974} to skin surrounding the wound prior to dressing change.  [] Bactroban or Mupirocin  [] Gentamicin ointment    [] Other: gentian violet to wound bed and periwound  [] Other:     Dressings:           Wound Location right thigh      Apply Primary Dressing:        [] MediHoney Gel [] Medihoney CA Alginate    [] Alginate with Silver (Aquacel AG) [] Alginate    [] Endoform [x] Collagen with Silver (Susana)     [] Santyl with Moisten saline gauze      [] Hydrocolloid   [] Mepilex Foam     [] Mepitel One  [] Adaptic  [] Xeroform     [] Hydrofera Blue Classic (moisten with saline)   [] Hydrafera Blue Ready  [] Hydrafera Blue Transfer       [] Hydrogel   [] Intrasite     [x] Other: Snap vac            Change dressing:     [] Daily      [] Every Other Day   [] Three times per week  [] Once a week   [x] Do Not Change Dressing     [] Other:       Negative Pressure:           Wound Location: ***    [] Pressure@ {vacpressure:23020}mm/Hg    [] Continuous [] Intermittent  [] Black             [] White Foam   [] Other:   Change dressing:  [] Two times per week  []Three times per week    []Other:      [x] Off-loading when [] walking  [x] in bed [] sitting    Dietary:  [] Diet as tolerated: [] Diabetic Diet:   [x] Increase Protein: examples ( Meat, cheese, eggs, greek yogurt, premier protein drink, fish, nuts )   [] Dial a Dietician : Call [employer] at [phone] enter code ([phone]) when prompted. M-F 9am-5pm EST. [] Other:    Return Appointment:    [] Return Appointment: With Dr. Rangel Hunter  1 week     [] Wound assessment with Nurse at wound center in *** days     [] Ordered tests: {OP Wound Vascular Studies:55768} {OP Wound F/U Imagin}     Electronically signed on 2020 at 4:08 PM     Irina [address] 281: Should you experience any significant changes in your wound(s) or have questions about your wound care, please contact the [facility] Main at [address] [place] [address] 8:00 am - 4:30. If you need help with your wound outside these hours and cannot wait until we are again available, contact your PCP or go to the hospital emergency room. PLEASE NOTE: IF YOU ARE UNABLE TO OBTAIN WOUND SUPPLIES, CONTINUE TO USE THE SUPPLIES YOU HAVE AVAILABLE UNTIL YOU ARE ABLE TO REACH US. IT IS MOST IMPORTANT TO KEEP THE WOUND COVERED AT ALL TIMES.      Physician Signature:_______________________  Dr. Shoaib Rogers

## 2020-07-15 ENCOUNTER — HOSPITAL ENCOUNTER (OUTPATIENT)
Dept: WOUND CARE | Age: 65
Discharge: HOME | End: 2020-07-15
Payer: MEDICARE

## 2020-07-15 VITALS
RESPIRATION RATE: 16 BRPM | DIASTOLIC BLOOD PRESSURE: 83 MMHG | TEMPERATURE: 97.2 F | SYSTOLIC BLOOD PRESSURE: 153 MMHG | HEART RATE: 100 BPM

## 2020-07-15 PROCEDURE — 99211 OFF/OP EST MAY X REQ PHY/QHP: CPT | Performed by: EMERGENCY MEDICINE

## 2020-07-15 NOTE — HYPERBARIC MEDICINE NOTE
HBO PROGRESS NOTE  NAME: Margi Sosa MEDICAL RECORD NUMBER:  [id number]  AGE: 75 y.o. GENDER: male  : 1955  EPISODE DATE:  2020     Subjective     HBO Treatment Number: 9 out of Total Treatments: 30    HBO Diagnosis:  Indications: Lower Extremity Diabetic Wound ___(site)(RLE)  Safety checks performed prior to treatment. See doc flowsheets for documentation. Objective           Lab Results   Component Value Date/Time    Glucose (POC) 160 (H) 2020 03:19 PM    Glucose (POC) 141 (H) 2020 12:41 PM       Pre treatment Vital Signs       Temp: 96.9 °F (36.1 °C)     Pulse (Heart Rate): 100     Resp Rate: 16   BP: (!) 184/91       Post treatment Vital Signs  Temp: 97.4 °F (36.3 °C)  Pulse (Heart Rate): 92  Resp Rate: 15  BP: 178/90      Assessment        Physical Exam:  General Appearance: healthy, well developed, cooperative, smiling, pleasant and in no apparent distress    Tympanic Membrane Assessment:  Pre-Treatment Left Left: Grade 0 Right Right: Grade 0  Post-treatment Left   Grade 0 Right  Grade 0    Pulmonary/Chest: lungs clear to auscultation, no accessory muscle use    Cardiovascular: normal    Patient place in a fully body Monoplace serial number Chamber #: [id number]       Treatment Start Time: 76 Door Closed    Pressure Reached Time: 1317 Pressure Reached  ROSALINDA Depth: 2.5 ROSALINDA  Number of Air Breaks: Two 5 minute air breaks      Decompression Time: 1457 Decompression Time   Treatment End Time: 5954 Door Opened    Symptoms Noted During Treatment: None    Length of Treatment: 90 Minutes    Adverse Event: Treatment Completion Status: Treatment Completed without Adverse Event    I was present on these premises and immediately available to furnish assistance & direction throughout the procedure. Plan          Margi Teague. is a 75 y.o. male  did successfully complete today's hyperbaric oxygen treatment at [address]     In my clinical judgement, ongoing HBO therapy is necessary at this time, given a threat to patient function, limb or life from the current condition. Supervision and attendance of Hyperbaric Oxygen Therapy provided. Continue HBO treatment as outlined in the treatment plan. Hyperbaric Oxygen: Libby Mckeon. tolerated Treatment Number: 9 well today without complications.      Electronically signed by Elia Murray DPM on 7/14/2020 at 1:54 PM

## 2020-07-15 NOTE — HYPERBARIC MEDICINE NOTE
Patient arrived 30 minutes late for HBO today c/o diarrhea. States he thinks he had too much coffee this morning. Patient on IV antibiotics and is on a probiotic. Patient advised to call prescribing MD and notify of her of the diarrhea. Patient denies pain VSS. Per MD, HBO was cancelled for today. SNAP VAC that was placed yesterday to RLE was checked and is in place with appropriate suction.    Visit Vitals  /83 (BP 1 Location: Left arm, BP Patient Position: Sitting)   Pulse 100   Temp 97.2 °F (36.2 °C)   Resp 16

## 2020-07-15 NOTE — WOUND CARE
Yasmany Liao, DPPEDRO - Radha Blas. Floy, 8 alfred Scales - Follow up     Assessment/Plan:  Type 2 Diabetes with leg ulcer (E11.622)    Non pressure chronic ulcer right leg to the level of bone (W88.683)    Chronic osteomyelitis of RLE (M86.461)    Nichole Grade 3 ulcer    - Pt evaluated and treated. - t BKA stump wound stagnant.  - Preformed wound debridement. See procedure note below  - Pt receiving abx via PICC- Currently on week 3.  - HBO therapy in progress. - Dressing consisting of promogran  - NPWT applied using SNAP VAC to be changed once weekly at the Wound care center. Discussed with patient once IV abx complete will consider traditional Wound VAC since homehealth companies can be changed. - F/U tomorrow for HBO. Subjective:  Pt complains of wound to right amputation site. Patient is here for HBO therapy and wound care visit. Pt denies a recent h/o fever, chills, nausea, vomiting, chest pain, shortness of breath. HPI: Mr. Robb Dexter is a 76 yo AA male with a PMH of arthritis, BPH, CKD, chronic pain, DM2, dyslipidemia, GERD, HTN, s/p rt BKA, sleep apnea, thromboembolus lt leg who presents with a right BKA stump wound. Wound formed from ill fitting prosthetic. Pt has had wounds in the past from in this same manner. ROS:  Consitutional: no weight loss, night sweats, fatigue / malaise / lethargy. Musculoskeletal: no joint / extremity pain, misalignment, stiffness, decreased ROM, crepitus. Integument: right BKA stump wound. No pruritis, rashes, lesions, .   Psychiatric: No depression, anxiety, paranoia      History:  Wound Care  Allergies   Allergen Reactions    Adhesive Tape-Silicones Hives    Sulfa (Sulfonamide Antibiotics) Swelling     Lips eyes     Family History   Problem Relation Age of Onset    Hypertension Mother    [facility] Gout Mother    [facility] Cancer Father         leukemia    Diabetes Father     Hypertension Sister     Diabetes Maternal Grandmother     Hypertension Maternal Grandmother     Diabetes Paternal Grandmother     Hypertension Paternal Grandmother     Anesth Problems Neg Hx       Past Medical History:   Diagnosis Date    Arthritis     BPH (benign prostatic hyperplasia)     Chronic kidney disease     Chronic pain     BACK NEUROPATHY FEET HANDS    DM neuropathy, type II diabetes mellitus ([address]     DM type 2 causing CKD stage 3 ([address] 12/17/2012    DM type 2 causing vascular disease ([address]     Dyslipidemia     Foot ulcer due to secondary DM ([address] 12/1/2012    GERD (gastroesophageal reflux disease)     HTN     Ill-defined condition 2013    MRSA in wound right leg (BKA)    S/P BKA (below knee amputation) ([facility]     right BKA due to non-healing ulcer    Sleep apnea     Thromboembolus ([facility] 1970'S    BLOOD CLOT LEFT LEG     Past Surgical History:   Procedure Laterality Date    ABDOMEN SURGERY PROC UNLISTED      pilonidal cyst    ECHO 2D ADULT  8/09    normal; LVEF 60%    ECHO STRESS  4/09    7 min; normal    ENDOSCOPY, COLON, DIAGNOSTIC      HX AMPUTATION  2012    left great toe    HX AMPUTATION  2007    BKA right    HX BELOW KNEE AMPUTATION Right     HX CERVICAL FUSION  2009    x2    HX ORTHOPAEDIC  2006    ACDF C4-C7    IR INSERT TUNL CVC W/O PORT OVER 5 YR  6/19/2020    STRESS TEST MYOVIEW  8/09    walked 8:46; normal; LVEF 51%     Social History     Tobacco Use    Smoking status: Never Smoker    Smokeless tobacco: Never Used   Substance Use Topics    Alcohol use: No       Social History     Substance and Sexual Activity   Alcohol Use No     Social History     Substance and Sexual Activity   Drug Use No      Social History     Tobacco Use   Smoking Status Never Smoker   Smokeless Tobacco Never Used     Current Outpatient Medications   Medication Sig    oxyCODONE-acetaminophen (Percocet) 5-325 mg per tablet Take 1 Tab by mouth every four (4) hours as needed for Pain for up to 30 days. Max Daily Amount: 6 Tabs.  lisinopriL (PRINIVIL, ZESTRIL) 10 mg tablet Take 1 Tab by mouth two (2) times a day.  busPIRone (BUSPAR) 5 mg tablet Take 1 Tab by mouth three (3) times daily as needed (anxiety).  glucose blood VI test strips (Prodigy No Coding) strip TEST BLOOD SUGAR 2 TIMES A DAY    rosuvastatin (CRESTOR) 40 mg tablet Take 1 Tab by mouth daily.  silver sulfADIAZINE (SILVADENE) 1 % topical cream Apply  to affected area daily.  albuterol (Ventolin HFA) 90 mcg/actuation inhaler Take 2 Puffs by inhalation every four (4) hours as needed for Wheezing.  pregabalin (Lyrica) 75 mg capsule Take 1 Cap by mouth two (2) times a day. Max Daily Amount: 150 mg.    ergocalciferol (Vitamin D2) 1,250 mcg (50,000 unit) capsule TAKE ONE CAPSULE BY MOUTH EVERY WEEK    Lantus Solostar U-100 Insulin 100 unit/mL (3 mL) inpn INJECT 40 TO 50 UNITS SUBCUTANEOUSLY TWICE DAILY AS DIRECTED    insulin lispro (HUMALOG) 100 unit/mL kwikpen INJECT 18 TO 30 UNITS SUBCUTANEOUSLY 3 TIMES DAILY BEFORE BREAKFAST, LUNCH, AND DINNER    pantoprazole (PROTONIX) 40 mg tablet TAKE ONE TABLET BY MOUTH EVERY DAY BEFORE BREAKFAST    Insulin Needles, Disposable, (OLIVIA PEN NEEDLE) 32 gauge x 5/32\" ndle USE AS DIRECTED 4 TIMES DAILY    ferrous sulfate 325 mg (65 mg iron) tablet TAKE ONE TABLET BY MOUTH 2 TIMES A DAY    aspirin 81 mg chewable tablet Take 81 mg by mouth daily. No current facility-administered medications for this encounter. Objective: There were no vitals taken for this visit. Vascular:  left LE  DP 1/4; PT 1/4  capillary fill time brisk, pitting edema is present, skin temperature is cool, varicosities are present. Dermatological:    Wound: 1  Location: right BKA stump   Measurements: per RN note  Margins: intact  Drainage: serous  Odor: no  Wound base: graunular  Lymphangitic streaking? No.  Undermining? yes  Sinus tracts? No.  Exposed bone? yes  Subcutaneous crepitation on palpation? No.    Skin is dry and scaly, exhibits hemosiderin deposition. There is no maceration of the interspaces of the feet RLE      Neurological:  DTR are present, protective sensation per 5.07 Crouse Mauricio monofilament is lost, patient is AAOx3, mood is normal. Epicritic sensation is intact. Orthopedic:   LE are asymmetric, RLE ROM of ankle, STJ, 1st MTPJ is limited, MMT 5 out of 5 for RLE. Right BKA. Has prosthesis. Constitutional: Pt is a well developed, pleasant AA male. Lymphatics: negative tenderness to palpation of neck/axillary/inguinal nodes. Imaging / Labs / Cx / Px:    5/28/2020   tib/fib XR: soft tissue ulcer extending to the rt distal fibula    6/4/2020  Right tib fib MRI       EXAM: MRI right lower leg. Sequences include multiplanar T1 and T2-weighted  images with and without fat saturation and before and after the intravenous  demonstration of 18 cc of Dotarem. Comparison 5/28/2020     FINDINGS: There is a soft tissue ulceration lateral lower leg at the level of  the fibular stump. This extends to the level the bone where there is cortical  irregularity and edema throughout the entire fibular stump. There are subtle  small foci of low signal throughout the bone which may indicate gas. There is  edema and enhancement throughout the muscles of the calf with sparing of the  medial margin of the gastrocnemius and soleus. A drainable fluid collection is  not identified.     There is reactive marrow change at the tip of the tibial stump without edema  extending proximally. There is chronic appearing periostitis.       IMPRESSION:  1. Lateral lower leg ulceration with sinus tract to the adjacent fibula. Findings compatible with osteomyelitis of the residual fibula and adjacent  myositis. No drainable abscess  2. Reactive changes in the distal tibia.  Early osteomyelitis is difficult to  exclude but not favored      Procedure Note:  Excisional debridement through level of subcutaneous muscle  Location / Ulcer: left leg  Indication: to remove non-viable tissue from wound bed. Consent in chart. Anesthesia: 4% xylocaine gel  Instrument: roncheur and curette   Bleeding: minimal  Hemostasis: pressure  Pre-Procedure Pain: 1  Post-Procedure Pain: 2  Area debrided < 20 cm sq. Pre-Debridement measurements: see nursing notes  Post-Debridement measurements: see nursing notes  This is part of a series of staged procedures in an attempt at limb salvage.

## 2020-07-16 ENCOUNTER — HOSPITAL ENCOUNTER (OUTPATIENT)
Dept: WOUND CARE | Age: 65
Discharge: HOME | End: 2020-07-16
Payer: MEDICARE

## 2020-07-16 VITALS
DIASTOLIC BLOOD PRESSURE: 90 MMHG | HEART RATE: 99 BPM | RESPIRATION RATE: 16 BRPM | SYSTOLIC BLOOD PRESSURE: 178 MMHG | TEMPERATURE: 97.6 F

## 2020-07-16 LAB
GLUCOSE BLD STRIP.AUTO-MCNC: 281 MG/DL (ref 65–100)
GLUCOSE BLD STRIP.AUTO-MCNC: 306 MG/DL (ref 65–100)
SERVICE CMNT-IMP: ABNORMAL
SERVICE CMNT-IMP: ABNORMAL

## 2020-07-16 PROCEDURE — 97607 NEG PRS WND THR NDME<=50SQCM: CPT

## 2020-07-16 PROCEDURE — G0277 HBOT, FULL BODY CHAMBER, 30M: HCPCS | Performed by: FAMILY MEDICINE

## 2020-07-16 PROCEDURE — 82962 GLUCOSE BLOOD TEST: CPT

## 2020-07-16 NOTE — PROGRESS NOTES
HBO PROGRESS NOTE  NAME: Taylor Harrington MEDICAL RECORD NUMBER:  [id number]  AGE: 75 y.o. GENDER: male  : 1955  EPISODE DATE:  2020     Subjective     HBO Treatment Number: 10 out of Total Treatments: 30    HBO Diagnosis:  Indications: Lower Extremity Diabetic Wound ___(site)(RLE)  Safety checks performed prior to treatment. See doc flowsheets for documentation. Objective           Lab Results   Component Value Date/Time    Glucose (POC) 281 (H) 2020 03:13 PM    Glucose (POC) 306 (H) 2020 12:34 PM       Pre treatment Vital Signs       Temp: 97 °F (36.1 °C)     Pulse (Heart Rate): 99     Resp Rate: 16   BP: 171/89       Post treatment Vital Signs  Temp: 97.6 °F (36.4 °C)  Pulse (Heart Rate): 99  Resp Rate: 16  BP: 178/90      Assessment        Physical Exam:  General Appearance: healthy and in no apparent distress    Tympanic Membrane Assessment:  Pre-Treatment Left Left: Grade 0 Right Right: Grade 0  Post-treatment Left   Right      Pulmonary/Chest: no accessory muscle use    Cardiovascular: normal    Patient place in a fully body Monoplace serial number Chamber #: [id number]       Treatment Start Time: 2563 Door Closed    Pressure Reached Time: 1314 Pressure Reached  ROSALINDA Depth: 2.5 ROSALINDA  Number of Air Breaks: Two 5 minute air breaks      Decompression Time: 1454 Decompression Time   Treatment End Time: 1510 Door Opened         Length of Treatment: 90 Minutes    Adverse Event: Treatment Completion Status: Treatment Completed without Adverse Event    I was present on these premises and immediately available to furnish assistance & direction throughout the procedure. Plan          Taylor Lopez. is a 75 y.o. male  did successfully complete today's hyperbaric oxygen treatment at [address] In my clinical judgement, ongoing HBO therapy is necessary at this time, given a threat to patient function, limb or life from the current condition. Supervision and attendance of Hyperbaric Oxygen Therapy provided. Continue HBO treatment as outlined in the treatment plan. Hyperbaric Oxygen: Norrine Pu. tolerated Treatment Number: 10 well today without complications.      Electronically signed by Hannah Gonzalez MD on 7/16/2020 at 3:44 PM

## 2020-07-20 ENCOUNTER — HOSPITAL ENCOUNTER (OUTPATIENT)
Dept: WOUND CARE | Age: 65
Discharge: HOME | End: 2020-07-20
Payer: MEDICARE

## 2020-07-20 VITALS
DIASTOLIC BLOOD PRESSURE: 90 MMHG | RESPIRATION RATE: 14 BRPM | TEMPERATURE: 98.2 F | SYSTOLIC BLOOD PRESSURE: 176 MMHG | HEART RATE: 96 BPM

## 2020-07-20 LAB
GLUCOSE BLD STRIP.AUTO-MCNC: 201 MG/DL (ref 65–100)
GLUCOSE BLD STRIP.AUTO-MCNC: 229 MG/DL (ref 65–100)
SERVICE CMNT-IMP: ABNORMAL
SERVICE CMNT-IMP: ABNORMAL

## 2020-07-20 PROCEDURE — 82962 GLUCOSE BLOOD TEST: CPT

## 2020-07-20 PROCEDURE — G0277 HBOT, FULL BODY CHAMBER, 30M: HCPCS | Performed by: FAMILY MEDICINE

## 2020-07-21 ENCOUNTER — HOSPITAL ENCOUNTER (OUTPATIENT)
Dept: WOUND CARE | Age: 65
Discharge: HOME | End: 2020-07-21
Payer: MEDICARE

## 2020-07-21 VITALS
TEMPERATURE: 97.1 F | SYSTOLIC BLOOD PRESSURE: 192 MMHG | HEART RATE: 89 BPM | DIASTOLIC BLOOD PRESSURE: 86 MMHG | RESPIRATION RATE: 18 BRPM

## 2020-07-21 LAB
GLUCOSE BLD STRIP.AUTO-MCNC: 189 MG/DL (ref 65–100)
GLUCOSE BLD STRIP.AUTO-MCNC: 189 MG/DL (ref 65–100)
SERVICE CMNT-IMP: ABNORMAL
SERVICE CMNT-IMP: ABNORMAL

## 2020-07-21 PROCEDURE — 97605 NEG PRS WND THER DME<=50SQCM: CPT | Performed by: PODIATRIST

## 2020-07-21 PROCEDURE — 82962 GLUCOSE BLOOD TEST: CPT

## 2020-07-21 PROCEDURE — 11042 DBRDMT SUBQ TIS 1ST 20SQCM/<: CPT | Performed by: PODIATRIST

## 2020-07-21 PROCEDURE — G0277 HBOT, FULL BODY CHAMBER, 30M: HCPCS | Performed by: PODIATRIST

## 2020-07-21 NOTE — WOUND CARE
Alida Beckett DPM - Rebecca PenaJoe Robison, [address] [facility]     Assessment/Plan:  Type 2 Diabetes with leg ulcer (E11.622)    Non pressure chronic ulcer right leg to the level of bone (Z47.975)    Chronic osteomyelitis of RLE (M86.461)    Nichole Grade 3 ulcer    - Pt evaluated and treated. - Pt presents with right BKA stump wound - undermining has improved   - Preformed wound debridement. See procedure note below  - Abx on hold for now until new blood labs are done  - Pt receiving HBO therapy  - Dressing consisting of Susana. Snap VAC. - Pt has returned to wearing his old prosthetic   - F/U 1 week. Subjective:  Pt complains of wound to right amputation site. At present he is packing the wound with mesalt and receiving HBO therapy. Pt denies a recent h/o fever, chills, nausea, vomiting, chest pain, shortness of breath. HPI: Mr. Brendon Najjar is a 74 yo AA male with a PMH of arthritis, BPH, CKD, chronic pain, DM2, dyslipidemia, GERD, HTN, s/p rt BKA, sleep apnea, thromboembolus lt leg who presents with a right BKA stump wound. Wound formed from ill fitting prosthetic. Pt has had wounds in the past from in this same manner. ROS:  Consitutional: no weight loss, night sweats, fatigue / malaise / lethargy. Musculoskeletal: no joint / extremity pain, misalignment, stiffness, decreased ROM, crepitus. Integument: No pruritis, rashes, lesions, right BKA stump wound.   Psychiatric: No depression, anxiety, paranoia      History:  Wound Care  Allergies   Allergen Reactions    Adhesive Tape-Silicones Hives    Sulfa (Sulfonamide Antibiotics) Swelling     Lips eyes     Family History   Problem Relation Age of Onset    Hypertension Mother    Shanti Peabody Gout Mother     Cancer Father         leukemia    Diabetes Father     Hypertension Sister     Diabetes Maternal Grandmother  Hypertension Maternal Grandmother     Diabetes Paternal Grandmother     Hypertension Paternal Grandmother     Anesth Problems Neg Hx       Past Medical History:   Diagnosis Date    Arthritis     BPH (benign prostatic hyperplasia)     Chronic kidney disease     Chronic pain     BACK NEUROPATHY FEET HANDS    DM neuropathy, type II diabetes mellitus [facility]     DM type 2 causing CKD stage 3 ([address] 12/17/2012    DM type 2 causing vascular disease ([facility] [address] [facility]     Dyslipidemia     Foot ulcer due to secondary DM ([address] 12/1/2012    GERD (gastroesophageal reflux disease)     HTN     Ill-defined condition 2013    MRSA in wound right leg (BKA)    S/P BKA (below knee amputation) ([address]     right BKA due to non-healing ulcer    Sleep apnea     Thromboembolus ([facility] [address] [facility] 1970'S    BLOOD CLOT LEFT LEG     Past Surgical History:   Procedure Laterality Date    ABDOMEN SURGERY PROC UNLISTED      pilonidal cyst    ECHO 2D ADULT  8/09    normal; LVEF 60%    ECHO STRESS  4/09    7 min; normal    ENDOSCOPY, COLON, DIAGNOSTIC      HX AMPUTATION  2012    left great toe    HX AMPUTATION  2007    BKA right    HX BELOW KNEE AMPUTATION Right     HX CERVICAL FUSION  2009    x2    HX ORTHOPAEDIC  2006    ACDF C4-C7    IR INSERT TUNL CVC W/O PORT OVER 5 YR  6/19/2020    STRESS TEST MYOVIEW  8/09    walked 8:46; normal; LVEF 51%     Social History     Tobacco Use    Smoking status: Never Smoker    Smokeless tobacco: Never Used   Substance Use Topics    Alcohol use: No       Social History     Substance and Sexual Activity   Alcohol Use No     Social History     Substance and Sexual Activity   Drug Use No      Social History     Tobacco Use   Smoking Status Never Smoker   Smokeless Tobacco Never Used     Current Outpatient Medications   Medication Sig    oxyCODONE-acetaminophen (Percocet) 5-325 mg per tablet Take 1 Tab by mouth every four (4) hours as needed for Pain for up to 30 days. Max Daily Amount: 6 Tabs.     lisinopriL (PRINIVIL, ZESTRIL) 10 mg tablet Take 1 Tab by mouth two (2) times a day.  busPIRone (BUSPAR) 5 mg tablet Take 1 Tab by mouth three (3) times daily as needed (anxiety).  glucose blood VI test strips (Prodigy No Coding) strip TEST BLOOD SUGAR 2 TIMES A DAY    rosuvastatin (CRESTOR) 40 mg tablet Take 1 Tab by mouth daily.  silver sulfADIAZINE (SILVADENE) 1 % topical cream Apply  to affected area daily.  albuterol (Ventolin HFA) 90 mcg/actuation inhaler Take 2 Puffs by inhalation every four (4) hours as needed for Wheezing.  pregabalin (Lyrica) 75 mg capsule Take 1 Cap by mouth two (2) times a day. Max Daily Amount: 150 mg.    ergocalciferol (Vitamin D2) 1,250 mcg (50,000 unit) capsule TAKE ONE CAPSULE BY MOUTH EVERY WEEK    Lantus Solostar U-100 Insulin 100 unit/mL (3 mL) inpn INJECT 40 TO 50 UNITS SUBCUTANEOUSLY TWICE DAILY AS DIRECTED    insulin lispro (HUMALOG) 100 unit/mL kwikpen INJECT 18 TO 30 UNITS SUBCUTANEOUSLY 3 TIMES DAILY BEFORE BREAKFAST, LUNCH, AND DINNER    pantoprazole (PROTONIX) 40 mg tablet TAKE ONE TABLET BY MOUTH EVERY DAY BEFORE BREAKFAST    Insulin Needles, Disposable, (OLIVIA PEN NEEDLE) 32 gauge x 5/32\" ndle USE AS DIRECTED 4 TIMES DAILY    ferrous sulfate 325 mg (65 mg iron) tablet TAKE ONE TABLET BY MOUTH 2 TIMES A DAY    aspirin 81 mg chewable tablet Take 81 mg by mouth daily. No current facility-administered medications for this encounter. Objective:  Visit Vitals  /86   Pulse 89   Temp 97.1 °F (36.2 °C)   Resp 18       Vascular:  left LE  DP 1/4; PT 1/4  capillary fill time brisk, pitting edema is present, skin temperature is cool, varicosities are present. Dermatological:    Wound: 1  Location: right BKA stump   Measurements: per RN note  Margins: intact  Drainage: serous  Odor: no  Wound base: graunular  Lymphangitic streaking? No.  Undermining? yes  Sinus tracts? No.  Exposed bone? yes  Subcutaneous crepitation on palpation?  No.    Skin is dry and scaly, exhibits hemosiderin deposition. There is no maceration of the interspaces of the feet b/l. Neurological:  DTR are present, protective sensation per 5.07 Cyril Mauricio monofilament is intact, patient is AAOx3, mood is normal. Epicritic sensation is intact. Orthopedic:  Left LE are symmetric, ROM of ankle, STJ, 1st MTPJ is limited, MMT 5 out of 5 for B/L LE. Right BKA. Has prosthesis. Constitutional: Pt is a well developed, pleasant AA male. Lymphatics: negative tenderness to palpation of neck/axillary/inguinal nodes. Imaging / Labs / Cx / Px:  5/28 tib/fib XR: soft tissue ulcer extending to the rt distal fibula    Procedure Note:  Excisional debridement through level of subcutaneous fat. Location / Ulcer: left leg  Indication: to remove non-viable tissue from wound bed. Consent in chart. Anesthesia: 4% xylocaine gel  Instrument: roncheur and curette   Bleeding: minimal  Hemostasis: pressure  Pre-Procedure Pain: 1  Post-Procedure Pain: 2  Area debrided < 20 cm sq. Pre-Debridement measurements: see nursing notes  Post-Debridement measurements: see nursing notes  This is part of a series of staged procedures in an attempt at limb salvage.

## 2020-07-21 NOTE — DISCHARGE INSTRUCTIONS
Discharge Instructions for  [facility]  [address] [place], [address] [facility]  Telephone: [phone]    NAME:  Alec Carr OF BIRTH:  1955  DATE:  2020      Probiotics while on antibiotics  Will call Angio at [facility] to set up Murali catheter placement and place order with [employer] for antibiotic therapy. [x] Wound and dressing supply provider: [facility]          Wound Cleansing:   Do not scrub or use excessive force. Cleanse wound prior to applying a clean dressing with:    [x] Normal Saline   [] Keep Wound Dry in Shower      [x] Wound Cleanser (***)  [] May Shower at Discharge   [] cleanse with Shearon Im and Shearon Im baby shampoo lather leave 2-3 then rinse with water, pat dry and redress wound. Dressings:           Wound Location right lat leg     Apply Primary Dressin [address]                                                   []     Change dressing:   [x] Daily      [] Every Other Day   [] Three times per week  [] Once a week   [] Do Not Change Dressing     [] Other:    Off-Loading:   [x] Off-loading when [x] walking  [] in bed [] sitting    Dietary:  [x] Diet as tolerated: [] Diabetic Diet:   [] Increase Protein: examples ( Meat, cheese, eggs, greek yogurt, premier protein drink, fish, nuts )   [] Other:    Return Appointment:      [] Return Appointment: With Dr. Alexandria Rubio 1 WEEK,     [] Ordered tests: {OP Wound Vascular Studies:52710} {OP Wound F/U Imagin}     Electronically signed on 2020     [address] [place] [address] Road Information: Should you experience any significant changes in your wound(s) or have questions about your wound care, please contact the [facility] at [address] [place] [address] 8:00 am - 4:30.   If you need help with your wound outside these hours and cannot wait until we are again available, contact your PCP or go to the hospital emergency room.     PLEASE NOTE: IF YOU ARE UNABLE TO OBTAIN WOUND SUPPLIES, CONTINUE TO USE THE SUPPLIES YOU HAVE AVAILABLE UNTIL YOU ARE ABLE TO REACH US. IT IS MOST IMPORTANT TO KEEP THE WOUND COVERED AT ALL TIMES.      Physician Signature:_______________________  Dr. Cain Miller

## 2020-07-21 NOTE — WOUND CARE
HBO PROGRESS NOTE  NAME: Gala Chery MEDICAL RECORD NUMBER:  [id number]  AGE: 75 y.o. GENDER: male  : 1955  EPISODE DATE:  2020     Subjective     HBO Treatment Number: 11 out of Total Treatments: 30    HBO Diagnosis:  Indications: Lower Extremity Diabetic Wound ___(site)(saldivar grade 3 RLE)  Safety checks performed prior to treatment. See doc flowsheets for documentation. Objective           Lab Results   Component Value Date/Time    Glucose (POC) 201 (H) 2020 03:35 PM    Glucose (POC) 229 (H) 2020 12:45 PM       Pre treatment Vital Signs       Temp: 98 °F (36.7 °C)     Pulse (Heart Rate): 99     Resp Rate: 18   BP: 151/81       Post treatment Vital Signs  Temp: 98.2 °F (36.8 °C)  Pulse (Heart Rate): 96  Resp Rate: 14  BP: 176/90      Assessment        Physical Exam:  General Appearance: healthy, alert, well developed and well nourished    Tympanic Membrane Assessment:  Pre-Treatment Left   Grade 0 Right: Grade 0  Post-treatment Left Left: Grade 0 Right Right: Grade 0    Pulmonary/Chest: lungs clear to auscultation, breath sounds equal and symmetric, no rhonchi, rales or wheezes, no accessory muscle use    Cardiovascular: regular rate and rhythm    Patient place in a fully body Monoplace serial number Chamber #: [id number]       Treatment Start Time: 1308 Door Closed    Pressure Reached Time: 1326 Pressure Reached  ROSALINDA Depth: 2.5 ROSALINDA  Number of Air Breaks: Two 5 minute air breaks      Decompression Time: 1506 Decompression Time   Treatment End Time: 1523 Door Opened    Symptoms Noted During Treatment: None    Length of Treatment: 90 Minutes    Adverse Event: Treatment Completion Status: Treatment Completed without Adverse Event    I was present on these premises and immediately available to furnish assistance & direction throughout the procedure.      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-L97.214   Chronic osteomyelitis with draining sinus, right tibia and QMZTTV-N60.789  Type 2 diabetes mellitus with other skin ulcer-E11.622     Plan          Lucita Sahni. is a 75 y.o. male  did successfully complete today's hyperbaric oxygen treatment at [address] In my clinical judgement, ongoing HBO therapy is necessary at this time, given a threat to patient function, limb or life from the current condition. Supervision and attendance of Hyperbaric Oxygen Therapy provided. Continue HBO treatment as outlined in the treatment plan. Hyperbaric Oxygen: Lucita Sahni. tolerated Treatment Number: 87 well today without complications.      Electronically signed by Amon Boxer, MD on 7/20/2020 at 8:11 PM

## 2020-07-21 NOTE — WOUND CARE
HBO PROGRESS NOTE  NAME: Lucita Mortensen MEDICAL RECORD NUMBER:  [id number]  AGE: 75 y.o. GENDER: male  : 1955  EPISODE DATE:  2020     Subjective     HBO Treatment Number: 12 out of Total Treatments: 30    HBO Diagnosis:  Indications: Lower Extremity Diabetic Wound ___(site)(Nichole grade 3 RLE)  Safety checks performed prior to treatment. See doc flowsheets for documentation. Objective           Lab Results   Component Value Date/Time    Glucose (POC) 189 (H) 2020 11:22 AM    Glucose (POC) 189 (H) 2020 08:45 AM       Pre treatment Vital Signs                               Post treatment Vital Signs                  Assessment        Physical Exam:  General Appearance: alert    Tympanic Membrane Assessment:  Pre-Treatment Left Left: Grade 0 Right Right: Grade 0  Post-treatment Left   Right        Patient place in a fully body Monoplace serial number Chamber #: [id number]       Treatment Start Time: 2286 Door Closed    Pressure Reached Time: 0913 Pressure Reached  ROSALINDA Depth: 2.5 ROSALINDA  Number of Air Breaks: Two 5 minute air breaks      Decompression Time: 1053 Decompression Time   Treatment End Time: 1110 Door Opened    Symptoms Noted During Treatment: None    Length of Treatment: 90 Minutes    Adverse Event: Treatment Completion Status: Treatment Completed without Adverse Event    I was present on these premises and immediately available to furnish assistance & direction throughout the procedure. Plan          Lucita Sahni. is a 75 y.o. male  did successfully complete today's hyperbaric oxygen treatment at [address] In my clinical judgement, ongoing HBO therapy is necessary at this time, given a threat to patient function, limb or life from the current condition. Supervision and attendance of Hyperbaric Oxygen Therapy provided. Continue HBO treatment as outlined in the treatment plan.     Hyperbaric Oxygen: Lucita Sahni. tolerated Treatment Number: 12 well today without complications.      Electronically signed by Rhea Vieira DPM on 7/21/2020 at 11:32 AM

## 2020-07-21 NOTE — WOUND CARE
07/21/20 1127   Wound Leg Lower Right;Lateral   Date First Assessed/Time First Assessed: 06/02/20 1439   POA: Yes  Location: Leg Lower  Wound Description (Optional): #1  Orientation: Right;Lateral   Dressing Status  Old drainage   Dressing Type  Gauze;Gauze wrap (greg)   Wound Length (cm) 1 cm   Wound Width (cm) 0.8 cm   Wound Depth (cm) 1.1   Wound Surface area (cm^2) 0.8 cm^2   Change in Wound Size % 46.67   Condition of Base Bone exposed;Pink   Condition of Edges Open   Direction of Undermining 1 o'clock;12 o'clock   Depth of Undermining (cm) 1.5   Drainage Amount  Moderate   Drainage Color Serosanguinous   Wound Odor None     Visit Vitals  /86   Pulse 89   Temp 97.1 °F (36.2 °C)   Resp 18

## 2020-07-23 ENCOUNTER — HOSPITAL ENCOUNTER (OUTPATIENT)
Dept: WOUND CARE | Age: 65
Discharge: HOME | End: 2020-07-23
Payer: MEDICARE

## 2020-07-23 VITALS
DIASTOLIC BLOOD PRESSURE: 84 MMHG | TEMPERATURE: 98.5 F | RESPIRATION RATE: 18 BRPM | HEART RATE: 92 BPM | SYSTOLIC BLOOD PRESSURE: 192 MMHG

## 2020-07-23 PROCEDURE — 97607 NEG PRS WND THR NDME<=50SQCM: CPT

## 2020-07-23 NOTE — WOUND CARE
07/23/20 0951   Wound Leg Lower Right;Lateral   Date First Assessed/Time First Assessed: 06/02/20 1439   POA: Yes  Location: Leg Lower  Wound Description (Optional): #1  Orientation: Right;Lateral   Dressing Status  Old drainage   Dressing Type  Adhesive wound dressing (Band-Aid)   Dressing Type Applied Collagens/cell matrix; Vacuum dressing     Visit Vitals  /84   Pulse 92   Temp 98.5 °F (36.9 °C)   Resp 18     Discharge Condition: Stable     Pain: 0    Ambulatory Status: Walking    Discharge Destination: Home     Transportation: Car    Accompanied by: Self     Discharge instructions reviewed with Patient and copy or written instructions have been provided. All questions/concerns have been addressed at this time.

## 2020-07-24 ENCOUNTER — HOSPITAL ENCOUNTER (OUTPATIENT)
Dept: WOUND CARE | Age: 65
Discharge: HOME | End: 2020-07-24
Admitting: PODIATRIST
Payer: MEDICARE

## 2020-07-24 VITALS
DIASTOLIC BLOOD PRESSURE: 81 MMHG | SYSTOLIC BLOOD PRESSURE: 170 MMHG | HEART RATE: 97 BPM | TEMPERATURE: 97.9 F | RESPIRATION RATE: 16 BRPM

## 2020-07-24 LAB
GLUCOSE BLD STRIP.AUTO-MCNC: 109 MG/DL (ref 65–100)
GLUCOSE BLD STRIP.AUTO-MCNC: 114 MG/DL (ref 65–100)
GLUCOSE BLD STRIP.AUTO-MCNC: 87 MG/DL (ref 65–100)
GLUCOSE BLD STRIP.AUTO-MCNC: 87 MG/DL (ref 65–100)
SERVICE CMNT-IMP: ABNORMAL
SERVICE CMNT-IMP: ABNORMAL
SERVICE CMNT-IMP: NORMAL
SERVICE CMNT-IMP: NORMAL

## 2020-07-24 PROCEDURE — 82962 GLUCOSE BLOOD TEST: CPT

## 2020-07-24 PROCEDURE — 99212 OFFICE O/P EST SF 10 MIN: CPT | Performed by: FAMILY MEDICINE

## 2020-07-24 NOTE — HYPERBARIC MEDICINE NOTE
Patient arrived for HBO therapy with a blood glucose of 87, stated he had taken 15 units of Novolog insulin at 0730 and had had a lite breakfast at that time as well. MD notified. Patient unable to drink Ensure due to GI upset related to IV antibiotics. Patient had a breakfast with him and he consumed an apple, cheerios with milk and a Slovak. Blood sugar rechecked 30 mins post eating and was 114. MD notified and HBO therapy was cancelled for today. VSS, denies pain, SNAP VAC functioning properly to RLE.

## 2020-07-27 ENCOUNTER — HOSPITAL ENCOUNTER (OUTPATIENT)
Dept: WOUND CARE | Age: 65
Discharge: HOME | End: 2020-07-27
Admitting: FAMILY MEDICINE
Payer: MEDICARE

## 2020-07-27 DIAGNOSIS — F41.9 ANXIETY: ICD-10-CM

## 2020-07-27 LAB
GLUCOSE BLD STRIP.AUTO-MCNC: 204 MG/DL (ref 65–100)
GLUCOSE BLD STRIP.AUTO-MCNC: 252 MG/DL (ref 65–100)
SERVICE CMNT-IMP: ABNORMAL
SERVICE CMNT-IMP: ABNORMAL

## 2020-07-27 PROCEDURE — 97607 NEG PRS WND THR NDME<=50SQCM: CPT | Performed by: FAMILY MEDICINE

## 2020-07-27 PROCEDURE — 82962 GLUCOSE BLOOD TEST: CPT

## 2020-07-27 PROCEDURE — G0277 HBOT, FULL BODY CHAMBER, 30M: HCPCS | Performed by: FAMILY MEDICINE

## 2020-07-27 RX ORDER — PEN NEEDLE, DIABETIC 31 GX3/16"
NEEDLE, DISPOSABLE MISCELLANEOUS
Qty: 100 PEN NEEDLE | Refills: 5 | Status: SHIPPED | OUTPATIENT
Start: 2020-07-27 | End: 2021-02-01

## 2020-07-27 NOTE — WOUND CARE
HBO PROGRESS NOTE  NAME: Young Casas MEDICAL RECORD NUMBER:  [id number]  AGE: 75 y.o. GENDER: male  : 1955  EPISODE DATE:  2020     Subjective     HBO Treatment Number: 13 out of Total Treatments: 30    HBO Diagnosis:  Indications: Lower Extremity Diabetic Wound ___(site)(saldivar grade 3)  Safety checks performed prior to treatment. See doc flowsheets for documentation. Objective           Lab Results   Component Value Date/Time    Glucose (POC) 204 (H) 2020 03:33 PM    Glucose (POC) 252 (H) 2020 12:52 PM       Pre treatment Vital Signs       Temp: 97.6 °F (36.4 °C)     Pulse (Heart Rate): 92     Resp Rate: 14   BP: 153/73       Post treatment Vital Signs  Temp: 97.4 °F (36.3 °C)  Pulse (Heart Rate): 86  Resp Rate: 16  BP: 160/84      Assessment        Physical Exam:  General Appearance: healthy, alert, well developed and well nourished    Tympanic Membrane Assessment:  Pre-Treatment Left: Grade 0 Right: Grade 0  Post-treatment Left  Grade 0 Right  Grade 0    Pulmonary/Chest: lungs clear to auscultation, breath sounds equal and symmetric, no rhonchi, rales or wheezes, no accessory muscle use    Cardiovascular: regular rate and rhythm    Patient place in a fully body Monoplace serial number Chamber #: [id number]       Treatment Start Time: 1022 Door Closed    Pressure Reached Time: 1331 Pressure Reached  ROSALINDA Depth: 2.5 ROSALINDA  Number of Air Breaks: Two 5 minute air breaks      Decompression Time: 1511 Decompression Time   Treatment End Time: 1529 Door Opened    Symptoms Noted During Treatment: None    Length of Treatment: 90 Minutes    Adverse Event: Treatment Completion Status: Treatment Completed without Adverse Event    I was present on these premises and immediately available to furnish assistance & direction throughout the procedure.      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-L97.214   Chronic osteomyelitis with draining sinus, right tibia and QCVHQO-T24.342  Type 2 diabetes mellitus with other skin ulcer-E11.622        Plan          Janelle Nuñez. is a 75 y.o. male  did successfully complete today's hyperbaric oxygen treatment at [address] In my clinical judgement, ongoing HBO therapy is necessary at this time, given a threat to patient function, limb or life from the current condition. Supervision and attendance of Hyperbaric Oxygen Therapy provided. Continue HBO treatment as outlined in the treatment plan. Hyperbaric Oxygen: Janelle Nuñez. tolerated Treatment Number: 18 well today without complications.      Electronically signed by Catie Ayala MD on 7/27/2020 at 7:40 PM

## 2020-07-28 ENCOUNTER — HOSPITAL ENCOUNTER (OUTPATIENT)
Dept: WOUND CARE | Age: 65
Discharge: HOME | End: 2020-07-28
Payer: MEDICARE

## 2020-07-28 VITALS
HEART RATE: 91 BPM | DIASTOLIC BLOOD PRESSURE: 80 MMHG | TEMPERATURE: 97.9 F | RESPIRATION RATE: 18 BRPM | SYSTOLIC BLOOD PRESSURE: 170 MMHG

## 2020-07-28 VITALS
TEMPERATURE: 97.5 F | DIASTOLIC BLOOD PRESSURE: 79 MMHG | HEART RATE: 85 BPM | SYSTOLIC BLOOD PRESSURE: 168 MMHG | RESPIRATION RATE: 14 BRPM

## 2020-07-28 LAB
GLUCOSE BLD STRIP.AUTO-MCNC: 163 MG/DL (ref 65–100)
GLUCOSE BLD STRIP.AUTO-MCNC: 234 MG/DL (ref 65–100)
SERVICE CMNT-IMP: ABNORMAL
SERVICE CMNT-IMP: ABNORMAL

## 2020-07-28 PROCEDURE — G0277 HBOT, FULL BODY CHAMBER, 30M: HCPCS | Performed by: PODIATRIST

## 2020-07-28 PROCEDURE — 11043 DBRDMT MUSC&/FSCA 1ST 20/<: CPT | Performed by: PODIATRIST

## 2020-07-28 PROCEDURE — 82962 GLUCOSE BLOOD TEST: CPT

## 2020-07-28 NOTE — PROGRESS NOTES
HBO PROGRESS NOTE  NAME: Taylor Lopez. MEDICAL RECORD NUMBER:  [id number]  AGE: 75 y.o. GENDER: male  : 1955  EPISODE DATE:  2020     Subjective     HBO Treatment Number: 6 out of Total Treatments: 30    HBO Diagnosis:  Indications: Lower Extremity Diabetic Wound ___(site)(RLE)  Safety checks performed prior to treatment. See doc flowsheets for documentation. Objective           Lab Results   Component Value Date/Time    Glucose (POC) 163 (H) 2020 10:58 AM    Glucose (POC) 234 (H) 2020 08:20 AM       Pre treatment Vital Signs       Temp: 97.7 °F (36.5 °C)     Pulse (Heart Rate): 99     Resp Rate: 18   BP: 113/72       Post treatment Vital Signs  Temp: 97.8 °F (36.6 °C)  Pulse (Heart Rate): 96  Resp Rate: 14  BP: 136/66      Assessment        Physical Exam:  General Appearance: cooperative and in no apparent distress    Tympanic Membrane Assessment:  Pre-Treatment Left Left: Grade 0 Right Right: Grade 0  Post-treatment Left Left: Grade 0 Right Right: Grade 0    Pulmonary/Chest: no accessory muscle use    Cardiovascular: normal    Patient place in a fully body Monoplace serial number Chamber #: [id number]       Treatment Start Time: 1000 Door Closed    Pressure Reached Time: 1016 Pressure Reached  ROSALINDA Depth: 2.5 ROSALINDA  Number of Air Breaks: Two 5 minute air breaks      Decompression Time: 1157 Decompression Time   Treatment End Time: 1214 Door Opened    Symptoms Noted During Treatment: None    Length of Treatment: 90 Minutes    Adverse Event: Treatment Completion Status: Treatment Completed without Adverse Event    I was present on these premises and immediately available to furnish assistance & direction throughout the procedure. Plan        did  Taylor Lopez. is a 75 y.o. male   successfully complete today's hyperbaric oxygen treatment at [address]     In my clinical judgement, ongoing HBO therapy is necessary at this time, given a threat to patient function, limb or life from the current condition. Supervision and attendance of Hyperbaric Oxygen Therapy provided. Continue HBO treatment as outlined in the treatment plan. Hyperbaric Oxygen: Claude Kick. tolerated Treatment Number: 6 well today without complications.      Electronically signed by Aysha Kidd MD on 7/8/2020 at 11:08 AM

## 2020-07-28 NOTE — WOUND CARE
Hai White, TIANNA - Ulisses Ortega. [place], [address] [facility]     Assessment/Plan:  Type 2 Diabetes with leg ulcer (E11.622)    Non pressure chronic ulcer right leg to the level of bone (C05.359)    Chronic osteomyelitis of RLE (M86.461)    Nichole Grade 3 ulcer    - Pt evaluated and treated. - Pt presents with right BKA stump wound - wound dimensions the same, but more of bone is now covered  - Preformed wound debridement. See procedure note below  - Pt on PICC  - Pt receiving HBO therapy  - Dressing consisting of Susana. Snap VAC. - Pt has returned to wearing his old prosthetic   - F/U 1 week. Subjective:  Pt complains of wound to right amputation site. At present he is packing the wound with mesalt and receiving HBO therapy. Pt denies a recent h/o fever, chills, nausea, vomiting, chest pain, shortness of breath. HPI: Mr. Davidson Garcia is a 74 yo AA male with a PMH of arthritis, BPH, CKD, chronic pain, DM2, dyslipidemia, GERD, HTN, s/p rt BKA, sleep apnea, thromboembolus lt leg who presents with a right BKA stump wound. Wound formed from ill fitting prosthetic. Pt has had wounds in the past from in this same manner. ROS:  Consitutional: no weight loss, night sweats, fatigue / malaise / lethargy. Musculoskeletal: no joint / extremity pain, misalignment, stiffness, decreased ROM, crepitus. Integument: No pruritis, rashes, lesions, right BKA stump wound.   Psychiatric: No depression, anxiety, paranoia      History:  Wound Care  Allergies   Allergen Reactions    Adhesive Tape-Silicones Hives    Sulfa (Sulfonamide Antibiotics) Swelling     Lips eyes     Family History   Problem Relation Age of Onset    Hypertension Mother    Nas.Popper Gout Mother     Cancer Father         leukemia    Diabetes Father     Hypertension Sister     Diabetes Maternal Grandmother     Hypertension Maternal Grandmother     Diabetes Paternal Grandmother     Hypertension Paternal Grandmother     Anesth Problems Neg Hx       Past Medical History:   Diagnosis Date    Arthritis     BPH (benign prostatic hyperplasia)     Chronic kidney disease     Chronic pain     BACK NEUROPATHY FEET HANDS    DM neuropathy, type II diabetes mellitus ([facility]     DM type 2 causing CKD stage 3 ([address] 12/17/2012    DM type 2 causing vascular disease ([facility]     Dyslipidemia     Foot ulcer due to secondary DM ([facility] 12/1/2012    GERD (gastroesophageal reflux disease)     HTN     Ill-defined condition 2013    MRSA in wound right leg (BKA)    S/P BKA (below knee amputation) ([address]     right BKA due to non-healing ulcer    Sleep apnea     Thromboembolus ([facility] 1970'S    BLOOD CLOT LEFT LEG     Past Surgical History:   Procedure Laterality Date    ABDOMEN SURGERY PROC UNLISTED      pilonidal cyst    ECHO 2D ADULT  8/09    normal; LVEF 60%    ECHO STRESS  4/09    7 min; normal    ENDOSCOPY, COLON, DIAGNOSTIC      HX AMPUTATION  2012    left great toe    HX AMPUTATION  2007    BKA right    HX BELOW KNEE AMPUTATION Right     HX CERVICAL FUSION  2009    x2    HX ORTHOPAEDIC  2006    ACDF C4-C7    IR INSERT TUNL CVC W/O PORT OVER 5 YR  6/19/2020    STRESS TEST MYOVIEW  8/09    walked 8:46; normal; LVEF 51%     Social History     Tobacco Use    Smoking status: Never Smoker    Smokeless tobacco: Never Used   Substance Use Topics    Alcohol use: No       Social History     Substance and Sexual Activity   Alcohol Use No     Social History     Substance and Sexual Activity   Drug Use No      Social History     Tobacco Use   Smoking Status Never Smoker   Smokeless Tobacco Never Used     Current Outpatient Medications   Medication Sig    Insulin Needles, Disposable, (Bee Pen Needle) 32 gauge x 5/32\" ndle USE AS DIRECTED 4 TIMES DAILY    cefTARoline (TEFLARO) injection 300 mg by IntraVENous route every twelve (12) hours every twelve (12) hours.  oxyCODONE-acetaminophen (Percocet) 5-325 mg per tablet Take 1 Tab by mouth every four (4) hours as needed for Pain for up to 30 days. Max Daily Amount: 6 Tabs.  lisinopriL (PRINIVIL, ZESTRIL) 10 mg tablet Take 1 Tab by mouth two (2) times a day.  busPIRone (BUSPAR) 5 mg tablet Take 1 Tab by mouth three (3) times daily as needed (anxiety).  glucose blood VI test strips (Prodigy No Coding) strip TEST BLOOD SUGAR 2 TIMES A DAY    rosuvastatin (CRESTOR) 40 mg tablet Take 1 Tab by mouth daily.  silver sulfADIAZINE (SILVADENE) 1 % topical cream Apply  to affected area daily.  albuterol (Ventolin HFA) 90 mcg/actuation inhaler Take 2 Puffs by inhalation every four (4) hours as needed for Wheezing.  pregabalin (Lyrica) 75 mg capsule Take 1 Cap by mouth two (2) times a day. Max Daily Amount: 150 mg.    ergocalciferol (Vitamin D2) 1,250 mcg (50,000 unit) capsule TAKE ONE CAPSULE BY MOUTH EVERY WEEK    Lantus Solostar U-100 Insulin 100 unit/mL (3 mL) inpn INJECT 40 TO 50 UNITS SUBCUTANEOUSLY TWICE DAILY AS DIRECTED    insulin lispro (HUMALOG) 100 unit/mL kwikpen INJECT 18 TO 30 UNITS SUBCUTANEOUSLY 3 TIMES DAILY BEFORE BREAKFAST, LUNCH, AND DINNER    pantoprazole (PROTONIX) 40 mg tablet TAKE ONE TABLET BY MOUTH EVERY DAY BEFORE BREAKFAST    ferrous sulfate 325 mg (65 mg iron) tablet TAKE ONE TABLET BY MOUTH 2 TIMES A DAY    aspirin 81 mg chewable tablet Take 81 mg by mouth daily. Current Facility-Administered Medications   Medication Dose Route Frequency    lidocaine (ALOCANE) 4 % topical gel   Topical NOW        Objective: There were no vitals taken for this visit. Vascular:  left LE  DP 1/4; PT 1/4  capillary fill time brisk, pitting edema is present, skin temperature is cool, varicosities are present.     Dermatological:    Wound: 1  Location: right BKA stump   Measurements: per RN note  Margins: intact  Drainage: serous  Odor: no  Wound base: graunular  Lymphangitic streaking? No.  Undermining? yes  Sinus tracts? No.  Exposed bone? yes  Subcutaneous crepitation on palpation? No.    Skin is dry and scaly, exhibits hemosiderin deposition. There is no maceration of the interspaces of the feet b/l. Neurological:  DTR are present, protective sensation per 5.07 Wayne Mauricio monofilament is intact, patient is AAOx3, mood is normal. Epicritic sensation is intact. Orthopedic:  Left LE are symmetric, ROM of ankle, STJ, 1st MTPJ is limited, MMT 5 out of 5 for B/L LE. Right BKA. Has prosthesis. Constitutional: Pt is a well developed, pleasant AA male. Lymphatics: negative tenderness to palpation of neck/axillary/inguinal nodes. Imaging / Labs / Cx / Px:  5/28 tib/fib XR: soft tissue ulcer extending to the rt distal fibula    Procedure Note:  Excisional debridement through level of muscle. Location / Ulcer: left leg  Indication: to remove non-viable tissue from wound bed. Consent in chart. Anesthesia: 4% xylocaine gel  Instrument: roncheur and curette   Bleeding: minimal  Hemostasis: pressure  Pre-Procedure Pain: 1  Post-Procedure Pain: 2  Area debrided < 20 cm sq. Pre-Debridement measurements: see nursing notes  Post-Debridement measurements: see nursing notes  This is part of a series of staged procedures in an attempt at limb salvage.

## 2020-07-28 NOTE — WOUND CARE
07/28/20 1117   Wound Leg Lower Right;Lateral   Date First Assessed/Time First Assessed: 06/02/20 1439   POA: Yes  Location: Leg Lower  Wound Description (Optional): #1  Orientation: Right;Lateral   Dressing Status  Old drainage   Dressing Type    (SNAP VAC)   Wound Length (cm) 1 cm   Wound Width (cm) 0.8 cm   Wound Depth (cm) 1.1   Wound Surface area (cm^2) 0.8 cm^2   Change in Wound Size % 46.67   Condition of Base Bone exposed;Pink   Condition of Edges Open   Direction of Undermining 1 o'clock;12 o'clock   Depth of Undermining (cm) 1.6   Drainage Amount  Moderate   Drainage Color Serosanguinous   Wound Odor None   7 97.5  P 85  R 14  B/P 168/79  Pain 0/10

## 2020-07-28 NOTE — WOUND CARE
HBO PROGRESS NOTE  NAME: Shaneka Shelton MEDICAL RECORD NUMBER:  [id number]  AGE: 75 y.o. GENDER: male  : 1955  EPISODE DATE:  2020     Subjective     HBO Treatment Number: 14 out of Total Treatments: 30    HBO Diagnosis:  Indications: Lower Extremity Diabetic Wound ___(site)(WWagner grade 3 RLE)  Safety checks performed prior to treatment. See doc flowsheets for documentation. Objective           Lab Results   Component Value Date/Time    Glucose (POC) 163 (H) 2020 10:58 AM    Glucose (POC) 234 (H) 2020 08:20 AM       Pre treatment Vital Signs       Temp: 97.5 °F (36.4 °C)     Pulse (Heart Rate): 85     Resp Rate: 14   BP: 168/79       Post treatment Vital Signs  Temp: 97.5 °F (36.4 °C)  Pulse (Heart Rate): 85  Resp Rate: 14  BP: 168/79      Assessment        Physical Exam:  General Appearance: alert    Tympanic Membrane Assessment:  Pre-Treatment Left Left: Grade I Right Right: Grade 0  Post-treatment Left   Right          Patient place in a fully body Monoplace serial number Chamber #: [id number]       Treatment Start Time: 05 Door Closed    Pressure Reached Time: 0858 Pressure Reached  ROSALINDA Depth: 2.5 ROSALINDA  Number of Air Breaks: Two 5 minute air breaks      Decompression Time: 1038 Decompression Time   Treatment End Time: 1055 Door Opened    Symptoms Noted During Treatment: None    Length of Treatment: 90 Minutes    Adverse Event: Treatment Completion Status: Treatment Completed without Adverse Event    I was present on these premises and immediately available to furnish assistance & direction throughout the procedure. Plan          Shaneka Shelton is a 75 y.o. male  did successfully complete today's hyperbaric oxygen treatment at [address] In my clinical judgement, ongoing HBO therapy is necessary at this time, given a threat to patient function, limb or life from the current condition.       Supervision and attendance of Hyperbaric Oxygen Therapy provided. Continue HBO treatment as outlined in the treatment plan. Hyperbaric Oxygen: Gem Holloway. tolerated Treatment Number: 14 well today without complications.      Electronically signed by Rhea Vieira DPM on 7/28/2020 at 11:23 AM

## 2020-07-28 NOTE — DISCHARGE INSTRUCTIONS
Discharge Instructions for  [facility]  [address] [place], [address] [facility]  Telephone: [phone]    NAME:  Mika Kulkarni OF BIRTH:  1955  DATE:  2020      Probiotics while on antibiotics  Will call Angio at [facility] to set up Murali catheter placement and place order with [employer] for antibiotic therapy. [x] Wound and dressing supply provider: [facility]          Wound Cleansing:   Do not scrub or use excessive force. Cleanse wound prior to applying a clean dressing with:    [x] Normal Saline   [] Keep Wound Dry in Shower      [x] Wound Cleanser (***)  [] May Shower at Discharge   [] cleanse with Belen Chum and Belen Chum baby shampoo lather leave 2-3 then rinse with water, pat dry and redress wound. Dressings:           Wound Location right lat leg     Apply Primary Dressing:unruly snap vac                                               []     Change dressing:   [x] Daily      [] Every Other Day   [] Three times per week  [] Once a week   [] Do Not Change Dressing     [] Other:    Off-Loading:   [x] Off-loading when [x] walking  [] in bed [] sitting    Dietary:  [x] Diet as tolerated: [] Diabetic Diet:   [] Increase Protein: examples ( Meat, cheese, eggs, greek yogurt, premier protein drink, fish, nuts )   [] Other:    Return Appointment:      [] Return Appointment: With Dr. Regino Moraes 1 WEEK,     [] Ordered tests: {OP Wound Vascular Studies:86761} {OP Wound F/U Imagin}     Electronically signed on 2020     [address] [place] [address] Information: Should you experience any significant changes in your wound(s) or have questions about your wound care, please contact the [facility] Main at [address] [place] [address] Street 8:00 am - 4:30. If you need help with your wound outside these hours and cannot wait until we are again available, contact your PCP or go to the hospital emergency room.      PLEASE NOTE: IF YOU ARE UNABLE TO OBTAIN WOUND SUPPLIES, CONTINUE TO USE THE SUPPLIES YOU HAVE AVAILABLE UNTIL YOU ARE ABLE TO REACH US. IT IS MOST IMPORTANT TO KEEP THE WOUND COVERED AT ALL TIMES.      Physician Signature:_______________________  Dr. Maicol Jiménez

## 2020-07-28 NOTE — WOUND CARE
07/28/20 1144   Wound Leg Lower Right;Lateral   Date First Assessed/Time First Assessed: 06/02/20 1439   POA: Yes  Location: Leg Lower  Wound Description (Optional): #1  Orientation: Right;Lateral   Cleansing and Cleansing Agents  Normal saline   Dressing Type Applied Collagens/cell matrix; Negative pressure wound therapy;Silver products  (unruly/snap vac)   Procedure Tolerated Well   Discharge Condition: Stable     Pain: 0    Ambulatory Status: Walking    Discharge Destination: Home     Transportation: Car    Accompanied by: Self     Discharge instructions reviewed with Patient and copy or written instructions have been provided. All questions/concerns have been addressed at this time.

## 2020-07-29 ENCOUNTER — HOSPITAL ENCOUNTER (OUTPATIENT)
Dept: WOUND CARE | Age: 65
Discharge: HOME | End: 2020-07-29
Payer: MEDICARE

## 2020-07-29 VITALS
HEART RATE: 82 BPM | DIASTOLIC BLOOD PRESSURE: 81 MMHG | TEMPERATURE: 96.9 F | SYSTOLIC BLOOD PRESSURE: 166 MMHG | RESPIRATION RATE: 17 BRPM

## 2020-07-29 LAB
GLUCOSE BLD STRIP.AUTO-MCNC: 174 MG/DL (ref 65–100)
GLUCOSE BLD STRIP.AUTO-MCNC: 174 MG/DL (ref 65–100)
SERVICE CMNT-IMP: ABNORMAL
SERVICE CMNT-IMP: ABNORMAL

## 2020-07-29 PROCEDURE — G0277 HBOT, FULL BODY CHAMBER, 30M: HCPCS | Performed by: EMERGENCY MEDICINE

## 2020-07-29 PROCEDURE — 82962 GLUCOSE BLOOD TEST: CPT

## 2020-07-29 RX ORDER — BUSPIRONE HYDROCHLORIDE 5 MG/1
TABLET ORAL
Qty: 90 TAB | Refills: 0 | Status: SHIPPED | OUTPATIENT
Start: 2020-07-29 | End: 2020-12-07

## 2020-07-29 NOTE — PROGRESS NOTES
HBO PROGRESS NOTE  NAME: Tomás Bai. MEDICAL RECORD NUMBER:  [id number]  AGE: 75 y.o. GENDER: male  : 1955  EPISODE DATE:  2020     Subjective     HBO Treatment Number: 15 out of Total Treatments: 30    HBO Diagnosis:  Indications: Lower Extremity Diabetic Wound ___(site)(saldivar grade 3 RLE)  Safety checks performed prior to treatment. See doc flowsheets for documentation. Objective           Lab Results   Component Value Date/Time    Glucose (POC) 174 (H) 2020 10:45 AM    Glucose (POC) 174 (H) 2020 08:52 AM       Pre treatment Vital Signs       Temp: 96.9 °F (36.1 °C)     Pulse (Heart Rate): 82     Resp Rate: 17   BP: 166/81       Post treatment Vital Signs  Temp: 96.9 °F (36.1 °C)  Pulse (Heart Rate): 82  Resp Rate: 17  BP: 166/81      Assessment        Physical Exam:  General Appearance: alert, cooperative and in no apparent distress    Tympanic Membrane Assessment:  Pre-Treatment Left Left: Grade 0 Right Right: Grade 0  Post-treatment Left Left: Grade I Right Right: Grade 0    Pulmonary/Chest: no accessory muscle use    Cardiovascular: normal    Patient place in a fully body Monoplace serial number Chamber #: [id number]       Treatment Start Time: 0906 Door Closed    Pressure Reached Time: 09 Pressure Reached  ROSALINDA Depth: 2.5 ROSALINDA  Number of Air Breaks: None      Decompression Time: 1008(patient c/o urgent need to have BM, MD notified, Socorro Sayer terminated) Decompression Time   Treatment End Time: 1025 Door Opened    Symptoms Noted During Treatment: Other (Comment)(Decompression began at 1008 due to patients urgent need to have BM. MD aware)    Length of Treatment: 90 Minutes    Adverse Event: Treatment Completion Status: Treatment Aborted/Not Restarted Due to Adverse Event(Treament aborted due to patients need to urgently have BM)    I was present on these premises and immediately available to furnish assistance & direction throughout the procedure. Plan          Tomás Bai. is a 75 y.o. male  did not successfully complete today's hyperbaric oxygen treatment at [address] In my clinical judgement, ongoing HBO therapy is necessary at this time, given a threat to patient function, limb or life from the current condition. Supervision and attendance of Hyperbaric Oxygen Therapy provided. Continue HBO treatment as outlined in the treatment plan. Note technician note, patient with fecal urgency aborted the full duration of HBO Rx, no other complication noted. Hyperbaric Oxygen: Margi Teague. tolerated Treatment Number: 15 well today without complications.      Electronically signed by Gareth Mead MD on 7/29/2020 at 12:06 PM

## 2020-07-29 NOTE — HYPERBARIC MEDICINE NOTE
HBO treatment #15 today had to be terminated early due to urgent need to have bowel movement. Patient is on IV antibiotics and reports he is also taking a probiotic. MD was notified and orders given to terminate treatment for today. VSS stable upo  Discharge, denies pain, blood sugar 174 post treatment.

## 2020-07-30 ENCOUNTER — HOSPITAL ENCOUNTER (OUTPATIENT)
Dept: WOUND CARE | Age: 65
End: 2020-07-30

## 2020-07-31 ENCOUNTER — HOSPITAL ENCOUNTER (OUTPATIENT)
Dept: WOUND CARE | Age: 65
Discharge: HOME | End: 2020-07-31
Payer: MEDICARE

## 2020-07-31 VITALS
SYSTOLIC BLOOD PRESSURE: 165 MMHG | DIASTOLIC BLOOD PRESSURE: 82 MMHG | RESPIRATION RATE: 16 BRPM | TEMPERATURE: 98 F | HEART RATE: 93 BPM

## 2020-07-31 LAB
GLUCOSE BLD STRIP.AUTO-MCNC: 220 MG/DL (ref 65–100)
GLUCOSE BLD STRIP.AUTO-MCNC: 268 MG/DL (ref 65–100)
SERVICE CMNT-IMP: ABNORMAL
SERVICE CMNT-IMP: ABNORMAL

## 2020-07-31 PROCEDURE — G0277 HBOT, FULL BODY CHAMBER, 30M: HCPCS | Performed by: FAMILY MEDICINE

## 2020-07-31 PROCEDURE — 82962 GLUCOSE BLOOD TEST: CPT

## 2020-07-31 NOTE — PROGRESS NOTES
HBO PROGRESS NOTE  NAME: Citlali Branch MEDICAL RECORD NUMBER:  [id number]  AGE: 75 y.o. GENDER: male  : 1955  EPISODE DATE:  2020     Subjective     HBO Treatment Number: 16 out of Total Treatments: 30    HBO Diagnosis:  Indications: Lower Extremity Diabetic Wound ___(site)(Nichole grade 3 RLE)  Safety checks performed prior to treatment. See doc flowsheets for documentation. Objective           see flowsheets for Blood glucose    Pre treatment Vital Signs       Temp: 97.7 °F (36.5 °C)     Pulse (Heart Rate): 93     Resp Rate: 16   BP: 158/78       Post treatment Vital Signs  see flowsheets for vital signs    Assessment        Physical Exam:  General Appearance: healthy, alert and in no apparent distress    Tympanic Membrane Assessment:  Pre-Treatment Left   Right Right: Grade 0  Post-treatment Left   Right      Pulmonary/Chest: no accessory muscle use    Cardiovascular: normal    Patient place in a fully body Monoplace serial number Chamber #: [id number]       Treatment Start Time: 7895 Door Closed    Pressure Reached Time: 0903 Pressure Reached  ROSALINDA Depth: 2.5 ROSALINDA  Number of Air Breaks: Two 5 minute air breaks      Decompression Time: 1043 Decompression Time     Door Opened              Adverse Event:  none    I was present on these premises and immediately available to furnish assistance & direction throughout the procedure. Plan          Citlali Branch is a 75 y.o. male  did successfully complete today's hyperbaric oxygen treatment at [address] In my clinical judgement, ongoing HBO therapy is necessary at this time, given a threat to patient function, limb or life from the current condition. Supervision and attendance of Hyperbaric Oxygen Therapy provided. Continue HBO treatment as outlined in the treatment plan. Hyperbaric Oxygen: Citlali Branch tolerated Treatment Number: 02 well today without complications.      Electronically signed by Richard De Jesus MD on 7/31/2020 at 11:03 AM

## 2020-08-03 ENCOUNTER — HOSPITAL ENCOUNTER (OUTPATIENT)
Dept: WOUND CARE | Age: 65
Discharge: HOME | End: 2020-08-03
Admitting: PODIATRIST
Payer: MEDICARE

## 2020-08-03 VITALS
SYSTOLIC BLOOD PRESSURE: 178 MMHG | RESPIRATION RATE: 16 BRPM | DIASTOLIC BLOOD PRESSURE: 88 MMHG | TEMPERATURE: 97.4 F | HEART RATE: 85 BPM

## 2020-08-03 DIAGNOSIS — M79.605 PAIN IN BOTH LOWER EXTREMITIES: Primary | ICD-10-CM

## 2020-08-03 DIAGNOSIS — G89.29 CHRONIC BILATERAL LOW BACK PAIN WITH BILATERAL SCIATICA: ICD-10-CM

## 2020-08-03 DIAGNOSIS — M54.42 CHRONIC BILATERAL LOW BACK PAIN WITH BILATERAL SCIATICA: ICD-10-CM

## 2020-08-03 DIAGNOSIS — M79.604 PAIN IN BOTH LOWER EXTREMITIES: Primary | ICD-10-CM

## 2020-08-03 DIAGNOSIS — Z89.9 S/P AMPUTATION OF LIMB: ICD-10-CM

## 2020-08-03 DIAGNOSIS — M54.41 CHRONIC BILATERAL LOW BACK PAIN WITH BILATERAL SCIATICA: ICD-10-CM

## 2020-08-03 LAB
GLUCOSE BLD STRIP.AUTO-MCNC: 219 MG/DL (ref 65–100)
GLUCOSE BLD STRIP.AUTO-MCNC: 222 MG/DL (ref 65–100)
SERVICE CMNT-IMP: ABNORMAL
SERVICE CMNT-IMP: ABNORMAL

## 2020-08-03 PROCEDURE — 82962 GLUCOSE BLOOD TEST: CPT

## 2020-08-03 RX ORDER — OXYCODONE AND ACETAMINOPHEN 5; 325 MG/1; MG/1
1 TABLET ORAL
Qty: 90 TAB | Refills: 0 | Status: SHIPPED | OUTPATIENT
Start: 2020-08-03 | End: 2020-09-02

## 2020-08-03 RX ORDER — OXYCODONE AND ACETAMINOPHEN 5; 325 MG/1; MG/1
TABLET ORAL
Qty: 90 TAB | Refills: 0 | Status: SHIPPED | OUTPATIENT
Start: 2020-08-03 | End: 2020-09-02

## 2020-08-03 RX ORDER — OXYCODONE HYDROCHLORIDE 10 MG/1
TABLET ORAL
COMMUNITY
End: 2020-12-07 | Stop reason: SDUPTHER

## 2020-08-04 ENCOUNTER — HOSPITAL ENCOUNTER (OUTPATIENT)
Dept: WOUND CARE | Age: 65
Discharge: HOME | End: 2020-08-04
Payer: MEDICARE

## 2020-08-04 VITALS
HEART RATE: 81 BPM | DIASTOLIC BLOOD PRESSURE: 91 MMHG | TEMPERATURE: 96.3 F | RESPIRATION RATE: 16 BRPM | SYSTOLIC BLOOD PRESSURE: 189 MMHG

## 2020-08-04 LAB
GLUCOSE BLD STRIP.AUTO-MCNC: 184 MG/DL (ref 65–100)
GLUCOSE BLD STRIP.AUTO-MCNC: 189 MG/DL (ref 65–100)
SERVICE CMNT-IMP: ABNORMAL
SERVICE CMNT-IMP: ABNORMAL

## 2020-08-04 PROCEDURE — G0277 HBOT, FULL BODY CHAMBER, 30M: HCPCS

## 2020-08-04 PROCEDURE — 97605 NEG PRS WND THER DME<=50SQCM: CPT | Performed by: PODIATRIST

## 2020-08-04 PROCEDURE — 74011000250 HC RX REV CODE- 250: Performed by: PODIATRIST

## 2020-08-04 PROCEDURE — 82962 GLUCOSE BLOOD TEST: CPT

## 2020-08-04 PROCEDURE — 11042 DBRDMT SUBQ TIS 1ST 20SQCM/<: CPT | Performed by: PODIATRIST

## 2020-08-04 RX ADMIN — Medication: at 11:50

## 2020-08-04 NOTE — WOUND CARE
HBO PROGRESS NOTE  NAME: Thor Allen MEDICAL RECORD NUMBER:  [id number]  AGE: 75 y.o. GENDER: male  : 1955  EPISODE DATE:  8/3/2020     Subjective     HBO Treatment Number: 17 out of Total Treatments: 30    HBO Diagnosis:  Indications: Lower Extremity Diabetic Wound ___(site)(saldivar grade 3 RLE)  Safety checks performed prior to treatment. See doc flowsheets for documentation. Objective           Lab Results   Component Value Date/Time    Glucose (POC) 184 (H) 2020 11:43 AM    Glucose (POC) 189 (H) 2020 09:00 AM       Pre treatment Vital Signs       Temp: 97.7 °F (36.5 °C)     Pulse (Heart Rate): 89     Resp Rate: 16   BP: 155/82       Post treatment Vital Signs  Temp: 97.4 °F (36.3 °C)  Pulse (Heart Rate): 85  Resp Rate: 16  BP: 178/88      Assessment        Physical Exam:  General Appearance: healthy, alert, well developed and well nourished    Tympanic Membrane Assessment:  Pre-Treatment Left Left: Grade 0 Right Right: Grade 0  Post-treatment Left Left: Grade 0 Right Right: Grade 0    Pulmonary/Chest: lungs clear to auscultation, breath sounds equal and symmetric, no rhonchi, rales or wheezes, no accessory muscle use    Cardiovascular: regular rate and rhythm    Patient place in a fully body Monoplace serial number Chamber #: [id number]       Treatment Start Time: 1231 Door Closed    Pressure Reached Time: 1315 Pressure Reached  ROSALINDA Depth: 2.5 ROSALINDA  Number of Air Breaks: Two 5 minute air breaks      Decompression Time: 1455 Decompression Time   Treatment End Time: 1512 Door Opened    Symptoms Noted During Treatment: None    Length of Treatment: 90 Minutes    Adverse Event: Treatment Completion Status: Treatment Completed without Adverse Event    I was present on these premises and immediately available to furnish assistance & direction throughout the procedure.      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-L97.214   Chronic osteomyelitis with draining sinus, right tibia and fibula-M86.461  Type 2 diabetes mellitus with other skin ulcer-E11.622       Plan          Chinyere Gar. is a 75 y.o. male  did successfully complete today's hyperbaric oxygen treatment at [address] In my clinical judgement, ongoing HBO therapy is necessary at this time, given a threat to patient function, limb or life from the current condition. Supervision and attendance of Hyperbaric Oxygen Therapy provided. Continue HBO treatment as outlined in the treatment plan. Hyperbaric Oxygen: Catherene Montgomery. tolerated Treatment Number: 96 well today without complications.      Electronically signed by Latia Cevallos MD on 8/3/2020 at 2:45 PM

## 2020-08-04 NOTE — DISCHARGE INSTRUCTIONS
Discharge Instructions for  [facility]  [place]  [place], [address] [facility]  Telephone: [phone]    NAME:  Anna Hernandez OF BIRTH:  1955  DATE:  2020      Probiotics while on antibiotics  Will call Angio at [facility] to set up Murali catheter placement and place order with [employer] for antibiotic therapy. [x] Wound and dressing supply provider: [facility]          Wound Cleansing:   Do not scrub or use excessive force. Cleanse wound prior to applying a clean dressing with:    [x] Normal Saline   [] Keep Wound Dry in Shower      [x] Wound Cleanser (***)  [] May Shower at Discharge   [] cleanse with Veronika Gladys and Veronika Gladys baby shampoo lather leave 2-3 then rinse with water, pat dry and redress wound. Dressings:           Wound Location right lat leg     Apply Primary Dressing:unruly snap vac                                               []     Change dressing:   [x] Daily      [] Every Other Day   [] Three times per week  [] Once a week   [] Do Not Change Dressing     [] Other:    Off-Loading:   [x] Off-loading when [x] walking  [] in bed [] sitting    Dietary:  [x] Diet as tolerated: [] Diabetic Diet:   [] Increase Protein: examples ( Meat, cheese, eggs, greek yogurt, premier protein drink, fish, nuts )   [] Other:    Return Appointment:      [] Return Appointment: With Dr. Emily Cristina 1 WEEK,     [] Ordered tests: {OP Wound Vascular Studies:45471} {OP Wound F/U Imagin}     Electronically signed on 2020     [address] [place] [address] Road Information: Should you experience any significant changes in your wound(s) or have questions about your wound care, please contact the [facility] Main at [address] [place] [address] 8:00 am - 4:30. If you need help with your wound outside these hours and cannot wait until we are again available, contact your PCP or go to the hospital emergency room.      PLEASE NOTE: IF YOU ARE UNABLE TO OBTAIN WOUND SUPPLIES, CONTINUE TO USE THE SUPPLIES YOU HAVE AVAILABLE UNTIL YOU ARE ABLE TO REACH US. IT IS MOST IMPORTANT TO KEEP THE WOUND COVERED AT ALL TIMES.      Physician Signature:_______________________  Dr. Dheeraj Shin

## 2020-08-04 NOTE — HYPERBARIC MEDICINE NOTE
HBO PROGRESS NOTE  NAME: Topher Lyles MEDICAL RECORD NUMBER:  [id number]  AGE: 75 y.o. GENDER: male  : 1955  EPISODE DATE:  2020     Subjective     HBO Treatment Number: 18 out of Total Treatments: 30    HBO Diagnosis:  Indications: Lower Extremity Diabetic Wound ___(site)  Safety checks performed prior to treatment. See doc flowsheets for documentation. Objective           Lab Results   Component Value Date/Time    Glucose (POC) 184 (H) 2020 11:43 AM    Glucose (POC) 189 (H) 2020 09:00 AM       Pre treatment Vital Signs       Temp: 97.9 °F (36.6 °C)     Pulse (Heart Rate): 92     Resp Rate: 18   BP: 169/87       Post treatment Vital Signs  Temp: (!) 96.3 °F (35.7 °C)  Pulse (Heart Rate): 81  Resp Rate: 16  BP: (!) 189/91      Assessment        Physical Exam:  General Appearance: healthy, well developed, cooperative, smiling, pleasant and in no apparent distress    Tympanic Membrane Assessment:  Pre-Treatment Left Left: Normal Right Right: Normal  Post-treatment Left Left: Normal Grade 0 Right Right: NormalGrade 0    Pulmonary/Chest: lungs clear to auscultation, no accessory muscle use    Cardiovascular: normal    Patient place in a fully body Monoplace serial number Chamber #: [id number]       Treatment Start Time: 1302 Door Closed    Pressure Reached Time: 0938 Pressure Reached  ROSALINDA Depth: 2.5 ROSALINDA  Number of Air Breaks: Two 5 minute air breaks      Decompression Time: 1118 Decompression Time   Treatment End Time: 1135 Door Opened    Symptoms Noted During Treatment: None    Length of Treatment: 90 Minutes    Adverse Event: Treatment Completion Status: Treatment Completed without Adverse Event    I was present on these premises and immediately available to furnish assistance & direction throughout the procedure. Plan          Topher Reynoso. is a 75 y.o. male  did successfully complete today's hyperbaric oxygen treatment at [address]     In my clinical judgement, ongoing HBO therapy is necessary at this time, given a threat to patient function, limb or life from the current condition. Supervision and attendance of Hyperbaric Oxygen Therapy provided. Continue HBO treatment as outlined in the treatment plan. Hyperbaric Oxygen: Claude Kick. tolerated Treatment Number: 95 well today without complications.      Electronically signed by Katia Aviles DPM on 8/4/2020 at 1:54 PM

## 2020-08-04 NOTE — WOUND CARE
Dickson Mantilla DPM - [facility] RYAN Calixto. Fl[place], [facility]     Assessment/Plan:  Type 2 Diabetes with leg ulcer (E11.622)    Non pressure chronic ulcer right leg to the level of bone (A49.780)    Chronic osteomyelitis of RLE (M86.461)    Nichole Grade 3 ulcer    - Pt evaluated and treated. - Pt presents with right BKA stump wound - wound dimensions the same, but more of bone is now covered and there is a reduction in the undermining  - Preformed wound debridement. See procedure note below  - Pt on PICC  - Pt receiving HBO therapy  - Dressing consisting of Susana. Snap VAC. - Pt has returned to wearing his old prosthetic   - F/U 1 week. Subjective:  Pt complains of wound to right amputation site. At present he is packing the wound with mesalt and receiving HBO therapy. Pt denies a recent h/o fever, chills, nausea, vomiting, chest pain, shortness of breath. HPI: Mr. Concetta Paz is a 74 yo AA male with a PMH of arthritis, BPH, CKD, chronic pain, DM2, dyslipidemia, GERD, HTN, s/p rt BKA, sleep apnea, thromboembolus lt leg who presents with a right BKA stump wound. Wound formed from ill fitting prosthetic. Pt has had wounds in the past from in this same manner. ROS:  Consitutional: no weight loss, night sweats, fatigue / malaise / lethargy. Musculoskeletal: no joint / extremity pain, misalignment, stiffness, decreased ROM, crepitus. Integument: No pruritis, rashes, lesions, right BKA stump wound.   Psychiatric: No depression, anxiety, paranoia      History:  Wound Care  Allergies   Allergen Reactions    Adhesive Tape-Silicones Hives    Sulfa (Sulfonamide Antibiotics) Swelling     Lips eyes     Family History   Problem Relation Age of Onset    Hypertension Mother    [facility] G[facility] Mother     Cancer Father         leukemia    Diabetes Father     Hypertension Sister     Diabetes Maternal Grandmother     Hypertension Maternal Grandmother     Diabetes Paternal Grandmother     Hypertension Paternal Grandmother     Anesth Problems Neg Hx       Past Medical History:   Diagnosis Date    Arthritis     BPH (benign prostatic hyperplasia)     Chronic kidney disease     Chronic pain     BACK NEUROPATHY FEET HANDS    DM neuropathy, type II diabetes mellitus [facility]     DM type 2 causing CKD stage 3 ([address] 12/17/2012    DM type 2 causing vascular disease ([address]     Dyslipidemia     Foot ulcer due to secondary DM ([address] 12/1/2012    GERD (gastroesophageal reflux disease)     HTN     Ill-defined condition 2013    MRSA in wound right leg (BKA)    S/P BKA (below knee amputation) ([facility]     right BKA due to non-healing ulcer    Sleep apnea     Thromboembolus ([facility] 1970'S    BLOOD CLOT LEFT LEG     Past Surgical History:   Procedure Laterality Date    ABDOMEN SURGERY PROC UNLISTED      pilonidal cyst    ECHO 2D ADULT  8/09    normal; LVEF 60%    ECHO STRESS  4/09    7 min; normal    ENDOSCOPY, COLON, DIAGNOSTIC      HX AMPUTATION  2012    left great toe    HX AMPUTATION  2007    BKA right    HX BELOW KNEE AMPUTATION Right     HX CERVICAL FUSION  2009    x2    HX ORTHOPAEDIC  2006    ACDF C4-C7    IR INSERT TUNL CVC W/O PORT OVER 5 YR  6/19/2020    STRESS TEST MYOVIEW  8/09    walked 8:46; normal; LVEF 51%     Social History     Tobacco Use    Smoking status: Never Smoker    Smokeless tobacco: Never Used   Substance Use Topics    Alcohol use: No       Social History     Substance and Sexual Activity   Alcohol Use No     Social History     Substance and Sexual Activity   Drug Use No      Social History     Tobacco Use   Smoking Status Never Smoker   Smokeless Tobacco Never Used     Current Outpatient Medications   Medication Sig    oxyCODONE IR (ROXICODONE) 10 mg tab immediate release tablet Take  by mouth.     oxyCODONE-acetaminophen (Percocet) 5-325 mg per tablet Take 1 Tab by mouth every four (4) hours as needed for Pain for up to 30 days. Max Daily Amount: 6 Tabs.  oxyCODONE-acetaminophen (PERCOCET) 5-325 mg per tablet TAKE ONE TABLET BY MOUTH EVERY 4 HOURS AS NEEDED FOR PAIN    busPIRone (BUSPAR) 5 mg tablet TAKE ONE TABLET BY MOUTH THREE TIMES DAILY AS NEEDED FOR ANXIETY    Insulin Needles, Disposable, (Bee Pen Needle) 32 gauge x 5/32\" ndle USE AS DIRECTED 4 TIMES DAILY    cefTARoline (TEFLARO) injection 300 mg by IntraVENous route every twelve (12) hours every twelve (12) hours.  lisinopriL (PRINIVIL, ZESTRIL) 10 mg tablet Take 1 Tab by mouth two (2) times a day.  glucose blood VI test strips (Prodigy No Coding) strip TEST BLOOD SUGAR 2 TIMES A DAY    rosuvastatin (CRESTOR) 40 mg tablet Take 1 Tab by mouth daily.  silver sulfADIAZINE (SILVADENE) 1 % topical cream Apply  to affected area daily.  albuterol (Ventolin HFA) 90 mcg/actuation inhaler Take 2 Puffs by inhalation every four (4) hours as needed for Wheezing.  pregabalin (Lyrica) 75 mg capsule Take 1 Cap by mouth two (2) times a day. Max Daily Amount: 150 mg.    ergocalciferol (Vitamin D2) 1,250 mcg (50,000 unit) capsule TAKE ONE CAPSULE BY MOUTH EVERY WEEK    Lantus Solostar U-100 Insulin 100 unit/mL (3 mL) inpn INJECT 40 TO 50 UNITS SUBCUTANEOUSLY TWICE DAILY AS DIRECTED    insulin lispro (HUMALOG) 100 unit/mL kwikpen INJECT 18 TO 30 UNITS SUBCUTANEOUSLY 3 TIMES DAILY BEFORE BREAKFAST, LUNCH, AND DINNER    pantoprazole (PROTONIX) 40 mg tablet TAKE ONE TABLET BY MOUTH EVERY DAY BEFORE BREAKFAST    ferrous sulfate 325 mg (65 mg iron) tablet TAKE ONE TABLET BY MOUTH 2 TIMES A DAY    aspirin 81 mg chewable tablet Take 81 mg by mouth daily. No current facility-administered medications for this encounter. Objective: There were no vitals taken for this visit.     Vascular:  left LE  DP 1/4; PT 1/4  capillary fill time brisk, pitting edema is present, skin temperature is cool, varicosities are present. Dermatological:    Wound: 1  Location: right BKA stump   Measurements: per RN note  Margins: intact  Drainage: serous  Odor: no  Wound base: graunular  Lymphangitic streaking? No.  Undermining? yes  Sinus tracts? No.  Exposed bone? yes  Subcutaneous crepitation on palpation? No.    Skin is dry and scaly, exhibits hemosiderin deposition. There is no maceration of the interspaces of the feet b/l. Neurological:  DTR are present, protective sensation per 5.07 Peel Mauricio monofilament is intact, patient is AAOx3, mood is normal. Epicritic sensation is intact. Orthopedic:  Left LE are symmetric, ROM of ankle, STJ, 1st MTPJ is limited, MMT 5 out of 5 for B/L LE. Right BKA. Has prosthesis. Constitutional: Pt is a well developed, pleasant AA male. Lymphatics: negative tenderness to palpation of neck/axillary/inguinal nodes. Imaging / Labs / Cx / Px:  5/28 tib/fib XR: soft tissue ulcer extending to the rt distal fibula    Procedure Note:  Excisional debridement through level of muscle. Location / Ulcer: left leg  Indication: to remove non-viable tissue from wound bed. Consent in chart. Anesthesia: 4% xylocaine gel  Instrument: roncheur and curette   Bleeding: minimal  Hemostasis: pressure  Pre-Procedure Pain: 1  Post-Procedure Pain: 2  Area debrided < 20 cm sq. Pre-Debridement measurements: see nursing notes  Post-Debridement measurements: see nursing notes  This is part of a series of staged procedures in an attempt at limb salvage.

## 2020-08-04 NOTE — WOUND CARE
08/04/20 1146   Wound Leg Lower Right;Lateral   Date First Assessed/Time First Assessed: 06/02/20 1439   POA: Yes  Location: Leg Lower  Wound Description (Optional): #1  Orientation: Right;Lateral   Dressing Status  Removed   Dressing Type  Vacuum dressing   Wound Length (cm) 1 cm   Wound Width (cm) 0.8 cm   Wound Depth (cm) 1.5   Wound Surface area (cm^2) 0.8 cm^2   Change in Wound Size % 46.67   Condition of Base Bone exposed;Pink   Condition of Edges Open   Direction of Undermining 10 o'clock;1 o'clock   Depth of Undermining (cm) 1.6   Drainage Amount  Moderate   Drainage Color Serosanguinous   Wound Odor None   Periwound Skin Condition Macerated   Cleansing and Cleansing Agents  Normal saline

## 2020-08-05 LAB — MICROALBUMIN UR TEST STR-MCNC: 2164 MG/DL

## 2020-08-10 ENCOUNTER — HOSPITAL ENCOUNTER (OUTPATIENT)
Dept: WOUND CARE | Age: 65
Discharge: HOME | End: 2020-08-10
Payer: MEDICARE

## 2020-08-10 VITALS — SYSTOLIC BLOOD PRESSURE: 125 MMHG | TEMPERATURE: 98.4 F | DIASTOLIC BLOOD PRESSURE: 74 MMHG | HEART RATE: 97 BPM

## 2020-08-10 LAB
GLUCOSE BLD STRIP.AUTO-MCNC: 164 MG/DL (ref 65–100)
GLUCOSE BLD STRIP.AUTO-MCNC: 227 MG/DL (ref 65–100)
SERVICE CMNT-IMP: ABNORMAL
SERVICE CMNT-IMP: ABNORMAL

## 2020-08-10 PROCEDURE — G0277 HBOT, FULL BODY CHAMBER, 30M: HCPCS | Performed by: PODIATRIST

## 2020-08-10 PROCEDURE — 82962 GLUCOSE BLOOD TEST: CPT

## 2020-08-10 PROCEDURE — 97605 NEG PRS WND THER DME<=50SQCM: CPT | Performed by: PODIATRIST

## 2020-08-11 ENCOUNTER — HOSPITAL ENCOUNTER (OUTPATIENT)
Dept: WOUND CARE | Age: 65
Discharge: HOME | End: 2020-08-11
Payer: MEDICARE

## 2020-08-11 ENCOUNTER — TELEPHONE (OUTPATIENT)
Dept: WOUND CARE | Age: 65
End: 2020-08-11

## 2020-08-11 LAB
GLUCOSE BLD STRIP.AUTO-MCNC: 182 MG/DL (ref 65–100)
GLUCOSE BLD STRIP.AUTO-MCNC: 204 MG/DL (ref 65–100)
SERVICE CMNT-IMP: ABNORMAL
SERVICE CMNT-IMP: ABNORMAL

## 2020-08-11 PROCEDURE — G0277 HBOT, FULL BODY CHAMBER, 30M: HCPCS | Performed by: PODIATRIST

## 2020-08-11 PROCEDURE — 82962 GLUCOSE BLOOD TEST: CPT

## 2020-08-11 NOTE — WOUND CARE
HBO PROGRESS NOTE  NAME: Samanta Sinclair MEDICAL RECORD NUMBER:  [id number]  AGE: 77 y.o. GENDER: male  : 1955  EPISODE DATE:  2020     Subjective     HBO Treatment Number: 20 out of Total Treatments: 30    HBO Diagnosis:  Indications: Compromised/Failed Flap/Graft to ___(site)(RILGHT LOWER LEG AMP)  Safety checks performed prior to treatment. See doc flowsheets for documentation. Objective           Lab Results   Component Value Date/Time    Glucose (POC) 204 (H) 2020 09:16 AM    Glucose (POC) 164 (H) 08/10/2020 03:44 PM       Pre treatment Vital Signs                               Post treatment Vital Signs                  Assessment        Physical Exam:  General Appearance: alert    Tympanic Membrane Assessment:  Pre-Treatment Left Left: Grade 0 Right Right: Grade 0  Post-treatment Left   Right        Patient place in a fully body Monoplace serial number Chamber #: [id number]       Treatment Start Time: 6628 Door Closed    Pressure Reached Time: 0950 Pressure Reached  ROSALINDA Depth: 2.5 ROSALINDA  Number of Air Breaks: Two 5 minute air breaks      Decompression Time: 1130 Decompression Time     Door Opened         Length of Treatment: 90 Minutes    Adverse Event:      I was present on these premises and immediately available to furnish assistance & direction throughout the procedure. Plan          Samanta Sinclair is a 77 y.o. male  did successfully complete today's hyperbaric oxygen treatment at [address] In my clinical judgement, ongoing HBO therapy is necessary at this time, given a threat to patient function, limb or life from the current condition. Supervision and attendance of Hyperbaric Oxygen Therapy provided. Continue HBO treatment as outlined in the treatment plan. Hyperbaric Oxygen: Samnata Sinclair tolerated Treatment Number: 20 well today without complications.      Electronically signed by Darron Romero DPM on 2020 at 11:53 AM

## 2020-08-11 NOTE — HYPERBARIC MEDICINE NOTE
HBO PROGRESS NOTE  NAME: Marine Christine MEDICAL RECORD NUMBER:  [id number]  AGE: 77 y.o. GENDER: male  : 1955  EPISODE DATE:  8/10/2020     Subjective     HBO Treatment Number: 19 out of Total Treatments: 30    HBO Diagnosis:  Indications: Compromised/Failed Flap/Graft to ___(site)(right lower leg amp)  Safety checks performed prior to treatment. See doc flowsheets for documentation. Objective           Lab Results   Component Value Date/Time    Glucose (POC) 182 (H) 2020 11:55 AM    Glucose (POC) 204 (H) 2020 09:16 AM       Pre treatment Vital Signs       Temp: 98.4 °F (36.9 °C)     Pulse (Heart Rate): 97         BP: 125/74       Post treatment Vital Signs  Temp: 98.4 °F (36.9 °C)  Pulse (Heart Rate): 97     BP: 125/74      Assessment        Physical Exam:  General Appearance: healthy, well developed, cooperative, smiling, pleasant and in no apparent distress    Tympanic Membrane Assessment:  Pre-Treatment Left Left: Grade 0 Right Right: Grade 0  Post-treatment Left   Grade 0 Right  Grade 0    Pulmonary/Chest: lungs clear to auscultation, no accessory muscle use    Cardiovascular: normal    Patient place in a fully body Monoplace serial number           Door Closed    Pressure Reached Time: 1415 Pressure Reached    ROSALINDA  Number of Air Breaks: Two 5 minute air breaks      Decompression Time: 0521 Decompression Time   Treatment End Time: 9379 Door Opened    Symptoms Noted During Treatment: None         Adverse Event: Treatment Completion Status: Treatment Completed without Adverse Event    I was present on these premises and immediately available to furnish assistance & direction throughout the procedure. Plan          Marine Christine is a 77 y.o. male  did successfully complete today's hyperbaric oxygen treatment at [address]     In my clinical judgement, ongoing HBO therapy is necessary at this time, given a threat to patient function, limb or life from the current condition. Supervision and attendance of Hyperbaric Oxygen Therapy provided. Continue HBO treatment as outlined in the treatment plan. Hyperbaric Oxygen: Taylor Gilman. tolerated Treatment Number: 83 well today without complications.      Electronically signed by Rizwana Coelho DPM on 8/10/2020 at 1:54 PM

## 2020-08-11 NOTE — TELEPHONE ENCOUNTER
New Patient Appointment Reminder and [phone] Screening      Have you or anyone in your house hold been tested for or have had confirmed positive Covid-19 test or suspected of or have you been around anyone that has had confirmed or suspected Covid-19? No      Does Patient have fever and/or lower respiratory symptoms? (shortness of breath, difficulty breathing, cough) If yes continue with travel screening questions and cancel patient appointment, and refer to to PCP or ED. No      Referred to PCP or to the closest ED and notified ED of pending patient arrival:  No   Open travel questions and document patient responses. If No stop here and give patient reminder time for appointment and ask them please limit to having only 1 person accompany to appointment if necessary, no children allowed at visits. Remind all patients and caretakers they must wear a mask when entering into the wound clinic. Reminder Appointment time given:  Yes

## 2020-08-12 ENCOUNTER — HOSPITAL ENCOUNTER (OUTPATIENT)
Dept: WOUND CARE | Age: 65
Discharge: HOME | End: 2020-08-12
Payer: MEDICARE

## 2020-08-12 VITALS — HEART RATE: 98 BPM | SYSTOLIC BLOOD PRESSURE: 153 MMHG | DIASTOLIC BLOOD PRESSURE: 79 MMHG | RESPIRATION RATE: 18 BRPM

## 2020-08-12 PROCEDURE — 99213 OFFICE O/P EST LOW 20 MIN: CPT

## 2020-08-12 NOTE — DISCHARGE INSTRUCTIONS
Discharge Instructions for  [id number]  [address] [facility], [address]  Telephone: [phone]    NAME:  Martin Potter. YOB: 1955  MEDICAL RECORD NUMBER:  [id number]  DATE:  8/12/2020      Wound Care:  Cleanse with normal saline. Pack loosely with unruly, cover with border foam. Snap vac to be reapplied at [address] [place] [address] tomorrow. Take antibiotics as ordered by Dr. Elle Patrick. Dietary:  [x] Diet as tolerated: [x] Increase Protein:      Activity:  [x] Activity as tolerated:              Return Appointment:  [x] Return Appointment: At [place] [address] as scheduled. Electronically signed on 8/12/2020 at [address] [place] [address] St: Should you experience any significant changes in your wound(s) or have questions about your wound care, please contact the [facility] at [address] [place] [address] 8:00 am - 4:30. If you need help with your wound outside these hours and cannot wait until we are again available, contact your PCP or go to the hospital emergency room. PLEASE NOTE: IF YOU ARE UNABLE TO OBTAIN WOUND SUPPLIES, CONTINUE TO USE THE SUPPLIES YOU HAVE AVAILABLE UNTIL YOU ARE ABLE TO REACH US. IT IS MOST IMPORTANT TO KEEP THE WOUND COVERED AT ALL TIMES.       Physician Signature:_______________________    Date: ___________ Time:  ____________

## 2020-08-12 NOTE — PROGRESS NOTES
[facility] Infectious Diseases follow up  Ambreen Moore MD     [address], [place], [facility] [place] [address]     Office: [phone]       Fax: [phone]           Reason for consult:      Date of Consultation:  8/12/20  Referring Physician:         Impression:   · Fibula osteomyelitis, wound M86.461  · Non pressure chronic rt leg wound L97.214  · DM with other skin ulcer E11.622  · CKD N18.3  · Anemia to follow up with PCP and nephrology team     Plan:   · Inability tolerate Daptomycin, high CPK and Vancomycin causing ESTRELLA, now on Ceftaroline. He is tolerating well. · I am adding Augmentin to cover Enterococcus in wound that is currently not being covered by Ceftaroline. Patient on probiotics and not having diarrhea at this point, to call us if any issues. · ESR, CRP still high. Anemia can cause ESR to go up but not CRP. · D/w patient lab work and recommend to extend Abx through 9/3/20 for now, I evaluate him again and decide further course. · Indications, complications, risk, benefits, alternatives dw pt, voiced understanding, and agree with current plan  · Continue to follow up with  for wound care,  Nephrologist, endocrinologist etc.  · Follow up with me at 3 weeks for Abx        Patient is a 75 y.o. male who was originally seen on 6/17/2020 with complaints of rt stump wound and fibula osteomyelitis. Patient has this wound on rt stump for 2 months with some drainage. NO fever/chills. Bone is exposed. Wound started with friction from his prosthetic boot. He has wound vac currently. He sometimes feel dizzy, ? Due to anemia. He denies any fever/chills. Bone is covering up some with tissue, not much change in size yet. Total CPK went up to 1000 with Daptomycin, changed to Vancomycin, caused worsening renal failure. Had to stop all previous Abx and now on Ceftaroline and tolerating well. On Probiotics, NO diarrhea currently.  His HbA1C has improved some, he stopped soda and now on white meat and diet control per endocrinology.                Allergies   Allergen Reactions    Adhesive Tape-Silicones Hives    Sulfa (Sulfonamide Antibiotics) Swelling       Lips eyes         Review of Systems:  A comprehensive review of systems was negative except for that written in the History of Present Illness.        Objective:     Vitals:    08/12/20 1156   BP: 153/79   Pulse: 98   Resp: 18       Exam:  General:  alert, cooperative, no distress, appears stated age  HEENT: pallor, NO icterus  Chest: CTA b/l  Heart: RRR S1S2  Abdomen: soft, NT, ND, BS+  Extremities: rt stump lateral aspect wound 1.5 x 1.2 x 1cm deep wound, bone exposed, NO drainage        Microbiology:    6/17/20 bone Cx with MRSA, Amp susceptible Enterococcus, Enterobacter     Studies:  MRI: 6/4/20 with wound deep to bone and fibula osteomyelitis        We appreciate your consult and the opportunity to participate in your patient's care.   Please call us with any questions or concerns.         MD Pawan Warren

## 2020-08-12 NOTE — WOUND CARE
08/12/20 1218   Wound Leg Lower Right;Lateral   Date First Assessed/Time First Assessed: 06/02/20 1439   POA: Yes  Location: Leg Lower  Wound Description (Optional): #1  Orientation: Right;Lateral   Dressing Type Applied Collagens/cell matrix; Foam   Procedure Tolerated Well   Discharge Condition: Stable     Pain: 0    Ambulatory Status: Walking    Discharge Destination: Home     Transportation: Car    Accompanied by: Family/Caregiver     Discharge instructions reviewed with Patient and copy or written instructions have been provided. All questions/concerns have been addressed at this time.

## 2020-08-12 NOTE — WOUND CARE
08/12/20 1156   Wound Leg Lower Right;Lateral   Date First Assessed/Time First Assessed: 06/02/20 1439   POA: Yes  Location: Leg Lower  Wound Description (Optional): #1  Orientation: Right;Lateral   Dressing Status  Removed   Dressing Type  Negative pressure wound therapy   Wound Length (cm) 1 cm   Wound Width (cm) 0.8 cm   Wound Depth (cm) 1.1   Wound Surface area (cm^2) 0.8 cm^2   Change in Wound Size % 46.67   Condition of Base Bone exposed   Condition of Edges Rolled/curled   Direction of Undermining 10 o'clock;11 o'clock;12 o'clock;1 o'clock   Depth of Undermining (cm) 1.6   Drainage Amount  Moderate   Drainage Color Serosanguinous   Wound Odor None   Periwound Skin Condition Macerated     Visit Vitals  /79 (BP 1 Location: Left arm, BP Patient Position: Sitting)   Pulse 98   Resp 18

## 2020-08-13 ENCOUNTER — HOSPITAL ENCOUNTER (OUTPATIENT)
Dept: WOUND CARE | Age: 65
Discharge: HOME | End: 2020-08-13
Admitting: PODIATRIST
Payer: MEDICARE

## 2020-08-13 VITALS
TEMPERATURE: 97.7 F | RESPIRATION RATE: 16 BRPM | SYSTOLIC BLOOD PRESSURE: 165 MMHG | DIASTOLIC BLOOD PRESSURE: 77 MMHG | HEART RATE: 95 BPM

## 2020-08-13 LAB
GLUCOSE BLD STRIP.AUTO-MCNC: 263 MG/DL (ref 65–100)
GLUCOSE BLD STRIP.AUTO-MCNC: 350 MG/DL (ref 65–100)
SERVICE CMNT-IMP: ABNORMAL
SERVICE CMNT-IMP: ABNORMAL

## 2020-08-13 PROCEDURE — 82962 GLUCOSE BLOOD TEST: CPT

## 2020-08-13 PROCEDURE — G0277 HBOT, FULL BODY CHAMBER, 30M: HCPCS | Performed by: FAMILY MEDICINE

## 2020-08-13 PROCEDURE — 97605 NEG PRS WND THER DME<=50SQCM: CPT | Performed by: FAMILY MEDICINE

## 2020-08-13 NOTE — PROGRESS NOTES
HBO PROGRESS NOTE  NAME: Citlali Branch MEDICAL RECORD NUMBER:  [id number]  AGE: 77 y.o. GENDER: male  : 1955  EPISODE DATE:  2020     Subjective     HBO Treatment Number: 21 out of Total Treatments: 30    HBO Diagnosis:  Indications: Lower Extremity Diabetic Wound ___(site)(RLE Nichole grade 3)  Safety checks performed prior to treatment. See doc flowsheets for documentation. Objective           Lab Results   Component Value Date/Time    Glucose (POC) 263 (H) 2020 03:33 PM    Glucose (POC) 350 (H) 2020 01:03 PM       Pre treatment Vital Signs       Temp: 98.1 °F (36.7 °C)     Pulse (Heart Rate): 100     Resp Rate: 14   BP: 151/67       Post treatment Vital Signs  Temp: 97.7 °F (36.5 °C)  Pulse (Heart Rate): 95  Resp Rate: 16  BP: 165/77      Assessment        Physical Exam:  General Appearance: healthy, alert and in no apparent distress    Tympanic Membrane Assessment:  Pre-Treatment Left Left: Grade 0 Right Right: Grade 0  Post-treatment Left   Right      Pulmonary/Chest: no accessory muscle use    Cardiovascular: normal    Patient place in a fully body Monoplace serial number Chamber #: [id number]       Treatment Start Time: 7095 Door Closed    Pressure Reached Time: 1332 Pressure Reached  ROSALINDA Depth: 2.5 ROSALINDA  Number of Air Breaks: Two 5 minute air breaks      Decompression Time: 1512 Decompression Time   Treatment End Time: 1529 Door Opened    Symptoms Noted During Treatment: None    Length of Treatment: 90 Minutes    Adverse Event: Treatment Completion Status: Treatment Completed without Adverse Event    I was present on these premises and immediately available to furnish assistance & direction throughout the procedure. Plan          Citlali Branch is a 77 y.o. male  did successfully complete today's hyperbaric oxygen treatment at [address]     In my clinical judgement, ongoing HBO therapy is necessary at this time, given a threat to patient function, limb or life from the current condition. Supervision and attendance of Hyperbaric Oxygen Therapy provided. Continue HBO treatment as outlined in the treatment plan. Hyperbaric Oxygen: Shaneka Keller. tolerated Treatment Number: 21 well today without complications.      Electronically signed by Radha Delgado MD on 8/13/2020 at 3:56 PM

## 2020-08-17 ENCOUNTER — HOSPITAL ENCOUNTER (OUTPATIENT)
Dept: WOUND CARE | Age: 65
End: 2020-08-17

## 2020-08-18 ENCOUNTER — HOSPITAL ENCOUNTER (OUTPATIENT)
Dept: WOUND CARE | Age: 65
Discharge: HOME | End: 2020-08-18
Payer: MEDICARE

## 2020-08-18 VITALS
HEART RATE: 96 BPM | DIASTOLIC BLOOD PRESSURE: 59 MMHG | RESPIRATION RATE: 18 BRPM | SYSTOLIC BLOOD PRESSURE: 123 MMHG | TEMPERATURE: 97 F

## 2020-08-18 VITALS
DIASTOLIC BLOOD PRESSURE: 59 MMHG | SYSTOLIC BLOOD PRESSURE: 123 MMHG | RESPIRATION RATE: 18 BRPM | TEMPERATURE: 97 F | HEART RATE: 96 BPM

## 2020-08-18 LAB
GLUCOSE BLD STRIP.AUTO-MCNC: 160 MG/DL (ref 65–100)
GLUCOSE BLD STRIP.AUTO-MCNC: 204 MG/DL (ref 65–100)
SERVICE CMNT-IMP: ABNORMAL
SERVICE CMNT-IMP: ABNORMAL

## 2020-08-18 PROCEDURE — G0277 HBOT, FULL BODY CHAMBER, 30M: HCPCS | Performed by: PODIATRIST

## 2020-08-18 PROCEDURE — 82962 GLUCOSE BLOOD TEST: CPT

## 2020-08-18 PROCEDURE — 74011000250 HC RX REV CODE- 250: Performed by: PODIATRIST

## 2020-08-18 RX ADMIN — Medication: at 10:22

## 2020-08-18 NOTE — WOUND CARE
Sierra Keyes DPM - Suzi SavageJoe Robison, [facility]     Assessment/Plan:  Type 2 Diabetes with leg ulcer (E11.622)    Non pressure chronic ulcer right leg to the level of bone (G62.349)    Chronic osteomyelitis of RLE (M86.461)    Nichole Grade 3 ulcer    - Pt evaluated and treated. - Pt presents with right BKA stump wound - wound dimensions improved, more bone is being covered by soft tissue  - Preformed wound debridement. See procedure note below  - Pt on PICC and IV abx  - Pt receiving HBO therapy  - Dressing consisting of Susana. Snap VAC. - Pt has returned to wearing his old prosthetic   - F/U 1 week. Subjective:  Pt complains of wound to right amputation site. Pt has been receiving HBO therapy with wound vac. No reported issues. Dive today cut short because of bowel urgency. Pt denies a recent h/o fever, chills, nausea, vomiting, chest pain, shortness of breath. HPI: Mr. Ruddy Loomis is a 76 yo AA male with a PMH of arthritis, BPH, CKD, chronic pain, DM2, dyslipidemia, GERD, HTN, s/p rt BKA, sleep apnea, thromboembolus lt leg who presents with a right BKA stump wound. Wound formed from ill fitting prosthetic. Pt has had wounds in the past from in this same manner. ROS:  Consitutional: no weight loss, night sweats, fatigue / malaise / lethargy. Musculoskeletal: no joint / extremity pain, misalignment, stiffness, decreased ROM, crepitus. Integument: No pruritis, rashes, lesions, right BKA stump wound.   Psychiatric: No depression, anxiety, paranoia      History:  Wound Care  Allergies   Allergen Reactions    Adhesive Tape-Silicones Hives    Sulfa (Sulfonamide Antibiotics) Swelling     Lips eyes     Family History   Problem Relation Age of Onset    Hypertension Mother    Ocampo Gout Mother     Cancer Father         leukemia    Diabetes Father  Hypertension Sister     Diabetes Maternal Grandmother     Hypertension Maternal Grandmother     Diabetes Paternal Grandmother     Hypertension Paternal Grandmother     Anesth Problems Neg Hx       Past Medical History:   Diagnosis Date    Arthritis     BPH (benign prostatic hyperplasia)     Chronic kidney disease     Chronic pain     BACK NEUROPATHY FEET HANDS    DM neuropathy, type II diabetes mellitus ([facility]     DM type 2 causing CKD stage 3 ([address] 12/17/2012    DM type 2 causing vascular disease ([address]     Dyslipidemia     Foot ulcer due to secondary DM ([address] 12/1/2012    GERD (gastroesophageal reflux disease)     HTN     Ill-defined condition 2013    MRSA in wound right leg (BKA)    S/P BKA (below knee amputation) ([facility]     right BKA due to non-healing ulcer    Sleep apnea     Thromboembolus ([facility] [facility] 1970'S    BLOOD CLOT LEFT LEG     Past Surgical History:   Procedure Laterality Date    ABDOMEN SURGERY PROC UNLISTED      pilonidal cyst    ECHO 2D ADULT  8/09    normal; LVEF 60%    ECHO STRESS  4/09    7 min; normal    ENDOSCOPY, COLON, DIAGNOSTIC      HX AMPUTATION  2012    left great toe    HX AMPUTATION  2007    BKA right    HX BELOW KNEE AMPUTATION Right     HX CERVICAL FUSION  2009    x2    HX ORTHOPAEDIC  2006    ACDF C4-C7    IR INSERT TUNL CVC W/O PORT OVER 5 YR  6/19/2020    STRESS TEST MYOVIEW  8/09    walked 8:46; normal; LVEF 51%     Social History     Tobacco Use    Smoking status: Never Smoker    Smokeless tobacco: Never Used   Substance Use Topics    Alcohol use: No       Social History     Substance and Sexual Activity   Alcohol Use No     Social History     Substance and Sexual Activity   Drug Use No      Social History     Tobacco Use   Smoking Status Never Smoker   Smokeless Tobacco Never Used     Current Outpatient Medications   Medication Sig    oxyCODONE IR (ROXICODONE) 10 mg tab immediate release tablet Take  by mouth.     oxyCODONE-acetaminophen (Percocet) 5-325 mg per tablet Take 1 Tab by mouth every four (4) hours as needed for Pain for up to 30 days. Max Daily Amount: 6 Tabs.  oxyCODONE-acetaminophen (PERCOCET) 5-325 mg per tablet TAKE ONE TABLET BY MOUTH EVERY 4 HOURS AS NEEDED FOR PAIN    busPIRone (BUSPAR) 5 mg tablet TAKE ONE TABLET BY MOUTH THREE TIMES DAILY AS NEEDED FOR ANXIETY    Insulin Needles, Disposable, (Bee Pen Needle) 32 gauge x 5/32\" ndle USE AS DIRECTED 4 TIMES DAILY    cefTARoline (TEFLARO) injection 300 mg by IntraVENous route every twelve (12) hours every twelve (12) hours.  lisinopriL (PRINIVIL, ZESTRIL) 10 mg tablet Take 1 Tab by mouth two (2) times a day.  glucose blood VI test strips (Prodigy No Coding) strip TEST BLOOD SUGAR 2 TIMES A DAY    rosuvastatin (CRESTOR) 40 mg tablet Take 1 Tab by mouth daily.  silver sulfADIAZINE (SILVADENE) 1 % topical cream Apply  to affected area daily.  albuterol (Ventolin HFA) 90 mcg/actuation inhaler Take 2 Puffs by inhalation every four (4) hours as needed for Wheezing.  pregabalin (Lyrica) 75 mg capsule Take 1 Cap by mouth two (2) times a day. Max Daily Amount: 150 mg.    ergocalciferol (Vitamin D2) 1,250 mcg (50,000 unit) capsule TAKE ONE CAPSULE BY MOUTH EVERY WEEK    Lantus Solostar U-100 Insulin 100 unit/mL (3 mL) inpn INJECT 40 TO 50 UNITS SUBCUTANEOUSLY TWICE DAILY AS DIRECTED    insulin lispro (HUMALOG) 100 unit/mL kwikpen INJECT 18 TO 30 UNITS SUBCUTANEOUSLY 3 TIMES DAILY BEFORE BREAKFAST, LUNCH, AND DINNER    pantoprazole (PROTONIX) 40 mg tablet TAKE ONE TABLET BY MOUTH EVERY DAY BEFORE BREAKFAST    ferrous sulfate 325 mg (65 mg iron) tablet TAKE ONE TABLET BY MOUTH 2 TIMES A DAY    aspirin 81 mg chewable tablet Take 81 mg by mouth daily. No current facility-administered medications for this encounter.          Objective:  Visit Vitals  /59 (BP 1 Location: Left arm, BP Patient Position: Sitting)   Pulse 96   Temp 97 °F (36.1 °C)   Resp 18 Vascular:  left LE  DP 1/4; PT 1/4  capillary fill time brisk, pitting edema is present, skin temperature is cool, varicosities are present. Dermatological:    Wound: 1  Location: right BKA stump   Measurements: per RN note  Margins: intact  Drainage: serous  Odor: no  Wound base: graunular  Lymphangitic streaking? No.  Undermining? yes  Sinus tracts? No.  Exposed bone? yes  Subcutaneous crepitation on palpation? No.    Skin is dry and scaly, exhibits hemosiderin deposition. There is no maceration of the interspaces of the feet b/l. Neurological:  DTR are present, protective sensation per 5.07 [place] Mauricio monofilament is intact, patient is AAOx3, mood is normal. Epicritic sensation is intact. Orthopedic:  Left LE are symmetric, ROM of ankle, STJ, 1st MTPJ is limited, MMT 5 out of 5 for B/L LE. Right BKA. Has prosthesis. Constitutional: Pt is a well developed, pleasant AA male. Lymphatics: negative tenderness to palpation of neck/axillary/inguinal nodes. Imaging / Labs / Cx / Px:  5/28 tib/fib XR: soft tissue ulcer extending to the rt distal fibula    Procedure Note:  Excisional debridement through level of muscle. Location / Ulcer: left leg  Indication: to remove non-viable tissue from wound bed. Consent in chart. Anesthesia: 4% xylocaine gel  Instrument: roncheur and curette   Bleeding: minimal  Hemostasis: pressure  Pre-Procedure Pain: 1  Post-Procedure Pain: 2  Area debrided < 20 cm sq. Pre-Debridement measurements: see nursing notes  Post-Debridement measurements: see nursing notes  This is part of a series of staged procedures in an attempt at limb salvage.

## 2020-08-18 NOTE — WOUND CARE
08/18/20 1013 Wound Leg Lower Right;Lateral  
Date First Assessed/Time First Assessed: 06/02/20 1439   POA: Yes  Location: Leg Lower  Wound Description (Optional): #1  Orientation: Right;Lateral  
Dressing Status  Old drainage Dressing Type   
(SNAP VAC) Wound Width (cm) 1 cm Wound Depth (cm) 0.7 Condition of Base Slough;Pink Condition of Edges Rolled/curled Direction of Tunnel 1 o'clock Depth of Tunnel/Sinus (cm) 1.6 cm Drainage Amount  Moderate Drainage Color Serosanguinous Wound Odor None Periwound Skin Condition Macerated Cleansing and Cleansing Agents  Normal saline Visit Vitals /59 (BP 1 Location: Left arm, BP Patient Position: Sitting) Pulse 96 Temp 97 °F (36.1 °C) Resp 18

## 2020-08-18 NOTE — HYPERBARIC MEDICINE NOTE
Patient c/o bowel urgency during HBO treatment, patient had not reached depth. DPM notified. Treatment terminated early. VSS and  after treatment termination both reported to [facility]. Patient denies pain or abdominal discomfort. Patient is scheduled for a wound visit today as well.   Visit Vitals  /59 (BP 1 Location: Left arm, BP Patient Position: Sitting)   Pulse 96   Temp 97 °F (36.1 °C)   Resp 18

## 2020-08-18 NOTE — DISCHARGE INSTRUCTIONS
Discharge Instructions for  [facility]  [address] [place], [address] [place]  Telephone: [phone]    NAME:  Humera Boyle OF BIRTH:  1955  DATE:  8/04/2020      Probiotics while on antibiotics  Will call Angio at [facility] to set up [facility] catheter placement and place order with [employer] for antibiotic therapy. [x] Wound and dressing supply provider: [facility]          Wound Cleansing:   Do not scrub or use excessive force. Cleanse wound prior to applying a clean dressing with:    [x] Normal Saline   [] Keep Wound Dry in Shower      [x] Wound Cleanser (***)  [] May Shower at Discharge   [] cleanse with Darcy Dereje and Darcy Dereje baby shampoo lather leave 2-3 then rinse with water, pat dry and redress wound. Dressings:           Wound Location right lat leg     Apply Primary Dressing:unruly snap vac                                               []     Change dressing:   [x] Daily      [] Every Other Day   [] Three times per week  [] Once a week   [] Do Not Change Dressing     [] Other:    Off-Loading:   [x] Off-loading when [x] walking  [] in bed [] sitting    Dietary:  [x] Diet as tolerated: [] Diabetic Diet:   [] Increase Protein: examples ( Meat, cheese, eggs, greek yogurt, premier protein drink, fish, nuts )   [] Other:    Return Appointment:      [] Return Appointment: With Dr. Charles Ramos 1 WEEK,          Electronically signed on 8/4/2020     [address] [facility] Information: Should you experience any significant changes in your wound(s) or have questions about your wound care, please contact the [address] [place] [address] at [address] [place] [address] 8:00 am - 4:30. If you need help with your wound outside these hours and cannot wait until we are again available, contact your PCP or go to the hospital emergency room.      PLEASE NOTE: IF YOU ARE UNABLE TO OBTAIN WOUND SUPPLIES, CONTINUE TO USE THE SUPPLIES YOU HAVE AVAILABLE UNTIL YOU ARE ABLE TO REACH US. IT IS MOST IMPORTANT TO KEEP THE WOUND COVERED AT ALL TIMES.      Physician Signature:_______________________  Dr. Mikhail Mondragon

## 2020-08-18 NOTE — WOUND CARE
08/18/20 1013   Wound Leg Lower Right;Lateral   Date First Assessed/Time First Assessed: 06/02/20 1439   POA: Yes  Location: Leg Lower  Wound Description (Optional): #1  Orientation: Right;Lateral   Dressing Status  Old drainage   Dressing Type    (SNAP VAC)   Wound Length (cm) 1 cm   Wound Width (cm) 0.7 cm   Wound Depth (cm) 1.1   Wound Surface area (cm^2) 0.7 cm^2   Change in Wound Size % 53.33   Condition of Base Slough;Pink   Condition of Edges Rolled/curled   Direction of Tunnel 1 o'clock   Depth of Tunnel/Sinus (cm) 1.6 cm   Drainage Amount  Moderate   Drainage Color Serosanguinous   Wound Odor None   Periwound Skin Condition Macerated   Cleansing and Cleansing Agents  Normal saline     Visit Vitals  /59 (BP 1 Location: Left arm, BP Patient Position: Sitting)   Pulse 96   Temp 97 °F (36.1 °C)   Resp 18

## 2020-08-18 NOTE — WOUND CARE
08/18/20 1039   Wound Leg Lower Right;Lateral   Date First Assessed/Time First Assessed: 06/02/20 1439   POA: Yes  Location: Leg Lower  Wound Description (Optional): #1  Orientation: Right;Lateral   Dressing Type Applied Collagens/cell matrix  (SNAP VAC)   Discharge Condition: Stable     Pain: 0    Ambulatory Status: Walking    Discharge Destination: Home     Transportation: Car    Accompanied by: Self     Discharge instructions reviewed with Patient and copy or written instructions have been provided. All questions/concerns have been addressed at this time.

## 2020-08-19 ENCOUNTER — HOSPITAL ENCOUNTER (OUTPATIENT)
Dept: WOUND CARE | Age: 65
Discharge: HOME | End: 2020-08-19
Payer: MEDICARE

## 2020-08-19 VITALS
SYSTOLIC BLOOD PRESSURE: 163 MMHG | RESPIRATION RATE: 16 BRPM | DIASTOLIC BLOOD PRESSURE: 82 MMHG | HEART RATE: 88 BPM | TEMPERATURE: 97.1 F

## 2020-08-19 LAB
GLUCOSE BLD STRIP.AUTO-MCNC: 273 MG/DL (ref 65–100)
GLUCOSE BLD STRIP.AUTO-MCNC: 299 MG/DL (ref 65–100)
SERVICE CMNT-IMP: ABNORMAL
SERVICE CMNT-IMP: ABNORMAL

## 2020-08-19 PROCEDURE — 82962 GLUCOSE BLOOD TEST: CPT

## 2020-08-19 PROCEDURE — G0277 HBOT, FULL BODY CHAMBER, 30M: HCPCS | Performed by: EMERGENCY MEDICINE

## 2020-08-20 ENCOUNTER — HOSPITAL ENCOUNTER (OUTPATIENT)
Dept: WOUND CARE | Age: 65
Discharge: HOME | End: 2020-08-20
Payer: MEDICARE

## 2020-08-20 VITALS
RESPIRATION RATE: 16 BRPM | SYSTOLIC BLOOD PRESSURE: 170 MMHG | DIASTOLIC BLOOD PRESSURE: 72 MMHG | TEMPERATURE: 96.9 F | HEART RATE: 85 BPM

## 2020-08-20 LAB
GLUCOSE BLD STRIP.AUTO-MCNC: 180 MG/DL (ref 65–100)
GLUCOSE BLD STRIP.AUTO-MCNC: 195 MG/DL (ref 65–100)
SERVICE CMNT-IMP: ABNORMAL
SERVICE CMNT-IMP: ABNORMAL

## 2020-08-20 PROCEDURE — G0277 HBOT, FULL BODY CHAMBER, 30M: HCPCS | Performed by: FAMILY MEDICINE

## 2020-08-20 PROCEDURE — 82962 GLUCOSE BLOOD TEST: CPT

## 2020-08-20 NOTE — PROGRESS NOTES
HBO PROGRESS NOTE  NAME: Claude Kick. MEDICAL RECORD NUMBER:  [id number]  AGE: 77 y.o. GENDER: male  : 1955  EPISODE DATE:  2020     Subjective     HBO Treatment Number: 24 out of Total Treatments: 30    HBO Diagnosis:  Indications: Lower Extremity Diabetic Wound ___(site)(Nichole grade 3 RLE)  Safety checks performed prior to treatment. See doc flowsheets for documentation. Objective           Lab Results   Component Value Date/Time    Glucose (POC) 180 (H) 2020 03:34 PM    Glucose (POC) 195 (H) 2020 12:50 PM       Pre treatment Vital Signs       Temp: 97.4 °F (36.3 °C)     Pulse (Heart Rate): 95     Resp Rate: 14   BP: 136/60       Post treatment Vital Signs  Temp: 96.9 °F (36.1 °C)  Pulse (Heart Rate): 85  Resp Rate: 16  BP: 170/72      Assessment        Physical Exam:  General Appearance: alert, cooperative and in no apparent distress    Tympanic Membrane Assessment:  Pre-Treatment Left Left: Grade 0 Right Right: Grade 0  Post-treatment Left   Right      Pulmonary/Chest: no accessory muscle use    Cardiovascular: normal    Patient place in a fully body Monoplace serial number Chamber #: [id number]       Treatment Start Time:  Door Closed    Pressure Reached Time: 1324 Pressure Reached  ROSALINDA Depth: 2.5 ROSALINDA  Number of Air Breaks: Two 5 minute air breaks      Decompression Time: 1504 Decompression Time   Treatment End Time:  Door Opened    Symptoms Noted During Treatment: None         Adverse Event: Treatment Completion Status: Treatment Completed without Adverse Event    I was present on these premises and immediately available to furnish assistance & direction throughout the procedure. Plan          Claude Kick. is a 77 y.o. male  did successfully complete today's hyperbaric oxygen treatment at [address]     In my clinical judgement, ongoing HBO therapy is necessary at this time, given a threat to patient function, limb or life from the current condition. Supervision and attendance of Hyperbaric Oxygen Therapy provided. Continue HBO treatment as outlined in the treatment plan. Hyperbaric Oxygen: Martin Potter. tolerated Treatment Number: 24 well today without complications.      Electronically signed by Leopoldo Ade, MD on 8/20/2020 at 4:13 PM

## 2020-08-25 ENCOUNTER — HOSPITAL ENCOUNTER (OUTPATIENT)
Dept: WOUND CARE | Age: 65
Discharge: HOME | End: 2020-08-25

## 2020-08-25 ENCOUNTER — HOSPITAL ENCOUNTER (OUTPATIENT)
Dept: WOUND CARE | Age: 65
Discharge: HOME | End: 2020-08-25
Payer: MEDICARE

## 2020-08-25 VITALS
SYSTOLIC BLOOD PRESSURE: 181 MMHG | RESPIRATION RATE: 14 BRPM | HEART RATE: 86 BPM | DIASTOLIC BLOOD PRESSURE: 86 MMHG | TEMPERATURE: 96.9 F

## 2020-08-25 VITALS
DIASTOLIC BLOOD PRESSURE: 86 MMHG | RESPIRATION RATE: 14 BRPM | HEART RATE: 86 BPM | TEMPERATURE: 96.9 F | SYSTOLIC BLOOD PRESSURE: 181 MMHG

## 2020-08-25 PROCEDURE — 97605 NEG PRS WND THER DME<=50SQCM: CPT | Performed by: PODIATRIST

## 2020-08-25 PROCEDURE — G0277 HBOT, FULL BODY CHAMBER, 30M: HCPCS | Performed by: PODIATRIST

## 2020-08-25 PROCEDURE — 11044 DBRDMT BONE 1ST 20 SQ CM/<: CPT | Performed by: PODIATRIST

## 2020-08-25 NOTE — WOUND CARE
HBO PROGRESS NOTE  NAME: Chinyere Terrell MEDICAL RECORD NUMBER:  [id number]  AGE: 77 y.o. GENDER: male  : 1955  EPISODE DATE:  2020     Subjective     HBO Treatment Number: 25 out of Total Treatments: 30    HBO Diagnosis:  Indications: Lower Extremity Diabetic Wound ___(site)(saldivar grade 3)  Safety checks performed prior to treatment. See doc flowsheets for documentation. Objective           Lab Results   Component Value Date/Time    Glucose (POC) 180 (H) 2020 03:34 PM    Glucose (POC) 195 (H) 2020 12:50 PM       Pre treatment Vital Signs       Temp: 97.2 °F (36.2 °C)     Pulse (Heart Rate): 95     Resp Rate: 16   BP: 151/70       Post treatment Vital Signs  Temp: 96.9 °F (36.1 °C)  Pulse (Heart Rate): 86  Resp Rate: 14  BP: 181/86      Assessment        Physical Exam:  General Appearance: alert    Tympanic Membrane Assessment:  Pre-Treatment Left Left: Grade 0 Right Right: Grade 0  Post-treatment Left Left: Grade 0 Right Right: Grade 0    Patient place in a fully body Monoplace serial number Chamber #: [id number]       Treatment Start Time: 2495 Door Closed    Pressure Reached Time: 0920 Pressure Reached  ROSALINDA Depth: 2.5 ROSALINDA  Number of Air Breaks: Two 5 minute air breaks      Decompression Time: 1100 Decompression Time     Door Opened         Length of Treatment: 90 Minutes    Adverse Event:      I was present on these premises and immediately available to furnish assistance & direction throughout the procedure. Plan          Chinyere Gar. is a 77 y.o. male  did successfully complete today's hyperbaric oxygen treatment at [address] In my clinical judgement, ongoing HBO therapy is necessary at this time, given a threat to patient function, limb or life from the current condition. Supervision and attendance of Hyperbaric Oxygen Therapy provided. Continue HBO treatment as outlined in the treatment plan.     Hyperbaric Oxygen: Helio Bonds Hayden Quintanilla. tolerated Treatment Number: 25 well today without complications.      Electronically signed by Isabel Alexander DPM on 8/25/2020 at 11:40 AM

## 2020-08-25 NOTE — WOUND CARE
08/25/20 1147   Wound Leg Lower Right;Lateral   Date First Assessed/Time First Assessed: 06/02/20 1439   POA: Yes  Location: Leg Lower  Wound Description (Optional): #1  Orientation: Right;Lateral   Dressing Type Applied Collagens/cell matrix  (snap vac)   Wound Procedure Type Debridement- Surgical   Procedure Time Out 1147   Consent Obtained  Yes   Procedure Bleeding Minimal   Procedure Hemostasis  Pressure   Type of Tissue Removed  Devitalized   Procedure Instrument  Curette   Procedure Pain Scale Numeric 0/10   Debridement Procedure Performed by charlette   Post-Procedure Length (cm) 1.2 cm   Post-Procedure Width (cm) 0.7 cm   Post-Procedure Depth (cm) 1.2 cm   Post-Procedure Volume (cm^3) 1.01 cm^3   Post-Procedure Surface Area (cm^2) 0.84 cm^2   Procedure Tolerated Well   Discharge Condition: Stable     Pain: 0    Ambulatory Status: Walking    Discharge Destination: Home     Transportation: Car    Accompanied by: Family/Caregiver     Discharge instructions reviewed with Patient and copy or written instructions have been provided. All questions/concerns have been addressed at this time.

## 2020-08-25 NOTE — WOUND CARE
Jordan King DPM - James Godwin. Floy, 8 [facility] - H&P     Assessment/Plan:  Type 2 Diabetes with leg ulcer (E11.622)    Non pressure chronic ulcer right leg to the level of bone (T33.322)    Chronic osteomyelitis of RLE (M86.461)    Nichole Grade 3 ulcer    - Pt evaluated and treated. - Pt presents with right BKA stump wound - wound dimensions improved, more bone is being covered by soft tissue  - Preformed wound debridement. See procedure note below  - Pt on PICC and IV abx  - Pt receiving HBO therapy. Wound has significantly improved with this therapy and patient will benefit from continued treatment. It is medically necessary to continue with HBO therapy to aid in wound closure as wound extends down to bone. Pt is at high risk for revisional amputation further up leg if wound doesn't heal.   - Dressing consisting of Susana. Snap VAC. - Pt has returned to wearing his old prosthetic   - F/U 1 week. Subjective:  Pt complains of wound to right amputation site. Pt has been receiving HBO therapy with wound vac. No reported issues. Pt denies a recent h/o fever, chills, nausea, vomiting, chest pain, shortness of breath. HPI: Mr. Altaf Ruiz is a 76 yo AA male with a PMH of arthritis, BPH, CKD, chronic pain, DM2, dyslipidemia, GERD, HTN, s/p rt BKA, sleep apnea, thromboembolus lt leg who presents with a right BKA stump wound. Wound formed from ill fitting prosthetic. Pt has had wounds in the past from in this same manner. ROS:  Consitutional: no weight loss, night sweats, fatigue / malaise / lethargy. Musculoskeletal: no joint / extremity pain, misalignment, stiffness, decreased ROM, crepitus. Integument: No pruritis, rashes, lesions, right BKA stump wound.   Psychiatric: No depression, anxiety, paranoia      History:  Wound Care  Allergies   Allergen Reactions    Adhesive Tape-Silicones Hives    Sulfa (Sulfonamide Antibiotics) Swelling     Lips eyes     Family History   Problem Relation Age of Onset    Hypertension Mother    [facility] Gout Mother     Cancer Father         leukemia    Diabetes Father     Hypertension Sister     Diabetes Maternal Grandmother     Hypertension Maternal Grandmother     Diabetes Paternal Grandmother     Hypertension Paternal Grandmother     Anesth Problems Neg Hx       Past Medical History:   Diagnosis Date    Arthritis     BPH (benign prostatic hyperplasia)     Chronic kidney disease     Chronic pain     BACK NEUROPATHY FEET HANDS    DM neuropathy, type II diabetes mellitus ([address]     DM type 2 causing CKD stage 3 ([address] 12/17/2012    DM type 2 causing vascular disease ([address]     Dyslipidemia     Foot ulcer due to secondary DM ([address] 12/1/2012    GERD (gastroesophageal reflux disease)     HTN     Ill-defined condition 2013    MRSA in wound right leg (BKA)    S/P BKA (below knee amputation) ([address]     right BKA due to non-healing ulcer    Sleep apnea     Thromboembolus ([address] 1970'S    BLOOD CLOT LEFT LEG     Past Surgical History:   Procedure Laterality Date    ABDOMEN SURGERY PROC UNLISTED      pilonidal cyst    ECHO 2D ADULT  8/09    normal; LVEF 60%    ECHO STRESS  4/09    7 min; normal    ENDOSCOPY, COLON, DIAGNOSTIC      HX AMPUTATION  2012    left great toe    HX AMPUTATION  2007    BKA right    HX BELOW KNEE AMPUTATION Right     HX CERVICAL FUSION  2009    x2    HX ORTHOPAEDIC  2006    ACDF C4-C7    IR INSERT TUNL CVC W/O PORT OVER 5 YR  6/19/2020    STRESS TEST MYOVIEW  8/09    walked 8:46; normal; LVEF 51%     Social History     Tobacco Use    Smoking status: Never Smoker    Smokeless tobacco: Never Used   Substance Use Topics    Alcohol use: No       Social History     Substance and Sexual Activity   Alcohol Use No     Social History     Substance and Sexual Activity   Drug Use No      Social History     Tobacco Use   Smoking Status Never Smoker   Smokeless Tobacco Never Used     Current Outpatient Medications   Medication Sig    oxyCODONE IR (ROXICODONE) 10 mg tab immediate release tablet Take  by mouth.  oxyCODONE-acetaminophen (Percocet) 5-325 mg per tablet Take 1 Tab by mouth every four (4) hours as needed for Pain for up to 30 days. Max Daily Amount: 6 Tabs.  oxyCODONE-acetaminophen (PERCOCET) 5-325 mg per tablet TAKE ONE TABLET BY MOUTH EVERY 4 HOURS AS NEEDED FOR PAIN    busPIRone (BUSPAR) 5 mg tablet TAKE ONE TABLET BY MOUTH THREE TIMES DAILY AS NEEDED FOR ANXIETY    Insulin Needles, Disposable, (Bee Pen Needle) 32 gauge x 5/32\" ndle USE AS DIRECTED 4 TIMES DAILY    cefTARoline (TEFLARO) injection 300 mg by IntraVENous route every twelve (12) hours every twelve (12) hours.  lisinopriL (PRINIVIL, ZESTRIL) 10 mg tablet Take 1 Tab by mouth two (2) times a day.  glucose blood VI test strips (Prodigy No Coding) strip TEST BLOOD SUGAR 2 TIMES A DAY    rosuvastatin (CRESTOR) 40 mg tablet Take 1 Tab by mouth daily.  silver sulfADIAZINE (SILVADENE) 1 % topical cream Apply  to affected area daily.  albuterol (Ventolin HFA) 90 mcg/actuation inhaler Take 2 Puffs by inhalation every four (4) hours as needed for Wheezing.  pregabalin (Lyrica) 75 mg capsule Take 1 Cap by mouth two (2) times a day.  Max Daily Amount: 150 mg.    ergocalciferol (Vitamin D2) 1,250 mcg (50,000 unit) capsule TAKE ONE CAPSULE BY MOUTH EVERY WEEK    Lantus Solostar U-100 Insulin 100 unit/mL (3 mL) inpn INJECT 40 TO 50 UNITS SUBCUTANEOUSLY TWICE DAILY AS DIRECTED    insulin lispro (HUMALOG) 100 unit/mL kwikpen INJECT 18 TO 30 UNITS SUBCUTANEOUSLY 3 TIMES DAILY BEFORE BREAKFAST, LUNCH, AND DINNER    pantoprazole (PROTONIX) 40 mg tablet TAKE ONE TABLET BY MOUTH EVERY DAY BEFORE BREAKFAST    ferrous sulfate 325 mg (65 mg iron) tablet TAKE ONE TABLET BY MOUTH 2 TIMES A DAY    aspirin 81 mg chewable tablet Take 81 mg by mouth daily. No current facility-administered medications for this encounter. Objective:  Visit Vitals  /86   Pulse 86   Temp 96.9 °F (36.1 °C)   Resp 14       Vascular:  left LE  DP 1/4; PT 1/4  capillary fill time brisk, pitting edema is present, skin temperature is cool, varicosities are present. Dermatological:    Wound: 1  Location: right BKA stump   Measurements: per RN note  Margins: intact  Drainage: serous  Odor: no  Wound base: graunular  Lymphangitic streaking? No.  Undermining? yes  Sinus tracts? No.  Exposed bone? yes  Subcutaneous crepitation on palpation? No.    Skin is dry and scaly, exhibits hemosiderin deposition. There is no maceration of the interspaces of the feet b/l. Neurological:  DTR are present, protective sensation per 5.07 [place] Mauricio monofilament is intact, patient is AAOx3, mood is normal. Epicritic sensation is intact. Orthopedic:  Left LE are symmetric, ROM of ankle, STJ, 1st MTPJ is limited, MMT 5 out of 5 for B/L LE. Right BKA. Has prosthesis. Constitutional: Pt is a well developed, pleasant AA male. Lymphatics: negative tenderness to palpation of neck/axillary/inguinal nodes. Imaging / Labs / Cx / Px:  5/28 tib/fib XR: soft tissue ulcer extending to the rt distal fibula    Procedure Note:  Excisional debridement through level of muscle. Location / Ulcer: left leg  Indication: to remove non-viable tissue from wound bed. Consent in chart. Anesthesia: 4% xylocaine gel  Instrument: roncheur and curette   Bleeding: minimal  Hemostasis: pressure  Pre-Procedure Pain: 1  Post-Procedure Pain: 2  Area debrided < 20 cm sq. Pre-Debridement measurements: see nursing notes  Post-Debridement measurements: see nursing notes  This is part of a series of staged procedures in an attempt at limb salvage.

## 2020-08-25 NOTE — WOUND CARE
08/25/20 1135   Wound Leg Lower Right;Lateral   Date First Assessed/Time First Assessed: 06/02/20 1439   POA: Yes  Location: Leg Lower  Wound Description (Optional): #1  Orientation: Right;Lateral   Dressing Status  Old drainage   Dressing Type  Collagens/cell matrix  (SNAP VAC)   Wound Length (cm) 1.2 cm   Wound Width (cm) 0.7 cm   Wound Depth (cm) 1.1   Wound Surface area (cm^2) 0.84 cm^2   Change in Wound Size % 44   Condition of Base Slough;Pink   Condition of Edges Rolled/curled   Direction of Tunnel 1 o'clock   Depth of Tunnel/Sinus (cm) 1.6 cm   Drainage Amount  Moderate   Drainage Color Serosanguinous   Wound Odor None   Periwound Skin Condition Macerated   Cleansing and Cleansing Agents  Normal saline     Visit Vitals  /86   Pulse 86   Temp 96.9 °F (36.1 °C)   Resp 14   Due to network connection error unable to obtain and upload image

## 2020-08-26 ENCOUNTER — HOSPITAL ENCOUNTER (OUTPATIENT)
Dept: WOUND CARE | Age: 65
Discharge: HOME | End: 2020-08-26
Admitting: PODIATRIST
Payer: MEDICARE

## 2020-08-26 VITALS
TEMPERATURE: 96.9 F | HEART RATE: 84 BPM | RESPIRATION RATE: 14 BRPM | SYSTOLIC BLOOD PRESSURE: 181 MMHG | DIASTOLIC BLOOD PRESSURE: 85 MMHG

## 2020-08-26 DIAGNOSIS — N18.30 DM TYPE 2 CAUSING CKD STAGE 3 (HCC): Chronic | ICD-10-CM

## 2020-08-26 DIAGNOSIS — E11.22 DM TYPE 2 CAUSING CKD STAGE 3 (HCC): Chronic | ICD-10-CM

## 2020-08-26 PROCEDURE — G0277 HBOT, FULL BODY CHAMBER, 30M: HCPCS | Performed by: EMERGENCY MEDICINE

## 2020-08-26 RX ORDER — INSULIN LISPRO 100 [IU]/ML
INJECTION, SOLUTION INTRAVENOUS; SUBCUTANEOUS
Qty: 15 ML | Refills: 0 | Status: SHIPPED | OUTPATIENT
Start: 2020-08-26 | End: 2020-09-17

## 2020-08-26 RX ORDER — INSULIN GLARGINE 100 [IU]/ML
INJECTION, SOLUTION SUBCUTANEOUS
Qty: 15 ML | Refills: 0 | Status: SHIPPED | OUTPATIENT
Start: 2020-08-26 | End: 2020-11-25

## 2020-08-26 NOTE — PROGRESS NOTES
HBO PROGRESS NOTE  NAME: Marine Christine MEDICAL RECORD NUMBER:  [id number]  AGE: 77 y.o. GENDER: male  : 1955  EPISODE DATE:  2020     Subjective     HBO Treatment Number: 26 out of Total Treatments: 30    HBO Diagnosis:  Indications: Lower Extremity Diabetic Wound ___(site)(saldivar grade 3 RLE)  Safety checks performed prior to treatment. See doc flowsheets for documentation. Objective           Lab Results   Component Value Date/Time    Glucose (POC) 180 (H) 2020 03:34 PM    Glucose (POC) 195 (H) 2020 12:50 PM       Pre treatment Vital Signs       Temp: 97.2 °F (36.2 °C)     Pulse (Heart Rate): 99     Resp Rate: 16   BP: 129/62       Post treatment Vital Signs  Temp: 96.9 °F (36.1 °C)  Pulse (Heart Rate): 84  Resp Rate: 14  BP: 181/85      Assessment        Physical Exam:  General Appearance: alert, well developed and in no apparent distress    Tympanic Membrane Assessment:  Pre-Treatment Left Left: Grade 0 Right Right: Grade 0  Post-treatment Left Left: Grade 0 Right Right: Grade 0    Pulmonary/Chest: no accessory muscle use    Cardiovascular: normal    Patient place in a fully body Monoplace serial number Chamber #: [id number]       Treatment Start Time: 5077 Door Closed    Pressure Reached Time: 1316 Pressure Reached  ROSALINDA Depth: 2.5 ROSALINDA  Number of Air Breaks: Two 5 minute air breaks      Decompression Time: 1456 Decompression Time   Treatment End Time: 4693 Door Opened         Length of Treatment: 90 Minutes    Adverse Event: Treatment Completion Status: Treatment Completed without Adverse Event    I was present on these premises and immediately available to furnish assistance & direction throughout the procedure. Plan          Marine Silva. is a 77 y.o. male  did successfully complete today's hyperbaric oxygen treatment at [address]     In my clinical judgement, ongoing HBO therapy is necessary at this time, given a threat to patient function, limb or life from the current condition. Supervision and attendance of Hyperbaric Oxygen Therapy provided. Continue HBO treatment as outlined in the treatment plan. Hyperbaric Oxygen: Citlali Ga. tolerated Treatment Number: 26 well today without complications.      Electronically signed by Aspen Friedman MD on 8/26/2020 at 4:40 PM

## 2020-08-27 ENCOUNTER — HOSPITAL ENCOUNTER (OUTPATIENT)
Dept: WOUND CARE | Age: 65
Discharge: HOME | End: 2020-08-27
Payer: MEDICARE

## 2020-08-27 VITALS
SYSTOLIC BLOOD PRESSURE: 153 MMHG | DIASTOLIC BLOOD PRESSURE: 75 MMHG | HEART RATE: 87 BPM | TEMPERATURE: 96.9 F | RESPIRATION RATE: 18 BRPM

## 2020-08-27 LAB
GLUCOSE BLD STRIP.AUTO-MCNC: 163 MG/DL (ref 65–100)
GLUCOSE BLD STRIP.AUTO-MCNC: 212 MG/DL (ref 65–100)
SERVICE CMNT-IMP: ABNORMAL
SERVICE CMNT-IMP: ABNORMAL

## 2020-08-27 PROCEDURE — 82962 GLUCOSE BLOOD TEST: CPT

## 2020-08-27 PROCEDURE — G0277 HBOT, FULL BODY CHAMBER, 30M: HCPCS | Performed by: FAMILY MEDICINE

## 2020-08-27 NOTE — HYPERBARIC MEDICINE NOTE
Patient c/o bowel urgency during treatment had had an episode of incontinence. MD notified. Treatment terminated early. Patient VSS, denies abdominal pain, states \"I feel great, It just came on me suddenly\" . MD aware.

## 2020-08-31 ENCOUNTER — HOSPITAL ENCOUNTER (OUTPATIENT)
Dept: WOUND CARE | Age: 65
Discharge: HOME | End: 2020-08-31
Payer: MEDICARE

## 2020-08-31 VITALS
DIASTOLIC BLOOD PRESSURE: 83 MMHG | HEART RATE: 82 BPM | TEMPERATURE: 96.9 F | SYSTOLIC BLOOD PRESSURE: 177 MMHG | RESPIRATION RATE: 18 BRPM

## 2020-08-31 LAB
GLUCOSE BLD STRIP.AUTO-MCNC: 127 MG/DL (ref 65–100)
GLUCOSE BLD STRIP.AUTO-MCNC: 151 MG/DL (ref 65–100)
GLUCOSE BLD STRIP.AUTO-MCNC: 166 MG/DL (ref 65–100)
GLUCOSE BLD STRIP.AUTO-MCNC: 213 MG/DL (ref 65–100)
GLUCOSE BLD STRIP.AUTO-MCNC: 223 MG/DL (ref 65–100)
GLUCOSE BLD STRIP.AUTO-MCNC: 236 MG/DL (ref 65–100)
SERVICE CMNT-IMP: ABNORMAL

## 2020-08-31 PROCEDURE — 82962 GLUCOSE BLOOD TEST: CPT

## 2020-08-31 PROCEDURE — G0277 HBOT, FULL BODY CHAMBER, 30M: HCPCS | Performed by: FAMILY MEDICINE

## 2020-09-01 ENCOUNTER — HOSPITAL ENCOUNTER (OUTPATIENT)
Dept: WOUND CARE | Age: 65
Discharge: HOME | End: 2020-09-01
Payer: MEDICARE

## 2020-09-01 ENCOUNTER — TELEPHONE (OUTPATIENT)
Dept: WOUND CARE | Age: 65
End: 2020-09-01

## 2020-09-01 ENCOUNTER — HOSPITAL ENCOUNTER (OUTPATIENT)
Dept: WOUND CARE | Age: 65
Discharge: HOME | End: 2020-09-01
Admitting: PODIATRIST
Payer: MEDICARE

## 2020-09-01 VITALS
HEART RATE: 92 BPM | DIASTOLIC BLOOD PRESSURE: 88 MMHG | SYSTOLIC BLOOD PRESSURE: 187 MMHG | TEMPERATURE: 97 F | RESPIRATION RATE: 18 BRPM

## 2020-09-01 DIAGNOSIS — M79.605 PAIN IN BOTH LOWER EXTREMITIES: Primary | ICD-10-CM

## 2020-09-01 DIAGNOSIS — M54.41 CHRONIC BILATERAL LOW BACK PAIN WITH BILATERAL SCIATICA: ICD-10-CM

## 2020-09-01 DIAGNOSIS — Z89.9 S/P AMPUTATION OF LIMB: ICD-10-CM

## 2020-09-01 DIAGNOSIS — M54.42 CHRONIC BILATERAL LOW BACK PAIN WITH BILATERAL SCIATICA: ICD-10-CM

## 2020-09-01 DIAGNOSIS — M79.604 PAIN IN BOTH LOWER EXTREMITIES: Primary | ICD-10-CM

## 2020-09-01 DIAGNOSIS — G89.29 CHRONIC BILATERAL LOW BACK PAIN WITH BILATERAL SCIATICA: ICD-10-CM

## 2020-09-01 LAB
GLUCOSE BLD STRIP.AUTO-MCNC: 219 MG/DL (ref 65–100)
GLUCOSE BLD STRIP.AUTO-MCNC: 245 MG/DL (ref 65–100)
SERVICE CMNT-IMP: ABNORMAL
SERVICE CMNT-IMP: ABNORMAL

## 2020-09-01 PROCEDURE — G0277 HBOT, FULL BODY CHAMBER, 30M: HCPCS | Performed by: PODIATRIST

## 2020-09-01 PROCEDURE — 11043 DBRDMT MUSC&/FSCA 1ST 20/<: CPT | Performed by: PODIATRIST

## 2020-09-01 PROCEDURE — 82962 GLUCOSE BLOOD TEST: CPT

## 2020-09-01 NOTE — WOUND CARE
Discharge Condition: Stable     Pain: 0    Ambulatory Status: Walking    Discharge Destination: Home     Transportation: Car    Accompanied by: Self     Discharge instructions reviewed with Patient and copy or written instructions have been provided. All questions/concerns have been addressed at this time.

## 2020-09-01 NOTE — DISCHARGE INSTRUCTIONS
Discharge Instructions for  [facility]  [address] [place], [address] [facility]  Telephone: [phone]    NAME:  Silvia Quene OF BIRTH:  1955  DATE:  9/1/2020      Probiotics while on antibiotics  Will call Angio at [facility] to set up Murali catheter placement and place order with [employer] for antibiotic therapy. [x] Wound and dressing supply provider: [facility]          Wound Cleansing:   Do not scrub or use excessive force. Cleanse wound prior to applying a clean dressing with:    [x] Normal Saline   [] Keep Wound Dry in Shower      [x] Wound Cleanser (***)  [] May Shower at Discharge   [] cleanse with Towana Asai and Towana Asai baby shampoo lather leave 2-3 then rinse with water, pat dry and redress wound. Dressings:           Wound Location right lat leg     Apply Primary Dressing:unruly snap vac                                               [x] today unruly gauze roll gauze tape     Change dressing:   [x] Daily      [] Every Other Day   [] Three times per week  [] Once a week   [] Do Not Change Dressing     [] Other:    Off-Loading:   [x] Off-loading when [x] walking  [] in bed [] sitting    Dietary:  [x] Diet as tolerated: [] Diabetic Diet:   [] Increase Protein: examples ( Meat, cheese, eggs, greek yogurt, premier protein drink, fish, nuts )   [] Other:    Return Appointment:      [] Return Appointment: With Dr. Nhung Dwyer 1 WEEK,          Electronically signed on 9/1/2020     [address] [place] [address] Information: Should you experience any significant changes in your wound(s) or have questions about your wound care, please contact the [facility] Main at [address] [place] [address] 8:00 am - 4:30. If you need help with your wound outside these hours and cannot wait until we are again available, contact your PCP or go to the hospital emergency room.      PLEASE NOTE: IF YOU ARE UNABLE TO OBTAIN WOUND SUPPLIES, CONTINUE TO USE THE SUPPLIES YOU HAVE AVAILABLE UNTIL YOU ARE ABLE TO REACH US. IT IS MOST IMPORTANT TO KEEP THE WOUND COVERED AT ALL TIMES.      Physician Signature:_______________________  Dr. Lev Grove

## 2020-09-01 NOTE — WOUND CARE
Tang Officer, TIANNA - Curry Bhakta. Floy, 8 R[facility] Scales - H&P     Assessment/Plan:  Type 2 Diabetes with leg ulcer (E11.622)    Non pressure chronic ulcer right leg to the level of bone (C12.459)    Chronic osteomyelitis of RLE (M86.461)    Nichole Grade 3 ulcer    - Pt evaluated and treated. - Pt presents with right BKA stump wound - undermining remains the same, bone exposure remains the same, wound edges closing in  - Preformed wound debridement. See procedure note below  - Pt on PICC and IV abx  - Pt receiving HBO therapy. Wound has significantly improved with this therapy and patient will benefit from continued treatment. It is medically necessary to continue with HBO therapy to aid in wound closure as wound extends down to bone. Pt is at high risk for revisional amputation further up leg if wound doesn't heal.   - Dressing consisting of Susana. Snap VAC. - Pt has returned to wearing his old prosthetic   - F/U 1 week. Subjective:  Pt complains of wound to right amputation site. Pt has been receiving HBO therapy with wound vac. No reported issues. Pt denies a recent h/o fever, chills, nausea, vomiting, chest pain, shortness of breath. HPI: Mr. Lacy Shown is a 76 yo AA male with a PMH of arthritis, BPH, CKD, chronic pain, DM2, dyslipidemia, GERD, HTN, s/p rt BKA, sleep apnea, thromboembolus lt leg who presents with a right BKA stump wound. Wound formed from ill fitting prosthetic. Pt has had wounds in the past from in this same manner. ROS:  Consitutional: no weight loss, night sweats, fatigue / malaise / lethargy. Musculoskeletal: no joint / extremity pain, misalignment, stiffness, decreased ROM, crepitus. Integument: No pruritis, rashes, lesions, right BKA stump wound.   Psychiatric: No depression, anxiety, paranoia      History:  Wound Care  Allergies   Allergen Reactions    Adhesive Tape-Silicones Hives    Sulfa (Sulfonamide Antibiotics) Swelling     Lips eyes     Family History   Problem Relation Age of Onset    Hypertension Mother    [facility] Gout Mother     Cancer Father         leukemia    Diabetes Father     Hypertension Sister     Diabetes Maternal Grandmother     Hypertension Maternal Grandmother     Diabetes Paternal Grandmother     Hypertension Paternal Grandmother     Anesth Problems Neg Hx       Past Medical History:   Diagnosis Date    Arthritis     BPH (benign prostatic hyperplasia)     Chronic kidney disease     Chronic pain     BACK NEUROPATHY FEET HANDS    DM neuropathy, type II diabetes mellitus ([address]     DM type 2 causing CKD stage 3 ([address] 12/17/2012    DM type 2 causing vascular disease ([address]     Dyslipidemia     Foot ulcer due to secondary DM ([address] 12/1/2012    GERD (gastroesophageal reflux disease)     HTN     Ill-defined condition 2013    MRSA in wound right leg (BKA)    S/P BKA (below knee amputation) ([address]     right BKA due to non-healing ulcer    Sleep apnea     Thrombo[facility]olus ([facility]S    BLOOD CLOT LEFT LEG     Past Surgical History:   Procedure Laterality Date    ABDOMEN SURGERY PROC UNLISTED      pilonidal cyst    ECHO 2D ADULT  8/09    normal; LVEF 60%    ECHO STRESS  4/09    7 min; normal    ENDOSCOPY, COLON, DIAGNOSTIC      HX AMPUTATION  2012    left great toe    HX AMPUTATION  2007    BKA right    HX BELOW KNEE AMPUTATION Right     HX CERVICAL FUSION  2009    x2    HX ORTHOPAEDIC  2006    ACDF C4-C7    IR INSERT TUNL CVC W/O PORT OVER 5 YR  6/19/2020    STRESS TEST MYOVIEW  8/09    walked 8:46; normal; LVEF 51%     Social History     Tobacco Use    Smoking status: Never Smoker    Smokeless tobacco: Never Used   Substance Use Topics    Alcohol use: No       Social History     Substance and Sexual Activity   Alcohol Use No     Social History     Substance and Sexual Activity Drug Use No      Social History     Tobacco Use   Smoking Status Never Smoker   Smokeless Tobacco Never Used     Current Outpatient Medications   Medication Sig    Lantus Solostar U-100 Insulin 100 unit/mL (3 mL) inpn INJECT 40 TO 50 UNITS SUBCUTANEOUSLY TWICE DAILY AS DIRECTED    insulin lispro (HUMALOG) 100 unit/mL kwikpen INJECT 18 TO 30 UNITS SUBCUTANEOUSLY 3 TIMES DAILY BEFORE BREAKFAST, LUNCH, AND DINNER    oxyCODONE IR (ROXICODONE) 10 mg tab immediate release tablet Take  by mouth.  oxyCODONE-acetaminophen (Percocet) 5-325 mg per tablet Take 1 Tab by mouth every four (4) hours as needed for Pain for up to 30 days. Max Daily Amount: 6 Tabs.  oxyCODONE-acetaminophen (PERCOCET) 5-325 mg per tablet TAKE ONE TABLET BY MOUTH EVERY 4 HOURS AS NEEDED FOR PAIN    busPIRone (BUSPAR) 5 mg tablet TAKE ONE TABLET BY MOUTH THREE TIMES DAILY AS NEEDED FOR ANXIETY    Insulin Needles, Disposable, (Bee Pen Needle) 32 gauge x 5/32\" ndle USE AS DIRECTED 4 TIMES DAILY    cefTARoline (TEFLARO) injection 300 mg by IntraVENous route every twelve (12) hours every twelve (12) hours.  lisinopriL (PRINIVIL, ZESTRIL) 10 mg tablet Take 1 Tab by mouth two (2) times a day.  glucose blood VI test strips (Prodigy No Coding) strip TEST BLOOD SUGAR 2 TIMES A DAY    rosuvastatin (CRESTOR) 40 mg tablet Take 1 Tab by mouth daily.  silver sulfADIAZINE (SILVADENE) 1 % topical cream Apply  to affected area daily.  albuterol (Ventolin HFA) 90 mcg/actuation inhaler Take 2 Puffs by inhalation every four (4) hours as needed for Wheezing.  pregabalin (Lyrica) 75 mg capsule Take 1 Cap by mouth two (2) times a day.  Max Daily Amount: 150 mg.    ergocalciferol (Vitamin D2) 1,250 mcg (50,000 unit) capsule TAKE ONE CAPSULE BY MOUTH EVERY WEEK    pantoprazole (PROTONIX) 40 mg tablet TAKE ONE TABLET BY MOUTH EVERY DAY BEFORE BREAKFAST    ferrous sulfate 325 mg (65 mg iron) tablet TAKE ONE TABLET BY MOUTH 2 TIMES A DAY    aspirin 81 mg chewable tablet Take 81 mg by mouth daily. No current facility-administered medications for this encounter. Objective: There were no vitals taken for this visit. Vascular:  left LE  DP 1/4; PT 1/4  capillary fill time brisk, pitting edema is present, skin temperature is cool, varicosities are present. Dermatological:    Wound: 1  Location: right BKA stump   Measurements: per RN note  Margins: intact  Drainage: serous  Odor: no  Wound base: graunular  Lymphangitic streaking? No.  Undermining? yes  Sinus tracts? No.  Exposed bone? yes  Subcutaneous crepitation on palpation? No.    Skin is dry and scaly, exhibits hemosiderin deposition. There is no maceration of the interspaces of the feet b/l. Neurological:  DTR are present, protective sensation per 5.07 [place] Mauricio monofilament is intact, patient is AAOx3, mood is normal. Epicritic sensation is intact. Orthopedic:  Left LE are symmetric, ROM of ankle, STJ, 1st MTPJ is limited, MMT 5 out of 5 for B/L LE. Right BKA. Has prosthesis. Constitutional: Pt is a well developed, pleasant AA male. Lymphatics: negative tenderness to palpation of neck/axillary/inguinal nodes. Imaging / Labs / Cx / Px:  5/28 tib/fib XR: soft tissue ulcer extending to the rt distal fibula    Procedure Note:  Excisional debridement through level of muscle. Location / Ulcer: left leg  Indication: to remove non-viable tissue from wound bed. Consent in chart. Anesthesia: 4% xylocaine gel  Instrument: roncheur and curette   Bleeding: minimal  Hemostasis: pressure  Pre-Procedure Pain: 1  Post-Procedure Pain: 2  Area debrided < 20 cm sq. Pre-Debridement measurements: see nursing notes  Post-Debridement measurements: see nursing notes  This is part of a series of staged procedures in an attempt at limb salvage.

## 2020-09-01 NOTE — DISCHARGE INSTRUCTIONS
Discharge Instructions for  [facility]  [address] [place], [address] [facility]  Telephone: [phone]    NAME:  Ethan Campoverde OF BIRTH:  1955  DATE:  [id number]      Probiotics while on antibiotics  Will call Angio at [facility] to set up Murali catheter placement and place order with [employer] for antibiotic therapy. [x] Wound and dressing supply provider: [facility]          Wound Cleansing:   Do not scrub or use excessive force. Cleanse wound prior to applying a clean dressing with:    [x] Normal Saline   [] Keep Wound Dry in Shower      [x] Wound Cleanser (***)  [] May Shower at Discharge   [] cleanse with Kathlen Medina and Kathlen Medina baby shampoo lather leave 2-3 then rinse with water, pat dry and redress wound. Dressings:           Wound Location right lat leg     Apply Primary Dressing:unruly snap vac                                               []     Change dressing:   [x] Daily      [] Every Other Day   [] Three times per week  [] Once a week   [] Do Not Change Dressing     [] Other:    Off-Loading:   [x] Off-loading when [x] walking  [] in bed [] sitting    Dietary:  [x] Diet as tolerated: [] Diabetic Diet:   [] Increase Protein: examples ( Meat, cheese, eggs, greek yogurt, premier protein drink, fish, nuts )   [] Other:    Return Appointment:      [] Return Appointment: With Dr. Rangel Hunter 1 WEEK,          Electronically signed on 8/25/2020     [facility] Information: Should you experience any significant changes in your wound(s) or have questions about your wound care, please contact the [facility] Main at [address] [place] [address] 8:00 am - 4:30. If you need help with your wound outside these hours and cannot wait until we are again available, contact your PCP or go to the hospital emergency room.      PLEASE NOTE: IF YOU ARE UNABLE TO OBTAIN WOUND SUPPLIES, CONTINUE TO USE THE SUPPLIES YOU HAVE AVAILABLE UNTIL YOU ARE ABLE TO REACH US. IT IS MOST IMPORTANT TO KEEP THE WOUND COVERED AT ALL TIMES.      Physician Signature:_______________________  Dr. Panfilo Ruiz

## 2020-09-01 NOTE — WOUND CARE
09/01/20 1112   Wound Leg Lower Right;Lateral   Date First Assessed/Time First Assessed: 06/02/20 1439   POA: Yes  Location: Leg Lower  Wound Description (Optional): #1  Orientation: Right;Lateral   Dressing Status  Old drainage   Dressing Type  Collagens/cell matrix;Gauze wrap (greg);Gauze   Wound Length (cm) 1.1 cm   Wound Width (cm) 0.5 cm   Wound Depth (cm) 0.7   Wound Surface area (cm^2) 0.55 cm^2   Change in Wound Size % 63.33   Condition of Base Pink;Slough   Direction of Tunnel 1 o'clock   Depth of Tunnel/Sinus (cm) 1.6 cm   Drainage Amount  Moderate   Drainage Color Serosanguinous   Wound Odor None   Periwound Skin Condition Macerated   Cleansing and Cleansing Agents  Normal saline   T 97.0  P 92  R 18  B/P 187/88  Pain 0/10

## 2020-09-01 NOTE — TELEPHONE ENCOUNTER
New Patient Appointment Reminder and [phone] Screening     Have you or anyone in your house hold been tested for or have had confirmed positive Covid-19 test or suspected of or have you been around anyone that has had confirmed or suspected Covid-19? No     Does Patient have fever and/or lower respiratory symptoms? (shortness of breath, difficulty breathing, cough) If yes continue with travel screening questions and cancel patient appointment, and refer to to PCP or ED. No     Referred to PCP or to the closest ED and notified ED of pending patient arrival:  No   Open travel questions and document patient responses. If No stop here and give patient reminder time for appointment and ask them please limit to having only 1 person accompany to appointment if necessary, no children allowed at visits. Remind all patients and caretakers they must wear a mask when entering into the wound clinic. Reminder Appointment time given: yes    If Yes then please ask travel screening questions and cancel patient appointment:     Do they have the following?  eatures & Epidemiologic Risk  Fever or signs/symptoms of lower  respiratory illness (e.g., cough or  shortness of breath)  AND  Close contact with a laboratoryconfirmed  COVID-19 patient within  14 days of symptom onset    Fever and signs/symptoms of lower  respiratory illness (e.g., cough or  shortness of breath) requiring  hospitalization  AND  History of travel from affected  geographic areas within 14 days of  symptom onset (current areas include:  [place], [place], [place], [facility], [place])    Fever with severe acute lower respiratory  illness (e.g., pneumonia, ARDS) requiring  hospitalization and without alternative  explanatory diagnosis (e.g., influenza)  AND No source of exposure identified    ns and document patient responses. Not COVID-19. Proceed with scheduling  appointment    1. Provider instructs patient to travel directly to inpatient ED.   2. Provider to notify local ED of patients pending arrival.  3. Notify local health department and infection prevention immediately. 4. Obtain exposure list to include staff, others in waiting room and transport staff and  submit to infection prevention. Communication to the patient:   Due to your recent history and reported symptoms, we ask that you go to an emergency  department for further evaluation.  You should wear a facemask when you are in the same room with other people and  when you visit a health care provider. If you cannot wear a facemask, other practical  means of protection can be used, such as a wash cloth.  Cover your mouth and nose with a tissue when you cough or sneeze, or you can  cough or sneeze into your sleeve. Throw used tissues away, and immediately wash  your hands with soap and water for at least 20 seconds.  Wash your hands often and thoroughly with soap and water for at least 20 seconds. You can use an alcohol-based hand , if soap and water are not available.

## 2020-09-01 NOTE — WOUND CARE
HBO PROGRESS NOTE  NAME: Ila Wood MEDICAL RECORD NUMBER:  [id number]  AGE: 77 y.o. GENDER: male  : 1955  EPISODE DATE:  2020     Subjective     HBO Treatment Number: 29 out of Total Treatments: 30    HBO Diagnosis:  Indications: Lower Extremity Diabetic Wound ___(site)(saldivar grade 3 RLE)  Safety checks performed prior to treatment. See doc flowsheets for documentation. Objective           Lab Results   Component Value Date/Time    Glucose (POC) 245 (H) 2020 11:09 AM    Glucose (POC) 219 (H) 2020 08:35 AM       Pre treatment Vital Signs       Temp: 96.9 °F (36.1 °C)     Pulse (Heart Rate): 95     Resp Rate: 14   BP: 161/74       Post treatment Vital Signs  Temp: 97 °F (36.1 °C)  Pulse (Heart Rate): 92  Resp Rate: 18  BP: 187/88      Assessment        Physical Exam:  General Appearance: alert    Tympanic Membrane Assessment:  Pre-Treatment Left Left: Grade 0 Right Right: Grade 0  Post-treatment Left   Right        Patient place in a fully body Monoplace serial number Chamber #: [id number]       Treatment Start Time: 9860 Door Closed    Pressure Reached Time: 0904 Pressure Reached  ROSALINDA Depth: 2.5 ROSALINDA  Number of Air Breaks: Two 5 minute air breaks      Decompression Time: 1037 Decompression Time   Treatment End Time: 1054 Door Opened    Symptoms Noted During Treatment: Other (Comment)(bowel urgency)    Length of Treatment: 90 Minutes    Adverse Event: Treatment Completion Status: Treatment Aborted/Not Restarted Due to Adverse Event(decompression began early due to bowel urgency DPM aware)    I was present on these premises and immediately available to furnish assistance & direction throughout the procedure. Plan          Ila Wood is a 77 y.o. male  did successfully complete today's hyperbaric oxygen treatment at [address], he just had to stop 7 min early as noted.     In my clinical judgement, ongoing HBO therapy is necessary at this time, given a threat to patient function, limb or life from the current condition. Supervision and attendance of Hyperbaric Oxygen Therapy provided. Continue HBO treatment as outlined in the treatment plan. Hyperbaric Oxygen: Blas Grain. tolerated Treatment Number: 61 well today without complications.      Electronically signed by Imelda Guardado DPM on 9/1/2020 at 11:28 AM

## 2020-09-02 ENCOUNTER — DOCUMENTATION ONLY (OUTPATIENT)
Dept: WOUND CARE | Age: 65
End: 2020-09-02

## 2020-09-02 ENCOUNTER — HOSPITAL ENCOUNTER (OUTPATIENT)
Dept: WOUND CARE | Age: 65
Discharge: HOME | End: 2020-09-02

## 2020-09-02 RX ORDER — OXYCODONE AND ACETAMINOPHEN 5; 325 MG/1; MG/1
TABLET ORAL
Qty: 90 TAB | Refills: 0 | Status: SHIPPED | OUTPATIENT
Start: 2020-09-02 | End: 2020-09-29

## 2020-09-02 RX ORDER — OXYCODONE AND ACETAMINOPHEN 5; 325 MG/1; MG/1
1 TABLET ORAL
Qty: 90 TAB | Refills: 0 | OUTPATIENT
Start: 2020-09-02 | End: 2020-10-02

## 2020-09-02 NOTE — PROGRESS NOTES
Patient's ESR increasing again, we had called him multiple times, need to determine new Abx regimen, but patient cancelled appointment again. Patient is rescheduled next week.

## 2020-09-03 ENCOUNTER — HOSPITAL ENCOUNTER (OUTPATIENT)
Dept: WOUND CARE | Age: 65
Discharge: HOME | End: 2020-09-03
Payer: MEDICARE

## 2020-09-03 ENCOUNTER — TELEPHONE (OUTPATIENT)
Dept: WOUND CARE | Age: 65
End: 2020-09-03

## 2020-09-03 VITALS
TEMPERATURE: 97.2 F | DIASTOLIC BLOOD PRESSURE: 62 MMHG | HEART RATE: 96 BPM | RESPIRATION RATE: 16 BRPM | SYSTOLIC BLOOD PRESSURE: 138 MMHG

## 2020-09-03 PROCEDURE — 97607 NEG PRS WND THR NDME<=50SQCM: CPT

## 2020-09-03 NOTE — TELEPHONE ENCOUNTER
Late entry for 8/2/20. Patient called to report that he would not be able to have HBO treatment because he had an eye injury and was going to the [place]  for evaluation and treatment.

## 2020-09-03 NOTE — WOUND CARE
09/03/20 1426   Wound Leg Lower Right;Lateral   Date First Assessed/Time First Assessed: 06/02/20 1439   POA: Yes  Location: Leg Lower  Wound Description (Optional): #1  Orientation: Right;Lateral   Dressing Type Applied Collagens/cell matrix; Negative pressure wound therapy   Procedure Tolerated Well   Discharge Condition: Stable     Pain: 7 L eye    Ambulatory Status: Walking    Discharge Destination: Home     Transportation: Car    Accompanied by: Self     Discharge instructions reviewed with Patient and copy or written instructions have been provided. All questions/concerns have been addressed at this time.

## 2020-09-03 NOTE — PROGRESS NOTES
HBO PROGRESS NOTE  NAME: Devon Johnson MEDICAL RECORD NUMBER:  [id number]  AGE: 77 y.o. GENDER: male  : 1955  EPISODE DATE:  2020     Subjective     HBO Treatment Number: 27 out of Total Treatments: 30    HBO Diagnosis:  Indications: Lower Extremity Diabetic Wound ___(site)(Nichole grade 3 RLE)  Safety checks performed prior to treatment. See doc flowsheets for documentation. Objective           Late entry  Refer to Flowheets, nursing notes for lab, vitals  Patient reached pressure and had to come out as he had an explosive BM while in chamber    Assessment        Physical Exam:  General Appearance: in no apparent distress    Tympanic Membrane Assessment:  Pre-Treatment Left Left: Grade 0 Right Right: Grade 0  Post-treatment Left   Right      Pulmonary/Chest: no accessory muscle use    Cardiovascular: normal    Patient place in a fully body Monoplace serial number Chamber #: [id number]       Treatment Start Time: 9160 Door Closed    Pressure Reached Time: 1317 Pressure Reached  ROSALINDA Depth: 2.5 ROSALINDA  Number of Air Breaks: Two 5 minute air breaks      Decompression Time: 1336 Decompression Time   Treatment End Time: 3058 Door Opened    Symptoms Noted During Treatment: (bowel urgency)    Length of Treatment: 90 Minutes    Adverse Event: Treatment Completion Status: Treatment Aborted/Not Restarted Due to Adverse Event(bowel urgency)    I was present on these premises and immediately available to furnish assistance & direction throughout the procedure. Plan          Devon Johnson is a 77 y.o. male  did not successfully complete today's hyperbaric oxygen treatment at [address] In my clinical judgement, ongoing HBO therapy is necessary at this time, given a threat to patient function, limb or life from the current condition. Supervision and attendance of Hyperbaric Oxygen Therapy provided.   Continue HBO treatment as outlined in the treatment plan.    Hyperbaric Oxygen: Loren Espinal. tolerated Treatment Number: 27 well today without complications.      Electronically signed by Deborah Barahona MD on 8/27/2020

## 2020-09-03 NOTE — WOUND CARE
09/03/20 1416   Wound Leg Lower Right;Lateral   Date First Assessed/Time First Assessed: 06/02/20 1439   POA: Yes  Location: Leg Lower  Wound Description (Optional): #1  Orientation: Right;Lateral   Dressing Type  Collagens/cell matrix   Wound Length (cm) 1 cm   Wound Width (cm) 0.5 cm   Wound Depth (cm) 0.8   Wound Surface area (cm^2) 0.5 cm^2   Change in Wound Size % 66.67   Condition of Base Slough;Pink   Direction of Tunnel 1 o'clock   Depth of Tunnel/Sinus (cm) 1.6 cm   Drainage Amount  Moderate   Drainage Color Serosanguinous   Wound Odor None   Periwound Skin Condition Macerated   Cleansing and Cleansing Agents  Normal saline   Blood pressure 138/62, pulse 96, temperature 97.2 °F (36.2 °C), resp. rate 16.

## 2020-09-08 ENCOUNTER — HOSPITAL ENCOUNTER (OUTPATIENT)
Dept: WOUND CARE | Age: 65
Discharge: HOME | End: 2020-09-08
Payer: MEDICARE

## 2020-09-08 VITALS
RESPIRATION RATE: 18 BRPM | TEMPERATURE: 98.1 F | SYSTOLIC BLOOD PRESSURE: 159 MMHG | HEART RATE: 94 BPM | DIASTOLIC BLOOD PRESSURE: 72 MMHG

## 2020-09-08 PROCEDURE — 11042 DBRDMT SUBQ TIS 1ST 20SQCM/<: CPT | Performed by: PODIATRIST

## 2020-09-08 PROCEDURE — 97605 NEG PRS WND THER DME<=50SQCM: CPT | Performed by: PODIATRIST

## 2020-09-08 PROCEDURE — 74011000250 HC RX REV CODE- 250: Performed by: PODIATRIST

## 2020-09-08 RX ADMIN — Medication: at 13:11

## 2020-09-08 NOTE — WOUND CARE
09/08/20 1333   Wound Leg Lower Right;Lateral   Date First Assessed/Time First Assessed: 06/02/20 1439   POA: Yes  Location: Leg Lower  Wound Description (Optional): #1  Orientation: Right;Lateral   Dressing Type Applied Collagens/cell matrix;Gauze;Gauze wrap (greg); Special tape (comment)   Procedure Tolerated Well   Discharge Condition: Stable     Pain: 0    Ambulatory Status: Walking    Discharge Destination: Home     Transportation: Car    Accompanied by: Self     Discharge instructions reviewed with Patient and copy or written instructions have been provided. All questions/concerns have been addressed at this time.

## 2020-09-08 NOTE — WOUND CARE
Jordan King DPM - James Godwin. Floy, 8 al[address]es - H&P     Assessment/Plan:  Type 2 Diabetes with leg ulcer (E11.622)    Non pressure chronic ulcer right leg to the level of bone (M09.958)    Chronic osteomyelitis of RLE (M86.461)    Nichole Grade 3 ulcer    - Pt evaluated and treated. - Pt presents with right BKA stump wound - undermining remains the same, bone exposure remains the same, wound edges closing in  - Preformed wound debridement. See procedure note below  - Pt on PICC and IV abx. To see ID tomorrow (9/9)  - Pt has completed HBO sessions  - Dressing consisting of Susana. Snap VAC. - Pt has returned to wearing his old prosthetic   - F/U 1 week. Subjective:  Pt complains of wound to right amputation site. Pt has been receiving HBO therapy with wound vac. No reported issues. Pt denies a recent h/o fever, chills, nausea, vomiting, chest pain, shortness of breath. HPI: Mr. Altaf Ruiz is a 76 yo AA male with a PMH of arthritis, BPH, CKD, chronic pain, DM2, dyslipidemia, GERD, HTN, s/p rt BKA, sleep apnea, thromboembolus lt leg who presents with a right BKA stump wound. Wound formed from ill fitting prosthetic. Pt has had wounds in the past from in this same manner. ROS:  Consitutional: no weight loss, night sweats, fatigue / malaise / lethargy. Musculoskeletal: no joint / extremity pain, misalignment, stiffness, decreased ROM, crepitus. Integument: No pruritis, rashes, lesions, right BKA stump wound.   Psychiatric: No depression, anxiety, paranoia      History:  Wound Care  Allergies   Allergen Reactions    Adhesive Tape-Silicones Hives    Sulfa (Sulfonamide Antibiotics) Swelling     Lips eyes     Family History   Problem Relation Age of Onset    Hypertension Mother    David Jeffery Gout Mother     Cancer Father         leukemia    Diabetes Father  Hypertension Sister     Diabetes Maternal Grandmother     Hypertension Maternal Grandmother     Diabetes Paternal Grandmother     Hypertension Paternal Grandmother     Anesth Problems Neg Hx       Past Medical History:   Diagnosis Date    Arthritis     BPH (benign prostatic hyperplasia)     Chronic kidney disease     Chronic pain     BACK NEUROPATHY FEET HANDS    DM neuropathy, [facility] II diabetes mellitus [facility]     DM type 2 causing CKD stage 3 ([address] 12/17/2012    DM type 2 causing vascular disease ([facility]     Dyslipidemia     Foot ulcer due to secondary DM ([facility] 12/1/2012    GERD (gastroesophageal reflux disease)     HTN     Ill-defined condition 2013    MRSA in wound right leg (BKA)    S/P BKA (below knee amputation) ([facility]     right BKA due to non-healing ulcer    Sleep apnea     Thromboembolus ([facility] 1970'S    BLOOD CLOT LEFT LEG     Past Surgical History:   Procedure Laterality Date    ABDOMEN SURGERY PROC UNLISTED      pilonidal cyst    ECHO 2D ADULT  8/09    normal; LVEF 60%    ECHO STRESS  4/09    7 min; normal    ENDOSCOPY, COLON, DIAGNOSTIC      HX AMPUTATION  2012    left great toe    HX AMPUTATION  2007    BKA right    HX BELOW KNEE AMPUTATION Right     HX CERVICAL FUSION  2009    x2    HX ORTHOPAEDIC  2006    ACDF C4-C7    IR INSERT TUNL CVC W/O PORT OVER 5 YR  6/19/2020    STRESS TEST MYOVIEW  8/09    walked 8:46; normal; LVEF 51%     Social History     Tobacco Use    Smoking status: Never Smoker    Smokeless tobacco: Never Used   Substance Use Topics    Alcohol use: No       Social History     Substance and Sexual Activity   Alcohol Use No     Social History     Substance and Sexual Activity   Drug Use No      Social History     Tobacco Use   Smoking Status Never Smoker   Smokeless Tobacco Never Used     Current Outpatient Medications   Medication Sig    Lantus Solostar U-100 Insulin 100 unit/mL (3 mL) inpn INJECT 40 TO 50 UNITS SUBCUTANEOUSLY TWICE DAILY AS DIRECTED  insulin lispro (HUMALOG) 100 unit/mL kwikpen INJECT 18 TO 30 UNITS SUBCUTANEOUSLY 3 TIMES DAILY BEFORE BREAKFAST, LUNCH, AND DINNER    busPIRone (BUSPAR) 5 mg tablet TAKE ONE TABLET BY MOUTH THREE TIMES DAILY AS NEEDED FOR ANXIETY    cefTARoline (TEFLARO) injection 300 mg by IntraVENous route every twelve (12) hours every twelve (12) hours.  lisinopriL (PRINIVIL, ZESTRIL) 10 mg tablet Take 1 Tab by mouth two (2) times a day.  rosuvastatin (CRESTOR) 40 mg tablet Take 1 Tab by mouth daily.  silver sulfADIAZINE (SILVADENE) 1 % topical cream Apply  to affected area daily.  pregabalin (Lyrica) 75 mg capsule Take 1 Cap by mouth two (2) times a day. Max Daily Amount: 150 mg.    ergocalciferol (Vitamin D2) 1,250 mcg (50,000 unit) capsule TAKE ONE CAPSULE BY MOUTH EVERY WEEK    pantoprazole (PROTONIX) 40 mg tablet TAKE ONE TABLET BY MOUTH EVERY DAY BEFORE BREAKFAST    ferrous sulfate 325 mg (65 mg iron) tablet TAKE ONE TABLET BY MOUTH 2 TIMES A DAY    aspirin 81 mg chewable tablet Take 81 mg by mouth daily.  oxyCODONE-acetaminophen (PERCOCET) 5-325 mg per tablet TAKE ONE TABLET BY MOUTH EVERY 4 HOURS AS NEEDED FOR PAIN    oxyCODONE IR (ROXICODONE) 10 mg tab immediate release tablet Take  by mouth.  Insulin Needles, Disposable, (Bee Pen Needle) 32 gauge x 5/32\" ndle USE AS DIRECTED 4 TIMES DAILY    glucose blood VI test strips (Prodigy No Coding) strip TEST BLOOD SUGAR 2 TIMES A DAY    albuterol (Ventolin HFA) 90 mcg/actuation inhaler Take 2 Puffs by inhalation every four (4) hours as needed for Wheezing. No current facility-administered medications for this encounter. Objective:  Visit Vitals  /72   Pulse 94   Temp 98.1 °F (36.7 °C)   Resp 18       Vascular:  left LE  DP 1/4; PT 1/4  capillary fill time brisk, pitting edema is present, skin temperature is cool, varicosities are present.     Dermatological:    Wound: 1  Location: right BKA stump   Measurements: per RN note  Margins: intact  Drainage: serous  Odor: no  Wound base: graunular  Lymphangitic streaking? No.  Undermining? yes  Sinus tracts? No.  Exposed bone? yes  Subcutaneous crepitation on palpation? No.    Skin is dry and scaly, exhibits hemosiderin deposition. There is no maceration of the interspaces of the feet b/l. Neurological:  DTR are present, protective sensation per 5.07 Ernest Mauricio monofilament is intact, patient is AAOx3, mood is normal. Epicritic sensation is intact. Orthopedic:  Left LE are symmetric, ROM of ankle, STJ, 1st MTPJ is limited, MMT 5 out of 5 for B/L LE. Right BKA. Has prosthesis. Constitutional: Pt is a well developed, pleasant AA male. Lymphatics: negative tenderness to palpation of neck/axillary/inguinal nodes. Imaging / Labs / Cx / Px:  5/28 tib/fib XR: soft tissue ulcer extending to the rt distal fibula    Procedure Note:  Excisional debridement through level of muscle. Location / Ulcer: left leg  Indication: to remove non-viable tissue from wound bed. Consent in chart. Anesthesia: 4% xylocaine gel  Instrument: roncheur and curette   Bleeding: minimal  Hemostasis: pressure  Pre-Procedure Pain: 1  Post-Procedure Pain: 2  Area debrided < 20 cm sq. Pre-Debridement measurements: see nursing notes  Post-Debridement measurements: see nursing notes  This is part of a series of staged procedures in an attempt at limb salvage.

## 2020-09-08 NOTE — WOUND CARE
09/08/20 1309   Wound Leg Lower Right;Lateral   Date First Assessed/Time First Assessed: 06/02/20 1439   POA: Yes  Location: Leg Lower  Wound Description (Optional): #1  Orientation: Right;Lateral   Dressing Status  Removed   Dressing Type  Absorptive;Packing   Wound Length (cm) 1.1 cm   Wound Width (cm) 0.5 cm   Wound Depth (cm) 0.8   Wound Surface area (cm^2) 0.55 cm^2   Change in Wound Size % 63.33   Direction of Tunnel 1 o'clock   Depth of Tunnel/Sinus (cm) 1.3 cm   Drainage Amount  Moderate   Drainage Color Serosanguinous   Wound Odor None   Periwound Skin Condition Macerated     Visit Vitals  /72   Pulse 94   Temp 98.1 °F (36.7 °C)   Resp 18

## 2020-09-08 NOTE — DISCHARGE INSTRUCTIONS
Discharge Instructions for  [facility]  [place] [address], [address] [facility] [address]  Telephone: [phone]    NAME:  Alex Lynch OF BIRTH:  1955  DATE:  8/18/2020      Probiotics while on antibiotics  Will call Angio at [facility] to set up [facility] catheter placement and place order with [employer] for antibiotic therapy. [x] Wound and dressing supply provider: [facility]          Wound Cleansing:   Do not scrub or use excessive force. Cleanse wound prior to applying a clean dressing with:    [x] Normal Saline   [] Keep Wound Dry in Shower      [x] Wound Cleanser (***)  [] May Shower at Discharge   [] cleanse with [place] and [place] baby shampoo lather leave 2-3 then rinse with water, pat dry and redress wound. Dressings:           Wound Location right lat leg     Apply Primary Dressing:unruly snap vac                                               []     Change dressing:   [x] Daily      [] Every Other Day   [] Three times per week  [] Once a week   [] Do Not Change Dressing     [] Other:    Off-Loading:   [x] Off-loading when [x] walking  [] in bed [] sitting    Dietary:  [x] Diet as tolerated: [] Diabetic Diet:   [] Increase Protein: examples ( Meat, cheese, eggs, greek yogurt, premier protein drink, fish, nuts )   [] Other:    Return Appointment:      [] Return Appointment: With Dr. Gloria Mckeon 1 WEEK,          Electronically signed on 8/18/2020     [address] Information: Should you experience any significant changes in your wound(s) or have questions about your wound care, please contact the [address] [place] [address] at [address] [place] [address] 8:00 am - 4:30. If you need help with your wound outside these hours and cannot wait until we are again available, contact your PCP or go to the hospital emergency room.      PLEASE NOTE: IF YOU ARE UNABLE TO OBTAIN WOUND SUPPLIES, CONTINUE TO USE THE SUPPLIES YOU HAVE AVAILABLE UNTIL YOU ARE ABLE TO REACH US. IT IS MOST IMPORTANT TO KEEP THE WOUND COVERED AT ALL TIMES.      Physician Signature:_______________________   Mary Coleridge

## 2020-09-09 ENCOUNTER — HOSPITAL ENCOUNTER (OUTPATIENT)
Dept: WOUND CARE | Age: 65
Discharge: HOME | End: 2020-09-09
Payer: MEDICARE

## 2020-09-09 VITALS
HEART RATE: 86 BPM | DIASTOLIC BLOOD PRESSURE: 90 MMHG | SYSTOLIC BLOOD PRESSURE: 169 MMHG | RESPIRATION RATE: 18 BRPM | TEMPERATURE: 97.7 F

## 2020-09-09 PROCEDURE — 99213 OFFICE O/P EST LOW 20 MIN: CPT

## 2020-09-09 NOTE — DISCHARGE INSTRUCTIONS
Discharge Instructions for  [address]  Telephone: [phone]    NAME:  Checo Miguel. YOB: 1955  MEDICAL RECORD NUMBER:  [id number]  DATE:  9/9/2020      Wound Care:     Continue wound care as ordered by Dr. Makayla Bains. Apply collagen Ag and snap-vac. Dietary:  [x] Diet as tolerated: [x] Increase Protein:  Continue probiotics. Activity:  [x] Activity as tolerated:              Return Appointment:  [x] Return Appointment: With Dr. Dayanna Matute  in  2 Week(s)  Start on Levaquin antibiotic. Electronically signed on 9/9/2020 at [facility] [address] Information: Should you experience any significant changes in your wound(s) or have questions about your wound care, please contact the [facility] at [address] [place] [address] 8:00 am - 4:30. If you need help with your wound outside these hours and cannot wait until we are again available, contact your PCP or go to the hospital emergency room. PLEASE NOTE: IF YOU ARE UNABLE TO OBTAIN WOUND SUPPLIES, CONTINUE TO USE THE SUPPLIES YOU HAVE AVAILABLE UNTIL YOU ARE ABLE TO REACH US. IT IS MOST IMPORTANT TO KEEP THE WOUND COVERED AT ALL TIMES.       Physician Signature:_______________________    Date: ___________ Time:  ____________

## 2020-09-09 NOTE — PROGRESS NOTES
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 · Cannot use Doxy as MRSA strain is resi to tetracycline Abx  · Stop Ceftaroline IV and PO Amoxicillin  · To get "[employer] Neo[employer] cath removed  · Patient on Buspirone, asked him to slowly wean off himself, slowly wean off codeine medications  · Then start PO Zyvox (may need GoodRx coupon) plus cipro PO for 2 weeks  · To check labs again after that 2 week treatment  · Follow up with me at 3 weeks  · Continue probiotics  · I discussed how only few choices left to treat his osteo  · We also discuss possible drug drug interaction of Zyvox with other meds and side effects. He understands and agrees  · Indications, complications, risk, benefits, alternatives dw pt, voiced understanding, and agree with current plan  · Continue to follow up with  for wound care,  Nephrologist, endocrinologist etc.  · Follow up with me at 3 weeks for Abx        Patient is a 75 y.o. male who was originally seen on 6/17/2020 with complaints of rt stump wound and fibula osteomyelitis. Patient has this wound on rt stump for more than 2 months with some drainage. NO fever/chills. Bone is exposed. Wound started with friction from his prosthetic boot. He has wound vac currently.  Bone is covering up some with tissue, not much change in size yet. Total CPK went up to 1000 with Daptomycin, changed to Vancomycin, caused worsening renal failure. Had to stop all previous Abx and now on Ceftaroline and tolerating well. On Probiotics, NO diarrhea currently. His HbA1C has improved some, he stopped soda and now on white meat and diet control per endocrinology.                Allergies   Allergen Reactions    Adhesive Tape-Silicones Hives    Sulfa (Sulfonamide Antibiotics) Swelling       Lips eyes         Review of Systems:  A comprehensive review of systems was negative except for that written in the History of Present Illness.        Objective:     Vitals:    09/09/20 1043   BP: 169/90   Pulse: 86   Resp: 18   Temp: 97.7 °F (36.5 °C)       Exam:  General:  alert, cooperative, no distress, appears stated age  HEENT: pallor, NO icterus  Chest: CTA b/l  Heart: RRR S1S2  Abdomen: soft, NT, ND, BS+  Extremities: rt stump lateral aspect wound 1.5 x 1.2 x 1cm deep wound, bone exposed, NO drainage        Microbiology:    6/17/20 bone Cx with MRSA, Amp susceptible Enterococcus, Enterobacter     Studies:  MRI: 6/4/20 with wound deep to bone and fibula osteomyelitis        We appreciate your consult and the opportunity to participate in your patient's care.   Please call us with any questions or concerns.         MD Cornell Naqvi

## 2020-09-09 NOTE — WOUND CARE
09/09/20 1047   Wound Leg Lower Right;Lateral   Date First Assessed/Time First Assessed: 06/02/20 1439   POA: Yes  Location: Leg Lower  Wound Description (Optional): #1  Orientation: Right;Lateral   Dressing Status  Removed   Dressing Type  4 x 4;Other (Comment)  (tegaderm)   Non-Pressure Injury Full thickness (subcut/muscle)   Wound Length (cm) 1 cm   Wound Width (cm) 0.4 cm   Wound Depth (cm) 0.9   Wound Surface area (cm^2) 0.4 cm^2   Change in Wound Size % 73.33   Condition of Base Bone exposed   Condition of Edges Rolled/curled   Direction of Undermining 10 o'clock;4 o'clock   Depth of Undermining (cm) 1.9   Drainage Amount  Moderate   Drainage Color Serous   Wound Odor None   Periwound Skin Condition Macerated   Cleansing and Cleansing Agents  Normal saline     Visit Vitals  /90 (BP 1 Location: Left arm, BP Patient Position: At rest)   Pulse 86   Temp 97.7 °F (36.5 °C)   Resp 18

## 2020-09-11 ENCOUNTER — TELEPHONE (OUTPATIENT)
Dept: WOUND CARE | Age: 65
End: 2020-09-11

## 2020-09-14 ENCOUNTER — HOSPITAL ENCOUNTER (OUTPATIENT)
Dept: INTERVENTIONAL RADIOLOGY/VASCULAR | Age: 65
Discharge: HOME | End: 2020-09-14
Attending: INTERNAL MEDICINE | Admitting: STUDENT IN AN ORGANIZED HEALTH CARE EDUCATION/TRAINING PROGRAM
Payer: MEDICARE

## 2020-09-14 PROCEDURE — 36589 REMOVAL TUNNELED CV CATH: CPT

## 2020-09-14 PROCEDURE — 74011250636 HC RX REV CODE- 250/636: Performed by: STUDENT IN AN ORGANIZED HEALTH CARE EDUCATION/TRAINING PROGRAM

## 2020-09-14 RX ORDER — LIDOCAINE HYDROCHLORIDE 20 MG/ML
INJECTION, SOLUTION INFILTRATION; PERINEURAL
Status: DISCONTINUED
Start: 2020-09-14 | End: 2020-09-15 | Stop reason: HOSPADM

## 2020-09-14 RX ORDER — FENTANYL CITRATE 50 UG/ML
100 INJECTION, SOLUTION INTRAMUSCULAR; INTRAVENOUS
Status: DISCONTINUED | OUTPATIENT
Start: 2020-09-14 | End: 2020-09-15 | Stop reason: HOSPADM

## 2020-09-14 RX ADMIN — FENTANYL CITRATE 50 MCG: 50 INJECTION, SOLUTION INTRAMUSCULAR; INTRAVENOUS at 13:41

## 2020-09-14 NOTE — H&P
Radiology History and Physical    Patient: Libby Mckeon. 77 y.o. male       Chief Complaint: Tunneled CVC no longer needed    History of Present Illness: 77year old male with indwelling right tunneled central venous catheter which is no longer needed.     History:    Past Medical History:   Diagnosis Date    Arthritis     BPH (benign prostatic hyperplasia)     Chronic kidney disease     Chronic pain     BACK NEUROPATHY FEET HANDS    DM neuropathy, type II diabetes mellitus ([address]     DM type 2 causing CKD stage 3 ([address] 12/17/2012    DM type 2 causing vascular disease ([address]     Dyslipidemia     Foot ulcer due to secondary DM ([address] 12/1/2012    GERD (gastroesophageal reflux disease)     HTN     Ill-defined condition 2013    MRSA in wound right leg (BKA)    S/P B[facility] (below knee amputation) [facility]     right BKA due to non-healing ulcer    Sleep apnea     Thromboembolus ([facility] 1970'S    BLOOD CLOT LEFT LEG     Family History   Problem Relation Age of Onset    Hypertension Mother    Ocampo Gout Mother     Cancer Father         leukemia    Diabetes Father     Hypertension Sister     Diabetes Maternal Grandmother     Hypertension Maternal Grandmother     Diabetes Paternal Grandmother     Hypertension Paternal Grandmother     Anesth Problems Neg Hx      Social History     Socioeconomic History    Marital status:      Spouse name: Not on file    Number of children: Not on file    Years of education: Not on file    Highest education level: Not on file   Occupational History    Not on file   Social Needs    Financial resource strain: Not on file    Food insecurity     Worry: Not on file     Inability: Not on file    Transportation needs     Medical: Not on file     Non-medical: Not on file   Tobacco Use    Smoking status: Never Smoker    Smokeless tobacco: Never Used   Substance and Sexual Activity    Alcohol use: No    Drug use: No    Sexual activity: Yes     Partners: Female     Birth control/protection: None   Lifestyle    Physical activity     Days per week: Not on file     Minutes per session: Not on file    Stress: Not on file   Relationships    Social connections     Talks on phone: Not on file     Gets together: Not on file     Attends Rastafarian service: Not on file     Active member of club or organization: Not on file     Attends meetings of clubs or organizations: Not on file     Relationship status: Not on file    Intimate partner violence     Fear of current or ex partner: Not on file     Emotionally abused: Not on file     Physically abused: Not on file     Forced sexual activity: Not on file   Other Topics Concern    Not on file   Social History Narrative    Not on file       Allergies: Allergies   Allergen Reactions    Adhesive Tape-Silicones Hives    Sulfa (Sulfonamide Antibiotics) Swelling     Lips eyes       Current Medications:  Current Facility-Administered Medications   Medication Dose Route Frequency    lidocaine (XYLOCAINE) 20 mg/mL (2 %) injection        fentaNYL citrate (PF) injection 100 mcg  100 mcg IntraVENous Rad Multiple     Facility-Administered Medications Ordered in Other Encounters   Medication Dose Route Frequency    [START ON 9/17/2020] epoetin naman (EPOGEN;PROCRIT) injection 20,000 Units  20,000 Units SubCUTAneous ONCE        Physical Exam:  There were no vitals taken for this visit. GENERAL: alert, cooperative, no distress, appears stated age,   LUNG: Nonlabored respiration on room air  HEART: regular rate and rhythm    Alerts:    Hospital Problems  Date Reviewed: 6/17/2020    None          Laboratory:    No results for input(s): HGB, HCT, WBC, PLT, INR, BUN, CREA, K, CRCLT, HGBEXT, HCTEXT, PLTEXT, INREXT in the last 72 hours. No lab exists for component: PTT, PT      Plan of Care/Planned Procedure:  Risks, benefits, and alternatives reviewed with patient and he agrees to proceed with the procedure.      Remove RIJ powerline    Ta Galan MD  Interventional Radiology  [facility]  5:49 PM, 9/14/2020

## 2020-09-14 NOTE — PROCEDURES
Interventional Radiology  Procedure Note        9/14/2020 1:46 PM    Patient: Young Lambert. Informed consent obtained    Diagnosis: Osteomyelitis    Procedure(s): Remove tunneled CVC    Specimens removed:   Indwelling tunneled central venous catheter removed intact    Complications: None    Primary Physician: Basim Mahoney MD    Recomendations: N/A    Discharge Disposition: stable; discharge to home    Full dictated report to follow    Basim Mahoney MD  Interventional Radiology  [facility]  1:46 PM, 9/14/2020

## 2020-09-15 ENCOUNTER — HOSPITAL ENCOUNTER (OUTPATIENT)
Dept: WOUND CARE | Age: 65
Discharge: HOME | End: 2020-09-15
Admitting: PODIATRIST
Payer: MEDICARE

## 2020-09-15 VITALS
HEART RATE: 94 BPM | TEMPERATURE: 97.3 F | DIASTOLIC BLOOD PRESSURE: 83 MMHG | SYSTOLIC BLOOD PRESSURE: 174 MMHG | RESPIRATION RATE: 16 BRPM

## 2020-09-15 PROCEDURE — 74011000250 HC RX REV CODE- 250: Performed by: PODIATRIST

## 2020-09-15 PROCEDURE — 11043 DBRDMT MUSC&/FSCA 1ST 20/<: CPT | Performed by: PODIATRIST

## 2020-09-15 RX ADMIN — Medication: at 13:41

## 2020-09-15 NOTE — DISCHARGE INSTRUCTIONS
Discharge Instructions for  [facility]  [place] [address], [address] [facility]  Telephone: [phone]    NAME:  Karen Giacomo OF BIRTH:  1955  DATE:  9/8/2020      Probiotics while on antibiotics       [x] Wound and dressing supply provider: [facility]          Wound Cleansing:   Do not scrub or use excessive force. Cleanse wound prior to applying a clean dressing with:    [x] Normal Saline   [] Keep Wound Dry in Shower      [x] Wound Cleanser (***)  [] May Shower at Discharge   [] cleanse with Venia Hiss and Venia Hiss baby shampoo lather leave 2-3 then rinse with water, pat dry and redress wound. Dressings:           Wound Location right lat leg     Apply Primary Dressing:unruly snap vac                                               [x] unruly gauze roll gaze tape tubi  today in office  Change dressing:   [] Daily      [] Every Other Day   [] Three times per week  [x] Once a week   [] Do Not Change Dressing     [] Other:    Off-Loading:   [x] Off-loading when [x] walking  [] in bed [] sitting    Dietary:  [x] Diet as tolerated: [] Diabetic Diet:   [] Increase Protein: examples ( Meat, cheese, eggs, greek yogurt, premier protein drink, fish, nuts )   [] Other:    Return Appointment:      [] Return Appointment: With Dr. Sofía Levine 1 WEEK,          Electronically signed on 9/8//2020     [address] [place] [address] Information: Should you experience any significant changes in your wound(s) or have questions about your wound care, please contact the [facility] at [address] [place] [address] 8:00 am - 4:30. If you need help with your wound outside these hours and cannot wait until we are again available, contact your PCP or go to the hospital emergency room. PLEASE NOTE: IF YOU ARE UNABLE TO OBTAIN WOUND SUPPLIES, CONTINUE TO USE THE SUPPLIES YOU HAVE AVAILABLE UNTIL YOU ARE ABLE TO REACH US.  IT IS MOST IMPORTANT TO KEEP THE WOUND COVERED AT ALL TIMES.      Physician Signature:_______________________  Dr. Arnulfo Muhammad

## 2020-09-15 NOTE — WOUND CARE
Romaine Wells DPM - [facility] [place], [address] - H&P     Assessment/Plan:  Type 2 Diabetes with leg ulcer (E11.622)    Non pressure chronic ulcer right leg to the level of bone (F38.054)    Chronic osteomyelitis of RLE (M86.461)    Nichole Grade 3 ulcer    - Pt evaluated and treated. - Pt presents with right BKA stump wound - undermining has improved, wound edges closing in  - Preformed wound debridement. See procedure note below  - PICC d/fritz. Pt now on oral Ceftaroline.  - Pt has completed HBO sessions  - Dressing consisting of Susana. Snap VAC. - Pt has returned to wearing his old prosthetic   - F/U 1 week. Subjective:  Pt complains of wound to right amputation site. Pt has been receiving HBO therapy with wound vac. No reported issues. Pt denies a recent h/o fever, chills, nausea, vomiting, chest pain, shortness of breath. HPI: Mr. Lizz Granados is a 76 yo AA male with a PMH of arthritis, BPH, CKD, chronic pain, DM2, dyslipidemia, GERD, HTN, s/p rt BKA, sleep apnea, thromboembolus lt leg who presents with a right BKA stump wound. Wound formed from ill fitting prosthetic. Pt has had wounds in the past from in this same manner. ROS:  Consitutional: no weight loss, night sweats, fatigue / malaise / lethargy. Musculoskeletal: no joint / extremity pain, misalignment, stiffness, decreased ROM, crepitus. Integument: No pruritis, rashes, lesions, right BKA stump wound.   Psychiatric: No depression, anxiety, paranoia      History:  Wound Care  Allergies   Allergen Reactions    Adhesive Tape-Silicones Hives    Sulfa (Sulfonamide Antibiotics) Swelling     Lips eyes     Family History   Problem Relation Age of Onset    Hypertension Mother    Leldon Neas Gout Mother     Cancer Father         leukemia    Diabetes Father     Hypertension Sister     Diabetes Maternal Grandmother     Hypertension Maternal Grandmother     Diabetes Paternal Grandmother     Hypertension Paternal Grandmother     Anesth Problems Neg Hx       Past Medical History:   Diagnosis Date    Arthritis     BPH (benign prostatic hyperplasia)     Chronic kidney disease     Chronic pain     BACK NEUROPATHY FEET HANDS    DM neuropathy, type II diabetes mellitus ([facility]     DM type 2 causing CKD stage 3 ([address] 12/17/2012    DM type 2 causing vascular disease ([address]     Dyslipidemia     Foot ulcer due to secondary DM ([address] 12/1/2012    GERD (gastroesophageal reflux disease)     HTN     Ill-defined condition 2013    MRSA in wound right leg (BKA)    S/P BKA (below knee amputation) ([address]     right BKA due to non-healing ulcer    Sleep apnea     Thromboembolus ([facility] 1970'S    BLOOD CLOT LEFT LEG     Past Surgical History:   Procedure Laterality Date    ABDOMEN SURGERY PROC UNLISTED      pilonidal cyst    ECHO 2D ADULT  8/09    normal; LVEF 60%    ECHO STRESS  4/09    7 min; normal    ENDOSCOPY, COLON, DIAGNOSTIC      HX AMPUTATION  2012    left great toe    HX AMPUTATION  2007    BKA right    HX BELOW KNEE AMPUTATION Right     HX CERVICAL FUSION  2009    x2    HX ORTHOPAEDIC  2006    ACDF C4-C7    IR INSERT TUNL CVC W/O PORT OVER 5 YR  6/19/2020    IR REMOVE TUNL CVAD W/O PORT / PUMP  9/14/2020    STRESS TEST MYOVIEW  8/09    walked 8:46; normal; LVEF 51%     Social History     Tobacco Use    Smoking status: Never Smoker    Smokeless tobacco: Never Used   Substance Use Topics    Alcohol use: No       Social History     Substance and Sexual Activity   Alcohol Use No     Social History     Substance and Sexual Activity   Drug Use No      Social History     Tobacco Use   Smoking Status Never Smoker   Smokeless Tobacco Never Used     No current facility-administered medications for this encounter. No current outpatient medications on file.      Facility-Administered Medications Ordered in Other Encounters   Medication Dose Route Frequency    fentaNYL citrate (PF) injection 100 mcg  100 mcg IntraVENous Rad Multiple    [START ON 9/17/2020] epoetin naman (EPOGEN;PROCRIT) injection 20,000 Units  20,000 Units SubCUTAneous ONCE        Objective:  Visit Vitals  BP (!) 174/83   Pulse 94   Temp 97.3 °F (36.3 °C)   Resp 16       Vascular:  left LE  DP 1/4; PT 1/4  capillary fill time brisk, pitting edema is present, skin temperature is cool, varicosities are present. Dermatological:    Wound: 1  Location: right BKA stump   Measurements: per RN note  Margins: intact  Drainage: serous  Odor: no  Wound base: graunular  Lymphangitic streaking? No.  Undermining? yes  Sinus tracts? No.  Exposed bone? yes  Subcutaneous crepitation on palpation? No.    Skin is dry and scaly, exhibits hemosiderin deposition. There is no maceration of the interspaces of the feet b/l. Neurological:  DTR are present, protective sensation per 5.07 [place] Mauricio monofilament is intact, patient is AAOx3, mood is normal. Epicritic sensation is intact. Orthopedic:  Left LE are symmetric, ROM of ankle, STJ, 1st MTPJ is limited, MMT 5 out of 5 for B/L LE. Right BKA. Has prosthesis. Constitutional: Pt is a well developed, pleasant AA male. Lymphatics: negative tenderness to palpation of neck/axillary/inguinal nodes. Imaging / Labs / Cx / Px:  5/28 tib/fib XR: soft tissue ulcer extending to the rt distal fibula    Procedure Note:  Excisional debridement through level of muscle. Location / Ulcer: left leg  Indication: to remove non-viable tissue from wound bed. Consent in chart. Anesthesia: 4% xylocaine gel  Instrument: roncheur and curette   Bleeding: minimal  Hemostasis: pressure  Pre-Procedure Pain: 1  Post-Procedure Pain: 2  Area debrided < 20 cm sq.   Pre-Debridement measurements: see nursing notes  Post-Debridement measurements: see nursing notes  This is part of a series of staged procedures in an attempt at limb salvage.

## 2020-09-15 NOTE — WOUND CARE
09/15/20 1338   Wound Leg Lower Right;Lateral   Date First Assessed/Time First Assessed: 06/02/20 1439   POA: Yes  Location: Leg Lower  Wound Description (Optional): #1  Orientation: Right;Lateral   Dressing Type  Foam   Wound Length (cm) 0.8 cm   Wound Width (cm) 0.4 cm   Wound Depth (cm) 0.5   Wound Surface area (cm^2) 0.32 cm^2   Change in Wound Size % 78.67   Condition of [facility]; White   Condition of Edges Rolled/curled   Direction of Undermining 10 o'clock;4 o'clock   Depth of Undermining (cm) 1.6   Drainage Amount  Moderate   Drainage Color Serosanguinous   Wound Odor None   Periwound Skin Condition Macerated   Blood pressure (!) 174/83, pulse 94, temperature 97.3 °F (36.3 °C), resp. rate 16.

## 2020-09-15 NOTE — WOUND CARE
09/15/20 1414   Wound Leg Lower Right;Lateral   Date First Assessed/Time First Assessed: 06/02/20 1439   POA: Yes  Location: Leg Lower  Wound Description (Optional): #1  Orientation: Right;Lateral   Dressing Type Applied Collagens/cell matrix; Negative pressure wound therapy   Procedure Tolerated Well   Discharge Condition: Stable     Pain: 0    Ambulatory Status: Walking    Discharge Destination: Home     Transportation: Car    Accompanied by: Family/Caregiver     Discharge instructions reviewed with Patient and Family/Caregiver  and copy or written instructions have been provided. All questions/concerns have been addressed at this time.

## 2020-09-17 DIAGNOSIS — D50.9 IRON DEFICIENCY ANEMIA, UNSPECIFIED IRON DEFICIENCY ANEMIA TYPE: ICD-10-CM

## 2020-09-17 RX ORDER — INSULIN LISPRO 100 [IU]/ML
INJECTION, SOLUTION INTRAVENOUS; SUBCUTANEOUS
Qty: 15 ML | Refills: 0 | Status: SHIPPED | OUTPATIENT
Start: 2020-09-17 | End: 2020-10-12

## 2020-09-18 RX ORDER — LANOLIN ALCOHOL/MO/W.PET/CERES
CREAM (GRAM) TOPICAL
Qty: 100 TAB | Refills: 0 | Status: SHIPPED | OUTPATIENT
Start: 2020-09-18 | End: 2021-03-03

## 2020-09-22 ENCOUNTER — HOSPITAL ENCOUNTER (OUTPATIENT)
Dept: WOUND CARE | Age: 65
Discharge: HOME | End: 2020-09-22
Payer: MEDICARE

## 2020-09-22 VITALS
TEMPERATURE: 97.4 F | DIASTOLIC BLOOD PRESSURE: 79 MMHG | SYSTOLIC BLOOD PRESSURE: 160 MMHG | HEART RATE: 99 BPM | RESPIRATION RATE: 16 BRPM

## 2020-09-22 PROCEDURE — 11042 DBRDMT SUBQ TIS 1ST 20SQCM/<: CPT | Performed by: PODIATRIST

## 2020-09-22 PROCEDURE — 74011000250 HC RX REV CODE- 250: Performed by: PODIATRIST

## 2020-09-22 RX ADMIN — Medication: at 14:43

## 2020-09-22 NOTE — WOUND CARE
09/22/20 1439   Wound Leg Lower Right;Lateral   Date First Assessed/Time First Assessed: 06/02/20 1439   POA: Yes  Location: Leg Lower  Wound Description (Optional): #1  Orientation: Right;Lateral   Dressing Status  Removed   Dressing Type  Absorptive  (coban, snap vac removed 9/21)   Wound Length (cm) 1.2 cm   Wound Width (cm) 0.4 cm   Wound Depth (cm) 0.8   Wound Surface area (cm^2) 0.48 cm^2   Change in Wound Size % 68   Condition of Base Other (comment)  (unable to visualize base of wound)   Condition of Edges Rolled/curled   Direction of Undermining 10 o'clock;4 o'clock   Depth of Undermining (cm) 1   Drainage Amount  Moderate   Drainage Color Serosanguinous   Wound Odor None   Periwound Skin Condition Macerated   Cleansing and Cleansing Agents  Normal saline     Visit Vitals  BP (!) 160/79 (BP 1 Location: Left arm, BP Patient Position: Sitting)   Pulse 99   Temp 97.4 °F (36.3 °C)   Resp 16

## 2020-09-22 NOTE — WOUND CARE
09/22/20 1458   Wound Leg Lower Right;Lateral   Date First Assessed/Time First Assessed: 06/02/20 1439   POA: Yes  Location: Leg Lower  Wound Description (Optional): #1  Orientation: Right;Lateral   Dressing Type Applied Collagens/cell matrix; Vacuum dressing   Discharge Condition: Stable     Pain: 0    Ambulatory Status: Walking    Discharge Destination: Home     Transportation: Car    Accompanied by: Self     Discharge instructions reviewed with Patient and copy or written instructions have been provided. All questions/concerns have been addressed at this time.

## 2020-09-22 NOTE — WOUND CARE
Hai White, TIANNA - Ulisses Ortega. [place], [address] [facility]     Assessment/Plan:  Type 2 Diabetes with leg ulcer (E11.622)    Non pressure chronic ulcer right leg to the level of bone (P76.563)    Chronic osteomyelitis of RLE (M86.461)    Nichole Grade 3 ulcer    - Pt evaluated and treated. - Pt presents with right BKA stump wound - undermining has improved, wound edges closing in  - Preformed wound debridement. See procedure note below  - PICC d/fritz. Pt now on oral Ceftaroline.  - Pt has completed HBO sessions  - Dressing consisting of Susana. Snap VAC. - Pt has returned to wearing his old prosthetic   - F/U 1 week. Subjective:  Pt complains of wound to right amputation site. Snap vac removed yesterday. Pt dressed with packing and Optilock. No reported issues. Pt denies a recent h/o fever, chills, nausea, vomiting, chest pain, shortness of breath. HPI: Mr. Davidson Garcia is a 76 yo AA male with a PMH of arthritis, BPH, CKD, chronic pain, DM2, dyslipidemia, GERD, HTN, s/p rt BKA, sleep apnea, thromboembolus lt leg who presents with a right BKA stump wound. Wound formed from ill fitting prosthetic. Pt has had wounds in the past from in this same manner. ROS:  Consitutional: no weight loss, night sweats, fatigue / malaise / lethargy. Musculoskeletal: no joint / extremity pain, misalignment, stiffness, decreased ROM, crepitus. Integument: No pruritis, rashes, lesions, right BKA stump wound.   Psychiatric: No depression, anxiety, paranoia      History:  Wound Care  Allergies   Allergen Reactions    Adhesive Tape-Silicones Hives    Sulfa (Sulfonamide Antibiotics) Swelling     Lips eyes     Family History   Problem Relation Age of Onset    Hypertension Mother    [employer] Gout Mother     Cancer Father         leukemia    Diabetes Father     Hypertension Sister    [employer] Diabetes Maternal Grandmother     Hypertension Maternal Grandmother     Diabetes Paternal Grandmother     Hypertension Paternal Grandmother     Anesth Problems Neg Hx       Past Medical History:   Diagnosis Date    Arthritis     BPH (benign prostatic hyperplasia)     Chronic kidney disease     Chronic pain     BACK NEUROPATHY FEET HANDS    DM neuropathy, type II diabetes mellitus [facility] [address]     DM type 2 causing CKD stage 3 ([address] 12/17/2012    DM type 2 causing vascular disease ([address]     Dyslipidemia     Foot ulcer due to secondary DM ([address] 12/1/2012    GERD (gastroesophageal reflux disease)     HTN     Ill-defined condition 2013    MRSA in wound right leg (BKA)    S/P BKA (below knee amputation) ([facility] [address] [facility]     right BKA due to non-healing ulcer    Sleep apnea     Thromboembolus ([facility] [address] 1970'S    BLOOD CLOT LEFT LEG     Past Surgical History:   Procedure Laterality Date    ABDOMEN SURGERY PROC UNLISTED      pilonidal cyst    ECHO 2D ADULT  8/09    normal; LVEF 60%    ECHO STRESS  4/09    7 min; normal    ENDOSCOPY, COLON, DIAGNOSTIC      HX AMPUTATION  2012    left great toe    HX AMPUTATION  2007    BKA right    HX BELOW KNEE AMPUTATION Right     HX CERVICAL FUSION  2009    x2    HX ORTHOPAEDIC  2006    ACDF C4-C7    IR INSERT TUNL CVC W/O PORT OVER 5 YR  6/19/2020    IR REMOVE TUNL CVAD W/O PORT / PUMP  9/14/2020    STRESS TEST MYOVIEW  8/09    walked 8:46; normal; LVEF 51%     Social History     Tobacco Use    Smoking status: Never Smoker    Smokeless tobacco: Never Used   Substance Use Topics    Alcohol use: No       Social History     Substance and Sexual Activity   Alcohol Use No     Social History     Substance and Sexual Activity   Drug Use No      Social History     Tobacco Use   Smoking Status Never Smoker   Smokeless Tobacco Never Used     Current Outpatient Medications   Medication Sig    ferrous sulfate 325 mg (65 mg iron) tablet TAKE ONE TABLET BY MOUTH 2 TIMES A DAY    insulin lispro (HUMALOG) 100 unit/mL kwikpen INJECT 18 TO 30 UNITS SUBCUTANEOUSLY 3 TIMES DAILY BEFORE BREAKFAST, LUNCH, AND DINNER    oxyCODONE-acetaminophen (PERCOCET) 5-325 mg per tablet TAKE ONE TABLET BY MOUTH EVERY 4 HOURS AS NEEDED FOR PAIN    Lantus Solostar U-100 Insulin 100 unit/mL (3 mL) inpn INJECT 40 TO 50 UNITS SUBCUTANEOUSLY TWICE DAILY AS DIRECTED    oxyCODONE IR (ROXICODONE) 10 mg tab immediate release tablet Take  by mouth.  busPIRone (BUSPAR) 5 mg tablet TAKE ONE TABLET BY MOUTH THREE TIMES DAILY AS NEEDED FOR ANXIETY    lisinopriL (PRINIVIL, ZESTRIL) 10 mg tablet Take 1 Tab by mouth two (2) times a day.  rosuvastatin (CRESTOR) 40 mg tablet Take 1 Tab by mouth daily.  pregabalin (Lyrica) 75 mg capsule Take 1 Cap by mouth two (2) times a day. Max Daily Amount: 150 mg.    ergocalciferol (Vitamin D2) 1,250 mcg (50,000 unit) capsule TAKE ONE CAPSULE BY MOUTH EVERY WEEK    pantoprazole (PROTONIX) 40 mg tablet TAKE ONE TABLET BY MOUTH EVERY DAY BEFORE BREAKFAST    aspirin 81 mg chewable tablet Take 81 mg by mouth daily.  Insulin Needles, Disposable, (Bee Pen Needle) 32 gauge x 5/32\" ndle USE AS DIRECTED 4 TIMES DAILY    cefTARoline (TEFLARO) injection 300 mg by IntraVENous route every twelve (12) hours every twelve (12) hours.  glucose blood VI test strips (Prodigy No Coding) strip TEST BLOOD SUGAR 2 TIMES A DAY    silver sulfADIAZINE (SILVADENE) 1 % topical cream Apply  to affected area daily.  albuterol (Ventolin HFA) 90 mcg/actuation inhaler Take 2 Puffs by inhalation every four (4) hours as needed for Wheezing. No current facility-administered medications for this encounter.       Facility-Administered Medications Ordered in Other Encounters   Medication Dose Route Frequency    [START ON 9/25/2020] epoetin naman (EPOGEN;PROCRIT) injection 20,000 Units  20,000 Units SubCUTAneous ONCE        Objective:  Visit Vitals  BP (!) 160/79 (BP 1 Location: Left arm, BP Patient Position: Sitting)   Pulse 99   Temp 97.4 °F (36.3 °C)   Resp 16       Vascular:  left LE  DP 1/4; PT 1/4  capillary fill time brisk, pitting edema is present, skin temperature is cool, varicosities are present. Dermatological:    Wound: 1  Location: right BKA stump   Measurements: per RN note  Margins: intact  Drainage: serous  Odor: no  Wound base: graunular  Lymphangitic streaking? No.  Undermining? yes  Sinus tracts? No.  Exposed bone? yes  Subcutaneous crepitation on palpation? No.    Skin is dry and scaly, exhibits hemosiderin deposition. There is no maceration of the interspaces of the feet b/l. Neurological:  DTR are present, protective sensation per 5.07 [place] Mauricio monofilament is intact, patient is AAOx3, mood is normal. Epicritic sensation is intact. Orthopedic:  Left LE are symmetric, ROM of ankle, STJ, 1st MTPJ is limited, MMT 5 out of 5 for B/L LE. Right BKA. Has prosthesis. Constitutional: Pt is a well developed, pleasant AA male. Lymphatics: negative tenderness to palpation of neck/axillary/inguinal nodes. Imaging / Labs / Cx / Px:  5/28 tib/fib XR: soft tissue ulcer extending to the rt distal fibula    Procedure Note:  Excisional debridement through level of muscle. Location / Ulcer: left leg  Indication: to remove non-viable tissue from wound bed. Consent in chart. Anesthesia: 4% xylocaine gel  Instrument: roncheur and curette   Bleeding: minimal  Hemostasis: pressure  Pre-Procedure Pain: 1  Post-Procedure Pain: 2  Area debrided < 20 cm sq. Pre-Debridement measurements: see nursing notes  Post-Debridement measurements: see nursing notes  This is part of a series of staged procedures in an attempt at limb salvage.

## 2020-09-25 ENCOUNTER — HOSPITAL ENCOUNTER (OUTPATIENT)
Dept: INFUSION THERAPY | Age: 65
Discharge: HOME | End: 2020-09-25
Payer: MEDICARE

## 2020-09-25 VITALS
TEMPERATURE: 98.3 F | DIASTOLIC BLOOD PRESSURE: 60 MMHG | RESPIRATION RATE: 16 BRPM | HEART RATE: 91 BPM | SYSTOLIC BLOOD PRESSURE: 112 MMHG

## 2020-09-25 LAB
ALBUMIN SERPL-MCNC: 2.7 G/DL (ref 3.5–5)
ANION GAP SERPL CALC-SCNC: 5 MMOL/L (ref 5–15)
BASO+EOS+MONOS # BLD AUTO: 0.6 K/UL (ref 0.2–1.2)
BASO+EOS+MONOS NFR BLD AUTO: 8 % (ref 3.2–16.9)
BUN SERPL-MCNC: 41 MG/DL (ref 6–20)
BUN/CREAT SERPL: 13 (ref 12–20)
CALCIUM SERPL-MCNC: 8 MG/DL (ref 8.5–10.1)
CALCIUM SERPL-MCNC: 8 MG/DL (ref 8.5–10.1)
CHLORIDE SERPL-SCNC: 114 MMOL/L (ref 97–108)
CO2 SERPL-SCNC: 21 MMOL/L (ref 21–32)
CREAT SERPL-MCNC: 3.08 MG/DL (ref 0.7–1.3)
DIFFERENTIAL METHOD BLD: ABNORMAL
ERYTHROCYTE [DISTWIDTH] IN BLOOD BY AUTOMATED COUNT: 17.5 % (ref 11.8–15.8)
GLUCOSE SERPL-MCNC: 211 MG/DL (ref 65–100)
HCT VFR BLD AUTO: 22.8 % (ref 36.6–50.3)
HGB BLD-MCNC: 7.6 G/DL (ref 12.1–17)
LYMPHOCYTES # BLD: 1.2 K/UL (ref 0.8–3.5)
LYMPHOCYTES NFR BLD: 16 % (ref 12–49)
MCH RBC QN AUTO: 29.5 PG (ref 26–34)
MCHC RBC AUTO-ENTMCNC: 33.3 G/DL (ref 30–36.5)
MCV RBC AUTO: 88.4 FL (ref 80–99)
NEUTS SEG # BLD: 5.5 K/UL (ref 1.8–8)
NEUTS SEG NFR BLD: 77 % (ref 32–75)
PHOSPHATE SERPL-MCNC: 2.7 MG/DL (ref 2.6–4.7)
PLATELET # BLD AUTO: 230 K/UL (ref 150–400)
POTASSIUM SERPL-SCNC: 4.7 MMOL/L (ref 3.5–5.1)
PTH-INTACT SERPL-MCNC: 141.3 PG/ML (ref 18.4–88)
RBC # BLD AUTO: 2.58 M/UL (ref 4.1–5.7)
SODIUM SERPL-SCNC: 140 MMOL/L (ref 136–145)
WBC # BLD AUTO: 7.3 K/UL (ref 4.1–11.1)

## 2020-09-25 PROCEDURE — 83970 ASSAY OF PARATHORMONE: CPT

## 2020-09-25 PROCEDURE — 36415 COLL VENOUS BLD VENIPUNCTURE: CPT

## 2020-09-25 PROCEDURE — 74011250636 HC RX REV CODE- 250/636: Performed by: INTERNAL MEDICINE

## 2020-09-25 PROCEDURE — 82652 VIT D 1 25-DIHYDROXY: CPT

## 2020-09-25 PROCEDURE — 85025 COMPLETE CBC W/AUTO DIFF WBC: CPT

## 2020-09-25 PROCEDURE — 80069 RENAL FUNCTION PANEL: CPT

## 2020-09-25 PROCEDURE — 96372 THER/PROPH/DIAG INJ SC/IM: CPT

## 2020-09-25 RX ADMIN — EPOETIN ALFA 20000 UNITS: 20000 SOLUTION INTRAVENOUS; SUBCUTANEOUS at 13:20

## 2020-09-25 NOTE — PROGRESS NOTES
[facility] Progress Note    Date: 2020    Name: Jenny Hickey. MRN: [id number]         : 1955    Mr. Blake Arrived ambulatory and in no distress for Procrit Injection. Assessment was completed, no acute issues at this time, no new complaints voiced. Mr. Moise Smith vitals were reviewed. Visit Vitals  /60   Pulse 91   Temp 98.3 °F (36.8 °C)   Resp 16     Recent Results (from the past 12 hour(s))   CBC WITH 3 PART DIFF    Collection Time: 20 12:52 PM   Result Value Ref Range    WBC 7.3 4.1 - 11.1 K/uL    RBC 2.58 (L) 4.10 - 5.70 M/uL    HGB 7.6 (L) 12.1 - 17.0 g/dL    HCT 22.8 (L) 36.6 - 50.3 %    MCV 88.4 80.0 - 99.0 FL    MCH 29.5 26.0 - 34.0 PG    MCHC 33.3 30.0 - 36.5 g/dL    RDW 17.5 (H) 11.8 - 15.8 %    PLATELET 556 234 - 479 K/uL    NEUTROPHILS 77 (H) 32 - 75 %    MIXED CELLS 8 3.2 - 16.9 %    LYMPHOCYTES 16 12 - 49 %    ABS. NEUTROPHILS 5.5 1.8 - 8.0 K/UL    ABS. MIXED CELLS 0.6 0.2 - 1.2 K/uL    ABS. LYMPHOCYTES 1.2 0.8 - 3.5 K/UL    DF AUTOMATED           Medications:  Medications Administered     epoetin naman (EPOGEN;PROCRIT) injection 20,000 Units     Admin Date  2020 Action  Given Dose  61523 Units Route  SubCUTAneous Administered By  Azalia Norris RN                  Mr. Blake tolerated treatment well and was discharged from [place] [address] in stable condition. He is to return on  for his next appointment.     Sara Virk RN  2020

## 2020-09-28 LAB — 1,25(OH)2D3 SERPL-MCNC: 52.1 PG/ML (ref 19.9–79.3)

## 2020-09-29 ENCOUNTER — HOSPITAL ENCOUNTER (OUTPATIENT)
Dept: WOUND CARE | Age: 65
Discharge: HOME | End: 2020-09-29

## 2020-09-29 DIAGNOSIS — M79.605 PAIN IN BOTH LOWER EXTREMITIES: ICD-10-CM

## 2020-09-29 DIAGNOSIS — M79.604 PAIN IN BOTH LOWER EXTREMITIES: ICD-10-CM

## 2020-09-29 RX ORDER — OXYCODONE AND ACETAMINOPHEN 5; 325 MG/1; MG/1
TABLET ORAL
Qty: 90 TAB | Refills: 0 | Status: SHIPPED | OUTPATIENT
Start: 2020-09-29 | End: 2020-10-27

## 2020-09-29 NOTE — DISCHARGE INSTRUCTIONS
Discharge Instructions for  [facility]  [place], [address] [facility]  Telephone: [phone]    NAME:  Nomi Roy OF BIRTH:  1955  DATE:  9/29/2020      Probiotics while on antibiotics       [x] Wound and dressing supply provider: [employer]          Wound Cleansing:   Do not scrub or use excessive force. Cleanse wound prior to applying a clean dressing with:    [x] Normal Saline   [] Keep Wound Dry in Shower      [x] Wound Cleanser (***)  [] May Shower at Discharge   [] cleanse with Clydie Nicely and Clydie Nicely baby shampoo lather leave 2-3 then rinse with water, pat dry and redress wound. Dressings:           Wound Location right lat leg     Apply Primary Dressing:unruly snap vac                                               [x] unruly gauze roll gaze tape tubi  today in office  Change dressing:   [] Daily      [] Every Other Day   [] Three times per week  [x] Once a week   [] Do Not Change Dressing     [] Other:    Off-Loading:   [x] Off-loading when [x] walking  [] in bed [] sitting    Dietary:  [x] Diet as tolerated: [] Diabetic Diet:   [] Increase Protein: examples ( Meat, cheese, eggs, greek yogurt, premier protein drink, fish, nuts )   [] Other:    Return Appointment:      [] Return Appointment: With Dr. Macey Torres 1 WEEK,          Electronically signed on 9/29/2020     [address] [place] [address] Information: Should you experience any significant changes in your wound(s) or have questions about your wound care, please contact the [facility] at [address] [place] [address] 8:00 am - 4:30. If you need help with your wound outside these hours and cannot wait until we are again available, contact your PCP or go to the hospital emergency room. PLEASE NOTE: IF YOU ARE UNABLE TO OBTAIN WOUND SUPPLIES, CONTINUE TO USE THE SUPPLIES YOU HAVE AVAILABLE UNTIL YOU ARE ABLE TO REACH US.  IT IS MOST IMPORTANT TO KEEP THE WOUND COVERED AT ALL TIMES.      Physician Signature:_______________________  Dr. Trevon Toledo

## 2020-09-30 NOTE — PROGRESS NOTES
HBO PROGRESS NOTE  NAME: Marine Christine MEDICAL RECORD NUMBER:  [id number]  AGE: 77 y.o. GENDER: male  : 1955  EPISODE DATE:  2020     Subjective     HBO Treatment Number: 23 out of Total Treatments: 30    HBO Diagnosis:  Indications: Lower Extremity Diabetic Wound ___(site)(Nichole grade 3 RLE)  Safety checks performed prior to treatment. See doc flowsheets for documentation. Objective           Lab Results   Component Value Date/Time    Glucose (POC) 245 (H) 2020 11:09 AM    Glucose (POC) 219 (H) 2020 08:35 AM       Pre treatment Vital Signs       Temp: 96.9 °F (36.1 °C)     Pulse (Heart Rate): 95     Resp Rate: 14   BP: 155/70       Post treatment Vital Signs  Temp: 97.1 °F (36.2 °C)  Pulse (Heart Rate): 88  Resp Rate: 16  BP: 163/82      Assessment        Physical Exam:  General Appearance: alert, well developed and in no apparent distress    Tympanic Membrane Assessment:  Pre-Treatment Left   Right Right: Grade 0  Post-treatment Left Left: Grade 0 Right Right: Grade 0    Pulmonary/Chest: no accessory muscle use    Cardiovascular: normal    Patient place in a fully body Monoplace serial number Chamber #: [id number]       Treatment Start Time: 2867 Door Closed    Pressure Reached Time: 1320 Pressure Reached  ROSALINDA Depth: 2.5 ROSALINDA  Number of Air Breaks: Two 5 minute air breaks      Decompression Time: 1500 Decompression Time   Treatment End Time: 1518 Door Opened    Symptoms Noted During Treatment: None    Length of Treatment: 90 Minutes    Adverse Event: Treatment Completion Status: Treatment Completed without Adverse Event    I was present on these premises and immediately available to furnish assistance & direction throughout the procedure. Plan          Marine Silva. is a 77 y.o. male  did successfully complete today's hyperbaric oxygen treatment at [address]     In my clinical judgement, ongoing HBO therapy is necessary at this time, given a threat to patient function, limb or life from the current condition. Supervision and attendance of Hyperbaric Oxygen Therapy provided. Continue HBO treatment as outlined in the treatment plan. Hyperbaric Oxygen: Libby Mckeon. tolerated Treatment Number: 23 well today without complications.      Electronically signed by Mikie Morgan MD on 8/19/2020 at 12:56 PM

## 2020-10-01 ENCOUNTER — APPOINTMENT (OUTPATIENT)
Dept: INFUSION THERAPY | Age: 65
End: 2020-10-01

## 2020-10-02 ENCOUNTER — HOSPITAL ENCOUNTER (OUTPATIENT)
Dept: WOUND CARE | Age: 65
Discharge: HOME | End: 2020-10-02
Admitting: PODIATRIST
Payer: MEDICARE

## 2020-10-02 VITALS — RESPIRATION RATE: 16 BRPM | DIASTOLIC BLOOD PRESSURE: 90 MMHG | SYSTOLIC BLOOD PRESSURE: 147 MMHG | TEMPERATURE: 97.5 F

## 2020-10-02 PROCEDURE — 11043 DBRDMT MUSC&/FSCA 1ST 20/<: CPT | Performed by: PODIATRIST

## 2020-10-02 NOTE — WOUND CARE
Yessi Sifuentes DPM - Lazaro Gray. [place], [facility]     Assessment/Plan:  Type 2 Diabetes with leg ulcer (E11.622)    Non pressure chronic ulcer right leg to the level of bone (E60.214)    Chronic osteomyelitis of RLE (M86.461)    Nichole Grade 3 ulcer    - Pt evaluated and treated. - Pt presents with right BKA stump wound - unchanged  - Preformed wound debridement. See procedure note below  - PICC d/fritz. Pt now on oral Ceftaroline.  - Pt has completed HBO sessions  - Dressing consisting of Endoform. Snap VAC. - Pt has returned to wearing his old prosthetic   - F/U 1 week. Subjective:  Pt complains of wound to right amputation site. Snap vac removed yesterday. Pt dressed with packing and Optilock. No reported issues. Pt denies a recent h/o fever, chills, nausea, vomiting, chest pain, shortness of breath. HPI: Mr. Risa Angelo is a 74 yo AA male with a PMH of arthritis, BPH, CKD, chronic pain, DM2, dyslipidemia, GERD, HTN, s/p rt BKA, sleep apnea, thromboembolus lt leg who presents with a right BKA stump wound. Wound formed from ill fitting prosthetic. Pt has had wounds in the past from in this same manner. ROS:  Consitutional: no weight loss, night sweats, fatigue / malaise / lethargy. Musculoskeletal: no joint / extremity pain, misalignment, stiffness, decreased ROM, crepitus. Integument: No pruritis, rashes, lesions, right BKA stump wound.   Psychiatric: No depression, anxiety, paranoia      History:  Wound Care  Allergies   Allergen Reactions    Adhesive Tape-Silicones Hives    Sulfa (Sulfonamide Antibiotics) Swelling     Lips eyes     Family History   Problem Relation Age of Onset    Hypertension Mother    [employer] Gout Mother     Cancer Father         leukemia    Diabetes Father     Hypertension Sister     Diabetes Maternal Grandmother     Hypertension Maternal Grandmother     Diabetes Paternal Grandmother     Hypertension Paternal Grandmother     Anesth Problems Neg Hx       Past Medical History:   Diagnosis Date    Arthritis     BPH (benign prostatic hyperplasia)     Chronic kidney disease     Chronic pain     BACK NEUROPATHY FEET HANDS    DM neuropathy, type II diabetes mellitus ([facility] [address]     DM type 2 causing CKD stage 3 ([address] 12/17/2012    DM type 2 causing vascular disease ([facility] [address]     Dyslipidemia     Foot ulcer due to secondary DM ([facility] [address] 12/1/2012    GERD (gastroesophageal reflux disease)     HTN     Ill-defined condition 2013    MRSA in wound right leg (BKA)    S/P BKA (below knee amputation) ([address]     right BKA due to non-healing ulcer    Sleep apnea     Thromboembolus ([facility] [address] 1970'S    BLOOD CLOT LEFT LEG     Past Surgical History:   Procedure Laterality Date    ABDOMEN SURGERY PROC UNLISTED      pilonidal cyst    ECHO 2D ADULT  8/09    normal; LVEF 60%    ECHO STRESS  4/09    7 min; normal    ENDOSCOPY, COLON, DIAGNOSTIC      HX AMPUTATION  2012    left great toe    HX AMPUTATION  2007    BKA right    HX BELOW KNEE AMPUTATION Right     HX CERVICAL FUSION  2009    x2    HX ORTHOPAEDIC  2006    ACDF C4-C7    IR INSERT TUNL CVC W/O PORT OVER 5 YR  6/19/2020    IR REMOVE TUNL CVAD W/O PORT / PUMP  9/14/2020    STRESS TEST MYOVIEW  8/09    walked 8:46; normal; LVEF 51%     Social History     Tobacco Use    Smoking status: Never Smoker    Smokeless tobacco: Never Used   Substance Use Topics    Alcohol use: No       Social History     Substance and Sexual Activity   Alcohol Use No     Social History     Substance and Sexual Activity   Drug Use No      Social History     Tobacco Use   Smoking Status Never Smoker   Smokeless Tobacco Never Used     Current Outpatient Medications   Medication Sig    oxyCODONE-acetaminophen (PERCOCET) 5-325 mg per tablet TAKE ONE TABLET BY MOUTH EVERY 4 HOURS AS NEEDED FOR PAIN    ferrous sulfate 325 mg (65 mg iron) tablet TAKE ONE TABLET BY MOUTH 2 TIMES A DAY    insulin lispro (HUMALOG) 100 unit/mL kwikpen INJECT 18 TO 30 UNITS SUBCUTANEOUSLY 3 TIMES DAILY BEFORE BREAKFAST, LUNCH, AND DINNER    Lantus Solostar U-100 Insulin 100 unit/mL (3 mL) inpn INJECT 40 TO 50 UNITS SUBCUTANEOUSLY TWICE DAILY AS DIRECTED    oxyCODONE IR (ROXICODONE) 10 mg tab immediate release tablet Take  by mouth.  busPIRone (BUSPAR) 5 mg tablet TAKE ONE TABLET BY MOUTH THREE TIMES DAILY AS NEEDED FOR ANXIETY    Insulin Needles, Disposable, (Bee Pen Needle) 32 gauge x 5/32\" ndle USE AS DIRECTED 4 TIMES DAILY    cefTARoline (TEFLARO) injection 300 mg by IntraVENous route every twelve (12) hours every twelve (12) hours.  lisinopriL (PRINIVIL, ZESTRIL) 10 mg tablet Take 1 Tab by mouth two (2) times a day.  glucose blood VI test strips (Prodigy No Coding) strip TEST BLOOD SUGAR 2 TIMES A DAY    rosuvastatin (CRESTOR) 40 mg tablet Take 1 Tab by mouth daily.  silver sulfADIAZINE (SILVADENE) 1 % topical cream Apply  to affected area daily.  albuterol (Ventolin HFA) 90 mcg/actuation inhaler Take 2 Puffs by inhalation every four (4) hours as needed for Wheezing.  pregabalin (Lyrica) 75 mg capsule Take 1 Cap by mouth two (2) times a day. Max Daily Amount: 150 mg.    ergocalciferol (Vitamin D2) 1,250 mcg (50,000 unit) capsule TAKE ONE CAPSULE BY MOUTH EVERY WEEK    pantoprazole (PROTONIX) 40 mg tablet TAKE ONE TABLET BY MOUTH EVERY DAY BEFORE BREAKFAST    aspirin 81 mg chewable tablet Take 81 mg by mouth daily. No current facility-administered medications for this encounter. Objective:  Visit Vitals  BP (!) 147/90   Temp 97.5 °F (36.4 °C)   Resp 16       Vascular:  left LE  DP 1/4; PT 1/4  capillary fill time brisk, pitting edema is present, skin temperature is cool, varicosities are present.     Dermatological:    Wound: 1  Location: right BKA stump   Measurements: per RN note  Margins: intact  Drainage: serous  Odor: no  Wound base: graunular  Lymphangitic streaking? No.  Undermining? yes  Sinus tracts? No.  Exposed bone? yes  Subcutaneous crepitation on palpation? No.    Skin is dry and scaly, exhibits hemosiderin deposition. There is no maceration of the interspaces of the feet b/l. Neurological:  DTR are present, protective sensation per 5.07 [place] Mauricio monofilament is intact, patient is AAOx3, mood is normal. Epicritic sensation is intact. Orthopedic:  Left LE are symmetric, ROM of ankle, STJ, 1st MTPJ is limited, MMT 5 out of 5 for B/L LE. Right BKA. Has prosthesis. Constitutional: Pt is a well developed, pleasant AA male. Lymphatics: negative tenderness to palpation of neck/axillary/inguinal nodes. Imaging / Labs / Cx / Px:  5/28 tib/fib XR: soft tissue ulcer extending to the rt distal fibula    Procedure Note:  Excisional debridement through level of muscle. Location / Ulcer: left leg  Indication: to remove non-viable tissue from wound bed. Consent in chart. Anesthesia: 4% xylocaine gel  Instrument: roncheur and curette   Bleeding: minimal  Hemostasis: pressure  Pre-Procedure Pain: 1  Post-Procedure Pain: 2  Area debrided < 20 cm sq. Pre-Debridement measurements: see nursing notes  Post-Debridement measurements: see nursing notes  This is part of a series of staged procedures in an attempt at limb salvage.

## 2020-10-02 NOTE — DISCHARGE INSTRUCTIONS
Discharge Instructions for  [facility]  [place] [address], [address] [facility]  Telephone: [phone]    NAME:  Alec Carr OF BIRTH:  1955  DATE:  9/15/2020      Probiotics while on antibiotics       [x] Wound and dressing supply provider: [facility]          Wound Cleansing:   Do not scrub or use excessive force. Cleanse wound prior to applying a clean dressing with:    [x] Normal Saline   [] Keep Wound Dry in Shower      [x] Wound Cleanser (***)  [] May Shower at Discharge   [] cleanse with Shearon Im and Shearon Im baby shampoo lather leave 2-3 then rinse with water, pat dry and redress wound. Dressings:           Wound Location right lat leg     Apply Primary Dressing:unruly snap vac                                               [x] unruly gauze roll gaze tape tubi  today in office  Change dressing:   [] Daily      [] Every Other Day   [] Three times per week  [x] Once a week   [] Do Not Change Dressing     [] Other:    Off-Loading:   [x] Off-loading when [x] walking  [] in bed [] sitting    Dietary:  [x] Diet as tolerated: [] Diabetic Diet:   [] Increase Protein: examples ( Meat, cheese, eggs, greek yogurt, premier protein drink, fish, nuts )   [] Other:    Return Appointment:      [] Return Appointment: With Dr. Alexandria Rubio 1 WEEK,          Electronically signed on 9/15//2020     [address] [place] [address] Information: Should you experience any significant changes in your wound(s) or have questions about your wound care, please contact the [address] [place] [address] at [address] [place] [address] 8:00 am - 4:30. If you need help with your wound outside these hours and cannot wait until we are again available, contact your PCP or go to the hospital emergency room. PLEASE NOTE: IF YOU ARE UNABLE TO OBTAIN WOUND SUPPLIES, CONTINUE TO USE THE SUPPLIES YOU HAVE AVAILABLE UNTIL YOU ARE ABLE TO REACH US.  IT IS MOST IMPORTANT TO KEEP THE WOUND COVERED AT ALL TIMES.      Physician Signature:_______________________  Dr. Jannet Bishop

## 2020-10-06 ENCOUNTER — TELEPHONE (OUTPATIENT)
Dept: WOUND CARE | Age: 65
End: 2020-10-06

## 2020-10-06 NOTE — TELEPHONE ENCOUNTER
Attempted to call patient for COVID prescreen prior to appointment on 10/7/2020. No answer. Voicemail full.

## 2020-10-07 ENCOUNTER — HOSPITAL ENCOUNTER (OUTPATIENT)
Dept: WOUND CARE | Age: 65
Discharge: HOME | End: 2020-10-07
Payer: MEDICARE

## 2020-10-07 VITALS — DIASTOLIC BLOOD PRESSURE: 78 MMHG | SYSTOLIC BLOOD PRESSURE: 168 MMHG | TEMPERATURE: 97.9 F | HEART RATE: 93 BPM

## 2020-10-07 PROCEDURE — 99213 OFFICE O/P EST LOW 20 MIN: CPT

## 2020-10-07 NOTE — DISCHARGE INSTRUCTIONS
Discharge Instructions for  [address], [address] [place] [address] Avenue  Telephone: [phone]    NAME:  Elida Briseno. YOB: 1955  MEDICAL RECORD NUMBER:  [id number]  DATE:  10/7/2020    Wound Care:     Continue wound care as ordered by Dr. Keshia English. Today in clinic apply collagen Ag, cover with gauze, roll gauze until able to have snap-vac re-applied at [address] [place] [address]. Dietary:  [x] Diet as tolerated: [x] Increase Protein:  Continue probiotics. Activity:  [x] Activity as tolerated:              Return Appointment:  [x] Return Appointment: With Dr. Milagro Baker  If needed. Start on nystatin mouth swish. Ordering a refill of oral antibiotic (2 weeks). Follow up with PCP for potassium level. Electronically signed on 10/7/2020 at 11:55 AM     Irina Angeles 281: Should you experience any significant changes in your wound(s) or have questions about your wound care, please contact the [facility] Main at [address] [place] [address] 8:00 am - 4:30. If you need help with your wound outside these hours and cannot wait until we are again available, contact your PCP or go to the hospital emergency room. PLEASE NOTE: IF YOU ARE UNABLE TO OBTAIN WOUND SUPPLIES, CONTINUE TO USE THE SUPPLIES YOU HAVE AVAILABLE UNTIL YOU ARE ABLE TO REACH US. IT IS MOST IMPORTANT TO KEEP THE WOUND COVERED AT ALL TIMES.       Physician Signature:_______________________    Date: ___________ Time:  ____________

## 2020-10-07 NOTE — PROGRESS NOTES
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 · Cannot use Doxy as MRSA strain is resi to tetracycline Abx  · S/p Ceftaroline IV long course, [facility] cath removed  · Patient off Buspirone, and says doing well  · S/p 2 weeks of PO Zyvox plus cipro PO for 2 weeks, and ESR still remains high  · He tolerated those meds well, without any major complaints. · I d/w patient his potasium level, need of lamar axalate, creat, etc, he would like to follow up with his nephrologist.  · I ordered 2 more weeks of PO Zyvox and PO Cipro. · D/w patient that he has been through long Abx course already, more than 8 weeks by now, and as wound itself is not showing major signs of worsening infection, high ESR and CRP likely related to other medical issues. · Continue probiotics  · Indications, complications, risk, benefits, alternatives dw pt, voiced understanding, and agree with current plan  · Continue to follow up with  for wound care,  Nephrologist, endocrinologist etc.        Patient is a 75 y.o. male who was originally seen on 6/17/2020 with complaints of rt stump wound and fibula osteomyelitis. Patient has this wound on rt stump for more than 2 months with some drainage. NO fever/chills. Bone is exposed. Wound started with friction from his prosthetic boot. He has wound vac currently. Bone is covering up some with tissue, not much change in size yet.    Total CPK went up to 1000 with Daptomycin, changed to Vancomycin, caused worsening renal failure. Had to stop all previous Abx and then s/p Ceftaroline and tolerated well. On Probiotics, NO diarrhea currently. His HbA1C has improved some, he stopped soda and now on white meat and diet control per endocrinology. He tolerated Zyvox, Cipro PO combination well.                Allergies   Allergen Reactions    Adhesive Tape-Silicones Hives    Sulfa (Sulfonamide Antibiotics) Swelling       Lips eyes         Review of Systems:  A comprehensive review of systems was negative except for that written in the History of Present Illness.        Objective:     Vitals:    10/07/20 1112   BP: (!) 168/78   Pulse: 93   Temp: 97.9 °F (36.6 °C)       Exam:  General:  alert, cooperative, no distress, appears stated age  HEENT: pallor, NO icterus  Chest: CTA b/l  Heart: RRR S1S2  Abdomen: soft, NT, ND, BS+  Extremities: rt stump lateral aspect wound 1.5 x 1.2 x 1cm deep wound, bone exposed, NO drainage        Microbiology:    6/17/20 bone Cx with MRSA, Amp susceptible Enterococcus, Enterobacter     Studies:  MRI: 6/4/20 with wound deep to bone and fibula osteomyelitis        We appreciate your consult and the opportunity to participate in your patient's care.   Please call us with any questions or concerns.         MD Cammy Herrmann

## 2020-10-07 NOTE — WOUND CARE
10/07/20 1122   Anesthetic   Anesthetic 4% Lidocaine Cream   Peripheral Vascular   Peripheral Vascular (WDL) X   RLE Peripheral Vascular    Color Appropriate for race   Popliteal Pulse Palpable   Musculoskeletal   Musculoskeletal (WDL) WDL   Foot Assessment   Foot Assessment X   Right Foot Assessment   Prior Amputation Yes   Wound Leg lower Right;Lateral #1   Date First Assessed/Time First Assessed: 10/07/20 1122   Present on Hospital Admission: Yes  Location: Leg lower  Wound Location Orientation: Right;Lateral  Wound Description: #1   Dressing Status Clean, dry, and intact   Dressing Type Other (Comment)  (Mesalt packing, dry dressing)   Non-staged Wound Description Full thickness   Wound Length (cm) 1 cm   Wound Width (cm) 0.8 cm   Wound Depth (cm) 1.1 cm   Wound Surface Area (cm^2) 0.8 cm^2   Wound Volume (cm^3) 0.88 cm^3   Condition of Base Slough   Condition of Edges Open   Tunneling (cm) 1.8 cm   Direction of Tunnel 12 o'clock  (Tunnel at 6 O'clock 1.6 cm)   Drainage Amount Moderate   Drainage Color Tan   Wound Odor None   Tressa-wound Assessment Maceration   Cleansing and Cleansing Agents  Normal saline     Visit Vitals  BP (!) 168/78 (BP 1 Location: Left arm, BP Patient Position: At rest)   Pulse 93   Temp 97.9 °F (36.6 °C)

## 2020-10-09 ENCOUNTER — HOSPITAL ENCOUNTER (OUTPATIENT)
Dept: INFUSION THERAPY | Age: 65
Discharge: HOME | End: 2020-10-09
Payer: MEDICARE

## 2020-10-09 ENCOUNTER — HOSPITAL ENCOUNTER (OUTPATIENT)
Dept: WOUND CARE | Age: 65
Discharge: HOME | End: 2020-10-09
Admitting: PODIATRIST
Payer: MEDICAID

## 2020-10-09 VITALS
DIASTOLIC BLOOD PRESSURE: 84 MMHG | TEMPERATURE: 98.1 F | RESPIRATION RATE: 18 BRPM | SYSTOLIC BLOOD PRESSURE: 156 MMHG | HEART RATE: 90 BPM

## 2020-10-09 VITALS
TEMPERATURE: 98.2 F | DIASTOLIC BLOOD PRESSURE: 82 MMHG | HEART RATE: 91 BPM | SYSTOLIC BLOOD PRESSURE: 186 MMHG | RESPIRATION RATE: 18 BRPM

## 2020-10-09 LAB
FERRITIN SERPL-MCNC: 386 NG/ML (ref 26–388)
HCT VFR BLD AUTO: 27.5 % (ref 36.6–50.3)
HGB BLD-MCNC: 8.5 G/DL (ref 12.1–17)
IRON SATN MFR SERPL: 18 % (ref 20–50)
IRON SERPL-MCNC: 35 UG/DL (ref 35–150)
PHOSPHATE SERPL-MCNC: 3.4 MG/DL (ref 2.6–4.7)
TIBC SERPL-MCNC: 200 UG/DL (ref 250–450)

## 2020-10-09 PROCEDURE — 36415 COLL VENOUS BLD VENIPUNCTURE: CPT

## 2020-10-09 PROCEDURE — 85018 HEMOGLOBIN: CPT

## 2020-10-09 PROCEDURE — 83540 ASSAY OF IRON: CPT

## 2020-10-09 PROCEDURE — 84100 ASSAY OF PHOSPHORUS: CPT

## 2020-10-09 PROCEDURE — 74011250636 HC RX REV CODE- 250/636: Performed by: INTERNAL MEDICINE

## 2020-10-09 PROCEDURE — 96372 THER/PROPH/DIAG INJ SC/IM: CPT

## 2020-10-09 PROCEDURE — 82728 ASSAY OF FERRITIN: CPT

## 2020-10-09 PROCEDURE — 99213 OFFICE O/P EST LOW 20 MIN: CPT

## 2020-10-09 RX ORDER — CIPROFLOXACIN 500 MG/1
TABLET ORAL 2 TIMES DAILY
COMMUNITY
End: 2020-12-15

## 2020-10-09 RX ORDER — SODIUM POLYSTYRENE SULFONATE 15 G/60ML
15 SUSPENSION ORAL; RECTAL
COMMUNITY
End: 2021-02-01

## 2020-10-09 RX ORDER — NYSTATIN 100000 [USP'U]/ML
SUSPENSION ORAL 4 TIMES DAILY
COMMUNITY
End: 2021-02-01

## 2020-10-09 RX ADMIN — EPOETIN ALFA 20000 UNITS: 20000 SOLUTION INTRAVENOUS; SUBCUTANEOUS at 12:53

## 2020-10-09 NOTE — WOUND CARE
10/09/20 1110   Wound Leg Lower Right;Lateral   Date First Assessed/Time First Assessed: 06/02/20 1439   POA: Yes  Location: Leg Lower  Wound Description (Optional): #1  Orientation: Right;Lateral   Dressing Type Applied Gauze;Gauze wrap (greg);ABD pad  (mesalt soaked in gentian violet)   Discharge Condition: Stable     Pain: 0    Ambulatory Status: Walking    Discharge Destination: Infusion center at [facility]     Transportation: Car    Accompanied by: Self     Discharge instructions reviewed with Patient and copy or written instructions have been provided. All questions/concerns have been addressed at this time.

## 2020-10-09 NOTE — DISCHARGE INSTRUCTIONS
Discharge Instructions for  [facility]  [address] [place], [address] [facility]  Telephone: [phone]    NAME:  Cat Spring OF BIRTH:  1955  DATE:  10/2/2020      Probiotics while on antibiotics       [x] Wound and dressing supply provider: [facility]          Wound Cleansing:   Do not scrub or use excessive force. Cleanse wound prior to applying a clean dressing with:    [x] Normal Saline   [] Keep Wound Dry in Shower      [x] Wound Cleanser (***)  [] May Shower at Discharge   [] cleanse with [place] Brisk and [place] Brisk baby shampoo lather leave 2-3 then rinse with water, pat dry and redress wound. Dressings:           Wound Location right lat leg     Apply Primary Dressing:endoform snap vac                                               [x] endoform gauze roll gaze tape tubi  today in office  Change dressing:   [] Daily      [] Every Other Day   [] Three times per week  [x] Once a week   [] Do Not Change Dressing     [] Other:    Off-Loading:   [x] Off-loading when [x] walking  [] in bed [] sitting    Dietary:  [x] Diet as tolerated: [] Diabetic Diet:   [] Increase Protein: examples ( Meat, cheese, eggs, greek yogurt, premier protein drink, fish, nuts )   [] Other:    Return Appointment:      [] Return Appointment: With Dr. Angeles Albright 1 WEEK,          Electronically signed on 10/2/2020     [address] [place] [address] Information: Should you experience any significant changes in your wound(s) or have questions about your wound care, please contact the [facility] Main at [address] [place] [address] 8:00 am - 4:30. If you need help with your wound outside these hours and cannot wait until we are again available, contact your PCP or go to the hospital emergency room. PLEASE NOTE: IF YOU ARE UNABLE TO OBTAIN WOUND SUPPLIES, CONTINUE TO USE THE SUPPLIES YOU HAVE AVAILABLE UNTIL YOU ARE ABLE TO REACH US.  IT IS MOST IMPORTANT TO KEEP THE WOUND COVERED AT ALL TIMES.      Physician Signature:_______________________  Dr. Karin Jones

## 2020-10-09 NOTE — WOUND CARE
10/09/20 1101   Wound Leg Lower Right;Lateral   Date First Assessed/Time First Assessed: 06/02/20 1439   POA: Yes  Location: Leg Lower  Wound Description (Optional): #1  Orientation: Right;Lateral   Dressing Status  Removed   Dressing Type  Adhesive wound dressing (Band-Aid)   Wound Length (cm) 0.9 cm   Wound Width (cm) 0.5 cm   Wound Depth (cm) 0.9   Wound Surface area (cm^2) 0.45 cm^2   Change in Wound Size % 70   Condition of Base Other (comment)  (unabole to visualize)   Condition of Edges Rolled/curled   Direction of Tunnel 1 o'clock   Depth of Tunnel/Sinus (cm) 2.5 cm   Direction of Undermining 10 o'clock;4 o'clock   Depth of Undermining (cm) 1.5   Drainage Amount  Moderate   Drainage Color Serosanguinous   Wound Odor None   Periwound Skin Condition Macerated     Visit Vitals  BP (!) 186/82   Pulse 91   Temp 98.2 °F (36.8 °C)   Resp 18

## 2020-10-09 NOTE — PROGRESS NOTES
[facility] Progress Note    Date: 2020    Name: Analilia Varela. MRN: [id number]         : 1955    Mr. Blake Arrived ambulatory and in no distress for Procrit Injection. Assessment was completed, no acute issues at this time, no new complaints voiced. Labs drawn peripherally by Kun Keith Phlebotomist.      Mr. Blake's vitals were reviewed. Visit Vitals  BP (!) 156/84   Pulse 90   Temp 98.1 °F (36.7 °C)   Resp 18     Recent Results (from the past 12 hour(s))   HGB & HCT    Collection Time: 10/09/20 12:03 PM   Result Value Ref Range    HGB 8.5 (L) 12.1 - 17.0 g/dL    HCT 27.5 (L) 36.6 - 50.3 %   PHOSPHORUS    Collection Time: 10/09/20 12:03 PM   Result Value Ref Range    Phosphorus 3.4 2.6 - 4.7 MG/DL         Medications:  Medications Administered     epoetin naman (EPOGEN;PROCRIT) injection 20,000 Units     Admin Date  10/09/2020 Action  Given Dose  24186 Units Route  SubCUTAneous Administered By  Rosa Isela Bermudez RN              Mr. Blake tolerated treatment well and was discharged from [place] [address] in stable condition. He is to return on  for his next appointment.     Aaron Nugent RN  2020

## 2020-10-12 RX ORDER — INSULIN LISPRO 100 [IU]/ML
INJECTION, SOLUTION INTRAVENOUS; SUBCUTANEOUS
Qty: 15 ML | Refills: 0 | Status: SHIPPED | OUTPATIENT
Start: 2020-10-12 | End: 2020-11-09

## 2020-10-13 ENCOUNTER — HOSPITAL ENCOUNTER (OUTPATIENT)
Dept: WOUND CARE | Age: 65
Discharge: HOME | End: 2020-10-13
Admitting: PODIATRIST
Payer: MEDICARE

## 2020-10-13 VITALS
RESPIRATION RATE: 18 BRPM | DIASTOLIC BLOOD PRESSURE: 80 MMHG | TEMPERATURE: 98.4 F | HEART RATE: 94 BPM | SYSTOLIC BLOOD PRESSURE: 175 MMHG

## 2020-10-13 DIAGNOSIS — K21.9 GASTROESOPHAGEAL REFLUX DISEASE WITHOUT ESOPHAGITIS: ICD-10-CM

## 2020-10-13 PROCEDURE — 11042 DBRDMT SUBQ TIS 1ST 20SQCM/<: CPT | Performed by: PODIATRIST

## 2020-10-13 PROCEDURE — 74011000250 HC RX REV CODE- 250: Performed by: PODIATRIST

## 2020-10-13 RX ORDER — PANTOPRAZOLE SODIUM 40 MG/1
TABLET, DELAYED RELEASE ORAL
Qty: 90 TAB | Refills: 0 | Status: SHIPPED | OUTPATIENT
Start: 2020-10-13 | End: 2021-03-03

## 2020-10-13 RX ADMIN — Medication: at 11:34

## 2020-10-13 NOTE — WOUND CARE
10/13/20 1131   Wound Leg Lower Right;Lateral   Date First Assessed/Time First Assessed: 06/02/20 1439   POA: Yes  Location: Leg Lower  Wound Description (Optional): #1  Orientation: Right;Lateral   Dressing Status  Removed   Dressing Type  Gauze wrap (greg);Gauze;Packing   Wound Length (cm) 1 cm   Wound Width (cm) 0.7 cm   Wound Depth (cm) 0.9   Wound Surface area (cm^2) 0.7 cm^2   Change in Wound Size % 53.33   Condition of Base Other (comment)  (unable to visualize )   Direction of Tunnel 1 o'clock   Depth of Tunnel/Sinus (cm) 2.4 cm   Direction of Undermining 12 o'clock;4 o'clock   Depth of Undermining (cm) 1.4   Drainage Amount  Moderate   Drainage Color Serosanguinous   Wound Odor None   Periwound Skin Condition Intact   Cleansing and Cleansing Agents  Normal saline     Visit Vitals  BP (!) 175/80   Pulse 94   Temp 98.4 °F (36.9 °C)   Resp 18

## 2020-10-13 NOTE — WOUND CARE
Ronald Alvarado, TIANNA - Feed[place] Cameron. Ricki, [address] [facility] - Progress Note     Assessment/Plan:  Type 2 Diabetes with leg ulcer (E11.622)    Non pressure chronic ulcer right leg to the level of bone (B97.252)    Chronic osteomyelitis of RLE (M86.461)    Nichole Grade 3 ulcer    - Pt evaluated and treated. - Pt presents with right BKA stump wound - wound smaller but still with exposed bone, reviewed his options including continued conservative tx vs revision of the stump, he will consider.  - PICC d/fritz. Pt now on oral Ceftaroline.  - Pt has completed HBO sessions  - Dressing consisting of GV, Mesalt, gauze, roll gauze, tape applied.  - Pt has returned to wearing his old prosthetic   - F/U in 2 weeks    Subjective:  Pt complains of wound to right amputation site. He had slight irritation to his skin due to the GV. Pt denies a recent h/o fever, chills, nausea, vomiting, chest pain, shortness of breath. HPI: Mr. Maurice Garza is a 76 yo AA male with a PMH of arthritis, BPH, CKD, chronic pain, DM2, dyslipidemia, GERD, HTN, s/p rt BKA, sleep apnea, thromboembolus lt leg who presents with a right BKA stump wound. Wound formed from ill fitting prosthetic. Pt has had wounds in the past from in this same manner. Has had issues with the stump since he first got the BKA with Dr. Nohemi Shields, orthopedics. ROS:  Consitutional: no weight loss, night sweats, fatigue / malaise / lethargy. Musculoskeletal: no joint / extremity pain, misalignment, stiffness, decreased ROM, crepitus. Integument: No pruritis, rashes, lesions, right BKA stump wound.   Psychiatric: No depression, anxiety, paranoia      History:  Wound Care  Allergies   Allergen Reactions    Adhesive Tape-Silicones Hives    Sulfa (Sulfonamide Antibiotics) Swelling     Lips eyes     Family History   Problem Relation Age of Onset    Hypertension Mother    Albino Filter Gout Mother     Cancer Father         leukemia    Diabetes Father     Hypertension Sister     Diabetes Maternal Grandmother     Hypertension Maternal Grandmother     Diabetes Paternal Grandmother     Hypertension Paternal Grandmother     Anesth Problems Neg Hx       Past Medical History:   Diagnosis Date    Arthritis     BPH (benign prostatic hyperplasia)     Chronic kidney disease     Chronic pain     BACK NEUROPATHY FEET HANDS    DM neuropathy, type II diabetes mellitus ([address]     DM type 2 causing CKD stage 3 ([address] 12/17/2012    DM type 2 causing vascular disease ([address]     Dyslipidemia     Foot ulcer due to secondary DM ([address] 12/1/2012    GERD (gastroesophageal reflux disease)     HTN     Ill-defined condition 2013    MRSA in wound right leg (BKA)    S/P BKA (below knee amputation) ([address]     right BKA due to non-healing ulcer    Sleep apnea     Thromboembolus ([facility] 1970'S    BLOOD CLOT LEFT LEG     Past Surgical History:   Procedure Laterality Date    ABDOMEN SURGERY PROC UNLISTED      pilonidal cyst    ECHO 2D ADULT  8/09    normal; LVEF 60%    ECHO STRESS  4/09    7 min; normal    ENDOSCOPY, COLON, DIAGNOSTIC      HX AMPUTATION  2012    left great toe    HX AMPUTATION  2007    BKA right    HX BELOW KNEE AMPUTATION Right     HX CERVICAL FUSION  2009    x2    HX ORTHOPAEDIC  2006    ACDF C4-C7    IR INSERT TUNL CVC W/O PORT OVER 5 YR  6/19/2020    IR REMOVE TUNL CVAD W/O PORT / PUMP  9/14/2020    STRESS TEST MYOVIEW  8/09    walked 8:46; normal; LVEF 51%     Social History     Tobacco Use    Smoking status: Never Smoker    Smokeless tobacco: Never Used   Substance Use Topics    Alcohol use: No       Social History     Substance and Sexual Activity   Alcohol Use No     Social History     Substance and Sexual Activity   Drug Use No      Social History     Tobacco Use   Smoking Status Never Smoker   Smokeless Tobacco Never Used     Current Outpatient Medications   Medication Sig    pantoprazole (PROTONIX) 40 mg tablet TAKE 1 TABLET BY MOUTH EVERY DAY    insulin lispro (HUMALOG) 100 unit/mL kwikpen INJECT 18 TO 30 UNITS SUBCUTANEOUSLY 3 TIMES DAILY BEFORE BREAKFAST, LUNCH, AND DINNER    ciprofloxacin HCl (CIPRO) 500 mg tablet Take  by mouth two (2) times a day.  sodium polystyrene (KAYEXALATE) 15 gram/60 mL suspension Take 15 g by mouth now.  nystatin (MYCOSTATIN) 100,000 unit/mL suspension Take  by mouth four (4) times daily. swish and spit    ergocalciferol (Vitamin D2) 1,250 mcg (50,000 unit) capsule TAKE ONE CAPSULE BY MOUTH EVERY WEEK    oxyCODONE-acetaminophen (PERCOCET) 5-325 mg per tablet TAKE ONE TABLET BY MOUTH EVERY 4 HOURS AS NEEDED FOR PAIN    ferrous sulfate 325 mg (65 mg iron) tablet TAKE ONE TABLET BY MOUTH 2 TIMES A DAY    Lantus Solostar U-100 Insulin 100 unit/mL (3 mL) inpn INJECT 40 TO 50 UNITS SUBCUTANEOUSLY TWICE DAILY AS DIRECTED    oxyCODONE IR (ROXICODONE) 10 mg tab immediate release tablet Take  by mouth.  busPIRone (BUSPAR) 5 mg tablet TAKE ONE TABLET BY MOUTH THREE TIMES DAILY AS NEEDED FOR ANXIETY    Insulin Needles, Disposable, (Bee Pen Needle) 32 gauge x 5/32\" ndle USE AS DIRECTED 4 TIMES DAILY    lisinopriL (PRINIVIL, ZESTRIL) 10 mg tablet Take 1 Tab by mouth two (2) times a day.  glucose blood VI test strips (Prodigy No Coding) strip TEST BLOOD SUGAR 2 TIMES A DAY    rosuvastatin (CRESTOR) 40 mg tablet Take 1 Tab by mouth daily.  silver sulfADIAZINE (SILVADENE) 1 % topical cream Apply  to affected area daily.  albuterol (Ventolin HFA) 90 mcg/actuation inhaler Take 2 Puffs by inhalation every four (4) hours as needed for Wheezing.  pregabalin (Lyrica) 75 mg capsule Take 1 Cap by mouth two (2) times a day. Max Daily Amount: 150 mg.    aspirin 81 mg chewable tablet Take 81 mg by mouth daily. No current facility-administered medications for this encounter.          Objective:  Visit Vitals  BP (!) 175/80   Pulse 94   Temp 98.4 °F (36.9 °C)   Resp 18       Vascular:  left LE  DP 1/4; PT 1/4  capillary fill time brisk, pitting edema is present, skin temperature is cool, varicosities are present. Dermatological:    Wound: 1  Location: right BKA stump   Measurements: per RN note  Margins: intact  Drainage: serous  Odor: no  Wound base: graunular  Lymphangitic streaking? No.  Undermining? yes  Sinus tracts? Yes, to fibula. Exposed bone? yes  Subcutaneous crepitation on palpation? No.    Skin is dry and scaly, exhibits hemosiderin deposition. There is no maceration of the interspaces of the feet b/l. Neurological:  DTR are present, protective sensation per 5.07 Teague Mauricio monofilament is intact, patient is AAOx3, mood is normal. Epicritic sensation is intact. Orthopedic:  Left LE are symmetric, ROM of ankle, STJ, 1st MTPJ is limited, MMT 5 out of 5 for B/L LE. Right BKA. Has prosthesis. Constitutional: Pt is a well developed, pleasant AA male. Lymphatics: negative tenderness to palpation of neck/axillary/inguinal nodes.     Imaging / Labs / Cx / Px:  5/28 tib/fib XR: soft tissue ulcer extending to the rt distal fibula

## 2020-10-13 NOTE — WOUND CARE
10/13/20 1159   Wound Leg Lower Right;Lateral   Date First Assessed/Time First Assessed: 06/02/20 1439   POA: Yes  Location: Leg Lower  Wound Description (Optional): #1  Orientation: Right;Lateral   Dressing Type Applied Gauze wrap (greg);Gauze;Packing  (mesalt soaked in gentian violet and packed to wound base)   Discharge Condition: Stable     Pain: 0    Ambulatory Status: Walking    Discharge Destination: Home     Transportation: Car    Accompanied by: Self     Discharge instructions reviewed with Patient and copy or written instructions have been provided. All questions/concerns have been addressed at this time.

## 2020-10-27 ENCOUNTER — HOSPITAL ENCOUNTER (OUTPATIENT)
Dept: WOUND CARE | Age: 65
Discharge: HOME | End: 2020-10-27
Admitting: PODIATRIST
Payer: MEDICARE

## 2020-10-27 VITALS
RESPIRATION RATE: 18 BRPM | TEMPERATURE: 97.6 F | SYSTOLIC BLOOD PRESSURE: 170 MMHG | DIASTOLIC BLOOD PRESSURE: 92 MMHG | HEART RATE: 99 BPM

## 2020-10-27 PROCEDURE — 74011000250 HC RX REV CODE- 250: Performed by: PODIATRIST

## 2020-10-27 PROCEDURE — 11042 DBRDMT SUBQ TIS 1ST 20SQCM/<: CPT | Performed by: PODIATRIST

## 2020-10-27 RX ADMIN — Medication: at 13:45

## 2020-10-27 NOTE — WOUND CARE
10/27/20 1343   Wound Leg lower Right;Lateral #1   Date First Assessed/Time First Assessed: 10/07/20 1122   Present on Hospital Admission: Yes  Location: Leg lower  Wound Location Orientation: Right;Lateral  Wound Description: #1   Dressing Status Removed   Dressing Type Packing;Gauze;Gauze wrap (greg)   Wound Length (cm) 0.6 cm   Wound Width (cm) 0.4 cm   Wound Depth (cm) 0.9 cm   Wound Surface Area (cm^2) 0.24 cm^2   Wound Volume (cm^3) 0.22 cm^3   Change in Wound Size % 70   Condition of Base Other (comment)  (unable to visualize due to gentian violet)   Condition of Edges Open   Undermining (cm) 2.4 cm   Direction of Undermining 12 o'clock;6 o'clock   Drainage Amount Moderate   Drainage Color Serosanguinous   Wound Odor None   Cleansing and Cleansing Agents  Normal saline     Visit Vitals  BP (!) 170/92 (BP 1 Location: Left arm, BP Patient Position: Sitting)   Pulse 99   Temp 97.6 °F (36.4 °C)   Resp 18

## 2020-10-27 NOTE — WOUND CARE
Charli Ramsey DPM - Aren Prince. [place], [address] [facility] - Progress Note     Assessment/Plan:  Type 2 Diabetes with leg ulcer (E11.622)    Non pressure chronic ulcer right leg to the level of bone (O23.608)    Chronic osteomyelitis of RLE (M86.461)    Nichole Grade 3 ulcer    - Pt evaluated and treated. - Pt presents with right BKA stump wound - wound smaller but still with exposed bone  - PICC d/fritz. Pt now on oral Ceftaroline.  - Pt has completed HBO sessions  - Dressing consisting of betadine, Mesalt, gauze, roll gauze, tape applied.  - Pt has returned to wearing his old prosthetic   - F/U in 2 weeks    Subjective:  Pt complains of wound to right amputation site. He had slight irritation to his skin due to the GV. Pt denies a recent h/o fever, chills, nausea, vomiting, chest pain, shortness of breath. HPI: Mr. Terrance Haney is a 76 yo AA male with a PMH of arthritis, BPH, CKD, chronic pain, DM2, dyslipidemia, GERD, HTN, s/p rt BKA, sleep apnea, thromboembolus lt leg who presents with a right BKA stump wound. Wound formed from ill fitting prosthetic. Pt has had wounds in the past from in this same manner. Has had issues with the stump since he first got the BKA with Dr. Melina Greene, orthopedics. ROS:  Consitutional: no weight loss, night sweats, fatigue / malaise / lethargy. Musculoskeletal: no joint / extremity pain, misalignment, stiffness, decreased ROM, crepitus. Integument: No pruritis, rashes, lesions, right BKA stump wound.   Psychiatric: No depression, anxiety, paranoia      History:  Wound Care  Allergies   Allergen Reactions    Adhesive Tape-Silicones Hives    Sulfa (Sulfonamide Antibiotics) Swelling     Lips eyes     Family History   Problem Relation Age of Onset    Hypertension Mother    Damaris Gout Mother     Cancer Father         leukemia    Diabetes Father    Damaris Hypertension Sister     Diabetes Maternal Grandmother     Hypertension Maternal Grandmother     Diabetes Paternal Grandmother     Hypertension Paternal Grandmother     Anesth Problems Neg Hx       Past Medical History:   Diagnosis Date    Arthritis     BPH (benign prostatic hyperplasia)     Chronic kidney disease     Chronic pain     BACK NEUROPATHY FEET HANDS    DM neuropathy, type II diabetes mellitus [facility]     DM type 2 causing CKD stage 3 ([address] 12/17/2012    DM type 2 causing vascular disease ([facility]     Dyslipidemia     Foot ulcer due to secondary DM ([facility] 12/1/2012    GERD (gastroesophageal reflux disease)     HTN     Ill-defined condition 2013    MRSA in wound right leg (BKA)    S/P BKA (below knee amputation) ([facility]     right BKA due to non-healing ulcer    Sleep apnea     Thromboembolus ([facility] 1970'S    BLOOD CLOT LEFT LEG     Past Surgical History:   Procedure Laterality Date    ABDOMEN SURGERY PROC UNLISTED      pilonidal cyst    ECHO 2D ADULT  8/09    normal; LVEF 60%    ECHO STRESS  4/09    7 min; normal    ENDOSCOPY, COLON, DIAGNOSTIC      HX AMPUTATION  2012    left great toe    HX AMPUTATION  2007    BKA right    HX BELOW KNEE AMPUTATION Right     HX CERVICAL FUSION  2009    x2    HX ORTHOPAEDIC  2006    ACDF C4-C7    IR INSERT TUNL CVC W/O PORT OVER 5 YR  6/19/2020    IR REMOVE TUNL CVAD W/O PORT / PUMP  9/14/2020    STRESS TEST MYOVIEW  8/09    walked 8:46; normal; LVEF 51%     Social History     Tobacco Use    Smoking status: Never Smoker    Smokeless tobacco: Never Used   Substance Use Topics    Alcohol use: No       Social History     Substance and Sexual Activity   Alcohol Use No     Social History     Substance and Sexual Activity   Drug Use No      Social History     Tobacco Use   Smoking Status Never Smoker   Smokeless Tobacco Never Used     Current Outpatient Medications   Medication Sig    pantoprazole (PROTONIX) 40 mg tablet TAKE 1 TABLET BY MOUTH EVERY DAY  insulin lispro (HUMALOG) 100 unit/mL kwikpen INJECT 18 TO 30 UNITS SUBCUTANEOUSLY 3 TIMES DAILY BEFORE BREAKFAST, LUNCH, AND DINNER    ciprofloxacin HCl (CIPRO) 500 mg tablet Take  by mouth two (2) times a day.  nystatin (MYCOSTATIN) 100,000 unit/mL suspension Take  by mouth four (4) times daily. swish and spit    ergocalciferol (Vitamin D2) 1,250 mcg (50,000 unit) capsule TAKE ONE CAPSULE BY MOUTH EVERY WEEK    ferrous sulfate 325 mg (65 mg iron) tablet TAKE ONE TABLET BY MOUTH 2 TIMES A DAY    Lantus Solostar U-100 Insulin 100 unit/mL (3 mL) inpn INJECT 40 TO 50 UNITS SUBCUTANEOUSLY TWICE DAILY AS DIRECTED    busPIRone (BUSPAR) 5 mg tablet TAKE ONE TABLET BY MOUTH THREE TIMES DAILY AS NEEDED FOR ANXIETY    lisinopriL (PRINIVIL, ZESTRIL) 10 mg tablet Take 1 Tab by mouth two (2) times a day.  rosuvastatin (CRESTOR) 40 mg tablet Take 1 Tab by mouth daily.  pregabalin (Lyrica) 75 mg capsule Take 1 Cap by mouth two (2) times a day. Max Daily Amount: 150 mg.    aspirin 81 mg chewable tablet Take 81 mg by mouth daily.  sodium polystyrene (KAYEXALATE) 15 gram/60 mL suspension Take 15 g by mouth now.  oxyCODONE-acetaminophen (PERCOCET) 5-325 mg per tablet TAKE ONE TABLET BY MOUTH EVERY 4 HOURS AS NEEDED FOR PAIN    oxyCODONE IR (ROXICODONE) 10 mg tab immediate release tablet Take  by mouth.  Insulin Needles, Disposable, (Bee Pen Needle) 32 gauge x 5/32\" ndle USE AS DIRECTED 4 TIMES DAILY    glucose blood VI test strips (Prodigy No Coding) strip TEST BLOOD SUGAR 2 TIMES A DAY    silver sulfADIAZINE (SILVADENE) 1 % topical cream Apply  to affected area daily.  albuterol (Ventolin HFA) 90 mcg/actuation inhaler Take 2 Puffs by inhalation every four (4) hours as needed for Wheezing. No current facility-administered medications for this encounter.          Objective:  Visit Vitals  BP (!) 170/92 (BP 1 Location: Left arm, BP Patient Position: Sitting)   Pulse 99   Temp 97.6 °F (36.4 °C) Resp 18       Vascular:  left LE  DP 1/4; PT 1/4  capillary fill time brisk, pitting edema is present, skin temperature is cool, varicosities are present. Dermatological:    Wound: 1  Location: right BKA stump   Measurements: per RN note  Margins: intact  Drainage: serous  Odor: no  Wound base: graunular  Lymphangitic streaking? No.  Undermining? yes  Sinus tracts? Yes, to fibula. Exposed bone? yes  Subcutaneous crepitation on palpation? No.    Skin is dry and scaly, exhibits hemosiderin deposition. There is no maceration of the interspaces of the feet b/l. Neurological:  DTR are present, protective sensation per 5.07 Yuma Mauricio monofilament is intact, patient is AAOx3, mood is normal. Epicritic sensation is intact. Orthopedic:  Left LE are symmetric, ROM of ankle, STJ, 1st MTPJ is limited, MMT 5 out of 5 for B/L LE. Right BKA. Has prosthesis. Constitutional: Pt is a well developed, pleasant AA male. Lymphatics: negative tenderness to palpation of neck/axillary/inguinal nodes. Imaging / Labs / Cx / Px:  5/28 tib/fib XR: soft tissue ulcer extending to the rt distal fibula    Procedure Note:  Excisional debridement through level of subcutaneous fat. Location / Ulcer: right lateral leg  Indication: to remove non-viable tissue from wound bed. Consent in chart. Anesthesia: 4% lidocaine plain  Instrument: curette  Residual necrosis: none  Bleeding: minimal  Hemostasis: pressure  Pre-Procedure Pain: 0  Post-Procedure Pain: 0  Area debrided < 20 cm sq. Pre-Debridement measurements: see nursing notes  Post-Debridement measurements: see nursing notes  This is part of a series of staged procedures in an attempt at limb salvage.

## 2020-10-27 NOTE — DISCHARGE INSTRUCTIONS
Discharge Instructions for  [facility]  [address] [place], [address] [address]  Telephone: [phone]    NAME:  Bishop Vasques. YOB: 1955  DATE:  10/9/2020    [x] Wound and dressing supply provider: [facility]      Wound Cleansing:   Do not scrub or use excessive force. Cleanse wound prior to applying a clean dressing with:    [] Normal Saline   [] Keep Wound Dry in Shower      [] Wound Cleanser   [] Cleanse wound with Mild Soap & Water    [x] May Shower at Discharge   [] Do not shower  [] cleanse with baby shampoo lather leave 2-3 then rinse    Dressings:                   Wound Location Right Lateral Lower Leg     Apply Primary Dressing:      Pack wound loosely with:                                                   [] Iodoform       [] Plain Packing       [x] Mesalt  Soaked in gentian violet      [] Other:    Cover and Secure with:  [x] Gauze [x] ABD [] exudry     [] Francis [] Kerlix [] Mepilex Border {mepilex foam with boarder :32853:::1}  [] Ace Wrap [x] Roll Tape   [] Other:      Change dressing:   [] Daily      [] Every Other Day   [x] Three times per week  [] Once a week   [] Do Not Change Dressing     [] Other:[x] Elevate leg(s) above the level of the heart when sitting. [x] Avoid prolonged standing in one place. Dietary:  [] Diet as tolerated [] Diabetic Diet   [x] Increase Protein: examples (Meat, cheese, eggs, greek yogurt, fish, nuts)   [] Marcello Therapeutic Nutrition Powder  [] Other:  [] Dial a Dietician : Call [employer] at [phone] enter code [phone]) when prompted. M-F 9am-5pm EST.      Return Appointment:  [] Nurse Visit at wound center in *** days   [x] Return Appointment: With Dr. Filemon Valle  Tuesday      Electronically signed on 10/9/2020 at 11:06 AM     [address] [facility] Information: Should you experience any significant changes in your wound(s) or have questions about your wound care, please contact the [facility] at [address] 8:00 am - 4:30. If you need help with your wound outside these hours and cannot wait until we are again available, contact your PCP or go to the hospital emergency room. PLEASE NOTE: IF YOU ARE UNABLE TO OBTAIN WOUND SUPPLIES, CONTINUE TO USE THE SUPPLIES YOU HAVE AVAILABLE UNTIL YOU ARE ABLE TO REACH US. IT IS MOST IMPORTANT TO KEEP THE WOUND COVERED AT ALL TIMES.      Physician Signature:_______________________  Dr. Shannan Parson

## 2020-10-27 NOTE — WOUND CARE
10/27/20 1403   Wound Leg Lower Right;Lateral   Date First Assessed/Time First Assessed: 06/02/20 1439   POA: Yes  Location: Leg Lower  Wound Description (Optional): #1  Orientation: Right;Lateral   Dressing Type Applied ABD pad;Gauze;Gauze wrap (greg);Packing  (mesalt packing)   Discharge Condition: Stable     Pain: 0    Ambulatory Status: Walking    Discharge Destination: Home     Transportation: Car    Accompanied by: Family/Caregiver     Discharge instructions reviewed with Patient and Family/Caregiver  and copy or written instructions have been provided. All questions/concerns have been addressed at this time.

## 2020-10-30 DIAGNOSIS — E11.59 DM TYPE 2 CAUSING VASCULAR DISEASE (HCC): Primary | ICD-10-CM

## 2020-10-30 NOTE — TELEPHONE ENCOUNTER
Pt is requesting a glucose monitor. He does not know the name of it. He would like everything sent downstairs to the [address] [facility].     Thank you

## 2020-11-02 RX ORDER — BLOOD SUGAR DIAGNOSTIC
STRIP MISCELLANEOUS
Qty: 50 STRIP | Refills: 3 | Status: SHIPPED | OUTPATIENT
Start: 2020-11-02 | End: 2021-02-01

## 2020-11-02 RX ORDER — INSULIN PUMP SYRINGE, 3 ML
EACH MISCELLANEOUS
Qty: 1 KIT | Refills: 0 | Status: SHIPPED | OUTPATIENT
Start: 2020-11-02 | End: 2021-02-01

## 2020-11-06 ENCOUNTER — HOSPITAL ENCOUNTER (OUTPATIENT)
Dept: INFUSION THERAPY | Age: 65
Discharge: HOME | End: 2020-11-06
Payer: MEDICARE

## 2020-11-06 VITALS
RESPIRATION RATE: 16 BRPM | SYSTOLIC BLOOD PRESSURE: 111 MMHG | HEART RATE: 88 BPM | DIASTOLIC BLOOD PRESSURE: 57 MMHG | OXYGEN SATURATION: 99 %

## 2020-11-06 LAB
ALBUMIN SERPL-MCNC: 2.4 G/DL (ref 3.5–5)
ANION GAP SERPL CALC-SCNC: 3 MMOL/L (ref 5–15)
BASO+EOS+MONOS # BLD AUTO: 1.4 K/UL (ref 0.2–1.2)
BASO+EOS+MONOS NFR BLD AUTO: 19 % (ref 3.2–16.9)
BUN SERPL-MCNC: 33 MG/DL (ref 6–20)
BUN/CREAT SERPL: 12 (ref 12–20)
CALCIUM SERPL-MCNC: 7.8 MG/DL (ref 8.5–10.1)
CHLORIDE SERPL-SCNC: 117 MMOL/L (ref 97–108)
CO2 SERPL-SCNC: 22 MMOL/L (ref 21–32)
CREAT SERPL-MCNC: 2.86 MG/DL (ref 0.7–1.3)
DIFFERENTIAL METHOD BLD: ABNORMAL
ERYTHROCYTE [DISTWIDTH] IN BLOOD BY AUTOMATED COUNT: 15.9 % (ref 11.8–15.8)
GLUCOSE SERPL-MCNC: 162 MG/DL (ref 65–100)
HCT VFR BLD AUTO: 29.2 % (ref 36.6–50.3)
HGB BLD-MCNC: 9.5 G/DL (ref 12.1–17)
LYMPHOCYTES # BLD: 1.5 K/UL (ref 0.8–3.5)
LYMPHOCYTES NFR BLD: 21 % (ref 12–49)
MCH RBC QN AUTO: 29 PG (ref 26–34)
MCHC RBC AUTO-ENTMCNC: 32.5 G/DL (ref 30–36.5)
MCV RBC AUTO: 89 FL (ref 80–99)
NEUTS SEG # BLD: 4.5 K/UL (ref 1.8–8)
NEUTS SEG NFR BLD: 61 % (ref 32–75)
PHOSPHATE SERPL-MCNC: 3 MG/DL (ref 2.6–4.7)
PLATELET # BLD AUTO: 275 K/UL (ref 150–400)
POTASSIUM SERPL-SCNC: 5.6 MMOL/L (ref 3.5–5.1)
RBC # BLD AUTO: 3.28 M/UL (ref 4.1–5.7)
SODIUM SERPL-SCNC: 142 MMOL/L (ref 136–145)
WBC # BLD AUTO: 7.4 K/UL (ref 4.1–11.1)

## 2020-11-06 PROCEDURE — 74011250636 HC RX REV CODE- 250/636: Performed by: INTERNAL MEDICINE

## 2020-11-06 PROCEDURE — 85025 COMPLETE CBC W/AUTO DIFF WBC: CPT

## 2020-11-06 PROCEDURE — 96372 THER/PROPH/DIAG INJ SC/IM: CPT

## 2020-11-06 PROCEDURE — 80069 RENAL FUNCTION PANEL: CPT

## 2020-11-06 PROCEDURE — 36415 COLL VENOUS BLD VENIPUNCTURE: CPT

## 2020-11-06 RX ADMIN — ERYTHROPOIETIN 20000 UNITS: 20000 INJECTION, SOLUTION INTRAVENOUS; SUBCUTANEOUS at 12:20

## 2020-11-06 NOTE — PROGRESS NOTES
[facility] Progress Note    Date: 2020    Name: Jenny Hickey. MRN: [id number]         : 1955    Mr. lBake Arrived ambulatory and in no distress for Retacrit Regimen. Assessment was completed, no acute issues at this time, no new complaints voiced. Labs drawn & sent for processing. Mr. Moise Smith vitals were reviewed. Patient Vitals for the past 12 hrs:   Pulse Resp BP SpO2   20 1156 88 16 (!) 111/57 99 %     Lab results were obtained and reviewed. Recent Results (from the past 12 hour(s))   CBC WITH 3 PART DIFF    Collection Time: 20 12:02 PM   Result Value Ref Range    WBC 7.4 4.1 - 11.1 K/uL    RBC 3.28 (L) 4.10 - 5.70 M/uL    HGB 9.5 (L) 12.1 - 17.0 g/dL    HCT 29.2 (L) 36.6 - 50.3 %    MCV 89.0 80.0 - 99.0 FL    MCH 29.0 26.0 - 34.0 PG    MCHC 32.5 30.0 - 36.5 g/dL    RDW 15.9 (H) 11.8 - 15.8 %    PLATELET 824 239 - 083 K/uL    NEUTROPHILS 61 32 - 75 %    MIXED CELLS 19 (H) 3.2 - 16.9 %    LYMPHOCYTES 21 12 - 49 %    ABS. NEUTROPHILS 4.5 1.8 - 8.0 K/UL    ABS. MIXED CELLS 1.4 (H) 0.2 - 1.2 K/uL    ABS. LYMPHOCYTES 1.5 0.8 - 3.5 K/UL    DF AUTOMATED         Renal panel still pending in [facility]. Medications:  Retacrit SQ    Mr. Ruth Mosher tolerated treatment well and was discharged from [place] [address] in stable condition at 1220. He is to return on  at 1130 for his next appointment.     Vidal Heard RN  2020

## 2020-11-10 ENCOUNTER — HOSPITAL ENCOUNTER (OUTPATIENT)
Dept: WOUND CARE | Age: 65
Discharge: HOME | End: 2020-11-10
Admitting: PODIATRIST

## 2020-11-10 NOTE — DISCHARGE INSTRUCTIONS
Discharge Instructions for  [facility]  [address] [place], [address] [facility]  Telephone: [phone]    NAME:  Anita Castro. YOB: 1955  DATE:  11/10/2020    [x] Wound and dressing supply provider: [facility]      Wound Cleansing:   Do not scrub or use excessive force. Cleanse wound prior to applying a clean dressing with:    [] Normal Saline   [] Keep Wound Dry in Shower      [] Wound Cleanser   [] Cleanse wound with Mild Soap & Water    [x] May Shower at Discharge   [] Do not shower  [] cleanse with baby shampoo lather leave 2-3 then rinse    Dressings:                   Wound Location Right Lateral Lower Leg     Apply Primary Dressing:      Pack wound loosely with:                                                   [] Iodoform       [] Plain Packing       [x] Mesalt        [] Other:    Cover and Secure with:betadine wet to dry  [x] Gauze [x] ABD [] exudry     [] Francis [] Kerlix [] Mepilex Border {mepilex foam with boarder :48693:::1}  [] Ace Wrap [x] Roll Tape   [] Other:      Change dressing:   [] Daily      [x] Every Other Day   [] Three times per week  [] Once a week   [] Do Not Change Dressing     [] Other:[x] Elevate leg(s) above the level of the heart when sitting. [x] Avoid prolonged standing in one place. Dietary:  [] Diet as tolerated [] Diabetic Diet   [x] Increase Protein: examples (Meat, cheese, eggs, greek yogurt, fish, nuts)   [] Marcello Therapeutic Nutrition Powder  [] Other:  [] Dial a Dietician : Call [employer] at [phone] enter code [phone]) when prompted. M-F 9am-5pm EST.      Return Appointment:  [] Nurse Visit at wound center in *** days   [x] Return Appointment: With Dr. Oksana Huerta  2 weeks     Electronically signed on 11/10/2020 at 11:06 AM     [address] [facility] Information: Should you experience any significant changes in your wound(s) or have questions about your wound care, please contact the [facility] at [address] [place] [address] 8:00 am - 4:30. If you need help with your wound outside these hours and cannot wait until we are again available, contact your PCP or go to the hospital emergency room. PLEASE NOTE: IF YOU ARE UNABLE TO OBTAIN WOUND SUPPLIES, CONTINUE TO USE THE SUPPLIES YOU HAVE AVAILABLE UNTIL YOU ARE ABLE TO REACH US. IT IS MOST IMPORTANT TO KEEP THE WOUND COVERED AT ALL TIMES.      Physician Signature:_______________________  Dr. Artem Camara

## 2020-11-17 ENCOUNTER — HOSPITAL ENCOUNTER (OUTPATIENT)
Dept: WOUND CARE | Age: 65
Discharge: HOME | End: 2020-11-17
Admitting: PODIATRIST
Payer: MEDICARE

## 2020-11-17 VITALS
DIASTOLIC BLOOD PRESSURE: 73 MMHG | SYSTOLIC BLOOD PRESSURE: 169 MMHG | TEMPERATURE: 97.5 F | RESPIRATION RATE: 18 BRPM | HEART RATE: 99 BPM

## 2020-11-17 PROCEDURE — 11042 DBRDMT SUBQ TIS 1ST 20SQCM/<: CPT | Performed by: PODIATRIST

## 2020-11-17 PROCEDURE — 74011000250 HC RX REV CODE- 250: Performed by: PODIATRIST

## 2020-11-17 RX ADMIN — Medication: at 14:28

## 2020-11-17 NOTE — DISCHARGE INSTRUCTIONS
Discharge Instructions for  [facility]  [address] [place], [address] [address]  Telephone: [phone]    NAME:  Aggie Kahn. YOB: 1955  DATE:  10/27/2020    [x] Wound and dressing supply provider: [facility]      Wound Cleansing:   Do not scrub or use excessive force. Cleanse wound prior to applying a clean dressing with:    [] Normal Saline   [] Keep Wound Dry in Shower      [] Wound Cleanser   [] Cleanse wound with Mild Soap & Water    [x] May Shower at Discharge   [] Do not shower  [] cleanse with baby shampoo lather leave 2-3 then rinse    Dressings:                   Wound Location Right Lateral Lower Leg     Apply Primary Dressing:      Pack wound loosely with:                                                   [] Iodoform       [] Plain Packing       [x] Mesalt        [] Other:    Cover and Secure with:betadine wet to dry  [x] Gauze [x] ABD [] exudry     [] Francis [] Kerlix [] Mepilex Border {mepilex foam with boarder :22927:::1}  [] Ace Wrap [x] Roll Tape   [] Other:      Change dressing:   [] Daily      [x] Every Other Day   [] Three times per week  [] Once a week   [] Do Not Change Dressing     [] Other:[x] Elevate leg(s) above the level of the heart when sitting. [x] Avoid prolonged standing in one place. Dietary:  [] Diet as tolerated [] Diabetic Diet   [x] Increase Protein: examples (Meat, cheese, eggs, greek yogurt, fish, nuts)   [] Marcello Therapeutic Nutrition Powder  [] Other:  [] Dial a Dietician : Call [employer] at [phone] enter code [phone]) when prompted. M-F 9am-5pm EST.      Return Appointment:  [] Nurse Visit at wound center in *** days   [x] Return Appointment: With Dr. Carito Aaron  2 weeks     Electronically signed on 10/27/2020 at 11:06 AM     [address] [place] Information: Should you experience any significant changes in your wound(s) or have questions about your wound care, please contact the [facility] at [address] [place] [address] 8:00 am - 4:30. If you need help with your wound outside these hours and cannot wait until we are again available, contact your PCP or go to the hospital emergency room. PLEASE NOTE: IF YOU ARE UNABLE TO OBTAIN WOUND SUPPLIES, CONTINUE TO USE THE SUPPLIES YOU HAVE AVAILABLE UNTIL YOU ARE ABLE TO REACH US. IT IS MOST IMPORTANT TO KEEP THE WOUND COVERED AT ALL TIMES.      Physician Signature:_______________________  Dr. Keshia English

## 2020-11-17 NOTE — WOUND CARE
Gage Mack DPM - [place]. [place], [address] [facility] - Progress Note     Assessment/Plan:  Type 2 Diabetes with leg ulcer (E11.622)    Non pressure chronic ulcer right leg to the level of bone (L07.645)    Chronic osteomyelitis of RLE (M86.461)    Nichole Grade 3 ulcer    - Pt evaluated and treated. - Pt presents with right BKA stump wound - wound smaller but still with exposed bone, undermining the same. More drainage on dressing.  - PICC d/fritz. Pt now on oral Ceftaroline.  - Pt has completed HBO sessions  - Dressing consisting of packing with Aquacel Ag  - Pt has returned to wearing his old prosthetic   - F/U in 2 weeks    Subjective:  Pt complains of wound to right amputation site. Pt denies a recent h/o fever, chills, nausea, vomiting, chest pain, shortness of breath. HPI: Mr. Michelle Han is a 76 yo AA male with a PMH of arthritis, BPH, CKD, chronic pain, DM2, dyslipidemia, GERD, HTN, s/p rt BKA, sleep apnea, thromboembolus lt leg who presents with a right BKA stump wound. Wound formed from ill fitting prosthetic. Pt has had wounds in the past from in this same manner. Has had issues with the stump since he first got the BKA with Dr. Miguel Salinas, orthopedics. ROS:  Consitutional: no weight loss, night sweats, fatigue / malaise / lethargy. Musculoskeletal: no joint / extremity pain, misalignment, stiffness, decreased ROM, crepitus. Integument: No pruritis, rashes, lesions, right BKA stump wound.   Psychiatric: No depression, anxiety, paranoia      History:  Wound Care  Allergies   Allergen Reactions    Adhesive Tape-Silicones Hives    Sulfa (Sulfonamide Antibiotics) Swelling     Lips eyes     Family History   Problem Relation Age of Onset    Hypertension Mother    [facility] Gout Mother     Cancer Father         leukemia    Diabetes Father     Hypertension Sister    [facility] Diabetes Maternal Grandmother     Hypertension Maternal Grandmother     Diabetes Paternal Grandmother     Hypertension Paternal Grandmother     Anesth Problems Neg Hx       Past Medical History:   Diagnosis Date    Arthritis     BPH (benign prostatic hyperplasia)     Chronic kidney disease     Chronic pain     BACK NEUROPATHY FEET HANDS    DM neuropathy, type II diabetes mellitus [facility]     DM type 2 causing CKD stage 3 ([facility] 12/17/2012    DM type 2 causing vascular disease ([facility]     Dyslipidemia     Foot ulcer due to secondary DM ([facility] 12/1/2012    GERD (gastroesophageal reflux disease)     HTN     Ill-defined condition 2013    MRSA in wound right leg (BKA)    S/P BKA (below knee amputation) ([facility]     right BKA due to non-healing ulcer    Sleep apnea     Thromboembolus ([facility] 1970'S    BLOOD CLOT LEFT LEG     Past Surgical History:   Procedure Laterality Date    ABDOMEN SURGERY PROC UNLISTED      pilonidal cyst    ECHO 2D ADULT  8/09    normal; LVEF 60%    ECHO STRESS  4/09    7 min; normal    ENDOSCOPY, COLON, DIAGNOSTIC      HX AMPUTATION  2012    left great toe    HX AMPUTATION  2007    BKA right    HX BELOW KNEE AMPUTATION Right     HX CERVICAL FUSION  2009    x2    HX ORTHOPAEDIC  2006    ACDF C4-C7    IR INSERT TUNL CVC W/O PORT OVER 5 YR  6/19/2020    IR REMOVE TUNL CVAD W/O PORT / PUMP  9/14/2020    STRESS TEST MYOVIEW  8/09    walked 8:46; normal; LVEF 51%     Social History     Tobacco Use    Smoking status: Never Smoker    Smokeless tobacco: Never Used   Substance Use Topics    Alcohol use: No       Social History     Substance and Sexual Activity   Alcohol Use No     Social History     Substance and Sexual Activity   Drug Use No      Social History     Tobacco Use   Smoking Status Never Smoker   Smokeless Tobacco Never Used     Current Outpatient Medications   Medication Sig    insulin lispro (HUMALOG) 100 unit/mL kwikpen INJECT 18 TO 30 UNITS SUBCUTANEOUSLY 3 TIMES DAILY BEFORE BREAKFAST, LUNCH, AND DINNER    oxyCODONE-acetaminophen (PERCOCET) 5-325 mg per tablet TAKE ONE TABLET BY MOUTH EVERY 4 HOURS AS NEEDED FOR PAIN    pantoprazole (PROTONIX) 40 mg tablet TAKE 1 TABLET BY MOUTH EVERY DAY    ciprofloxacin HCl (CIPRO) 500 mg tablet Take  by mouth two (2) times a day.  nystatin (MYCOSTATIN) 100,000 unit/mL suspension Take  by mouth four (4) times daily. swish and spit    ergocalciferol (Vitamin D2) 1,250 mcg (50,000 unit) capsule TAKE ONE CAPSULE BY MOUTH EVERY WEEK    ferrous sulfate 325 mg (65 mg iron) tablet TAKE ONE TABLET BY MOUTH 2 TIMES A DAY    oxyCODONE IR (ROXICODONE) 10 mg tab immediate release tablet Take  by mouth.  busPIRone (BUSPAR) 5 mg tablet TAKE ONE TABLET BY MOUTH THREE TIMES DAILY AS NEEDED FOR ANXIETY    lisinopriL (PRINIVIL, ZESTRIL) 10 mg tablet Take 1 Tab by mouth two (2) times a day.  rosuvastatin (CRESTOR) 40 mg tablet Take 1 Tab by mouth daily.  silver sulfADIAZINE (SILVADENE) 1 % topical cream Apply  to affected area daily.  pregabalin (Lyrica) 75 mg capsule Take 1 Cap by mouth two (2) times a day. Max Daily Amount: 150 mg.    aspirin 81 mg chewable tablet Take 81 mg by mouth daily.  glucose blood VI test strips (Prodigy No Coding) strip TEST BLOOD SUGAR 2 TIMES A DAY    Blood-Glucose Meter monitoring kit USE TO CHECK BLOOD SUGAR TWICE DAILY    sodium polystyrene (KAYEXALATE) 15 gram/60 mL suspension Take 15 g by mouth now.  Lantus Solostar U-100 Insulin 100 unit/mL (3 mL) inpn INJECT 40 TO 50 UNITS SUBCUTANEOUSLY TWICE DAILY AS DIRECTED    Insulin Needles, Disposable, (Bee Pen Needle) 32 gauge x 5/32\" ndle USE AS DIRECTED 4 TIMES DAILY    albuterol (Ventolin HFA) 90 mcg/actuation inhaler Take 2 Puffs by inhalation every four (4) hours as needed for Wheezing. No current facility-administered medications for this encounter.       Facility-Administered Medications Ordered in Other Encounters   Medication Dose Route Frequency    [START ON 11/20/2020] epoetin naman (EPOGEN;PROCRIT) injection 20,000 Units  20,000 Units SubCUTAneous ONCE        Objective:  Visit Vitals  BP (!) 169/73 (BP 1 Location: Left arm, BP Patient Position: Sitting)   Pulse 99   Temp 97.5 °F (36.4 °C)   Resp 18       Vascular:  left LE  DP 1/4; PT 1/4  capillary fill time brisk, pitting edema is present, skin temperature is cool, varicosities are present. Dermatological:    Wound: 1  Location: right BKA stump   Measurements: per RN note  Margins: intact  Drainage: serous  Odor: no  Wound base: graunular  Lymphangitic streaking? No.  Undermining? yes  Sinus tracts? Yes, to fibula. Exposed bone? yes  Subcutaneous crepitation on palpation? No.    Skin is dry and scaly, exhibits hemosiderin deposition. There is no maceration of the interspaces of the feet b/l. Neurological:  DTR are present, protective sensation per 5.07 Hampton Mauricio monofilament is intact, patient is AAOx3, mood is normal. Epicritic sensation is intact. Orthopedic:  Left LE are symmetric, ROM of ankle, STJ, 1st MTPJ is limited, MMT 5 out of 5 for B/L LE. Right BKA. Has prosthesis. Constitutional: Pt is a well developed, pleasant AA male. Lymphatics: negative tenderness to palpation of neck/axillary/inguinal nodes. Imaging / Labs / Cx / Px:  5/28 tib/fib XR: soft tissue ulcer extending to the rt distal fibula    Procedure Note:  Excisional debridement through level of subcutaneous fat. Location / Ulcer: right lateral leg  Indication: to remove non-viable tissue from wound bed. Consent in chart. Anesthesia: 4% lidocaine plain  Instrument: curette  Residual necrosis: none  Bleeding: minimal  Hemostasis: pressure  Pre-Procedure Pain: 0  Post-Procedure Pain: 0  Area debrided < 20 cm sq.   Pre-Debridement measurements: see nursing notes  Post-Debridement measurements: see nursing notes  This is part of a series of staged procedures in an attempt at limb salvage.

## 2020-11-17 NOTE — WOUND CARE
11/17/20 1424   Wound Leg lower Right;Lateral #1   Date First Assessed/Time First Assessed: 10/07/20 1122   Present on Hospital Admission: Yes  Location: Leg lower  Wound Location Orientation: Right;Lateral  Wound Description: #1   Wound Image    Dressing Status Breakthrough drainage noted   Wound Length (cm) 0.6 cm   Wound Width (cm) 0.4 cm   Wound Depth (cm) 0.6 cm   Wound Surface Area (cm^2) 0.24 cm^2   Change in Wound Size % 70   Wound Volume (cm^3) 0.14 cm^3   Wound Healing % 84   Distance Tunneling (cm) 1.8 cm  (1.5)   Direction of Tunnel 6 o'clock  (12 o'clock)   Drainage Amount Large   Drainage Description Serosanguinous   Wound Odor None   Tressa-Wound/Incision Assessment Maceration     Visit Vitals  BP (!) 169/73 (BP 1 Location: Left arm, BP Patient Position: Sitting)   Pulse 99   Temp 97.5 °F (36.4 °C)   Resp 18

## 2020-11-17 NOTE — WOUND CARE
11/17/20 1452   Wound Leg lower Right;Lateral #1   Date First Assessed/Time First Assessed: 10/07/20 1122   Present on Hospital Admission: Yes  Location: Leg lower  Wound Location Orientation: Right;Lateral  Wound Description: #1   Dressing/Treatment Packing;Alginate with Ag;ABD pad;Gauze dressing/dressing sponge   Discharge Condition: Stable     Pain: 0    Ambulatory Status: Walking    Discharge Destination: Home     Transportation: Car    Accompanied by: Self  and Family/Caregiver     Discharge instructions reviewed with Patient and Family/Caregiver  and copy or written instructions have been provided. All questions/concerns have been addressed at this time.

## 2020-12-01 NOTE — CALL BACK NOTE
New/updated order required for patient's upcoming [facility] appointment. Faxed doctor's office on 12/01/20 at 7:35 AM.  Refer to fax documents in pharmacy for further information.

## 2020-12-04 ENCOUNTER — HOSPITAL ENCOUNTER (OUTPATIENT)
Dept: INFUSION THERAPY | Age: 65
Discharge: HOME | End: 2020-12-04
Payer: MEDICARE

## 2020-12-04 VITALS
RESPIRATION RATE: 18 BRPM | TEMPERATURE: 97.8 F | SYSTOLIC BLOOD PRESSURE: 137 MMHG | OXYGEN SATURATION: 98 % | HEART RATE: 85 BPM | DIASTOLIC BLOOD PRESSURE: 73 MMHG

## 2020-12-04 LAB
ALBUMIN SERPL-MCNC: 1.8 G/DL (ref 3.5–5)
ANION GAP SERPL CALC-SCNC: 3 MMOL/L (ref 5–15)
BASOPHILS # BLD: 0.1 K/UL (ref 0–0.1)
BASOPHILS NFR BLD: 1 % (ref 0–1)
BUN SERPL-MCNC: 31 MG/DL (ref 6–20)
BUN/CREAT SERPL: 13 (ref 12–20)
CALCIUM SERPL-MCNC: 8.4 MG/DL (ref 8.5–10.1)
CHLORIDE SERPL-SCNC: 114 MMOL/L (ref 97–108)
CO2 SERPL-SCNC: 22 MMOL/L (ref 21–32)
CREAT SERPL-MCNC: 2.31 MG/DL (ref 0.7–1.3)
DIFFERENTIAL METHOD BLD: ABNORMAL
EOSINOPHIL # BLD: 0.5 K/UL (ref 0–0.4)
EOSINOPHIL NFR BLD: 5 % (ref 0–7)
ERYTHROCYTE [DISTWIDTH] IN BLOOD BY AUTOMATED COUNT: 14.4 % (ref 11.5–14.5)
GLUCOSE SERPL-MCNC: 225 MG/DL (ref 65–100)
HCT VFR BLD AUTO: 26.6 % (ref 36.6–50.3)
HGB BLD-MCNC: 8.1 G/DL (ref 12.1–17)
IMM GRANULOCYTES # BLD AUTO: 0.1 K/UL (ref 0–0.04)
IMM GRANULOCYTES NFR BLD AUTO: 1 % (ref 0–0.5)
LYMPHOCYTES # BLD: 1.5 K/UL (ref 0.8–3.5)
LYMPHOCYTES NFR BLD: 17 % (ref 12–49)
MCH RBC QN AUTO: 27.6 PG (ref 26–34)
MCHC RBC AUTO-ENTMCNC: 30.5 G/DL (ref 30–36.5)
MCV RBC AUTO: 90.8 FL (ref 80–99)
MONOCYTES # BLD: 0.5 K/UL (ref 0–1)
MONOCYTES NFR BLD: 6 % (ref 5–13)
NEUTS SEG # BLD: 6.1 K/UL (ref 1.8–8)
NEUTS SEG NFR BLD: 70 % (ref 32–75)
NRBC # BLD: 0 K/UL (ref 0–0.01)
NRBC BLD-RTO: 0 PER 100 WBC
PHOSPHATE SERPL-MCNC: 2.9 MG/DL (ref 2.6–4.7)
PLATELET # BLD AUTO: 419 K/UL (ref 150–400)
PMV BLD AUTO: 8.9 FL (ref 8.9–12.9)
POTASSIUM SERPL-SCNC: 5.3 MMOL/L (ref 3.5–5.1)
RBC # BLD AUTO: 2.93 M/UL (ref 4.1–5.7)
SODIUM SERPL-SCNC: 139 MMOL/L (ref 136–145)
WBC # BLD AUTO: 8.7 K/UL (ref 4.1–11.1)

## 2020-12-04 PROCEDURE — 96372 THER/PROPH/DIAG INJ SC/IM: CPT

## 2020-12-04 PROCEDURE — 80069 RENAL FUNCTION PANEL: CPT

## 2020-12-04 PROCEDURE — 36415 COLL VENOUS BLD VENIPUNCTURE: CPT

## 2020-12-04 PROCEDURE — 74011250636 HC RX REV CODE- 250/636: Performed by: INTERNAL MEDICINE

## 2020-12-04 PROCEDURE — 85025 COMPLETE CBC W/AUTO DIFF WBC: CPT

## 2020-12-04 RX ADMIN — ERYTHROPOIETIN 20000 UNITS: 20000 INJECTION, SOLUTION INTRAVENOUS; SUBCUTANEOUS at 12:59

## 2020-12-04 NOTE — PROGRESS NOTES
[facility] Progress Note    Date: 2020    Name: Rommel Ribera. MRN: [id number]         : 1955    1215. Mr. Flores Darden Arrived ambulatory and in no distress for Procrit Injection. Assessment was completed, no acute issues at this time, no new complaints voiced. Covid Questionnaire completed. 1. Do you have any symptoms of covid 19? SOB, coughing, fever, or generally not feeling well? - no  2. Have you been exposed to covid 19 recently? - no  3. Have you had any recent contact with family/friend that has a pending covid test? no    Mr. Sunny Pittman vitals were reviewed. Patient Vitals for the past 12 hrs:   Temp Pulse Resp BP SpO2   20 1221 97.8 °F (36.6 °C) 85 18 137/73 98 %         Medications:  Medications Administered     epoetin naman (EPOGEN;PROCRIT) injection 20,000 Units     Admin Date  2020 Action  Given Dose  20626 Units Route  SubCUTAneous Administered By  Jose Muñiz RN                1300. Mr. Flores Darden tolerated treatment well and was discharged from [place] [address] in stable condition. He is to return on 2020 for his next appointment.     Nuha Guardado RN  2020

## 2020-12-07 ENCOUNTER — OFFICE VISIT (OUTPATIENT)
Dept: INTERNAL MEDICINE CLINIC | Age: 65
End: 2020-12-07
Payer: MEDICARE

## 2020-12-07 VITALS
BODY MASS INDEX: 22.43 KG/M2 | TEMPERATURE: 97.6 F | SYSTOLIC BLOOD PRESSURE: 166 MMHG | HEIGHT: 71 IN | DIASTOLIC BLOOD PRESSURE: 75 MMHG | HEART RATE: 93 BPM | RESPIRATION RATE: 22 BRPM | WEIGHT: 160.2 LBS | OXYGEN SATURATION: 96 %

## 2020-12-07 DIAGNOSIS — M54.42 CHRONIC BILATERAL LOW BACK PAIN WITH BILATERAL SCIATICA: ICD-10-CM

## 2020-12-07 DIAGNOSIS — L97.912 ULCER OF RIGHT LOWER EXTREMITY WITH FAT LAYER EXPOSED (HCC): ICD-10-CM

## 2020-12-07 DIAGNOSIS — K21.9 GASTROESOPHAGEAL REFLUX DISEASE WITHOUT ESOPHAGITIS: ICD-10-CM

## 2020-12-07 DIAGNOSIS — M79.605 PAIN IN BOTH LOWER EXTREMITIES: ICD-10-CM

## 2020-12-07 DIAGNOSIS — M54.41 CHRONIC BILATERAL LOW BACK PAIN WITH BILATERAL SCIATICA: ICD-10-CM

## 2020-12-07 DIAGNOSIS — E11.22 TYPE 2 DIABETES MELLITUS WITH STAGE 3B CHRONIC KIDNEY DISEASE, WITH LONG-TERM CURRENT USE OF INSULIN (HCC): ICD-10-CM

## 2020-12-07 DIAGNOSIS — Z79.4 TYPE 2 DIABETES MELLITUS WITH STAGE 3B CHRONIC KIDNEY DISEASE, WITH LONG-TERM CURRENT USE OF INSULIN (HCC): ICD-10-CM

## 2020-12-07 DIAGNOSIS — I10 ESSENTIAL HYPERTENSION: ICD-10-CM

## 2020-12-07 DIAGNOSIS — Z00.00 MEDICARE ANNUAL WELLNESS VISIT, SUBSEQUENT: Primary | ICD-10-CM

## 2020-12-07 DIAGNOSIS — G89.29 CHRONIC BILATERAL LOW BACK PAIN WITH BILATERAL SCIATICA: ICD-10-CM

## 2020-12-07 DIAGNOSIS — N18.32 TYPE 2 DIABETES MELLITUS WITH STAGE 3B CHRONIC KIDNEY DISEASE, WITH LONG-TERM CURRENT USE OF INSULIN (HCC): ICD-10-CM

## 2020-12-07 DIAGNOSIS — M79.604 PAIN IN BOTH LOWER EXTREMITIES: ICD-10-CM

## 2020-12-07 PROBLEM — Z89.412 STATUS POST AMPUTATION OF LEFT GREAT TOE (HCC): Status: RESOLVED | Noted: 2018-04-09 | Resolved: 2020-12-07

## 2020-12-07 PROBLEM — Z89.619 STATUS POST ABOVE KNEE AMPUTATION: Status: RESOLVED | Noted: 2018-04-09 | Resolved: 2020-12-07

## 2020-12-07 PROCEDURE — 3017F COLORECTAL CA SCREEN DOC REV: CPT | Performed by: INTERNAL MEDICINE

## 2020-12-07 PROCEDURE — G0463 HOSPITAL OUTPT CLINIC VISIT: HCPCS | Performed by: INTERNAL MEDICINE

## 2020-12-07 PROCEDURE — G8432 DEP SCR NOT DOC, RNG: HCPCS | Performed by: INTERNAL MEDICINE

## 2020-12-07 PROCEDURE — G0439 PPPS, SUBSEQ VISIT: HCPCS | Performed by: INTERNAL MEDICINE

## 2020-12-07 PROCEDURE — G8420 CALC BMI NORM PARAMETERS: HCPCS | Performed by: INTERNAL MEDICINE

## 2020-12-07 PROCEDURE — 2022F DILAT RTA XM EVC RTNOPTHY: CPT | Performed by: INTERNAL MEDICINE

## 2020-12-07 PROCEDURE — 99215 OFFICE O/P EST HI 40 MIN: CPT | Performed by: INTERNAL MEDICINE

## 2020-12-07 PROCEDURE — G8427 DOCREV CUR MEDS BY ELIG CLIN: HCPCS | Performed by: INTERNAL MEDICINE

## 2020-12-07 PROCEDURE — G8753 SYS BP > OR = 140: HCPCS | Performed by: INTERNAL MEDICINE

## 2020-12-07 PROCEDURE — G8754 DIAS BP LESS 90: HCPCS | Performed by: INTERNAL MEDICINE

## 2020-12-07 PROCEDURE — G8536 NO DOC ELDER MAL SCRN: HCPCS | Performed by: INTERNAL MEDICINE

## 2020-12-07 PROCEDURE — 1101F PT FALLS ASSESS-DOCD LE1/YR: CPT | Performed by: INTERNAL MEDICINE

## 2020-12-07 PROCEDURE — 3046F HEMOGLOBIN A1C LEVEL >9.0%: CPT | Performed by: INTERNAL MEDICINE

## 2020-12-07 RX ORDER — SILVER SULFADIAZINE 10 G/1000G
CREAM TOPICAL DAILY
Qty: 85 G | Refills: 0 | Status: SHIPPED | OUTPATIENT
Start: 2020-12-07 | End: 2021-02-02

## 2020-12-07 RX ORDER — OXYCODONE HYDROCHLORIDE 10 MG/1
10 TABLET ORAL
Qty: 90 TAB | Refills: 0 | Status: SHIPPED | OUTPATIENT
Start: 2020-12-07 | End: 2020-12-07 | Stop reason: ALTCHOICE

## 2020-12-07 RX ORDER — OXYCODONE AND ACETAMINOPHEN 5; 325 MG/1; MG/1
1 TABLET ORAL
Qty: 90 TAB | Refills: 0 | Status: SHIPPED | OUTPATIENT
Start: 2020-12-07 | End: 2020-12-29

## 2020-12-07 RX ORDER — ALBUTEROL SULFATE 90 UG/1
2 AEROSOL, METERED RESPIRATORY (INHALATION)
Qty: 1 INHALER | Refills: 4 | Status: SHIPPED | OUTPATIENT
Start: 2020-12-07 | End: 2021-08-03

## 2020-12-07 RX ORDER — LISINOPRIL 10 MG/1
10 TABLET ORAL 2 TIMES DAILY
Qty: 180 TAB | Refills: 1 | Status: SHIPPED | OUTPATIENT
Start: 2020-12-07 | End: 2021-05-20

## 2020-12-07 NOTE — PROGRESS NOTES
This is the Subsequent Medicare Annual Wellness Exam, performed 12 months or more after the Initial AWV or the last Subsequent AWV    I have reviewed the patient's medical history in detail and updated the computerized patient record. Depression Risk Factor Screening:     3 most recent PHQ Screens 6/23/2020   Little interest or pleasure in doing things Not at all   Feeling down, depressed, irritable, or hopeless Not at all   Total Score PHQ 2 0       Alcohol Risk Screen   Do you average more than 1 drink per night or more than 7 drinks a week: No    In the past three months have you have had more than 4 drinks containing alcohol on one occasion: No        Functional Ability and Level of Safety:   Hearing: Hearing is good. Activities of Daily Living: The home contains: no safety equipment. Patient does total self care     Ambulation: with no difficulty     Fall Risk:  Fall Risk Assessment, last 12 mths 6/23/2020   Able to walk? Yes   Fall in past 12 months? No     Abuse Screen:  Patient is not abused       Cognitive Screening   Has your family/caregiver stated any concerns about your memory: no     Cognitive Screening: Normal - MMSE (Mini Mental Status Exam)    Assessment/Plan   Education and counseling provided:  Are appropriate based on today's review and evaluation    Diagnoses and all orders for this visit:    1. Type 2 diabetes mellitus with stage 3b chronic kidney disease, with long-term current use of insulin ([address]    2. Essential hypertension  -     lisinopriL (PRINIVIL, ZESTRIL) 10 mg tablet; Take 1 Tab by mouth two (2) times a day. 3. Ulcer of right lower extremity with fat layer exposed ([address]  -     silver sulfADIAZINE (SILVADENE) 1 % topical cream; Apply  to affected area daily. 4. Gastroesophageal reflux disease without esophagitis    5. Chronic bilateral low back pain with bilateral sciatica    Other orders  -     albuterol (Ventolin HFA) 90 mcg/actuation inhaler;  Take 2 Puffs by inhalation every four (4) hours as needed for Wheezing.  -     oxyCODONE IR (ROXICODONE) 10 mg tab immediate release tablet; Take 1 Tab by mouth every six (6) hours as needed for Pain. Max Daily Amount: 40 mg.         Health Maintenance Due     Health Maintenance Due   Topic Date Due    Medicare Yearly Exam  06/06/2020    A1C test (Diabetic or Prediabetic)  06/30/2020    GLAUCOMA SCREENING Q2Y  09/18/2020    Eye Exam Retinal or Dilated  09/18/2020    Foot Exam Q1  12/03/2020       Patient Care Team   Patient Care Team:  Tian Rios MD as PCP - Wendy Flores MD as PCP - [facility] [facility]    History     Patient Active Problem List   Diagnosis Code    Neuropathy G62.9    HTN (hypertension) I10    Dyslipidemia E78.5    DM type 2 causing vascular disease ([address] E11.59    DM neuropathy, type II diabetes mellitus ([address] E11.40    Foot ulcer due to secondary DM ([address] E13.621, L97.509    Anemia, unspecified D64.9    ARF (acute renal failure) ([address] N17.9    DM type 2 causing CKD stage 3 ([address] E11.22, N18.30    CKD (chronic kidney disease) stage 3, GFR 30-59 ml/min N18.30    Disc herniation [id number]    Spinal stenosis in cervical region M48.02    GABI (obstructive sleep apnea) G47.33    Cataract, nuclear [id number]    Presbyopia H52.4    Type 2 diabetes mellitus without retinopathy ([address] E11.9    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 ([address] L97.214    Diabetes mellitus with skin ulcer ([address] E11.622, L98.499    Chronic osteomyelitis of right lower leg with draining sinus (HCC) M86.461     Past Medical History:   Diagnosis Date    Arthritis     BPH (benign prostatic hyperplasia)     Chronic kidney disease     Chronic pain     BACK NEUROPATHY FEET HANDS    DM neuropathy, type II diabetes mellitus ([address]     DM type 2 causing CKD stage 3 ([address] 12/17/2012    DM type 2 causing vascular disease ([address]     Dyslipidemia     Foot ulcer due to secondary DM ([facility] 12/1/2012    GERD (gastroesophageal reflux disease)     HTN     Ill-defined condition 2013    MRSA in wound right leg (BKA)    S/P BKA (below knee amputation) ([facility]     right BKA due to non-healing ulcer    Sleep apnea     Thromboembolus ([facility] 1970'S    BLOOD CLOT LEFT LEG      Past Surgical History:   Procedure Laterality Date    ABDOMEN SURGERY PROC UNLISTED      pilonidal cyst    ECHO 2D ADULT  8/09    normal; LVEF 60%    ECHO STRESS  4/09    7 min; normal    ENDOSCOPY, COLON, DIAGNOSTIC      HX AMPUTATION  2012    left great toe    HX AMPUTATION  2007    BKA right    HX BELOW KNEE AMPUTATION Right     HX CERVICAL FUSION  2009    x2    HX ORTHOPAEDIC  2006    ACDF C4-C7    IR INSERT TUNL CVC W/O PORT OVER 5 YR  6/19/2020    IR REMOVE TUNL CVAD W/O PORT / PUMP  9/14/2020    STRESS TEST MYOVIEW  8/09    walked 8:46; normal; LVEF 51%     Current Outpatient Medications   Medication Sig Dispense Refill    Lantus Solostar U-100 Insulin 100 unit/mL (3 mL) inpn INJECT 40 TO 50 UNITS SUBCUTANEOUSLY TWICE DAILY AS DIRECTED 15 mL 5    insulin lispro (HUMALOG) 100 unit/mL kwikpen INJECT 18 TO 30 UNITS SUBCUTANEOUSLY 3 TIMES DAILY BEFORE BREAKFAST, LUNCH, AND DINNER 15 mL 3    glucose blood VI test strips (Prodigy No Coding) strip TEST BLOOD SUGAR 2 TIMES A DAY 50 Strip 3    Blood-Glucose Meter monitoring kit USE TO CHECK BLOOD SUGAR TWICE DAILY 1 Kit 0    pantoprazole (PROTONIX) 40 mg tablet TAKE 1 TABLET BY MOUTH EVERY DAY 90 Tab 0    ciprofloxacin HCl (CIPRO) 500 mg tablet Take  by mouth two (2) times a day.  sodium polystyrene (KAYEXALATE) 15 gram/60 mL suspension Take 15 g by mouth now.  nystatin (MYCOSTATIN) 100,000 unit/mL suspension Take  by mouth four (4) times daily.  swish and spit      ergocalciferol (Vitamin D2) 1,250 mcg (50,000 unit) capsule TAKE ONE CAPSULE BY MOUTH EVERY WEEK 4 Cap 4    ferrous sulfate 325 mg (65 mg iron) tablet TAKE ONE TABLET BY MOUTH 2 TIMES A DAY 100 Tab 0    oxyCODONE IR (ROXICODONE) 10 mg tab immediate release tablet Take  by mouth.  busPIRone (BUSPAR) 5 mg tablet TAKE ONE TABLET BY MOUTH THREE TIMES DAILY AS NEEDED FOR ANXIETY 90 Tab 0    Insulin Needles, Disposable, (Bee Pen Needle) 32 gauge x 5/32\" ndle USE AS DIRECTED 4 TIMES DAILY 100 Pen Needle 5    lisinopriL (PRINIVIL, ZESTRIL) 10 mg tablet Take 1 Tab by mouth two (2) times a day. 180 Tab 1    rosuvastatin (CRESTOR) 40 mg tablet Take 1 Tab by mouth daily. 90 Tab 1    silver sulfADIAZINE (SILVADENE) 1 % topical cream Apply  to affected area daily. 85 g 0    albuterol (Ventolin HFA) 90 mcg/actuation inhaler Take 2 Puffs by inhalation every four (4) hours as needed for Wheezing. 1 Inhaler 4    pregabalin (Lyrica) 75 mg capsule Take 1 Cap by mouth two (2) times a day. Max Daily Amount: 150 mg. 30 Cap 5    aspirin 81 mg chewable tablet Take 81 mg by mouth daily. Allergies   Allergen Reactions    Adhesive Tape-Silicones Hives    Sulfa (Sulfonamide Antibiotics) Swelling     Lips eyes       Family History   Problem Relation Age of Onset    Hypertension Mother    [facility] Gout Mother     Cancer Father         leukemia    Diabetes Father     Hypertension Sister     Diabetes Maternal Grandmother     Hypertension Maternal Grandmother     Diabetes Paternal Grandmother     Hypertension Paternal Grandmother     Anesth Problems Neg Hx      Social History     Tobacco Use    Smoking status: Never Smoker    Smokeless tobacco: Never Used   Substance Use Topics    Alcohol use:  No

## 2020-12-07 NOTE — ADDENDUM NOTE
Addended by: Cinthia Gaitan on: 12/7/2020 11:01 AM     Modules accepted: Orders
Addended by: Erick Schneider on: 12/7/2020 10:57 AM     Modules accepted: Orders
Yes

## 2020-12-07 NOTE — PATIENT INSTRUCTIONS
Medicare Wellness Visit, Male    The best way to live healthy is to have a lifestyle where you eat a well-balanced diet, exercise regularly, limit alcohol use, and quit all forms of tobacco/nicotine, if applicable. Regular preventive services are another way to keep healthy. Preventive services (vaccines, screening tests, monitoring & exams) can help personalize your care plan, which helps you manage your own care. Screening tests can find health problems at the earliest stages, when they are easiest to treat. [facility] follows the current, evidence-based guidelines published by the [facility] Reynaldo Jovani ([facility]) when recommending preventive services for our patients. Because we follow these guidelines, sometimes recommendations change over time as research supports it. (For example, a prostate screening blood test is no longer routinely recommended for men with no symptoms). Of course, you and your doctor may decide to screen more often for some diseases, based on your risk and co-morbidities (chronic disease you are already diagnosed with). Preventive services for you include:  - Medicare offers their members a free annual wellness visit, which is time for you and your primary care provider to discuss and plan for your preventive service needs. Take advantage of this benefit every year!  -All adults over age 72 should receive the recommended pneumonia vaccines. Current USPSTF guidelines recommend a series of two vaccines for the best pneumonia protection.   -All adults should have a flu vaccine yearly and tetanus vaccine every 10 years.  -All adults age 48 and older should receive the shingles vaccines (series of two vaccines).        -All adults age 38-68 who are overweight should have a diabetes screening test once every three years.   -Other screening tests & preventive services for persons with diabetes include: an eye exam to screen for diabetic retinopathy, a kidney function test, a foot exam, and stricter control over your cholesterol.   -Cardiovascular screening for adults with routine risk involves an electrocardiogram (ECG) at intervals determined by the provider.   -Colorectal cancer screening should be done for adults age 54-65 with no increased risk factors for colorectal cancer. There are a number of acceptable methods of screening for this type of cancer. Each test has its own benefits and drawbacks. Discuss with your provider what is most appropriate for you during your annual wellness visit. The different tests include: colonoscopy (considered the best screening method), a fecal occult blood test, a fecal DNA test, and sigmoidoscopy.  -All adults born between [facility] should be screened once for Hepatitis C.  -An Abdominal Aortic Aneurysm (AAA) Screening is recommended for men age 73-68 who has ever smoked in their lifetime.      Here is a list of your current Health Maintenance items (your personalized list of preventive services) with a due date:  Health Maintenance Due   Topic Date Due    Annual Well Visit  06/06/2020    Hemoglobin A1C    06/30/2020    Glaucoma Screening   09/18/2020    Eye Exam  09/18/2020    Diabetic Foot Care  12/03/2020

## 2020-12-07 NOTE — PROGRESS NOTES
HISTORY OF PRESENT ILLNESS  Shira Caban is a 77 y.o. male. HPI  Hypertension ROS: taking medications as instructed, no medication side effects noted, no TIA's, no chest pain on exertion, no dyspnea on exertion, no swelling of ankles. New concerns: BP in office today is 166/75. Pt reports that he has not taken his medication yet today but his values are stable at home. Continues on Lisinopril BID. R  Leg wound: Pt continues to follow with wound care. He notes that his wound his no longer infected but that it is taking a long time to close. Pt reports that he was on intravenous antibodies for ulcer in his leg in June or July. He notes that he also did hypobaric chamber treatment. Pt reports that he continues to apply Silvadene for healing of the ulcer. Back pain: Stable, pt continues to comply with 2 Percocet 10 mg /day to manage his pain. Anemia: Followed by Dr. Maia De La Cruz. He notes that he receives injections every 2 weeks for Hb.     GERD: Stable with no reported issues. Diabetic Review of Systems - further diabetic ROS: no polyuria or polydipsia, no chest pain, dyspnea or TIA's, no numbness, tingling or pain in extremities. Other symptoms and concerns: Pt's last a1c was 11.4 on 3/30/20 with an estimated BG of 280. Followed by Dr. Holly Matta. Pt reports that his BG has been more stable with values under 300. He notes that his next appt with Dr. Holly Matta is in 2 weeks. Continues on 18-30 units of humalog before meals and Lantus UID. Hx of asthma: Stable, pt continues to comply with inhaler PRN. Review of Systems   All other systems reviewed and are negative. Physical Exam  Vitals signs reviewed. Constitutional:       General: He is not in acute distress. Appearance: Normal appearance. He is not ill-appearing, toxic-appearing or diaphoretic.    HENT:      Right Ear: Hearing normal.      Left Ear: Hearing normal.      Nose: Nose normal.      Mouth/Throat:      Mouth: Mucous membranes are moist. Pharynx: Oropharynx is clear. Eyes:      Conjunctiva/sclera: Conjunctivae normal.   Neck:      Musculoskeletal: Normal range of motion. Pulmonary:      Effort: No respiratory distress. Breath sounds: Normal air entry. Musculoskeletal: Normal range of motion. Skin:     General: Skin is warm and dry. Neurological:      General: No focal deficit present. Mental Status: He is alert and oriented to person, place, and time. Mental status is at baseline. Psychiatric:         Mood and Affect: Mood normal.         Behavior: Behavior normal.         Thought Content: Thought content normal.         Judgment: Judgment normal.         ASSESSMENT and PLAN  Diagnoses and all orders for this visit:    1. Medicare annual wellness visit, subsequent    2. Essential hypertension  BP is at goal. I do not recommend any change in medications. -     lisinopriL (PRINIVIL, ZESTRIL) 10 mg tablet; Take 1 Tab by mouth two (2) times a day. 3. Ulcer of right lower extremity with fat layer exposed ([address]  Stable and well-managed. No change in medications. -     silver sulfADIAZINE (SILVADENE) 1 % topical cream; Apply  to affected area daily. -     oxyCODONE IR (ROXICODONE) 10 mg tab immediate release tablet; Take 1 Tab by mouth every six (6) hours as needed for Pain for up to 30 days. Max Daily Amount: 40 mg.    4. Type 2 diabetes mellitus with stage 3b chronic kidney disease, with long-term current use of insulin ([address]  Followed by Dr. Holly Matta. Sugars stable. Continue to follow diabetic diet and monitor sugars. 5. Gastroesophageal reflux disease without esophagitis  Stable and well-managed. No change in medications. 6. Chronic bilateral low back pain with bilateral sciatica  Stable and well-managed. No change in medications. -     oxyCODONE IR (ROXICODONE) 10 mg tab immediate release tablet; Take 1 Tab by mouth every six (6) hours as needed for Pain for up to 30 days. Max Daily Amount: 40 mg.     Other orders  -     albuterol (Ventolin HFA) 90 mcg/actuation inhaler; Take 2 Puffs by inhalation every four (4) hours as needed for Wheezing. Lab results and schedule of future lab studies reviewed with patient. Reviewed diet, exercise and weight control. Written by Cory Cogan, as dictated by Omar De La Fuente MD.     Current diagnosis and concerns discussed with pt at length. Understands risks and benefits or current treatment plan and medications and accepts the treatment and medication with any possible risks. Pt asks appropriate questions which were answered. Pt instructed to call with any concerns or problems.

## 2020-12-10 DIAGNOSIS — G62.9 NEUROPATHY: Chronic | ICD-10-CM

## 2020-12-10 DIAGNOSIS — E78.5 DYSLIPIDEMIA: ICD-10-CM

## 2020-12-10 RX ORDER — ROSUVASTATIN CALCIUM 40 MG/1
TABLET, COATED ORAL
Qty: 90 TAB | Refills: 0 | Status: SHIPPED | OUTPATIENT
Start: 2020-12-10 | End: 2022-03-07

## 2020-12-15 ENCOUNTER — HOSPITAL ENCOUNTER (OUTPATIENT)
Dept: WOUND CARE | Age: 65
Discharge: HOME | End: 2020-12-15
Admitting: PODIATRIST
Payer: MEDICARE

## 2020-12-15 VITALS
SYSTOLIC BLOOD PRESSURE: 188 MMHG | HEART RATE: 94 BPM | RESPIRATION RATE: 18 BRPM | DIASTOLIC BLOOD PRESSURE: 98 MMHG | TEMPERATURE: 97.7 F

## 2020-12-15 PROCEDURE — 99213 OFFICE O/P EST LOW 20 MIN: CPT | Performed by: PODIATRIST

## 2020-12-15 PROCEDURE — 74011000250 HC RX REV CODE- 250: Performed by: PODIATRIST

## 2020-12-15 RX ADMIN — Medication: at 13:47

## 2020-12-15 NOTE — WOUND CARE
12/15/20 1343   Anesthetic   Anesthetic 4% Lidocaine Cream   Foot Assessment   Foot Assessment X   Right Foot Assessment   Prior Amputation Yes   Wound Leg lower Right;Lateral #1   Date First Assessed/Time First Assessed: 10/07/20 1122   Present on Hospital Admission: Yes  Location: Leg lower  Wound Location Orientation: Right;Lateral  Wound Description: #1   Wound Image    Wound Length (cm) 0.6 cm   Wound Width (cm) 0.3 cm   Wound Depth (cm) 0.6 cm   Wound Surface Area (cm^2) 0.18 cm^2   Change in Wound Size % 77.5   Wound Volume (cm^3) 0.11 cm^3   Wound Healing % 88   Distance Tunneling (cm) 2.7 cm  (1.5)   Direction of Tunnel 12 o'clock  (5 o'clock tunnel)   Wound Assessment Other (Comment)  (unable to visualize base of wound)   Drainage Amount Large   Drainage Description Serosanguinous   Wound Odor None   Tressa-Wound/Incision Assessment Maceration     Visit Vitals  BP (!) 188/98 (BP 1 Location: Right arm, BP Patient Position: Sitting)   Pulse 94   Temp 97.7 °F (36.5 °C)   Resp 18

## 2020-12-15 NOTE — DISCHARGE INSTRUCTIONS
Discharge Instructions for  [facility]  [address] [place], [address] [facility]  Telephone: [phone]    NAME:  Veronica Vale. YOB: 1955  DATE:  11/17/2020    [x] Wound and dressing supply provider: [facility]      Wound Cleansing:   Do not scrub or use excessive force. Cleanse wound prior to applying a clean dressing with:    [] Normal Saline   [] Keep Wound Dry in Shower      [] Wound Cleanser   [] Cleanse wound with Mild Soap & Water    [x] May Shower at Discharge   [] Do not shower  [] cleanse with baby shampoo lather leave 2-3 then rinse    Dressings:                   Wound Location Right Lateral Lower Leg     Apply Primary Dressing:      Pack wound loosely with:                                                   [] Iodoform       [] Plain Packing       [] Mesalt        [x] Other:aquacel ag  Cover and Secure with:betadine wet to dry  [x] Gauze [x] ABD [] exudry     [] Francis [] Kerlix [] Mepilex Border {mepilex foam with boarder :23230:::1}  [] Ace Wrap [x] Roll Tape   [] Other:      Change dressing:   [] Daily      [x] Every Other Day   [] Three times per week  [] Once a week   [] Do Not Change Dressing     [] Other:[x] Elevate leg(s) above the level of the heart when sitting. [x] Avoid prolonged standing in one place. Dietary:  [] Diet as tolerated [] Diabetic Diet   [x] Increase Protein: examples (Meat, cheese, eggs, greek yogurt, fish, nuts)   [] Marcello Therapeutic Nutrition Powder  [] Other:  [] Dial a Dietician : Call [employer] at [phone] enter code ([phone]) when prompted. M-F 9am-5pm EST.      Return Appointment:  [] Nurse Visit at wound center in *** days   [x] Return Appointment: With Dr. Tahmina Bonds  2 weeks     Electronically signed on 11/17/2020 at 11:06 AM     [address] Information: Should you experience any significant changes in your wound(s) or have questions about your wound care, please contact the [place] [facility] at [address] 8:00 am - 4:30. If you need help with your wound outside these hours and cannot wait until we are again available, contact your PCP or go to the hospital emergency room. PLEASE NOTE: IF YOU ARE UNABLE TO OBTAIN WOUND SUPPLIES, CONTINUE TO USE THE SUPPLIES YOU HAVE AVAILABLE UNTIL YOU ARE ABLE TO REACH US. IT IS MOST IMPORTANT TO KEEP THE WOUND COVERED AT ALL TIMES.      Physician Signature:_______________________  Dr. Marja Boxer

## 2020-12-15 NOTE — WOUND CARE
12/15/20 1400   Wound Leg lower Right;Lateral #1   Date First Assessed/Time First Assessed: 10/07/20 1122   Present on Hospital Admission: Yes  Location: Leg lower  Wound Location Orientation: Right;Lateral  Wound Description: #1   Dressing/Treatment Alginate with Ag;ABD pad;Gauze dressing/dressing sponge;Roll gauze;Packing;Tape/Soft cloth adhesive tape   Discharge Condition: Stable     Pain: 0    Ambulatory Status: Walking    Discharge Destination: Home     Transportation: Car    Accompanied by: Self     Discharge instructions reviewed with Patient and copy or written instructions have been provided. All questions/concerns have been addressed at this time.

## 2020-12-15 NOTE — WOUND CARE
Leonidas Aguilar, TIANNA - Jeanine Blanco. [facility], [address] [facility] - Progress Note     Assessment/Plan:  Type 2 Diabetes with leg ulcer (E11.622)    Non pressure chronic ulcer right leg to the level of bone (H90.786)    Chronic osteomyelitis of RLE (M86.461)    Nichole Grade 3 ulcer    - Pt evaluated and treated. - Pt presents with right BKA stump wound - stagnant  - Pt has completed HBO sessions  - Dressing consisting of packing with Aquacel Ag  - Pt has returned to wearing his old prosthetic   - Pt looking to purchase a knee scooter. I think this will be beneficial to take pressure off the wound site. - F/U in 2 weeks    Subjective:  Pt complains of wound to right amputation site. Pt denies a recent h/o fever, chills, nausea, vomiting, chest pain, shortness of breath. HPI: Mr. Guy Scott is a 76 yo AA male with a PMH of arthritis, BPH, CKD, chronic pain, DM2, dyslipidemia, GERD, HTN, s/p rt BKA, sleep apnea, thromboembolus lt leg who presents with a right BKA stump wound. Wound formed from ill fitting prosthetic. Pt has had wounds in the past from in this same manner. Has had issues with the stump since he first got the BKA with Dr. Mary Olsen, orthopedics. ROS:  Consitutional: no weight loss, night sweats, fatigue / malaise / lethargy. Musculoskeletal: no joint / extremity pain, misalignment, stiffness, decreased ROM, crepitus. Integument: No pruritis, rashes, lesions, right BKA stump wound.   Psychiatric: No depression, anxiety, paranoia      History:  Wound Care  Allergies   Allergen Reactions    Adhesive Tape-Silicones Hives    Sulfa (Sulfonamide Antibiotics) Swelling     Lips eyes     Family History   Problem Relation Age of Onset    Hypertension Mother    [place]bino Filter Gout Mother     Cancer Father         leukemia    Diabetes Father     Hypertension Sister     Diabetes Maternal Grandmother     Hypertension Maternal Grandmother     Diabetes Paternal Grandmother     Hypertension Paternal Grandmother     Anesth Problems Neg Hx       Past Medical History:   Diagnosis Date    Arthritis     BPH (benign prostatic hyperplasia)     Chronic kidney disease     Chronic pain     BACK NEUROPATHY FEET HANDS    DM neuropathy, type II diabetes mellitus ([facility]     DM type 2 causing CKD stage 3 ([address] 12/17/2012    DM type 2 causing vascular disease ([facility]     Dyslipidemia     Foot ulcer due to secondary DM ([facility] 12/1/2012    GERD (gastroesophageal reflux disease)     HTN     Ill-defined condition 2013    MRSA in wound right leg (BKA)    S/P BKA (below knee amputation) ([address]     right BKA due to non-healing ulcer    Sleep apnea     Thromboembolus ([facility] 1970'S    BLOOD CLOT LEFT LEG     Past Surgical History:   Procedure Laterality Date    ABDOMEN SURGERY PROC UNLISTED      pilonidal cyst    ECHO 2D ADULT  8/09    normal; LVEF 60%    ECHO STRESS  4/09    7 min; normal    ENDOSCOPY, COLON, DIAGNOSTIC      HX AMPUTATION  2012    left great toe    HX AMPUTATION  2007    BKA right    HX BELOW KNEE AMPUTATION Right     HX CERVICAL FUSION  2009    x2    HX ORTHOPAEDIC  2006    ACDF C4-C7    IR INSERT TUNL CVC W/O PORT OVER 5 YR  6/19/2020    IR REMOVE TUNL CVAD W/O PORT / PUMP  9/14/2020    STRESS TEST MYOVIEW  8/09    walked 8:46; normal; LVEF 51%     Social History     Tobacco Use    Smoking status: Never Smoker    Smokeless tobacco: Never Used   Substance Use Topics    Alcohol use: No       Social History     Substance and Sexual Activity   Alcohol Use No     Social History     Substance and Sexual Activity   Drug Use No      Social History     Tobacco Use   Smoking Status Never Smoker   Smokeless Tobacco Never Used     Current Outpatient Medications   Medication Sig    rosuvastatin (CRESTOR) 40 mg tablet TAKE ONE TABLET BY MOUTH EVERY DAY    albuterol (Ventolin HFA) 90 mcg/actuation inhaler Take 2 Puffs by inhalation every four (4) hours as needed for Wheezing.  lisinopriL (PRINIVIL, ZESTRIL) 10 mg tablet Take 1 Tab by mouth two (2) times a day.  silver sulfADIAZINE (SILVADENE) 1 % topical cream Apply  to affected area daily.  oxyCODONE-acetaminophen (PERCOCET) 5-325 mg per tablet Take 1 Tab by mouth every four (4) hours as needed for Pain for up to 30 days. Max Daily Amount: 6 Tabs.  Lantus Solostar U-100 Insulin 100 unit/mL (3 mL) inpn INJECT 40 TO 50 UNITS SUBCUTANEOUSLY TWICE DAILY AS DIRECTED    insulin lispro (HUMALOG) 100 unit/mL kwikpen INJECT 18 TO 30 UNITS SUBCUTANEOUSLY 3 TIMES DAILY BEFORE BREAKFAST, LUNCH, AND DINNER    glucose blood VI test strips (Prodigy No Coding) strip TEST BLOOD SUGAR 2 TIMES A DAY    Blood-Glucose Meter monitoring kit USE TO CHECK BLOOD SUGAR TWICE DAILY    pantoprazole (PROTONIX) 40 mg tablet TAKE 1 TABLET BY MOUTH EVERY DAY    sodium polystyrene (KAYEXALATE) 15 gram/60 mL suspension Take 15 g by mouth now.  nystatin (MYCOSTATIN) 100,000 unit/mL suspension Take  by mouth four (4) times daily. swish and spit    ergocalciferol (Vitamin D2) 1,250 mcg (50,000 unit) capsule TAKE ONE CAPSULE BY MOUTH EVERY WEEK    ferrous sulfate 325 mg (65 mg iron) tablet TAKE ONE TABLET BY MOUTH 2 TIMES A DAY    Insulin Needles, Disposable, (Bee Pen Needle) 32 gauge x 5/32\" ndle USE AS DIRECTED 4 TIMES DAILY    pregabalin (Lyrica) 75 mg capsule Take 1 Cap by mouth two (2) times a day. Max Daily Amount: 150 mg.    aspirin 81 mg chewable tablet Take 81 mg by mouth daily. No current facility-administered medications for this encounter.       Facility-Administered Medications Ordered in Other Encounters   Medication Dose Route Frequency    [START ON 12/18/2020] epoetin naman (EPOGEN;PROCRIT) injection 20,000 Units  20,000 Units SubCUTAneous ONCE        Objective:  Visit Vitals  BP (!) 188/98 (BP 1 Location: Right arm, BP Patient Position: Sitting)   Pulse 94   Temp 97.7 °F (36.5 °C)   Resp 18       Vascular:  left LE  DP 1/4; PT 1/4  capillary fill time brisk, pitting edema is present, skin temperature is cool, varicosities are present. Dermatological:    Wound: 1  Location: right BKA stump   Measurements: per RN note  Margins: intact  Drainage: serous  Odor: no  Wound base: graunular  Lymphangitic streaking? No.  Undermining? yes  Sinus tracts? Yes, to fibula. Exposed bone? yes  Subcutaneous crepitation on palpation? No.    Skin is dry and scaly, exhibits hemosiderin deposition. There is no maceration of the interspaces of the feet b/l. Neurological:  DTR are present, protective sensation per 5.07 [place] Mauricio monofilament is intact, patient is AAOx3, mood is normal. Epicritic sensation is intact. Orthopedic:  Left LE are symmetric, ROM of ankle, STJ, 1st MTPJ is limited, MMT 5 out of 5 for B/L LE. Right BKA. Has prosthesis. Constitutional: Pt is a well developed, pleasant AA male. Lymphatics: negative tenderness to palpation of neck/axillary/inguinal nodes.     Imaging / Labs / Cx / Px:  5/28 tib/fib XR: soft tissue ulcer extending to the rt distal fibula

## 2020-12-18 ENCOUNTER — HOSPITAL ENCOUNTER (OUTPATIENT)
Dept: INFUSION THERAPY | Age: 65
Discharge: HOME | End: 2020-12-18
Payer: MEDICARE

## 2020-12-18 VITALS
OXYGEN SATURATION: 99 % | DIASTOLIC BLOOD PRESSURE: 80 MMHG | HEART RATE: 94 BPM | SYSTOLIC BLOOD PRESSURE: 148 MMHG | RESPIRATION RATE: 18 BRPM | TEMPERATURE: 96.7 F

## 2020-12-18 LAB
25(OH)D3 SERPL-MCNC: 17.2 NG/ML (ref 30–100)
CALCIUM SERPL-MCNC: 8 MG/DL (ref 8.5–10.1)
HCT VFR BLD AUTO: 28.5 % (ref 36.6–50.3)
HGB BLD-MCNC: 8.6 G/DL (ref 12.1–17)
PTH-INTACT SERPL-MCNC: 166.7 PG/ML (ref 18.4–88)

## 2020-12-18 PROCEDURE — 82306 VITAMIN D 25 HYDROXY: CPT

## 2020-12-18 PROCEDURE — 85018 HEMOGLOBIN: CPT

## 2020-12-18 PROCEDURE — 83970 ASSAY OF PARATHORMONE: CPT

## 2020-12-18 PROCEDURE — 96372 THER/PROPH/DIAG INJ SC/IM: CPT

## 2020-12-18 PROCEDURE — 36415 COLL VENOUS BLD VENIPUNCTURE: CPT

## 2020-12-18 PROCEDURE — 74011250636 HC RX REV CODE- 250/636: Performed by: INTERNAL MEDICINE

## 2020-12-18 RX ADMIN — ERYTHROPOIETIN 20000 UNITS: 20000 INJECTION, SOLUTION INTRAVENOUS; SUBCUTANEOUS at 12:27

## 2020-12-18 NOTE — PROGRESS NOTES
[facility] Progress Note    Date: 2020    Name: Dillon Blackwell. MRN: [id number]         : 1955    Mr. Blake Arrived ambulatory and in no distress for Retacrit Injection. Assessment was completed, no acute issues at this time, no new complaints voiced. Labs drawn peripherally and sent for processing. Mr. Ivan Suarez vitals were reviewed. Visit Vitals  BP (!) 148/80   Pulse 94   Temp (!) 96.7 °F (35.9 °C)   Resp 18   SpO2 99%     Recent Results (from the past 8 hour(s))   HGB & HCT    Collection Time: 20 11:53 AM   Result Value Ref Range    HGB 8.6 (L) 12.1 - 17.0 g/dL    HCT 28.5 (L) 36.6 - 50.3 %     Labs within parameters to treat. Medications:  Medications Administered     epoetin namna (EPOGEN;PROCRIT) injection 20,000 Units     Admin Date  2020 Action  Given Dose  20,000 Units Route  SubCUTAneous Administered By  Jaxson Palomares RN                Mr. Blake tolerated treatment well and was discharged from [place] [address] in stable condition. He is to return on 2021 for his next appointment.     Simeon Pizarro RN  2020

## 2020-12-29 ENCOUNTER — HOSPITAL ENCOUNTER (OUTPATIENT)
Dept: WOUND CARE | Age: 65
Discharge: HOME | End: 2020-12-29
Admitting: PODIATRIST

## 2020-12-29 DIAGNOSIS — M79.605 PAIN IN BOTH LOWER EXTREMITIES: ICD-10-CM

## 2020-12-29 DIAGNOSIS — M79.604 PAIN IN BOTH LOWER EXTREMITIES: ICD-10-CM

## 2020-12-29 RX ORDER — OXYCODONE AND ACETAMINOPHEN 5; 325 MG/1; MG/1
TABLET ORAL
Qty: 90 TAB | Refills: 0 | Status: SHIPPED | OUTPATIENT
Start: 2020-12-29 | End: 2021-01-28

## 2020-12-29 NOTE — DISCHARGE INSTRUCTIONS
Discharge Instructions for  [facility]  [address] [place], [address] [facility]  Telephone: [phone]    NAME:  Larry Stone. YOB: 1955  DATE:  12/29/2020    [x] Wound and dressing supply provider: [facility]      Wound Cleansing:   Do not scrub or use excessive force. Cleanse wound prior to applying a clean dressing with:    [] Normal Saline   [] Keep Wound Dry in Shower      [] Wound Cleanser   [] Cleanse wound with Mild Soap & Water    [x] May Shower at Discharge   [] Do not shower  [] cleanse with baby shampoo lather leave 2-3 then rinse    Dressings:                   Wound Location Right Lateral Lower Leg     Apply Primary Dressing:      Pack wound loosely with:                                                   [] Iodoform       [] Plain Packing       [] Mesalt        [x] Other:aquacel ag  Cover and Secure with:betadine wet to dry  [x] Gauze [x] ABD [] exudry     [] Francis [] Kerlix [] Mepilex Border {mepilex foam with boarder :98776:::1}  [] Ace Wrap [x] Roll Tape   [] Other:      Change dressing:   [] Daily      [x] Every Other Day   [] Three times per week  [] Once a week   [] Do Not Change Dressing     [] Other:[x] Elevate leg(s) above the level of the heart when sitting. [x] Avoid prolonged standing in one place. Dietary:  [] Diet as tolerated [] Diabetic Diet   [x] Increase Protein: examples (Meat, cheese, eggs, greek yogurt, fish, nuts)   [] Marcello Therapeutic Nutrition Powder  [] Other:  [] Dial a Dietician : Call [employer] at [phone] enter code ([phone]) when prompted. M-F 9am-5pm EST.      Return Appointment:  [] Nurse Visit at wound center in *** days   [x] Return Appointment: With Dr. Jackelyn Estrada  2 weeks     Electronically signed on 12/29/2020     [address] [place] [address] Information: Should you experience any significant changes in your wound(s) or have questions about your wound care, please contact the [address] [place] [address] Outpatient Wound Center at [address] [place] [address] 8:00 am - 4:30. If you need help with your wound outside these hours and cannot wait until we are again available, contact your PCP or go to the hospital emergency room. PLEASE NOTE: IF YOU ARE UNABLE TO OBTAIN WOUND SUPPLIES, CONTINUE TO USE THE SUPPLIES YOU HAVE AVAILABLE UNTIL YOU ARE ABLE TO REACH US. IT IS MOST IMPORTANT TO KEEP THE WOUND COVERED AT ALL TIMES.      Physician Signature:_______________________  Dr. Cathleen Farr

## 2021-01-05 ENCOUNTER — HOSPITAL ENCOUNTER (OUTPATIENT)
Dept: WOUND CARE | Age: 66
Discharge: HOME | End: 2021-01-05
Admitting: PODIATRIST

## 2021-01-05 NOTE — DISCHARGE INSTRUCTIONS
Discharge Instructions for  [facility]  [address] [place], [address] [facility]  Telephone: [phone]    NAME:  Jasmin Quiroz. YOB: 1955  DATE:  1/5/2021    [x] Wound and dressing supply provider: [facility]      Wound Cleansing:   Do not scrub or use excessive force. Cleanse wound prior to applying a clean dressing with:    [] Normal Saline   [] Keep Wound Dry in Shower      [] Wound Cleanser   [] Cleanse wound with Mild Soap & Water    [x] May Shower at Discharge   [] Do not shower  [] cleanse with baby shampoo lather leave 2-3 then rinse    Dressings:                   Wound Location Right Lateral Lower Leg     Apply Primary Dressing:      Pack wound loosely with:                                                   [] Iodoform       [] Plain Packing       [] Mesalt        [x] Other:aquacel ag  Cover and Secure with:betadine wet to dry  [x] Gauze [x] ABD [] exudry     [] Francis [] Kerlix [] Mepilex Border {mepilex foam with boarder :32803:::1}  [] Ace Wrap [x] Roll Tape   [] Other:      Change dressing:   [] Daily      [x] Every Other Day   [] Three times per week  [] Once a week   [] Do Not Change Dressing     [] Other:[x] Elevate leg(s) above the level of the heart when sitting. [x] Avoid prolonged standing in one place. Dietary:  [] Diet as tolerated [] Diabetic Diet   [x] Increase Protein: examples (Meat, cheese, eggs, greek yogurt, fish, nuts)   [] Marcello Therapeutic Nutrition Powder  [] Other:  [] Dial a Dietician : Call [employer] at [phone] enter code ([phone]) when prompted. M-F 9am-5pm EST.      Return Appointment:  [] Nurse Visit at wound center in *** days   [x] Return Appointment: With Dr. Krystle Siegel  2 weeks     Electronically signed on 1/5/2021     [address] [facility] Information: Should you experience any significant changes in your wound(s) or have questions about your wound care, please contact the [facility] at [address] [place] [address] 8:00 am - 4:30. If you need help with your wound outside these hours and cannot wait until we are again available, contact your PCP or go to the hospital emergency room. PLEASE NOTE: IF YOU ARE UNABLE TO OBTAIN WOUND SUPPLIES, CONTINUE TO USE THE SUPPLIES YOU HAVE AVAILABLE UNTIL YOU ARE ABLE TO REACH US. IT IS MOST IMPORTANT TO KEEP THE WOUND COVERED AT ALL TIMES.      Physician Signature:_______________________  Dr. Roman Lindo

## 2021-01-11 ENCOUNTER — HOSPITAL ENCOUNTER (OUTPATIENT)
Dept: WOUND CARE | Age: 66
Discharge: HOME | End: 2021-01-11
Admitting: PODIATRIST

## 2021-01-11 NOTE — DISCHARGE INSTRUCTIONS
Discharge Instructions for  [facility]  [place] [address], [address] [facility]  Telephone: [phone]    NAME:  Fay Leslie. YOB: 1955  DATE:  1/11/2021    [x] Wound and dressing supply provider: [employer]    Wound Cleansing:   Do not scrub or use excessive force. Cleanse wound prior to applying a clean dressing with:    [] Normal Saline   [] Keep Wound Dry in Shower      [] Wound Cleanser   [] Cleanse wound with Mild Soap & Water    [x] May Shower at Discharge   [] Do not shower  [] cleanse with baby shampoo lather leave 2-3 then rinse    Dressings:                   Wound Location Right Lateral Lower Leg     Apply Primary Dressing:  Pack wound loosely with:                                                   [] Iodoform       [] Plain Packing       [] Mesalt        [x] Other:aquacel ag    Cover and Secure with:betadine wet to dry  [x] Gauze [x] ABD [] exudry     [] Francis [] Kerlix [] Mepilex Border {mepilex foam with boarder :33167:::1}  [] Ace Wrap [x] Roll Tape   [] Other:      Change dressing:   [] Daily      [x] Every Other Day   [] Three times per week  [] Once a week   [] Do Not Change Dressing     [] Other:[x] Elevate leg(s) above the level of the heart when sitting. [x] Avoid prolonged standing in one place. Dietary:  [] Diet as tolerated [] Diabetic Diet   [x] Increase Protein: examples (Meat, cheese, eggs, greek yogurt, fish, nuts)   [] Marcello Therapeutic Nutrition Powder  [] Other:  [] Dial a Dietician : Call [employer] at [phone] enter code ([phone] when prompted. M-F 9am-5pm EST.      Return Appointment:  [] Nurse Visit at wound center in *** days   [x] Return Appointment: With Dr. Berna Crowley  2 weeks     Electronically signed on 1/11/2021 at 11:06 AM     [address] [facility] Information: Should you experience any significant changes in your wound(s) or have questions about your wound care, please contact the [facility] at [address] [place] [address] 8:00 am - 4:30. If you need help with your wound outside these hours and cannot wait until we are again available, contact your PCP or go to the hospital emergency room. PLEASE NOTE: IF YOU ARE UNABLE TO OBTAIN WOUND SUPPLIES, CONTINUE TO USE THE SUPPLIES YOU HAVE AVAILABLE UNTIL YOU ARE ABLE TO REACH US. IT IS MOST IMPORTANT TO KEEP THE WOUND COVERED AT ALL TIMES.      Physician Signature:_______________________  Dr. Stacey Mckenzie

## 2021-01-18 ENCOUNTER — HOSPITAL ENCOUNTER (OUTPATIENT)
Dept: INFUSION THERAPY | Age: 66
Discharge: HOME | End: 2021-01-18
Payer: MEDICARE

## 2021-01-18 VITALS
SYSTOLIC BLOOD PRESSURE: 155 MMHG | DIASTOLIC BLOOD PRESSURE: 70 MMHG | HEART RATE: 99 BPM | TEMPERATURE: 97.8 F | OXYGEN SATURATION: 97 % | RESPIRATION RATE: 18 BRPM

## 2021-01-18 LAB
ALBUMIN SERPL-MCNC: 1.6 G/DL (ref 3.5–5)
ANION GAP SERPL CALC-SCNC: 3 MMOL/L (ref 5–15)
BASO+EOS+MONOS # BLD AUTO: 0.9 K/UL (ref 0.2–1.2)
BASO+EOS+MONOS NFR BLD AUTO: 8 % (ref 3.2–16.9)
BUN SERPL-MCNC: 33 MG/DL (ref 6–20)
BUN/CREAT SERPL: 14 (ref 12–20)
CALCIUM SERPL-MCNC: 8.1 MG/DL (ref 8.5–10.1)
CHLORIDE SERPL-SCNC: 110 MMOL/L (ref 97–108)
CO2 SERPL-SCNC: 25 MMOL/L (ref 21–32)
CREAT SERPL-MCNC: 2.28 MG/DL (ref 0.7–1.3)
DIFFERENTIAL METHOD BLD: ABNORMAL
ERYTHROCYTE [DISTWIDTH] IN BLOOD BY AUTOMATED COUNT: 15.5 % (ref 11.8–15.8)
FERRITIN SERPL-MCNC: 1149 NG/ML (ref 26–388)
GLUCOSE SERPL-MCNC: 243 MG/DL (ref 65–100)
HCT VFR BLD AUTO: 23.5 % (ref 36.6–50.3)
HGB BLD-MCNC: 7.1 G/DL (ref 12.1–17)
IRON SATN MFR SERPL: 12 % (ref 20–50)
IRON SERPL-MCNC: 14 UG/DL (ref 35–150)
LYMPHOCYTES # BLD: 1.4 K/UL (ref 0.8–3.5)
LYMPHOCYTES NFR BLD: 12 % (ref 12–49)
MCH RBC QN AUTO: 26.8 PG (ref 26–34)
MCHC RBC AUTO-ENTMCNC: 30.2 G/DL (ref 30–36.5)
MCV RBC AUTO: 88.7 FL (ref 80–99)
NEUTS SEG # BLD: 9.5 K/UL (ref 1.8–8)
NEUTS SEG NFR BLD: 81 % (ref 32–75)
PHOSPHATE SERPL-MCNC: 2.9 MG/DL (ref 2.6–4.7)
PLATELET # BLD AUTO: 797 K/UL (ref 150–400)
POTASSIUM SERPL-SCNC: 4.7 MMOL/L (ref 3.5–5.1)
RBC # BLD AUTO: 2.65 M/UL (ref 4.1–5.7)
SODIUM SERPL-SCNC: 138 MMOL/L (ref 136–145)
TIBC SERPL-MCNC: 117 UG/DL (ref 250–450)
WBC # BLD AUTO: 11.8 K/UL (ref 4.1–11.1)

## 2021-01-18 PROCEDURE — 36415 COLL VENOUS BLD VENIPUNCTURE: CPT

## 2021-01-18 PROCEDURE — 96372 THER/PROPH/DIAG INJ SC/IM: CPT

## 2021-01-18 PROCEDURE — 74011250636 HC RX REV CODE- 250/636: Performed by: INTERNAL MEDICINE

## 2021-01-18 PROCEDURE — 83550 IRON BINDING TEST: CPT

## 2021-01-18 PROCEDURE — 80069 RENAL FUNCTION PANEL: CPT

## 2021-01-18 PROCEDURE — 82728 ASSAY OF FERRITIN: CPT

## 2021-01-18 PROCEDURE — 85025 COMPLETE CBC W/AUTO DIFF WBC: CPT

## 2021-01-18 RX ADMIN — ERYTHROPOIETIN 25000 UNITS: 10000 INJECTION, SOLUTION INTRAVENOUS; SUBCUTANEOUS at 14:32

## 2021-01-18 NOTE — PROGRESS NOTES
Outpatient Infusion Center Short Visit Progress Note    Patient admitted to [facility] for procrit ambulatory in stable condition. Assessment completed. No new concerns voiced other than some fatigue. Spoke with Ramiro from [facility] office regarrding low hgb of 7.1. Per MD office they are going to send a new order with increased dosage. Waited for fax but did not receive. Received verbal order from MD Diana Solorzano from [facility] for [id number] dose today. [place] to fax new order pharmacy aware and patient aware. Covid Screening      1. Do you have any symptoms of COVID-19? SOB, coughing, fever, or generally not feeling well ? no  2. Have you been exposed to COVID-19 recently? no  3. Have you had any recent contact with family/friend that has a pending COVID test? no    Vital Signs:  Visit Vitals  BP (!) 155/70   Pulse 99   Temp 97.8 °F (36.6 °C)   Resp 18   SpO2 97%         Left arm with positive blood return. Lab Results:  Recent Results (from the past 12 hour(s))   CBC WITH 3 PART DIFF    Collection Time: 01/18/21 12:13 PM   Result Value Ref Range    WBC 11.8 (H) 4.1 - 11.1 K/uL    RBC 2.65 (L) 4.10 - 5.70 M/uL    HGB 7.1 (L) 12.1 - 17.0 g/dL    HCT 23.5 (L) 36.6 - 50.3 %    MCV 88.7 80.0 - 99.0 FL    MCH 26.8 26.0 - 34.0 PG    MCHC 30.2 30.0 - 36.5 g/dL    RDW 15.5 11.8 - 15.8 %    PLATELET 344 (H) 867 - 400 K/uL    NEUTROPHILS 81 (H) 32 - 75 %    MIXED CELLS 8 3.2 - 16.9 %    LYMPHOCYTES 12 12 - 49 %    ABS. NEUTROPHILS 9.5 (H) 1.8 - 8.0 K/UL    ABS. MIXED CELLS 0.9 0.2 - 1.2 K/uL    ABS.  LYMPHOCYTES 1.4 0.8 - 3.5 K/UL    DF AUTOMATED     RENAL FUNCTION PANEL    Collection Time: 01/18/21 12:13 PM   Result Value Ref Range    Sodium 138 136 - 145 mmol/L    Potassium 4.7 3.5 - 5.1 mmol/L    Chloride 110 (H) 97 - 108 mmol/L    CO2 25 21 - 32 mmol/L    Anion gap 3 (L) 5 - 15 mmol/L    Glucose 243 (H) 65 - 100 mg/dL    BUN 33 (H) 6 - 20 MG/DL    Creatinine 2.28 (H) 0.70 - 1.30 MG/DL    BUN/Creatinine ratio 14 12 - 20      GFR est AA 35 (L) >60 ml/min/1.73m2    GFR est non-AA 29 (L) >60 ml/min/1.73m2    Calcium 8.1 (L) 8.5 - 10.1 MG/DL    Phosphorus 2.9 2.6 - 4.7 MG/DL    Albumin 1.6 (L) 3.5 - 5.0 g/dL           Medications:  Medications Administered     epoetin naman (EPOGEN;PROCRIT) injection 25,000 Units     Admin Date  01/18/2021 Action  Given Dose  25,000 Units Route  SubCUTAneous Administered By  Martin Newberry             Left arm SQ    Patient tolerated treatment well. Patient discharged from [place] [address] ambulatory in no distress. Patient aware of next appointment.     Michael Sommer RN    Future Appointments   Date Time Provider Alisa Ku   1/19/2021  2:15 PM Yanick Sutton DPM [address]   2/1/2021 11:30 AM SS INF6 CH4 <1H [facility]   2/15/2021 11:30 AM SS INF6 CH4 <1H [facility]   2/19/2021 11:30 AM SS INF6 CH4 <1H [facility]   3/5/2021 11:30 AM SS INF6 CH4 <1H [facility]   3/19/2021 11:30 AM SS INF6 CH4 <1H [facility]   4/2/2021 11:30 AM SS INF6 CH4 <1H [facility]   4/7/2021  1:15 PM Jin Rosenbaum MD [facility] AMB

## 2021-01-19 ENCOUNTER — HOSPITAL ENCOUNTER (OUTPATIENT)
Dept: WOUND CARE | Age: 66
Discharge: HOME | End: 2021-01-19
Admitting: PODIATRIST
Payer: MEDICARE

## 2021-01-19 VITALS
HEART RATE: 101 BPM | SYSTOLIC BLOOD PRESSURE: 173 MMHG | TEMPERATURE: 98.8 F | RESPIRATION RATE: 18 BRPM | DIASTOLIC BLOOD PRESSURE: 82 MMHG

## 2021-01-19 PROCEDURE — 74011000250 HC RX REV CODE- 250: Performed by: PODIATRIST

## 2021-01-19 PROCEDURE — 11042 DBRDMT SUBQ TIS 1ST 20SQCM/<: CPT | Performed by: PODIATRIST

## 2021-01-19 RX ADMIN — Medication: at 14:44

## 2021-01-19 NOTE — DISCHARGE INSTRUCTIONS
Discharge Instructions for  [facility]  [place] [address], [address] [facility]  Telephone: [phone]    NAME:  Analilia Varela. YOB: 1955  DATE:  12/15/2020    [x] Wound and dressing supply provider: [facility]      Wound Cleansing:   Do not scrub or use excessive force. Cleanse wound prior to applying a clean dressing with:    [] Normal Saline   [] Keep Wound Dry in Shower      [] Wound Cleanser   [] Cleanse wound with Mild Soap & Water    [x] May Shower at Discharge   [] Do not shower  [] cleanse with baby shampoo lather leave 2-3 then rinse    Dressings:                   Wound Location Right Lateral Lower Leg     Apply Primary Dressing:      Pack wound loosely with:                                                   [] Iodoform       [] Plain Packing       [] Mesalt        [x] Other:aquacel ag  Cover and Secure with:betadine wet to dry  [x] Gauze [x] ABD [] exudry     [] Francis [] Kerlix [] Mepilex Border {mepilex foam with boarder :42964:::1}  [] Ace Wrap [x] Roll Tape   [] Other:      Change dressing:   [] Daily      [x] Every Other Day   [] Three times per week  [] Once a week   [] Do Not Change Dressing     [] Other:[x] Elevate leg(s) above the level of the heart when sitting. [x] Avoid prolonged standing in one place. Dietary:  [] Diet as tolerated [] Diabetic Diet   [x] Increase Protein: examples (Meat, cheese, eggs, greek yogurt, fish, nuts)   [] Marcello Therapeutic Nutrition Powder  [] Other:  [] Dial a Dietician : Call [employer] at [phone] enter code ([phone] when prompted. M-F 9am-5pm EST.      Return Appointment:  [] Nurse Visit at wound center in *** days   [x] Return Appointment: With Dr. Noel Oro  2 weeks     Electronically signed on 12/15/2020 at 11:06 AM     [address] Information: Should you experience any significant changes in your wound(s) or have questions about your wound care, please contact the [facility] [address] [facility] at [address] 8:00 am - 4:30. If you need help with your wound outside these hours and cannot wait until we are again available, contact your PCP or go to the hospital emergency room. PLEASE NOTE: IF YOU ARE UNABLE TO OBTAIN WOUND SUPPLIES, CONTINUE TO USE THE SUPPLIES YOU HAVE AVAILABLE UNTIL YOU ARE ABLE TO REACH US. IT IS MOST IMPORTANT TO KEEP THE WOUND COVERED AT ALL TIMES.      Physician Signature:_______________________  Dr. Irina Perez

## 2021-01-19 NOTE — WOUND CARE
Chip Silveira DPM - Dilip Cheney DPM - Yifan Robison DPM                                                         Podiatry - Wound Care Center - Progress Note     Assessment/Plan:  Type 2 Diabetes with leg ulcer (E11.622)    Non pressure chronic ulcer right leg to the level of bone (L97.214)    Chronic osteomyelitis of RLE (M86.461)    Nichole Grade 3 ulcer    - Pt evaluated and treated.  - Pt presents with right BKA stump wound - improved  - Pt has completed HBO sessions  - Dressing consisting of packing with Aquacel Ag and GV  - Pt has returned to wearing his old prosthetic, but has tried as much as possible not to wear in the last few weeks  - F/U in 2 weeks    Subjective:  Pt complains of wound to right amputation site. Recently purchased a knee scooter but had a lot of swelling and inflammation afterwards. Saw Ortho who recommended injection and PT. Pt to start physical therapy soon. Pt denies a recent h/o fever, chills, nausea, vomiting, chest pain, shortness of breath.      HPI: Mr. Blake is a 63 yo AA male with a PMH of arthritis, BPH, CKD, chronic pain, DM2, dyslipidemia, GERD, HTN, s/p rt BKA, sleep apnea, thromboembolus lt leg who presents with a right BKA stump wound. Wound formed from ill fitting prosthetic. Pt has had wounds in the past from in this same manner.  Has had issues with the stump since he first got the BKA with Dr. Sotelo, orthopedics.    ROS:  Consitutional: no weight loss, night sweats, fatigue / malaise / lethargy.  Musculoskeletal: no joint / extremity pain, misalignment, stiffness, decreased ROM, crepitus.  Integument: No pruritis, rashes, lesions, right BKA stump wound.  Psychiatric: No depression, anxiety, paranoia      History:  Wound Care  Allergies   Allergen Reactions   • Adhesive Tape-Silicones Hives   • Sulfa (Sulfonamide Antibiotics) Swelling     Lips eyes     Family History   Problem Relation Age of Onset   •  Hypertension Mother    [address] [facility] Gout Mother     Cancer Father         leukemia    Diabetes Father     Hypertension Sister     Diabetes Maternal Grandmother     Hypertension Maternal Grandmother     Diabetes Paternal Grandmother     Hypertension Paternal Grandmother     Anesth Problems Neg Hx       Past Medical History:   Diagnosis Date    Arthritis     BPH (benign prostatic hyperplasia)     Chronic kidney disease     Chronic pain     BACK NEUROPATHY FEET HANDS    DM neuropathy, type II diabetes mellitus ([address]     DM type 2 causing CKD stage 3 ([address] 12/17/2012    DM type 2 causing vascular disease ([address]     Dyslipidemia     Foot ulcer due to secondary DM ([address] 12/1/2012    GERD (gastroesophageal reflux disease)     HTN     Ill-defined condition 2013    MRSA in wound right leg (BKA)    S/P BKA (below knee amputation) ([address]     right BKA due to non-healing ulcer    Sleep apnea     Thromboembolus ([address] 1970'S    BLOOD CLOT LEFT LEG     Past Surgical History:   Procedure Laterality Date    ECHO 2D ADULT  8/09    normal; LVEF 60%    ECHO STRESS  4/09    7 min; normal    ENDOSCOPY, COLON, DIAGNOSTIC      HX AMPUTATION  2012    left great toe    HX AMPUTATION  2007    BKA right    HX BELOW KNEE AMPUTATION Right     HX CERVICAL FUSION  2009    x2    HX ORTHOPAEDIC  2006    ACDF C4-C7    IR INSERT TUNL CVC W/O PORT OVER 5 YR  6/19/2020    IR REMOVE TUNL CVAD W/O PORT / PUMP  9/14/2020    ND ABDOMEN SURGERY PROC UNLISTED      pilonidal cyst    STRESS TEST MYOVIEW  8/09    walked 8:46; normal; LVEF 51%     Social History     Tobacco Use    Smoking status: Never Smoker    Smokeless tobacco: Never Used   Substance Use Topics    Alcohol use: No       Social History     Substance and Sexual Activity   Alcohol Use No     Social History     Substance and Sexual Activity   Drug Use No      Social History     Tobacco Use   Smoking Status Never Smoker   Smokeless Tobacco Never Used     Current Outpatient Medications   Medication Sig    oxyCODONE-acetaminophen (PERCOCET) 5-325 mg per tablet TAKE ONE TABLET BY MOUTH EVERY 4 HOURS AS NEEDED FOR PAIN TO UP TO 30 DAYS    rosuvastatin (CRESTOR) 40 mg tablet TAKE ONE TABLET BY MOUTH EVERY DAY    albuterol (Ventolin HFA) 90 mcg/actuation inhaler Take 2 Puffs by inhalation every four (4) hours as needed for Wheezing.  lisinopriL (PRINIVIL, ZESTRIL) 10 mg tablet Take 1 Tab by mouth two (2) times a day.  silver sulfADIAZINE (SILVADENE) 1 % topical cream Apply  to affected area daily.  Lantus Solostar U-100 Insulin 100 unit/mL (3 mL) inpn INJECT 40 TO 50 UNITS SUBCUTANEOUSLY TWICE DAILY AS DIRECTED    insulin lispro (HUMALOG) 100 unit/mL kwikpen INJECT 18 TO 30 UNITS SUBCUTANEOUSLY 3 TIMES DAILY BEFORE BREAKFAST, LUNCH, AND DINNER    glucose blood VI test strips (Prodigy No Coding) strip TEST BLOOD SUGAR 2 TIMES A DAY    Blood-Glucose Meter monitoring kit USE TO CHECK BLOOD SUGAR TWICE DAILY    pantoprazole (PROTONIX) 40 mg tablet TAKE 1 TABLET BY MOUTH EVERY DAY    sodium polystyrene (KAYEXALATE) 15 gram/60 mL suspension Take 15 g by mouth now.  nystatin (MYCOSTATIN) 100,000 unit/mL suspension Take  by mouth four (4) times daily. swish and spit    ergocalciferol (Vitamin D2) 1,250 mcg (50,000 unit) capsule TAKE ONE CAPSULE BY MOUTH EVERY WEEK    ferrous sulfate 325 mg (65 mg iron) tablet TAKE ONE TABLET BY MOUTH 2 TIMES A DAY    Insulin Needles, Disposable, (Bee Pen Needle) 32 gauge x 5/32\" ndle USE AS DIRECTED 4 TIMES DAILY    pregabalin (Lyrica) 75 mg capsule Take 1 Cap by mouth two (2) times a day. Max Daily Amount: 150 mg.    aspirin 81 mg chewable tablet Take 81 mg by mouth daily. No current facility-administered medications for this encounter.          Objective:  Visit Vitals  BP (!) 173/82 (BP 1 Location: Left arm, BP Patient Position: Sitting)   Pulse (!) 101   Temp 98.8 °F (37.1 °C)   Resp 18       Vascular:  left LE  DP 1/4; PT 1/4  capillary fill time brisk, pitting edema is present, skin temperature is cool, varicosities are present. Dermatological:    Wound: 1  Location: right BKA stump   Measurements: per RN note  Margins: intact  Drainage: serous  Odor: no  Wound base: graunular  Lymphangitic streaking? No.  Undermining? yes  Sinus tracts? Yes, to fibula. Exposed bone? yes  Subcutaneous crepitation on palpation? No.    Skin is dry and scaly, exhibits hemosiderin deposition. There is no maceration of the interspaces of the feet b/l. Neurological:  DTR are present, protective sensation per 5.07 Paicines Mauricio monofilament is intact, patient is AAOx3, mood is normal. Epicritic sensation is intact. Orthopedic:  Left LE are symmetric, ROM of ankle, STJ, 1st MTPJ is limited, MMT 5 out of 5 for B/L LE. Right BKA. Has prosthesis. Constitutional: Pt is a well developed, pleasant AA male. Lymphatics: negative tenderness to palpation of neck/axillary/inguinal nodes.     Imaging / Labs / Cx / Px:  5/28 tib/fib XR: soft tissue ulcer extending to the rt distal fibula

## 2021-01-19 NOTE — WOUND CARE
01/19/21 1440   Anesthetic   Anesthetic 4% Lidocaine Cream   Foot Assessment   Foot Assessment X   Right Foot Assessment   Prior Amputation Yes   Wound Leg lower Right;Lateral #1   Date First Assessed/Time First Assessed: 10/07/20 1122   Present on Hospital Admission: Yes  Location: Leg lower  Wound Location Orientation: Right;Lateral  Wound Description: #1   Wound Image    Wound Length (cm) 0.9 cm   Wound Width (cm) 0.4 cm   Wound Depth (cm) 0.7 cm   Wound Surface Area (cm^2) 0.36 cm^2   Change in Wound Size % 55   Wound Volume (cm^3) 0.25 cm^3   Wound Healing % 72   Distance Tunneling (cm) 1 cm  (1.5)   Direction of Tunnel 3 o'clock  (12 o'clock)   Wound Assessment Other (Comment)  (gentian violet to base)   Drainage Amount Moderate   Drainage Description Serosanguinous   Wound Odor None   Edges Epibole (rolled edges)     Visit Vitals  BP (!) 173/82 (BP 1 Location: Left arm, BP Patient Position: Sitting)   Pulse (!) 101   Temp 98.8 °F (37.1 °C)   Resp 18

## 2021-01-19 NOTE — WOUND CARE
01/19/21 1507   Wound Leg lower Right;Lateral #1   Date First Assessed/Time First Assessed: 10/07/20 1122   Present on Hospital Admission: Yes  Location: Leg lower  Wound Location Orientation: Right;Lateral  Wound Description: #1   Dressing/Treatment ABD pad;Alginate with Ag;Gauze dressing/dressing sponge;Roll gauze;Tape/Soft cloth adhesive tape   Discharge Condition: Stable     Pain: 0    Ambulatory Status: Wheelchair     Discharge Destination: Home     Transportation: Car    Accompanied by: Self     Discharge instructions reviewed with Patient and copy or written instructions have been provided. All questions/concerns have been addressed at this time.

## 2021-01-25 ENCOUNTER — HOSPITAL ENCOUNTER (OUTPATIENT)
Dept: PHYSICAL THERAPY | Age: 66
Discharge: HOME | End: 2021-01-25
Payer: MEDICARE

## 2021-01-25 PROCEDURE — 97162 PT EVAL MOD COMPLEX 30 MIN: CPT | Performed by: PHYSICAL THERAPIST

## 2021-01-25 PROCEDURE — 97110 THERAPEUTIC EXERCISES: CPT | Performed by: PHYSICAL THERAPIST

## 2021-01-25 PROCEDURE — 97530 THERAPEUTIC ACTIVITIES: CPT | Performed by: PHYSICAL THERAPIST

## 2021-01-25 NOTE — PROGRESS NOTES
[facility]  [address], [address] [facility] [place]  Ph [phone]  Fx [phone]    Plan of Care/ Statement of Necessity for Physical Therapy Services    Patient name: Nate Buck. Start of Care: 2021   Referral source: Arnaldo Dan MD : 1955    Medical Diagnosis: Right knee pain [M25.561]   Onset Date:begin 2021    Treatment Diagnosis: right knee pain and swelling   Prior Hospitalization: see medical history Provider#: [id number]   Medications: Verified on Patient summary List    Comorbidities: diabetes, kidney , weight loss , high blood pressure , healing wound , BKA x 10 years, anemic    Prior Level of Function: very active fishing , walking for exercise,  , traveling       The Plan of Care and following information is based on the information from the initial evaluation. Assessment/ key information:  Patient is a pleasant and active 77 y.o.male who presents to PT with Right knee pain [M25.561] since attempting to use a knee scooter the beginning of 2021. Pt right leg BKA since  and pt has been in wound care for an infected sore on his distal residual limb lateral side since 2020. Pt is experienceing swelling ( 3-6 cm compared to left knee ) in his right knee , stiffness , decrease ROM, decrease flexibility and has weakness in left ankle and hip and right hip. The patient will benefit from skilled PT services to address these deficits and facilitate return to premorbid activity level and improved quality of life. Evaluation Complexity History HIGH Complexity :3+ comorbidities / personal factors will impact the outcome/ POC ; Examination MEDIUM Complexity : 3 Standardized tests and measures addressing body structure, function, activity limitation and / or participation in recreation  ;Presentation MEDIUM Complexity : Evolving with changing characteristics  ; Clinical Decision Making MEDIUM Complexity : FOTO score of 26-74  Overall Complexity Rating: MEDIUM  Problem List: pain affecting function, decrease ROM, decrease strength, edema affecting function, impaired gait/ balance, decrease ADL/ functional abilitiies, decrease activity tolerance and decrease flexibility/ joint mobility   Treatment Plan may include any combination of the following: Therapeutic exercise, Therapeutic activities, Neuromuscular re-education, Physical agent/modality, Gait/balance training, Manual therapy, Aquatic therapy, Patient education, Self Care training, Functional mobility training, Home safety training and Stair training  Patient / Family readiness to learn indicated by: asking questions, trying to perform skills and interest  Persons(s) to be included in education: patient (P)  Barriers to Learning/Limitations: None  Patient Goal (s): get my knee back , get motion and strength know what is wrong able to walk again with prosthesis and be active   Patient Self Reported Health Status: poor  Rehabilitation Potential: good  Short Term Goals: STG- To be accomplished in 4 week(s):  1. Pt will be compliant with HEP for max progression toward all goals and return to PLOF. Eval:started on HEP and has handout      2. Pt pain will improve >= 40% to allow pt improved tolerance to daily activity and use of prosthesis wear and gait . Eval:pain can get up to 8/10      3. Pt FOTO score will increase by >=8 points to show improvement in functional mobility. Eval:46/100  *will reasses every 5th visit      Long Term Goals: LTG- To be accomplished in 8 week(s):     1. Pt will be independant with HEP for continued and ongoing progression following discharge toward full functional mobility. Eval: started on HEP      2. Pt pain will improve >= 80% to allow pt return to PLOF. Eval: pain up to 8/10      3. Pt FOTO score will increase by >=16 points to show improvement in functional mobility. Eval:46/100   will reassess every 5th visit      4.  Pt strength will improve to 5/5 bilateral LE hip and knees to allow pt to return to PLOF   Eval:      Left Right   Hip Flexion 4+  4-     ER 3 4   Knee Flexion 5 5     Extension 5 5   Ankle Plantarflexion 5 NA     Dorsiflexion 3+  NA         5. Pt ROM will improve by >=10 deg extension right knee and >=25 deg flexion  to allow pt to return to PLOF and normal gait with prosthesis   Eval:      Left Right   Hip Flexion   + tight     ER       Knee Flexion 130/135 82     Extension 7 hyper  -16   Ankle Plantarflexion 5 deg from neutral  NA     Dorsiflexion   NA         6. Pt swelling will decrease allowing proper wear of his prosthesis promoting return to PLOF and gait . Eval:Girth Measurements:      Cm at 2\" above patella   Cm at suprapatella    Cm at  infrapatella    Cm 2\" below patella    Left 34.75 36 35 30   Right  38 39.25 39 35.75      Frequency / Duration: Patient to be seen 2 times per week for 8 weeks. Patient/ Caregiver education and instruction: Diagnosis, prognosis, self care, activity modification and exercises   [x]  Plan of care has been reviewed with PTA        The severity rating is based on clinical judgment and the FOTO score. Certification Period: 1/25/2021- 3/24/2021  Jessica Chun, PT 1/25/2021 6:10 PM    ________________________________________________________________________    I certify that the above Therapy Services are being furnished while the patient is under my care. I agree with the treatment plan and certify that this therapy is necessary.     Physician's Signature:____________Date:_________TIME:________    [employer], Date and Time must be completed for valid certification **  Please sign and return to [facility]  [address], [address] [facility]  Ph [phone]  Fx [phone]

## 2021-01-25 NOTE — PROGRESS NOTES
PT DAILY TREATMENT NOTE/KNEE EVAL     Patient Name: Shashank Crocker.   Date:2021  : 1955  [x]  Patient  Verified  Payor: [place] MEDICARE / Plan: [place] MEDICARE PART A & B / Product Type: Medicare /    In FMAT:[id number]  Out time:1745  Total Treatment Time (min): 60  Visit #: 1 of 16    Medicare/BCBS Only   Total Timed Codes (min):  25 1:1 Treatment Time:  60     Treatment Area: Right knee pain [M25.561]    SUBJECTIVE  Pain Level (0-10 scale):0 currently unless moves knee   []constant [x]intermittent []improving []worsening []no change since onset    Any medication changes, allergies to medications, adverse drug reactions, diagnosis change, or new procedure performed?: [x] No    [] Yes (see summary sheet for update)  Subjective functional status/changes:     PLOF: pt is very active with prosthetic he is able to walk couple miles fish , and travel and home repairs   Limitations to PLOF: swelling and continued wound healing inhibiting use of prosthetic   Mechanism of Injury: begin of January limiting use of prosthesis due to wound management so instead attempted use of knee scooter but 2 times use and increase knee pain and swelling    Current symptoms/Complaints: pain , stiffness swelling in right knee   Previous Treatment/Compliance: pt had MRI awaiting results ,had xray , may get an injection ( on hold till diabetes sugar good )   PMHx/Surgical Hx: Below knee amputation  , wound which was treated for infection started in 2020 now off antibiotics and doing wound management for healing distal right residual limb lateral side   Work Hx: retired   Living Situation: lives with wife   Pt Goals: knee back , get motion and strength know what is wrong able to walk again with prosthesis and be active   Barriers: []pain []financial []time []transportation []other  Motivation: good   Substance use: []Alcohol []Tobacco []other: none   FABQ Score: []low []elevate  Cognition: A & O x 4 Other: OBJECTIVE/EXAMINATION  Domestic Life: safe   Activity/Recreational Limitations: very active   Mobility: using walker and wheel chair due to swellling and wound unable to use prosthesis, cue to blood sugar fluxuating feels unsteady with crutches so using walker   Self Care: indep         35 min [x]Eval                  []Re-Eval       15 min Therapeutic Exercise:  [x] See flow sheet : quad set , prone SLR , HS curl , sitting LAQ with opp HS , knee flexion     Rationale: increase ROM and increase strength to improve the patients ability to return to PLOF     10  min Therapeutic Activity:  [x]  See flow sheet : pt education on ice and elevate , general modalities/ compression sleeves , discussed findings of eval    Rationale: use of modalities and education to improve the patients ability to regain functional mobility to promote return to PLOF            With   [] TE   [] TA   [] neuro   [] other: Patient Education: [x] Review HEP    [] Progressed/Changed HEP based on:   [] positioning   [] body mechanics   [] transfers   [] heat/ice application    [] other:      Other Objective/Functional Measures:  FOTO: 46/100    Physical Therapy Evaluation - Knee        Gait:  [] Normal    [x] Abnormal    [] Antalgic    [] NWB    Device:walker   Describe:pt currently using walker for upright gait due to difficulty using prosthesis and concern for fall due to fluctuating sugar levels     ROM / Strength  [] Unable to assess                  AROM                     Strength (1-5)    Left Right Left Right   Hip Flexion  + tight 4+  4-    ER   3    Knee Flexion 130/135 82 5 5    Extension 7 hyper  -16 5 5   Ankle Plantarflexion 5 deg from neutral  NA 5 NA    Dorsiflexion  NA 3+  NA   Neuropathy in left foot     Flexibility: [] Unable to assess at this time  Hamstrings: 61 deg left , difficult to assess right      Quadriceps:    (L) Tightness= [] WNL   [] Min   [] Mod   [] Severe    (R) Tightness= [] WNL   [] Min   [] Mod [x] Severe  Gastroc:      (L) Tightness= [] WNL   [] Min   [x] Mod   [] Severe    (R) Tightness= [] WNL   [] Min   [] Mod   [] Severe  Other:    Palpation: ttp over anterior joint , very tender medial lateral joint line , + swelling palpated , ttp over patellar tension and medial lateral patella     + warmth around right  knee joint line , pt denies fever , no hot spots or bright red spots       Optional Tests:    Girth Measurements:     Cm at 2\" above patella   Cm at suprapatella    Cm at  infrapatella    Cm 2\" below patella   Cm at joint line   Left 34.75 36 35 30    Right  38 39.25 39 35.75    Other tests/comments:   Neg tests for Lachmans, anterior post drawer, jarad , V/V bilaterally   Patella on right smooth         Pain Level (0-10 scale) post treatment: 0    ASSESSMENT/Changes in Function: Patient is a pleasant and active 77 y.o.male who presents to PT with Right knee pain [M25.561] since attempting to use a knee scooter the beginning of January 2021. Pt right leg BKA since 2010 and pt has been in wound care for an infected sore on his distal residual limb lateral side since June 2020. Pt is experienceing swelling ( 3-6 cm compared to left knee ) in his right knee , stiffness , decrease ROM, decrease flexibility and has weakness in left ankle and hip and right hip. The patient will benefit from skilled PT services to address these deficits and facilitate return to premorbid activity level and improved quality of life. Patient will continue to benefit from skilled PT services to modify and progress therapeutic interventions, address functional mobility deficits, address ROM deficits, address strength deficits, analyze and address soft tissue restrictions, analyze and cue movement patterns, analyze and modify body mechanics/ergonomics, assess and modify postural abnormalities and address imbalance/dizziness to attain remaining goals.      [x]  See Plan of Care  []  See progress note/recertification  [] See Discharge Summary         Progress towards goals / Updated goals:  Short Term Goals: STG- To be accomplished in 4 week(s):  1. Pt will be compliant with HEP for max progression toward all goals and return to PLOF. Eval:started on HEP and has handout     2. Pt pain will improve >= 40% to allow pt improved tolerance to daily activity and use of prosthesis wear and gait . Eval:pain can get up to 8/10     3. Pt FOTO score will increase by >=8 points to show improvement in functional mobility. Eval:46/100  *will reasses every 5th visit     Long Term Goals: LTG- To be accomplished in 8 week(s):    1. Pt will be independant with HEP for continued and ongoing progression following discharge toward full functional mobility. Eval: started on HEP     2.Pt pain will improve >= 80% to allow pt return to PLOF. Eval: pain up to 8/10     3. Pt FOTO score will increase by >=16 points to show improvement in functional mobility. Eval:46/100   will reassess every 5th visit     4. Pt strength will improve to 5/5 bilateral LE hip and knees to allow pt to return to PLOF   Eval:    Left Right   Hip Flexion 4+  4-    ER 3 4   Knee Flexion 5 5    Extension 5 5   Ankle Plantarflexion 5 NA    Dorsiflexion 3+  NA       5. Pt ROM will improve by >=10 deg extension right knee and >=25 deg flexion  to allow pt to return to PLOF and normal gait with prosthesis   Eval:    Left Right   Hip Flexion  + tight    ER     Knee Flexion 130/135 82    Extension 7 hyper  -16   Ankle Plantarflexion 5 deg from neutral  NA    Dorsiflexion  NA       6. Pt swelling will decrease allowing proper wear of his prosthesis promoting return to PLOF and gait .    Eval:Girth Measurements:     Cm at 2\" above patella   Cm at suprapatella    Cm at  infrapatella    Cm 2\" below patella    Left 34.75 36 35 30   Right  38 39.25 39 35.75               PLAN  [x]  Upgrade activities as tolerated     [x]  Continue plan of care  []  Update interventions per flow sheet []  Discharge due to:_  []  Other:_      Jakob Buckner, PT 1/25/2021  4:39 PM

## 2021-01-27 ENCOUNTER — HOSPITAL ENCOUNTER (OUTPATIENT)
Dept: PHYSICAL THERAPY | Age: 66
Discharge: HOME | End: 2021-01-27
Payer: MEDICARE

## 2021-01-27 PROCEDURE — 97530 THERAPEUTIC ACTIVITIES: CPT

## 2021-01-27 PROCEDURE — 97110 THERAPEUTIC EXERCISES: CPT

## 2021-01-27 NOTE — PROGRESS NOTES
PT DAILY TREATMENT NOTE    Patient Name: Clifford Gomez. Date:2021  : 1955  [x]  Patient  Verified  Payor: Tricia Avery / Plan: [place] MEDICARE PART A & B / Product Type: Medicare /    In time:3:30  Out time:4:23  Total Treatment Time (min): 53  Total Timed Codes (min): 53  1:1 Treatment Time ( [address] only): 48   Visit #: 2 of 16    Treatment Area: Right knee pain [M25.561]    SUBJECTIVE  Pain Level (0-10 scale): 0/10  Any medication changes, allergies to medications, adverse drug reactions, diagnosis change, or new procedure performed?: [x] No    [] Yes (see summary sheet for update)  Subjective functional status/changes:   [] No changes reported  Report no pain in right residual limb; reports that he is seeing [facility] wound care for right residual  limb every 2 weeks    OBJECTIVE  38 min Therapeutic Exercise:  [x]? See flow sheet : quad set , prone SLR , HS curl , sitting LAQ with opp HS , knee flexion     Rationale: increase ROM and increase strength to improve the patients ability to return to PLOF      15  min Therapeutic Activity:  [x]? See flow sheet : pt education avoiding hip flexor hip abductor, and knee flexion contractions; searted slide board with elevation in w/c and recliner  compression sleeves   Rationale: use of modalities and education to improve the patients ability to regain functional mobility to promote return to PLOF                 With   [x] TE   [x] TA   [] neuro   [] other: Patient Education: [x] Review HEP    [] Progressed/Changed HEP based on:   [] positioning   [] body mechanics   [] transfers   [] heat/ice application    [] other:      Other Objective/Functional Measures:NT     Pain Level (0-10 scale) post treatment: 0/10    ASSESSMENT/Changes in Function: Patient demonstrates fair form with all exercises and requires verbal cues for appropriate form; able to perform progressive resistive exercises without increased pain. Patient has no increased pain in right LE at end of session. Patient will continue to benefit from skilled PT services to modify and progress therapeutic interventions, address functional mobility deficits, address ROM deficits, address strength deficits, analyze and address soft tissue restrictions, analyze and cue movement patterns, analyze and modify body mechanics/ergonomics, assess and modify postural abnormalities, address imbalance/dizziness and instruct in home and community integration to attain remaining goals. [x]  See Plan of Care  []  See progress note/recertification  []  See Discharge Summary         Progress towards goals / Updated goals:  Short Term Goals: STG- To be accomplished in 4 week(s):  1. Pt will be compliant with HEP for max progression toward all goals and return to PLOF. Eval:started on HEP and has handout   Current: added prone lying to HEP 01/27/2021     2. Pt pain will improve >= 40% to allow pt improved tolerance to daily activity and use of prosthesis wear and gait . Eval:pain can get up to 8/10      3. Pt FOTO score will increase by >=8 points to show improvement in functional mobility. Eval:46/100  *will reasses every 5th visit      Long Term Goals: LTG- To be accomplished in 8 week(s):     1. Pt will be independant with HEP for continued and ongoing progression following discharge toward full functional mobility. Eval: started on HEP      2. Pt pain will improve >= 80% to allow pt return to PLOF. Eval: pain up to 8/10      3. Pt FOTO score will increase by >=16 points to show improvement in functional mobility. Eval:46/100   will reassess every 5th visit      4. Pt strength will improve to 5/5 bilateral LE hip and knees to allow pt to return to PLOF   Eval:      Left Right   Hip Flexion 4+  4-     ER 3 4   Knee Flexion 5 5     Extension 5 5   Ankle Plantarflexion 5 NA     Dorsiflexion 3+  NA         5.  Pt ROM will improve by >=10 deg extension right knee and >=25 deg flexion  to allow pt to return to PLOF and normal gait with prosthesis   Eval:      Left Right   Hip Flexion   + tight     ER       Knee Flexion 130/135 82     Extension 7 hyper  -16   Ankle Plantarflexion 5 deg from neutral  NA     Dorsiflexion   NA         6. Pt swelling will decrease allowing proper wear of his prosthesis promoting return to PLOF and gait .    Eval:Girth Measurements:      Cm at 2\" above patella   Cm at suprapatella    Cm at  infrapatella    Cm 2\" below patella    Left 34.75 36 35 30   Right  38 39.25 39 35.75          PLAN  [x]  Upgrade activities as tolerated     [x]  Continue plan of care  []  Update interventions per flow sheet       []  Discharge due to:_  []  Other:_      Vj Rued 1/27/2021  4:04 PM    Future Appointments   Date Time Provider Alisa Ku   2/1/2021 11:30 AM SS INF6 CH4 <1H [facility] [address]   2/2/2021  2:30 PM Kana Robison, DPM [address]   2/3/2021  3:30 PM [facility]   2/4/2021  2:45 PM [facility]   2/11/2021  2:45 PM [facility]   2/12/2021 11:00 AM [facility]   2/15/2021 11:30 AM SS INF6 CH4 <1H [facility]   2/17/2021  2:00 PM [facility]   2/18/2021  2:45 PM [facility]   2/19/2021 11:30 AM SS INF6 CH4 <1H [facility]   2/23/2021  2:45 PM [facility]   2/25/2021  2:45 PM [facility]   3/5/2021 11:30 AM SS INF6 CH4 <1H [facility]   3/19/2021 11:30 AM SS INF6 CH4 <1H [facility]   4/2/2021 11:30 AM [facility]   4/7/2021  1:15 PM [facility]

## 2021-02-01 ENCOUNTER — HOSPITAL ENCOUNTER (EMERGENCY)
Age: 66
Discharge: HOME | End: 2021-02-01
Attending: STUDENT IN AN ORGANIZED HEALTH CARE EDUCATION/TRAINING PROGRAM
Payer: MEDICARE

## 2021-02-01 ENCOUNTER — HOSPITAL ENCOUNTER (OUTPATIENT)
Dept: INFUSION THERAPY | Age: 66
Discharge: HOME | End: 2021-02-01
Payer: MEDICARE

## 2021-02-01 VITALS
SYSTOLIC BLOOD PRESSURE: 161 MMHG | DIASTOLIC BLOOD PRESSURE: 70 MMHG | HEART RATE: 102 BPM | TEMPERATURE: 100.9 F | BODY MASS INDEX: 23.01 KG/M2 | OXYGEN SATURATION: 99 % | RESPIRATION RATE: 18 BRPM | WEIGHT: 165 LBS

## 2021-02-01 VITALS
TEMPERATURE: 99.1 F | DIASTOLIC BLOOD PRESSURE: 67 MMHG | BODY MASS INDEX: 23.01 KG/M2 | OXYGEN SATURATION: 96 % | SYSTOLIC BLOOD PRESSURE: 131 MMHG | HEART RATE: 104 BPM | RESPIRATION RATE: 18 BRPM | WEIGHT: 165 LBS

## 2021-02-01 DIAGNOSIS — R50.9 FEVER, UNSPECIFIED FEVER CAUSE: ICD-10-CM

## 2021-02-01 DIAGNOSIS — M54.41 CHRONIC BILATERAL LOW BACK PAIN WITH BILATERAL SCIATICA: Primary | ICD-10-CM

## 2021-02-01 DIAGNOSIS — M54.42 CHRONIC BILATERAL LOW BACK PAIN WITH BILATERAL SCIATICA: Primary | ICD-10-CM

## 2021-02-01 DIAGNOSIS — D63.1 ANEMIA DUE TO CHRONIC KIDNEY DISEASE, UNSPECIFIED CKD STAGE: Primary | ICD-10-CM

## 2021-02-01 DIAGNOSIS — N18.9 ANEMIA DUE TO CHRONIC KIDNEY DISEASE, UNSPECIFIED CKD STAGE: Primary | ICD-10-CM

## 2021-02-01 DIAGNOSIS — G89.29 CHRONIC BILATERAL LOW BACK PAIN WITH BILATERAL SCIATICA: Primary | ICD-10-CM

## 2021-02-01 LAB
ALBUMIN SERPL-MCNC: 1.5 G/DL (ref 3.5–5)
ALBUMIN/GLOB SERPL: 0.2 {RATIO} (ref 1.1–2.2)
ALP SERPL-CCNC: 246 U/L (ref 45–117)
ALT SERPL-CCNC: 46 U/L (ref 12–78)
ANION GAP SERPL CALC-SCNC: 7 MMOL/L (ref 5–15)
AST SERPL-CCNC: 39 U/L (ref 15–37)
BASOPHILS # BLD: 0.1 K/UL (ref 0–0.1)
BASOPHILS NFR BLD: 1 % (ref 0–1)
BILIRUB SERPL-MCNC: 0.3 MG/DL (ref 0.2–1)
BUN SERPL-MCNC: 29 MG/DL (ref 6–20)
BUN/CREAT SERPL: 14 (ref 12–20)
CALCIUM SERPL-MCNC: 8.2 MG/DL (ref 8.5–10.1)
CHLORIDE SERPL-SCNC: 109 MMOL/L (ref 97–108)
CO2 SERPL-SCNC: 21 MMOL/L (ref 21–32)
COMMENT, HOLDF: NORMAL
CREAT SERPL-MCNC: 2.07 MG/DL (ref 0.7–1.3)
DIFFERENTIAL METHOD BLD: ABNORMAL
EOSINOPHIL # BLD: 0.2 K/UL (ref 0–0.4)
EOSINOPHIL NFR BLD: 2 % (ref 0–7)
ERYTHROCYTE [DISTWIDTH] IN BLOOD BY AUTOMATED COUNT: 15 % (ref 11.5–14.5)
GLOBULIN SER CALC-MCNC: 7.1 G/DL (ref 2–4)
GLUCOSE SERPL-MCNC: 312 MG/DL (ref 65–100)
HCT VFR BLD AUTO: 18.7 % (ref 36.6–50.3)
HCT VFR BLD AUTO: 21.9 % (ref 36.6–50.3)
HEMOCCULT STL QL: NEGATIVE
HGB BLD-MCNC: 5.4 G/DL (ref 12.1–17)
HGB BLD-MCNC: 6.5 G/DL (ref 12.1–17)
HISTORY CHECKED?,CKHIST: NORMAL
IMM GRANULOCYTES # BLD AUTO: 0 K/UL (ref 0–0.04)
IMM GRANULOCYTES NFR BLD AUTO: 0 % (ref 0–0.5)
LYMPHOCYTES # BLD: 1.2 K/UL (ref 0.8–3.5)
LYMPHOCYTES NFR BLD: 15 % (ref 12–49)
MCH RBC QN AUTO: 26 PG (ref 26–34)
MCHC RBC AUTO-ENTMCNC: 29.7 G/DL (ref 30–36.5)
MCV RBC AUTO: 87.6 FL (ref 80–99)
MONOCYTES # BLD: 0.9 K/UL (ref 0–1)
MONOCYTES NFR BLD: 11 % (ref 5–13)
NEUTS SEG # BLD: 5.7 K/UL (ref 1.8–8)
NEUTS SEG NFR BLD: 71 % (ref 32–75)
NRBC # BLD: 0 K/UL (ref 0–0.01)
NRBC BLD-RTO: 0 PER 100 WBC
PLATELET # BLD AUTO: 393 K/UL (ref 150–400)
PMV BLD AUTO: 8 FL (ref 8.9–12.9)
POTASSIUM SERPL-SCNC: 5.6 MMOL/L (ref 3.5–5.1)
PROT SERPL-MCNC: 8.6 G/DL (ref 6.4–8.2)
RBC # BLD AUTO: 2.5 M/UL (ref 4.1–5.7)
RBC MORPH BLD: ABNORMAL
SAMPLES BEING HELD,HOLD: NORMAL
SODIUM SERPL-SCNC: 137 MMOL/L (ref 136–145)
WBC # BLD AUTO: 8.1 K/UL (ref 4.1–11.1)

## 2021-02-01 PROCEDURE — 96372 THER/PROPH/DIAG INJ SC/IM: CPT

## 2021-02-01 PROCEDURE — 2709999900 HC NON-CHARGEABLE SUPPLY

## 2021-02-01 PROCEDURE — 82272 OCCULT BLD FECES 1-3 TESTS: CPT

## 2021-02-01 PROCEDURE — 86923 COMPATIBILITY TEST ELECTRIC: CPT

## 2021-02-01 PROCEDURE — 74011250636 HC RX REV CODE- 250/636: Performed by: INTERNAL MEDICINE

## 2021-02-01 PROCEDURE — 99284 EMERGENCY DEPT VISIT MOD MDM: CPT

## 2021-02-01 PROCEDURE — 36415 COLL VENOUS BLD VENIPUNCTURE: CPT

## 2021-02-01 PROCEDURE — 85018 HEMOGLOBIN: CPT

## 2021-02-01 PROCEDURE — P9016 RBC LEUKOCYTES REDUCED: HCPCS

## 2021-02-01 PROCEDURE — 80053 COMPREHEN METABOLIC PANEL: CPT

## 2021-02-01 PROCEDURE — 86901 BLOOD TYPING SEROLOGIC RH(D): CPT

## 2021-02-01 PROCEDURE — 36430 TRANSFUSION BLD/BLD COMPNT: CPT

## 2021-02-01 PROCEDURE — 85025 COMPLETE CBC W/AUTO DIFF WBC: CPT

## 2021-02-01 RX ORDER — SODIUM CHLORIDE 9 MG/ML
250 INJECTION, SOLUTION INTRAVENOUS AS NEEDED
Status: DISCONTINUED | OUTPATIENT
Start: 2021-02-01 | End: 2021-02-02 | Stop reason: HOSPADM

## 2021-02-01 RX ORDER — OXYCODONE AND ACETAMINOPHEN 5; 325 MG/1; MG/1
TABLET ORAL
Qty: 90 TAB | Refills: 0 | Status: SHIPPED | OUTPATIENT
Start: 2021-02-01 | End: 2021-03-03

## 2021-02-01 RX ORDER — INSULIN LISPRO 100 [IU]/ML
8 INJECTION, SOLUTION INTRAVENOUS; SUBCUTANEOUS
COMMUNITY
End: 2021-02-17

## 2021-02-01 RX ORDER — ERGOCALCIFEROL 1.25 MG/1
50000 CAPSULE ORAL
COMMUNITY
End: 2021-09-02

## 2021-02-01 RX ORDER — IBUPROFEN 200 MG
200 TABLET ORAL
COMMUNITY
End: 2021-04-07

## 2021-02-01 RX ORDER — INSULIN GLARGINE 100 [IU]/ML
18 INJECTION, SOLUTION SUBCUTANEOUS
Status: ON HOLD | COMMUNITY
End: 2021-05-10

## 2021-02-01 RX ADMIN — ERYTHROPOIETIN 20000 UNITS: 20000 INJECTION, SOLUTION INTRAVENOUS; SUBCUTANEOUS at 13:49

## 2021-02-01 RX ADMIN — ERYTHROPOIETIN 5000 UNITS: 10000 INJECTION, SOLUTION INTRAVENOUS; SUBCUTANEOUS at 13:49

## 2021-02-01 NOTE — ED PROVIDER NOTES
Patient is a 64-year-old male with a history of anemia, chronic kidney disease, diabetes, and osteomyelitis who was sent from the outpatient infusion center due to a hemoglobin level of 5.4. Patient has been receiving biweekly infusions of epoetin for anemia. Today, routine hemoglobin was low. He was referred to the ED for blood transfusion. States he has had intermittent blood in his stool over the past month, scheduled to have a colonoscopy and EGD with Dr. Jefe Lin on February 24. The symptoms have not changed in has not had any blood in his stool in past two days. Not on any blood thinners. Otherwise, patient reports he is asymptomatic. Denies any syncope, chest pain, shortness breath. No other complaints today. Abnormal Lab Results  Pertinent negatives include no chest pain, no abdominal pain, no headaches and no shortness of breath.         Past Medical History:   Diagnosis Date    Arthritis     BPH (benign prostatic hyperplasia)     Chronic kidney disease     Chronic pain     BACK NEUROPATHY FEET HANDS    DM neuropathy, type II diabetes mellitus ([address]     DM type 2 causing CKD stage 3 ([address] 12/17/2012    DM type 2 causing vascular disease ([address]     Dyslipidemia     Foot ulcer due to secondary DM ([address] 12/1/2012    GERD (gastroesophageal reflux disease)     HTN     Ill-defined condition 2013    MRSA in wound right leg (BKA)    S/P BKA (below knee amputation) ([address]     right BKA due to non-healing ulcer    Sleep apnea     Thromboembolus ([address] 1970'S    BLOOD CLOT LEFT LEG       Past Surgical History:   Procedure Laterality Date    ECHO 2D ADULT  8/09    normal; LVEF 60%    ECHO STRESS  4/09    7 min; normal    ENDOSCOPY, COLON, DIAGNOSTIC      HX AMPUTATION  2012    left great toe    HX AMPUTATION  2007    BKA right    HX BELOW KNEE AMPUTATION Right     HX CERVICAL FUSION  2009    x2    HX ORTHOPAEDIC  2006    ACDF C4-C7    IR INSERT TUNL CVC W/O PORT OVER 5 YR  6/19/2020    IR REMOVE TUNL CVAD W/O PORT / PUMP  9/14/2020    NC ABDOMEN SURGERY PROC UNLISTED      pilonidal cyst    STRESS TEST MYOVIEW  8/09    walked 8:46; normal; LVEF 51%         Family History:   Problem Relation Age of Onset    Hypertension Mother    [facility] Gout Mother     Cancer Father         leukemia    Diabetes Father     Hypertension Sister     Diabetes Maternal Grandmother     Hypertension Maternal Grandmother     Diabetes Paternal Grandmother     Hypertension Paternal Grandmother     Anesth Problems Neg Hx        Social History     Socioeconomic History    Marital status:      Spouse name: Not on file    Number of children: Not on file    Years of education: Not on file    Highest education level: Not on file   Occupational History    Not on file   Social Needs    Financial resource strain: Not on file    Food insecurity     Worry: Not on file     Inability: Not on file    Transportation needs     Medical: Not on file     Non-medical: Not on file   Tobacco Use    Smoking status: Never Smoker    Smokeless tobacco: Never Used   Substance and Sexual Activity    Alcohol use: No    Drug use: No    Sexual activity: Yes     Partners: Female     Birth control/protection: None   Lifestyle    Physical activity     Days per week: Not on file     Minutes per session: Not on file    Stress: Not on file   Relationships    Social connections     Talks on phone: Not on file     Gets together: Not on file     Attends Hindu service: Not on file     Active member of club or organization: Not on file     Attends meetings of clubs or organizations: Not on file     Relationship status: Not on file    Intimate partner violence     Fear of current or ex partner: Not on file     Emotionally abused: Not on file     Physically abused: Not on file     Forced sexual activity: Not on file   Other Topics Concern    Not on file   Social History Narrative    Not on file         ALLERGIES: Adhesive tape-silicones and Sulfa (sulfonamide antibiotics)    Review of Systems   Constitutional: Negative for fever. Respiratory: Negative for cough and shortness of breath. Cardiovascular: Negative for chest pain. Gastrointestinal: Positive for blood in stool (intermittent. ). Negative for abdominal pain, diarrhea and vomiting. Skin: Negative for color change. Neurological: Negative for syncope and headaches. All other systems reviewed and are negative. Vitals:    02/01/21 1559   BP: (!) 162/73   Pulse: (!) 113   Resp: 15   Temp: 97.7 °F (36.5 °C)   SpO2: 100%   Weight: 74.8 kg (165 lb)            Physical Exam  Vitals signs reviewed. Constitutional:       General: He is not in acute distress. Appearance: He is well-developed. HENT:      Head: Normocephalic and atraumatic. Eyes:      Conjunctiva/sclera: Conjunctivae normal.   Neck:      Musculoskeletal: Normal range of motion and neck supple. Cardiovascular:      Rate and Rhythm: Normal rate and regular rhythm. Pulmonary:      Effort: Pulmonary effort is normal. No respiratory distress. Abdominal:      General: Abdomen is flat. There is no distension. Genitourinary:     Rectum: Normal.      Comments: Brown stool, no melena    Musculoskeletal:      Comments: R BKA noted. Skin:     General: Skin is warm and dry. Neurological:      Mental Status: He is alert and oriented to person, place, and time. Motor: No abnormal muscle tone. [facility]       Procedures    Assessment plan: This is a 57-year-old male here with anemia, hemoglobin level 5.4. Intermittent bright red blood in his stools, none in the past 2 days. Scheduled for outpatient colonoscopy and EGD at the end of this month. Asymptomatic here. Will write for 1 unit of PRBCs. Guaiac stool. Disposition pending results and clinical course. RN reports patient had low grade temp prior to initiation of pRBC transfusion. 100.9 F on my re-eval s/p transfusion.   Patient remains asymptomatic. Denies any urticaria, shortness of breath, chills, myalgias. He is requesting discharge home. Patient follow-up with GI as scheduled. Return to ER precautions discussed. Patient agrees with understands plan. All questions answered.

## 2021-02-01 NOTE — PROGRESS NOTES
[facility]: MED RECONCILIATION    Comments/Recommendations:   PTA medications reviewed with patient at bedside with appropriate PPE. Patient is a good historian and reports taking all of his medications this AM. He fills Rx at [facility]. Of note- Patient reports epoetin injections at [facility] every 2 weeks, reviewed history and dose varies 20-25,000 units. Patient reports significant insulin dose reduction- Previously listed as lispro 18-30 units TIDAC plus lantus 40-50 units BID. Patient reports currently taking 8 units lispro TID and 18 units lantus once daily at bedtime. Patient has been taking ibuprofen OTC as needed- Counseled to avoid use in CKD and that patient can try additional acetaminophen in between percocet doses and to avoid taking more than 3g APAP daily OTC. If not effective would recommend discuss alternatives to NSAIDS such as resumption of lyrica if benefited from this previously. Medications added:     Ibuprofen 200 mg PRN  Epoetin SQ injections Q2 weeks    Medications removed:    Nystatin  Pregabalin 75 mg BID  Kayexalate    Medications adjusted:    Insulin lispro and glargine doses    Information obtained from: Patient, RxQuery, Chart review    Allergies: Adhesive tape-silicones and Sulfa (sulfonamide antibiotics)    Prior to Admission Medications:     Medication Documentation Review Audit       Reviewed by RALPH WeissD (Pharmacist) on 02/01/21 at 1719      Medication Sig Documenting Provider Last Dose Status Taking? albuterol (Ventolin HFA) 90 mcg/actuation inhaler Take 2 Puffs by inhalation every four (4) hours as needed for Wheezing. Griselda Salomon MD  Active Yes   aspirin 81 mg chewable tablet Take 81 mg by mouth daily. Provider, Historical 2/1/2021 Active Yes   epoetin naman (EPOGEN;PROCRIT) 20,000 unit/mL injection 20,000-25,000 Units by SubCUTAneous route Once every 2 weeks.  Provider, Historical 2/1/2021 1349 Active Yes   ergocalciferol (ERGOCALCIFEROL) 1,250 mcg (50,000 unit) capsule Take 50,000 Units by mouth Every Friday. Provider, Historical 1/29/2021 Active Yes   ferrous sulfate 325 mg (65 mg iron) tablet TAKE ONE TABLET BY MOUTH 2 TIMES A DAY Moni Rosenbaum MD 2/1/2021 AM Active Yes   ibuprofen (MOTRIN) 200 mg tablet Take 200 mg by mouth daily as needed for Pain. Provider, Historical 1/31/2021 Active Yes   insulin glargine (Lantus Solostar U-100 Insulin) 100 unit/mL (3 mL) inpn 18 Units by SubCUTAneous route nightly. Provider, Historical 1/31/2021 Active Yes   insulin lispro (HUMALOG) 100 unit/mL kwikpen 8 Units by SubCUTAneous route Before breakfast, lunch, and dinner. Provider, Historical 2/1/2021 AM Active Yes   lisinopriL (PRINIVIL, ZESTRIL) 10 mg tablet Take 1 Tab by mouth two (2) times a day. Domenica Mata MD 2/1/2021 AM Active Yes   oxyCODONE-acetaminophen (PERCOCET) 5-325 mg per tablet TAKE ONE TABLET BY MOUTH EVERY 4 HOURS AS NEEDED FOR PAIN TO UP TO 30 DAYS Domenica Mata MD 2/1/2021 Active Yes   pantoprazole (PROTONIX) 40 mg tablet TAKE 1 TABLET BY MOUTH EVERY DAY Domenica Mata MD 2/1/2021 Active Yes   rosuvastatin (CRESTOR) 40 mg tablet TAKE ONE TABLET BY MOUTH EVERY DAY Domenica Mata MD 2/1/2021 Active Yes   silver sulfADIAZINE (SILVADENE) 1 % topical cream Apply  to affected area daily.  Domenica Mata MD 2/1/2021 Active Yes                  Vandana Ramos, PHARMD   Contact: [phone]

## 2021-02-01 NOTE — PROGRESS NOTES
Outpatient Infusion Center Short Visit Progress Note    Patient admitted to [facility] for retacrit in wheelchair in stable condition. Assessment completed. No new concerns voiced. Patient feeling some fatigue no SOB or chest pain. Hgb 5.4 per MD Claudetta Kemps patient to go to ED for a blood transfusion called ED and notified patient was coming and gave report. Patient aware and was going to ED after he leaves infusion center. Covid Screening    1. Do you have any symptoms of COVID-19? SOB, coughing, fever, or generally not feeling well ? no  2. Have you been exposed to COVID-19 recently? no  3. Have you had any recent contact with family/friend that has a pending COVID test? no    Vital Signs:  Visit Vitals  /67   Pulse (!) 104   Temp 99.1 °F (37.3 °C)   Resp 18   Wt 74.8 kg (165 lb)   SpO2 96%   BMI 23.01 kg/m²     R arm labs drawn by Mp SANDHU with positive blood return. Lab Results:  Recent Results (from the past 12 hour(s))   HGB & HCT    Collection Time: 02/01/21 12:24 PM   Result Value Ref Range    HGB 5.4 (LL) 12.1 - 17.0 g/dL    HCT 18.7 (L) 36.6 - 50.3 %       Medications:  Medications Administered     epoetin naman (EPOGEN;PROCRIT) injection 20,000 Units     Admin Date  02/01/2021 Action  Given Dose  20,000 Units Route  SubCUTAneous Administered By  Kaci Rhoades          epoetin naman (EPOGEN;PROCRIT) injection 5,000 Units     Admin Date  02/01/2021 Action  Given Dose  5,000 Units Route  SubCUTAneous Administered By  Kaci Jf              R  Arm SQ     Patient tolerated treatment well. Patient discharged from [place] [address] ambulatory in no distress. Patient aware of next appointment.     Jevon Saxena, USAMA    Future Appointments   Date Time Provider Alisa Ku   2/2/2021  2:30 PM [facility] [address] [place] [address],[address]   2/3/2021  3:30 PM [facility]   2/4/2021  2:45 PM [facility]   2/11/2021  2:45 PM [facility]   2/12/2021 11:00 AM [facility]   2/15/2021 11:30 AM SS INF6 CH4 <1H [facility]   2/17/2021  2:00 PM [facility]   2/18/2021  2:45 PM [facility]   2/19/2021 11:30 AM SS INF6 CH4 <1H [facility]   2/23/2021  2:45 PM [facility]   2/25/2021  2:45 PM [facility]   3/5/2021 11:30 AM SS INF6 CH4 <1H [facility]   3/19/2021 11:30 AM SS INF6 CH4 <1H [facility]   4/2/2021 11:30 AM SS INF6 CH4 <1H [facility]   4/7/2021  1:15 PM Amber Rosenbaum MD [facility] BS AMB

## 2021-02-02 ENCOUNTER — HOSPITAL ENCOUNTER (OUTPATIENT)
Dept: WOUND CARE | Age: 66
Discharge: HOME | End: 2021-02-02
Admitting: PODIATRIST
Payer: MEDICARE

## 2021-02-02 VITALS
TEMPERATURE: 97.9 F | RESPIRATION RATE: 16 BRPM | SYSTOLIC BLOOD PRESSURE: 157 MMHG | DIASTOLIC BLOOD PRESSURE: 79 MMHG | HEART RATE: 106 BPM

## 2021-02-02 LAB
ABO + RH BLD: NORMAL
BASOPHILS # BLD: 0.1 K/UL (ref 0–0.1)
BASOPHILS NFR BLD: 1 % (ref 0–1)
BLD PROD TYP BPU: NORMAL
BLOOD GROUP ANTIBODIES SERPL: NORMAL
BPU ID: NORMAL
CROSSMATCH RESULT,%XM: NORMAL
DIFFERENTIAL METHOD BLD: ABNORMAL
EOSINOPHIL # BLD: 0.3 K/UL (ref 0–0.4)
EOSINOPHIL NFR BLD: 3 % (ref 0–7)
ERYTHROCYTE [DISTWIDTH] IN BLOOD BY AUTOMATED COUNT: 15.1 % (ref 11.5–14.5)
HCT VFR BLD AUTO: 19.3 % (ref 36.6–50.3)
HGB BLD-MCNC: 5.7 G/DL (ref 12.1–17)
IMM GRANULOCYTES # BLD AUTO: 0 K/UL
IMM GRANULOCYTES NFR BLD AUTO: 0 %
LYMPHOCYTES # BLD: 0.9 K/UL (ref 0.8–3.5)
LYMPHOCYTES NFR BLD: 10 % (ref 12–49)
MCH RBC QN AUTO: 26.1 PG (ref 26–34)
MCHC RBC AUTO-ENTMCNC: 29.5 G/DL (ref 30–36.5)
MCV RBC AUTO: 88.5 FL (ref 80–99)
MONOCYTES # BLD: 0.7 K/UL (ref 0–1)
MONOCYTES NFR BLD: 8 % (ref 5–13)
NEUTS SEG # BLD: 6.8 K/UL (ref 1.8–8)
NEUTS SEG NFR BLD: 78 % (ref 32–75)
NRBC # BLD: 0 K/UL (ref 0–0.01)
NRBC BLD-RTO: 0 PER 100 WBC
PATH REV BLD -IMP: ABNORMAL
PLATELET # BLD AUTO: 440 K/UL (ref 150–400)
PMV BLD AUTO: 8.3 FL (ref 8.9–12.9)
RBC # BLD AUTO: 2.18 M/UL (ref 4.1–5.7)
RBC MORPH BLD: ABNORMAL
SPECIMEN EXP DATE BLD: NORMAL
STATUS OF UNIT,%ST: NORMAL
UNIT DIVISION, %UDIV: 0
WBC # BLD AUTO: 8.8 K/UL (ref 4.1–11.1)
WBC MORPH BLD: ABNORMAL

## 2021-02-02 PROCEDURE — 99213 OFFICE O/P EST LOW 20 MIN: CPT | Performed by: PODIATRIST

## 2021-02-02 NOTE — DISCHARGE INSTRUCTIONS
[facility]  [address] [place], [address] [facility]  Telephone: [phone]    NAME:  Joe Hudson. YOB: 1955  DATE:  2/2/21    [x] Wound and dressing supply provider: [employer]      Wound Cleansing:   Do not scrub or use excessive force. Cleanse wound prior to applying a clean dressing with:    [] Normal Saline   [] Keep Wound Dry in Shower      [] Wound Cleanser   [] Cleanse wound with Mild Soap & Water    [x] May Shower at Discharge   [] Do not shower  [] cleanse with baby shampoo lather leave 2-3 then rinse    Dressings:                   Wound Location Right Lateral Lower Leg     Apply Primary Dressing:      Pack wound loosely with:                                                   [] Iodoform       [] Plain Packing       [] Mesalt        [x] Other:aquacel ag  Cover and Secure with:betadine wet to dry  [x] Gauze [x] ABD [] exudry     [x] Francis [] Kerlix [] Mepilex Border {mepilex foam with boarder :66087:::1}  [] Ace Wrap [x] Roll Tape   [] Other:      Change dressing:   [] Daily      [x] Every Other Day   [] Three times per week  [] Once a week   [] Do Not Change Dressing     [] Other:[x] Elevate leg(s) above the level of the heart when sitting. [x] Avoid prolonged standing in one place. Dietary:  [] Diet as tolerated [] Diabetic Diet   [x] Increase Protein: examples (Meat, cheese, eggs, greek yogurt, fish, nuts)   [] Marcello Therapeutic Nutrition Powder  [] Other:  [] Dial a Dietician : Call [employer] at [phone] enter code ([phone]) when prompted. M-F 9am-5pm EST.      Return Appointment:  [] Nurse Visit at wound center in *** days   [x] Return Appointment: With Dr. Sofia Moody  2 weeks     Electronically signed on 2/2/21    [facility] Information: Should you experience any significant changes in your wound(s) or have questions about your wound care, please contact the [facility] at [facility] - FRIDAY 8:00 am - 4:30. If you need help with your wound outside these hours and cannot wait until we are again available, contact your PCP or go to the hospital emergency room. PLEASE NOTE: IF YOU ARE UNABLE TO OBTAIN WOUND SUPPLIES, CONTINUE TO USE THE SUPPLIES YOU HAVE AVAILABLE UNTIL YOU ARE ABLE TO REACH US. IT IS MOST IMPORTANT TO KEEP THE WOUND COVERED AT ALL TIMES.      Physician Signature:_______________________  Dr. Reg Lutz

## 2021-02-02 NOTE — WOUND CARE
02/02/21 1455   Wound Leg lower Right;Lateral #1   Date First Assessed/Time First Assessed: 10/07/20 1122   Present on Hospital Admission: Yes  Location: Leg lower  Wound Location Orientation: Right;Lateral  Wound Description: #1   Wound Image    Cleansed Cleansed with saline   Wound Length (cm) 1.9 cm   Wound Width (cm) 0.7 cm   Wound Depth (cm) 1.5 cm   Wound Surface Area (cm^2) 1.33 cm^2   Change in Wound Size % -66.25   Wound Volume (cm^3) 2 cm^3   Wound Healing % -127   Undermining Starts ___ O'Clock 12 o'clock   Undermining Ends ___ O'Clock 3 o'clock   Undermining Maximum Distance (cm) 1 cm   Wound Assessment   (unable to assess full base due to gentian violet staining)   Drainage Amount Moderate   Drainage Description Serosanguinous   Wound Odor None   Edges Epibole (rolled edges)     Visit Vitals  BP (!) 157/79 (BP 1 Location: Right leg, BP Patient Position: Sitting)   Pulse (!) 106   Temp 97.9 °F (36.6 °C)   Resp 16

## 2021-02-02 NOTE — DISCHARGE INSTRUCTIONS
Discharge Instructions for  [facility]  [address] [place], [address] [facility]  Telephone: [phone]    NAME:  Tania Hernandez. YOB: 1955  DATE:  1/19/21    [x] Wound and dressing supply provider: [facility]      Wound Cleansing:   Do not scrub or use excessive force. Cleanse wound prior to applying a clean dressing with:    [] Normal Saline   [] Keep Wound Dry in Shower      [] Wound Cleanser   [] Cleanse wound with Mild Soap & Water    [x] May Shower at Discharge   [] Do not shower  [] cleanse with baby shampoo lather leave 2-3 then rinse    Dressings:                   Wound Location Right Lateral Lower Leg     Apply Primary Dressing:      Pack wound loosely with:                                                   [] Iodoform       [] Plain Packing       [] Mesalt        [x] Other:aquacel ag  Cover and Secure with:betadine wet to dry  [x] Gauze [x] ABD [] exudry     [x] Francis [] Kerlix [] Mepilex Border {mepilex foam with boarder :92114:::1}  [] Ace Wrap [x] Roll Tape   [] Other:      Change dressing:   [] Daily      [x] Every Other Day   [] Three times per week  [] Once a week   [] Do Not Change Dressing     [] Other:[x] Elevate leg(s) above the level of the heart when sitting. [x] Avoid prolonged standing in one place. Dietary:  [] Diet as tolerated [] Diabetic Diet   [x] Increase Protein: examples (Meat, cheese, eggs, greek yogurt, fish, nuts)   [] Marecllo Therapeutic Nutrition Powder  [] Other:  [] Dial a Dietician : Call [employer] at [phone] enter code ([phone]) when prompted. M-F 9am-5pm EST.      Return Appointment:  [] Nurse Visit at wound center in *** days   [x] Return Appointment: With Dr. Cooper Mention  2 weeks     Electronically signed on 1/19/21    [address] [facility] Information: Should you experience any significant changes in your wound(s) or have questions about your wound care, please contact the [address] [place] [address] Wound Center at [address] [place] [address] 8:00 am - 4:30. If you need help with your wound outside these hours and cannot wait until we are again available, contact your PCP or go to the hospital emergency room. PLEASE NOTE: IF YOU ARE UNABLE TO OBTAIN WOUND SUPPLIES, CONTINUE TO USE THE SUPPLIES YOU HAVE AVAILABLE UNTIL YOU ARE ABLE TO REACH US. IT IS MOST IMPORTANT TO KEEP THE WOUND COVERED AT ALL TIMES.      Physician Signature:_______________________  Dr. Karen Weems

## 2021-02-02 NOTE — WOUND CARE
02/02/21 1527   Wound Leg lower Right;Lateral #1   Date First Assessed/Time First Assessed: 10/07/20 1122   Present on Hospital Admission: Yes  Location: Leg lower  Wound Location Orientation: Right;Lateral  Wound Description: #1   Dressing/Treatment Alginate with Ag;Gauze dressing/dressing sponge   Discharge Condition: Stable     Pain: 0    Ambulatory Status: Wheelchair     Discharge Destination: Home     Transportation: Car    Accompanied by: Self     Discharge instructions reviewed with Patient and copy or written instructions have been provided. All questions/concerns have been addressed at this time.

## 2021-02-02 NOTE — WOUND CARE
Jason Preciado DPM - Lulu Quintana. 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 - Pt presents with right BKA stump wound - stagnant  - Pt has completed HBO sessions and abx  - Dressing consisting of packing with Mesalt packing and GV  - Pt has returned to wearing his old prosthetic, but has tried as much as possible not to wear in the last few weeks  - Pt recently having right knee swelling. Ortho sent him for an MRI of the extremity. Positive for subacute OM of fibular and possibly tibia. Possible septic joint. Pt to follow up with ortho this coming Friday. Discussed with patient that it may be necessary to take amputation back further or remove fibula. - F/U in 2 weeks    Subjective:  Pt complains of wound to right amputation site. Pt to start physical therapy soon. Pt denies a recent h/o fever, chills, nausea, vomiting, chest pain, shortness of breath. HPI: Mr. Kylie Boles is a 76 yo AA male with a PMH of arthritis, BPH, CKD, chronic pain, DM2, dyslipidemia, GERD, HTN, s/p rt BKA, sleep apnea, thromboembolus lt leg who presents with a right BKA stump wound. Wound formed from ill fitting prosthetic. Pt has had wounds in the past from in this same manner. Has had issues with the stump since he first got the BKA with Dr. Lorena Blas, orthopedics. ROS:  Consitutional: no weight loss, night sweats, fatigue / malaise / lethargy. Musculoskeletal: no joint / extremity pain, misalignment, stiffness, decreased ROM, crepitus. Integument: No pruritis, rashes, lesions, right BKA stump wound.   Psychiatric: No depression, anxiety, paranoia      History:  Wound Care  Allergies   Allergen Reactions    Adhesive Tape-Silicones Hives    Sulfa (Sulfonamide Antibiotics) Swelling     Lips eyes     Family History   Problem Relation Age of Onset    Hypertension Mother    [employer] Gout Mother     Cancer Father         leukemia    Diabetes Father     Hypertension Sister     Diabetes Maternal Grandmother     Hypertension Maternal Grandmother     Diabetes Paternal Grandmother     Hypertension Paternal Grandmother     Anesth Problems Neg Hx       Past Medical History:   Diagnosis Date    Arthritis     BPH (benign prostatic hyperplasia)     Chronic kidney disease     Chronic pain     BACK NEUROPATHY FEET HANDS    DM neuropathy, type II diabetes mellitus ([address]     DM type 2 causing CKD stage 3 ([address] 12/17/2012    DM type 2 causing vascular disease ([address]     Dyslipidemia     Foot ulcer due to secondary DM ([address] 12/1/2012    GERD (gastroesophageal reflux disease)     HTN     Ill-defined condition 2013    MRSA in wound right leg (BKA)    S/P BKA (below knee amputation) ([address]     right BKA due to non-healing ulcer    Sleep apnea     Thromboembolus ([facility] 1970'S    BLOOD CLOT LEFT LEG     Past Surgical History:   Procedure Laterality Date    ECHO 2D ADULT  8/09    normal; LVEF 60%    ECHO STRESS  4/09    7 min; normal    ENDOSCOPY, COLON, DIAGNOSTIC      HX AMPUTATION  2012    left great toe    HX AMPUTATION  2007    BKA right    HX BELOW KNEE AMPUTATION Right     HX CERVICAL FUSION  2009    x2    HX ORTHOPAEDIC  2006    ACDF C4-C7    IR INSERT TUNL CVC W/O PORT OVER 5 YR  6/19/2020    IR REMOVE TUNL CVAD W/O PORT / PUMP  9/14/2020    MT ABDOMEN SURGERY PROC UNLISTED      pilonidal cyst    STRESS TEST MYOVIEW  8/09    walked 8:46; normal; LVEF 51%     Social History     Tobacco Use    Smoking status: Never Smoker    Smokeless tobacco: Never Used   Substance Use Topics    Alcohol use: No       Social History     Substance and Sexual Activity   Alcohol Use No Social History     Substance and Sexual Activity   Drug Use No      Social History     Tobacco Use   Smoking Status Never Smoker   Smokeless Tobacco Never Used     Current Outpatient Medications   Medication Sig    oxyCODONE-acetaminophen (PERCOCET) 5-325 mg per tablet TAKE ONE TABLET BY MOUTH EVERY 4 HOURS AS NEEDED FOR PAIN TO UP TO 30 DAYS    epoetin naman (EPOGEN;PROCRIT) 20,000 unit/mL injection 20,000-25,000 Units by SubCUTAneous route Once every 2 weeks.  ergocalciferol (ERGOCALCIFEROL) 1,250 mcg (50,000 unit) capsule Take 50,000 Units by mouth Every Friday.  ibuprofen (MOTRIN) 200 mg tablet Take 200 mg by mouth daily as needed for Pain.  insulin lispro (HUMALOG) 100 unit/mL kwikpen 8 Units by SubCUTAneous route Before breakfast, lunch, and dinner.  insulin glargine (Lantus Solostar U-100 Insulin) 100 unit/mL (3 mL) inpn 18 Units by SubCUTAneous route nightly.  rosuvastatin (CRESTOR) 40 mg tablet TAKE ONE TABLET BY MOUTH EVERY DAY    albuterol (Ventolin HFA) 90 mcg/actuation inhaler Take 2 Puffs by inhalation every four (4) hours as needed for Wheezing.  lisinopriL (PRINIVIL, ZESTRIL) 10 mg tablet Take 1 Tab by mouth two (2) times a day.  pantoprazole (PROTONIX) 40 mg tablet TAKE 1 TABLET BY MOUTH EVERY DAY    ferrous sulfate 325 mg (65 mg iron) tablet TAKE ONE TABLET BY MOUTH 2 TIMES A DAY    aspirin 81 mg chewable tablet Take 81 mg by mouth daily. Current Facility-Administered Medications   Medication Dose Route Frequency    lidocaine (ALOCANE) 4 % topical gel   Topical NOW        Objective:  Visit Vitals  BP (!) 157/79 (BP 1 Location: Right leg, BP Patient Position: Sitting)   Pulse (!) 106   Temp 97.9 °F (36.6 °C)   Resp 16       Vascular:  left LE  DP 1/4; PT 1/4  capillary fill time brisk, pitting edema is present, skin temperature is cool, varicosities are present.     Dermatological:    Wound: 1  Location: right BKA stump   Measurements: per RN note  Margins: intact  Drainage: serous  Odor: no  Wound base: graunular  Lymphangitic streaking? No.  Undermining? yes  Sinus tracts? Yes, to fibula. Exposed bone? yes  Subcutaneous crepitation on palpation? No.    Skin is dry and scaly, exhibits hemosiderin deposition. There is no maceration of the interspaces of the feet b/l. Neurological:  DTR are present, protective sensation per 5.07 [place] Mauricio monofilament is intact, patient is AAOx3, mood is normal. Epicritic sensation is intact. Orthopedic:  Left LE are symmetric, ROM of ankle, STJ, 1st MTPJ is limited, MMT 5 out of 5 for B/L LE. Right BKA. Has prosthesis. Constitutional: Pt is a well developed, pleasant AA male. Lymphatics: negative tenderness to palpation of neck/axillary/inguinal nodes.     Imaging / Labs / Cx / Px:  5/28 tib/fib XR: soft tissue ulcer extending to the rt distal fibula

## 2021-02-03 ENCOUNTER — APPOINTMENT (OUTPATIENT)
Dept: PHYSICAL THERAPY | Age: 66
End: 2021-02-03

## 2021-02-04 ENCOUNTER — APPOINTMENT (OUTPATIENT)
Dept: PHYSICAL THERAPY | Age: 66
End: 2021-02-04

## 2021-02-08 NOTE — CALL BACK NOTE
Need OK to treat order required for patient's upcoming [facility] appointment. Faxed doctor's office on 02/08/21 at 10:19 AM.  Refer to fax documents in pharmacy for further information.

## 2021-02-15 ENCOUNTER — HOSPITAL ENCOUNTER (OUTPATIENT)
Dept: INFUSION THERAPY | Age: 66
Discharge: HOME | End: 2021-02-15
Payer: MEDICARE

## 2021-02-15 VITALS
RESPIRATION RATE: 18 BRPM | SYSTOLIC BLOOD PRESSURE: 141 MMHG | DIASTOLIC BLOOD PRESSURE: 65 MMHG | OXYGEN SATURATION: 100 % | TEMPERATURE: 98.8 F | HEART RATE: 98 BPM

## 2021-02-15 LAB
HCT VFR BLD AUTO: 20.8 % (ref 36.6–50.3)
HGB BLD-MCNC: 6.1 G/DL (ref 12.1–17)

## 2021-02-15 PROCEDURE — 96372 THER/PROPH/DIAG INJ SC/IM: CPT

## 2021-02-15 PROCEDURE — 85018 HEMOGLOBIN: CPT

## 2021-02-15 PROCEDURE — 36415 COLL VENOUS BLD VENIPUNCTURE: CPT

## 2021-02-15 PROCEDURE — 74011250636 HC RX REV CODE- 250/636: Performed by: INTERNAL MEDICINE

## 2021-02-15 RX ADMIN — ERYTHROPOIETIN 20000 UNITS: 20000 INJECTION, SOLUTION INTRAVENOUS; SUBCUTANEOUS at 13:18

## 2021-02-15 RX ADMIN — ERYTHROPOIETIN 5000 UNITS: 10000 INJECTION, SOLUTION INTRAVENOUS; SUBCUTANEOUS at 13:19

## 2021-02-15 NOTE — PROGRESS NOTES
Outpatient Infusion Center   Visit Progress Note    6359 Patient admitted to [facility] for Procrit in stable condition. Assessment completed. No new concerns voiced. Covid Screening Questions:  1) Do you have any symptoms of COVID-19? SOB, coughing, fever, or generally not feeling well? no  2) Have you been exposed to COVID-19 recently? no  3) Have you had any recent contact with family/friend that has a pending COVID test? no      VS  Patient Vitals for the past 12 hrs:   Temp Pulse Resp BP SpO2   02/15/21 1251 98.8 °F (37.1 °C) 98 18 (!) 141/65 100 %   02/15/21 1220 98.3 °F (36.8 °C) 98 18 137/61 100 %         Medications:  Medications Administered     epoetin naman (EPOGEN;PROCRIT) injection 20,000 Units     Admin Date  02/15/2021 Action  Given Dose  20,000 Units Route  SubCUTAneous Administered By  Cheng Riley, USAMA          epoetin naman (EPOGEN;PROCRIT) injection 5,000 Units     Admin Date  02/15/2021 Action  Given Dose  5,000 Units Route  SubCUTAneous Administered By  Cheng Riley, USAMA                  3754 Patient tolerated treatment well. Patient discharged from [place] [address] in no distress. Patient aware of next appointment on 3/1 at 1130.     Labs  Recent Results (from the past 12 hour(s))   HGB & HCT    Collection Time: 02/15/21 12:22 PM   Result Value Ref Range    HGB 6.1 (L) 12.1 - 17.0 g/dL    HCT 20.8 (L) 36.6 - 50.3 %

## 2021-02-17 ENCOUNTER — TELEPHONE (OUTPATIENT)
Dept: INTERNAL MEDICINE CLINIC | Age: 66
End: 2021-02-17

## 2021-02-17 ENCOUNTER — APPOINTMENT (OUTPATIENT)
Dept: PHYSICAL THERAPY | Age: 66
End: 2021-02-17

## 2021-02-17 RX ORDER — INSULIN LISPRO 100 [IU]/ML
INJECTION, SOLUTION INTRAVENOUS; SUBCUTANEOUS
Qty: 15 ML | Refills: 0 | Status: SHIPPED | OUTPATIENT
Start: 2021-02-17 | End: 2021-03-03

## 2021-02-17 NOTE — TELEPHONE ENCOUNTER
----- Message from Anupama Jacome sent at 2/17/2021  1:44 PM EST -----  Regarding: Dr. Traci Carreon: [phone]  General Message/Vendor Calls    Caller's first and last name: Vanessa Bruno with  [facility]       Reason for call: He needs a mobility evaluation. They would like to know what the process is to get the paperwork started.        Callback required yes/no and why: Yes      Best contact number(s): [phone]      Details to clarify the request: reference number [id number]      Anupama Jacome

## 2021-02-18 ENCOUNTER — APPOINTMENT (OUTPATIENT)
Dept: PHYSICAL THERAPY | Age: 66
End: 2021-02-18

## 2021-02-19 ENCOUNTER — APPOINTMENT (OUTPATIENT)
Dept: INFUSION THERAPY | Age: 66
End: 2021-02-19

## 2021-02-19 ENCOUNTER — TELEPHONE (OUTPATIENT)
Dept: INTERNAL MEDICINE CLINIC | Age: 66
End: 2021-02-19

## 2021-02-19 DIAGNOSIS — M54.42 CHRONIC BILATERAL LOW BACK PAIN WITH BILATERAL SCIATICA: Primary | ICD-10-CM

## 2021-02-19 DIAGNOSIS — Z89.9 S/P AMPUTATION OF LIMB: ICD-10-CM

## 2021-02-19 DIAGNOSIS — G89.29 CHRONIC BILATERAL LOW BACK PAIN WITH BILATERAL SCIATICA: Primary | ICD-10-CM

## 2021-02-19 DIAGNOSIS — M54.41 CHRONIC BILATERAL LOW BACK PAIN WITH BILATERAL SCIATICA: Primary | ICD-10-CM

## 2021-02-19 NOTE — TELEPHONE ENCOUNTER
----- Message from Collette Hurtado sent at 2/19/2021 11:47 AM EST -----  Regarding: Dr. Obrien Muske[place]: [phone]  General Message/Vendor Calls    Caller's first and last name: Pt.       Reason for call: Needs referral for evaluation for motorized wheel chair. Callback required yes/no and why: Yes, confirm pt can get referral.       Best contact number(s): [phone]       Details to clarify the request: N/a.       Collette Hurtado

## 2021-02-20 ENCOUNTER — HOSPITAL ENCOUNTER (OUTPATIENT)
Dept: LAB | Age: 66
Discharge: HOME | End: 2021-02-20
Payer: MEDICARE

## 2021-02-20 ENCOUNTER — TRANSCRIBE ORDER (OUTPATIENT)
Dept: EMERGENCY DEPT | Age: 66
End: 2021-02-20

## 2021-02-20 DIAGNOSIS — Z01.812 PRE-PROCEDURAL LABORATORY EXAMINATIONS: Primary | ICD-10-CM

## 2021-02-20 DIAGNOSIS — Z01.812 PRE-PROCEDURAL LABORATORY EXAMINATIONS: ICD-10-CM

## 2021-02-20 PROCEDURE — U0003 INFECTIOUS AGENT DETECTION BY NUCLEIC ACID (DNA OR RNA); SEVERE ACUTE RESPIRATORY SYNDROME CORONAVIRUS 2 (SARS-COV-2) (CORONAVIRUS DISEASE [COVID-19]), AMPLIFIED PROBE TECHNIQUE, MAKING USE OF HIGH THROUGHPUT TECHNOLOGIES AS DESCRIBED BY CMS-2020-01-R: HCPCS

## 2021-02-22 LAB — SARS-COV-2, COV2NT: NOT DETECTED

## 2021-02-22 RX ORDER — SULFASALAZINE 500 MG/1
800 TABLET ORAL 2 TIMES DAILY
COMMUNITY
End: 2022-04-28 | Stop reason: ALTCHOICE

## 2021-02-22 NOTE — PERIOP NOTES
[address]  Endoscopy Preprocedure Instructions      1. On the day of your surgery, please report to registration located on the 2nd floor of the  [facility]. yes    2. You must have a responsible adult to drive you to the hospital, stay at the hospital during your procedure and drive you home. If they leave your procedure will not be started (no exceptions). yes    3. Do not have anything to eat or drink (including water, gum, mints, coffee, and juice) after midnight. This does not apply to the medications you were instructed to take by your physician. yesIf you are currently taking Plavix, Coumadin, Aspirin, or other blood-thinning agents, contact your physician for special instructions. not applicable,    4. If you are having a procedure that requires bowel prep: We recommend that you have only clear liquids the day before your procedure and begin your bowel prep by 5:00 pm.  You may continue to drink clear liquids until midnight. If for any reason you are not able to complete your prep please notify your physician immediately. yes    5. Have a list of all current medications, including vitamins, herbal supplements and any other over the counter medications. yes    6. If you wear glasses, contacts, dentures and/or hearing aids, they may be removed prior to procedure, please bring a case to store them in. yes    7. You should understand that if you do not follow these instructions your procedure may be cancelled. If your physical condition changes (I.e. fever, cold or flu) please contact your doctor as soon as possible. 8. It is important that you be on time.   If for any reason you are unable to keep your appointment please call [phone] the day of or your physicians office prior to your scheduled procedure

## 2021-02-23 ENCOUNTER — APPOINTMENT (OUTPATIENT)
Dept: PHYSICAL THERAPY | Age: 66
End: 2021-02-23

## 2021-02-24 ENCOUNTER — HOSPITAL ENCOUNTER (OUTPATIENT)
Age: 66
Setting detail: OUTPATIENT SURGERY
Discharge: HOME | End: 2021-02-24
Attending: SPECIALIST | Admitting: SPECIALIST
Payer: MEDICARE

## 2021-02-24 ENCOUNTER — ANESTHESIA (OUTPATIENT)
Dept: ENDOSCOPY | Age: 66
End: 2021-02-24
Payer: MEDICARE

## 2021-02-24 ENCOUNTER — ANESTHESIA EVENT (OUTPATIENT)
Dept: ENDOSCOPY | Age: 66
End: 2021-02-24
Payer: MEDICARE

## 2021-02-24 VITALS
DIASTOLIC BLOOD PRESSURE: 77 MMHG | RESPIRATION RATE: 18 BRPM | OXYGEN SATURATION: 100 % | BODY MASS INDEX: 21.45 KG/M2 | TEMPERATURE: 97.9 F | SYSTOLIC BLOOD PRESSURE: 148 MMHG | HEART RATE: 88 BPM | WEIGHT: 153.22 LBS | HEIGHT: 71 IN

## 2021-02-24 LAB
GLUCOSE BLD STRIP.AUTO-MCNC: 118 MG/DL (ref 65–100)
SERVICE CMNT-IMP: ABNORMAL

## 2021-02-24 PROCEDURE — 2709999900 HC NON-CHARGEABLE SUPPLY: Performed by: SPECIALIST

## 2021-02-24 PROCEDURE — 76060000031 HC ANESTHESIA FIRST 0.5 HR: Performed by: SPECIALIST

## 2021-02-24 PROCEDURE — 88305 TISSUE EXAM BY PATHOLOGIST: CPT

## 2021-02-24 PROCEDURE — 88312 SPECIAL STAINS GROUP 1: CPT

## 2021-02-24 PROCEDURE — 82962 GLUCOSE BLOOD TEST: CPT

## 2021-02-24 PROCEDURE — 74011000250 HC RX REV CODE- 250: Performed by: NURSE ANESTHETIST, CERTIFIED REGISTERED

## 2021-02-24 PROCEDURE — 74011250636 HC RX REV CODE- 250/636: Performed by: SPECIALIST

## 2021-02-24 PROCEDURE — 76040000019: Performed by: SPECIALIST

## 2021-02-24 PROCEDURE — 77030018712 HC DEV BLLN INFL BSC -B: Performed by: SPECIALIST

## 2021-02-24 PROCEDURE — 74011250636 HC RX REV CODE- 250/636: Performed by: NURSE ANESTHETIST, CERTIFIED REGISTERED

## 2021-02-24 RX ORDER — MIDAZOLAM HYDROCHLORIDE 1 MG/ML
.25-5 INJECTION, SOLUTION INTRAMUSCULAR; INTRAVENOUS AS NEEDED
Status: DISCONTINUED | OUTPATIENT
Start: 2021-02-24 | End: 2021-02-24 | Stop reason: HOSPADM

## 2021-02-24 RX ORDER — FENTANYL CITRATE 50 UG/ML
25 INJECTION, SOLUTION INTRAMUSCULAR; INTRAVENOUS AS NEEDED
Status: DISCONTINUED | OUTPATIENT
Start: 2021-02-24 | End: 2021-02-24 | Stop reason: HOSPADM

## 2021-02-24 RX ORDER — SODIUM CHLORIDE 9 MG/ML
50 INJECTION, SOLUTION INTRAVENOUS CONTINUOUS
Status: DISCONTINUED | OUTPATIENT
Start: 2021-02-24 | End: 2021-02-24 | Stop reason: HOSPADM

## 2021-02-24 RX ORDER — PROPOFOL 10 MG/ML
INJECTION, EMULSION INTRAVENOUS
Status: DISCONTINUED | OUTPATIENT
Start: 2021-02-24 | End: 2021-02-24 | Stop reason: HOSPADM

## 2021-02-24 RX ORDER — FENTANYL CITRATE 50 UG/ML
INJECTION, SOLUTION INTRAMUSCULAR; INTRAVENOUS AS NEEDED
Status: DISCONTINUED | OUTPATIENT
Start: 2021-02-24 | End: 2021-02-24 | Stop reason: HOSPADM

## 2021-02-24 RX ORDER — FLUMAZENIL 0.1 MG/ML
0.2 INJECTION INTRAVENOUS
Status: DISCONTINUED | OUTPATIENT
Start: 2021-02-24 | End: 2021-02-24 | Stop reason: HOSPADM

## 2021-02-24 RX ORDER — LIDOCAINE HYDROCHLORIDE 20 MG/ML
INJECTION, SOLUTION EPIDURAL; INFILTRATION; INTRACAUDAL; PERINEURAL AS NEEDED
Status: DISCONTINUED | OUTPATIENT
Start: 2021-02-24 | End: 2021-02-24 | Stop reason: HOSPADM

## 2021-02-24 RX ORDER — PROPOFOL 10 MG/ML
INJECTION, EMULSION INTRAVENOUS AS NEEDED
Status: DISCONTINUED | OUTPATIENT
Start: 2021-02-24 | End: 2021-02-24 | Stop reason: HOSPADM

## 2021-02-24 RX ORDER — SODIUM CHLORIDE 9 MG/ML
INJECTION, SOLUTION INTRAVENOUS
Status: DISCONTINUED | OUTPATIENT
Start: 2021-02-24 | End: 2021-02-24 | Stop reason: HOSPADM

## 2021-02-24 RX ORDER — NALOXONE HYDROCHLORIDE 0.4 MG/ML
0.4 INJECTION, SOLUTION INTRAMUSCULAR; INTRAVENOUS; SUBCUTANEOUS
Status: DISCONTINUED | OUTPATIENT
Start: 2021-02-24 | End: 2021-02-24 | Stop reason: HOSPADM

## 2021-02-24 RX ADMIN — SODIUM CHLORIDE: 9 INJECTION, SOLUTION INTRAVENOUS at 09:24

## 2021-02-24 RX ADMIN — FENTANYL CITRATE 50 MCG: 0.05 INJECTION, SOLUTION INTRAMUSCULAR; INTRAVENOUS at 09:32

## 2021-02-24 RX ADMIN — LIDOCAINE HYDROCHLORIDE 80 MG: 20 INJECTION, SOLUTION INTRAVENOUS at 09:27

## 2021-02-24 RX ADMIN — PROPOFOL 50 MG: 10 INJECTION, EMULSION INTRAVENOUS at 09:28

## 2021-02-24 RX ADMIN — FENTANYL CITRATE 50 MCG: 0.05 INJECTION, SOLUTION INTRAMUSCULAR; INTRAVENOUS at 09:25

## 2021-02-24 RX ADMIN — PROPOFOL 50 MG: 10 INJECTION, EMULSION INTRAVENOUS at 09:30

## 2021-02-24 RX ADMIN — SODIUM CHLORIDE 50 ML/HR: 9 INJECTION, SOLUTION INTRAVENOUS at 09:08

## 2021-02-24 RX ADMIN — LIDOCAINE HYDROCHLORIDE 20 MG: 20 INJECTION, SOLUTION INTRAVENOUS at 09:29

## 2021-02-24 RX ADMIN — PROPOFOL 120 MCG/KG/MIN: 10 INJECTION, EMULSION INTRAVENOUS at 09:28

## 2021-02-24 NOTE — ROUTINE PROCESS
Clifford Gomez.  1955  [id number]    Situation:  Verbal report received from: Cyril Roman  Procedure: Procedure(s):  COLONOSCOPY  ESOPHAGOGASTRODUODENOSCOPY (EGD)  ESOPHAGOGASTRODUODENAL (EGD) BIOPSY  ESOPHAGEAL DILATION    Background:    Preoperative diagnosis: ANEMIA/DIARRHEA/DYSPHAGIA  Postoperative diagnosis: Esophagitis  Esophageal ring  Poor prep    :  Dr. Sabas Simpson  Assistant(s): Endoscopy Technician-1: Yamel Noguera  Endoscopy RN-1: Brissa Kumar RN    Specimens:   ID Type Source Tests Collected by Time Destination   1 : EG junction bxs Preservative   Gretchen Pimentel MD 2/24/2021 0559 Pathology   2 : bxs Preservative Esophagus, Mid  Gretchen Pimentel MD 2/24/2021 1240 Pathology     H. Pylori  no    Assessment:  Intra-procedure medications   Anesthesia gave intra-procedure sedation and medications, see anesthesia flow sheet yes    Intravenous fluids: NS@ KVO     Vital signs stable     Abdominal assessment: round and soft       No subcutaneous crepitus of the chest or cervical region was noted post dilatation. Recommendation:  Discharge patient per MD order. Family Wife  Permission to share finding with family or friend yes    Endoscopy discharge instructions have been reviewed and given to patient. The patient verbalized understanding and acceptance of instructions. Dr. Sabas Simpson discussed with patient and spouse procedure findings and next steps.

## 2021-02-24 NOTE — H&P
Date: 2021 8:45 AM   Patient Name: Geri Gregorio. Account #: [id number]    Gender: Male    (age): 1955 (77)       Provider:     Merlin Simmonds. Luis Alberto Yao MD        Referring Physician:     Bradley Lei MD  [address], [place], [address] [facility]  [phone] (phone)  [phone] (fax)        Chief Complaint: Weight loss, diarrhea, itching, anemia, dysphagia           History of Present Illness:   77 old male with whom I last met 2 years ago for dysphagia, upper endoscopy revealed a small ring which was dilated and improved his symptoms. He comes to the office today with multiple complaints. Primarily reports diarrhea 5-7 times daily frequently having to get up in the middle the night with urgent diarrhea, rare incontinence, reports some blood on toilet tissue but not gopal hematochezia, associated with weight loss, not associated with oil or fat noted on the surface of this toilet water. He reports generalized cutaneous itching without jaundice or bilirubinuria. He has established renal insufficiency and is following with Dr. Heber Rojo and has received iron and what sounds like EPO therapy for support of hematopoiesis. Dysphagia has returned. He has a cutaneous ulcer in his right leg, may be related to his prosthesis, was treated with antibiotics and hyperbaric O2, now that therapy is complete. Diarrhea predated antibiotics, diarrhea predated therapy with potassium binders. Plan is blood and stool labs, he is worried as his father had leukemia and had similar symptoms prior to diagnosis. No nodes today, he is not jaundiced. If stool negative for c diff or EPI, will get colonsocopy. Given his dysphagia EGD will be scheduled after lab review regardless of results. He has already been tested for celiac with duodenal biopsies in the past (negative).  ? 77 old male with whom I last met 2 years ago for dysphagia, upper endoscopy revealed a small ring which was dilated and improved his symptoms. He comes to the office today with multiple complaints. Primarily reports diarrhea 5-7 times daily frequently having to get up in the middle the night with urgent diarrhea, rare incontinence, reports some blood on toilet tissue but not gopal hematochezia, associated with weight loss, not associated with oil or fat noted on the surface of this toilet water. He reports generalized cutaneous itching without jaundice or bilirubinuria. He has established renal insufficiency and is following with Dr. Tameka Fritz and has received iron and what sounds like EPO therapy for support of hematopoiesis. Dysphagia has returned. He has a cutaneous ulcer in his right leg, may be related to his prosthesis, was treated with antibiotics and hyperbaric O2, now that therapy is complete. Diarrhea predated antibiotics, diarrhea predated therapy with potassium binders. Plan is blood and stool labs, he is worried as his father had leukemia and had similar symptoms prior to diagnosis. No nodes today, he is not jaundiced. If stool negative for c diff or EPI, will get colonsocopy. Given his dysphagia EGD will be scheduled after lab review regardless of results. He has already been tested for celiac with duodenal biopsies in the past (negative). ?       Past Medical History      Medical Conditions: Anemia  Arthritis  Daily activites, walking, moving from bed to chair?right leg B.K.A.   Diabetes Mellitus  High blood pressure  High cholesterol  Kidney disease  MRSA  Sleep apnea   Surgical Procedures:    Dx Studies: Colonoscopy   Medications: Albuterol 90 mcg/actuation Inhaler 2 Puffs Q4h  Align 4 mg Take 1 capsule by mouth once a day  buspirone 5 mg  cefadroxil 500 mg  docusate sodium 100 mg Take 1 tablet by mouth twice a day  Lantus U-100 Insulin 100 unit/mL Inject 70 units subcutaneously every night  lisinopril 10 mg Take 1 tablet by mouth once a day  Lyrica 75 mg Take 1 capsule by mouth once a day  Novolog 20 unit Inject 1 unit subcutaneously single dose before meals  oxycodone-acetaminophen 5-325 mg Take 1 tablet by mouth once a day as needed  pantoprazole 40 mg TAKE ONE TABLET BY MOUTH EVERY DAY BEFORE BREAKFAST  rosuvastatin 40 mg Take 1 tablet by mouth once a day   Allergies: Adhesive Tape  Sulfa (Sulfonamide Antibiotics)   Immunizations: Influenza, seasonal, injectable      Social History      Alcohol: None   Tobacco: Never smoker   Drugs: None   Exercise: Exercise 3 or more times a week. Walking 1-3 miles 1x times a day. Caffeine: Coffee or Tea # of cups per day:1. Weekly. Marital Status:          Occupation:               Family History No history of Celiac sprue, Colon Cancer, Colon Polyps, Esophageal Cancer, GI Cancers, Inflammatory bowel disease (Crohn's or Ulcerative Colitis), Liver disease, Pancreatic Cancer, Stomach Cancer  Other: Diagnosed with Pancreatitis; Review of Systems:   Cardiovascular: Presents suffers from Jaylan Schein. Denies chest pain, irregular heart beat, palpitations, peripheral edema, syncope. Constitutional: Presents suffers from fatigue, weight loss. Denies fever, loss of appetite, weight gain. ENMT: Denies nose bleeds, sore throat, hearing loss. Endocrine: Denies excessive thirst, heat intolerance. Eyes: Denies loss of vision. Gastrointestinal: Presents suffers from change in bowel habits, diarrhea, Bloating/gas, heartburn, rectal bleeding, dysphagia, Stool incontinence. Denies abdominal pain, abdominal swelling, constipation, jaundice, nausea, stomach cramps, vomiting, rectal pain, hematemesis. Genitourinary: Denies dark urine, dysuria, frequent urination, hematuria, incontinence. Hematologic/Lymphatic: Denies easy bruising, prolonged bleeding. Integumentary: Presents suffers from itching. Denies rashes, sun sensitivity. Musculoskeletal: Presents suffers from arthritis, back pain. Denies gout, joint pain, muscle weakness, stiffness.    Neurological: Denies dizziness, fainting, frequent headaches, memory loss. Psychiatric: Denies anxiety, depression, difficulty sleeping, hallucinations, nervousness, panic attacks, paranoia. Respiratory: Denies cough, dyspnea, wheezing. Vital Signs:   BP  (mmHg)  Pulse  (ppm) Weight (lbs/oz) Height (ft/in) BMI Temp   141/68 110 156 / 4 5 / 11 21.79 97.5 (F)      Physical Exam:   Constitutional:      Appearance: No distress, appears comfortable. Communication: Understands/receives spoken information. Skin:      Inspection: No rash, no jaundice. Head/face: Inspection: Normacephalic, atraumatic. Eyes:      Conjunctivae/lids: Normal.   Pupils/irises: Pupils equal, round and normal.   ENMT:      External: Normal.   Hearing: Normal.   Neck:      Neck: Normal appearance, trachea midline. Jugular veins: No JVD noted. Respiratory:      Effort: Normal respiratory effort, comfortable, speaks in complete sentences. Musculoskeletal:      Gait/station: normal.   Digits/nails: Normal, no spooning of nails, clubbing, or splinter hemorrhages, no clubbing, cyanosis, petechiae or other inflammatory conditions. Psychiatric:      Judgment/insight: Normal, normal judgement, normal insight. Orientation: oriented to time, space and person. Lymphatic:      Neck: No lymphadenopathy in the cervical or supraclavicular chain. Other: No periumbilic lymphadenopathy. Lab Results:      Test 10/9/2018 Units Limits   Chem8+ Istat      ANION GAP ISTAT 4 mmol/L 10 - 20   BUN ISTAT 32 mg/dL 9 - 20   CHLORIDE ISTAT 118 mmol/L 98 - 107   COMMENT ISTAT Comment Not Indicated.      Creatinine, POC 2.3 mg/dL 0.6 - 1.3   GFRAA, POC 35 ml/min/1.73m2 >60   GFRNA, POC 29 ml/min/1.73m2 >60   Glucose,  mg/dL 65 - 100   HEMATOCRIT ISTAT 26 % 36.6 - 50.3   Ionized Calcium 1.12 mmol/L 1.12 - 1.32   POTASSIUM ISTAT 3.9 mmol/L 3.5 - 5.1   SODIUM ISTAT 140 mmol/L 136 - 145   TCO2 ISTAT 24 mmol/L 21 - 32     Impressions: Diarrhea, unspecified  Esophageal dysphagia  Itching  Anemia, unspecified? ?Diarrhea, unspecified? ?  ? ? Esophageal dysphagia? ?  ? ? Itching? ?  ? ? Anemia, unspecified? Assessment: EGD if stool studies abnormal. If stool studies normal - EGD+ colonoscopy. ? EGD if stool studies abnormal. If stool studies normal - EGD+ colonoscopy. Plan: Comp Metabolic Panel ([facility] #[id number])  CBC w/ Diff w/ Platelet ([facility] #[id number])  Clostridium Difficile Toxin ([facility] #[id number])  Pancreatic Elastase, Fecal ([facility] # [id number])? ? Comp Metabolic Panel ([facility] #[id number])? ?  ? ?CBC w/ Diff w/ Platelet ([facility] #[id number])? ?  ? ? Clostridium Difficile Toxin ([facility] #[id number])? ?  ? ? Pancreatic Elastase, Fecal ([facility] # [id number])? Risk & Medical Necessity: The level of medical decision making for this visit is moderate. The number and complexity of problems addressed are moderate. The amount and/or complexity of data to be reviewed and analyzed is moderate. The risk of complications and/or morbidity or mortality of patient management is low. Notes:              Kelly Sender. Luis Alberto Yao MD     Electronically signed on 2021 9:21:26 AM by Kelly Sender.  Luis Alberto Yao, [address], MRN [id number],  1955 Follow Up, 2021                                                                                                                                                        New     Modify          Delete     Delete all     Edit Wording          Sign     [email or web address]

## 2021-02-24 NOTE — PROCEDURES
[facility] [place] Kandy Gonzalez[address]  [phone]      2021    Esophagogastroduodenoscopy & Colonoscopy Procedure Note  Pan Rubio. : 1955  [facility] Medical Record Number: [id number]      Indications:    Dysphagia/odynophagia Diarrhea   Referring Physician:  Chandrika Lake MD  Anesthesia/Sedation: Conscious Sedation/Moderate Sedation/MAC  Endoscopist:  Dr. Marquise Barnett  Complications:  None  Estimated Blood Loss:  None    Permit:  The indications, risks, benefits and alternatives were reviewed with the patient or their decision maker who was provided an opportunity to ask questions and all questions were answered. The specific risks of esophagogastroduodenoscopy with conscious sedation were reviewed, including but not limited to anesthetic complication, bleeding, adverse drug reaction, missed lesion, infection, IV site reactions, and intestinal perforation which would lead to the need for surgical repair. Alternatives to EGD and colonoscopy including radiographic imaging, observation without testing, or laboratory testing were reviewed as well as the limitations of those alternatives discussed. After considering the options and having all their questions answered, the patient or their decision maker provided both verbal and written consent to proceed. -----------EGD------------   Procedure in Detail:  After obtaining informed consent, positioning of the patient in the left lateral decubitus position, and conduction of a pre-procedure pause or \"time out\" the endoscope was introduced into the mouth and advanced to the duodenum. A careful inspection was made, and findings or interventions are described below. Findings:   Esophagus: Whitish plaques of the esophagus throughout with acquired ring like narrowing of the EG junction.   Cold forceps biopsies from the esophageal ring (EG junction) and mid esophagus taken for histology. 20mm balloon dilation of the EG junction for improvement in swallowing. Stomach: normal   Duodenum/jejunum: normal        ----------Colonoscopy-----------    Procedure in Detail:  After obtaining informed consent, positioning of the patient in the left lateral decubitus position, and conduction of a pre-procedure pause or \"time out\" the endoscope was introduced into the anus and advanced to the sigmoid colon. The quality of the colonic preparation was inadequate. A careful inspection was made as the colonoscope was withdrawn, findings and interventions are described below. Findings:   Solid stool prevents any examination today. Procedure terminated in rectum.    ------------------------------  Specimens:    See above    Complications:   None; patient tolerated the procedure well. Impressions:  EGD:  Esophagitis, seems c/w candida and an esophageal ring. Colonoscopy: Inadequate prep. Recommendations:     - Await pathology. Will consider antifungal therapy based on biopsy results. - repeat attempt at colonoscopy with 2 day prep. Thank you for entrusting me with this patient's care. Please do not hesitate to contact me with any questions or if I can be of assistance with any of your other patients' GI needs. Signed By: Arvind Lindsey MD                        February 24, 2021    Surgical assistant none. Implants none unless specified.

## 2021-02-24 NOTE — INTERVAL H&P NOTE
Pre-Endoscopy H&P Update  Chief complaint/HPI/ROS:  The indication for the procedure, the patient's history and the patient's current medications are reviewed prior to the procedure and that data is reported on the H&P to which this document is attached. Any significant complaints with regard to organ systems will be noted, and if not mentioned then a review of systems is not contributory.   Past Medical History:   Diagnosis Date    Arthritis     BPH (benign prostatic hyperplasia)     Chronic kidney disease     Chronic pain     BACK NEUROPATHY FEET HANDS    DM neuropathy, type II diabetes mellitus ([address]     DM type 2 causing CKD stage 3 ([address] 12/17/2012    DM type 2 causing vascular disease ([address]     Dyslipidemia     Foot ulcer due to secondary DM ([address] 12/1/2012    GERD (gastroesophageal reflux disease)     History of esophageal dilatation     HTN     Ill-defined condition 2013    MRSA in wound right leg (BKA)    S/P BKA (below knee amputation) ([address]     right BKA due to non-healing ulcer    Sleep apnea     Doesnt use CPAP, patient hasnt been retested since weight loss    Thromboembolus ([address] 1970'S    BLOOD CLOT LEFT LEG      Past Surgical History:   Procedure Laterality Date    ECHO 2D ADULT  8/09    normal; LVEF 60%    ECHO STRESS  4/09    7 min; normal    ENDOSCOPY, COLON, DIAGNOSTIC      HX AMPUTATION  2012    left great toe    HX AMPUTATION  2007    BKA right    HX BELOW KNEE AMPUTATION Right     HX CERVICAL FUSION  2009    x2    HX ENDOSCOPY      EGD with Dilitation x 2    HX ORTHOPAEDIC  2006    ACDF C4-C7    IR INSERT TUNL CVC W/O PORT OVER 5 YR  6/19/2020    IR REMOVE TUNL CVAD W/O PORT / PUMP  9/14/2020    [place] ABDOMEN SURGERY PROC UNLISTED      pilonidal cyst    STRESS TEST MYOVIEW  8/09    walked 8:46; normal; LVEF 51%     Social   Social History     Tobacco Use    Smoking status: Never Smoker    Smokeless tobacco: Never Used   Substance Use Topics    Alcohol use: No      Family History   Problem Relation Age of Onset    Hypertension Mother     Gout Mother     Cancer Father         leukemia    Diabetes Father     Hypertension Sister     Diabetes Maternal Grandmother     Hypertension Maternal Grandmother     Diabetes Paternal Grandmother     Hypertension Paternal Grandmother     Anesth Problems Neg Hx       Allergies   Allergen Reactions    Adhesive Tape-Silicones Hives    Sulfa (Sulfonamide Antibiotics) Swelling     Lips eyes      Prior to Admission Medications   Prescriptions Last Dose Informant Patient Reported? Taking? albuterol (Ventolin HFA) 90 mcg/actuation inhaler 2021 at Unknown time Self No Yes   Sig: Take 2 Puffs by inhalation every four (4) hours as needed for Wheezing. aspirin 81 mg chewable tablet 2021 at Unknown time Self Yes Yes   Sig: Take 81 mg by mouth daily. epoetin naman (EPOGEN;PROCRIT) 20,000 unit/mL injection 2021 Self Yes Yes   Si,000-25,000 Units by SubCUTAneous route Once every 2 weeks. ergocalciferol (ERGOCALCIFEROL) 1,250 mcg (50,000 unit) capsule 2021 Self Yes Yes   Sig: Take 50,000 Units by mouth Every Friday. ferrous sulfate 325 mg (65 mg iron) tablet 2021 at Unknown time Self No Yes   Sig: TAKE ONE TABLET BY MOUTH 2 TIMES A DAY   ibuprofen (MOTRIN) 200 mg tablet Not Taking at Unknown time Self Yes No   Sig: Take 200 mg by mouth daily as needed for Pain. insulin glargine (Lantus Solostar U-100 Insulin) 100 unit/mL (3 mL) inpn 2021 at Unknown time Self Yes Yes   Si Units by SubCUTAneous route nightly. insulin lispro (HUMALOG) 100 unit/mL kwikpen 2021  No Yes   Sig: INJECT 18 TO 30 UNITS SUBCUTANEOUSLY 3 TIMES DAILY BEFORE BREAKFAST, LUNCH, AND DINNER   lisinopriL (PRINIVIL, ZESTRIL) 10 mg tablet 2021 at Unknown time Self No Yes   Sig: Take 1 Tab by mouth two (2) times a day.    oxyCODONE-acetaminophen (PERCOCET) 5-325 mg per tablet 2021 at Unknown time Self No Yes   Sig: TAKE ONE TABLET BY MOUTH EVERY 4 HOURS AS NEEDED FOR PAIN TO UP TO 30 DAYS   pantoprazole (PROTONIX) 40 mg tablet 2/23/2021 at Unknown time Self No Yes   Sig: TAKE 1 TABLET BY MOUTH EVERY DAY   rosuvastatin (CRESTOR) 40 mg tablet 2/17/2021 at Unknown time Self No Yes   Sig: TAKE ONE TABLET BY MOUTH EVERY DAY   sulfaSALAzine (AZULFIDINE) 500 mg tablet 2/23/2021 at Unknown time  Yes Yes   Sig: Take 800 mg by mouth two (2) times a day. Facility-Administered Medications: None       PHYSICAL EXAM:  The patient is examined immediately prior to the procedure. Visit Vitals  /64   Pulse 93   Temp 98.7 °F (37.1 °C)   Resp 12   Ht 5' 11\" (1.803 m)   Wt 69.5 kg (153 lb 3.5 oz)   SpO2 100%   BMI 21.37 kg/m²     Gen: Appears comfortable, no distress. Pulm: comfortable respirations with no abnormal audible breath sounds  HEART: well perfused, no abnormal audible heart sounds  GI: abdomen flat. PLAN:  Informed consent discussion held, patient afforded an opportunity to ask questions and all questions answered. After being advised of the risks, benefits, and alternatives, the patient requested that we proceed and indicated so on a written consent form. Will proceed with procedure as planned.   Cheng Cerna MD

## 2021-02-24 NOTE — ANESTHESIA PREPROCEDURE EVALUATION
Anesthetic History   No history of anesthetic complications            Review of Systems / Medical History  Patient summary reviewed and pertinent labs reviewed    Pulmonary        Sleep apnea: No treatment    Asthma : well controlled    Comments: Pt has GABI - has been noncompliant with CPAP  Asthma - mild, uses albuterol several times per month   Neuro/Psych   Within defined limits           Cardiovascular    Hypertension          PAD and hyperlipidemia    Exercise tolerance: >4 METS  Comments: PVD - s/p BKA   GI/Hepatic/Renal     GERD    Renal disease: CRI      Comments: GERD - well controlled  Dysphagia Endo/Other    Diabetes: type 2    Arthritis and anemia (epo injections, last transfusion 3 weeks ago)     Other Findings   Comments: OA   H/o cervical fusion x 2           Physical Exam    Airway  Mallampati: II  TM Distance: 4 - 6 cm  Neck ROM: normal range of motion   Mouth opening: Normal     Cardiovascular  Regular rate and rhythm,  S1 and S2 normal,  no murmur, click, rub, or gallop  Rhythm: regular  Rate: normal         Dental  No notable dental hx    Comments: Several missing teeth   Pulmonary  Breath sounds clear to auscultation               Abdominal         Other Findings            Anesthetic Plan    ASA: 3  Anesthesia type: MAC          Induction: Intravenous  Anesthetic plan and risks discussed with: Patient

## 2021-02-24 NOTE — PERIOP NOTES
6364 Procedure being performed under MAC; ASHLEY Becker at bedside monitoring patient. See anesthesia notes. 74 Endoscope was pre-cleaned at bedside immediately following procedure by Quin Holman. CRE balloon dilatation of the esophagus   18 mm Balloon inflated to 3 ATMs. 19 mm Balloon inflated to 4.5 ATMs. 20 mm Balloon inflated to 6 ATMs. No subcutaneous crepitus of the chest or cervical region was noted post dilatation. 8005 Patient received Fentanyl, Lidocaine, Propofol, per ASHLEY Becker. Care of patient assumed from the anesthesia provider. Patient tolerated EGD well. Abdomen remains soft/flat and non tender post procedure, no complaints or indication of discomfort noted at this time. See anesthesia note. Patient transferred to Endoscopy Recovery and report given to recovery nurse.

## 2021-02-24 NOTE — DISCHARGE INSTRUCTIONS
[address] [facility] Y[place] Peters, [address] [place] [address]  [phone]      February 24, 2021    Shashank Crocker. YOB: 1955    COLONOSCOPY DISCHARGE INSTRUCTIONS    If there is redness at IV site you should apply warm compress to area. If redness or soreness persist contact Dr. Niranjan Peters' or your primary care doctor. There may be a slight amount of blood passed from the rectum. Gaseous discomfort may develop, but walking, belching will help relieve this. You may not operate a vehicle for 12 hours  You may not operate machinery or dangerous appliances for rest of today  You may not drink alcoholic beverages for 12 hours  Avoid making any critical decisions for 24 hours    DIET:  You may resume your normal diet, but some patients find that heavy or large meals may lead to indigestion or vomiting. I suggest a light meal as first food intake. MEDICATIONS:  The use of some over-the-counter pain medication may lead to bleeding after colon biopsies or polyp removal.  Tylenol (also called acetaminophen) is safe to take even if you have had colonoscopy with polyp removal.  Based on the procedure you had today you may not safely take aspirin or aspirin-like products for the next ten (10) days. Remember that Tylenol (also called acetaminophen) is safe to take after colonoscopy even if you have had biopsies or polyps removed. ACTIVITY:  You may resume your normal household activities, but it is recommended that you spend the remainder of the day resting -  avoid any strenuous activity. CALL DR. Noel Silvestre' OFFICE IF:  Increasing pain, nausea, vomiting  Abdominal distension (swelling)  Significant new or increased bleeding (oral or rectal)  Fever/Chills  Chest pain/shortness of breath                       Additional instructions: We found narrowing of the esophagus which we dilated and biopsied today.   Unfortunately we didn't get your colon clean enough to do colonoscopy so my office will contact to reschedule that with a different prep. It was an honor to be your doctor today. Please let me or my office staff know if you have any feedback about today's procedure. Rachna Guy MD    Colonoscopy saves lives, and can prevent colon cancer. Everyone aged 48 or older needs colonoscopy.   Tell your family and friends: get the test!

## 2021-02-25 ENCOUNTER — APPOINTMENT (OUTPATIENT)
Dept: PHYSICAL THERAPY | Age: 66
End: 2021-02-25

## 2021-02-27 NOTE — ANESTHESIA POSTPROCEDURE EVALUATION
Procedure(s):  SIGMOIDOSCOPY FLEXIBLE  ESOPHAGOGASTRODUODENOSCOPY (EGD)  ESOPHAGOGASTRODUODENAL (EGD) BIOPSY  ESOPHAGEAL DILATION.     MAC    Anesthesia Post Evaluation      Multimodal analgesia: multimodal analgesia used between 6 hours prior to anesthesia start to PACU discharge  Patient location during evaluation: bedside  Patient participation: complete - patient participated  Level of consciousness: awake  Pain management: adequate  Airway patency: patent  Anesthetic complications: no  Cardiovascular status: acceptable  Respiratory status: acceptable  Hydration status: acceptable        INITIAL Post-op Vital signs:   Vitals Value Taken Time   /77 02/24/21 1002   Temp 36.6 °C (97.9 °F) 02/24/21 0948   Pulse 88 02/24/21 1002   Resp 18 02/24/21 1002   SpO2 100 % 02/24/21 1002

## 2021-03-03 DIAGNOSIS — G89.29 CHRONIC BILATERAL LOW BACK PAIN WITH BILATERAL SCIATICA: ICD-10-CM

## 2021-03-03 DIAGNOSIS — L97.912 ULCER OF RIGHT LOWER EXTREMITY WITH FAT LAYER EXPOSED (HCC): ICD-10-CM

## 2021-03-03 DIAGNOSIS — K21.9 GASTROESOPHAGEAL REFLUX DISEASE WITHOUT ESOPHAGITIS: ICD-10-CM

## 2021-03-03 DIAGNOSIS — M54.42 CHRONIC BILATERAL LOW BACK PAIN WITH BILATERAL SCIATICA: ICD-10-CM

## 2021-03-03 DIAGNOSIS — M54.41 CHRONIC BILATERAL LOW BACK PAIN WITH BILATERAL SCIATICA: ICD-10-CM

## 2021-03-03 RX ORDER — SILVER SULFADIAZINE 10 G/1000G
CREAM TOPICAL
Qty: 90 G | Refills: 0 | Status: SHIPPED | OUTPATIENT
Start: 2021-03-03 | End: 2021-12-02

## 2021-03-03 RX ORDER — OXYCODONE AND ACETAMINOPHEN 5; 325 MG/1; MG/1
TABLET ORAL
Qty: 90 TAB | Refills: 0 | Status: SHIPPED | OUTPATIENT
Start: 2021-03-03 | End: 2021-04-02

## 2021-03-03 RX ORDER — PANTOPRAZOLE SODIUM 40 MG/1
TABLET, DELAYED RELEASE ORAL
Qty: 90 TAB | Refills: 1 | Status: SHIPPED | OUTPATIENT
Start: 2021-03-03 | End: 2022-07-28 | Stop reason: SDUPTHER

## 2021-03-03 RX ORDER — INSULIN LISPRO 100 [IU]/ML
INJECTION, SOLUTION INTRAVENOUS; SUBCUTANEOUS
Qty: 15 ML | Refills: 3 | Status: ON HOLD | OUTPATIENT
Start: 2021-03-03 | End: 2021-05-20 | Stop reason: SDUPTHER

## 2021-03-05 ENCOUNTER — HOSPITAL ENCOUNTER (OUTPATIENT)
Dept: INFUSION THERAPY | Age: 66
Discharge: HOME | End: 2021-03-05
Payer: MEDICARE

## 2021-03-05 ENCOUNTER — APPOINTMENT (OUTPATIENT)
Dept: INFUSION THERAPY | Age: 66
End: 2021-03-05

## 2021-03-05 VITALS
RESPIRATION RATE: 18 BRPM | BODY MASS INDEX: 23.8 KG/M2 | HEIGHT: 71 IN | OXYGEN SATURATION: 98 % | TEMPERATURE: 97.9 F | SYSTOLIC BLOOD PRESSURE: 147 MMHG | DIASTOLIC BLOOD PRESSURE: 67 MMHG | WEIGHT: 170 LBS | HEART RATE: 93 BPM

## 2021-03-05 LAB
25(OH)D3 SERPL-MCNC: 32.9 NG/ML (ref 30–100)
ALBUMIN SERPL-MCNC: 1.5 G/DL (ref 3.5–5)
ANION GAP SERPL CALC-SCNC: 5 MMOL/L (ref 5–15)
BASOPHILS # BLD: 0.1 K/UL (ref 0–0.1)
BASOPHILS NFR BLD: 1 % (ref 0–1)
BUN SERPL-MCNC: 27 MG/DL (ref 6–20)
BUN/CREAT SERPL: 14 (ref 12–20)
CALCIUM SERPL-MCNC: 7.8 MG/DL (ref 8.5–10.1)
CALCIUM SERPL-MCNC: 7.9 MG/DL (ref 8.5–10.1)
CHLORIDE SERPL-SCNC: 114 MMOL/L (ref 97–108)
CO2 SERPL-SCNC: 19 MMOL/L (ref 21–32)
CREAT SERPL-MCNC: 1.97 MG/DL (ref 0.7–1.3)
DIFFERENTIAL METHOD BLD: ABNORMAL
EOSINOPHIL # BLD: 0.1 K/UL (ref 0–0.4)
EOSINOPHIL NFR BLD: 1 % (ref 0–7)
ERYTHROCYTE [DISTWIDTH] IN BLOOD BY AUTOMATED COUNT: 15.5 % (ref 11.5–14.5)
FERRITIN SERPL-MCNC: 596 NG/ML (ref 26–388)
GLUCOSE SERPL-MCNC: 195 MG/DL (ref 65–100)
HCT VFR BLD AUTO: 18.9 % (ref 36.6–50.3)
HGB BLD-MCNC: 5.4 G/DL (ref 12.1–17)
IMM GRANULOCYTES # BLD AUTO: 0 K/UL (ref 0–0.04)
IMM GRANULOCYTES NFR BLD AUTO: 0 % (ref 0–0.5)
IRON SATN MFR SERPL: 9 % (ref 20–50)
IRON SERPL-MCNC: 13 UG/DL (ref 35–150)
LYMPHOCYTES # BLD: 1 K/UL (ref 0.8–3.5)
LYMPHOCYTES NFR BLD: 15 % (ref 12–49)
MCH RBC QN AUTO: 24.5 PG (ref 26–34)
MCHC RBC AUTO-ENTMCNC: 28.6 G/DL (ref 30–36.5)
MCV RBC AUTO: 85.9 FL (ref 80–99)
MONOCYTES # BLD: 0.5 K/UL (ref 0–1)
MONOCYTES NFR BLD: 7 % (ref 5–13)
NEUTS SEG # BLD: 5.2 K/UL (ref 1.8–8)
NEUTS SEG NFR BLD: 76 % (ref 32–75)
NRBC # BLD: 0 K/UL (ref 0–0.01)
NRBC BLD-RTO: 0 PER 100 WBC
PHOSPHATE SERPL-MCNC: 3.3 MG/DL (ref 2.6–4.7)
PLATELET # BLD AUTO: 410 K/UL (ref 150–400)
PMV BLD AUTO: 8.1 FL (ref 8.9–12.9)
POTASSIUM SERPL-SCNC: 5.4 MMOL/L (ref 3.5–5.1)
PTH-INTACT SERPL-MCNC: 56.7 PG/ML (ref 18.4–88)
RBC # BLD AUTO: 2.2 M/UL (ref 4.1–5.7)
RBC MORPH BLD: ABNORMAL
RBC MORPH BLD: ABNORMAL
SODIUM SERPL-SCNC: 138 MMOL/L (ref 136–145)
TIBC SERPL-MCNC: 142 UG/DL (ref 250–450)
WBC # BLD AUTO: 6.9 K/UL (ref 4.1–11.1)

## 2021-03-05 PROCEDURE — 82306 VITAMIN D 25 HYDROXY: CPT

## 2021-03-05 PROCEDURE — 83970 ASSAY OF PARATHORMONE: CPT

## 2021-03-05 PROCEDURE — 80069 RENAL FUNCTION PANEL: CPT

## 2021-03-05 PROCEDURE — 85025 COMPLETE CBC W/AUTO DIFF WBC: CPT

## 2021-03-05 PROCEDURE — 36415 COLL VENOUS BLD VENIPUNCTURE: CPT

## 2021-03-05 PROCEDURE — 82728 ASSAY OF FERRITIN: CPT

## 2021-03-05 PROCEDURE — 83540 ASSAY OF IRON: CPT

## 2021-03-05 PROCEDURE — 74011250636 HC RX REV CODE- 250/636: Performed by: INTERNAL MEDICINE

## 2021-03-05 PROCEDURE — 96372 THER/PROPH/DIAG INJ SC/IM: CPT

## 2021-03-05 RX ADMIN — EPOETIN ALFA-EPBX 40000 UNITS: 40000 INJECTION, SOLUTION INTRAVENOUS; SUBCUTANEOUS at 13:09

## 2021-03-05 NOTE — PROGRESS NOTES
Outpatient Infusion Center Short Visit Progress Note    1120 Patient admitted to [facility] for [facility] ambulatory in stable condition. Assessment completed. No new concerns voiced. Covid Screening      1. Do you have any symptoms of COVID-19? SOB, coughing, fever, or generally not feeling well ? NO  2. Have you been exposed to COVID-19 recently? NO  3. Have you had any recent contact with family/friend that has a pending COVID test? NO    Vital Signs:  Visit Vitals  BP (!) 147/67   Pulse 93   Temp 97.9 °F (36.6 °C)   Resp 18   Ht 5' 11\" (1.803 m)   Wt 77.1 kg (170 lb)   SpO2 98%   BMI 23.71 kg/m²         Labs drawn from right arm and sent for processing. Lab Results:  Recent Results (from the past 12 hour(s))   CBC WITH AUTOMATED DIFF    Collection Time: 03/05/21 11:30 AM   Result Value Ref Range    WBC 6.9 4.1 - 11.1 K/uL    RBC 2.20 (L) 4.10 - 5.70 M/uL    HGB 5.4 (LL) 12.1 - 17.0 g/dL    HCT 18.9 (L) 36.6 - 50.3 %    MCV 85.9 80.0 - 99.0 FL    MCH 24.5 (L) 26.0 - 34.0 PG    MCHC 28.6 (L) 30.0 - 36.5 g/dL    RDW 15.5 (H) 11.5 - 14.5 %    PLATELET 019 (H) 932 - 400 K/uL    MPV 8.1 (L) 8.9 - 12.9 FL    NRBC 0.0 0  WBC    ABSOLUTE NRBC 0.00 0.00 - 0.01 K/uL    NEUTROPHILS 76 (H) 32 - 75 %    LYMPHOCYTES 15 12 - 49 %    MONOCYTES 7 5 - 13 %    EOSINOPHILS 1 0 - 7 %    BASOPHILS 1 0 - 1 %    IMMATURE GRANULOCYTES 0 0.0 - 0.5 %    ABS. NEUTROPHILS 5.2 1.8 - 8.0 K/UL    ABS. LYMPHOCYTES 1.0 0.8 - 3.5 K/UL    ABS. MONOCYTES 0.5 0.0 - 1.0 K/UL    ABS. EOSINOPHILS 0.1 0.0 - 0.4 K/UL    ABS. BASOPHILS 0.1 0.0 - 0.1 K/UL    ABS. IMM.  GRANS. 0.0 0.00 - 0.04 K/UL    DF SMEAR SCANNED      RBC COMMENTS ANISOCYTOSIS  1+        RBC COMMENTS HYPOCHROMIA  1+       RENAL FUNCTION PANEL    Collection Time: 03/05/21 11:30 AM   Result Value Ref Range    Sodium 138 136 - 145 mmol/L    Potassium 5.4 (H) 3.5 - 5.1 mmol/L    Chloride 114 (H) 97 - 108 mmol/L    CO2 19 (L) 21 - 32 mmol/L    Anion gap 5 5 - 15 mmol/L    Glucose 195 (H) 65 - 100 mg/dL    BUN 27 (H) 6 - 20 MG/DL    Creatinine 1.97 (H) 0.70 - 1.30 MG/DL    BUN/Creatinine ratio 14 12 - 20      GFR est AA 41 (L) >60 ml/min/1.73m2    GFR est non-AA 34 (L) >60 ml/min/1.73m2    Calcium 7.9 (L) 8.5 - 10.1 MG/DL    Phosphorus 3.3 2.6 - 4.7 MG/DL    Albumin 1.5 (L) 3.5 - 5.0 g/dL     RN notified MD of Hgb 5.4, MD to fax infusion orders for iron. 1400 Orders still not received from MD,patient has another medical appointment at 1430. Infusion center to call and schedule infusion once received. Medications:  Medications Administered     epoetin naman-epbx (RETACRIT) injection 40,000 Units     Admin Date  03/05/2021 Action  Given Dose  40,000 Units Route  SubCUTAneous Administered By  Tammi De La Torre RN                 Patient tolerated treatment well. Patient discharged from [place] [address] ambulatory in no distress at 1400. Patient aware of next appointment.     Future Appointments   Date Time Provider Alisa Ku   3/19/2021 11:00 AM SS INF7 CH2 <1H [facility]   4/2[facility]2021 11:[facility]   4/7/2021  1:15 PM Coleen Ziegler MD [facility] BS AMB   4/16/2021 11:00 AM SS INF6 CH4 <1H [facility]   4/30/2021 11:30 AM SS INF6 CH4 <1H [facility]   5/14/2021 11:30 AM SS INF6 CH4 <1H [facility]   5/28/2021 11:30 AM SS INF7 CH2 <1H [facility]   6/11/2021 11:30 AM SS INF6 CH4 <1H [facility]   6/25/2021 11:30 AM SS INF6 CH4 <1H [facility] [address] [place] [address]

## 2021-03-08 RX ORDER — SODIUM CHLORIDE 9 MG/ML
25 INJECTION, SOLUTION INTRAVENOUS CONTINUOUS
Status: DISPENSED | OUTPATIENT
Start: 2021-03-12 | End: 2021-03-12

## 2021-03-12 ENCOUNTER — HOSPITAL ENCOUNTER (OUTPATIENT)
Dept: INFUSION THERAPY | Age: 66
Discharge: HOME | End: 2021-03-12
Payer: MEDICARE

## 2021-03-12 VITALS
DIASTOLIC BLOOD PRESSURE: 70 MMHG | HEART RATE: 88 BPM | TEMPERATURE: 97.2 F | SYSTOLIC BLOOD PRESSURE: 149 MMHG | RESPIRATION RATE: 18 BRPM

## 2021-03-12 PROCEDURE — 74011250636 HC RX REV CODE- 250/636: Performed by: INTERNAL MEDICINE

## 2021-03-12 PROCEDURE — 96374 THER/PROPH/DIAG INJ IV PUSH: CPT

## 2021-03-12 PROCEDURE — 74011000258 HC RX REV CODE- 258: Performed by: INTERNAL MEDICINE

## 2021-03-12 RX ORDER — SODIUM CHLORIDE 9 MG/ML
25 INJECTION, SOLUTION INTRAVENOUS CONTINUOUS
Status: DISPENSED | OUTPATIENT
Start: 2021-03-19 | End: 2021-03-19

## 2021-03-12 RX ORDER — CLINDAMYCIN HYDROCHLORIDE 300 MG/1
300 CAPSULE ORAL 4 TIMES DAILY
Status: ON HOLD | COMMUNITY
End: 2021-03-30

## 2021-03-12 RX ADMIN — FERUMOXYTOL 510 MG: 510 INJECTION INTRAVENOUS at 15:25

## 2021-03-12 RX ADMIN — SODIUM CHLORIDE 25 ML/HR: 900 INJECTION, SOLUTION INTRAVENOUS at 15:24

## 2021-03-12 NOTE — PROGRESS NOTES
[facility] Progress Note    Date: 2021    Name: Shashank Gibson MRN: [id number]         : 1955    Mr. Blake Arrived ambulatory and in no distress for Ferahe[facility] Infusion. Assessment was completed, patient states he recently started Clindamycin for his leg infection. 24 G PIV established to left arm, + blood return. Mr. Asuncion Logan vitals were reviewed. Visit Vitals  BP (!) 150/68   Pulse 88   Temp 97.2 °F (36.2 °C)   Resp 18     Medications:  Medications Administered     0.9% sodium chloride infusion     Admin Date  2021 Action  New Bag Dose  25 mL/hr Rate  25 mL/hr Route  IntraVENous Administered By  Milo Luciano RN          ferumoxytoL [facility]) 510 mg in 0.9% sodium chloride 100 mL, overfill volume 10 mL IVPB     Admin Date  2021 Action  Given Dose  510 mg Rate  508 mL/hr Route  IntraVENous Administered By  Milo Luciano RN                Mr. Blake tolerated treatment well and was discharged from [place] [address] in stable condition. PIV flushed & removed. He is aware of future appointments.     Future Appointments   Date Time Provider Alisa Ku   3/19/2021 11:00 AM SS INF7 CH2 <1H [facility]   2021 11:30 AM SS INF6 CH4 <1H [facility]   2021  1:15 PM Jos Rosenbaum MD [facility] AMB   2021 11:00 AM SS INF6 CH4 <1H [facility]   2021 11:30 AM [facility]   2021 11:30 AM SS INF6 CH4 <1H [facility]   2021 11:30 AM SS INF7 CH2 <1H [facility]   2021 11:30 AM SS INF6 CH4 <1H [facility]   2021 11:30 AM SS INF6 CH4 <1H [facility] Joe Means RN  2021

## 2021-03-15 ENCOUNTER — APPOINTMENT (OUTPATIENT)
Dept: INFUSION THERAPY | Age: 66
End: 2021-03-15

## 2021-03-19 ENCOUNTER — HOSPITAL ENCOUNTER (OUTPATIENT)
Dept: INFUSION THERAPY | Age: 66
Discharge: HOME | End: 2021-03-19
Payer: MEDICARE

## 2021-03-19 ENCOUNTER — APPOINTMENT (OUTPATIENT)
Dept: INFUSION THERAPY | Age: 66
End: 2021-03-19

## 2021-03-19 VITALS
RESPIRATION RATE: 16 BRPM | TEMPERATURE: 98 F | HEART RATE: 93 BPM | OXYGEN SATURATION: 95 % | DIASTOLIC BLOOD PRESSURE: 70 MMHG | SYSTOLIC BLOOD PRESSURE: 156 MMHG

## 2021-03-19 LAB
HCT VFR BLD AUTO: 17.5 % (ref 36.6–50.3)
HGB BLD-MCNC: 5 G/DL (ref 12.1–17)

## 2021-03-19 PROCEDURE — 74011000258 HC RX REV CODE- 258: Performed by: INTERNAL MEDICINE

## 2021-03-19 PROCEDURE — 96374 THER/PROPH/DIAG INJ IV PUSH: CPT

## 2021-03-19 PROCEDURE — 96372 THER/PROPH/DIAG INJ SC/IM: CPT

## 2021-03-19 PROCEDURE — 85018 HEMOGLOBIN: CPT

## 2021-03-19 PROCEDURE — 74011250636 HC RX REV CODE- 250/636: Performed by: INTERNAL MEDICINE

## 2021-03-19 PROCEDURE — 36415 COLL VENOUS BLD VENIPUNCTURE: CPT

## 2021-03-19 RX ADMIN — EPOETIN ALFA-EPBX 40000 UNITS: 40000 INJECTION, SOLUTION INTRAVENOUS; SUBCUTANEOUS at 11:46

## 2021-03-19 RX ADMIN — SODIUM CHLORIDE 25 ML/HR: 900 INJECTION, SOLUTION INTRAVENOUS at 11:20

## 2021-03-19 RX ADMIN — FERUMOXYTOL 510 MG: 510 INJECTION INTRAVENOUS at 11:20

## 2021-03-19 NOTE — PROGRESS NOTES
[facility] Progress Note    Date: 2021    Name: Kassidy Zuniga. MRN: [id number]         : 1955    Mr. Blake Arrived in wheelchair and in no distress for Retacrit/2 of 2 Faraheme Regimen. Assessment was completed, no acute issues at this time, no new complaints voiced. PIV placed without difficulty, labs drawn & sent for processing. Covid questionnaire completed. 1. Do you have any symptoms of COVID-19? SOB, coughing, fever, or generally not feeling well - No    2. Have you been exposed to COVID-19 recently? - No    3. Have you had any recent contact with family/friend that has a pending COVID test? - No      Mr. Blake's vitals were reviewed. Patient Vitals for the past 12 hrs:   Temp Pulse Resp BP SpO2   21 1057 98.4 °F (36.9 °C) 100 18 (!) 130/58 95 %       Lab results were obtained and reviewed. Recent Results (from the past 12 hour(s))   HGB & HCT    Collection Time: 21 11:03 AM   Result Value Ref Range    HGB 5.0 (LL) 12.1 - 17.0 g/dL    HCT 17.5 (LL) 36.6 - 50.3 %       Called MD office abnd spoke to Dolores lema regarding pt labs values. Awaiting call back. Pt stated he is unable to wait for Md to return call. Pt stated to call him and let him know what MD says and that he will go to ED to get a blood transfusion if needed. Medications:  Retacrit SQ  Faraheme IV    Mr. Rivera Escobar tolerated treatment well and was discharged from [place] [address] in stable condition. PIV flushed and removed. He is to return on 21 for his next appointment.     Chele Mcgarry RN  2021

## 2021-03-26 NOTE — PROGRESS NOTES
[address] [place] [address] Dr Jarvis Preprocedure Instructions      1. On the day of your surgery, please report to registration located on the 2nd floor of the  [facility]. yes    2. You must have a responsible adult to drive you to the hospital, stay at the hospital during your procedure and drive you home. If they leave your procedure will not be started (no exceptions). yes    3. Do not have anything to eat or drink (including water, gum, mints, coffee, and juice) after midnight. This does not apply to the medications you were instructed to take by your physician. yesIf you are currently taking Plavix, Coumadin, Aspirin, or other blood-thinning agents, contact your physician for special instructions. Yes,     4. If you are having a procedure that requires bowel prep: We recommend that you have only clear liquids the day before your procedure and begin your bowel prep by 5:00 pm.  You may continue to drink clear liquids until midnight. If for any reason you are not able to complete your prep please notify your physician immediately. yes    5. Have a list of all current medications, including vitamins, herbal supplements and any other over the counter medications. yes    6. If you wear glasses, contacts, dentures and/or hearing aids, they may be removed prior to procedure, please bring a case to store them in. yes    7. You should understand that if you do not follow these instructions your procedure may be cancelled. If your physical condition changes (I.e. fever, cold or flu) please contact your doctor as soon as possible. 8. It is important that you be on time.   If for any reason you are unable to keep your appointment please call )- the day of or your physicians office prior to your scheduled procedure

## 2021-03-27 ENCOUNTER — TRANSCRIBE ORDER (OUTPATIENT)
Dept: REGISTRATION | Age: 66
End: 2021-03-27

## 2021-03-27 ENCOUNTER — HOSPITAL ENCOUNTER (OUTPATIENT)
Dept: LAB | Age: 66
Discharge: HOME | End: 2021-03-27
Payer: MEDICARE

## 2021-03-27 DIAGNOSIS — Z01.812 PRE-PROCEDURAL LABORATORY EXAMINATIONS: ICD-10-CM

## 2021-03-27 DIAGNOSIS — Z01.812 PRE-PROCEDURAL LABORATORY EXAMINATIONS: Primary | ICD-10-CM

## 2021-03-27 PROCEDURE — U0003 INFECTIOUS AGENT DETECTION BY NUCLEIC ACID (DNA OR RNA); SEVERE ACUTE RESPIRATORY SYNDROME CORONAVIRUS 2 (SARS-COV-2) (CORONAVIRUS DISEASE [COVID-19]), AMPLIFIED PROBE TECHNIQUE, MAKING USE OF HIGH THROUGHPUT TECHNOLOGIES AS DESCRIBED BY CMS-2020-01-R: HCPCS

## 2021-03-28 LAB — SARS-COV-2, COV2NT: NOT DETECTED

## 2021-03-29 ENCOUNTER — ANESTHESIA EVENT (OUTPATIENT)
Dept: ENDOSCOPY | Age: 66
End: 2021-03-29
Payer: MEDICARE

## 2021-03-29 ENCOUNTER — APPOINTMENT (OUTPATIENT)
Dept: INFUSION THERAPY | Age: 66
End: 2021-03-29

## 2021-03-30 ENCOUNTER — HOSPITAL ENCOUNTER (OUTPATIENT)
Age: 66
Setting detail: OUTPATIENT SURGERY
Discharge: HOME | End: 2021-03-30
Attending: SPECIALIST | Admitting: SPECIALIST
Payer: MEDICARE

## 2021-03-30 ENCOUNTER — ANESTHESIA (OUTPATIENT)
Dept: ENDOSCOPY | Age: 66
End: 2021-03-30
Payer: MEDICARE

## 2021-03-30 VITALS
HEIGHT: 71 IN | BODY MASS INDEX: 20.96 KG/M2 | TEMPERATURE: 98.7 F | SYSTOLIC BLOOD PRESSURE: 163 MMHG | RESPIRATION RATE: 15 BRPM | HEART RATE: 94 BPM | DIASTOLIC BLOOD PRESSURE: 77 MMHG | OXYGEN SATURATION: 100 % | WEIGHT: 149.69 LBS

## 2021-03-30 LAB
GLUCOSE BLD STRIP.AUTO-MCNC: 139 MG/DL (ref 65–100)
HGB BLD-MCNC: 5.2 G/DL (ref 12.1–17)
SERVICE CMNT-IMP: ABNORMAL

## 2021-03-30 PROCEDURE — 82962 GLUCOSE BLOOD TEST: CPT

## 2021-03-30 PROCEDURE — 74011250636 HC RX REV CODE- 250/636: Performed by: NURSE ANESTHETIST, CERTIFIED REGISTERED

## 2021-03-30 PROCEDURE — 85018 HEMOGLOBIN: CPT

## 2021-03-30 PROCEDURE — 76060000031 HC ANESTHESIA FIRST 0.5 HR: Performed by: SPECIALIST

## 2021-03-30 PROCEDURE — 74011250636 HC RX REV CODE- 250/636: Performed by: SPECIALIST

## 2021-03-30 PROCEDURE — 2709999900 HC NON-CHARGEABLE SUPPLY: Performed by: SPECIALIST

## 2021-03-30 PROCEDURE — 76040000019: Performed by: SPECIALIST

## 2021-03-30 RX ORDER — PROPOFOL 10 MG/ML
INJECTION, EMULSION INTRAVENOUS AS NEEDED
Status: DISCONTINUED | OUTPATIENT
Start: 2021-03-30 | End: 2021-03-30 | Stop reason: HOSPADM

## 2021-03-30 RX ORDER — NALOXONE HYDROCHLORIDE 0.4 MG/ML
0.4 INJECTION, SOLUTION INTRAMUSCULAR; INTRAVENOUS; SUBCUTANEOUS
Status: DISCONTINUED | OUTPATIENT
Start: 2021-03-30 | End: 2021-03-30 | Stop reason: HOSPADM

## 2021-03-30 RX ORDER — FLUMAZENIL 0.1 MG/ML
0.2 INJECTION INTRAVENOUS
Status: DISCONTINUED | OUTPATIENT
Start: 2021-03-30 | End: 2021-03-30 | Stop reason: HOSPADM

## 2021-03-30 RX ORDER — SODIUM CHLORIDE 9 MG/ML
50 INJECTION, SOLUTION INTRAVENOUS CONTINUOUS
Status: DISCONTINUED | OUTPATIENT
Start: 2021-03-30 | End: 2021-03-30 | Stop reason: HOSPADM

## 2021-03-30 RX ORDER — MIDAZOLAM HYDROCHLORIDE 1 MG/ML
.25-5 INJECTION, SOLUTION INTRAMUSCULAR; INTRAVENOUS AS NEEDED
Status: DISCONTINUED | OUTPATIENT
Start: 2021-03-30 | End: 2021-03-30 | Stop reason: HOSPADM

## 2021-03-30 RX ORDER — FENTANYL CITRATE 50 UG/ML
25 INJECTION, SOLUTION INTRAMUSCULAR; INTRAVENOUS AS NEEDED
Status: DISCONTINUED | OUTPATIENT
Start: 2021-03-30 | End: 2021-03-30 | Stop reason: HOSPADM

## 2021-03-30 RX ORDER — SODIUM CHLORIDE 9 MG/ML
INJECTION, SOLUTION INTRAVENOUS
Status: DISCONTINUED | OUTPATIENT
Start: 2021-03-30 | End: 2021-03-30 | Stop reason: HOSPADM

## 2021-03-30 RX ADMIN — PROPOFOL INJECTABLE EMULSION 20 MG: 10 INJECTION, EMULSION INTRAVENOUS at 08:51

## 2021-03-30 RX ADMIN — SODIUM CHLORIDE 50 ML/HR: 9 INJECTION, SOLUTION INTRAVENOUS at 08:40

## 2021-03-30 RX ADMIN — PROPOFOL INJECTABLE EMULSION 50 MG: 10 INJECTION, EMULSION INTRAVENOUS at 08:48

## 2021-03-30 RX ADMIN — SODIUM CHLORIDE: 9 INJECTION, SOLUTION INTRAVENOUS at 08:23

## 2021-03-30 RX ADMIN — PROPOFOL INJECTABLE EMULSION 20 MG: 10 INJECTION, EMULSION INTRAVENOUS at 08:55

## 2021-03-30 NOTE — PROGRESS NOTES
Libby Mckeon.  1955  [id number]    Situation:  Verbal report received from: Abilio Tavarez RN  Procedure: Procedure(s):  COLONOSCOPY    Background:    Preoperative diagnosis: ANEMIA  DIARRHEA  Postoperative diagnosis: Diverticulosis  Hemorrhoids    :  Dr. Landen Singh  Assistant(s): Endoscopy Technician-1: Richardson Morel  Endoscopy RN-1: Jasper Garcia RN    Specimens: * No specimens in log *  H. Pylori  no    Assessment:  Intra-procedure medications       Anesthesia gave intra-procedure sedation and medications, see anesthesia flow sheet yes    Intravenous fluids: NS@ KVO     Vital signs stable   yes    Abdominal assessment: round and soft   yes    Recommendation:  Discharge patient per MD order  yes.   Return to floor  outpatient  Family or Friend   spouse  Permission to share finding with family or friend yes

## 2021-03-30 NOTE — PERIOP NOTES
Report from April, CRNA, see anesthesia record. ABD remains soft and non-tender post procedure. Pt has no complaints at this time and tolerated the procedure well. Endoscope was pre-cleaned at bedside immediately following procedure by Jose Juan Stevenson.

## 2021-03-30 NOTE — ANESTHESIA POSTPROCEDURE EVALUATION
Procedure(s):  COLONOSCOPY. MAC    Anesthesia Post Evaluation      Multimodal analgesia: multimodal analgesia not used between 6 hours prior to anesthesia start to PACU discharge  Patient location during evaluation: PACU  Patient participation: complete - patient participated  Level of consciousness: awake  Pain management: adequate  Airway patency: patent  Anesthetic complications: no  Cardiovascular status: acceptable, blood pressure returned to baseline and hemodynamically stable  Respiratory status: acceptable  Hydration status: acceptable  Post anesthesia nausea and vomiting:  controlled  Final Post Anesthesia Temperature Assessment:  Normothermia (36.0-37.5 degrees C)      INITIAL Post-op Vital signs:   Vitals Value Taken Time   /77 03/30/21 0920   Temp 37.1 °C (98.7 °F) 03/30/21 0906   Pulse 94 03/30/21 0920   Resp 14 03/30/21 0920   SpO2 100 % 03/30/21 0920   Vitals shown include unvalidated device data.

## 2021-03-30 NOTE — DISCHARGE INSTRUCTIONS
[address] [facility] YOLIS Ferguson, [address] [place] [address]  [phone]      March 30, 2021    Lacey Vides. YOB: 1955    COLONOSCOPY DISCHARGE INSTRUCTIONS    If there is redness at IV site you should apply warm compress to area. If redness or soreness persist contact Dr. Nelia Ferguson' or your primary care doctor. There may be a slight amount of blood passed from the rectum. Gaseous discomfort may develop, but walking, belching will help relieve this. You may not operate a vehicle for 12 hours  You may not operate machinery or dangerous appliances for rest of today  You may not drink alcoholic beverages for 12 hours  Avoid making any critical decisions for 24 hours    DIET:  You may resume your normal diet, but some patients find that heavy or large meals may lead to indigestion or vomiting. I suggest a light meal as first food intake. MEDICATIONS:  The use of some over-the-counter pain medication may lead to bleeding after colon biopsies or polyp removal.  Tylenol (also called acetaminophen) is safe to take even if you have had colonoscopy with polyp removal.  Based on the procedure you had today you may safely take aspirin or aspirin-like products for the next ten (10) days. Remember that Tylenol (also called acetaminophen) is safe to take after colonoscopy even if you have had biopsies or polyps removed. ACTIVITY:  You may resume your normal household activities, but it is recommended that you spend the remainder of the day resting -  avoid any strenuous activity. CALL DR. Bryon Moscoso' OFFICE IF:  Increasing pain, nausea, vomiting  Abdominal distension (swelling)  Significant new or increased bleeding (oral or rectal)  Fever/Chills  Chest pain/shortness of breath                       Additional instructions: The colon is healthy and without any significant problems.   My suspicion is that the weight loss, anemia, and diarrhea may improve after we resolve the chronic infection of the leg with the planned amputation. Let me know if your symptoms do not resolve after your surgery and we'll try some medication to assist.     It was an honor to be your doctor today. Please let me or my office staff know if you have any feedback about today's procedure. Vicky Garcias MD    Colonoscopy saves lives, and can prevent colon cancer. Everyone aged 48 or older needs colonoscopy.   Tell your family and friends: get the test!

## 2021-03-30 NOTE — ANESTHESIA PREPROCEDURE EVALUATION
Anesthetic History   No history of anesthetic complications            Review of Systems / Medical History  Patient summary reviewed and pertinent labs reviewed    Pulmonary        Sleep apnea: No treatment    Asthma : well controlled    Comments: Pt has GABI - has been noncompliant with CPAP  Asthma - mild, uses albuterol several times per month   Neuro/Psych   Within defined limits           Cardiovascular    Hypertension          PAD and hyperlipidemia    Exercise tolerance: >4 METS  Comments: PVD - s/p BKA   GI/Hepatic/Renal     GERD    Renal disease: CRI      Comments: GERD - well controlled  Dysphagia Endo/Other    Diabetes: type 2    Arthritis and anemia (epo injections, last transfusion 3 weeks ago)     Other Findings   Comments: OA   H/o cervical fusion x 2    Severe, chronic Iron deficiency Anemia; last Hgb 5; received iron and Epogen infusion; rechecking H/H prior to Colonoscopy; patient asymptomatic; chronically low Hgb b/t 5-6         Physical Exam    Airway  Mallampati: II  TM Distance: 4 - 6 cm  Neck ROM: decreased range of motion   Mouth opening: Normal     Cardiovascular  Regular rate and rhythm,  S1 and S2 normal,  no murmur, click, rub, or gallop  Rhythm: regular  Rate: normal         Dental  No notable dental hx    Comments: Several missing teeth   Pulmonary  Breath sounds clear to auscultation               Abdominal  GI exam deferred       Other Findings            Anesthetic Plan    ASA: 4  Anesthesia type: MAC          Induction: Intravenous  Anesthetic plan and risks discussed with: Patient

## 2021-03-30 NOTE — PROGRESS NOTES
Endoscopy discharge instructions have been reviewed and given to patient. The patient verbalized understanding and acceptance of instructions. Dr. Mary Ann Johnson discussed with patient's spouse procedure findings and next steps.

## 2021-03-30 NOTE — PROCEDURES
[address]  [phone]      2021    Colonoscopy Procedure Note  Alesia Jimenez. :  1955  [facility] Medical Record Number: [id number]    Indications:     Anemia, weight loss. has chronic nonhealing infection in left leg at distal end of BKA. He advises that he has exhausted efforts at control and there is planned AKA in a short interval.   PCP:  Carla Adams MD  Anesthesia/Sedation: Conscious Sedation/Moderate Sedation/GETA, see notes  Endoscopist:  Dr. Esvin Sebastian  Complications:  None  Estimated Blood Loss:  None    Permit:  The indications, risks, benefits and alternatives were reviewed with the patient or their decision maker who was provided an opportunity to ask questions and all questions were answered. The specific risks of colonoscopy with conscious sedation were reviewed, including but not limited to anesthetic complication, bleeding, adverse drug reaction, missed lesion, infection, IV site reactions, and intestinal perforation which would lead to the need for surgical repair. Alternatives to colonoscopy including radiographic imaging, observation without testing, or laboratory testing were reviewed including the limitations of those alternatives. After considering the options and having all their questions answered, the patient or their decision maker provided both verbal and written consent to proceed. Procedure in Detail:  After obtaining informed consent, positioning of the patient in the left lateral decubitus position, and conduction of a pre-procedure pause or \"time out\" the endoscope was introduced into the anus and advanced to the terminal ileum. The quality of the colonic preparation was adequate. A careful inspection was made as the colonoscope was withdrawn, findings and interventions are described below.     Findings:   Ileal mucosa normal.  Colonic mucosa normal no colitis, polyps, cancer, AVM, or bleeding. There is diverticulosis in the sigmoid colon without complications such as bleeding, inflammatory change, or luminal narrowing. In the rectum, medium internal hemorrhoids are noted without bleeding. Specimens:    none    Complications:   None; patient tolerated the procedure well. Impression:  Otherwise normal colonoscopy to the terminal ileum. Recommendations:      - I suspect his anemia is related to renal disease + chronic disease.      - His weight loss may also be related to his nonhealing wound/chronic infection. We'll see if the planned AKA leads to improvement. For now I have no plans for additional GI testing. Thank you for entrusting me with this patient's care. Please do not hesitate to contact me with any questions or if I can be of assistance with any of your other patients' GI needs. Signed By: Francisca Shook MD                        March 30, 2021      Surgical assistant none. Implants none unless specified.

## 2021-03-30 NOTE — H&P
77 y.o. male for open access colonoscopy for weight loss and diarrhea. Last attempt - inadequate prep. Additional data for completion of the targeted pre-endoscopy H&P will be provided under 'H&P interval notes'. Please see that document which will be attached to this.   Amado Wooten MD

## 2021-03-30 NOTE — INTERVAL H&P NOTE
Pre-Endoscopy H&P Update  Chief complaint/HPI/ROS:  The indication for the procedure, the patient's history and the patient's current medications are reviewed prior to the procedure and that data is reported on the H&P to which this document is attached. Any significant complaints with regard to organ systems will be noted, and if not mentioned then a review of systems is not contributory.   Past Medical History:   Diagnosis Date    Arthritis     BPH (benign prostatic hyperplasia)     Chronic kidney disease     Chronic pain     BACK NEUROPATHY FEET HANDS    DM neuropathy, type II diabetes mellitus ([address]     DM type 2 causing CKD stage 3 ([address] 12/17/2012    DM type 2 causing vascular disease ([address]     Dyslipidemia     Foot ulcer due to secondary DM ([address] 12/1/2012    GERD (gastroesophageal reflux disease)     History of esophageal dilatation     HTN     Ill-defined condition 2013    MRSA in wound right leg (BKA)    S/P BKA (below knee amputation) ([address]     right BKA due to non-healing ulcer    Sleep apnea     Doesnt use CPAP, patient hasnt been retested since weight loss    Thromboembolus ([address] 1970'S    BLOOD CLOT LEFT LEG      Past Surgical History:   Procedure Laterality Date    ECHO 2D ADULT  8/09    normal; LVEF 60%    ECHO STRESS  4/09    7 min; normal    ENDOSCOPY, COLON, DIAGNOSTIC      FLEXIBLE SIGMOIDOSCOPY N/A 2/24/2021    SIGMOIDOSCOPY FLEXIBLE performed by Bhargavi Moon MD at [facility]  2012    left great toe    HX AMPUTATION  2007    BKA right    HX BELOW KNEE AMPUTATION Right     HX CERVICAL FUSION  2009    x2    HX ENDOSCOPY      EGD with Dilitation x 2    HX ORTHOPAEDIC  2006    ACDF C4-C7    IR INSERT TUNL CVC W/O PORT OVER 5 YR  6/19/2020    IR REMOVE TUNL CVAD W/O PORT / PUMP  9/14/2020    CO ABDOMEN SURGERY PROC UNLISTED      pilonidal cyst    STRESS TEST MYOVIEW  8/09    walked 8:46; normal; LVEF 51%     Social   Social History     Tobacco Use    Smoking status: Never Smoker    Smokeless tobacco: Never Used   Substance Use Topics    Alcohol use: No      Family History   Problem Relation Age of Onset    Hypertension Mother     Gout Mother     Cancer Father         leukemia    Diabetes Father     Hypertension Sister     Diabetes Maternal Grandmother     Hypertension Maternal Grandmother     Diabetes Paternal Grandmother     Hypertension Paternal Grandmother     Anesth Problems Neg Hx       Allergies   Allergen Reactions    Adhesive Tape-Silicones Hives    Sulfa (Sulfonamide Antibiotics) Swelling     Lips eyes      Prior to Admission Medications   Prescriptions Last Dose Informant Patient Reported? Taking? SSD 1 % topical cream 3/23/2021 at Unknown time  No Yes   Sig: APPLY TO AFFECTED AREA DAILY   albuterol (Ventolin HFA) 90 mcg/actuation inhaler Unknown at Unknown time Self No No   Sig: Take 2 Puffs by inhalation every four (4) hours as needed for Wheezing. aspirin 81 mg chewable tablet 3/16/2021 Self Yes Yes   Sig: Take 81 mg by mouth daily. epoetin naman (EPOGEN;PROCRIT) 20,000 unit/mL injection 3/23/2021 Self Yes Yes   Si,000-25,000 Units by SubCUTAneous route Once every 2 weeks. ergocalciferol (ERGOCALCIFEROL) 1,250 mcg (50,000 unit) capsule 3/23/2021 Self Yes Yes   Sig: Take 50,000 Units by mouth Every Friday. ferrous sulfate 325 mg (65 mg iron) tablet 3/29/2021 at Unknown time  No Yes   Sig: TAKE ONE TABLET BY MOUTH 2 TIMES A DAY   ibuprofen (MOTRIN) 200 mg tablet Not Taking at Unknown time Self Yes No   Sig: Take 200 mg by mouth daily as needed for Pain. insulin glargine (Lantus Solostar U-100 Insulin) 100 unit/mL (3 mL) inpn 3/28/2021 Self Yes Yes   Si Units by SubCUTAneous route nightly.    insulin lispro (HUMALOG) 100 unit/mL kwikpen 3/28/2021  No Yes   Sig: INJECT 18 TO 30 UNITS SUBCUTANEOUSLY 3 TIMES DAILY BEFORE BREAKFAST, LUNCH, AND DINNER   lisinopriL (PRINIVIL, ZESTRIL) 10 mg tablet 3/29/2021 at Unknown time Self No Yes   Sig: Take 1 Tab by mouth two (2) times a day. oxyCODONE-acetaminophen (PERCOCET) 5-325 mg per tablet 3/23/2021 at Unknown time  No Yes   Sig: TAKE ONE TABLET BY MOUTH EVERY 4 HOURS AS NEEDED FOR PAIN TO UP TO 30 DAYS   pantoprazole (PROTONIX) 40 mg tablet 3/23/2021 at Unknown time  No Yes   Sig: TAKE 1 TABLET BY MOUTH EVERY DAY   rosuvastatin (CRESTOR) 40 mg tablet 3/23/2021 at Unknown time Self No Yes   Sig: TAKE ONE TABLET BY MOUTH EVERY DAY   sulfaSALAzine (AZULFIDINE) 500 mg tablet 3/23/2021 at Unknown time  Yes Yes   Sig: Take 800 mg by mouth two (2) times a day. Facility-Administered Medications: None       PHYSICAL EXAM:  The patient is examined immediately prior to the procedure. Visit Vitals  BP (!) 149/66   Pulse 95   Temp 99.3 °F (37.4 °C)   Resp 15   Ht 5' 11\" (1.803 m)   Wt 67.9 kg (149 lb 11.1 oz)   SpO2 100%   BMI 20.88 kg/m²     Gen: Appears comfortable, no distress. Pulm: comfortable respirations with no abnormal audible breath sounds  HEART: well perfused, no abnormal audible heart sounds  GI: abdomen flat. PLAN:  Informed consent discussion held, patient afforded an opportunity to ask questions and all questions answered. After being advised of the risks, benefits, and alternatives, the patient requested that we proceed and indicated so on a written consent form. Will proceed with procedure as planned.   Vicky Paez MD

## 2021-04-02 ENCOUNTER — APPOINTMENT (OUTPATIENT)
Dept: INFUSION THERAPY | Age: 66
End: 2021-04-02

## 2021-04-02 ENCOUNTER — HOSPITAL ENCOUNTER (OUTPATIENT)
Dept: INFUSION THERAPY | Age: 66
Discharge: HOME | End: 2021-04-02

## 2021-04-07 ENCOUNTER — OFFICE VISIT (OUTPATIENT)
Dept: INTERNAL MEDICINE CLINIC | Age: 66
End: 2021-04-07
Payer: MEDICARE

## 2021-04-07 VITALS
BODY MASS INDEX: 20.05 KG/M2 | HEART RATE: 94 BPM | RESPIRATION RATE: 20 BRPM | TEMPERATURE: 98.2 F | DIASTOLIC BLOOD PRESSURE: 80 MMHG | SYSTOLIC BLOOD PRESSURE: 160 MMHG | HEIGHT: 71 IN | WEIGHT: 143.2 LBS | OXYGEN SATURATION: 100 %

## 2021-04-07 DIAGNOSIS — M79.605 PAIN IN BOTH LOWER EXTREMITIES: ICD-10-CM

## 2021-04-07 DIAGNOSIS — D63.1 ANEMIA IN CHRONIC KIDNEY DISEASE, UNSPECIFIED CKD STAGE: ICD-10-CM

## 2021-04-07 DIAGNOSIS — G89.29 CHRONIC BILATERAL LOW BACK PAIN WITH BILATERAL SCIATICA: ICD-10-CM

## 2021-04-07 DIAGNOSIS — M54.42 CHRONIC BILATERAL LOW BACK PAIN WITH BILATERAL SCIATICA: ICD-10-CM

## 2021-04-07 DIAGNOSIS — M79.604 PAIN IN BOTH LOWER EXTREMITIES: ICD-10-CM

## 2021-04-07 DIAGNOSIS — Z79.4 TYPE 2 DIABETES MELLITUS WITH STAGE 3B CHRONIC KIDNEY DISEASE, WITH LONG-TERM CURRENT USE OF INSULIN (HCC): ICD-10-CM

## 2021-04-07 DIAGNOSIS — M54.41 CHRONIC BILATERAL LOW BACK PAIN WITH BILATERAL SCIATICA: ICD-10-CM

## 2021-04-07 DIAGNOSIS — M86.461 CHRONIC OSTEOMYELITIS OF RIGHT LOWER LEG WITH DRAINING SINUS (HCC): Primary | ICD-10-CM

## 2021-04-07 DIAGNOSIS — E78.5 DYSLIPIDEMIA: ICD-10-CM

## 2021-04-07 DIAGNOSIS — N18.9 ANEMIA IN CHRONIC KIDNEY DISEASE, UNSPECIFIED CKD STAGE: ICD-10-CM

## 2021-04-07 DIAGNOSIS — N18.32 TYPE 2 DIABETES MELLITUS WITH STAGE 3B CHRONIC KIDNEY DISEASE, WITH LONG-TERM CURRENT USE OF INSULIN (HCC): ICD-10-CM

## 2021-04-07 DIAGNOSIS — I10 ESSENTIAL HYPERTENSION: ICD-10-CM

## 2021-04-07 DIAGNOSIS — E11.22 TYPE 2 DIABETES MELLITUS WITH STAGE 3B CHRONIC KIDNEY DISEASE, WITH LONG-TERM CURRENT USE OF INSULIN (HCC): ICD-10-CM

## 2021-04-07 PROBLEM — L97.214: Status: RESOLVED | Noted: 2020-06-10 | Resolved: 2021-04-07

## 2021-04-07 PROBLEM — E11.622 DIABETES MELLITUS WITH SKIN ULCER (HCC): Status: RESOLVED | Noted: 2020-06-22 | Resolved: 2021-04-07

## 2021-04-07 PROBLEM — L98.499 DIABETES MELLITUS WITH SKIN ULCER (HCC): Status: RESOLVED | Noted: 2020-06-22 | Resolved: 2021-04-07

## 2021-04-07 PROCEDURE — G0463 HOSPITAL OUTPT CLINIC VISIT: HCPCS | Performed by: INTERNAL MEDICINE

## 2021-04-07 PROCEDURE — G8754 DIAS BP LESS 90: HCPCS | Performed by: INTERNAL MEDICINE

## 2021-04-07 PROCEDURE — 1101F PT FALLS ASSESS-DOCD LE1/YR: CPT | Performed by: INTERNAL MEDICINE

## 2021-04-07 PROCEDURE — G8427 DOCREV CUR MEDS BY ELIG CLIN: HCPCS | Performed by: INTERNAL MEDICINE

## 2021-04-07 PROCEDURE — 3017F COLORECTAL CA SCREEN DOC REV: CPT | Performed by: INTERNAL MEDICINE

## 2021-04-07 PROCEDURE — 2022F DILAT RTA XM EVC RTNOPTHY: CPT | Performed by: INTERNAL MEDICINE

## 2021-04-07 PROCEDURE — G8432 DEP SCR NOT DOC, RNG: HCPCS | Performed by: INTERNAL MEDICINE

## 2021-04-07 PROCEDURE — 99215 OFFICE O/P EST HI 40 MIN: CPT | Performed by: INTERNAL MEDICINE

## 2021-04-07 PROCEDURE — G8536 NO DOC ELDER MAL SCRN: HCPCS | Performed by: INTERNAL MEDICINE

## 2021-04-07 PROCEDURE — G8753 SYS BP > OR = 140: HCPCS | Performed by: INTERNAL MEDICINE

## 2021-04-07 PROCEDURE — 3046F HEMOGLOBIN A1C LEVEL >9.0%: CPT | Performed by: INTERNAL MEDICINE

## 2021-04-07 PROCEDURE — G8420 CALC BMI NORM PARAMETERS: HCPCS | Performed by: INTERNAL MEDICINE

## 2021-04-07 RX ORDER — PEN NEEDLE, DIABETIC 32GX 5/32"
NEEDLE, DISPOSABLE MISCELLANEOUS
Qty: 100 PEN NEEDLE | Refills: 3 | Status: ON HOLD | OUTPATIENT
Start: 2021-04-07 | End: 2021-05-02 | Stop reason: CLARIF

## 2021-04-07 RX ORDER — OXYCODONE HYDROCHLORIDE 15 MG/1
15 TABLET ORAL
COMMUNITY
End: 2021-05-20

## 2021-04-07 RX ORDER — CLINDAMYCIN HYDROCHLORIDE 300 MG/1
300 CAPSULE ORAL EVERY 12 HOURS
COMMUNITY
End: 2021-05-20

## 2021-04-07 RX ORDER — SULFAMETHOXAZOLE AND TRIMETHOPRIM 800; 160 MG/1; MG/1
TABLET ORAL
Status: ON HOLD | COMMUNITY
Start: 2021-02-18 | End: 2021-05-02

## 2021-04-07 RX ORDER — OXYCODONE AND ACETAMINOPHEN 5; 325 MG/1; MG/1
1 TABLET ORAL
Qty: 90 TAB | Refills: 0 | Status: SHIPPED | OUTPATIENT
Start: 2021-04-07 | End: 2021-05-20

## 2021-04-07 NOTE — PROGRESS NOTES
Shabnam Hernandez. (: 1955) is a 77 y.o. male, established patient, here for evaluation of the following chief complaint(s):  Follow-up (diabetes)       ASSESSMENT/PLAN:  1. Chronic osteomyelitis of right lower leg with draining sinus (HCC)  Not at goal. Directed pt to continue on clindamycin and informed him that it can cause diarrhea. Pt is scheduled for R leg amputation with Dr. Rashmi Goldsmith. Asked pt to send records. Will continue to monitor for improvements or changes. 2. Dyslipidemia  Stable, patient is tolerating medications, no myalgias. I do not recommend any change in Crestor. 3. Chronic bilateral low back pain with bilateral sciatica  Stable and well-managed. Continue with ongoing regimen of Percocet. -     oxyCODONE-acetaminophen (PERCOCET) 5-325 mg per tablet; Take 1 Tab by mouth every six (6) hours as needed for Pain for up to 30 days. Max Daily Amount: 4 Tabs., Print, Disp-90 Tab, R-0Last refill needs appt    4. Essential hypertension  BP is at goal. I do not recommend any change in Lisinopril. 5. Pain in both lower extremities  Not at goal. Directed pt to continue on Percocet as Rx. Will continue to monitor for improvements or changes. -     oxyCODONE-acetaminophen (PERCOCET) 5-325 mg per tablet; Take 1 Tab by mouth every six (6) hours as needed for Pain for up to 30 days. Max Daily Amount: 4 Tabs., Print, Disp-90 Tab, R-0Last refill needs appt    6. Type 2 diabetes mellitus with stage 3b chronic kidney disease, with long-term current use of insulin (HCC)  Sugars stable. Continue to follow diabetic diet and monitor sugars. Informed pt that managing his sugars is a major aspect of preparing for and recovering from surgery. Need to obtain records from Dr. Soto who has been monitoring pt's a1c. Continue on Humalog and Lantus. Will continue to monitor for improvements or changes. 7. Anemia in chronic kidney disease, unspecified CKD stage  Stable and well-managed.  Continue with ongoing regimen of infusions, IV iron, supplements, and injections. Will continue to monitor for improvements or changes. SUBJECTIVE/OBJECTIVE:  HPI  Followed by Dr. Micheline Gauthier at [facility]. Pt reports that he is scheduled for above knee R leg amputation 4/19/21 due to chronic osteomyelitis. Pt reports that he is complying with clindamycin 1 tablet every 12 hours. He notes that he has been experiencing diarrhea. Hypertension ROS: taking medications as instructed, no medication side effects noted, no TIA's, no chest pain on exertion, no dyspnea on exertion, no swelling of ankles. New concerns: BP in office today is 160/80. Hyperlipidemia:  Cardiovascular risk analysis - 77 y.o. male LDL goal is under 100. ROS: taking medications as instructed, no medication side effects noted, no TIA's, no chest pain on exertion, no dyspnea on exertion, no swelling of ankles. Tolerating meds, no myalgias or other side effects noted. New concerns:   Lab Results   Component Value Date/Time    LDL,Direct 120 (H) 09/11/2012 12:00 AM    LDL, calculated 130 (H) 03/30/2020 02:32 PM        Diabetic Review of Systems - further diabetic ROS: no polyuria or polydipsia, no chest pain, dyspnea or TIA's. Other symptoms and concerns: Followed by Dr. Yady Guardado and pt reports that his last a1c was 8. Continues on Lantus 16 units qPM and Humalog 8 or 4 units during the day depending on what he eats. Lab Results   Component Value Date/Time    Hemoglobin A1c 11.4 (H) 03/30/2020 02:32 PM    Hemoglobin A1c, External 10.0 08/05/2020     Pt reports that his last iron transfusion was 2 weeks ago. He notes that his last blood transfusion was 1 week ago. Continues on injections and oral supplements. Back/lower extremity pain: Stable, pt continues to comply with percocet TID. Pt reports that he has labs coming up with Dr. Juan Miguel Roach to monitor his kidneys.      Review of Systems   Musculoskeletal:        Lower extremity pain   All other systems reviewed and are negative. Physical Exam  Constitutional:       Appearance: Normal appearance. HENT:      Right Ear: Tympanic membrane and external ear normal.      Left Ear: Tympanic membrane and external ear normal.      Nose: Nose normal.   Neck:      Musculoskeletal: Normal range of motion. Cardiovascular:      Rate and Rhythm: Normal rate and regular rhythm. Pulses: Normal pulses. Heart sounds: Normal heart sounds. Pulmonary:      Effort: Pulmonary effort is normal.      Breath sounds: Normal breath sounds. Abdominal:      General: Abdomen is flat. Bowel sounds are normal.      Palpations: Abdomen is soft. Musculoskeletal: Normal range of motion. Neurological:      General: No focal deficit present. Mental Status: He is alert and oriented to person, place, and time. Mental status is at baseline. Psychiatric:         Mood and Affect: Mood normal.         Behavior: Behavior normal.           On this date 04/07/2021 I have spent 45 minutes reviewing previous notes, test results and face to face with the patient discussing the diagnosis and importance of compliance with the treatment plan as well as documenting on the day of the visit. Lab results and schedule of future lab studies reviewed with patient. Reviewed diet, exercise and weight control. Written by Angélica Rodriguez, as dictated by Margarette Cohen MD.     Current diagnosis and concerns discussed with pt at length. Understands risks and benefits or current treatment plan and medications and accepts the treatment and medication with any possible risks. Pt asks appropriate questions which were answered. Pt instructed to call with any concerns or problems.

## 2021-04-12 ENCOUNTER — APPOINTMENT (OUTPATIENT)
Dept: INFUSION THERAPY | Age: 66
End: 2021-04-12

## 2021-04-16 NOTE — PROGRESS NOTES
[facility]  [address], [address] [facility] [place]  Ph [phone]  Fx [phone]    Physical Therapy Discharge Summary       Patient name: Sierra Choudhury. Start of Care: 2021   Referral source: Charleen Rodrigez MD : 1955               Medical Diagnosis: Right knee pain [M25.561]    Onset Date:begin 2021               Treatment Diagnosis: right knee pain and swelling   Prior Hospitalization: see medical history Provider#: [id number]   Medications: Verified on Patient summary List    Comorbidities: diabetes, kidney , weight loss , high blood pressure , healing wound , BKA x 10 years, anemic    Prior Level of Function: very active fishing , walking for exercise,  , traveling                             Visits from Start of Care: 2    Missed Visits: 3    Reporting Period : 21 to 21    Summary of Care:    Unable to formally assess goals as pt failed to show for scheduled followup appts. Please see below for goals assessment while pt was current under my care. Please DC to HEP at this time. Thank you for this referral. Pt will require new order if he/she requires further PT services. Short Term Goals: STG- To be accomplished in 4 week(s):  1.  Pt will be compliant with HEP for max progression toward all goals and return to PLOF. Eval:started on HEP and has handout   Current: added prone lying to HEP 2021     2. Pt pain will improve >= 40% to allow pt improved tolerance to daily activity and use of prosthesis wear and gait .   Eval:pain can get up to 8/10      3. Pt FOTO score will increase by >=8 points to show improvement in functional mobility. Eval:46/100  *will reasses every 5th visit      Long Term Goals: LTG- To be accomplished in 8 week(s):     1.  Pt will be independant with HEP for continued and ongoing progression following discharge toward full functional mobility. Eval: started on HEP      2. Pt pain will improve >= 80% to allow pt return to PLOF. Eval: pain up to 8/10      3. Pt FOTO score will increase by >=16 points to show improvement in functional mobility. Eval:46/100   will reassess every 5th visit      4. Pt strength will improve to 5/5 bilateral LE hip and knees to allow pt to return to PLOF   Eval:      Left Right   Hip Flexion 4+  4-     ER 3 4   Knee Flexion 5 5     Extension 5 5   Ankle Plantarflexion 5 NA     Dorsiflexion 3+  NA         5.  Pt ROM will improve by >=10 deg extension right knee and >=25 deg flexion  to allow pt to return to PLOF and normal gait with prosthesis   Eval:      Left Right   Hip Flexion   + tight     ER       Knee Flexion 130/135 82     Extension 7 hyper  -16   Ankle Plantarflexion 5 deg from neutral  NA     Dorsiflexion   NA         6.  Pt swelling will decrease allowing proper wear of his prosthesis promoting return to PLOF and gait .   Eval:Girth Measurements:      Cm at 2\" above patella   Cm at suprapatella    Cm at  infrapatella    Cm 2\" below patella    Left 34.75 36 35 30   Right  38 39.25 39 35.75        ASSESSMENT/RECOMMENDATIONS:  [x]Discontinue therapy: []Patient has reached or is progressing toward set goals      [x]Patient is non-compliant or has abdicated      []Due to lack of appreciable progress towards set goals    Penny Seymour, PT 4/16/2021 10:44 AM

## 2021-04-26 ENCOUNTER — APPOINTMENT (OUTPATIENT)
Dept: INFUSION THERAPY | Age: 66
End: 2021-04-26

## 2021-05-01 ENCOUNTER — APPOINTMENT (OUTPATIENT)
Dept: CT IMAGING | Age: 66
DRG: 551 | End: 2021-05-01
Attending: EMERGENCY MEDICINE
Payer: MEDICARE

## 2021-05-01 ENCOUNTER — HOSPITAL ENCOUNTER (EMERGENCY)
Dept: GENERAL RADIOLOGY | Age: 66
Discharge: HOME | DRG: 551 | End: 2021-05-01
Attending: EMERGENCY MEDICINE
Payer: MEDICARE

## 2021-05-01 ENCOUNTER — HOSPITAL ENCOUNTER (INPATIENT)
Age: 66
LOS: 19 days | Discharge: NURSING, REHAB OR LONG-TERM CARE | DRG: 551 | End: 2021-05-20
Attending: EMERGENCY MEDICINE | Admitting: INTERNAL MEDICINE
Payer: MEDICARE

## 2021-05-01 DIAGNOSIS — R27.8 ASTERIXIS: ICD-10-CM

## 2021-05-01 DIAGNOSIS — M54.41 CHRONIC BILATERAL LOW BACK PAIN WITH BILATERAL SCIATICA: ICD-10-CM

## 2021-05-01 DIAGNOSIS — M79.605 PAIN IN BOTH LOWER EXTREMITIES: ICD-10-CM

## 2021-05-01 DIAGNOSIS — M54.42 CHRONIC BILATERAL LOW BACK PAIN WITH BILATERAL SCIATICA: ICD-10-CM

## 2021-05-01 DIAGNOSIS — R53.1 WEAKNESS: Primary | ICD-10-CM

## 2021-05-01 DIAGNOSIS — N17.9 ACUTE RENAL FAILURE, UNSPECIFIED ACUTE RENAL FAILURE TYPE (HCC): ICD-10-CM

## 2021-05-01 DIAGNOSIS — M79.604 PAIN IN BOTH LOWER EXTREMITIES: ICD-10-CM

## 2021-05-01 DIAGNOSIS — D64.9 ANEMIA, UNSPECIFIED TYPE: ICD-10-CM

## 2021-05-01 DIAGNOSIS — G89.29 CHRONIC BILATERAL LOW BACK PAIN WITH BILATERAL SCIATICA: ICD-10-CM

## 2021-05-01 DIAGNOSIS — N18.30 STAGE 3 CHRONIC KIDNEY DISEASE, UNSPECIFIED WHETHER STAGE 3A OR 3B CKD (HCC): Chronic | ICD-10-CM

## 2021-05-01 DIAGNOSIS — M54.50 ACUTE BILATERAL LOW BACK PAIN WITHOUT SCIATICA: ICD-10-CM

## 2021-05-01 DIAGNOSIS — D70.2 OTHER DRUG-INDUCED NEUTROPENIA (HCC): ICD-10-CM

## 2021-05-01 PROBLEM — R41.0 ACUTE DELIRIUM: Status: ACTIVE | Noted: 2021-05-01

## 2021-05-01 LAB
ALBUMIN SERPL-MCNC: 1.1 G/DL (ref 3.5–5)
ALBUMIN/GLOB SERPL: 0.2 {RATIO} (ref 1.1–2.2)
ALP SERPL-CCNC: 236 U/L (ref 45–117)
ALT SERPL-CCNC: 14 U/L (ref 12–78)
ANION GAP SERPL CALC-SCNC: 5 MMOL/L (ref 5–15)
APPEARANCE UR: ABNORMAL
AST SERPL-CCNC: 18 U/L (ref 15–37)
BACTERIA URNS QL MICRO: ABNORMAL /HPF
BASOPHILS # BLD: 0.1 K/UL (ref 0–0.1)
BASOPHILS NFR BLD: 0 % (ref 0–1)
BILIRUB SERPL-MCNC: 0.3 MG/DL (ref 0.2–1)
BILIRUB UR QL: NEGATIVE
BUN SERPL-MCNC: 55 MG/DL (ref 6–20)
BUN/CREAT SERPL: 26 (ref 12–20)
CALCIUM SERPL-MCNC: 9.4 MG/DL (ref 8.5–10.1)
CHLORIDE SERPL-SCNC: 102 MMOL/L (ref 97–108)
CO2 SERPL-SCNC: 27 MMOL/L (ref 21–32)
COLOR UR: ABNORMAL
COMMENT, HOLDF: NORMAL
COVID-19 RAPID TEST, COVR: NOT DETECTED
CREAT SERPL-MCNC: 2.1 MG/DL (ref 0.7–1.3)
DIFFERENTIAL METHOD BLD: ABNORMAL
EOSINOPHIL # BLD: 0.1 K/UL (ref 0–0.4)
EOSINOPHIL NFR BLD: 1 % (ref 0–7)
EPITH CASTS URNS QL MICRO: ABNORMAL /LPF
ERYTHROCYTE [DISTWIDTH] IN BLOOD BY AUTOMATED COUNT: 17.3 % (ref 11.5–14.5)
GLOBULIN SER CALC-MCNC: 6.8 G/DL (ref 2–4)
GLUCOSE SERPL-MCNC: 132 MG/DL (ref 65–100)
GLUCOSE UR STRIP.AUTO-MCNC: NEGATIVE MG/DL
GRAN CASTS URNS QL MICRO: ABNORMAL /LPF
HCT VFR BLD AUTO: 25.9 % (ref 36.6–50.3)
HGB BLD-MCNC: 7.6 G/DL (ref 12.1–17)
HGB UR QL STRIP: ABNORMAL
HYALINE CASTS URNS QL MICRO: ABNORMAL /LPF (ref 0–5)
IMM GRANULOCYTES # BLD AUTO: 0.1 K/UL (ref 0–0.04)
IMM GRANULOCYTES NFR BLD AUTO: 0 % (ref 0–0.5)
KETONES UR QL STRIP.AUTO: NEGATIVE MG/DL
LACTATE SERPL-SCNC: 1 MMOL/L (ref 0.4–2)
LEUKOCYTE ESTERASE UR QL STRIP.AUTO: NEGATIVE
LYMPHOCYTES # BLD: 1.3 K/UL (ref 0.8–3.5)
LYMPHOCYTES NFR BLD: 9 % (ref 12–49)
MAGNESIUM SERPL-MCNC: 2.1 MG/DL (ref 1.6–2.4)
MCH RBC QN AUTO: 26.1 PG (ref 26–34)
MCHC RBC AUTO-ENTMCNC: 29.3 G/DL (ref 30–36.5)
MCV RBC AUTO: 89 FL (ref 80–99)
MONOCYTES # BLD: 0.9 K/UL (ref 0–1)
MONOCYTES NFR BLD: 6 % (ref 5–13)
NEUTS SEG # BLD: 11 K/UL (ref 1.8–8)
NEUTS SEG NFR BLD: 84 % (ref 32–75)
NITRITE UR QL STRIP.AUTO: NEGATIVE
NRBC # BLD: 0 K/UL (ref 0–0.01)
NRBC BLD-RTO: 0 PER 100 WBC
PH UR STRIP: 5 [PH] (ref 5–8)
PLATELET # BLD AUTO: 402 K/UL (ref 150–400)
PMV BLD AUTO: 8.3 FL (ref 8.9–12.9)
POTASSIUM SERPL-SCNC: 4.5 MMOL/L (ref 3.5–5.1)
PROT SERPL-MCNC: 7.9 G/DL (ref 6.4–8.2)
PROT UR STRIP-MCNC: 100 MG/DL
RBC # BLD AUTO: 2.91 M/UL (ref 4.1–5.7)
RBC #/AREA URNS HPF: ABNORMAL /HPF (ref 0–5)
SAMPLES BEING HELD,HOLD: NORMAL
SODIUM SERPL-SCNC: 134 MMOL/L (ref 136–145)
SOURCE, COVRS: NORMAL
SP GR UR REFRACTOMETRY: 1.02 (ref 1–1.03)
TROPONIN I SERPL-MCNC: <0.05 NG/ML
UR CULT HOLD, URHOLD: NORMAL
UROBILINOGEN UR QL STRIP.AUTO: 0.2 EU/DL (ref 0.2–1)
WBC # BLD AUTO: 13.4 K/UL (ref 4.1–11.1)
WBC URNS QL MICRO: ABNORMAL /HPF (ref 0–4)

## 2021-05-01 PROCEDURE — 87635 SARS-COV-2 COVID-19 AMP PRB: CPT

## 2021-05-01 PROCEDURE — 36415 COLL VENOUS BLD VENIPUNCTURE: CPT

## 2021-05-01 PROCEDURE — 65660000000 HC RM CCU STEPDOWN

## 2021-05-01 PROCEDURE — 87186 SC STD MICRODIL/AGAR DIL: CPT

## 2021-05-01 PROCEDURE — 93005 ELECTROCARDIOGRAM TRACING: CPT

## 2021-05-01 PROCEDURE — 87040 BLOOD CULTURE FOR BACTERIA: CPT

## 2021-05-01 PROCEDURE — 72100 X-RAY EXAM L-S SPINE 2/3 VWS: CPT

## 2021-05-01 PROCEDURE — 99285 EMERGENCY DEPT VISIT HI MDM: CPT

## 2021-05-01 PROCEDURE — 84484 ASSAY OF TROPONIN QUANT: CPT

## 2021-05-01 PROCEDURE — 81001 URINALYSIS AUTO W/SCOPE: CPT

## 2021-05-01 PROCEDURE — 74011250636 HC RX REV CODE- 250/636: Performed by: EMERGENCY MEDICINE

## 2021-05-01 PROCEDURE — 83735 ASSAY OF MAGNESIUM: CPT

## 2021-05-01 PROCEDURE — 71045 X-RAY EXAM CHEST 1 VIEW: CPT

## 2021-05-01 PROCEDURE — 80053 COMPREHEN METABOLIC PANEL: CPT

## 2021-05-01 PROCEDURE — 87077 CULTURE AEROBIC IDENTIFY: CPT

## 2021-05-01 PROCEDURE — 74011250637 HC RX REV CODE- 250/637: Performed by: INTERNAL MEDICINE

## 2021-05-01 PROCEDURE — 85025 COMPLETE CBC W/AUTO DIFF WBC: CPT

## 2021-05-01 PROCEDURE — 70450 CT HEAD/BRAIN W/O DYE: CPT

## 2021-05-01 PROCEDURE — 83605 ASSAY OF LACTIC ACID: CPT

## 2021-05-01 RX ORDER — CLINDAMYCIN HYDROCHLORIDE 150 MG/1
300 CAPSULE ORAL EVERY 12 HOURS
Status: DISCONTINUED | OUTPATIENT
Start: 2021-05-02 | End: 2021-05-05

## 2021-05-01 RX ORDER — INSULIN LISPRO 100 [IU]/ML
INJECTION, SOLUTION INTRAVENOUS; SUBCUTANEOUS
Status: DISCONTINUED | OUTPATIENT
Start: 2021-05-02 | End: 2021-05-20 | Stop reason: HOSPADM

## 2021-05-01 RX ORDER — SODIUM CHLORIDE 0.9 % (FLUSH) 0.9 %
5-40 SYRINGE (ML) INJECTION EVERY 8 HOURS
Status: DISCONTINUED | OUTPATIENT
Start: 2021-05-02 | End: 2021-05-20 | Stop reason: HOSPADM

## 2021-05-01 RX ORDER — ACETAMINOPHEN 650 MG/1
650 SUPPOSITORY RECTAL
Status: DISCONTINUED | OUTPATIENT
Start: 2021-05-01 | End: 2021-05-20 | Stop reason: HOSPADM

## 2021-05-01 RX ORDER — PANTOPRAZOLE SODIUM 40 MG/1
40 TABLET, DELAYED RELEASE ORAL
Status: DISCONTINUED | OUTPATIENT
Start: 2021-05-02 | End: 2021-05-20 | Stop reason: HOSPADM

## 2021-05-01 RX ORDER — GUAIFENESIN 100 MG/5ML
81 LIQUID (ML) ORAL DAILY
Status: DISCONTINUED | OUTPATIENT
Start: 2021-05-02 | End: 2021-05-20 | Stop reason: HOSPADM

## 2021-05-01 RX ORDER — ACETAMINOPHEN 325 MG/1
650 TABLET ORAL
Status: DISCONTINUED | OUTPATIENT
Start: 2021-05-01 | End: 2021-05-20 | Stop reason: HOSPADM

## 2021-05-01 RX ORDER — ONDANSETRON 2 MG/ML
4 INJECTION INTRAMUSCULAR; INTRAVENOUS
Status: DISCONTINUED | OUTPATIENT
Start: 2021-05-01 | End: 2021-05-20 | Stop reason: HOSPADM

## 2021-05-01 RX ORDER — MAGNESIUM SULFATE 100 %
4 CRYSTALS MISCELLANEOUS AS NEEDED
Status: DISCONTINUED | OUTPATIENT
Start: 2021-05-01 | End: 2021-05-20 | Stop reason: HOSPADM

## 2021-05-01 RX ORDER — OXYCODONE HYDROCHLORIDE 5 MG/1
15 TABLET ORAL
Status: DISCONTINUED | OUTPATIENT
Start: 2021-05-01 | End: 2021-05-04

## 2021-05-01 RX ORDER — SODIUM CHLORIDE, SODIUM LACTATE, POTASSIUM CHLORIDE, CALCIUM CHLORIDE 600; 310; 30; 20 MG/100ML; MG/100ML; MG/100ML; MG/100ML
75 INJECTION, SOLUTION INTRAVENOUS CONTINUOUS
Status: DISCONTINUED | OUTPATIENT
Start: 2021-05-01 | End: 2021-05-05

## 2021-05-01 RX ORDER — SILVER SULFADIAZINE 10 G/1000G
CREAM TOPICAL DAILY
Status: DISCONTINUED | OUTPATIENT
Start: 2021-05-02 | End: 2021-05-03

## 2021-05-01 RX ORDER — HEPARIN SODIUM 5000 [USP'U]/ML
5000 INJECTION, SOLUTION INTRAVENOUS; SUBCUTANEOUS EVERY 8 HOURS
Status: DISCONTINUED | OUTPATIENT
Start: 2021-05-02 | End: 2021-05-20 | Stop reason: HOSPADM

## 2021-05-01 RX ORDER — PROMETHAZINE HYDROCHLORIDE 25 MG/1
12.5 TABLET ORAL
Status: DISCONTINUED | OUTPATIENT
Start: 2021-05-01 | End: 2021-05-20 | Stop reason: HOSPADM

## 2021-05-01 RX ORDER — POLYETHYLENE GLYCOL 3350 17 G/17G
17 POWDER, FOR SOLUTION ORAL DAILY PRN
Status: DISCONTINUED | OUTPATIENT
Start: 2021-05-01 | End: 2021-05-20 | Stop reason: HOSPADM

## 2021-05-01 RX ORDER — ROSUVASTATIN CALCIUM 40 MG/1
40 TABLET, COATED ORAL
Status: DISCONTINUED | OUTPATIENT
Start: 2021-05-02 | End: 2021-05-07

## 2021-05-01 RX ORDER — DEXTROSE 50 % IN WATER (D50W) INTRAVENOUS SYRINGE
12.5-25 AS NEEDED
Status: DISCONTINUED | OUTPATIENT
Start: 2021-05-01 | End: 2021-05-20 | Stop reason: HOSPADM

## 2021-05-01 RX ORDER — LANOLIN ALCOHOL/MO/W.PET/CERES
1 CREAM (GRAM) TOPICAL 2 TIMES DAILY WITH MEALS
Status: DISCONTINUED | OUTPATIENT
Start: 2021-05-02 | End: 2021-05-20 | Stop reason: HOSPADM

## 2021-05-01 RX ORDER — SODIUM CHLORIDE 0.9 % (FLUSH) 0.9 %
5-40 SYRINGE (ML) INJECTION AS NEEDED
Status: DISCONTINUED | OUTPATIENT
Start: 2021-05-01 | End: 2021-05-20 | Stop reason: HOSPADM

## 2021-05-01 RX ORDER — ERGOCALCIFEROL 1.25 MG/1
50000 CAPSULE ORAL
Status: DISCONTINUED | OUTPATIENT
Start: 2021-05-07 | End: 2021-05-20 | Stop reason: HOSPADM

## 2021-05-01 RX ORDER — IPRATROPIUM BROMIDE AND ALBUTEROL SULFATE 2.5; .5 MG/3ML; MG/3ML
3 SOLUTION RESPIRATORY (INHALATION)
Status: DISCONTINUED | OUTPATIENT
Start: 2021-05-01 | End: 2021-05-20 | Stop reason: HOSPADM

## 2021-05-01 RX ADMIN — OXYCODONE 15 MG: 5 TABLET ORAL at 23:25

## 2021-05-01 RX ADMIN — SODIUM CHLORIDE 500 ML: 9 INJECTION, SOLUTION INTRAVENOUS at 20:51

## 2021-05-01 RX ADMIN — ROSUVASTATIN 40 MG: 40 TABLET, FILM COATED ORAL at 23:26

## 2021-05-01 NOTE — ED TRIAGE NOTES
Pt arrives via EMS from [facility] c/o generalized weakness. Pt reports he had a recent low Hgb and was scheduled for a blood transfusion but did not receive it.

## 2021-05-01 NOTE — ED PROVIDER NOTES
HPI .  Patient has a history of BPH, chronic kidney disease, chronic pain, hypertension, sleep apnea, DVT, neuropathy, diabetes, hyperlipidemia, diabetic foot ulcer, and arthritis. Sister reports that the patient was admitted 3 days before his above-knee amputation with severe dehydration hyperkalemia and a hemoglobin of 5. He was given blood prior to his AKA. Patient lives in [place] and 2 weeks ago had a right above-knee amputation in [place]. He is in a rehab hospital in [place] he is at a rehab facility in [place]. Rehab physician called me and told me that the patient was anemic and was not responding even to a sternal rub. Patient gives a different history. He has many complaints. One complaint is that his left arm has not worked that great since his surgery. His left leg is also weak and he cannot lift it against gravity but this is been going on for at least several weeks. Patient says back pain in the lumbar area is making his rehab difficult. He reports feeling bad and worse than yesterday. Over the last 24 hours he has had trouble holding his fork and spoon and feeding himself. He reports vomiting today. He has had trouble using his phone today. He says his hands are shaking which appears to be asterixis. Patient has been getting every other week Epogen injections. He was due for 1 yesterday which was Friday.     Past Medical History:   Diagnosis Date    Arthritis     BPH (benign prostatic hyperplasia)     Chronic kidney disease     Chronic pain     BACK NEUROPATHY FEET HANDS    DM neuropathy, type II diabetes mellitus ([address]     DM type 2 causing CKD stage 3 ([address] 12/17/2012    DM type 2 causing vascular disease ([address]     Dyslipidemia     Foot ulcer due to secondary DM ([address] 12/1/2012    GERD (gastroesophageal reflux disease)     History of esophageal dilatation     HTN     Ill-defined condition 2013    MRSA in wound right leg (BKA)    S/P BKA (below knee amputation) ([address]     right BKA due to non-healing ulcer    Sleep apnea     Doesnt use CPAP, patient hasnt been retested since weight loss    Thromboembolus ([facility] 1970'S    BLOOD CLOT LEFT LEG       Past Surgical History:   Procedure Laterality Date    COLONOSCOPY N/A 3/30/2021    COLONOSCOPY performed by Frederick Moreau MD at [address] [facility] ECHO 2D ADULT  8/09    normal; LVEF 60%    ECHO STRESS  4/09    7 min; normal    ENDOSCOPY, COLON, DIAGNOSTIC      FLEXIBLE SIGMOIDOSCOPY N/A 2/24/2021    SIGMOIDOSCOPY FLEXIBLE performed by Frederick Moreau MD at [address] [facility] HX AMPUTATION  2012    left great toe    HX AMPUTATION  2007    BKA right    HX BELOW KNEE AMPUTATION Right     HX CERVICAL FUSION  2009    x2    HX ENDOSCOPY      EGD with Dilitation x 2    HX ORTHOPAEDIC  2006    ACDF C4-C7    IR INSERT TUNL CVC W/O PORT OVER 5 YR  6/19/2020    IR REMOVE TUNL CVAD W/O PORT / PUMP  9/14/2020    PA ABDOMEN SURGERY PROC UNLISTED      pilonidal cyst    STRESS TEST MYOVIEW  8/09    walked 8:46; normal; LVEF 51%         Family History:   Problem Relation Age of Onset    Hypertension Mother    [facility] Gout Mother     Cancer Father         leukemia    Diabetes Father     Hypertension Sister     Diabetes Maternal Grandmother     Hypertension Maternal Grandmother     Diabetes Paternal Grandmother     Hypertension Paternal Grandmother     Anesth Problems Neg Hx        Social History     Socioeconomic History    Marital status:      Spouse name: Not on file    Number of children: Not on file    Years of education: Not on file    Highest education level: Not on file   Occupational History    Not on file   Social Needs    Financial resource strain: Not on file    Food insecurity     Worry: Not on file     Inability: Not on file    Transportation needs     Medical: Not on file     Non-medical: Not on file   Tobacco Use    Smoking status: Never Smoker    Smokeless tobacco: Never Used Substance and Sexual Activity    Alcohol use: No    Drug use: No    Sexual activity: Yes     Partners: Female     Birth control/protection: None   Lifestyle    Physical activity     Days per week: Not on file     Minutes per session: Not on file    Stress: Not on file   Relationships    Social connections     Talks on phone: Not on file     Gets together: Not on file     Attends Scientologist service: Not on file     Active member of club or organization: Not on file     Attends meetings of clubs or organizations: Not on file     Relationship status: Not on file    Intimate partner violence     Fear of current or ex partner: Not on file     Emotionally abused: Not on file     Physically abused: Not on file     Forced sexual activity: Not on file   Other Topics Concern    Not on file   Social History Narrative    Not on file         ALLERGIES: Adhesive tape-silicones and Sulfa (sulfonamide antibiotics)    Review of Systems   All other systems reviewed and are negative. Vitals:    05/01/21 1646   BP: 131/66   Pulse: 97   Resp: 18   Temp: 97.8 °F (36.6 °C)            Physical Exam  Vitals signs and nursing note reviewed. Constitutional:       Appearance: He is well-developed. Comments: thin   HENT:      Head: Normocephalic and atraumatic. Eyes:      Pupils: Pupils are equal, round, and reactive to light. Neck:      Musculoskeletal: Normal range of motion and neck supple. Cardiovascular:      Rate and Rhythm: Normal rate and regular rhythm. Heart sounds: Normal heart sounds. No murmur. No friction rub. No gallop. Pulmonary:      Effort: Pulmonary effort is normal. No respiratory distress. Breath sounds: No wheezing or rales. Abdominal:      Palpations: Abdomen is soft. Tenderness: There is no abdominal tenderness. There is no rebound. Genitourinary:     Comments: hoyos  Musculoskeletal: Normal range of motion. General: No tenderness.       Comments: Right above-knee amputation   Skin:     Findings: No erythema. Neurological:      Mental Status: He is alert. Cranial Nerves: No cranial nerve deficit. Comments: Motor; ; trouble lifting left arm possibly due to shoulder pain; unable to lift left leg against gravity   Psychiatric:         Behavior: Behavior normal.          MDM       Procedures          ED EKG interpretation:  Rhythm: normal sinus rhythm; and regular . Rate (approx.): 95; Axis: normal; P wave: normal; QRS interval: normal ; ST/T wave: normal; in  Lead: ; Other findings: normal. This EKG was interpreted by Jonothan Cabot, MD,ED Provider. 5:46 PM         Note: Family member is feeding the patient. He continues to have asterixis type jerking. He is unable to put a cracker in his mouth without help. Patient's BUN has increased from 27 in early March to 54 today. Asterixis might be part of a uremic picture. Patient also has been vomiting and may be dehydrated. Normal saline 500 mL will be ordered. Patient's nephrology group will be consulted. I will ask the hospitalist to admit the patient. Jonothan Cabot, MD  7:53 PM      Perfect Serve Consult for Admission  7:54 PM    ED Room Number: ER16/16  Patient Name and age:  George Sinha 77 y.o.  male  Working Diagnosis:   1. Weakness    2. Asterixis    3. Acute renal failure, unspecified acute renal failure type ([address]    4. Anemia, unspecified type    5. Acute bilateral low back pain without sciatica        COVID-19 Suspicion:  no  Sepsis present:  no  Reassessment needed: yes  Code Status:  Full Code  Readmission: yes  Isolation Requirements:  no  Recommended Level of Care:  telemetry  Department:[facility] Adult ED - [phone]  Other:        CONSULT NOTE:  Spoke to Dr Nuvia Winkler  concerning the patient. The patient's history, presentation, physical findings, and results were all discussed.    8:15 PM

## 2021-05-02 ENCOUNTER — APPOINTMENT (OUTPATIENT)
Dept: MRI IMAGING | Age: 66
DRG: 551 | End: 2021-05-02
Attending: INTERNAL MEDICINE
Payer: MEDICARE

## 2021-05-02 ENCOUNTER — APPOINTMENT (OUTPATIENT)
Dept: CT IMAGING | Age: 66
DRG: 551 | End: 2021-05-02
Attending: INTERNAL MEDICINE
Payer: MEDICARE

## 2021-05-02 ENCOUNTER — APPOINTMENT (OUTPATIENT)
Dept: VASCULAR SURGERY | Age: 66
DRG: 551 | End: 2021-05-02
Attending: INTERNAL MEDICINE
Payer: MEDICARE

## 2021-05-02 LAB
ALBUMIN SERPL-MCNC: 1.2 G/DL (ref 3.5–5)
ALBUMIN/GLOB SERPL: 0.2 {RATIO} (ref 1.1–2.2)
ALP SERPL-CCNC: 234 U/L (ref 45–117)
ALT SERPL-CCNC: 13 U/L (ref 12–78)
AMMONIA PLAS-SCNC: 22 UMOL/L
ANION GAP SERPL CALC-SCNC: 8 MMOL/L (ref 5–15)
APAP SERPL-MCNC: <2 UG/ML (ref 10–30)
AST SERPL-CCNC: 18 U/L (ref 15–37)
ATRIAL RATE: 96 BPM
BASOPHILS # BLD: 0 K/UL (ref 0–0.1)
BASOPHILS NFR BLD: 0 % (ref 0–1)
BILIRUB SERPL-MCNC: 0.3 MG/DL (ref 0.2–1)
BUN SERPL-MCNC: 59 MG/DL (ref 6–20)
BUN/CREAT SERPL: 31 (ref 12–20)
CALCIUM SERPL-MCNC: 8.9 MG/DL (ref 8.5–10.1)
CALCULATED P AXIS, ECG09: 54 DEGREES
CALCULATED R AXIS, ECG10: 13 DEGREES
CALCULATED T AXIS, ECG11: 72 DEGREES
CHLORIDE SERPL-SCNC: 103 MMOL/L (ref 97–108)
CO2 SERPL-SCNC: 24 MMOL/L (ref 21–32)
CREAT SERPL-MCNC: 1.91 MG/DL (ref 0.7–1.3)
DIAGNOSIS, 93000: NORMAL
DIFFERENTIAL METHOD BLD: ABNORMAL
EOSINOPHIL # BLD: 0.1 K/UL (ref 0–0.4)
EOSINOPHIL NFR BLD: 1 % (ref 0–7)
ERYTHROCYTE [DISTWIDTH] IN BLOOD BY AUTOMATED COUNT: 17.3 % (ref 11.5–14.5)
EST. AVERAGE GLUCOSE BLD GHB EST-MCNC: 160 MG/DL
ETHANOL SERPL-MCNC: <10 MG/DL
FERRITIN SERPL-MCNC: 2753 NG/ML (ref 26–388)
FOLATE SERPL-MCNC: 7.1 NG/ML (ref 5–21)
GLOBULIN SER CALC-MCNC: 6 G/DL (ref 2–4)
GLUCOSE BLD STRIP.AUTO-MCNC: 123 MG/DL (ref 65–100)
GLUCOSE BLD STRIP.AUTO-MCNC: 142 MG/DL (ref 65–100)
GLUCOSE BLD STRIP.AUTO-MCNC: 144 MG/DL (ref 65–100)
GLUCOSE BLD STRIP.AUTO-MCNC: 161 MG/DL (ref 65–100)
GLUCOSE SERPL-MCNC: 131 MG/DL (ref 65–100)
HBA1C MFR BLD: 7.2 % (ref 4–5.6)
HCT VFR BLD AUTO: 25.9 % (ref 36.6–50.3)
HGB BLD-MCNC: 7.4 G/DL (ref 12.1–17)
IMM GRANULOCYTES # BLD AUTO: 0.1 K/UL (ref 0–0.04)
IMM GRANULOCYTES NFR BLD AUTO: 1 % (ref 0–0.5)
IRON SATN MFR SERPL: 27 % (ref 20–50)
IRON SERPL-MCNC: 29 UG/DL (ref 35–150)
LIPASE SERPL-CCNC: 113 U/L (ref 73–393)
LYMPHOCYTES # BLD: 0.9 K/UL (ref 0.8–3.5)
LYMPHOCYTES NFR BLD: 7 % (ref 12–49)
MAGNESIUM SERPL-MCNC: 2 MG/DL (ref 1.6–2.4)
MCH RBC QN AUTO: 26.1 PG (ref 26–34)
MCHC RBC AUTO-ENTMCNC: 28.6 G/DL (ref 30–36.5)
MCV RBC AUTO: 91.5 FL (ref 80–99)
MONOCYTES # BLD: 0.7 K/UL (ref 0–1)
MONOCYTES NFR BLD: 5 % (ref 5–13)
NEUTS SEG # BLD: 11.7 K/UL (ref 1.8–8)
NEUTS SEG NFR BLD: 86 % (ref 32–75)
NRBC # BLD: 0 K/UL (ref 0–0.01)
NRBC BLD-RTO: 0 PER 100 WBC
P-R INTERVAL, ECG05: 140 MS
PHOSPHATE SERPL-MCNC: 4.8 MG/DL (ref 2.6–4.7)
PLATELET # BLD AUTO: 446 K/UL (ref 150–400)
PMV BLD AUTO: 8.7 FL (ref 8.9–12.9)
POTASSIUM SERPL-SCNC: 4.9 MMOL/L (ref 3.5–5.1)
PROT SERPL-MCNC: 7.2 G/DL (ref 6.4–8.2)
Q-T INTERVAL, ECG07: 334 MS
QRS DURATION, ECG06: 82 MS
QTC CALCULATION (BEZET), ECG08: 421 MS
RBC # BLD AUTO: 2.83 M/UL (ref 4.1–5.7)
RBC MORPH BLD: ABNORMAL
SALICYLATES SERPL-MCNC: <1.7 MG/DL (ref 2.8–20)
SERVICE CMNT-IMP: ABNORMAL
SODIUM SERPL-SCNC: 135 MMOL/L (ref 136–145)
TIBC SERPL-MCNC: 109 UG/DL (ref 250–450)
TROPONIN I SERPL-MCNC: <0.05 NG/ML
TROPONIN I SERPL-MCNC: <0.05 NG/ML
TSH SERPL DL<=0.05 MIU/L-ACNC: 3.01 UIU/ML (ref 0.36–3.74)
VENTRICULAR RATE, ECG03: 96 BPM
VIT B12 SERPL-MCNC: 683 PG/ML (ref 193–986)
WBC # BLD AUTO: 13.5 K/UL (ref 4.1–11.1)

## 2021-05-02 PROCEDURE — 82077 ASSAY SPEC XCP UR&BREATH IA: CPT

## 2021-05-02 PROCEDURE — 83540 ASSAY OF IRON: CPT

## 2021-05-02 PROCEDURE — 82607 VITAMIN B-12: CPT

## 2021-05-02 PROCEDURE — 80053 COMPREHEN METABOLIC PANEL: CPT

## 2021-05-02 PROCEDURE — 84100 ASSAY OF PHOSPHORUS: CPT

## 2021-05-02 PROCEDURE — 74011000250 HC RX REV CODE- 250: Performed by: INTERNAL MEDICINE

## 2021-05-02 PROCEDURE — 74011250636 HC RX REV CODE- 250/636: Performed by: INTERNAL MEDICINE

## 2021-05-02 PROCEDURE — 70551 MRI BRAIN STEM W/O DYE: CPT

## 2021-05-02 PROCEDURE — 74011250637 HC RX REV CODE- 250/637: Performed by: INTERNAL MEDICINE

## 2021-05-02 PROCEDURE — 83690 ASSAY OF LIPASE: CPT

## 2021-05-02 PROCEDURE — 36415 COLL VENOUS BLD VENIPUNCTURE: CPT

## 2021-05-02 PROCEDURE — 82728 ASSAY OF FERRITIN: CPT

## 2021-05-02 PROCEDURE — 65660000000 HC RM CCU STEPDOWN

## 2021-05-02 PROCEDURE — 80143 DRUG ASSAY ACETAMINOPHEN: CPT

## 2021-05-02 PROCEDURE — 82746 ASSAY OF FOLIC ACID SERUM: CPT

## 2021-05-02 PROCEDURE — 84484 ASSAY OF TROPONIN QUANT: CPT

## 2021-05-02 PROCEDURE — 83735 ASSAY OF MAGNESIUM: CPT

## 2021-05-02 PROCEDURE — 82962 GLUCOSE BLOOD TEST: CPT

## 2021-05-02 PROCEDURE — 83036 HEMOGLOBIN GLYCOSYLATED A1C: CPT

## 2021-05-02 PROCEDURE — 93971 EXTREMITY STUDY: CPT

## 2021-05-02 PROCEDURE — 74176 CT ABD & PELVIS W/O CONTRAST: CPT

## 2021-05-02 PROCEDURE — 82140 ASSAY OF AMMONIA: CPT

## 2021-05-02 PROCEDURE — 84443 ASSAY THYROID STIM HORMONE: CPT

## 2021-05-02 PROCEDURE — 80179 DRUG ASSAY SALICYLATE: CPT

## 2021-05-02 PROCEDURE — 74011636637 HC RX REV CODE- 636/637: Performed by: INTERNAL MEDICINE

## 2021-05-02 PROCEDURE — 77030037877 HC DRSG MEPILEX >48IN BORD MOLN -A

## 2021-05-02 PROCEDURE — 85025 COMPLETE CBC W/AUTO DIFF WBC: CPT

## 2021-05-02 PROCEDURE — 71250 CT THORAX DX C-: CPT

## 2021-05-02 RX ADMIN — Medication 10 ML: at 06:00

## 2021-05-02 RX ADMIN — HEPARIN SODIUM 5000 UNITS: 5000 INJECTION INTRAVENOUS; SUBCUTANEOUS at 00:17

## 2021-05-02 RX ADMIN — SODIUM CHLORIDE, POTASSIUM CHLORIDE, SODIUM LACTATE AND CALCIUM CHLORIDE 125 ML/HR: 600; 310; 30; 20 INJECTION, SOLUTION INTRAVENOUS at 15:24

## 2021-05-02 RX ADMIN — FERROUS SULFATE TAB 325 MG (65 MG ELEMENTAL FE) 325 MG: 325 (65 FE) TAB at 09:16

## 2021-05-02 RX ADMIN — HEPARIN SODIUM 5000 UNITS: 5000 INJECTION INTRAVENOUS; SUBCUTANEOUS at 09:16

## 2021-05-02 RX ADMIN — ASPIRIN 81 MG: 81 TABLET, CHEWABLE ORAL at 09:16

## 2021-05-02 RX ADMIN — CLINDAMYCIN HYDROCHLORIDE 300 MG: 150 CAPSULE ORAL at 10:08

## 2021-05-02 RX ADMIN — SODIUM CHLORIDE, POTASSIUM CHLORIDE, SODIUM LACTATE AND CALCIUM CHLORIDE 125 ML/HR: 600; 310; 30; 20 INJECTION, SOLUTION INTRAVENOUS at 05:41

## 2021-05-02 RX ADMIN — Medication 10 ML: at 00:00

## 2021-05-02 RX ADMIN — SILVER SULFADIAZINE: 10 CREAM TOPICAL at 09:30

## 2021-05-02 RX ADMIN — Medication 10 ML: at 22:56

## 2021-05-02 RX ADMIN — INSULIN LISPRO 2 UNITS: 100 INJECTION, SOLUTION INTRAVENOUS; SUBCUTANEOUS at 14:34

## 2021-05-02 RX ADMIN — HEPARIN SODIUM 5000 UNITS: 5000 INJECTION INTRAVENOUS; SUBCUTANEOUS at 16:06

## 2021-05-02 RX ADMIN — OXYCODONE 15 MG: 5 TABLET ORAL at 16:15

## 2021-05-02 RX ADMIN — HEPARIN SODIUM 5000 UNITS: 5000 INJECTION INTRAVENOUS; SUBCUTANEOUS at 23:04

## 2021-05-02 RX ADMIN — CLINDAMYCIN HYDROCHLORIDE 300 MG: 150 CAPSULE ORAL at 00:18

## 2021-05-02 RX ADMIN — PANTOPRAZOLE SODIUM 40 MG: 40 TABLET, DELAYED RELEASE ORAL at 06:44

## 2021-05-02 RX ADMIN — FERROUS SULFATE TAB 325 MG (65 MG ELEMENTAL FE) 325 MG: 325 (65 FE) TAB at 16:06

## 2021-05-02 RX ADMIN — INSULIN LISPRO 2 UNITS: 100 INJECTION, SOLUTION INTRAVENOUS; SUBCUTANEOUS at 07:30

## 2021-05-02 NOTE — PROGRESS NOTES
Bedside and Verbal shift change report given to Elda Green (oncoming nurse) by Fallon Vilchis (offgoing nurse). Report included the following information Kardex, MAR, Recent Results and Cardiac Rhythm NSR     Patient wife in room as patient returned from MRI and CT. Lunch ordered and she assisted with feeding. Ask the same question numerous times in a row. Later states he is starting to remember things.

## 2021-05-02 NOTE — PROGRESS NOTES
[address] YOB: 1955          Assessment & Plan:   CKD 3b, followed by Dr. Erma Osorio. Cr at baseline  Anemia of CKD on EPO  AMS better  Weakness  GPC bacteremia  HTN  DM2  R AKA    Rec:  Renal function is at baseline and he is not uremic  AMS likely due to infection and other issues  Abx per primary team  Continue [address]. [address]       Subjective:   CC: F/u CKD  HPI: Patient of Dr. Erma Osorio admitted from rehab with AMS and anorexia and weakness. He has recent AKA. Creat is at baseline. Bcx + for GPC. He is on roosevelt for anemia of CKD.    ROS: +generalized weakness, improving confusion, no sob  Current Facility-Administered Medications   Medication Dose Route Frequency    iohexoL (OMNIPAQUE) 240 mg iodine/mL solution 50 mL  50 mL Oral RAD ONCE    albuterol-ipratropium (DUO-NEB) 2.5 MG-0.5 MG/3 ML  3 mL Nebulization Q4H PRN    aspirin chewable tablet 81 mg  81 mg Oral DAILY    clindamycin (CLEOCIN) capsule 300 mg  300 mg Oral Q12H    [START ON 5/14/2021] epoetin naman-epbx (RETACRIT) injection 20,000 Units  20,000 Units SubCUTAneous EVERY 2 WEEKS    [START ON 5/7/2021] ergocalciferol capsule 50,000 Units  50,000 Units Oral every Friday    ferrous sulfate tablet 325 mg  1 Tab Oral BID WITH MEALS    oxyCODONE IR (ROXICODONE) tablet 15 mg  15 mg Oral Q6H PRN    pantoprazole (PROTONIX) tablet 40 mg  40 mg Oral ACB    rosuvastatin (CRESTOR) tablet 40 mg  40 mg Oral QHS    silver sulfADIAZINE (SILVADENE) 1 % topical cream   Topical DAILY    sodium chloride (NS) flush 5-40 mL  5-40 mL IntraVENous Q8H    sodium chloride (NS) flush 5-40 mL  5-40 mL IntraVENous PRN    acetaminophen (TYLENOL) tablet 650 mg  650 mg Oral Q6H PRN    Or    acetaminophen (TYLENOL) suppository 650 mg  650 mg Rectal Q6H PRN    polyethylene glycol (MIRALAX) packet 17 g  17 g Oral DAILY PRN    promethazine (PHENERGAN) tablet 12.5 mg  12.5 mg Oral Q6H PRN    Or    ondansetron (ZOFRAN) injection 4 mg  4 mg IntraVENous Q6H PRN    heparin (porcine) injection 5,000 Units  5,000 Units SubCUTAneous Q8H    insulin lispro (HUMALOG) injection   SubCUTAneous AC&HS    glucose chewable tablet 16 g  4 Tab Oral PRN    dextrose (D50W) injection syrg 12.5-25 g  12.5-25 g IntraVENous PRN    glucagon (GLUCAGEN) injection 1 mg  1 mg IntraMUSCular PRN    lactated Ringers infusion  125 mL/hr IntraVENous CONTINUOUS          Objective:     Vitals:  Blood pressure (!) 151/70, pulse 92, temperature 97.7 °F (36.5 °C), resp. rate 13, weight 62 kg (136 lb 11 oz), SpO2 100 %. Temp (24hrs), Av.1 °F (36.7 °C), Min:97.6 °F (36.4 °C), Max:99 °F (37.2 °C)      Intake and Output:  701 - 1900  In: 500 [P.O.:500]  Out: 300 [Urine:300]  1901 -  0700  In: -   Out: 800 [Urine:800]    Physical Exam:               GENERAL ASSESSMENT: Chronically ill NAD  HEENT: Nontraumatic   CHEST: CTA  HEART: S1S2  ABDOMEN: Soft,NT  EXTREMITY: Min edema, R aka  NEURO: Awake and alert          ECG/rhythm:    Data Review      No results for input(s): TNIPOC in the last 72 hours. No lab exists for component: ITNL   Recent Labs     21  0512 21  0200 21  1658   TROIQ <0.05 <0.05 <0.05     Recent Labs     21  0200 21  1658   * 134*   K 4.9 4.5    102   CO2 24 27   BUN 59* 55*   CREA 1.91* 2.10*   * 132*   PHOS 4.8*  --    MG 2.0 2.1   CA 8.9 9.4   ALB 1.2* 1.1*   WBC 13.5* 13.4*   HGB 7.4* 7.6*   HCT 25.9* 25.9*   * 402*      No results for input(s): INR, PTP, APTT, INREXT in the last 72 hours. Needs: urine analysis, urine sodium, protein and creatinine  Lab Results   Component Value Date/Time    Creatinine, urine 95.5 2020 02:32 PM           : Scott Gibbs MD  2021        [facility]:  [email or web address]  Kate Zabala office:  [address] [place] [address], [address]  [address] [address]  Phone: [phone]  Fax :     [phone]    E[place]ogio office:  [address] [place] [address]  E[place], [address] [place] [address]  Phone - [phone]  Fax - [phone]

## 2021-05-02 NOTE — PROGRESS NOTES
Bedside and Verbal shift change report given to Von (oncoming nurse) by Sonido Erazo (offgoing nurse). Report included the following information SBAR, Kardex, Intake/Output, MAR, Recent Results and Cardiac Rhythm NSR.

## 2021-05-02 NOTE — PROGRESS NOTES
Problem: Falls - Risk of  Goal: *Absence of Falls  Description: Document Dimitry Edwards Fall Risk and appropriate interventions in the flowsheet.   Outcome: Progressing Towards Goal  Note: Fall Risk Interventions:                 Elimination Interventions: Call light in reach              Problem: Patient Education: Go to Patient Education Activity  Goal: Patient/Family Education  Outcome: Progressing Towards Goal

## 2021-05-02 NOTE — PROGRESS NOTES
[address] [facility] Adult  Hospitalist Group                                                                                          Hospitalist Progress Note  Loraine Tapia MD  Answering service: [phone] from in house phone              Progress Note    Patient: Shabnam Hernandez. MRN: [id number]  SSN: [id number]    YOB: 1955  Age: 77 y.o. Sex: male      Admit Date: 5/1/2021    LOS: 1 day     Subjective:     Patient presents with altered mental status and admitted for further evaluation. Patient with abnormal renal function on presentation but improving this a.m. on IV fluid. Patient cannot be communicated due to altered mental status. Review of system cannot be obtained due to patient's altered mental status. Objective:     Vitals:    05/01/21 2200 05/01/21 2244 05/02/21 0342 05/02/21 0907   BP: (!) 151/78 (!) 151/74 (!) 142/64 129/63   Pulse: (!) 106 (!) 108 (!) 102 98   Resp: 17 14 11 12   Temp:  98.4 °F (36.9 °C) 97.6 °F (36.4 °C) 99 °F (37.2 °C)   SpO2: 99% 100% 100% 96%   Weight:   62 kg (136 lb 11 oz)         Intake and Output:  Current Shift: No intake/output data recorded. Last three shifts: 04/30 1901 - 05/02 0700  In: -   Out: 800 [Urine:800]    Physical Exam:   GENERAL: Patient appears drowsy  THROAT & NECK: normal and no erythema or exudates noted. LUNG: clear to auscultation bilaterally  HEART: regular rate and rhythm, S1, S2 normal, no murmur, click, rub or gallop  ABDOMEN: soft, non-tender. Bowel sounds normal. No masses,  no organomegaly  EXTREMITIES: Right BKA, stump wound with dressing  SKIN: no rash or abnormalities  NEUROLOGIC: Patient appears drowsy  PSYCHIATRIC: non focal    Lab/Data Review: All lab results for the last 24 hours reviewed.      Recent Results (from the past 24 hour(s))   EKG, 12 LEAD, INITIAL    Collection Time: 05/01/21  4:44 PM   Result Value Ref Range    Ventricular Rate 96 BPM    Atrial Rate 96 BPM    P-R Interval 140 ms    QRS Duration 82 ms    Q-T Interval 334 ms    QTC Calculation (Bezet) 421 ms    Calculated P Axis 54 degrees    Calculated R Axis 13 degrees    Calculated T Axis 72 degrees    Diagnosis       Normal sinus rhythm  When compared with ECG of 22-OCT-2014 12:46,  No significant change was found     CBC WITH AUTOMATED DIFF    Collection Time: 05/01/21  4:58 PM   Result Value Ref Range    WBC 13.4 (H) 4.1 - 11.1 K/uL    RBC 2.91 (L) 4.10 - 5.70 M/uL    HGB 7.6 (L) 12.1 - 17.0 g/dL    HCT 25.9 (L) 36.6 - 50.3 %    MCV 89.0 80.0 - 99.0 FL    MCH 26.1 26.0 - 34.0 PG    MCHC 29.3 (L) 30.0 - 36.5 g/dL    RDW 17.3 (H) 11.5 - 14.5 %    PLATELET 561 (H) 368 - 400 K/uL    MPV 8.3 (L) 8.9 - 12.9 FL    NRBC 0.0 0  WBC    ABSOLUTE NRBC 0.00 0.00 - 0.01 K/uL    NEUTROPHILS 84 (H) 32 - 75 %    LYMPHOCYTES 9 (L) 12 - 49 %    MONOCYTES 6 5 - 13 %    EOSINOPHILS 1 0 - 7 %    BASOPHILS 0 0 - 1 %    IMMATURE GRANULOCYTES 0 0.0 - 0.5 %    ABS. NEUTROPHILS 11.0 (H) 1.8 - 8.0 K/UL    ABS. LYMPHOCYTES 1.3 0.8 - 3.5 K/UL    ABS. MONOCYTES 0.9 0.0 - 1.0 K/UL    ABS. EOSINOPHILS 0.1 0.0 - 0.4 K/UL    ABS. BASOPHILS 0.1 0.0 - 0.1 K/UL    ABS. IMM. GRANS. 0.1 (H) 0.00 - 0.04 K/UL    DF AUTOMATED     METABOLIC PANEL, COMPREHENSIVE    Collection Time: 05/01/21  4:58 PM   Result Value Ref Range    Sodium 134 (L) 136 - 145 mmol/L    Potassium 4.5 3.5 - 5.1 mmol/L    Chloride 102 97 - 108 mmol/L    CO2 27 21 - 32 mmol/L    Anion gap 5 5 - 15 mmol/L    Glucose 132 (H) 65 - 100 mg/dL    BUN 55 (H) 6 - 20 MG/DL    Creatinine 2.10 (H) 0.70 - 1.30 MG/DL    BUN/Creatinine ratio 26 (H) 12 - 20      GFR est AA 38 (L) >60 ml/min/1.73m2    GFR est non-AA 32 (L) >60 ml/min/1.73m2    Calcium 9.4 8.5 - 10.1 MG/DL    Bilirubin, total 0.3 0.2 - 1.0 MG/DL    ALT (SGPT) 14 12 - 78 U/L    AST (SGOT) 18 15 - 37 U/L    Alk.  phosphatase 236 (H) 45 - 117 U/L    Protein, total 7.9 6.4 - 8.2 g/dL    Albumin 1.1 (L) 3.5 - 5.0 g/dL    Globulin 6.8 (H) 2.0 - 4.0 g/dL    A-G Ratio 0.2 (L) 1.1 - 2.2     SAMPLES BEING HELD    Collection Time: 05/01/21  4:58 PM   Result Value Ref Range    SAMPLES BEING HELD 1BLU 1RED     COMMENT        Add-on orders for these samples will be processed based on acceptable specimen integrity and analyte stability, which may vary by analyte. MAGNESIUM    Collection Time: 05/01/21  4:58 PM   Result Value Ref Range    Magnesium 2.1 1.6 - 2.4 mg/dL   TROPONIN I    Collection Time: 05/01/21  4:58 PM   Result Value Ref Range    Troponin-I, Qt. <0.05 <0.05 ng/mL   CULTURE, BLOOD, PAIRED    Collection Time: 05/01/21  6:10 PM    Specimen: Blood   Result Value Ref Range    Special Requests: NO SPECIAL REQUESTS      Culture result: NO GROWTH AFTER 9 HOURS     LACTIC ACID    Collection Time: 05/01/21  6:10 PM   Result Value Ref Range    Lactic acid 1.0 0.4 - 2.0 MMOL/L   URINALYSIS W/MICROSCOPIC    Collection Time: 05/01/21  6:13 PM   Result Value Ref Range    Color YELLOW/STRAW      Appearance CLOUDY (A) CLEAR      Specific gravity 1.016 1.003 - 1.030      pH (UA) 5.0 5.0 - 8.0      Protein 100 (A) NEG mg/dL    Glucose Negative NEG mg/dL    Ketone Negative NEG mg/dL    Bilirubin Negative NEG      Blood SMALL (A) NEG      Urobilinogen 0.2 0.2 - 1.0 EU/dL    Nitrites Negative NEG      Leukocyte Esterase Negative NEG      WBC 0-4 0 - 4 /hpf    RBC 0-5 0 - 5 /hpf    Epithelial cells FEW FEW /lpf    Bacteria 2+ (A) NEG /hpf    Hyaline cast 0-2 0 - 5 /lpf    Granular cast 0-2 (A) NEG /lpf   URINE CULTURE HOLD SAMPLE    Collection Time: 05/01/21  6:13 PM    Specimen: Serum; Urine   Result Value Ref Range    Urine culture hold        Urine on hold in Microbiology dept for 2 days. If unpreserved urine is submitted, it cannot be used for addtional testing after 24 hours, recollection will be required.    COVID-19 RAPID TEST    Collection Time: 05/01/21  8:53 PM   Result Value Ref Range    Specimen source Nasopharyngeal      COVID-19 rapid test Not detected NOTD     METABOLIC PANEL, COMPREHENSIVE    Collection Time: 05/02/21  2:00 AM   Result Value Ref Range    Sodium 135 (L) 136 - 145 mmol/L    Potassium 4.9 3.5 - 5.1 mmol/L    Chloride 103 97 - 108 mmol/L    CO2 24 21 - 32 mmol/L    Anion gap 8 5 - 15 mmol/L    Glucose 131 (H) 65 - 100 mg/dL    BUN 59 (H) 6 - 20 MG/DL    Creatinine 1.91 (H) 0.70 - 1.30 MG/DL    BUN/Creatinine ratio 31 (H) 12 - 20      GFR est AA 43 (L) >60 ml/min/1.73m2    GFR est non-AA 35 (L) >60 ml/min/1.73m2    Calcium 8.9 8.5 - 10.1 MG/DL    Bilirubin, total 0.3 0.2 - 1.0 MG/DL    ALT (SGPT) 13 12 - 78 U/L    AST (SGOT) 18 15 - 37 U/L    Alk. phosphatase 234 (H) 45 - 117 U/L    Protein, total 7.2 6.4 - 8.2 g/dL    Albumin 1.2 (L) 3.5 - 5.0 g/dL    Globulin 6.0 (H) 2.0 - 4.0 g/dL    A-G Ratio 0.2 (L) 1.1 - 2.2     CBC WITH AUTOMATED DIFF    Collection Time: 05/02/21  2:00 AM   Result Value Ref Range    WBC 13.5 (H) 4.1 - 11.1 K/uL    RBC 2.83 (L) 4.10 - 5.70 M/uL    HGB 7.4 (L) 12.1 - 17.0 g/dL    HCT 25.9 (L) 36.6 - 50.3 %    MCV 91.5 80.0 - 99.0 FL    MCH 26.1 26.0 - 34.0 PG    MCHC 28.6 (L) 30.0 - 36.5 g/dL    RDW 17.3 (H) 11.5 - 14.5 %    PLATELET 199 (H) 346 - 400 K/uL    MPV 8.7 (L) 8.9 - 12.9 FL    NRBC 0.0 0  WBC    ABSOLUTE NRBC 0.00 0.00 - 0.01 K/uL    NEUTROPHILS 86 (H) 32 - 75 %    LYMPHOCYTES 7 (L) 12 - 49 %    MONOCYTES 5 5 - 13 %    EOSINOPHILS 1 0 - 7 %    BASOPHILS 0 0 - 1 %    IMMATURE GRANULOCYTES 1 (H) 0.0 - 0.5 %    ABS. NEUTROPHILS 11.7 (H) 1.8 - 8.0 K/UL    ABS. LYMPHOCYTES 0.9 0.8 - 3.5 K/UL    ABS. MONOCYTES 0.7 0.0 - 1.0 K/UL    ABS. EOSINOPHILS 0.1 0.0 - 0.4 K/UL    ABS. BASOPHILS 0.0 0.0 - 0.1 K/UL    ABS. IMM.  GRANS. 0.1 (H) 0.00 - 0.04 K/UL    DF SMEAR SCANNED      RBC COMMENTS ANISOCYTOSIS  1+       TSH 3RD GENERATION    Collection Time: 05/02/21  2:00 AM   Result Value Ref Range    TSH 3.01 0.36 - 3.74 uIU/mL   MAGNESIUM    Collection Time: 05/02/21  2:00 AM   Result Value Ref Range    Magnesium 2.0 1.6 - 2.4 mg/dL   PHOSPHORUS Collection Time: 05/02/21  2:00 AM   Result Value Ref Range    Phosphorus 4.8 (H) 2.6 - 4.7 MG/DL   LIPASE    Collection Time: 05/02/21  2:00 AM   Result Value Ref Range    Lipase 113 73 - 393 U/L   FOLATE    Collection Time: 05/02/21  2:00 AM   Result Value Ref Range    Folate 7.1 5.0 - 21.0 ng/mL   VITAMIN B12    Collection Time: 05/02/21  2:00 AM   Result Value Ref Range    Vitamin B12 683 193 - 986 pg/mL   IRON PROFILE    Collection Time: 05/02/21  2:00 AM   Result Value Ref Range    Iron 29 (L) 35 - 150 ug/dL    TIBC 109 (L) 250 - 450 ug/dL    Iron % saturation 27 20 - 50 %   FERRITIN    Collection Time: 05/02/21  2:00 AM   Result Value Ref Range    Ferritin 2,753 (H) 26 - 388 NG/ML   AMMONIA    Collection Time: 05/02/21  2:00 AM   Result Value Ref Range    Ammonia 22 <32 UMOL/L   ETHYL ALCOHOL    Collection Time: 05/02/21  2:00 AM   Result Value Ref Range    ALCOHOL(ETHYL),SERUM <10 <10 MG/DL   ACETAMINOPHEN    Collection Time: 05/02/21  2:00 AM   Result Value Ref Range    Acetaminophen level <2 (L) 10 - 30 ug/mL   SALICYLATE    Collection Time: 05/02/21  2:00 AM   Result Value Ref Range    Salicylate level <1.5 (L) 2.8 - 20.0 MG/DL   TROPONIN I    Collection Time: 05/02/21  2:00 AM   Result Value Ref Range    Troponin-I, Qt. <0.05 <0.05 ng/mL   HEMOGLOBIN A1C WITH EAG    Collection Time: 05/02/21  2:00 AM   Result Value Ref Range    Hemoglobin A1c 7.2 (H) 4.0 - 5.6 %    Est. average glucose 160 mg/dL   TROPONIN I    Collection Time: 05/02/21  5:12 AM   Result Value Ref Range    Troponin-I, Qt. <0.05 <0.05 ng/mL   GLUCOSE, POC    Collection Time: 05/02/21  6:27 AM   Result Value Ref Range    Glucose (POC) 161 (H) 65 - 100 mg/dL    Performed by Oksana Stoll (TENA)         Imaging:    Xr Chest Sngl V    Result Date: 5/1/2021  EXAM:  XR CHEST SNGL V INDICATION: Weakness COMPARISON: 6/16/2020 TECHNIQUE: Frontal supine chest view FINDINGS: The cardiomediastinal and hilar contours are within normal limits.  The pulmonary vasculature is within normal limits. The lungs and pleural spaces are clear. There is no pneumothorax. The bones and upper abdomen are stable. No acute process. Xr Spine Lumb 2 Or 3 V    Result Date: 5/1/2021  EXAM:  XR SPINE LUMB 2 OR 3 V INDICATION: Low back pain COMPARISON: None. TECHNIQUE: 3 views lumbar spine FINDINGS: There is no acute fracture or subluxation. Vertebral body heights are maintained. There is intervertebral disc space narrowing and facet arthrosis at L3-4, L4-5, and L5-S1. There is no abnormality in alignment. A linear Angiocath-like radiodensity on the lateral views anterior to L4 and L5 may be external to the patient. No acute abnormality. Degenerative disc and facet changes at L3-4 through L5-S1. Ct Head Wo Cont    Result Date: 5/1/2021  EXAM:  CT HEAD WO CONT INDICATION: Left arm weakness COMPARISON: CT 11/12/2008 TECHNIQUE: Axial noncontrast head CT from foramen magnum to vertex. Coronal and sagittal reformatted images were obtained. CT dose reduction was achieved through use of a standardized protocol tailored for this examination and automatic exposure control for dose modulation. Adaptive statistical iterative reconstruction (ASIR) was utilized. FINDINGS:  There is diffuse age-related parenchymal volume loss. The ventricles and sulci are age-appropriate without hydrocephalus. There is no mass effect or midline shift. There is no intracranial hemorrhage or extra-axial fluid collection. There is no significant white matter disease. The gray-white matter differentiation is maintained. The basal cisterns are patent. The osseous structures are intact. The visualized paranasal sinuses and mastoid air cells are clear. No acute intracranial abnormality. Assessment and Plan:      Altered mental status  -Likely metabolic encephalopathy due to renal insufficiency and abnormal electrolytes  -CT head negative for acute pathology, ammonia is normal  -MRI was ordered on admission due to concern of bilateral upper extremities weakness, MRI pending    ESTRELLA  -ESTRELLA on baseline CKD stage III  -Renal function improving with IV fluid  -Nephrology consulted    S/p right BKA  -Continue clindamycin per postop protocol  -Wound care consult for BKA stump wound    Anemia  -Combination of iron deficiency and anemia of chronic kidney disease  -Start iron supplement    Leukocytosis  -Currently no obvious focus of infection  -Follow-up blood and urine cultures  -CT chest and abdomen ordered by admitting team to evaluate for occult infections  -Monitor right BKA stump wound, continue clindamycin per postop protocol    Thrombocytosis  -Likely reactive thrombocytosis  -Continue monitor    Hyponatremia  -Likely hypovolemic hyponatremia  -Continue IV fluid and monitor sodium level    Hypertension  -Hold lisinopril due to ESTRELLA    Diabetes  -Well-controlled at baseline with hemoglobin A1c of 7.2  -Continue insulin sliding scale coverage  -May need basal insulin resumed if patient improving with p.o. intake    Dyslipidemia  -Continue statin    Discharge disposition: Likely more than 48 hours pending medical progress.     Signed By: Moi Macario MD     May 2, 2021

## 2021-05-02 NOTE — ROUTINE PROCESS
TRANSFER - OUT REPORT:    Verbal report given to USAMA Ambriz(name) on [employer]  being transferred to [facility](unit) for routine progression of care       Report consisted of patients Situation, Background, Assessment and   Recommendations(SBAR). Information from the following report(s) SBAR, ED Summary, [facility] and Recent Results was reviewed with the receiving nurse. Lines:   Venous Access Device powerline 06/19/20 Upper chest (subclavicular area, right (Active)       Peripheral IV 05/01/21 Left Forearm (Active)   Site Assessment Clean, dry, & intact 05/01/21 1656   Phlebitis Assessment 0 05/01/21 1656   Infiltration Assessment 0 05/01/21 1656   Dressing Status Clean, dry, & intact 05/01/21 1656   Dressing Type Transparent 05/01/21 1656       Peripheral IV 05/01/21 Right Forearm (Active)   Site Assessment Clean, dry, & intact 05/01/21 1656   Phlebitis Assessment 0 05/01/21 1656   Infiltration Assessment 0 05/01/21 1656   Dressing Type Transparent 05/01/21 1656       Peripheral IV 05/01/21 Right Antecubital (Active)   Site Assessment Clean, dry, & intact 05/01/21 1810   Phlebitis Assessment 0 05/01/21 1810   Infiltration Assessment 0 05/01/21 1810   Dressing Status Clean, dry, & intact 05/01/21 1810   Dressing Type Transparent 05/01/21 1810   Hub Color/Line Status Pink 05/01/21 1810        Opportunity for questions and clarification was provided.       Patient transported with:   Registered Nurse

## 2021-05-02 NOTE — H&P
[address]  HISTORY AND PHYSICAL    Name:  Elsa Brown  MR#:  [id number]  :  1955  ACCOUNT #:  [de-identified]  ADMIT DATE:  2021      The patient was seen, evaluated, and admitted by me on 2021. PRIMARY CARE PHYSICIAN:  Francis Agrawal MD    SOURCE OF INFORMATION:  The patient, the patient's spouse who was present at the bedside, and review of ED and old electronic medical record. CHIEF COMPLAINT:  Lethargy. HISTORY OF PRESENT ILLNESS:  This is a 28-year-old man with a past medical history significant for chronic kidney disease, type 2 diabetes, dyslipidemia, hypertension, obstructive sleep apnea, status post recent right above-knee amputation, degenerative disk disease, and chronic pain syndrome, was in his usual state of health until the day of presentation at the emergency room when it was reported that the patient became lethargic and confused at the rehab facility where the patient is presently receiving rehabilitation. The patient underwent right above-knee amputation 2 weeks ago due to infection. The patient initially had below-knee amputation done and was followed by above-knee amputation 2 weeks ago. This surgery was done in [place]. After the surgery, the patient was transferred to rehab hospital in [place]. The provider at the rehab facility called the emergency room physician and discussed the patient with the emergency room physician. According to report, the patient was not responding even to sternal rub. Because of that, the patient was brought to the emergency room. When the patient arrived at the emergency room, a CT scan of the head was obtained. This was negative. The patient is also complaining of left leg and left arm weakness. The patient has chronic pain including chronic low back pain. He was last admitted to the [address] [place] [address] from 2014 to 11/15/2014.   The patient was admitted and underwent neck surgery due to degenerative disk disease. There was no history of fever, rigors, or chills reported. The patient is on clindamycin for the right stump wound. PAST MEDICAL HISTORY:  Chronic kidney disease, type 2 diabetes, dyslipidemia, hypertension, obstructive sleep apnea, status post right above-knee amputation. ALLERGIES:  THE PATIENT IS ALLERGIC TO SULFA AND TAPE. MEDICATIONS:  Albuterol 90 mcg 2 puffs by inhalation every 4 hours as needed for wheezing, aspirin 81 mg daily, clindamycin 300 mg every 12 hours, Epogen dosage as directed, ferrous sulfate 325 mg 1 tablet twice daily, Lantus insulin 80 units subcutaneously daily at bedtime, sliding scale with insulin coverage, lisinopril 10 mg twice daily, oxycodone IR 15 mg every 4 hours as needed for pain, Percocet 5/325 one tablet every 6 hours as needed for pain, Protonix 40 mg daily, Crestor 40 mg daily, Bactrim double-strength 1 tablet twice daily. FAMILY HISTORY:  This was reviewed. His mother had hypertension and gout. His father had leukemia and diabetes. PAST SURGICAL HISTORY:  This is significant for right above-knee amputation, cervical fusion. SOCIAL HISTORY:  No history of alcohol or tobacco abuse. REVIEW OF SYSTEMS:  Unable to obtain because of the patient's change in mental status. PHYSICAL EXAMINATION:  GENERAL APPEARANCE:  The patient appeared ill in moderate distress. VITAL SIGNS:  On arrival at the emergency room, temperature 97.8, pulse 97, respiratory rate 18, blood pressure 131/66, oxygen saturation 100%. HEENT:  Head:  Normocephalic, atraumatic. Eyes:  Normal eye movements. No redness, no drainage, no discharge. Ears:  Normal external ears with no obvious drainage. Nose:  No deformity and no drainage. Mouth and Throat:  No visible oral lesion. Dry oral mucosa. NECK:  Neck is supple. No JVD, no thyromegaly. CHEST:  Clear breath sounds. No wheezing, no crackles. HEART:  Normal S1 and S2, regular.   No clinically appreciable murmur. ABDOMEN:  Soft, nontender. Normal bowel sounds. CNS:  Lethargic but easily arousable. No gross focal neurological deficit. EXTREMITIES:  Right above-knee amputation, tenderness of the left leg without swelling noted. MUSCULOSKELETAL SYSTEM:  Right above-knee amputation noted. SKIN:  Right stump wound with intact dressing noted. PSYCHIATRY:  Unable to assess mood and affect. LYMPHATIC SYSTEM:  No cervical lymphadenopathy. DIAGNOSTIC DATA:  EKG shows normal sinus rhythm. No significant ST-and T-wave abnormalities. X-ray of the lumbar spine, no acute abnormality. Chest x-ray, no acute process. CT scan of the head without contrast, no acute intracranial abnormalities. LABORATORY DATA:  Hematology:  Wbc 13.4, hemoglobin at 7.6, hematocrit 25.9, platelets 068. Chemistry:  Sodium 134, potassium 4.5, chloride 102, CO2 of 27, glucose 132, BUN 55, creatinine 2.10, calcium 9.4, total bilirubin 0.3, ALT 14, AST 18, alkaline phosphatase 236, total protein at 7.9, albumin level 1.1, globulin at 6.8. Cardiac Profile:  Troponin less than 0.05. Magnesium 2.1. Lactic acid level 1.0. Urinalysis: This is significant for negative nitrite, negative leukocyte esterase, 2+ bacteria. COVID-19 rapid test not detected. ASSESSMENT:  1. Acute delirium. 2.  Chronic kidney disease stage III. 3.  Anemia. 4.  Thrombocytosis. 5.  Type 2 diabetes. 6.  Hyponatremia. 7.  Leukocytosis. 8.  Dyslipidemia. 9.  Hypertension. 10.  Obstructive sleep apnea. 11.  Status post right above-knee amputation with stump wound. 12.  Left leg tenderness. PLAN:  1. Acute delirium. We will admit the patient for further evaluation and treatment. This is most likely due to metabolic event. CT scan of the head is negative for acute pathology. We will check ammonia level, acetaminophen level, salicylate level. We will carry out fluid therapy.   Since the patient is also complaining of left and right upper extremity weakness, we will obtain an MRI of the brain to evaluate the patient for stroke. The patient's chest x-ray is negative. Urinalysis is also negative. We will obtain a CT scan of the chest as well as CT scan of the abdomen and pelvis to evaluate the patient for sources of infection as a possible cause of acute delirium. We will check a TSH level and monitor the patient closely. 2.  Chronic kidney disease stage III. We will carry out fluid therapy. We will hold ACE inhibitor. We will await further recommendation from the nephrologist consulted by the emergency room physician. 3.  Anemia. This is most likely due to chronic kidney disease. We will also carry out anemia workup including checking a stool guaiac to rule out occult GI bleed. 4.  Thrombocytosis. This is most likely reactive thrombocytosis. We will continue to monitor the patient's platelet count. 5.  Type 2 diabetes. The patient will be placed on sliding scale with insulin coverage. We will check hemoglobin A1c level. 6.  Hyponatremia. This is most likely due to volume depletion. We will carry out fluid therapy and repeat the patient's sodium level. 7.  Leukocytosis. As stated above, we will await the results of CT scan of the chest, abdomen, and pelvis to evaluate the patient for sources of infection. We will check lipase level. We will monitor the patient closely. 8.  Dyslipidemia. We will continue with Crestor. 9.  Hypertension. We will monitor the patient's blood pressure closely. Lisinopril is on hold because of the renal failure. 10.  Obstructive sleep apnea. We will continue supportive therapy. The patient is not on CPAP. 11.  Status post right above-knee amputation with stump wound. We will continue with clindamycin for this stump wound. Wound Care consult will be requested for local wound care. 12.  Left leg tenderness. We will check ultrasound of the left lower extremity for DVT.   The patient has had DVT of the left lower extremity in the past.  13.  Other issues. Code status, the patient is a full code. We will place the patient on heparin for DVT prophylaxis. FUNCTIONAL STATUS PRIOR TO ADMISSION:  The patient came from rehab facility. The patient is ambulatory with assistive device. COVID PRECAUTION:  The patient was wearing a face mask. I was wearing a face mask and gloves for this patient encounter. The patient's COVID-19 rapid test was not detected in the emergency room.         Andreia Chavez MD      [id number]  D:  05/02/2021 4:56  T:  05/02/2021 5:56  JOB #:  [id number]  CC:  Melani Torres MD

## 2021-05-03 ENCOUNTER — APPOINTMENT (OUTPATIENT)
Dept: MRI IMAGING | Age: 66
DRG: 551 | End: 2021-05-03
Attending: PHYSICIAN ASSISTANT
Payer: MEDICARE

## 2021-05-03 LAB
ANION GAP SERPL CALC-SCNC: 6 MMOL/L (ref 5–15)
BASOPHILS # BLD: 0 K/UL (ref 0–0.1)
BASOPHILS NFR BLD: 0 % (ref 0–1)
BUN SERPL-MCNC: 56 MG/DL (ref 6–20)
BUN/CREAT SERPL: 30 (ref 12–20)
CALCIUM SERPL-MCNC: 8.8 MG/DL (ref 8.5–10.1)
CHLORIDE SERPL-SCNC: 102 MMOL/L (ref 97–108)
CO2 SERPL-SCNC: 26 MMOL/L (ref 21–32)
CREAT SERPL-MCNC: 1.87 MG/DL (ref 0.7–1.3)
DIFFERENTIAL METHOD BLD: ABNORMAL
EOSINOPHIL # BLD: 0 K/UL (ref 0–0.4)
EOSINOPHIL NFR BLD: 0 % (ref 0–7)
ERYTHROCYTE [DISTWIDTH] IN BLOOD BY AUTOMATED COUNT: 17.6 % (ref 11.5–14.5)
GLUCOSE BLD STRIP.AUTO-MCNC: 136 MG/DL (ref 65–100)
GLUCOSE BLD STRIP.AUTO-MCNC: 144 MG/DL (ref 65–100)
GLUCOSE BLD STRIP.AUTO-MCNC: 146 MG/DL (ref 65–100)
GLUCOSE BLD STRIP.AUTO-MCNC: 157 MG/DL (ref 65–100)
GLUCOSE SERPL-MCNC: 123 MG/DL (ref 65–100)
HCT VFR BLD AUTO: 20.9 % (ref 36.6–50.3)
HGB BLD-MCNC: 6 G/DL (ref 12.1–17)
HGB BLD-MCNC: 7.4 G/DL (ref 12.1–17)
HISTORY CHECKED?,CKHIST: NORMAL
IMM GRANULOCYTES # BLD AUTO: 0 K/UL (ref 0–0.04)
IMM GRANULOCYTES NFR BLD AUTO: 0 % (ref 0–0.5)
LYMPHOCYTES # BLD: 1.2 K/UL (ref 0.8–3.5)
LYMPHOCYTES NFR BLD: 12 % (ref 12–49)
MCH RBC QN AUTO: 26.2 PG (ref 26–34)
MCHC RBC AUTO-ENTMCNC: 28.7 G/DL (ref 30–36.5)
MCV RBC AUTO: 91.3 FL (ref 80–99)
MONOCYTES # BLD: 0.9 K/UL (ref 0–1)
MONOCYTES NFR BLD: 9 % (ref 5–13)
NEUTS SEG # BLD: 8 K/UL (ref 1.8–8)
NEUTS SEG NFR BLD: 79 % (ref 32–75)
NRBC # BLD: 0 K/UL (ref 0–0.01)
NRBC BLD-RTO: 0 PER 100 WBC
PLATELET # BLD AUTO: 374 K/UL (ref 150–400)
PMV BLD AUTO: 8.8 FL (ref 8.9–12.9)
POTASSIUM SERPL-SCNC: 4.9 MMOL/L (ref 3.5–5.1)
RBC # BLD AUTO: 2.29 M/UL (ref 4.1–5.7)
RBC MORPH BLD: ABNORMAL
RBC MORPH BLD: ABNORMAL
SERVICE CMNT-IMP: ABNORMAL
SODIUM SERPL-SCNC: 134 MMOL/L (ref 136–145)
WBC # BLD AUTO: 10.1 K/UL (ref 4.1–11.1)

## 2021-05-03 PROCEDURE — 86901 BLOOD TYPING SEROLOGIC RH(D): CPT

## 2021-05-03 PROCEDURE — 85018 HEMOGLOBIN: CPT

## 2021-05-03 PROCEDURE — 74011250637 HC RX REV CODE- 250/637: Performed by: INTERNAL MEDICINE

## 2021-05-03 PROCEDURE — 85025 COMPLETE CBC W/AUTO DIFF WBC: CPT

## 2021-05-03 PROCEDURE — A9576 INJ PROHANCE MULTIPACK: HCPCS | Performed by: FAMILY MEDICINE

## 2021-05-03 PROCEDURE — 80048 BASIC METABOLIC PNL TOTAL CA: CPT

## 2021-05-03 PROCEDURE — 36415 COLL VENOUS BLD VENIPUNCTURE: CPT

## 2021-05-03 PROCEDURE — P9016 RBC LEUKOCYTES REDUCED: HCPCS

## 2021-05-03 PROCEDURE — 74011250636 HC RX REV CODE- 250/636: Performed by: INTERNAL MEDICINE

## 2021-05-03 PROCEDURE — 86923 COMPATIBILITY TEST ELECTRIC: CPT

## 2021-05-03 PROCEDURE — 87147 CULTURE TYPE IMMUNOLOGIC: CPT

## 2021-05-03 PROCEDURE — 82962 GLUCOSE BLOOD TEST: CPT

## 2021-05-03 PROCEDURE — 72158 MRI LUMBAR SPINE W/O & W/DYE: CPT

## 2021-05-03 PROCEDURE — 74011636320 HC RX REV CODE- 636/320: Performed by: FAMILY MEDICINE

## 2021-05-03 PROCEDURE — 74011000258 HC RX REV CODE- 258: Performed by: FAMILY MEDICINE

## 2021-05-03 PROCEDURE — 30233N1 TRANSFUSION OF NONAUTOLOGOUS RED BLOOD CELLS INTO PERIPHERAL VEIN, PERCUTANEOUS APPROACH: ICD-10-PCS | Performed by: FAMILY MEDICINE

## 2021-05-03 PROCEDURE — 87040 BLOOD CULTURE FOR BACTERIA: CPT

## 2021-05-03 PROCEDURE — 74011250636 HC RX REV CODE- 250/636: Performed by: NURSE PRACTITIONER

## 2021-05-03 PROCEDURE — 74011000258 HC RX REV CODE- 258: Performed by: NURSE PRACTITIONER

## 2021-05-03 PROCEDURE — 65660000000 HC RM CCU STEPDOWN

## 2021-05-03 PROCEDURE — 74011250636 HC RX REV CODE- 250/636: Performed by: FAMILY MEDICINE

## 2021-05-03 PROCEDURE — 74011636637 HC RX REV CODE- 636/637: Performed by: INTERNAL MEDICINE

## 2021-05-03 PROCEDURE — 36430 TRANSFUSION BLD/BLD COMPNT: CPT

## 2021-05-03 RX ORDER — VANCOMYCIN/0.9 % SOD CHLORIDE 1.5G/250ML
1500 PLASTIC BAG, INJECTION (ML) INTRAVENOUS ONCE
Status: COMPLETED | OUTPATIENT
Start: 2021-05-03 | End: 2021-05-03

## 2021-05-03 RX ORDER — SODIUM CHLORIDE 0.9 % (FLUSH) 0.9 %
10 SYRINGE (ML) INJECTION
Status: COMPLETED | OUTPATIENT
Start: 2021-05-03 | End: 2021-05-03

## 2021-05-03 RX ORDER — SODIUM CHLORIDE 9 MG/ML
250 INJECTION, SOLUTION INTRAVENOUS AS NEEDED
Status: DISCONTINUED | OUTPATIENT
Start: 2021-05-03 | End: 2021-05-20 | Stop reason: HOSPADM

## 2021-05-03 RX ADMIN — Medication 10 ML: at 06:00

## 2021-05-03 RX ADMIN — ACETAMINOPHEN 650 MG: 325 TABLET, FILM COATED ORAL at 21:03

## 2021-05-03 RX ADMIN — ASPIRIN 81 MG: 81 TABLET, CHEWABLE ORAL at 08:48

## 2021-05-03 RX ADMIN — OXYCODONE 15 MG: 5 TABLET ORAL at 17:24

## 2021-05-03 RX ADMIN — ROSUVASTATIN 40 MG: 40 TABLET, FILM COATED ORAL at 21:03

## 2021-05-03 RX ADMIN — HEPARIN SODIUM 5000 UNITS: 5000 INJECTION INTRAVENOUS; SUBCUTANEOUS at 15:33

## 2021-05-03 RX ADMIN — PANTOPRAZOLE SODIUM 40 MG: 40 TABLET, DELAYED RELEASE ORAL at 08:49

## 2021-05-03 RX ADMIN — ACETAMINOPHEN 650 MG: 325 TABLET, FILM COATED ORAL at 13:03

## 2021-05-03 RX ADMIN — CLINDAMYCIN HYDROCHLORIDE 300 MG: 150 CAPSULE ORAL at 21:03

## 2021-05-03 RX ADMIN — OXYCODONE 15 MG: 5 TABLET ORAL at 08:48

## 2021-05-03 RX ADMIN — SODIUM CHLORIDE, POTASSIUM CHLORIDE, SODIUM LACTATE AND CALCIUM CHLORIDE 125 ML/HR: 600; 310; 30; 20 INJECTION, SOLUTION INTRAVENOUS at 09:06

## 2021-05-03 RX ADMIN — SILVER SULFADIAZINE: 10 CREAM TOPICAL at 08:50

## 2021-05-03 RX ADMIN — CLINDAMYCIN HYDROCHLORIDE 300 MG: 150 CAPSULE ORAL at 08:48

## 2021-05-03 RX ADMIN — INSULIN LISPRO 2 UNITS: 100 INJECTION, SOLUTION INTRAVENOUS; SUBCUTANEOUS at 16:30

## 2021-05-03 RX ADMIN — Medication 10 ML: at 22:31

## 2021-05-03 RX ADMIN — HEPARIN SODIUM 5000 UNITS: 5000 INJECTION INTRAVENOUS; SUBCUTANEOUS at 08:48

## 2021-05-03 RX ADMIN — SODIUM CHLORIDE, POTASSIUM CHLORIDE, SODIUM LACTATE AND CALCIUM CHLORIDE 125 ML/HR: 600; 310; 30; 20 INJECTION, SOLUTION INTRAVENOUS at 02:30

## 2021-05-03 RX ADMIN — HEPARIN SODIUM 5000 UNITS: 5000 INJECTION INTRAVENOUS; SUBCUTANEOUS at 23:17

## 2021-05-03 RX ADMIN — VANCOMYCIN HYDROCHLORIDE 1500 MG: 10 INJECTION, POWDER, LYOPHILIZED, FOR SOLUTION INTRAVENOUS at 08:50

## 2021-05-03 RX ADMIN — EPOETIN ALFA-EPBX 20000 UNITS: 20000 INJECTION, SOLUTION INTRAVENOUS; SUBCUTANEOUS at 21:03

## 2021-05-03 RX ADMIN — FERROUS SULFATE TAB 325 MG (65 MG ELEMENTAL FE) 325 MG: 325 (65 FE) TAB at 08:48

## 2021-05-03 RX ADMIN — INSULIN LISPRO 2 UNITS: 100 INJECTION, SOLUTION INTRAVENOUS; SUBCUTANEOUS at 07:30

## 2021-05-03 RX ADMIN — GADOTERIDOL 10 ML: 279.3 INJECTION, SOLUTION INTRAVENOUS at 22:31

## 2021-05-03 RX ADMIN — FERROUS SULFATE TAB 325 MG (65 MG ELEMENTAL FE) 325 MG: 325 (65 FE) TAB at 16:59

## 2021-05-03 RX ADMIN — PIPERACILLIN AND TAZOBACTAM 3.38 G: 3; .375 INJECTION, POWDER, LYOPHILIZED, FOR SOLUTION INTRAVENOUS at 08:49

## 2021-05-03 RX ADMIN — CEFEPIME 2 G: 2 INJECTION, POWDER, FOR SOLUTION INTRAVENOUS at 15:00

## 2021-05-03 NOTE — ROUTINE PROCESS
Bedside shift change report given to [address] (oncoming nurse) by Baldo Dangelo (offgoing nurse). Report included the following information SBAR, Kardex, MAR and Cardiac Rhythm NSR.

## 2021-05-03 NOTE — PROGRESS NOTES
0755-Received patient with HGB 6.0, resulted at 0323. Also, patient has a history of MRSA and has not been swabbed this admission. 0816-1 unit PRBC ordered by MD.    0834-Blood bank called regarding Type and Screen. Tech stated they will need another sample sent. 0915-MRSA swab sent to lab.    0945-Attempted to obtain type and screen 3x without success. Will attempt again soon, letting patient rest as he is expressing he is frustrated. 1100-Wound care at bedside. 1110-Sent type and screen to lab.

## 2021-05-03 NOTE — PROGRESS NOTES
Reason for Admission: Altered mental status, complex medical history- admitted from [facility]                       RUR Score:  19% risk of re-admission                 PCP: First and Last name:   Traci Cavanaugh MD     Name of Practice: [facility]   Are you a current patient: Yes/No: Yes   Approximate date of last visit: 04/07/2021   Can you participate in a virtual visit if needed: N/A    Do you (patient/family) have any concerns for transition/discharge? Family is concerned about the patient returning to [facility]- the family would like the patient closer to home for rehab, concerned that they gave him medications that he has a reaction to               Plan for utilizing home health:   TBD- PT/OT evaluations     Current Advanced Directive/Advance Care Plan:  Full Code      Healthcare Decision Maker:   Click here to complete [place] including selection of the Healthcare Decision Maker Relationship (ie \"Primary\")              Transition of Care Plan:   TBD, PT/OT evaluations pending- admitted from [facility], anticipate IPR needs- family want the patient closer to home     The CM met with the patient at bedside in order to introduce the role of CM and assess for patient needs. The patient was able to provide some history, alert and oriented. The patient underwent AKA at [facility] in [facility] transferred patient to [facility]- has been at [facility] for approximately 6 days prior to admission at [facility]. The patient endorses that prior to his hospitalization at [facility], he was functional at home/independent with ADLs- has walker at home, in addition to wheelchair and prosthetic. The patient is requesting to be placed closer to home in [facility]- verified demographics and insurance. The patient lives at home with his wife. Will contact spouse directly.      The CM called the patient's wife- she provided concerns for patient returning to [facility], she also would like the patient closer to home- it is getting to be very taxing on her traveling from [place] to [facility]. The patient's wife is a CNA and works nights- CM offered [employer] to family, she has information and will let CM know if she would like a [employer] Referral.     The patient's spouse is requesting placement closer to home- left list of IPR facilities in [facility], will need PT/OT evaluations- CM explained that this will be considered a new event, and patient will need to be evaluated for needs by therapy. The CM will follow for transitions of care needs, spouse supportive.      TATIANA Huntley    Care Management Interventions  PCP Verified by CM: (PCP listed as Dr. Yemi Brooke)  Mode of Transport at Discharge: BLS  Transition of Care Consult (CM Consult): Discharge Planning  MyChart Signup: Yes  Physical Therapy Consult: Yes  Occupational Therapy Consult: Yes  Current Support Network: Lives with Spouse, Own Home  Confirm Follow Up Transport: (BLS anticipated)  Discharge Location  Discharge Placement: Rehab hospital/unit acute

## 2021-05-03 NOTE — WOUND CARE
Wound Care Note:     New consult placed by physician request for right stump wound    Chart shows:  Admitted for acute delirium  Past Medical History:   Diagnosis Date    Arthritis     BPH (benign prostatic hyperplasia)     Chronic kidney disease     Chronic pain     BACK NEUROPATHY FEET HANDS    DM neuropathy, type II diabetes mellitus [facility]     DM type 2 causing CKD stage 3 ([facility] 12/17/2012    DM type 2 causing vascular disease ([facility]     Dyslipidemia     Foot ulcer due to secondary DM ([facility] 12/1/2012    GERD (gastroesophageal reflux disease)     History of esophageal dilatation     HTN     Ill-defined condition 2013    MRSA in wound right leg (BKA)    S/P BKA (below knee amputation) ([facility]     right BKA due to non-healing ulcer    Sleep apnea     Doesnt use CPAP, patient hasnt been retested since weight loss    Thromboembolus ([facility] 1970'S    BLOOD CLOT LEFT LEG     WBC = 10.1 on 5/3/21  Admitted from [facility]    Assessment:   Patient is alert and talking, repeating questions, incontinent with moderate assistance needed in repositioning. Bed: Prius  Patient has a Morrissey. Diet: Diabetic consistent carb regular  Patient reports no pain. Left heel and bilateral buttocks skin intact and without erythema. 1. POA unstageable sacral pressure injury measures 2.5 cm x 3.5 cm, wound bed is pink along wound edges are black centrally, scant serous drainage, wound edges are open, jai-wound intact. Hydrocolloid applied. 2.  POA right AKA incision looks great, no drainage, well approximated, no erythema. Gauze and roll gauze applied. Spoke with Dr. Ingrid Gerard, notified of POA pressure injury; wound care orders obtained. Patient repositioned on right side. Heel offloaded on rolled blanket. Recommendations:    Sacrum- Please maintain Exuderm Hydrocolloid up to one week or change as needed with soiling or rolled edges.   Please use adhesive remover wipes when changing. Right AKA- Every other day cleanse with normal saline, apply 4 x 4's and secure with roll gauze and tape. Specialty bed: Prius ordered via [employer]. Use only flat sheet and one incontinence pad. Please call [employer] at  if not delivered and when patient discharged. (patient already on bed)    Skin Care & Pressure Prevention:  Minimize layers of linen/pads under patient to optimize support surface. Turn/reposition approximately every 2 hours and offload heels.   Manage incontinence / promote continence   Nourishing Skin Cream to dry skin, minimize use of briefs when able    Discussed above plan with patient & Cary/USAMA Bianchi    Transition of Care: Plan to follow as needed while admitted to hospital.    YUSUF ConwayN, RN, [facility]  office [phone]  pager [phone] or call  to page

## 2021-05-03 NOTE — PROGRESS NOTES
Problem: Falls - Risk of  Goal: *Absence of Falls  Description: Document Raymundo Long Fall Risk and appropriate interventions in the flowsheet. Outcome: Progressing Towards Goal  Note: Fall Risk Interventions:       Mentation Interventions: Adequate sleep, hydration, pain control    Medication Interventions: Patient to call before getting OOB    Elimination Interventions: Call light in reach    History of Falls Interventions: Door open when patient unattended         Problem: Patient Education: Go to Patient Education Activity  Goal: Patient/Family Education  Outcome: Progressing Towards Goal     Problem: Pressure Injury - Risk of  Goal: *Prevention of pressure injury  Description: Document Celestino Scale and appropriate interventions in the flowsheet.   Outcome: Progressing Towards Goal  Note: Pressure Injury Interventions:  Sensory Interventions: Assess changes in LOC    Moisture Interventions: Absorbent underpads    Activity Interventions: Assess need for specialty bed    Mobility Interventions: Assess need for specialty bed    Nutrition Interventions: Document food/fluid/supplement intake    Friction and Shear Interventions: Apply protective barrier, creams and emollients

## 2021-05-03 NOTE — PROGRESS NOTES
Physician Progress Note      Zoe Rios  [facility] #:                  [id number]  :                       1955  ADMIT DATE:       2021 4:38 PM  [address] [place] DATE:  RESPONDING  PROVIDER #:        Fiordaliza Madsen MD          QUERY TEXT:    Patient admitted with Altered Mental Status (AMS). Noted documentation of Acute Kidney Injury (ESTRELLA) in MD PN on 21. In order to support the diagnosis of ESTRELLA, please include additional clinical indicators in your documentation. Or please document if the diagnosis of ESTRELLA has been ruled out after further study. The medical record reflects the following:  Risk Factors: 76 y/o male admitted with AMS, ESTRELLA, abnormal electrolytes. Hx of CKD III  Clinical Indicators: 21 MD PN:  Altered mental status  -Likely metabolic encephalopathy due to renal insufficiency and abnormal electrolytes  ESTRELLA  -ESTRELLA on baseline CKD stage III  -Renal function improving with IV fluid  -Nephrology consulted    21 Nephrology consult: CKD 3b,Cr at baseline  Renal function is at baseline and he is not uremic    2021 16:58 Creatinine: 2.10 (H) GFR est AA: 38 (L)      Treatment: Admit, hospitalist consult, nephrology consult, IV fluids, daily labs, vitals per unit routine    Defined by Kidney Disease Improving Global Outcomes (KDIGO) clinical practice guideline for acute kidney injury:  -Increase in SCr by greater than or equal to 0.3 mg/dl within 48 hours; or  -Increase or decrease in SCr to greater than or equal to 1.5 times baseline, which is known or presumed to have occurred within the prior 7 days; or  -Urine volume < 0.5ml/kg/h for 6 hours  Options provided:  -- Acute kidney injury evidenced by, Please document evidence as well as baseline creatinine, if known. -- Currently resolved acute kidney injury was evidenced by, Please document evidence as well as baseline creatinine, if known.   -- Acute kidney injury ruled out after study  -- Other - I will add my own diagnosis  -- Disagree - Not applicable / Not valid  -- Disagree - Clinically unable to determine / Unknown  -- Refer to Clinical Documentation Reviewer    PROVIDER RESPONSE TEXT:    Acute kidney injury was ruled out after study.     Query created by: Fatuma Vargas on 5/3/2021 12:42 PM      Electronically signed by:  Mario Hernandez MD 5/3/2021 12:53 PM

## 2021-05-03 NOTE — CONSULTS
Infectious Disease Consult    Today's Date: 5/3/2021   Admit Date: 5/1/2021    Impression:   L4-5 Discitis/OM   HAP  S/p laminectomy C3-4 (2014)   - WBC normal, afebrile    Blood cx (5/1) Staph aureus (identification and susceptibility pending)    CT of Chest, ABD/PEL (5/2); L4-5 findings most likely represent discitis-osteomyelitis. Soft tissue edema from hypoalbuminemia. Right upper lobe pneumonia. Bilateral lower lobe atelectasis vs pneumonia. Trace bilateral pleural effusions. Anemia  - hgb 6.0; transfusion per primary team    Plan:   · Source of infection is unclear at this time. Surgical incision without erythema, drainage, stiches are approximated it. · Repeat blood cx until clear  · Echo to r/o vegetation  · spine surgery consult  · D/c zosyn, changed to IV cefepime. Continue with IV vancomycin for now. · Fever work up if temp >= 100.4    Anti-infectives:   · IV Zosyn and vancomcyin 5/4    Subjective:   Date of Consultation:  May 3, 2021  Referring Physician: Dr. Emerald Stern    Patient is a 77 y.o. male who had AKA 2 weeks ago at [facility]. He was discharged to rehab in [place] where he was found as unresponsive to sternal rub which brought him to ER on 5/1. Pt's mentation is close at baseline per pt's spouse. Pt is c/o dry cough and low back pain. Pt was discharged to rehab with hoyos catheter which is still in place. It is unclear whether the hoyos catheter was changed upon admission. His blood cx grew Staph Aureus. CT of abd/pel revealed L4-5 discitis/OM. No associated fever/chills. Pt was started on IV zosyn and vancomycin. Pt's other medical hx include chronic kidney disease, type 2 diabetes, dyslipidemia, hypertension, obstructive sleep apnea, degenerative disk disease, and chronic pain syndrome. Pt had laminectomy c3-4 (2014)     ID team was consulted for discitis management.    Patient Active Problem List   Diagnosis Code    Neuropathy G62.9    HTN (hypertension) I10    Dyslipidemia E78.5    DM type 2 causing vascular disease ([address] E11.59    DM neuropathy, type II diabetes mellitus ([address] E11.40    Anemia, unspecified D64.9    ARF (acute renal failure) ([facility] N17.9    DM type 2 causing CKD stage 3 ([facility]) E11.22, N18.30    CKD (chronic kidney disease) stage 3, GFR 30-59 ml/min ([facility]) N18.30    Disc herniation [id number]    Spinal stenosis in cervical region M48.02    GABI (obstructive sleep apnea) G47.33    Cataract, nuclear [id number]    Presbyopia H52.4    Type 2 diabetes mellitus without retinopathy ([address] E11.9    Chronic osteomyelitis of right lower leg with draining sinus ([facility]) M86.461    Acute delirium R41.0     Past Medical History:   Diagnosis Date    Arthritis     BPH (benign prostatic hyperplasia)     Chronic kidney disease     Chronic pain     BACK NEUROPATHY FEET HANDS    DM neuropathy, type II diabetes mellitus ([address]     DM type 2 causing CKD stage 3 ([address] 12/17/2012    DM type 2 causing vascular disease ([address]     Dyslipidemia     Foot ulcer due to secondary DM ([address] 12/1/2012    GERD (gastroesophageal reflux disease)     History of esophageal dilatation     HTN     Ill-defined condition 2013    MRSA in wound right leg (BKA)    S/P BKA (below knee amputation) ([address]     right BKA due to non-healing ulcer    Sleep apnea     Doesnt use CPAP, patient hasnt been retested since weight loss    Thromboembolus ([address] 1970'S    BLOOD CLOT LEFT LEG      Family History   Problem Relation Age of Onset    Hypertension Mother    [facility] Gout Mother     Cancer Father         leukemia    Diabetes Father     Hypertension Sister     Diabetes Maternal Grandmother     Hypertension Maternal Grandmother     Diabetes Paternal Grandmother     Hypertension Paternal Grandmother     Anesth Problems Neg Hx       Social History     Tobacco Use    Smoking status: Never Smoker    Smokeless tobacco: Never Used   Substance Use Topics    Alcohol use: No     Past Surgical History:   Procedure Laterality Date    COLONOSCOPY N/A 3/30/2021    COLONOSCOPY performed by Allegra Valentine MD at [place] [address] ECHO 2D ADULT  8/09    normal; LVEF 60%    ECHO STRESS  4/09    7 min; normal    ENDOSCOPY, COLON, DIAGNOSTIC      FLEXIBLE SIGMOIDOSCOPY N/A 2/24/2021    SIGMOIDOSCOPY FLEXIBLE performed by Allegra Valentine MD at [place] [address] HX AMPUTATION  2012    left great toe    HX AMPUTATION  2007    BKA right    HX BELOW KNEE AMPUTATION Right     HX CERVICAL FUSION  2009    x2    HX ENDOSCOPY      EGD with Dilitation x 2    HX ORTHOPAEDIC  2006    ACDF C4-C7    IR INSERT TUNL CVC W/O PORT OVER 5 YR  6/19/2020    IR REMOVE TUNL CVAD W/O PORT / PUMP  9/14/2020    IA ABDOMEN SURGERY PROC UNLISTED      pilonidal cyst    STRESS TEST MYOVIEW  8/09    walked 8:46; normal; LVEF 51%      Prior to Admission medications    Medication Sig Start Date End Date Taking? Authorizing Provider   oxyCODONE IR (OXY-IR) 15 mg immediate release tablet Take 15 mg by mouth every four (4) hours as needed for Pain. Yes Provider, Historical   oxyCODONE-acetaminophen (PERCOCET) 5-325 mg per tablet Take 1 Tab by mouth every six (6) hours as needed for Pain for up to 30 days. Max Daily Amount: 4 Tabs. 4/7/21 5/7/21 Yes Miguel Rosenbaum MD   clindamycin (CLEOCIN) 300 mg capsule Take 300 mg by mouth every twelve (12) hours.    Yes Provider, Historical   insulin lispro (HUMALOG) 100 unit/mL kwikpen INJECT 18 TO 30 UNITS SUBCUTANEOUSLY 3 TIMES DAILY BEFORE BREAKFAST, LUNCH, AND DINNER 3/3/21  Yes Miguel Rosenbaum MD   SSD 1 % topical cream APPLY TO AFFECTED AREA DAILY 3/3/21  Yes Miguel Rosenbaum MD   pantoprazole (PROTONIX) 40 mg tablet TAKE 1 TABLET BY MOUTH EVERY DAY 3/3/21  Yes Miguel Rosenbaum MD   ferrous sulfate 325 mg (65 mg iron) tablet TAKE ONE TABLET BY MOUTH 2 TIMES A DAY 3/3/21  Yes Miguel Rosenbaum MD   epoetin naman (EPOGEN;PROCRIT) 20,000 unit/mL injection 20,000-25,000 Units by SubCUTAneous route Once every 2 weeks. Yes Provider, Historical   ergocalciferol (ERGOCALCIFEROL) 1,250 mcg (50,000 unit) capsule Take 50,000 Units by mouth Every Friday. Yes Provider, Historical   insulin glargine (Lantus Solostar U-100 Insulin) 100 unit/mL (3 mL) inpn 18 Units by SubCUTAneous route nightly. Yes Provider, Historical   rosuvastatin (CRESTOR) 40 mg tablet TAKE ONE TABLET BY MOUTH EVERY DAY 12/10/20  Yes Chele Rosenbaum MD   albuterol (Ventolin HFA) 90 mcg/actuation inhaler Take 2 Puffs by inhalation every four (4) hours as needed for Wheezing. 12/7/20  Yes Chele Rosenbaum MD   lisinopriL (PRINIVIL, ZESTRIL) 10 mg tablet Take 1 Tab by mouth two (2) times a day. 12/7/20  Yes Chele Rosenbaum MD   aspirin 81 mg chewable tablet Take 81 mg by mouth daily. Yes Provider, Historical   sulfaSALAzine (AZULFIDINE) 500 mg tablet Take 800 mg by mouth two (2) times a day. Provider, Historical     a  Allergies   Allergen Reactions    Adhesive Tape-Silicones Hives    Sulfa (Sulfonamide Antibiotics) Swelling     Lips eyes        REVIEW OF SYSTEMS:     Total of 12 systems reviewed as follows:   I am not able to complete the review of systems because:    The patient is intubated and sedated    The patient has altered mental status due to his acute medical problems    The patient has baseline aphasia from prior stroke(s)    The patient has baseline dementia and is not reliable historian                 POSITIVE= underlined text  Negative = text not underlined  General:  fever, chills, sweats, generalized weakness, weight loss/gain,      loss of appetite   Eyes:    blurred vision, eye pain, loss of vision, double vision  ENT:    rhinorrhea, pharyngitis   Respiratory:   dry cough, sputum production, SOB, wheezing, MASSEY, pleuritic pain   Cardiology:   chest pain, palpitations, orthopnea, PND, edema, syncope   Gastrointestinal:  abdominal pain , N/V, dysphagia, diarrhea, constipation, bleeding Genitourinary:  frequency, urgency, dysuria, hematuria, incontinence   Muskuloskeletal :  arthralgia, myalgia, low back pain  Hematology:  easy bruising, nose or gum bleeding, lymphadenopathy   Dermatological: rash, ulceration, pruritis   Endocrine:   hot flashes or polydipsia   Neurological:  headache, dizziness, confusion, focal weakness, paresthesia,     Speech difficulties, memory loss, gait disturbance  Psychological: Feelings of anxiety, depression, agitation    Objective:     Visit Vitals  /60   Pulse 98   Temp 99.3 °F (37.4 °C)   Resp 18   Ht 5' 11\" (1.803 m)   Wt 62 kg (136 lb 11 oz)   SpO2 97%   BMI 19.06 kg/m²     Temp (24hrs), Av.6 °F (37 °C), Min:97.7 °F (36.5 °C), Max:99.3 °F (37.4 °C)       Lines:  peripheral    PHYSICAL EXAM:   General:    Alert, cooperative, no distress, appears stated age. HEENT: Atraumatic, anicteric sclerae, pink conjunctivae     No oral ulcers, mucosa moist, throat clear  Neck:  Supple, symmetrical,  thyroid: non tender  Lungs:   Clear to auscultation bilaterally. No Wheezing or Rhonchi. No rales. Chest wall:  No tenderness  No Accessory muscle use. Heart:   Regular  rhythm,  No  murmur   No edema  Abdomen:   Soft, non-tender. Not distended. Bowel sounds normal  Extremities: No cyanosis. No clubbing  Skin:     Not pale. Not Jaundiced  No rashes     unstageable sacral pressure injury; wound bed is pink along wound edges are black centrally, scant serous drainage, wound edges are open, jai-wound intact    Right AKA site; incision approximate, stitches are intact, no erythema, no drainage  Psych:  Fair insight. Not depressed. Not anxious or agitated. Neurologic: EOMs intact. No facial asymmetry. No aphasia or slurred speech.  Alert and oriented X self, place, and time      Data Review:     CBC:  Recent Labs     21  0325 21  0200 21  1658   WBC 10.1 13.5* 13.4*   GRANS 79* 86* 84*   MONOS 9 5 6   EOS 0 1 1   ANEU 8.0 11.7* 11.0*   ABL 1.2 0. 9 1.3   HGB 6.0* 7.4* 7.6*   HCT 20.9* 25.9* 25.9*    446* 402*       BMP:  Recent Labs     05/03/21  0325 05/02/21  0200 05/01/21  1658   CREA 1.87* 1.91* 2.10*   BUN 56* 59* 55*   * 135* 134*   K 4.9 4.9 4.5    103 102   CO2 26 24 27   AGAP 6 8 5   * 131* 132*       LFTS:  Recent Labs     05/02/21  0200 05/01/21  1658   TBILI 0.3 0.3   ALT 13 14   * 236*   TP 7.2 7.9   ALB 1.2* 1.1*       Microbiology:     All Micro Results     Procedure Component Value Units Date/Time    CULTURE, BLOOD, PAIRED [[id number]]  (Abnormal) Collected: 05/01/21 1810    Order Status: Completed Specimen: Blood Updated: 05/03/21 1126     Special Requests: NO SPECIAL REQUESTS        Culture result:       STAPHYLOCOCCUS AUREUS GROWING IN ALL 4 BOTTLES DRAWN (SITE = R FOREARM AND R AC) SENSITIVITY TO FOLLOW                  PRELIMINARY REPORT OF GRAM POSITIVE COCCI IN CLUSTERS GROWING IN 3 OF 4 BOTTLES DRAWN CALLED TO AND READ BACK BY MARY ARRIAGA RN ON 5/2/21 AT 1440. 31 Irina Black           CULTURE, MRSA [[id number]] Collected: 05/03/21 0912    Order Status: Completed Specimen: Nasal from Nares Updated: 05/03/21 1013    COVID-19 RAPID TEST [[id number]] Collected: 05/01/21 2053    Order Status: Completed Specimen: Nasopharyngeal Updated: 05/01/21 2146     Specimen source Nasopharyngeal        COVID-19 rapid test Not detected        Comment: Rapid Abbott ID Now       Rapid NAAT:  The specimen is NEGATIVE for SARS-CoV-2, the novel coronavirus associated with COVID-19. Negative results should be treated as presumptive and, if inconsistent with clinical signs and symptoms or necessary for patient management, should be tested with an alternative molecular assay. Negative results do not preclude SARS-CoV-2 infection and should not be used as the sole basis for patient management decisions. This test has been authorized by the FDA under an Emergency Use Authorization (EUA) for use by authorized laboratories. Fact sheet for Healthcare Providers: ConventionUpdate.co.nz  Fact sheet for Patients: ConventionUpdate.co.nz       Methodology: Isothermal Nucleic Acid Amplification         URINE CULTURE HOLD SAMPLE [[id number]] Collected: 05/01/21 1813    Order Status: Completed Specimen: Urine from Serum Updated: 05/01/21 1819     Urine culture hold       Urine on hold in Microbiology dept for 2 days. If unpreserved urine is submitted, it cannot be used for addtional testing after 24 hours, recollection will be required.                 Signed By: Alban Patel NP     May 3, 2021

## 2021-05-03 NOTE — PROGRESS NOTES
[id number] [facility]ist [facility]                                                                                          Hospitalist Progress Note  Eliz De Oliveira MD  Answering service: [phone] from in house phone              Progress Note    Patient: Talisha Hodges. MRN: [id number]  SSN: [id number]    YOB: 1955  Age: 77 y.o. Sex: male      Admit Date: 5/1/2021    LOS: 2 days     Subjective:     Patient presents with altered mental status and admitted for further evaluation. Patient with abnormal renal function on presentation but improving this a.m. on IV fluid. Patient is overall more alert this a.m. Able to communicate more. Denies some back pain. Breathing is improving. Denies any headache, dizziness, chest pain, shortness of breath, nausea, vomiting, abdominal pain. Objective:     Vitals:    05/03/21 0155 05/03/21 0307 05/03/21 0744 05/03/21 0839   BP: 123/61 (!) 136/54  (!) 141/61   Pulse: (!) 103 99  94   Resp: 11 10  16   Temp: 98.3 °F (36.8 °C) 98.5 °F (36.9 °C)  99.1 °F (37.3 °C)   SpO2: 96% 98%  97%   Weight:  62 kg (136 lb 11 oz)     Height:   5' 11\" (1.803 m)         Intake and Output:  Current Shift: No intake/output data recorded. Last three shifts: 05/01 1901 - 05/03 0700  In: 500 [P.O.:500]  Out: 1075 [Urine:900]    Physical Exam:   GENERAL: Patient appears more alert this a.m. THROAT & NECK: normal and no erythema or exudates noted. LUNG: clear to auscultation bilaterally  HEART: regular rate and rhythm, S1, S2 normal, no murmur, click, rub or gallop  ABDOMEN: soft, non-tender. Bowel sounds normal. No masses,  no organomegaly  EXTREMITIES: Right BKA, stump wound with dressing  SKIN: no rash or abnormalities  NEUROLOGIC: Patient appears more alert this a.m. PSYCHIATRIC: non focal    Lab/Data Review: All lab results for the last 24 hours reviewed.      Recent Results (from the past 24 hour(s))   GLUCOSE, POC    Collection Time: 05/02/21 11:12 AM   Result Value Ref Range    Glucose (POC) 142 (H) 65 - 100 mg/dL    Performed by Shaquille Mcnulty, POC    Collection Time: 05/02/21  4:12 PM   Result Value Ref Range    Glucose (POC) 123 (H) 65 - 100 mg/dL    Performed by Malathi Phillips    GLUCOSE, POC    Collection Time: 05/02/21  9:42 PM   Result Value Ref Range    Glucose (POC) 144 (H) 65 - 100 mg/dL    Performed by Flaquito Awad (CON)    METABOLIC PANEL, BASIC    Collection Time: 05/03/21  3:25 AM   Result Value Ref Range    Sodium 134 (L) 136 - 145 mmol/L    Potassium 4.9 3.5 - 5.1 mmol/L    Chloride 102 97 - 108 mmol/L    CO2 26 21 - 32 mmol/L    Anion gap 6 5 - 15 mmol/L    Glucose 123 (H) 65 - 100 mg/dL    BUN 56 (H) 6 - 20 MG/DL    Creatinine 1.87 (H) 0.70 - 1.30 MG/DL    BUN/Creatinine ratio 30 (H) 12 - 20      GFR est AA 44 (L) >60 ml/min/1.73m2    GFR est non-AA 36 (L) >60 ml/min/1.73m2    Calcium 8.8 8.5 - 10.1 MG/DL   CBC WITH AUTOMATED DIFF    Collection Time: 05/03/21  3:25 AM   Result Value Ref Range    WBC 10.1 4.1 - 11.1 K/uL    RBC 2.29 (L) 4.10 - 5.70 M/uL    HGB 6.0 (L) 12.1 - 17.0 g/dL    HCT 20.9 (L) 36.6 - 50.3 %    MCV 91.3 80.0 - 99.0 FL    MCH 26.2 26.0 - 34.0 PG    MCHC 28.7 (L) 30.0 - 36.5 g/dL    RDW 17.6 (H) 11.5 - 14.5 %    PLATELET 876 956 - 478 K/uL    MPV 8.8 (L) 8.9 - 12.9 FL    NRBC 0.0 0  WBC    ABSOLUTE NRBC 0.00 0.00 - 0.01 K/uL    NEUTROPHILS 79 (H) 32 - 75 %    LYMPHOCYTES 12 12 - 49 %    MONOCYTES 9 5 - 13 %    EOSINOPHILS 0 0 - 7 %    BASOPHILS 0 0 - 1 %    IMMATURE GRANULOCYTES 0 0.0 - 0.5 %    ABS. NEUTROPHILS 8.0 1.8 - 8.0 K/UL    ABS. LYMPHOCYTES 1.2 0.8 - 3.5 K/UL    ABS. MONOCYTES 0.9 0.0 - 1.0 K/UL    ABS. EOSINOPHILS 0.0 0.0 - 0.4 K/UL    ABS. BASOPHILS 0.0 0.0 - 0.1 K/UL    ABS. IMM.  GRANS. 0.0 0.00 - 0.04 K/UL    DF SMEAR SCANNED      RBC COMMENTS ANISOCYTOSIS  1+        RBC COMMENTS HYPOCHROMIA  1+       GLUCOSE, POC    Collection Time: 05/03/21  6:23 AM   Result Value Ref Range Glucose (POC) 157 (H) 65 - 100 mg/dL    Performed by Esequiel Ho [facility]    Collection Time: 05/03/21  8:30 AM   Result Value Ref Range    HISTORY CHECKED? Historical check performed         Imaging:    Mri Brain Wo Cont    Result Date: 5/2/2021  EXAM: MRI BRAIN WO CONT INDICATION: Lethargy and confusion. COMPARISON: CT head on 5/1/2021 and 11/12/2008. CONTRAST: None. TECHNIQUE:  Multiplanar multisequence acquisition without contrast of the brain. FINDINGS: Cerebral volume loss is mild for age and new since 2008. There is no acute infarct, hemorrhage, extra-axial fluid collection, or mass effect. Chronic microvascular ischemic disease is new versus more conspicuous. . Expected arterial flow-voids are present. Medial temporal lobes are symmetric. Sagittal midline structures are within normal limits. Mild chronic microvascular ischemic disease. No acute infarct or mass effect. Ct Chest Wo Cont    Result Date: 5/2/2021  EXAM: CT CHEST WO CONT, CT ABD PELV WO CONT INDICATION: Unexplained delirium and leukocytosis. Evaluate for infection. Hypoalbuminemia. COMPARISON: CT angiography chest on 5/25/2009. TECHNIQUE: Helical CT of the chest, abdomen, and pelvis with oral contrast only. No intravenous contrast. Coronal and sagittal reformats were generated. CT dose reduction was achieved through use of a standardized protocol tailored for this examination and automatic exposure control for dose modulation. Absence of intravenous contrast limits evaluation of the mediastinum, jaret, vasculature, and solid organs. FINDINGS: Edema in the subcutaneous adipose tissues is new. THYROID: No nodule. MEDIASTINUM: No mass or lymphadenopathy. JARET: No evidence of mass. THORACIC AORTA: Atherosclerosis without aneurysm. MAIN PULMONARY ARTERY: Mildly dilated main pulmonary artery. HEART: Normal in size. ESOPHAGUS: No wall thickening or dilatation. TRACHEA/BRONCHI: Patent. PLEURA: Trace bilateral pleural effusions.  No pneumothorax. LUNGS: Patchy bilateral lower lobe airspace opacities are nonspecific. Peribronchial vascular airspace opacities are in the right upper lobe. No lung mass or fibrosis. Liver: Segment 4 lesion is unchanged and likely represents hemangioma. Smooth surface. Biliary tree: The gallbladder is within normal limits. The CBD is not dilated. Spleen: Within normal limits. Pancreas: Limited evaluation. Adrenals: Unremarkable. Kidneys: No mass or hydronephrosis. Stomach: Unremarkable. Small bowel: No dilatation or wall thickening. Colon: Colonic fecal burden may represent constipation. Appendix: Small versus resected. Peritoneum: No ascites or pneumoperitoneum. Retroperitoneum: No lymphadenopathy or aortic aneurysm. Reproductive organs: Prostate gland is not enlarged. Urinary bladder: Nondistended around a properly positioned Morrissey catheter. Bones: Endplate irregularity is at L4-5. Subchondral lucencies at L3-4 and L4-5. Subtle edema in the adjacent paraspinal soft tissues. No drainable abscess. Abdominal wall: No mass or hernia. Additional comments: Diffuse soft tissue edema. 1. L4-5 findings most likely represent discitis-osteomyelitis. 2. Soft tissue edema from hypoalbuminemia. 3. Right upper lobe pneumonia. Bilateral lower lobe atelectasis versus pneumonia. Trace bilateral pleural effusions. Ct Abd Pelv Wo Cont    Result Date: 5/2/2021  EXAM: CT CHEST WO CONT, CT ABD PELV WO CONT INDICATION: Unexplained delirium and leukocytosis. Evaluate for infection. Hypoalbuminemia. COMPARISON: CT angiography chest on 5/25/2009. TECHNIQUE: Helical CT of the chest, abdomen, and pelvis with oral contrast only. No intravenous contrast. Coronal and sagittal reformats were generated. CT dose reduction was achieved through use of a standardized protocol tailored for this examination and automatic exposure control for dose modulation.  Absence of intravenous contrast limits evaluation of the mediastinum, isacc, vasculature, and solid organs. FINDINGS: Edema in the subcutaneous adipose tissues is new. THYROID: No nodule. MEDIASTINUM: No mass or lymphadenopathy. JARET: No evidence of mass. THORACIC AORTA: Atherosclerosis without aneurysm. MAIN PULMONARY ARTERY: Mildly dilated main pulmonary artery. HEART: Normal in size. ESOPHAGUS: No wall thickening or dilatation. TRACHEA/BRONCHI: Patent. PLEURA: Trace bilateral pleural effusions. No pneumothorax. LUNGS: Patchy bilateral lower lobe airspace opacities are nonspecific. Peribronchial vascular airspace opacities are in the right upper lobe. No lung mass or fibrosis. Liver: Segment 4 lesion is unchanged and likely represents hemangioma. Smooth surface. Biliary tree: The gallbladder is within normal limits. The CBD is not dilated. Spleen: Within normal limits. Pancreas: Limited evaluation. Adrenals: Unremarkable. Kidneys: No mass or hydronephrosis. Stomach: Unremarkable. Small bowel: No dilatation or wall thickening. Colon: Colonic fecal burden may represent constipation. Appendix: Small versus resected. Peritoneum: No ascites or pneumoperitoneum. Retroperitoneum: No lymphadenopathy or aortic aneurysm. Reproductive organs: Prostate gland is not enlarged. Urinary bladder: Nondistended around a properly positioned Morrissey catheter. Bones: Endplate irregularity is at L4-5. Subchondral lucencies at L3-4 and L4-5. Subtle edema in the adjacent paraspinal soft tissues. No drainable abscess. Abdominal wall: No mass or hernia. Additional comments: Diffuse soft tissue edema. 1. L4-5 findings most likely represent discitis-osteomyelitis. 2. Soft tissue edema from hypoalbuminemia. 3. Right upper lobe pneumonia. Bilateral lower lobe atelectasis versus pneumonia. Trace bilateral pleural effusions. Duplex Lower Ext Venous Left    Result Date: 5/2/2021  · No evidence of deep vein thrombosis in the left lower extremity. Assessment and Plan:      Altered mental status  -Likely metabolic encephalopathy due to renal insufficiency and abnormal electrolytes  -CT head and MRI of the brain negative for acute pathology, ammonia is normal    Pneumonia  -CT of the chest shows right upper lobe pneumonia and bilateral lower lobe atelectasis versus pneumonia  -Start broad-spectrum antibiotic with Vanco and Zosyn for healthcare associated pneumonia    Discitis  -CT abdomen show L4-L5 finding most likely represent discitis/osteomyelitis  -Infectious disease consulted for further evaluation  -Continue broad-spectrum antibiotics as above    Anemia  -Drop in hemoglobin is likely due to hemodilution from IV fluid  -Baseline chronic anemia from CKD and iron deficiency  -Monitor for bleeding  -Transfuse 1 units PRBCs    ESTRELLA  -ESTRELLA on baseline CKD stage III  -Renal function improving with IV fluid  -Nephrology consulted    S/p right BKA  -Continue clindamycin per postop protocol  -Wound care consult for BKA stump wound    Anemia  -Combination of iron deficiency and anemia of chronic kidney disease  -Start iron supplement    Leukocytosis  -Now resolved    Thrombocytosis  -Now resolved    Hyponatremia  -Likely hypovolemic hyponatremia  -Continue IV fluid and monitor sodium level    Hypertension  -Hold lisinopril due to ESTRELLA    Diabetes  -Well-controlled at baseline with hemoglobin A1c of 7.2  -Continue insulin sliding scale coverage  -May need basal insulin resumed if patient improving with p.o. intake    Dyslipidemia  -Continue statin    Discharge disposition: Likely more than 48 hours pending medical progress.     Signed By: Carlotta Hunter MD     May 3, 2021

## 2021-05-03 NOTE — CONSULTS
ORTHOPEDIC CONSULT NOTE    Subjective:     Date of Consultation:  May 3, 2021  Referring Physician:  Zulema Yousif MD    Blas Palmer is a 77 y.o. male who is being seen for possible lumbar discitis. The patient states he has had low back pain for years, which progressively worsened following his AKA of the R leg 2 weeks ago. He states he was able to ambulate prior to the AKA, but since he has become immobile. He states he is unable to move the left leg. He has pain radiating into the left leg intermittently. Of note, he has a history of laminectomy of C3-4 in 2014 with a spine surgeon that he states has since retired.       Patient Active Problem List    Diagnosis Date Noted    Acute delirium 05/01/2021    Chronic osteomyelitis of right lower leg with draining sinus ([address] 06/22/2020    Cataract, nuclear 04/25/2017    Presbyopia 04/25/2017    Type 2 diabetes mellitus without retinopathy ([address] 04/25/2017    GABI (obstructive sleep apnea) 03/11/2015    Disc herniation 11/13/2014    Spinal stenosis in cervical region 11/13/2014    ARF (acute renal failure) ([address] 12/17/2012    DM type 2 causing CKD stage 3 ([address] 12/17/2012    CKD (chronic kidney disease) stage 3, GFR 30-59 ml/min ([address] 12/17/2012    Anemia, unspecified 12/01/2012    DM type 2 causing vascular disease ([address]     DM neuropathy, type II diabetes mellitus ([address]     Dyslipidemia     Neuropathy 03/03/2009    HTN (hypertension) 03/03/2009       Family History   Problem Relation Age of Onset    Hypertension Mother    Ocampo Gout Mother     Cancer Father         leukemia    Diabetes Father     Hypertension Sister     Diabetes Maternal Grandmother     Hypertension Maternal Grandmother     Diabetes Paternal Grandmother     Hypertension Paternal Grandmother     Anesth Problems Neg Hx       Social History     Tobacco Use    Smoking status: Never Smoker    Smokeless tobacco: Never Used   Substance Use Topics    Alcohol use: No     Past Medical History:   Diagnosis Date    Arthritis     BPH (benign prostatic hyperplasia)     Chronic kidney disease     Chronic pain     BACK NEUROPATHY FEET HANDS    DM neuropathy, type II [facility] mellitus [facility]     DM type 2 causing CKD stage 3 ([facility] 12/17/2012    DM type 2 causing vascular disease ([facility]     Dyslipidemia     Foot ulcer due to secondary DM ([facility] 12/1/2012    GERD (gastroesophageal reflux disease)     History of esophageal dilatation     HTN     Ill-defined condition 2013    MRSA in wound right leg (BKA)    S/P BKA (below knee amputation) ([facility]     right BKA due to non-healing ulcer    Sleep apnea     Doesnt use CPAP, patient hasnt been retested since weight loss    Thromboembolus ([facility] 1970'S    BLOOD CLOT LEFT LEG      Past Surgical History:   Procedure Laterality Date    COLONOSCOPY N/A 3/30/2021    COLONOSCOPY performed by Hannah Askew MD at [facility] ECHO 2D ADULT  8/09    normal; LVEF 60%    ECHO STRESS  4/09    7 min; normal    ENDOSCOPY, COLON, DIAGNOSTIC      FLEXIBLE SIGMOIDOSCOPY N/A 2/24/2021    SIGMOIDOSCOPY FLEXIBLE performed by Hannah Askew MD at [address] [facility] HX AMPUTATION  2012    left great toe    HX AMPUTATION  2007    [address] right    HX BELOW KNEE AMPUTATION Right     HX CERVICAL FUSION  2009    x2    HX ENDOSCOPY      EGD with Dilitation x 2    HX ORTHOPAEDIC  2006    ACDF C4-C7    IR INSERT TUNL CVC W/O PORT OVER 5 YR  6/19/2020    IR REMOVE TUNL CVAD W/O PORT / PUMP  9/14/2020    HI ABDOMEN SURGERY PROC UNLISTED      pilonidal cyst    STRESS TEST MYOVIEW  8/09    walked 8:46; normal; LVEF 51%      Prior to Admission medications    Medication Sig Start Date End Date Taking? Authorizing Provider   oxyCODONE IR (OXY-IR) 15 mg immediate release tablet Take 15 mg by mouth every four (4) hours as needed for Pain.    Yes Provider, Historical   oxyCODONE-acetaminophen (PERCOCET) 5-325 mg per tablet Take 1 Tab by mouth every six (6) hours as needed for Pain for up to 30 days. Max Daily Amount: 4 Tabs. 4/7/21 5/7/21 Yes Miracle Rosenbaum MD   clindamycin (CLEOCIN) 300 mg capsule Take 300 mg by mouth every twelve (12) hours. Yes Provider, Historical   insulin lispro (HUMALOG) 100 unit/mL kwikpen INJECT 18 TO 30 UNITS SUBCUTANEOUSLY 3 TIMES DAILY BEFORE BREAKFAST, LUNCH, AND DINNER 3/3/21  Yes Miracle Rosenbaum MD   SSD 1 % topical cream APPLY TO AFFECTED AREA DAILY 3/3/21  Yes Miracle Rosenbaum MD   pantoprazole (PROTONIX) 40 mg tablet TAKE 1 TABLET BY MOUTH EVERY DAY 3/3/21  Yes Miracle Rosenbaum MD   ferrous sulfate 325 mg (65 mg iron) tablet TAKE ONE TABLET BY MOUTH 2 TIMES A DAY 3/3/21  Yes Miracle Rosenbaum MD   epoetin naman (EPOGEN;PROCRIT) 20,000 unit/mL injection 20,000-25,000 Units by SubCUTAneous route Once every 2 weeks. Yes Provider, Historical   ergocalciferol (ERGOCALCIFEROL) 1,250 mcg (50,000 unit) capsule Take 50,000 Units by mouth Every Friday. Yes Provider, Historical   insulin glargine (Lantus Solostar U-100 Insulin) 100 unit/mL (3 mL) inpn 18 Units by SubCUTAneous route nightly. Yes Provider, Historical   rosuvastatin (CRESTOR) 40 mg tablet TAKE ONE TABLET BY MOUTH EVERY DAY 12/10/20  Yes Miracle Rosenbaum MD   albuterol (Ventolin HFA) 90 mcg/actuation inhaler Take 2 Puffs by inhalation every four (4) hours as needed for Wheezing. 12/7/20  Yes Miracle Rosenbaum MD   lisinopriL (PRINIVIL, ZESTRIL) 10 mg tablet Take 1 Tab by mouth two (2) times a day. 12/7/20  Yes Miracle Rosenbaum MD   aspirin 81 mg chewable tablet Take 81 mg by mouth daily. Yes Provider, Historical   sulfaSALAzine (AZULFIDINE) 500 mg tablet Take 800 mg by mouth two (2) times a day.     Provider, Historical     Current Facility-Administered Medications   Medication Dose Route Frequency    Vancomycin - Pharmacy to Dose   Other Rx Dosing/Monitoring    [START ON 5/4/2021] vancomycin (VANCOCIN) 750 mg in 0.9% sodium chloride 250 mL (VIAL-MATE)  750 mg IntraVENous Q24H    0.9% sodium chloride infusion 250 mL  250 mL IntraVENous PRN    epoetin naman-epbx (RETACRIT) injection 20,000 Units  20,000 Units SubCUTAneous Q MON, WED & FRI    cefepime (MAXIPIME) 2 g in 0.9% sodium chloride (MBP/ADV) 100 mL MBP  2 g IntraVENous Q12H    albuterol-ipratropium (DUO-NEB) 2.5 MG-0.5 MG/3 ML  3 mL Nebulization Q4H PRN    aspirin chewable tablet 81 mg  81 mg Oral DAILY    clindamycin (CLEOCIN) capsule 300 mg  300 mg Oral Q12H    [START ON 5/7/2021] ergocalciferol capsule 50,000 Units  50,000 Units Oral every Friday    ferrous sulfate tablet 325 mg  1 Tab Oral BID WITH MEALS    oxyCODONE IR (ROXICODONE) tablet 15 mg  15 mg Oral Q6H PRN    pantoprazole (PROTONIX) tablet 40 mg  40 mg Oral ACB    rosuvastatin (CRESTOR) tablet 40 mg  40 mg Oral QHS    sodium chloride (NS) flush 5-40 mL  5-40 mL IntraVENous Q8H    sodium chloride (NS) flush 5-40 mL  5-40 mL IntraVENous PRN    acetaminophen (TYLENOL) tablet 650 mg  650 mg Oral Q6H PRN    Or    acetaminophen (TYLENOL) suppository 650 mg  650 mg Rectal Q6H PRN    polyethylene glycol (MIRALAX) packet 17 g  17 g Oral DAILY PRN    promethazine (PHENERGAN) tablet 12.5 mg  12.5 mg Oral Q6H PRN    Or    ondansetron (ZOFRAN) injection 4 mg  4 mg IntraVENous Q6H PRN    heparin (porcine) injection 5,000 Units  5,000 Units SubCUTAneous Q8H    insulin lispro (HUMALOG) injection   SubCUTAneous AC&HS    glucose chewable tablet 16 g  4 Tab Oral PRN    dextrose (D50W) injection syrg 12.5-25 g  12.5-25 g IntraVENous PRN    glucagon (GLUCAGEN) injection 1 mg  1 mg IntraMUSCular PRN    lactated Ringers infusion  75 mL/hr IntraVENous CONTINUOUS      Allergies   Allergen Reactions    Adhesive Tape-Silicones Hives    Gabapentin Anxiety     Anxiety w/ hallucinations    Sulfa (Sulfonamide Antibiotics) Swelling     Lips eyes        Review of Systems:  A comprehensive review of systems was negative except for that written in the HPI. Objective:     Patient Vitals for the past 8 hrs:   BP Temp Pulse Resp SpO2   21 1528 (!) 149/56 99.2 °F (37.3 °C) 96 17 97 %   21 1436 (!) 132/52 98.9 °F (37.2 °C) -- 20 98 %   21 1400 (!) 143/50 98.6 °F (37 °C) -- 20 100 %   21 1342 (!) 148/61 98.2 °F (36.8 °C) (!) 102 14 98 %   21 1111 137/60 99.3 °F (37.4 °C) 98 18 97 %     Temp (24hrs), Av.8 °F (37.1 °C), Min:98.2 °F (36.8 °C), Max:99.3 °F (37.4 °C)        ORTHO EXAM:  alert, cooperative, no distress, appears stated age  MSK exam. Pt with low back tenderness on palpation. Sensation to light touch intact to LLE. Right lower leg stump. Pt able to wiggle toes. He does not actively move the left leg or stump. Passive flexion with pain to the low back. Compartment of the BLE soft and compressible. IMAGING REVIEW:  EXAM: CT CHEST WO CONT, CT ABD PELV WO CONT     INDICATION: Unexplained delirium and leukocytosis. Evaluate for infection. Hypoalbuminemia.     COMPARISON: CT angiography chest on 2009.     TECHNIQUE: Helical CT of the chest, abdomen, and pelvis with oral contrast only. No intravenous contrast. Coronal and sagittal reformats were generated. CT dose  reduction was achieved through use of a standardized protocol tailored for this  examination and automatic exposure control for dose modulation. Absence of  intravenous contrast limits evaluation of the mediastinum, jaret, vasculature,  and solid organs.     FINDINGS:  Edema in the subcutaneous adipose tissues is new. THYROID: No nodule. MEDIASTINUM: No mass or lymphadenopathy. JARET: No evidence of mass. THORACIC AORTA: Atherosclerosis without aneurysm. MAIN PULMONARY ARTERY: Mildly dilated main pulmonary artery. HEART: Normal in size. ESOPHAGUS: No wall thickening or dilatation. TRACHEA/BRONCHI: Patent. PLEURA: Trace bilateral pleural effusions. No pneumothorax.   LUNGS: Patchy bilateral lower lobe airspace opacities are nonspecific. Peribronchial vascular airspace opacities are in the right upper lobe. No lung  mass or fibrosis.     Liver: Segment 4 lesion is unchanged and likely represents hemangioma. Smooth  surface. Biliary tree: The gallbladder is within normal limits. The CBD is not dilated. Spleen: Within normal limits. Pancreas: Limited evaluation. Adrenals: Unremarkable. Kidneys: No mass or hydronephrosis. Stomach: Unremarkable. Small bowel: No dilatation or wall thickening. Colon: Colonic fecal burden may represent constipation. Appendix: Small versus resected. Peritoneum: No ascites or pneumoperitoneum. Retroperitoneum: No lymphadenopathy or aortic aneurysm. Reproductive organs: Prostate gland is not enlarged. Urinary bladder: Nondistended around a properly positioned Morrissey catheter. Bones: Endplate irregularity is at L4-5. Subchondral lucencies at L3-4 and L4-5. Subtle edema in the adjacent paraspinal soft tissues. No drainable abscess. Abdominal wall: No mass or hernia. Additional comments: Diffuse soft tissue edema.     IMPRESSION     1. L4-5 findings most likely represent discitis-osteomyelitis. 2. Soft tissue edema from hypoalbuminemia. 3. Right upper lobe pneumonia. Bilateral lower lobe atelectasis versus  pneumonia. Trace bilateral pleural effusions.     Labs:   Recent Results (from the past 24 hour(s))   GLUCOSE, POC    Collection Time: 05/02/21  9:42 PM   Result Value Ref Range    Glucose (POC) 144 (H) 65 - 100 mg/dL    Performed by Sebastián Gandara (TENA)    METABOLIC PANEL, BASIC    Collection Time: 05/03/21  3:25 AM   Result Value Ref Range    Sodium 134 (L) 136 - 145 mmol/L    Potassium 4.9 3.5 - 5.1 mmol/L    Chloride 102 97 - 108 mmol/L    CO2 26 21 - 32 mmol/L    Anion gap 6 5 - 15 mmol/L    Glucose 123 (H) 65 - 100 mg/dL    BUN 56 (H) 6 - 20 MG/DL    Creatinine 1.87 (H) 0.70 - 1.30 MG/DL    BUN/Creatinine ratio 30 (H) 12 - 20      GFR est AA 44 (L) >60 ml/min/1.73m2    GFR est non-AA 36 (L) >60 ml/min/1.73m2    Calcium 8.8 8.5 - 10.1 MG/DL   CBC WITH AUTOMATED DIFF    Collection Time: 05/03/21  3:25 AM   Result Value Ref Range    WBC 10.1 4.1 - 11.1 K/uL    RBC 2.29 (L) 4.10 - 5.70 M/uL    HGB 6.0 (L) 12.1 - 17.0 g/dL    HCT 20.9 (L) 36.6 - 50.3 %    MCV 91.3 80.0 - 99.0 FL    MCH 26.2 26.0 - 34.0 PG    MCHC 28.7 (L) 30.0 - 36.5 g/dL    RDW 17.6 (H) 11.5 - 14.5 %    PLATELET 616 803 - 608 K/uL    MPV 8.8 (L) 8.9 - 12.9 FL    NRBC 0.0 0  WBC    ABSOLUTE NRBC 0.00 0.00 - 0.01 K/uL    NEUTROPHILS 79 (H) 32 - 75 %    LYMPHOCYTES 12 12 - 49 %    MONOCYTES 9 5 - 13 %    EOSINOPHILS 0 0 - 7 %    BASOPHILS 0 0 - 1 %    IMMATURE GRANULOCYTES 0 0.0 - 0.5 %    ABS. NEUTROPHILS 8.0 1.8 - 8.0 K/UL    ABS. LYMPHOCYTES 1.2 0.8 - 3.5 K/UL    ABS. MONOCYTES 0.9 0.0 - 1.0 K/UL    ABS. EOSINOPHILS 0.0 0.0 - 0.4 K/UL    ABS. BASOPHILS 0.0 0.0 - 0.1 K/UL    ABS. IMM. GRANS. 0.0 0.00 - 0.04 K/UL    DF SMEAR SCANNED      RBC COMMENTS ANISOCYTOSIS  1+        RBC COMMENTS HYPOCHROMIA  1+       GLUCOSE, POC    Collection Time: 05/03/21  6:23 AM   Result Value Ref Range    Glucose (POC) 157 (H) 65 - 100 mg/dL    Performed by Elizabeth Hinds, ALLOCSHERRIE    Collection Time: 05/03/21  8:30 AM   Result Value Ref Range    HISTORY CHECKED?  Historical check performed    TYPE & SCREEN    Collection Time: 05/03/21 11:10 AM   Result Value Ref Range    Crossmatch Expiration 05/06/2021,2359     ABO/Rh(D) O POSITIVE     Antibody screen NEG     Unit number [id number]     Blood component type RC LR,2     Unit division 00     Status of unit ISSUED     Crossmatch result Compatible    GLUCOSE, POC    Collection Time: 05/03/21 12:13 PM   Result Value Ref Range    Glucose (POC) 136 (H) 65 - 100 mg/dL    Performed by Darrick OBREGON (CON)    GLUCOSE, POC    Collection Time: 05/03/21  4:18 PM   Result Value Ref Range    Glucose (POC) 146 (H) 65 - 100 mg/dL    Performed by Darrick Yimi A (CON)          Impression:     Patient Active Problem List    Diagnosis Date Noted    Acute delirium 05/01/2021    Chronic osteomyelitis of right lower leg with draining sinus ([facility] 06/22/2020    Cataract, nuclear 04/25/2017    Presbyopia 04/25/2017    Type 2 diabetes mellitus without retinopathy ([address] 04/25/2017    GABI (obstructive sleep apnea) 03/11/2015    Disc herniation 11/13/2014    Spinal stenosis in cervical region 11/13/2014    ARF (acute renal failure) ([address] 12/17/2012    DM type 2 causing CKD stage 3 ([address] 12/17/2012    CKD (chronic kidney disease) stage 3, GFR 30-59 ml/min ([facility] 12/17/2012    Anemia, unspecified 12/01/2012    DM type 2 causing vascular disease ([facility]     DM neuropathy, type II diabetes mellitus ([facility]     Dyslipidemia     Neuropathy 03/03/2009    HTN (hypertension) 03/03/2009       Principal Problem:    Acute delirium (5/1/2021)          ASSESSMENT:   Low back pain, r/o discitis    Plan:   ORTHOPEDIC PLAN:   - D/w ortho spine team   - Ortho plan: Ortho spine to see the pt tomorrow and await further recs following imaging studies   - MRI of L spine ordered   - Continue with pain mgmt      [place], [address]

## 2021-05-03 NOTE — PROGRESS NOTES
Day #1 of Cefepime  Indication:  HAP, discitis-osteomyelitis, Echo to r/o vegetation  Current regimen:  2 GM IV Q8H  Abx regimen: Cefepime + Clindamycin + Vancomycin (ID following)  Recent Labs     21  0325 21  0200 21  1658   WBC 10.1 13.5* 13.4*   CREA 1.87* 1.91* 2.10*   BUN 56* 59* 55*     Est CrCl: ~30-35 ml/min; UO: 0.3 ml/kg/hr  Temp (24hrs), Av.6 °F (37 °C), Min:98.2 °F (36.8 °C), Max:99.3 °F (37.4 °C)    Cultures:    Blood Cx S.aureus / bottles, pending  5/3 MRSA screen pending    Plan: Change to Cefepime 2 GM IV Q12H for CrCl 30-60 ml/min per hospital renal dosing protocol.

## 2021-05-03 NOTE — CONSULTS
Nephrology Progress Note  Erma Sifuentes. Date of Admission : 5/1/2021    CC: Follow up for CKD       Assessment and Plan     CKD 3b, followed by Dr. Bonifacio Joaquin. Cr at baseline  Anemia of CKD on EPO  AMS better  Weakness  GPC bacteremia  HTN  DM2  R AKA     Rec:  Continue present care  Would transfused PRBCs today  Increase MICHAEL to MWF  abx per primary team  Reduce IVF rate  Daily labs        Interval History:  Seen and examined. Cr stable.  hgb 6/ C/o pain. Stable BP. No cp or sob. Has IVF running at 125cc/hr. No cp or sob reported. Current Medications: all current  Medications have been eviewed in EPIC  Review of Systems: Pertinent items are noted in HPI. Objective:  Vitals:    Vitals:    05/03/21 0155 05/03/21 0307 05/03/21 0744 05/03/21 0839   BP: 123/61 (!) 136/54  (!) 141/61   Pulse: (!) 103 99  94   Resp: 11 10  16   Temp: 98.3 °F (36.8 °C) 98.5 °F (36.9 °C)  99.1 °F (37.3 °C)   SpO2: 96% 98%  97%   Weight:  62 kg (136 lb 11 oz)     Height:   5' 11\" (1.803 m)      Intake and Output:  No intake/output data recorded. 05/01 1901 - 05/03 0700  In: 500 [P.O.:500]  Out: 1075 [Urine:900]    Physical Examination:  General: Frail, NAD  Neck:  Supple, no mass  Resp:  Lungs CTA B/L, no wheezing , normal respiratory effort  CV:  RRR,  no murmur or rub,R AKA, no edema L  GI:  Soft, NT, + Bowel sounds, no hepatosplenomegaly  Neurologic:  Non focal  Psych:             AAO x 3 appropriate affect   Skin:  No Rash    []    High complexity decision making was performed  []    Patient is at high-risk of decompensation with multiple organ involvement    Lab Data Personally Reviewed: I have reviewed all the pertinent labs, microbiology data and radiology studies during assessment.     Recent Labs     05/03/21  0325 05/02/21  0200 05/01/21  1658   * 135* 134*   K 4.9 4.9 4.5    103 102   CO2 26 24 27   * 131* 132*   BUN 56* 59* 55*   CREA 1.87* 1.91* 2.10*   CA 8.8 8.9 9.4   MG  --  2.0 2.1   PHOS  -- 4.8*  --    ALB  --  1.2* 1.1*   ALT  --  13 14     Recent Labs     05/03/21  0325 05/02/21  0200 05/01/21  1658   WBC 10.1 13.5* 13.4*   HGB 6.0* 7.4* 7.6*   HCT 20.9* 25.9* 25.9*    446* 402*     Lab Results   Component Value Date/Time    Specimen Description: NARES 12/04/2012 10:12 PM    Specimen Description: TOE 12/02/2012 05:03 PM    Specimen Description: TOE 12/02/2012 05:03 PM     Lab Results   Component Value Date/Time    Culture result: (A) 05/01/2021 06:10 PM     PROBABLE STAPHYLOCOCCUS AUREUS GROWING IN ALL 4 BOTTLES DRAWN (SITE = R FOREARM AND R AC)    Culture result: (A) 05/01/2021 06:10 PM     PRELIMINARY REPORT OF GRAM POSITIVE COCCI IN CLUSTERS GROWING IN 3 OF 4 BOTTLES DRAWN CALLED TO AND READ BACK BY MARY ARRIAGA RN ON 5/2/21 AT 1440. 31 Irina Black     Culture result: MRSA PRESENT 12/21/2016 09:40 AM    Culture result:  12/21/2016 09:40 AM         Screening of patient nares for MRSA is for surveillance purposes and, if positive, to facilitate isolation considerations in high risk settings. It is not intended for automatic decolonization interventions per se as regimens are not sufficiently effective to warrant routine use.      Recent Results (from the past 24 hour(s))   GLUCOSE, POC    Collection Time: 05/02/21 11:12 AM   Result Value Ref Range    Glucose (POC) 142 (H) 65 - 100 mg/dL    Performed by Jose Enrique Ba, POC    Collection Time: 05/02/21  4:12 PM   Result Value Ref Range    Glucose (POC) 123 (H) 65 - 100 mg/dL    Performed by Brian An    GLUCOSE, POC    Collection Time: 05/02/21  9:42 PM   Result Value Ref Range    Glucose (POC) 144 (H) 65 - 100 mg/dL    Performed by Sunday Awad (TENA)    METABOLIC PANEL, BASIC    Collection Time: 05/03/21  3:25 AM   Result Value Ref Range    Sodium 134 (L) 136 - 145 mmol/L    Potassium 4.9 3.5 - 5.1 mmol/L    Chloride 102 97 - 108 mmol/L    CO2 26 21 - 32 mmol/L    Anion gap 6 5 - 15 mmol/L    Glucose 123 (H) 65 - 100 mg/dL    BUN 56 (H) 6 - 20 MG/DL    Creatinine 1.87 (H) 0.70 - 1.30 MG/DL    BUN/Creatinine ratio 30 (H) 12 - 20      GFR est AA 44 (L) >60 ml/min/1.73m2    GFR est non-AA 36 (L) >60 ml/min/1.73m2    Calcium 8.8 8.5 - 10.1 MG/DL   CBC WITH AUTOMATED DIFF    Collection Time: 05/03/21  3:25 AM   Result Value Ref Range    WBC 10.1 4.1 - 11.1 K/uL    RBC 2.29 (L) 4.10 - 5.70 M/uL    HGB 6.0 (L) 12.1 - 17.0 g/dL    HCT 20.9 (L) 36.6 - 50.3 %    MCV 91.3 80.0 - 99.0 FL    MCH 26.2 26.0 - 34.0 PG    MCHC 28.7 (L) 30.0 - 36.5 g/dL    RDW 17.6 (H) 11.5 - 14.5 %    PLATELET 089 650 - 174 K/uL    MPV 8.8 (L) 8.9 - 12.9 FL    NRBC 0.0 0  WBC    ABSOLUTE NRBC 0.00 0.00 - 0.01 K/uL    NEUTROPHILS 79 (H) 32 - 75 %    LYMPHOCYTES 12 12 - 49 %    MONOCYTES 9 5 - 13 %    EOSINOPHILS 0 0 - 7 %    BASOPHILS 0 0 - 1 %    IMMATURE GRANULOCYTES 0 0.0 - 0.5 %    ABS. NEUTROPHILS 8.0 1.8 - 8.0 K/UL    ABS. LYMPHOCYTES 1.2 0.8 - 3.5 K/UL    ABS. MONOCYTES 0.9 0.0 - 1.0 K/UL    ABS. EOSINOPHILS 0.0 0.0 - 0.4 K/UL    ABS. BASOPHILS 0.0 0.0 - 0.1 K/UL    ABS. IMM. GRANS. 0.0 0.00 - 0.04 K/UL    DF SMEAR SCANNED      RBC COMMENTS ANISOCYTOSIS  1+        RBC COMMENTS HYPOCHROMIA  1+       GLUCOSE, POC    Collection Time: 05/03/21  6:23 AM   Result Value Ref Range    Glucose (POC) 157 (H) 65 - 100 mg/dL    Performed by Sloane Finnegan, ALLOCATE    Collection Time: 05/03/21  8:30 AM   Result Value Ref Range    HISTORY CHECKED? Historical check performed            Kanchan Recinos MD  [facility]   [address]  Phone - [phone]   Fax - [phone]  [email or web address]

## 2021-05-03 NOTE — PROGRESS NOTES
Physical Therapy - defer  Order received, chart reviewed. Noted Hgb 6.0 w/ plan for 1 unit PRBC. Discussed with RN who confirmed pt has not received PRBC. Will follow up tomorrow for evaluation. Thank you.

## 2021-05-03 NOTE — PROGRESS NOTES
Bedside and Verbal shift change report given to April (oncoming nurse) by Abdullahi (offgoing nurse). Report included the following information SBAR, Kardex, Intake/Output, MAR, Recent Results and Cardiac Rhythm Sinus Tach.

## 2021-05-03 NOTE — PROGRESS NOTES
Bedside shift change report given to Jenny Domingo RN (oncoming nurse) by Renetta Santacruz RN (offgoing nurse).  Report included the following information SBAR, Intake/Output, MAR, Recent Results and Cardiac Rhythm SR.

## 2021-05-03 NOTE — PROGRESS NOTES
Pharmacist Note - Vancomycin Dosing    Consult provided for this 77 y.o. male for indication of bacteremia, aspiration PNA. Antibiotic regimen(s): vancomycin, Zosyn, clindamycin  Patient on vancomycin PTA? NO     Recent Labs     21  0325 21  0200 21  1658   WBC 10.1 13.5* 13.4*   CREA 1.87* 1.91* 2.10*   BUN 56* 59* 55*     Frequency of BMP: daily  Height: 180.3 cm  Weight: 62 kg  Est CrCl: 34 ml/min  Temp (24hrs), Av.4 °F (36.9 °C), Min:97.7 °F (36.5 °C), Max:99 °F (37.2 °C)    Cultures:   blood - GPCs in 4 of 4 bottles    Goal trough = 15 - 20 mcg/mL    Therapy will be initiated with a loading dose of 1500 mg IV x 1 to be followed by a maintenance dose of 750 mg IV every 24 hours. Pharmacy to follow patient daily and order levels / make dose adjustments as appropriate.

## 2021-05-04 LAB
ABO + RH BLD: NORMAL
ANION GAP SERPL CALC-SCNC: 5 MMOL/L (ref 5–15)
BACTERIA SPEC CULT: NORMAL
BACTERIA SPEC CULT: NORMAL
BASOPHILS # BLD: 0.1 K/UL (ref 0–0.1)
BASOPHILS NFR BLD: 1 % (ref 0–1)
BLD PROD TYP BPU: NORMAL
BLOOD GROUP ANTIBODIES SERPL: NORMAL
BPU ID: NORMAL
BUN SERPL-MCNC: 49 MG/DL (ref 6–20)
BUN/CREAT SERPL: 30 (ref 12–20)
CALCIUM SERPL-MCNC: 8.8 MG/DL (ref 8.5–10.1)
CHLORIDE SERPL-SCNC: 105 MMOL/L (ref 97–108)
CO2 SERPL-SCNC: 25 MMOL/L (ref 21–32)
CREAT SERPL-MCNC: 1.66 MG/DL (ref 0.7–1.3)
CROSSMATCH RESULT,%XM: NORMAL
DIFFERENTIAL METHOD BLD: ABNORMAL
EOSINOPHIL # BLD: 0.1 K/UL (ref 0–0.4)
EOSINOPHIL NFR BLD: 1 % (ref 0–7)
ERYTHROCYTE [DISTWIDTH] IN BLOOD BY AUTOMATED COUNT: 16.9 % (ref 11.5–14.5)
GLUCOSE BLD STRIP.AUTO-MCNC: 138 MG/DL (ref 65–100)
GLUCOSE BLD STRIP.AUTO-MCNC: 159 MG/DL (ref 65–100)
GLUCOSE BLD STRIP.AUTO-MCNC: 169 MG/DL (ref 65–100)
GLUCOSE BLD STRIP.AUTO-MCNC: 179 MG/DL (ref 65–100)
GLUCOSE SERPL-MCNC: 153 MG/DL (ref 65–100)
HCT VFR BLD AUTO: 25.2 % (ref 36.6–50.3)
HGB BLD-MCNC: 7.4 G/DL (ref 12.1–17)
IMM GRANULOCYTES # BLD AUTO: 0.1 K/UL (ref 0–0.04)
IMM GRANULOCYTES NFR BLD AUTO: 1 % (ref 0–0.5)
LYMPHOCYTES # BLD: 0.8 K/UL (ref 0.8–3.5)
LYMPHOCYTES NFR BLD: 8 % (ref 12–49)
MCH RBC QN AUTO: 26.2 PG (ref 26–34)
MCHC RBC AUTO-ENTMCNC: 29.4 G/DL (ref 30–36.5)
MCV RBC AUTO: 89.4 FL (ref 80–99)
MONOCYTES # BLD: 0.9 K/UL (ref 0–1)
MONOCYTES NFR BLD: 8 % (ref 5–13)
NEUTS SEG # BLD: 8.5 K/UL (ref 1.8–8)
NEUTS SEG NFR BLD: 81 % (ref 32–75)
NRBC # BLD: 0 K/UL (ref 0–0.01)
NRBC BLD-RTO: 0 PER 100 WBC
PLATELET # BLD AUTO: 361 K/UL (ref 150–400)
PMV BLD AUTO: 8.8 FL (ref 8.9–12.9)
POTASSIUM SERPL-SCNC: 4.8 MMOL/L (ref 3.5–5.1)
RBC # BLD AUTO: 2.82 M/UL (ref 4.1–5.7)
SERVICE CMNT-IMP: ABNORMAL
SERVICE CMNT-IMP: NORMAL
SODIUM SERPL-SCNC: 135 MMOL/L (ref 136–145)
SPECIMEN EXP DATE BLD: NORMAL
STATUS OF UNIT,%ST: NORMAL
UNIT DIVISION, %UDIV: 0
WBC # BLD AUTO: 10.4 K/UL (ref 4.1–11.1)

## 2021-05-04 PROCEDURE — 74011250637 HC RX REV CODE- 250/637: Performed by: FAMILY MEDICINE

## 2021-05-04 PROCEDURE — 36415 COLL VENOUS BLD VENIPUNCTURE: CPT

## 2021-05-04 PROCEDURE — 97530 THERAPEUTIC ACTIVITIES: CPT

## 2021-05-04 PROCEDURE — 74011250636 HC RX REV CODE- 250/636: Performed by: FAMILY MEDICINE

## 2021-05-04 PROCEDURE — 74011250636 HC RX REV CODE- 250/636: Performed by: INTERNAL MEDICINE

## 2021-05-04 PROCEDURE — 74011636637 HC RX REV CODE- 636/637: Performed by: INTERNAL MEDICINE

## 2021-05-04 PROCEDURE — 74011250637 HC RX REV CODE- 250/637: Performed by: INTERNAL MEDICINE

## 2021-05-04 PROCEDURE — 87147 CULTURE TYPE IMMUNOLOGIC: CPT

## 2021-05-04 PROCEDURE — 77030027138 HC INCENT SPIROMETER -A

## 2021-05-04 PROCEDURE — 82962 GLUCOSE BLOOD TEST: CPT

## 2021-05-04 PROCEDURE — 74011250636 HC RX REV CODE- 250/636: Performed by: NURSE PRACTITIONER

## 2021-05-04 PROCEDURE — 85025 COMPLETE CBC W/AUTO DIFF WBC: CPT

## 2021-05-04 PROCEDURE — 97161 PT EVAL LOW COMPLEX 20 MIN: CPT

## 2021-05-04 PROCEDURE — 74011000258 HC RX REV CODE- 258: Performed by: NURSE PRACTITIONER

## 2021-05-04 PROCEDURE — 80048 BASIC METABOLIC PNL TOTAL CA: CPT

## 2021-05-04 PROCEDURE — 97165 OT EVAL LOW COMPLEX 30 MIN: CPT

## 2021-05-04 PROCEDURE — 87040 BLOOD CULTURE FOR BACTERIA: CPT

## 2021-05-04 PROCEDURE — 65660000000 HC RM CCU STEPDOWN

## 2021-05-04 RX ORDER — OXYCODONE HYDROCHLORIDE 5 MG/1
15 TABLET ORAL
Status: DISCONTINUED | OUTPATIENT
Start: 2021-05-04 | End: 2021-05-20 | Stop reason: HOSPADM

## 2021-05-04 RX ADMIN — SODIUM CHLORIDE, POTASSIUM CHLORIDE, SODIUM LACTATE AND CALCIUM CHLORIDE 75 ML/HR: 600; 310; 30; 20 INJECTION, SOLUTION INTRAVENOUS at 02:50

## 2021-05-04 RX ADMIN — OXYCODONE 15 MG: 5 TABLET ORAL at 08:17

## 2021-05-04 RX ADMIN — OXYCODONE 15 MG: 5 TABLET ORAL at 17:14

## 2021-05-04 RX ADMIN — HEPARIN SODIUM 5000 UNITS: 5000 INJECTION INTRAVENOUS; SUBCUTANEOUS at 08:32

## 2021-05-04 RX ADMIN — INSULIN LISPRO 2 UNITS: 100 INJECTION, SOLUTION INTRAVENOUS; SUBCUTANEOUS at 12:50

## 2021-05-04 RX ADMIN — FERROUS SULFATE TAB 325 MG (65 MG ELEMENTAL FE) 325 MG: 325 (65 FE) TAB at 17:14

## 2021-05-04 RX ADMIN — HEPARIN SODIUM 5000 UNITS: 5000 INJECTION INTRAVENOUS; SUBCUTANEOUS at 17:14

## 2021-05-04 RX ADMIN — INSULIN LISPRO 2 UNITS: 100 INJECTION, SOLUTION INTRAVENOUS; SUBCUTANEOUS at 08:17

## 2021-05-04 RX ADMIN — ROSUVASTATIN 40 MG: 40 TABLET, FILM COATED ORAL at 21:59

## 2021-05-04 RX ADMIN — ASPIRIN 81 MG: 81 TABLET, CHEWABLE ORAL at 08:32

## 2021-05-04 RX ADMIN — FERROUS SULFATE TAB 325 MG (65 MG ELEMENTAL FE) 325 MG: 325 (65 FE) TAB at 08:16

## 2021-05-04 RX ADMIN — VANCOMYCIN HYDROCHLORIDE 750 MG: 750 INJECTION, POWDER, LYOPHILIZED, FOR SOLUTION INTRAVENOUS at 08:32

## 2021-05-04 RX ADMIN — CEFEPIME 2 G: 2 INJECTION, POWDER, FOR SOLUTION INTRAVENOUS at 02:30

## 2021-05-04 RX ADMIN — OXYCODONE 15 MG: 5 TABLET ORAL at 12:51

## 2021-05-04 RX ADMIN — CEFEPIME 2 G: 2 INJECTION, POWDER, FOR SOLUTION INTRAVENOUS at 17:13

## 2021-05-04 RX ADMIN — CLINDAMYCIN HYDROCHLORIDE 300 MG: 150 CAPSULE ORAL at 08:32

## 2021-05-04 RX ADMIN — OXYCODONE 15 MG: 5 TABLET ORAL at 21:59

## 2021-05-04 RX ADMIN — CLINDAMYCIN HYDROCHLORIDE 300 MG: 150 CAPSULE ORAL at 21:59

## 2021-05-04 RX ADMIN — PANTOPRAZOLE SODIUM 40 MG: 40 TABLET, DELAYED RELEASE ORAL at 08:17

## 2021-05-04 RX ADMIN — SODIUM CHLORIDE, POTASSIUM CHLORIDE, SODIUM LACTATE AND CALCIUM CHLORIDE 75 ML/HR: 600; 310; 30; 20 INJECTION, SOLUTION INTRAVENOUS at 17:13

## 2021-05-04 NOTE — PROGRESS NOTES
Bedside shift change report given to Vinny Santo RN (oncoming nurse) by USAMA Bianchi (offgoing nurse). Report included the following information SBAR, Kardex, Intake/Output, MAR, Accordion and Cardiac Rhythm Sinus tach.

## 2021-05-04 NOTE — PROGRESS NOTES
Nephrology Progress Note  Alexandria Terry. Date of Admission : 5/1/2021    CC: Follow up for CKD       Assessment and Plan     CKD 3b, followed by Dr. Aris Franco. Cr at baseline  Anemia of CKD on EPO  AMS better  Weakness  GPC bacteremia  HTN  DM2  R AKA  L3-4, L4-5 discitis/osteomyelitis       Rec:  Continue present care  Cr stable, hgb improving  Iron studies ok  Cont MICHAEL  Abx per ID  Daily labs       Interval History:  Seen and examined. Cr better, UOP stable.  hgb better after transfusion. C/o back pain. No fevers, chills, cp or sob. Current Medications: all current  Medications have been eviewed in EPIC  Review of Systems: Pertinent items are noted in HPI. Objective:  Vitals:    Vitals:    05/03/21 2005 05/03/21 2308 05/04/21 0307 05/04/21 0830   BP: (!) 180/78 (!) 153/61 (!) 141/54 (!) 159/73   Pulse: (!) 104 (!) 106 98 89   Resp: 18 17 18 16   Temp: 99 °F (37.2 °C) 98.7 °F (37.1 °C) 98.2 °F (36.8 °C) 98.7 °F (37.1 °C)   SpO2: 95% 98% 96% 98%   Weight:   61.6 kg (135 lb 14.4 oz)    Height:         Intake and Output:  No intake/output data recorded. 05/02 1901 - 05/04 0700  In: 440 [P.O.:240; I.V.:200]  Out: 1625 [Urine:1450]    Physical Examination:  General: Frail, NAD  Neck:  Supple, no mass  Resp:  Lungs CTA B/L, no wheezing , normal respiratory effort  CV:  RRR,  no murmur or rub,R AKA, no edema L  GI:  Soft, NT, + Bowel sounds, no hepatosplenomegaly  Neurologic:  Non focal  Psych:             AAO x 3 appropriate affect   Skin:  No Rash    []    High complexity decision making was performed  []    Patient is at high-risk of decompensation with multiple organ involvement    Lab Data Personally Reviewed: I have reviewed all the pertinent labs, microbiology data and radiology studies during assessment.     Recent Labs     05/04/21  0243 05/03/21  0325 05/02/21  0200 05/01/21  1658   * 134* 135* 134*   K 4.8 4.9 4.9 4.5    102 103 102   CO2 25 26 24 27   * 123* 131* 132*   BUN 49* 56* 59* 55*   CREA 1.66* 1.87* 1.91* 2.10*   CA 8.8 8.8 8.9 9.4   MG  --   --  2.0 2.1   PHOS  --   --  4.8*  --    ALB  --   --  1.2* 1.1*   ALT  --   --  13 14     Recent Labs     05/04/21  0243 05/03/21  1724 05/03/21  0325 05/02/21  0200   WBC 10.4  --  10.1 13.5*   HGB 7.4* 7.4* 6.0* 7.4*   HCT 25.2*  --  20.9* 25.9*     --  374 446*     Lab Results   Component Value Date/Time    Specimen Description: NARLADY 12/04/2012 10:12 PM    Specimen Description: TOE 12/02/2012 05:03 PM    Specimen Description: TOE 12/02/2012 05:03 PM     Lab Results   Component Value Date/Time    Culture result: NO GROWTH AFTER 13 HOURS 05/03/2021 05:24 PM    Culture result: (A) 05/01/2021 06:10 PM     STAPHYLOCOCCUS AUREUS GROWING IN ALL 4 BOTTLES DRAWN (SITE = R FOREARM AND R AC) SENSITIVITY TO FOLLOW    Culture result: (A) 05/01/2021 06:10 PM     PRELIMINARY REPORT OF GRAM POSITIVE COCCI IN CLUSTERS GROWING IN 3 OF 4 BOTTLES DRAWN CALLED TO AND READ BACK BY MARY ARRIAGA RN ON 5/2/21 AT 1440. SH      Recent Results (from the past 24 hour(s))   TYPE & SCREEN    Collection Time: 05/03/21 11:10 AM   Result Value Ref Range    Crossmatch Expiration 05/06/2021,2359     ABO/Rh(D) O POSITIVE     Antibody screen NEG     Unit number [id number]     Blood component type RC LR,2     Unit division 00     Status of unit TRANSFUSED     Crossmatch result Compatible    GLUCOSE, POC    Collection Time: 05/03/21 12:13 PM   Result Value Ref Range    Glucose (POC) 136 (H) 65 - 100 mg/dL    Performed by Belle OBREGON (CON)    GLUCOSE, POC    Collection Time: 05/03/21  4:18 PM   Result Value Ref Range    Glucose (POC) 146 (H) 65 - 100 mg/dL    Performed by Belle OBREGON (CON)    CULTURE, BLOOD, PAIRED    Collection Time: 05/03/21  5:24 PM    Specimen: Blood   Result Value Ref Range    Special Requests: NO SPECIAL REQUESTS      Culture result: NO GROWTH AFTER 13 HOURS     HEMOGLOBIN    Collection Time: 05/03/21  5:24 PM   Result Value Ref Range    HGB 7.4 (L) 12.1 - 17.0 g/dL   GLUCOSE, POC    Collection Time: 05/03/21  9:14 PM   Result Value Ref Range    Glucose (POC) 144 (H) 65 - 100 mg/dL    Performed by [place], [facility]    Collection Time: 05/04/21  2:43 AM   Result Value Ref Range    Sodium 135 (L) 136 - 145 mmol/L    Potassium 4.8 3.5 - 5.1 mmol/L    Chloride 105 97 - 108 mmol/L    CO2 25 21 - 32 mmol/L    Anion gap 5 5 - 15 mmol/L    Glucose 153 (H) 65 - 100 mg/dL    BUN 49 (H) 6 - 20 MG/DL    Creatinine 1.66 (H) 0.70 - 1.30 MG/DL    BUN/Creatinine ratio 30 (H) 12 - 20      GFR est AA 50 (L) >60 ml/min/1.73m2    GFR est non-AA 42 (L) >60 ml/min/1.73m2    Calcium 8.8 8.5 - 10.1 MG/DL   CBC WITH AUTOMATED DIFF    Collection Time: 05/04/21  2:43 AM   Result Value Ref Range    WBC 10.4 4.1 - 11.1 K/uL    RBC 2.82 (L) 4.10 - 5.70 M/uL    HGB 7.4 (L) 12.1 - 17.0 g/dL    HCT 25.2 (L) 36.6 - 50.3 %    MCV 89.4 80.0 - 99.0 FL    MCH 26.2 26.0 - 34.0 PG    MCHC 29.4 (L) 30.0 - 36.5 g/dL    RDW 16.9 (H) 11.5 - 14.5 %    PLATELET 281 714 - 387 K/uL    MPV 8.8 (L) 8.9 - 12.9 FL    NRBC 0.0 0  WBC    ABSOLUTE NRBC 0.00 0.00 - 0.01 K/uL    NEUTROPHILS 81 (H) 32 - 75 %    LYMPHOCYTES 8 (L) 12 - 49 %    MONOCYTES 8 5 - 13 %    EOSINOPHILS 1 0 - 7 %    BASOPHILS 1 0 - 1 %    IMMATURE GRANULOCYTES 1 (H) 0.0 - 0.5 %    ABS. NEUTROPHILS 8.5 (H) 1.8 - 8.0 K/UL    ABS. LYMPHOCYTES 0.8 0.8 - 3.5 K/UL    ABS. MONOCYTES 0.9 0.0 - 1.0 K/UL    ABS. EOSINOPHILS 0.1 0.0 - 0.4 K/UL    ABS. BASOPHILS 0.1 0.0 - 0.1 K/UL    ABS. IMM. GRANS. 0.1 (H) 0.00 - 0.04 K/UL    DF AUTOMATED     GLUCOSE, POC    Collection Time: 05/04/21  8:10 AM   Result Value Ref Range    Glucose (POC) 169 (H) 65 - 100 mg/dL    Performed by Ashley Jerez MD  [address]  Phone - [phone]   Fax - [phone]  [email or web address]

## 2021-05-04 NOTE — PROGRESS NOTES
Problem: Self Care Deficits Care Plan (Adult)  Goal: *Acute Goals and Plan of Care (Insert Text)  Description:   FUNCTIONAL STATUS PRIOR TO ADMISSION: Patient was modified independent with ADLs and functional mobility/ ambulatory with RLE prosthetic prior to R AKA ~2 weeks PTA at OSH. Patient and wife report progressive functional decline since AKA due to increasing pain and decreased LUE and LLE function. HOME SUPPORT: Patient was admitted to [facility] from [facility] inpatient rehab. Prior to that he was at [address] [place] [address] for R AKA. At home he lived with his wife but did not require assistance. Occupational Therapy Goals  Initiated 5/4/2021  1. Patient will perform grooming seated EOB with supervision/set-up within 7 day(s). 2.  Patient will perform upper body dressing with minimal assistance within 7 day(s). 3.  Patient will perform bathing with minimal assistance within 7 day(s). 4.  Patient will perform toilet transfers to [facility] with moderate assistance within 7 day(s). 5.  Patient will perform all aspects of toileting with moderate assistance  within 7 day(s). 6.  Patient will participate in upper extremity therapeutic exercise/activities with supervision/set-up for 10 minutes within 7 day(s). 7.  Patient will utilize fall prevention techniques during functional activities with verbal cues within 7 day(s)    Outcome: Not Met    OCCUPATIONAL THERAPY EVALUATION  Patient: Nadia Morales (76 y.o. male)  Date: 5/4/2021  Primary Diagnosis: Acute delirium [R41.0]        Precautions: fall      ASSESSMENT  Based on the objective data described below, the patient presents with very limited LUE shoulder AROM due to pain (but otherwise LUE equal to RUE and generally decreased/ functional), diffuse LLE pain, LLE weakness, and back pain s/p admission to [facility] from [facility] inpatient rehab for AMS and discitis/osteomyelitis.   Noted patient with prior R BKA but was active, modified independent, and ambulatory with RLE prosthetic device prior to R AKA at [facility] ~2 weeks ago. Patient and wife report progressive functional decline since R AKA due to increasing pain and decreased LUE and LLE function. Today for OT evaluation, patient received A&Ox4, a good historian, and followed all commands. Today's evaluation was limited primarily by pain and patient declined EOB attempt. Pending medical course and improved pain management/ activity tolerance, recommend inpatient rehab at d/c as patient remains significantly below his functional baseline prior to R AKA. Current Level of Function Impacting Discharge (ADLs/self-care): minimum to maximum assistance UB ADLs, total assistance LB ADLs, maximum assistance rolling    Functional Outcome Measure: The patient scored 10/100 on the Barthel Index outcome measure which is indicative of ~90% impairment in functional performance. Patient will benefit from skilled therapy intervention to address the above noted impairments. PLAN :  Recommendations and Planned Interventions: self care training, functional mobility training, therapeutic exercise, balance training, therapeutic activities, endurance activities, patient education, home safety training, and family training/education    Frequency/Duration: Patient will be followed by occupational therapy 5 times a week to address goals. Recommendation for discharge: (in order for the patient to meet his/her long term goals)  Therapy 3 hours per day 5-7 days per week    This discharge recommendation:  Has not yet been discussed the attending provider and/or case management         SUBJECTIVE:   Patient stated I can't sit up today.     OBJECTIVE DATA SUMMARY:   HISTORY:   Past Medical History:   Diagnosis Date    Arthritis     BPH (benign prostatic hyperplasia)     Chronic kidney disease     Chronic pain     BACK NEUROPATHY FEET HANDS    DM neuropathy, type II diabetes mellitus ([facility]     DM type 2 causing CKD stage 3 ([facility] 12/17/2012    DM type 2 causing vascular disease ([facility]     Dyslipidemia     Foot ulcer due to secondary DM ([facility] 12/1/2012    GERD (gastroesophageal reflux disease)     History of esophageal dilatation     HTN     Ill-defined condition 2013    MRSA in wound right leg (BKA)    S/P BKA (below knee amputation) ([facility]     right BKA due to non-healing ulcer    Sleep apnea     Doesnt use CPAP, patient hasnt been retested since weight loss    Thromboembolus ([facility] 1970'S    BLOOD CLOT LEFT LEG     Past Surgical History:   Procedure Laterality Date    COLONOSCOPY N/A 3/30/2021    COLONOSCOPY performed by Zee Marti MD at [facility] 10    ECHO 2D ADULT  8/09    normal; LVEF 60%    ECHO STRESS  4/09    7 min; normal    ENDOSCOPY, COLON, DIAGNOSTIC      FLEXIBLE SIGMOIDOSCOPY N/A 2/24/2021    SIGMOIDOSCOPY FLEXIBLE performed by Zee Marti MD at [facility]  2012    left great toe    HX AMPUTATION  2007    BKA right    HX BELOW KNEE AMPUTATION Right     HX CERVICAL FUSION  2009    x2    HX ENDOSCOPY      EGD with Dilitation x 2    HX ORTHOPAEDIC  2006    ACDF C4-C7    IR INSERT TUNL CVC W/O PORT OVER 5 YR  6/19/2020    IR REMOVE TUNL CVAD W/O PORT / PUMP  9/14/2020    NV ABDOMEN SURGERY PROC UNLISTED      pilonidal cyst    STRESS TEST MYOVIEW  8/09    walked 8:46; normal; LVEF 51%       Expanded or extensive additional review of patient history:     Home Situation  Home Environment: Apartment  # Steps to Enter: 1  One/Two Story Residence: One story  Living Alone: No  Support Systems: Spouse/Significant Other/Partner  Patient Expects to be Discharged to[de-identified] Rehabilitation facility  Current DME Used/Available at Home: Walker, rolling, Wheelchair, [address] [facility] [facility] chair, Other (comment)      EXAMINATION OF PERFORMANCE DEFICITS:  Cognitive/Behavioral Status:  Neurologic State: Alert  Orientation Level: Oriented X4  Cognition: Follows commands  Perception: Appears intact  Perseveration: No perseveration noted  Safety/Judgement: Awareness of environment; Insight into deficits    Skin: visible skin appears intact    Edema: none noted    Hearing: Auditory  Auditory Impairment: None    Vision/Perceptual:    Tracking: Able to track stimulus in all quadrants w/o difficulty              Visual Fields: (able to detect stimuli in all fields)  Diplopia: No    Acuity: Within Defined Limits         Range of Motion:    AROM: Generally decreased, functional(unable to tolerate full L LE ROMs 2* pain; R hip in tact)                         Strength:    Strength: Generally decreased, functional(significant L LE weakness and pain)                Coordination:  Coordination: Generally decreased, functional  Fine Motor Skills-Upper: Right Intact; Left Impaired(LUE limited by pain)    Gross Motor Skills-Upper: Left Impaired;Right Intact(LUE limited by pain)    Tone & Sensation:       Sensation: Impaired(to LT on left side; ?accuracy)                      Functional Mobility and Transfers for ADLs:  Bed Mobility:  Rolling: Maximum assistance  Supine to Sit: (pt refused EOB attempts)  Scooting: Total assistance    ADL Assessment:  Feeding: Minimum assistance(inferred for bimanual tasks, LUE limited function/ pain)    Oral Facial Hygiene/Grooming: Minimum assistance(inferred for bimanual tasks, LUE limited function/ pain)    Bathing: Maximum assistance(inferred for pain, mobility, AROM)    Upper Body Dressing: Maximum assistance(inferred due to pain, reach, mobility)    Lower Body Dressing: Total assistance(inferred)    Toileting: Total assistance(inferred)                ADL Intervention and task modifications:                    Cognitive Retraining  Safety/Judgement: Awareness of environment; Insight into deficits      Functional Measure:  Barthel Index:    Bathin  Bladder: 0  Bowels: 5  Groomin  Dressin  Feedin  Mobility: 0  Stairs: 0  Toilet Use: 0  Transfer (Bed to Chair and Back): 0  Total: 10/100 The Barthel ADL Index: Guidelines  1. The index should be used as a record of what a patient does, not as a record of what a patient could do. 2. The main aim is to establish degree of independence from any help, physical or verbal, however minor and for whatever reason. 3. The need for supervision renders the patient not independent. 4. A patient's performance should be established using the best available evidence. Asking the patient, friends/relatives and nurses are the usual sources, but direct observation and common sense are also important. However direct testing is not needed. 5. Usually the patient's performance over the preceding 24-48 hours is important, but occasionally longer periods will be relevant. 6. Middle categories imply that the patient supplies over 50 per cent of the effort. 7. Use of aids to be independent is allowed. Dayne Rodriguez., Barthel, D.W. (7412). Functional evaluation: the Barthel Index. [facility] (14)2. NORM Briones, Navneet Irizarry., Amy Brown, [place], [address] [place] [address] (1999). Measuring the change indisability after inpatient rehabilitation; comparison of the responsiveness of the Barthel Index and Functional Douglas Measure. Journal of Neurology, Neurosurgery, and Psychiatry, 66(4), 791-602. Elizabet Cook, N.J.A, BRYAN Robles, & Joan Saxena M.A. (2004.) Assessment of post-stroke quality of life in cost-effectiveness studies: The usefulness of the Barthel Index and the EuroQoL-5D. Quality of Life Research, 15, 358-60         Occupational Therapy Evaluation Charge Determination   History Examination Decision-Making   LOW Complexity : Brief history review  MEDIUM Complexity : 3-5 performance deficits relating to physical, cognitive , or psychosocial skils that result in activity limitations and / or participation restrictions MEDIUM Complexity : Patient may present with comorbidities that affect occupational performnce.  Miniml to moderate modification of tasks or assistance (eg, physical or verbal ) with assesment(s) is necessary to enable patient to complete evaluation       Based on the above components, the patient evaluation is determined to be of the following complexity level: LOW   Pain Rating:  Patient reports severe L shoulder, back, and LLE pain    Activity Tolerance:   Poor, limited by pain    After treatment patient left in no apparent distress:    Supine in bed, Call bell within reach, and Caregiver / family present    COMMUNICATION/EDUCATION:   The patients plan of care was discussed with: Physical therapist, Registered nurse, and Case management. Home safety education was provided and the patient/caregiver indicated understanding., Patient/family have participated as able in goal setting and plan of care. , and Patient/family agree to work toward stated goals and plan of care. This patients plan of care is appropriate for delegation to [facility].     Thank you for this referral.  Amparo Lamas, OT  Time Calculation: 27 mins

## 2021-05-04 NOTE — PROGRESS NOTES
Ortho Spine    Consult received. MRI completed overnight with now final interpretation. Likely discitis at L3-4, L4-5. No epidural abscess. Will discuss further with Dr. Jaems Tello and bridget pt thereafter. Per PA assessment yesterday, pt was neurointact. ID onboard.      Nidia Mendoza PA-C

## 2021-05-04 NOTE — PROGRESS NOTES
Day #2 of Vancomycin  Indication:  bacteremia, possible aspiration PNA  Current regimen:  750 mg IV Q24h  Abx regimen:  vancomycin, cefepime, clindamycin  ID Following ?: YES  Concomitant nephrotoxic drugs (requires more frequent monitoring): None  Frequency of BMP?: daily    Recent Labs     21  0243 21  0325 21  0200   WBC 10.4 10.1 13.5*   CREA 1.66* 1.87* 1.91*   BUN 49* 56* 59*     Est CrCl: 38 ml/min  Temp (24hrs), Av.8 °F (37.1 °C), Min:98.2 °F (36.8 °C), Max:99.3 °F (37.4 °C)    Cultures:   5/1 blood - GPCs in 4 of 4 bottles    Goal trough = 15 - 20 mcg/mL    Recent trough history (date/time/level/dose/action taken):  none    Plan: Continue current regimen

## 2021-05-04 NOTE — PROGRESS NOTES
Problem: Mobility Impaired (Adult and Pediatric)  Goal: *Acute Goals and Plan of Care (Insert Text)  Description: FUNCTIONAL STATUS PRIOR TO ADMISSION: Patient was modified independent using a Rw and R LE prosthesis for functional mobility prior to recent revision of BKA to AKA. Discharged to [facility] and was working on transfers to w/c with one assist    [facility]: The patient lived with his spouse but did not require assist prior to AKA. Physical Therapy Goals  Initiated 5/4/2021  1. Patient will move from supine to sit and sit to supine  and scoot up and down in bed with moderate assistance  within 7 day(s). 2.  Patient will transfer from bed to chair and chair to bed with maximal assistance using the least restrictive device within 7 day(s). 3.  Patient will perform sit to stand with maximal assistance within 7 day(s). 4.  Patient will demo fair sitting balance at EOB x2 min in prep for mobility progression within 7 days. 5. Patient will indep complete B LE there ex program to incr strength within 7 days. Outcome: Not Progressing Towards Goal     PHYSICAL THERAPY EVALUATION  Patient: Jass Craven (76 y.o. male)  Date: 5/4/2021  Primary Diagnosis: Acute delirium [R41.0]        Precautions:        ASSESSMENT  Based on the objective data described below, the patient presents with impaired functional mobility as compared to baseline level 2* c/o severe back pain, L sided UE and LE weakness and paresthesias, and inferred impaired balance following admission from rehab for c/o AMS and pain. Brain MRI negative for acute process; lumbar MRI showing possible discitis/OM at L4/5. Ortho plan pending at time of eval. Prior to recent admission to OSH for R AKA, pt lived at home with his spouse and was mod I with RW and R LE (BKA) prosthesis. Has been at [facility] following AKA. Received pt supine in bed, cleared for mobility by RN.  Mobility significantly limited by severe pain and L sided weakness; reports unable to even feed himself. Unable to tolerate full PROM of L LE 2* pain. Required max A to roll towards R via log roll however adamantly refused attempts to fully sit up 2* pain. Total A to scoot/reposition in bed and then only able to tolerate HOB elevated to ~45deg. Obtained and instructed pt in use of incentive spirometer (productive cough) and basic LE there ex to reduce risk of further decline. Answered questions to apparent satisfaction. Recommending discharge back to [facility] pending progress and tolerance to activity, will follow. Current Level of Function Impacting Discharge (mobility/balance): max A to roll    Functional Outcome Measure: The patient scored 5/100 on the Barthel outcome measure which is indicative of falls risk. Other factors to consider for discharge: far below baseline; motivated; good support system     Patient will benefit from skilled therapy intervention to address the above noted impairments. PLAN :  Recommendations and Planned Interventions: bed mobility training, transfer training, gait training, therapeutic exercises, neuromuscular re-education, patient and family training/education, and therapeutic activities      Frequency/Duration: Patient will be followed by physical therapy:  5 times a week to address goals. Recommendation for discharge: (in order for the patient to meet his/her long term goals)  Therapy 3 hours per day 5-7 days per week    This discharge recommendation:  A follow-up discussion with the attending provider and/or case management is planned    IF patient discharges home will need the following DME: to be determined (TBD)         SUBJECTIVE:   Patient stated I can't I'm not doing it it hurts too bad.     OBJECTIVE DATA SUMMARY:   HISTORY:    Past Medical History:   Diagnosis Date    Arthritis     BPH (benign prostatic hyperplasia)     Chronic kidney disease     Chronic pain     BACK NEUROPATHY FEET HANDS    DM neuropathy, type II diabetes mellitus ([facility] [address]     DM type 2 causing CKD stage 3 ([facility] [facility] 12/17/2012    DM type 2 causing vascular disease ([facility] [facility]     Dyslipidemia     Foot ulcer due to secondary DM ([facility] [facility] 12/1/2012    GERD (gastroesophageal reflux disease)     History of esophageal dilatation     HTN     Ill-defined condition 2013    MRSA in wound right leg (BKA)    S/P BKA (below knee amputation) ([facility] [facility]     right BKA due to non-healing ulcer    Sleep apnea     Doesnt use CPAP, patient hasnt been retested since weight loss    Thromboembolus ([facility] 1970'S    BLOOD CLOT LEFT LEG     Past Surgical History:   Procedure Laterality Date    COLONOSCOPY N/A 3/30/2021    COLONOSCOPY performed by Sly Guillen MD at [address] [place] [address]    ECHO 2D ADULT  8/09    normal; LVEF 60%    ECHO STRESS  4/09    7 min; normal    ENDOSCOPY, COLON, DIAGNOSTIC      FLEXIBLE SIGMOIDOSCOPY N/A 2/24/2021    SIGMOIDOSCOPY FLEXIBLE performed by Sly Guillen MD at [facility]  2012    left great toe    HX AMPUTATION  2007    BKA right    HX BELOW KNEE AMPUTATION Right     HX CERVICAL FUSION  2009    x2    HX ENDOSCOPY      EGD with Dilitation x 2    HX ORTHOPAEDIC  2006    ACDF C4-C7    IR INSERT TUNL CVC W/O PORT OVER 5 YR  6/19/2020    IR REMOVE TUNL CVAD W/O PORT / PUMP  9/14/2020    WA ABDOMEN SURGERY PROC UNLISTED      pilonidal cyst    STRESS TEST MYOVIEW  8/09    walked 8:46; normal; LVEF 51%       Personal factors and/or comorbidities impacting plan of care: PMHx    Home Situation  Home Environment: Apartment  # Steps to Enter: 1  One/Two Story Residence: One story  Living Alone: No  Support Systems: Spouse/Significant Other/Partner  Patient Expects to be Discharged to[de-identified] Rehabilitation facility  Current DME Used/Available at Home: Walker, rolling, Wheelchair, [address] [facility] chair, Other (comment)    EXAMINATION/PRESENTATION/DECISION MAKING:   Critical Behavior:  Neurologic State: Alert  Orientation Level: Oriented X4  Cognition: Follows commands  Safety/Judgement: Awareness of environment, Insight into deficits  Hearing: Auditory  Auditory Impairment: None  Skin:    Edema:   Range Of Motion:  AROM: Generally decreased, functional(unable to tolerate full L LE ROMs 2* pain; R hip in tact)     Strength:    Strength: Generally decreased, functional(significant L LE weakness and pain)     Tone & Sensation:     Sensation: Impaired(to LT on left side; ?accuracy)    Coordination:  Coordination: Generally decreased, functional  Vision:   Diplopia: No  Functional Mobility:  Bed Mobility:  Rolling: Maximum assistance  Supine to Sit: (pt refused EOB attempts)  Scooting: Total assistance      Functional Measure:  Barthel Index:    Bathin  Bladder: 0  Bowels: 5  Groomin  Dressin  Feedin  Mobility: 0  Stairs: 0  Toilet Use: 0  Transfer (Bed to Chair and Back): 0  Total: 5/100       The Barthel ADL Index: Guidelines  1. The index should be used as a record of what a patient does, not as a record of what a patient could do. 2. The main aim is to establish degree of independence from any help, physical or verbal, however minor and for whatever reason. 3. The need for supervision renders the patient not independent. 4. A patient's performance should be established using the best available evidence. Asking the patient, friends/relatives and nurses are the usual sources, but direct observation and common sense are also important. However direct testing is not needed. 5. Usually the patient's performance over the preceding 24-48 hours is important, but occasionally longer periods will be relevant. 6. Middle categories imply that the patient supplies over 50 per cent of the effort. 7. Use of aids to be independent is allowed. Neri Hdz., Barthel, D.W. (9387). Functional evaluation: the Barthel Index. [facility] (14)2. NORM Potter, Jacqueline Denny., Arlena Cushing., Lg, [address] ().  Measuring the change indisability after inpatient rehabilitation; comparison of the responsiveness of the Barthel Index and Functional Jber Measure. Journal of Neurology, Neurosurgery, and Psychiatry, 66(4), 603-203. [place] KIRA Eddy.SABAS, BRYAN Robles, & Meredith Almanza M.A. (2004.) Assessment of post-stroke quality of life in cost-effectiveness studies: The usefulness of the Barthel Index and the EuroQoL-5D. Quality of Life Research, 15, 218-08            Physical Therapy Evaluation Charge Determination   History Examination Presentation Decision-Making   HIGH Complexity :3+ comorbidities / personal factors will impact the outcome/ POC  MEDIUM Complexity : 3 Standardized tests and measures addressing body structure, function, activity limitation and / or participation in recreation  LOW Complexity : Stable, uncomplicated  HIGH Complexity : FOTO score of 1- 25       Based on the above components, the patient evaluation is determined to be of the following complexity level: LOW       Activity Tolerance:   Fair and requires rest breaks    After treatment patient left in no apparent distress:   Supine in bed, Call bell within reach, Caregiver / family present, and Side rails x 3    COMMUNICATION/EDUCATION:   The patients plan of care was discussed with: Occupational therapist and Registered nurse. Fall prevention education was provided and the patient/caregiver indicated understanding., Patient/family have participated as able in goal setting and plan of care. , and Patient/family agree to work toward stated goals and plan of care.     Thank you for this referral.  Jeanette Funes, PT, DPT   Time Calculation: 29 mins

## 2021-05-04 NOTE — PROGRESS NOTES
Orders received, chart reviewed and patient evaluated by physical therapy. Pending progression with skilled acute physical therapy, recommend:  Therapy 3 hours per day 5-7 days per week pending further medical work up and tolerance to activity. Full evaluation to follow.      Alysia Pulliam, PT, DPT

## 2021-05-04 NOTE — PROGRESS NOTES
Physician Progress Note      Albania Bryant  CSN #:                  [id number]  :                       1955  ADMIT DATE:       2021 4:38 PM  [address] [place] DATE:  RESPONDING  PROVIDER #:        Emerald Mandujano MD          QUERY TEXT:    Patient admitted with Altered Mental Status (AMS). Per Wound Care PN 5/3/21, noted to also have pressure ulcer. If possible, please document in progress notes and discharge summary the location, present on admission status and stage of the pressure ulcer: The medical record reflects the following:  Risk Factors: 76 y/o male admitted with AMS, ESTRELLA, abnormal electrolytes. Hx of CKD III  Clinical Indicators: 5/3/21 wound care PN: POA unstageable sacral pressure injury measures 2.5 cm x 3.5 cm, wound bed is pink along wound edges are black centrally, scant serous drainage, wound edges are open, jai-wound intact. Treatment: Hospitalist consult, wound care consult, Hydrocolloid applied, reposition patient, off load heels, specialty bed, dressing changes per order or PRN    Stage 1:  Non-blanchable erythema of intact skin  Stage 2:  Abrasion, Blister, Partial-thickness skin loss, with exposed dermis  Stage 3:  Full-thickness skin loss with damage or necrosis of subcutaneous tissue  Stage 4:  Full-thickness skin & soft tissue loss through to underlying muscle, tendon or bone  Unstageable: Obscured full-thickness skin & tissue loss  Options provided:  -- Unstageable Pressure Ulcer of sacrum present on admission  -- Unstageable Pressure Ulcer of sacrum not present on admission  -- Other - I will add my own diagnosis  -- Disagree - Not applicable / Not valid  -- Disagree - Clinically unable to determine / Unknown  -- Refer to Clinical Documentation Reviewer    PROVIDER RESPONSE TEXT:    This patient has a Unstageable pressure ulcer of the sacrum that was present on admission.     Query created by: Carly Ruiz on 2021 7:19 AM      QUERY TEXT:    Pt admitted with Altered Mental Status (AMS). Pt noted to have Pneumonia. If possible, please document in the progress notes and discharge summary if you are evaluating and/or treating any of the following:    Note: CAP and HCAP indicate where the pneumonia was acquired, not a specific type. The medical record reflects the following:  Risk Factors: 76 y/o male admitted with AMS, ESTRELLA, abnormal electrolytes. Hx of CKD III    Clinical Indicators:  5/3/21 Attending MD PN:  Pneumonia  -CT of the chest shows right upper lobe pneumonia and bilateral lower lobe atelectasis versus pneumonia  -Start broad-spectrum antibiotic with Vanco and Zosyn for healthcare associated pneumonia. 5/3/21 ID PN:  HAP  - WBC normal, afebrile  Blood cx (5/1) Staph aureus (identification and susceptibility pending)  CT of Chest, ABD/PEL (5/2); L4-5 findings most likely represent discitis-osteomyelitis. Soft tissue edema from hypoalbuminemia. Right upper lobe pneumonia. Bilateral lower lobe atelectasis vs pneumonia. Trace bilateral pleural effusions. 5/3/21 Blood culture: Culture result: Abnormal     ? Preliminary  STAPHYLOCOCCUS AUREUS GROWING IN ALL 4 BOTTLES    Treatment: Hospitalist consult, ID consult, CT Chest, ABX -Vancomycin, Zosyn. daily labs, Echo -pending,  Options provided:  -- Gram positive pneumonia  -- Bacterial pneumonia  -- Other - I will add my own diagnosis  -- Disagree - Not applicable / Not valid  -- Disagree - Clinically unable to determine / Unknown  -- Refer to Clinical Documentation Reviewer    PROVIDER RESPONSE TEXT:    This patient has bacterial pneumonia.     Query created by: Ranjit Bains on 5/4/2021 7:34 AM      Electronically signed by:  Chepe Jay MD 5/4/2021 3:54 PM

## 2021-05-04 NOTE — ROUTINE PROCESS
Bedside and Verbal shift change report given to April (oncoming nurse) by Peace Lozano (offgoing nurse). Report included the following information SBAR, Kardex, ED Summary, Intake/Output, MAR, Recent Results and Cardiac Rhythm NSR.

## 2021-05-04 NOTE — PROGRESS NOTES
Problem: Falls - Risk of  Goal: *Absence of Falls  Description: Document Burrell Canavan Fall Risk and appropriate interventions in the flowsheet. Outcome: Progressing Towards Goal  Note: Fall Risk Interventions:       Mentation Interventions: Door open when patient unattended, Reorient patient    Medication Interventions: Teach patient to arise slowly, Patient to call before getting OOB    Elimination Interventions: Call light in reach, Patient to call for help with toileting needs    History of Falls Interventions: Evaluate medications/consider consulting pharmacy         Problem: Patient Education: Go to Patient Education Activity  Goal: Patient/Family Education  Outcome: Progressing Towards Goal     Problem: Patient Education: Go to Patient Education Activity  Goal: Patient/Family Education  Outcome: Progressing Towards Goal     Problem: Diabetes Self-Management  Goal: *Disease process and treatment process  Description: Define diabetes and identify own type of diabetes; list 3 options for treating diabetes. Outcome: Progressing Towards Goal  Goal: *Incorporating nutritional management into lifestyle  Description: Describe effect of type, amount and timing of food on blood glucose; list 3 methods for planning meals. Outcome: Progressing Towards Goal  Goal: *Incorporating physical activity into lifestyle  Description: State effect of exercise on blood glucose levels. Outcome: Progressing Towards Goal  Goal: *Developing strategies to promote health/change behavior  Description: Define the ABC's of diabetes; identify appropriate screenings, schedule and personal plan for screenings. Outcome: Progressing Towards Goal  Goal: *Using medications safely  Description: State effect of diabetes medications on diabetes; name diabetes medication taking, action and side effects.   Outcome: Progressing Towards Goal  Goal: *Monitoring blood glucose, interpreting and using results  Description: Identify recommended blood glucose targets  and personal targets. Outcome: Progressing Towards Goal  Goal: *Prevention, detection, treatment of acute complications  Description: List symptoms of hyper- and hypoglycemia; describe how to treat low blood sugar and actions for lowering  high blood glucose level. Outcome: Progressing Towards Goal  Goal: *Prevention, detection and treatment of chronic complications  Description: Define the natural course of diabetes and describe the relationship of blood glucose levels to long term complications of diabetes.   Outcome: Progressing Towards Goal  Goal: *Developing strategies to address psychosocial issues  Description: Describe feelings about living with diabetes; identify support needed and support network  Outcome: Progressing Towards Goal  Goal: *Insulin pump training  Outcome: Progressing Towards Goal  Goal: *Sick day guidelines  Outcome: Progressing Towards Goal  Goal: *Patient Specific Goal (EDIT GOAL, INSERT TEXT)  Outcome: Progressing Towards Goal     Problem: Patient Education: Go to Patient Education Activity  Goal: Patient/Family Education  Outcome: Progressing Towards Goal     Problem: Patient Education: Go to Patient Education Activity  Goal: Patient/Family Education  Outcome: Progressing Towards Goal     Problem: Patient Education: Go to Patient Education Activity  Goal: Patient/Family Education  Outcome: Progressing Towards Goal     Problem: Nutrition Deficit  Goal: *Optimize nutritional status  Outcome: Progressing Towards Goal

## 2021-05-04 NOTE — PROGRESS NOTES
MRI of the lumbar spine reviewed. Likely discitis at L3-4 and L4-5. No epidural abscess or collections. Recommend bracing and IV antibiotics per ID. Will follow as needed. Not surgical currently.

## 2021-05-04 NOTE — PROGRESS NOTES
[facility]  [facility]ospitalist Group                                                                                          Hospitalist Progress Note  Nena Ramirez MD  Answering service: [phone] from in house phone              Progress Note    Patient: rEma Rivas MRN: [id number]  SSN: [id number]    YOB: 1955  Age: 77 y.o. Sex: male      Admit Date: 5/1/2021    LOS: 3 days     Subjective:     Patient presents with altered mental status and admitted for further evaluation. Patient with abnormal renal function on presentation but improving this a.m. on IV fluid. Patient is overall more alert this a.m. Able to communicate more. Denies some back pain. Breathing is improving. Denies any headache, dizziness, chest pain, shortness of breath, nausea, vomiting, abdominal pain. Objective:     Vitals:    05/04/21 0307 05/04/21 0830 05/04/21 1055 05/04/21 1243   BP: (!) 141/54 (!) 159/73 139/60 139/63   Pulse: 98 89 87 91   Resp: 18 16 15 13   Temp: 98.2 °F (36.8 °C) 98.7 °F (37.1 °C) 97.6 °F (36.4 °C) 96.8 °F (36 °C)   SpO2: 96% 98% 97% 98%   Weight: 61.6 kg (135 lb 14.4 oz)      Height:            Intake and Output:  Current Shift: No intake/output data recorded. Last three shifts: 05/02 1901 - 05/04 0700  In: 440 [P.O.:240; I.V.:200]  Out: 1625 [Urine:1450]    Physical Exam:   GENERAL: Patient appears more alert this a.m. THROAT & NECK: normal and no erythema or exudates noted. LUNG: clear to auscultation bilaterally  HEART: regular rate and rhythm, S1, S2 normal, no murmur, click, rub or gallop  ABDOMEN: soft, non-tender. Bowel sounds normal. No masses,  no organomegaly  EXTREMITIES: Right BKA, stump wound with dressing  SKIN: no rash or abnormalities  NEUROLOGIC: Patient appears more alert this a.m. PSYCHIATRIC: non focal    Lab/Data Review: All lab results for the last 24 hours reviewed.      Recent Results (from the past 24 hour(s))   GLUCOSE, POC Collection Time: 05/03/21  4:18 PM   Result Value Ref Range    Glucose (POC) 146 (H) 65 - 100 mg/dL    Performed by Glenn OBREGON (TENA)    CULTURE, BLOOD, PAIRED    Collection Time: 05/03/21  5:24 PM    Specimen: Blood   Result Value Ref Range    Special Requests: NO SPECIAL REQUESTS      Culture result: (A)       GRAM POSITIVE COCCI IN CLUSTERS GROWING IN 1 OF 4 BOTTLES DRAWN SITE = LARM    Culture result: REMAINING BOTTLE(S) HAS/HAVE NO GROWTH SO FAR     HEMOGLOBIN    Collection Time: 05/03/21  5:24 PM   Result Value Ref Range    HGB 7.4 (L) 12.1 - 17.0 g/dL   GLUCOSE, POC    Collection Time: 05/03/21  9:14 PM   Result Value Ref Range    Glucose (POC) 144 (H) 65 - 100 mg/dL    Performed by [employer] [address], [facility]    Collection Time: 05/04/21  2:43 AM   Result Value Ref Range    Sodium 135 (L) 136 - 145 mmol/L    Potassium 4.8 3.5 - 5.1 mmol/L    Chloride 105 97 - 108 mmol/L    CO2 25 21 - 32 mmol/L    Anion gap 5 5 - 15 mmol/L    Glucose 153 (H) 65 - 100 mg/dL    BUN 49 (H) 6 - 20 MG/DL    Creatinine 1.66 (H) 0.70 - 1.30 MG/DL    BUN/Creatinine ratio 30 (H) 12 - 20      GFR est AA 50 (L) >60 ml/min/1.73m2    GFR est non-AA 42 (L) >60 ml/min/1.73m2    Calcium 8.8 8.5 - 10.1 MG/DL   CBC WITH AUTOMATED DIFF    Collection Time: 05/04/21  2:43 AM   Result Value Ref Range    WBC 10.4 4.1 - 11.1 K/uL    RBC 2.82 (L) 4.10 - 5.70 M/uL    HGB 7.4 (L) 12.1 - 17.0 g/dL    HCT 25.2 (L) 36.6 - 50.3 %    MCV 89.4 80.0 - 99.0 FL    MCH 26.2 26.0 - 34.0 PG    MCHC 29.4 (L) 30.0 - 36.5 g/dL    RDW 16.9 (H) 11.5 - 14.5 %    PLATELET 101 241 - 122 K/uL    MPV 8.8 (L) 8.9 - 12.9 FL    NRBC 0.0 0  WBC    ABSOLUTE NRBC 0.00 0.00 - 0.01 K/uL    NEUTROPHILS 81 (H) 32 - 75 %    LYMPHOCYTES 8 (L) 12 - 49 %    MONOCYTES 8 5 - 13 %    EOSINOPHILS 1 0 - 7 %    BASOPHILS 1 0 - 1 %    IMMATURE GRANULOCYTES 1 (H) 0.0 - 0.5 %    ABS. NEUTROPHILS 8.5 (H) 1.8 - 8.0 K/UL    ABS. LYMPHOCYTES 0.8 0.8 - 3.5 K/UL    ABS. MONOCYTES 0.9 0.0 - 1.0 K/UL    ABS. EOSINOPHILS 0.1 0.0 - 0.4 K/UL    ABS. BASOPHILS 0.1 0.0 - 0.1 K/UL    ABS. IMM. GRANS. 0.1 (H) 0.00 - 0.04 K/UL    DF AUTOMATED     GLUCOSE, POC    Collection Time: 05/04/21  8:10 AM   Result Value Ref Range    Glucose (POC) 169 (H) 65 - 100 mg/dL    Performed by [employer] [place], POC    Collection Time: 05/04/21 11:50 AM   Result Value Ref Range    Glucose (POC) 159 (H) 65 - 100 mg/dL    Performed by Clau James         Imaging:           Assessment and Plan:      Altered mental status  -Likely metabolic encephalopathy due to renal insufficiency and abnormal electrolytes  -CT head and MRI of the brain negative for acute pathology, ammonia is normal  -Patient's mental status is back to baseline    Bacteremia  -Initial blood cultures on 5/1 showing MRSA of 4 bottles  -Repeat blood cultures 5/3 with gram-positive cocci in clusters in 1 of 4 bottles  -Currently on vancomycin and cefepime  -Check echocardiogram to evaluate for endocarditis  -Infectious disease following    Pneumonia  -CT of the chest shows right upper lobe pneumonia and bilateral lower lobe atelectasis versus pneumonia  -Continue Vanco and cefepime    Discitis  -CT abdomen show L4-L5 finding most likely represent discitis/osteomyelitis  -MRI of the lumbar spine on 5/3 shows edema and abnormal bright signal and moderately narrowed discs at L3 and 4 and L4-5 worrisome for discitis/osteomyelitis  -Infectious disease and spine surgery following  -Continue broad-spectrum antibiotics as above    Anemia  -Drop in hemoglobin is likely due to hemodilution from IV fluid  -Baseline chronic anemia from CKD and iron deficiency  -Monitor for bleeding  -Transfused 1 units PRBCs    ESTRELLA  -ESTRELLA on baseline CKD stage III  -Renal function improving with IV fluid  -Nephrology following    S/p right BKA  -Continue clindamycin per postop protocol  -Wound care consult for BKA stump wound    Anemia  -Combination of iron deficiency and anemia of chronic kidney disease  -Continue iron supplement    Leukocytosis  -Now resolved    Thrombocytosis  -Now resolved    Hyponatremia  -Likely hypovolemic hyponatremia  -Continue IV fluid and monitor sodium level    Hypertension  -Hold lisinopril due to ESTRELLA    Diabetes  -Well-controlled at baseline with hemoglobin A1c of 7.2  -Continue insulin sliding scale coverage  -May need basal insulin resumed if patient improving with p.o. intake    Dyslipidemia  -Continue statin    Discharge disposition: Likely more than 48 hours pending medical progress.     Signed By: Glenys Mtz MD     May 4, 2021

## 2021-05-04 NOTE — PROGRESS NOTES
ID Progress Note  2021    Subjective:     C/o severe back pain  Review of Systems:            Symptom Y/N Comments   Symptom Y/N Comments   Fever/Chills  n     Chest Pain n       Poor Appetite       Edema        Cough       Abdominal Pain        Sputum       Joint Pain  y  lumbar spine    SOB/MASSEY n      Pruritis/Rash        Nausea/vomit  n     Tolerating PT/OT        Diarrhea  n     Tolerating Diet        Constipation  n     Other           Could NOT obtain due to:       Objective:     Vitals:   Visit Vitals  BP (!) 141/54   Pulse 98   Temp 98.2 °F (36.8 °C)   Resp 18   Ht 5' 11\" (1.803 m)   Wt 61.6 kg (135 lb 14.4 oz)   SpO2 96%   BMI 18.95 kg/m²        Tmax:  Temp (24hrs), Av.8 °F (37.1 °C), Min:98.2 °F (36.8 °C), Max:99.3 °F (37.4 °C)      PHYSICAL EXAM:  General: Chronically ill appearing, WD, WN. Alert, cooperative, no acute distress    EENT:  EOMI. Anicteric sclerae. MMM  Resp:  Clear in apex with decreased breath sounds at bases, no wheezing or rales. No accessory muscle use  CV:  Regular  rhythm,  No edema  GI:  Soft, Non distended, Non tender. +Bowel sounds  Neurologic:  Alert and oriented X 3, normal speech,   Psych:   Good insight. Not anxious nor agitated  Skin:  No rashes.   No jaundice, right stump; dressing dry and intact, incision approximated, no erythema, no drainage     Labs:   Lab Results   Component Value Date/Time    WBC 10.4 2021 02:43 AM    Hemoglobin (POC) 5.2 (LL) 2021 08:37 AM    Hemoglobin, POC 11.2 (L) 2014 11:05 PM    HGB 7.4 (L) 2021 02:43 AM    Hematocrit (POC) 26 (L) 10/09/2018 07:16 AM    Hematocrit, POC 33 (L) 2014 11:05 PM    HCT 25.2 (L) 2021 02:43 AM    PLATELET 920 7112 02:43 AM    MCV 89.4 2021 02:43 AM     Lab Results   Component Value Date/Time    Sodium 135 (L) 2021 02:43 AM    Potassium 4.8 2021 02:43 AM    Chloride 105 2021 02:43 AM    CO2 25 2021 02:43 AM    Anion gap 5 2021 02:43 AM Glucose 153 (H) 05/04/2021 02:43 AM    BUN 49 (H) 05/04/2021 02:43 AM    Creatinine 1.66 (H) 05/04/2021 02:43 AM    BUN/Creatinine ratio 30 (H) 05/04/2021 02:43 AM    GFR est AA 50 (L) 05/04/2021 02:43 AM    GFR est non-AA 42 (L) 05/04/2021 02:43 AM    Calcium 8.8 05/04/2021 02:43 AM    Bilirubin, total 0.3 05/02/2021 02:00 AM    Alk. phosphatase 234 (H) 05/02/2021 02:00 AM    Protein, total 7.2 05/02/2021 02:00 AM    Albumin 1.2 (L) 05/02/2021 02:00 AM    Globulin 6.0 (H) 05/02/2021 02:00 AM    A-G Ratio 0.2 (L) 05/02/2021 02:00 AM    ALT (SGPT) 13 05/02/2021 02:00 AM       Assessment and Plan   L4-5 Discitis/OM   HAP  Bacteremia  S/p laminectomy C3-4 (2014)   - WBC normal, afebrile    Blood cx (5/1) Staph aureus (identification and susceptibility pending)    CT of Chest, ABD/PEL (5/2); L4-5 findings most likely represent discitis-osteomyelitis. Soft tissue edema from hypoalbuminemia. Right upper lobe pneumonia. Bilateral lower lobe atelectasis vs pneumonia. Trace bilateral pleural effusions.     MRI of lumbar spine (5/3) discitis/OM at L3-4 & L4-5, no evidence of epidural abscess or cord compression; to be seen by spine surgery team       Echo to r/o vegetation       Continue with IV vancomycin & cefepime    Anemia  - hgb 7.4; s/p PRBC transfusion      Bell Herbert NP

## 2021-05-04 NOTE — PROGRESS NOTES
Comprehensive Nutrition Assessment    Type and Reason for Visit: Initial    Nutrition Recommendations/Plan:    1. Consider addition of appetite stimulant (unless contraindicated with current meds). 2. Pt unlikely to meet kcal/protein needs via PO intake alone; ideally would benefit from enteral nutrition support -pending plan/goals of care. Nutrition Assessment:    Pt admitted after becoming lethargic/confused at [facility] facility. PMH CKD, Type 2 DM, dyslipidemia, HTN, sleep apnea, degenerative disc disease, chronic pain syndrome, and s/p R AKA. Pt found to be bacteremic, additional cardiac w/u for possible endocarditis as ID unsure of source of infection. Spoke with pt, he reports he has no appetite, and intakes have been poor for several weeks. He has visible muscle/fat mod-severe wasting. He reports issues with feeding himself, and with feeding assistance also declines most items offered. Reports hx esophageal dilation and eats soft/easy to chew/swallow foods. Reports UBW of 180# - ~ 6months ago. Pt also s/p R AKA since this time, but has also clearly not been meeting pro/barbara needs with current body weight. RD to trial Ensure Enlive daily, magic cup daily, and liberalize diet and will continue to follow. It is unlikely pt will meet nutrition needs via oral intake alone at this time. Malnutrition Assessment:  Malnutrition Status:   Moderate malnutrition    Context:  Chronic illness     Findings of the 6 clinical characteristics of malnutrition:   Energy Intake:  7 - 75% or less est energy requirements for 1 month or longer  Weight Loss:  1.00 - 10% over 6 months     Body Fat Loss:  7 - Severe body fat loss, Triceps, Orbital   Muscle Mass Loss:  1 - Mild muscle mass loss, Temples (temporalis), Clavicles (pectoralis &deltoids), Hand (interosseous)  Fluid Accumulation:  No significant fluid accumulation,     Strength:  Not performed       Nutritionally Significant Medications: IV Cefepime, IV vanc, protonix, humalog      Estimated Daily Nutrient Needs:  Energy (kcal): 1900 (MSJ x 1.2 + 250 kcal); Weight Used for Energy Requirements: Current  Protein (g): 75-80 (1.2-1.3 g/kg); Weight Used for Protein Requirements: Current  Fluid (ml/day): 1900; Method Used for Fluid Requirements: 1 ml/kcal    Nutrition Related Findings:       BM: Abd soft  Edema: None noted  Wounds:        Recent Labs     05/04/21  0243 05/03/21  0325 05/02/21  0200 05/01/21  1658   * 123* 131* 132*   BUN 49* 56* 59* 55*   CREA 1.66* 1.87* 1.91* 2.10*   * 134* 135* 134*   K 4.8 4.9 4.9 4.5    102 103 102   CO2 25 26 24 27   CA 8.8 8.8 8.9 9.4   PHOS  --   --  4.8*  --    MG  --   --  2.0 2.1       Recent Labs     05/02/21  0200 05/01/21  1658   ALT 13 14   * 236*   TBILI 0.3 0.3   TP 7.2 7.9   ALB 1.2* 1.1*   GLOB 6.0* 6.8*   LPSE 113  --        Recent Labs     05/01/21  1810   LAC 1.0       Recent Labs     05/04/21  0243 05/03/21  1724 05/03/21  0325   WBC 10.4  --  10.1   HGB 7.4* 7.4* 6.0*   HCT 25.2*  --  20.9*     --  374       Recent Labs     05/04/21  1150 05/04/21  0810 05/03/21  2114 05/03/21  1618 05/03/21  1213 05/03/21  0623 05/02/21  2142 05/02/21  1612 05/02/21  1112 05/02/21  0627   GLUCPOC 159* 169* 144* 146* 136* 157* 144* 123* 142* 161*       Lab Results   Component Value Date/Time    Hemoglobin A1c 7.2 (H) 05/02/2021 02:00 AM    Hemoglobin A1c 11.4 (H) 03/30/2020 02:32 PM                     Current Nutrition Therapies:   Diet: Diabetic/Consistent Carb, soft solids  Supplements: None ordered  Additional Caloric Sources: None    Meal intake: No data found. Anthropometric Measures:  · Height:  5' 11\" (180.3 cm)  · Current Body Wt:  61.2 kg (135 lb)   · Ideal Body Wt:  172:  78.5 %   · Adjusted Body Weight:  148.6;  Weight Adjustment for: Amputation   · Adjusted BMI:  20.7    · BMI Categories:  Underweight (BMI less than 22) age over 72     Wt Readings from Last 10 Encounters: 05/04/21 61.6 kg (135 lb 14.4 oz)   04/07/21 65 kg (143 lb 3.2 oz)   03/30/21 67.9 kg (149 lb 11.1 oz)   03/05/21 77.1 kg (170 lb)   02/24/21 69.5 kg (153 lb 3.5 oz)   02/01/21 74.8 kg (165 lb)   02/01/21 74.8 kg (165 lb)   12/07/20 72.7 kg (160 lb 3.2 oz)   07/14/20 86.2 kg (190 lb)   06/23/20 79.4 kg (175 lb)       Nutrition Diagnosis:   · Inadequate protein-energy intake related to increased demand for energy/nutrients, altered GI structure, psychological cause or life stress, cognitive or neurological impairment as evidenced by weight loss, BMI, intake 0-25%, moderate muscle loss      Nutrition Interventions:   Food and/or Nutrient Delivery: Continue current diet, Start oral nutrition supplement  Nutrition Education and Counseling: No recommendations at this time  Coordination of Nutrition Care: Continue to monitor while inpatient    Goals:  PO intakes >50% vs EN support. Nutrition Monitoring and Evaluation:   Behavioral-Environmental Outcomes: Beliefs and attitudes, None identified  Food/Nutrient Intake Outcomes: Supplement intake, Food and nutrient intake  Physical Signs/Symptoms Outcomes: Weight, Nutrition focused physical findings, Meal time behavior    Discharge Planning:     Too soon to determine     Andrey Mattson RD       Contact via [facility]

## 2021-05-04 NOTE — PROGRESS NOTES
Transitions of Care: 19% risk of re-admission      -TBD, anticipate IPR needs- family would like patient placed closer-to-home    -BLS transportation     The CM noted PT evaluation- recommending IPR. The patient is currently on IV antibiotics, being followed by ID- currently on IV cefepime and vancomycin, will monitor for IV antibiotic needs. The patient has Ortho consultation- monitor for plan, Ortho spine to follow-up with patient, plan TBD pending imaging. The CM will follow for transitions of care, the CM will follow for needs- anticipate IPR placement, CM provided spouse with lists of IPR facilities near [facility]. The CM will follow for transitions of care needs. 14:10 p.m.- CM received voicemail from patient's spouse inquiring about FMLA- CM reached out to attending MD, he is willing to complete. CM returned Mrs. Blake's phone call- informed her of above, MD is willing to complete FMLA. PT/OT evaluated the patient today and recommending IPR- the patient's spouse provided Freedom of Choice for [facility] in [place]- will send referral in [facility]. Patient has also verbalized wanting rehab closer to home. TATIANA Weir      Transition of Care Plan:     The Plan for Transition of Care is related to the following treatment goals: Inpatient Rehab    The Patient and/or patient representative, spouse, was provided with a choice of provider and agrees  with the discharge plan. Yes [x] No []    A Freedom of choice list was provided with basic dialogue that supports the patient's individualized plan of care/goals and shares the quality data associated with the providers.        Yes [x] No []

## 2021-05-05 ENCOUNTER — APPOINTMENT (OUTPATIENT)
Dept: NON INVASIVE DIAGNOSTICS | Age: 66
DRG: 551 | End: 2021-05-05
Attending: NURSE PRACTITIONER
Payer: MEDICARE

## 2021-05-05 LAB
ANION GAP SERPL CALC-SCNC: 6 MMOL/L (ref 5–15)
BACTERIA SPEC CULT: ABNORMAL
BACTERIA SPEC CULT: ABNORMAL
BASOPHILS # BLD: 0.1 K/UL (ref 0–0.1)
BASOPHILS NFR BLD: 1 % (ref 0–1)
BUN SERPL-MCNC: 42 MG/DL (ref 6–20)
BUN/CREAT SERPL: 27 (ref 12–20)
CALCIUM SERPL-MCNC: 9 MG/DL (ref 8.5–10.1)
CHLORIDE SERPL-SCNC: 107 MMOL/L (ref 97–108)
CO2 SERPL-SCNC: 22 MMOL/L (ref 21–32)
CREAT SERPL-MCNC: 1.56 MG/DL (ref 0.7–1.3)
DIFFERENTIAL METHOD BLD: ABNORMAL
EOSINOPHIL # BLD: 0.2 K/UL (ref 0–0.4)
EOSINOPHIL NFR BLD: 2 % (ref 0–7)
ERYTHROCYTE [DISTWIDTH] IN BLOOD BY AUTOMATED COUNT: 17 % (ref 11.5–14.5)
GLUCOSE BLD STRIP.AUTO-MCNC: 167 MG/DL (ref 65–100)
GLUCOSE BLD STRIP.AUTO-MCNC: 179 MG/DL (ref 65–100)
GLUCOSE BLD STRIP.AUTO-MCNC: 201 MG/DL (ref 65–100)
GLUCOSE BLD STRIP.AUTO-MCNC: 210 MG/DL (ref 65–100)
GLUCOSE SERPL-MCNC: 175 MG/DL (ref 65–100)
HCT VFR BLD AUTO: 28.3 % (ref 36.6–50.3)
HGB BLD-MCNC: 8.1 G/DL (ref 12.1–17)
IMM GRANULOCYTES # BLD AUTO: 0.1 K/UL (ref 0–0.04)
IMM GRANULOCYTES NFR BLD AUTO: 1 % (ref 0–0.5)
LYMPHOCYTES # BLD: 0.9 K/UL (ref 0.8–3.5)
LYMPHOCYTES NFR BLD: 11 % (ref 12–49)
MCH RBC QN AUTO: 26.1 PG (ref 26–34)
MCHC RBC AUTO-ENTMCNC: 28.6 G/DL (ref 30–36.5)
MCV RBC AUTO: 91.3 FL (ref 80–99)
MONOCYTES # BLD: 0.8 K/UL (ref 0–1)
MONOCYTES NFR BLD: 10 % (ref 5–13)
NEUTS SEG # BLD: 5.8 K/UL (ref 1.8–8)
NEUTS SEG NFR BLD: 75 % (ref 32–75)
NRBC # BLD: 0 K/UL (ref 0–0.01)
NRBC BLD-RTO: 0 PER 100 WBC
PLATELET # BLD AUTO: 390 K/UL (ref 150–400)
PMV BLD AUTO: 8.7 FL (ref 8.9–12.9)
POTASSIUM SERPL-SCNC: 4.8 MMOL/L (ref 3.5–5.1)
RBC # BLD AUTO: 3.1 M/UL (ref 4.1–5.7)
RBC MORPH BLD: ABNORMAL
SERVICE CMNT-IMP: ABNORMAL
SODIUM SERPL-SCNC: 135 MMOL/L (ref 136–145)
WBC # BLD AUTO: 7.9 K/UL (ref 4.1–11.1)

## 2021-05-05 PROCEDURE — 80048 BASIC METABOLIC PNL TOTAL CA: CPT

## 2021-05-05 PROCEDURE — 74011250636 HC RX REV CODE- 250/636: Performed by: FAMILY MEDICINE

## 2021-05-05 PROCEDURE — 74011250637 HC RX REV CODE- 250/637: Performed by: FAMILY MEDICINE

## 2021-05-05 PROCEDURE — 74011250636 HC RX REV CODE- 250/636: Performed by: NURSE PRACTITIONER

## 2021-05-05 PROCEDURE — 36415 COLL VENOUS BLD VENIPUNCTURE: CPT

## 2021-05-05 PROCEDURE — 74011250636 HC RX REV CODE- 250/636: Performed by: INTERNAL MEDICINE

## 2021-05-05 PROCEDURE — 93306 TTE W/DOPPLER COMPLETE: CPT

## 2021-05-05 PROCEDURE — 82962 GLUCOSE BLOOD TEST: CPT

## 2021-05-05 PROCEDURE — 97530 THERAPEUTIC ACTIVITIES: CPT

## 2021-05-05 PROCEDURE — 74011250637 HC RX REV CODE- 250/637: Performed by: INTERNAL MEDICINE

## 2021-05-05 PROCEDURE — 94760 N-INVAS EAR/PLS OXIMETRY 1: CPT

## 2021-05-05 PROCEDURE — 74011636637 HC RX REV CODE- 636/637: Performed by: INTERNAL MEDICINE

## 2021-05-05 PROCEDURE — 85025 COMPLETE CBC W/AUTO DIFF WBC: CPT

## 2021-05-05 PROCEDURE — 65660000000 HC RM CCU STEPDOWN

## 2021-05-05 PROCEDURE — 74011000258 HC RX REV CODE- 258: Performed by: NURSE PRACTITIONER

## 2021-05-05 PROCEDURE — 97535 SELF CARE MNGMENT TRAINING: CPT

## 2021-05-05 RX ORDER — CYCLOBENZAPRINE HCL 10 MG
5 TABLET ORAL 3 TIMES DAILY
Status: DISCONTINUED | OUTPATIENT
Start: 2021-05-05 | End: 2021-05-07

## 2021-05-05 RX ADMIN — INSULIN LISPRO 2 UNITS: 100 INJECTION, SOLUTION INTRAVENOUS; SUBCUTANEOUS at 18:11

## 2021-05-05 RX ADMIN — HEPARIN SODIUM 5000 UNITS: 5000 INJECTION INTRAVENOUS; SUBCUTANEOUS at 00:03

## 2021-05-05 RX ADMIN — CEFEPIME 2 G: 2 INJECTION, POWDER, FOR SOLUTION INTRAVENOUS at 15:35

## 2021-05-05 RX ADMIN — Medication 10 ML: at 15:36

## 2021-05-05 RX ADMIN — INSULIN LISPRO 2 UNITS: 100 INJECTION, SOLUTION INTRAVENOUS; SUBCUTANEOUS at 22:05

## 2021-05-05 RX ADMIN — ASPIRIN 81 MG: 81 TABLET, CHEWABLE ORAL at 08:54

## 2021-05-05 RX ADMIN — CYCLOBENZAPRINE 5 MG: 10 TABLET, FILM COATED ORAL at 22:06

## 2021-05-05 RX ADMIN — CYCLOBENZAPRINE 5 MG: 10 TABLET, FILM COATED ORAL at 19:44

## 2021-05-05 RX ADMIN — HEPARIN SODIUM 5000 UNITS: 5000 INJECTION INTRAVENOUS; SUBCUTANEOUS at 15:35

## 2021-05-05 RX ADMIN — ACETAMINOPHEN 650 MG: 325 TABLET, FILM COATED ORAL at 12:54

## 2021-05-05 RX ADMIN — OXYCODONE 15 MG: 5 TABLET ORAL at 12:53

## 2021-05-05 RX ADMIN — PANTOPRAZOLE SODIUM 40 MG: 40 TABLET, DELAYED RELEASE ORAL at 07:00

## 2021-05-05 RX ADMIN — FERROUS SULFATE TAB 325 MG (65 MG ELEMENTAL FE) 325 MG: 325 (65 FE) TAB at 18:11

## 2021-05-05 RX ADMIN — CEFEPIME 2 G: 2 INJECTION, POWDER, FOR SOLUTION INTRAVENOUS at 03:37

## 2021-05-05 RX ADMIN — OXYCODONE 15 MG: 5 TABLET ORAL at 01:11

## 2021-05-05 RX ADMIN — ACETAMINOPHEN 650 MG: 325 TABLET, FILM COATED ORAL at 01:10

## 2021-05-05 RX ADMIN — HEPARIN SODIUM 5000 UNITS: 5000 INJECTION INTRAVENOUS; SUBCUTANEOUS at 08:54

## 2021-05-05 RX ADMIN — EPOETIN ALFA-EPBX 20000 UNITS: 20000 INJECTION, SOLUTION INTRAVENOUS; SUBCUTANEOUS at 22:05

## 2021-05-05 RX ADMIN — ROSUVASTATIN 40 MG: 40 TABLET, FILM COATED ORAL at 22:06

## 2021-05-05 RX ADMIN — OXYCODONE 15 MG: 5 TABLET ORAL at 07:00

## 2021-05-05 RX ADMIN — FERROUS SULFATE TAB 325 MG (65 MG ELEMENTAL FE) 325 MG: 325 (65 FE) TAB at 08:54

## 2021-05-05 RX ADMIN — CLINDAMYCIN HYDROCHLORIDE 300 MG: 150 CAPSULE ORAL at 08:54

## 2021-05-05 RX ADMIN — VANCOMYCIN HYDROCHLORIDE 750 MG: 750 INJECTION, POWDER, LYOPHILIZED, FOR SOLUTION INTRAVENOUS at 08:54

## 2021-05-05 RX ADMIN — ACETAMINOPHEN 650 MG: 325 TABLET, FILM COATED ORAL at 22:06

## 2021-05-05 RX ADMIN — INSULIN LISPRO 3 UNITS: 100 INJECTION, SOLUTION INTRAVENOUS; SUBCUTANEOUS at 12:54

## 2021-05-05 RX ADMIN — INSULIN LISPRO 2 UNITS: 100 INJECTION, SOLUTION INTRAVENOUS; SUBCUTANEOUS at 07:04

## 2021-05-05 NOTE — PROGRESS NOTES
Physician Progress Note      Azeb Maddox  CSN #:                  [id number]  :                       1955  ADMIT DATE:       2021 4:38 PM  [address] DATE:  RESPONDING  PROVIDER #:        Stephanie Riley MD          QUERY TEXT:    Pt admitted with Altered Mental Status and has malnutrition documented by Dietician PN 21. Please further specify type of malnutrition with documentation in the medical record. The medical record reflects the following:  Risk Factors: 76 y/o female admitted after becoming lethargic/confused at [facility] facility. PMH CKD, Type 2 DM, dyslipidemia, HTN, sleep apnea, degenerative disc disease, chronic pain syndrome, and s/p R AKA. Clinical Indicators: 21 RD PN  Malnutrition Assessment:  Malnutrition Status: Moderate malnutrition  Context:  Chronic illness  Findings of the 6 clinical characteristics of malnutrition:  Energy Intake:  7 - 75% or less energy requirements for 1 month or longer  Weight Loss:  1.00 - 10% over 6 months  Body Fat Loss:  7 - Severe body fat loss, Triceps, Orbital  Muscle Mass Loss:  1 - Mild muscle mass loss, Temples (temporalis), Clavicles (pectoralis &deltoids), Hand (interosseous)  Fluid Accumulation:  No significant fluid accumulation,   Strength:  Not performed    Ht: 5' 11\" (1.803 m)  Wt: 60.6 kg (133 lb 9.6 oz)  Admission Wt: 62 kg (136 lb 11 oz)  BMI: 18.63 kg/m 2    Nutrition Recommendations/Plan:  1. Consider addition of appetite stimulant (unless contraindicated with current meds).   2. Pt unlikely to meet kcal/protein needs via PO intake alone; ideally would benefit from enteral nutrition support -pending plan/goals of care    Treatment: Hospitalist consult, RD consult, Nutritional supplements - Magic cup, Ensure Enlive, Dental soft diet,  Options provided:  -- Moderate Malnutrition  -- Moderate Protein calorie malnutrition  -- Other - I will add my own diagnosis  -- Disagree - Not applicable / Not valid  -- Disagree - Clinically unable to determine / Unknown  -- Refer to Clinical Documentation Reviewer    PROVIDER RESPONSE TEXT:    This patient has moderate protein calorie malnutrition.     Query created by: Patrice Cho on 5/5/2021 8:46 AM      Electronically signed by:  Campos Graff MD 5/5/2021 2:27 PM

## 2021-05-05 NOTE — PROGRESS NOTES
[address] [facility] Adult  Hospitalist Group                                                                                          Hospitalist Progress Note  Checo Obregon MD  Answering service: [phone] from in house phone              Progress Note    Patient: Jeffrey Peres. MRN: [id number]  SSN: [id number]    YOB: 1955  Age: 77 y.o. Sex: male      Admit Date: 5/1/2021    LOS: 4 days     Subjective:     Patient presents with altered mental status and admitted for further evaluation. Patient with abnormal renal function on presentation but improving this a.m. on IV fluid. Patient is overall more alert this a.m. Able to communicate more. Denies some back pain. Breathing is improving. Denies any headache, dizziness, chest pain, shortness of breath, nausea, vomiting, abdominal pain. Objective:     Vitals:    05/05/21 0337 05/05/21 0810 05/05/21 0956 05/05/21 1147   BP: (!) 161/73 (!) 144/65 (!) 144/65 (!) 145/62   Pulse: (!) 102 92  95   Resp: 15 13  18   Temp: 99.1 °F (37.3 °C) 97.5 °F (36.4 °C)  98.5 °F (36.9 °C)   SpO2: 100% 97%  99%   Weight: 60.6 kg (133 lb 9.6 oz)  60.3 kg (133 lb)    Height:   5' 11\" (1.803 m)         Intake and Output:  Current Shift: 05/05 0701 - 05/05 1900  In: 240 [P.O.:240]  Out: 800 [Urine:800]  Last three shifts: 05/03 1901 - 05/05 0700  In: 680 [P.O.:480; I.V.:200]  Out: 1950 [Urine:1950]    Physical Exam:   GENERAL: Patient appears more alert this a.m. THROAT & NECK: normal and no erythema or exudates noted. LUNG: clear to auscultation bilaterally  HEART: regular rate and rhythm, S1, S2 normal, no murmur, click, rub or gallop  ABDOMEN: soft, non-tender. Bowel sounds normal. No masses,  no organomegaly  EXTREMITIES: Right BKA, stump wound with dressing  SKIN: no rash or abnormalities  NEUROLOGIC: Patient appears more alert this a.m. PSYCHIATRIC: non focal    Lab/Data Review: All lab results for the last 24 hours reviewed.      Recent Results (from the past 24 hour(s))   GLUCOSE, POC    Collection Time: 05/04/21  4:34 PM   Result Value Ref Range    Glucose (POC) 138 (H) 65 - 100 mg/dL    Performed by Monique Courtney, BLOOD, PAIRED    Collection Time: 05/04/21  5:39 PM    Specimen: Blood   Result Value Ref Range    Special Requests: NO SPECIAL REQUESTS      Culture result: NO GROWTH AFTER 11 HOURS     GLUCOSE, POC    Collection Time: 05/04/21  9:55 PM   Result Value Ref Range    Glucose (POC) 179 (H) 65 - 100 mg/dL    Performed by [place], [facility]    Collection Time: 05/05/21 12:14 AM   Result Value Ref Range    Sodium 135 (L) 136 - 145 mmol/L    Potassium 4.8 3.5 - 5.1 mmol/L    Chloride 107 97 - 108 mmol/L    CO2 22 21 - 32 mmol/L    Anion gap 6 5 - 15 mmol/L    Glucose 175 (H) 65 - 100 mg/dL    BUN 42 (H) 6 - 20 MG/DL    Creatinine 1.56 (H) 0.70 - 1.30 MG/DL    BUN/Creatinine ratio 27 (H) 12 - 20      GFR est AA 54 (L) >60 ml/min/1.73m2    GFR est non-AA 45 (L) >60 ml/min/1.73m2    Calcium 9.0 8.5 - 10.1 MG/DL   CBC WITH AUTOMATED DIFF    Collection Time: 05/05/21  1:20 AM   Result Value Ref Range    WBC 7.9 4.1 - 11.1 K/uL    RBC 3.10 (L) 4.10 - 5.70 M/uL    HGB 8.1 (L) 12.1 - 17.0 g/dL    HCT 28.3 (L) 36.6 - 50.3 %    MCV 91.3 80.0 - 99.0 FL    MCH 26.1 26.0 - 34.0 PG    MCHC 28.6 (L) 30.0 - 36.5 g/dL    RDW 17.0 (H) 11.5 - 14.5 %    PLATELET 435 185 - 384 K/uL    MPV 8.7 (L) 8.9 - 12.9 FL    NRBC 0.0 0  WBC    ABSOLUTE NRBC 0.00 0.00 - 0.01 K/uL    NEUTROPHILS 75 32 - 75 %    LYMPHOCYTES 11 (L) 12 - 49 %    MONOCYTES 10 5 - 13 %    EOSINOPHILS 2 0 - 7 %    BASOPHILS 1 0 - 1 %    IMMATURE GRANULOCYTES 1 (H) 0.0 - 0.5 %    ABS. NEUTROPHILS 5.8 1.8 - 8.0 K/UL    ABS. LYMPHOCYTES 0.9 0.8 - 3.5 K/UL    ABS. MONOCYTES 0.8 0.0 - 1.0 K/UL    ABS. EOSINOPHILS 0.2 0.0 - 0.4 K/UL    ABS. BASOPHILS 0.1 0.0 - 0.1 K/UL    ABS. IMM.  GRANS. 0.1 (H) 0.00 - 0.04 K/UL    DF SMEAR SCANNED      RBC COMMENTS ANISOCYTOSIS  1+       GLUCOSE, POC    Collection Time: 05/05/21  7:01 AM   Result Value Ref Range    Glucose (POC) 167 (H) 65 - 100 mg/dL    Performed by Olivia Flowers    ECHO ADULT COMPLETE    Collection Time: 05/05/21 10:11 AM   Result Value Ref Range    IVSd 1.04 (A) 0.60 - 1.00 cm    LVIDd 3.92 (A) 4.20 - 5.90 cm    LVIDs 2.82 cm    LVOT d 2.05 cm    LVPWd 1.06 (A) 0.60 - 1.00 cm    LVOT Peak Gradient 2.62 mmHg    LVOT Peak Velocity 80.87 cm/s    RVIDd 3.24 cm    Left Atrium Major Axis 2.92 cm    LA Volume 32.05 18.0 - 58.0 mL    LA Area 4C 14.11 cm2    LA Vol 2C 29.75 18.00 - 58.00 mL    LA Vol 4C 30.56 18.00 - 58.00 mL    Aortic Valve Area by Continuity of Peak Velocity 2.34 cm2    AoV PG 5.15 mmHg    Aortic Valve Systolic Peak Velocity 902.54 cm/s    MV A Koko 80.77 cm/s    Mitral Valve E Wave Deceleration Time 135.89 ms    MV E Koko 61.75 cm/s    Mitral Valve Pressure Half-time 39.41 ms    MVA (PHT) 5.58 cm2    Pulmonic Valve Systolic Peak Instantaneous Gradient 3.41 mmHg    Tapse 1.70 1.50 - 2.00 cm    Triscuspid Valve Regurgitation Peak Gradient 22.62 mmHg    TR Max Velocity 237.80 cm/s    Ao Root D 3.09 cm    MV E/A 0.76     LV Mass .0 88.0 - 224.0 g    LV Mass AL Index 74.4 49.0 - 115.0 g/m2    Left Atrium Minor Axis 1.65 cm    LA Vol Index 18.07 16.00 - 28.00 ml/m2    LA Vol Index 16.78 16.00 - 28.00 ml/m2    LA Vol Index 17.23 16.00 - 28.00 ml/m2    PAULA/BSA Pk Koko 1.3 cm2/m2        Imaging:           Assessment and Plan:      Altered mental status  -Likely metabolic encephalopathy due to renal insufficiency and abnormal electrolytes  -CT head and MRI of the brain negative for acute pathology, ammonia is normal  -Patient's mental status is back to baseline    Bacteremia  -Initial blood cultures on 5/1 showing MRSA of 4 bottles  -Repeat blood cultures 5/3 with gram-positive cocci in clusters in 1 of 4 bottles  -Currently on vancomycin and cefepime  -Check echocardiogram to evaluate for endocarditis  -Infectious disease following    Pneumonia  -CT of the chest shows right upper lobe pneumonia and bilateral lower lobe atelectasis versus pneumonia  -Continue Vanco and cefepime    Discitis  -CT abdomen show L4-L5 finding most likely represent discitis/osteomyelitis  -MRI of the lumbar spine on 5/3 shows edema and abnormal bright signal and moderately narrowed discs at L3 and 4 and L4-5 worrisome for discitis/osteomyelitis  -Infectious disease and spine surgery following  -Continue broad-spectrum antibiotics as above  -Spine surgery recommending back brace, no recommendation for surgical intervention    Anemia  -Drop in hemoglobin is likely due to hemodilution from IV fluid  -Baseline chronic anemia from CKD and iron deficiency  -Monitor for bleeding  -Transfused 1 units PRBCs 05/04    ESTRELLA  -ESTRELLA on baseline CKD stage III  -Renal function improving with IV fluid  -Nephrology following    S/p right BKA  -Continue clindamycin per postop protocol  -Wound care consult for BKA stump wound    Anemia  -Combination of iron deficiency and anemia of chronic kidney disease  -Continue iron supplement    Leukocytosis  -Now resolved    Thrombocytosis  -Now resolved    Hyponatremia  -Likely hypovolemic hyponatremia  -Continue IV fluid and monitor sodium level    Hypertension  -Hold lisinopril due to ESTRELLA    Diabetes  -Well-controlled at baseline with hemoglobin A1c of 7.2  -Continue insulin sliding scale coverage  -May need basal insulin resumed if patient improving with p.o. intake    Dyslipidemia  -Continue statin    Discharge disposition: Likely more than 48 hours pending medical progress.     Signed By: Carlotta Hunter MD     May 5, 2021

## 2021-05-05 NOTE — PROGRESS NOTES
Problem: Mobility Impaired (Adult and Pediatric)  Goal: *Acute Goals and Plan of Care (Insert Text)  Description: FUNCTIONAL STATUS PRIOR TO ADMISSION: Patient was modified independent using a Rw and R LE prosthesis for functional mobility prior to recent revision of BKA to AKA. Discharged to [facility] and was working on transfers to w/c with one assist    [facility]: The patient lived with his spouse but did not require assist prior to AKA. Physical Therapy Goals  Initiated 5/4/2021  1. Patient will move from supine to sit and sit to supine  and scoot up and down in bed with moderate assistance  within 7 day(s). 2.  Patient will transfer from bed to chair and chair to bed with maximal assistance using the least restrictive device within 7 day(s). 3.  Patient will perform sit to stand with maximal assistance within 7 day(s). 4.  Patient will demo fair sitting balance at EOB x2 min in prep for mobility progression within 7 days. 5. Patient will indep complete B LE there ex program to incr strength within 7 days. Outcome: Progressing Towards Goal       PHYSICAL THERAPY TREATMENT  Patient: Jass Craven (76 y.o. male)  Date: 5/5/2021  Diagnosis: Acute delirium [R41.0] Acute delirium       Precautions:    Chart, physical therapy assessment, plan of care and goals were reviewed. ASSESSMENT  Patient continues with skilled PT services and is progressing towards goals. Pt motivated to improve mobility. Pt was able to achieve EOB with assistance, but very close to the EOB. Pt required assistance and facilitation to improve sitting balance and positioning. Pt was able to sit EOB with variable sitting balance. Pt reports back pain with task. Utilized log roll for bed mobility. Will continue to progress as able. Current Level of Function Impacting Discharge (mobility/balance):  Ax2    Other factors to consider for discharge: back pain, right AKA, decrease mobility and independence PLAN :  Patient continues to benefit from skilled intervention to address the above impairments. Continue treatment per established plan of care. to address goals. Recommendation for discharge: (in order for the patient to meet his/her long term goals)  Therapy 3 hours per day 5-7 days per week    This discharge recommendation:  Has not yet been discussed the attending provider and/or case management    IF patient discharges home will need the following DME: to be determined (TBD)       SUBJECTIVE:   Patient stated I need to move more .     OBJECTIVE DATA SUMMARY:   Critical Behavior:  Neurologic State: Alert  Orientation Level: Oriented X4  Cognition: Follows commands  Safety/Judgement: Awareness of environment, Insight into deficits  Functional Mobility Training:  Bed Mobility:  Rolling: Maximum assistance  Supine to Sit: Moderate assistance;Maximum assistance;Assist x2  Sit to Supine: Moderate assistance;Maximum assistance;Assist x2  Scooting: Maximum assistance;Assist x2        Transfers:                                   Balance:     Ambulation/Gait Training:                                                    Stairs: Therapeutic Exercises:   LAQ. Pt reports unable but with cueing and facilitation. Pt was able to repeatedly   Pain Rating:  back    Activity Tolerance:   Fair    After treatment patient left in no apparent distress:   Supine in bed, Call bell within reach, and Bed / chair alarm activated    COMMUNICATION/COLLABORATION:   The patients plan of care was discussed with: Occupational therapist and Registered nurse.      Lizet Burdick PTA   Time Calculation: 34 mins

## 2021-05-05 NOTE — PROGRESS NOTES
Critical lab called.     Paired blood cultures collected yesterday 5/5/21     Result: gram positive cocci in clusters 2/4 bottles (sites: LAC and R arm)

## 2021-05-05 NOTE — PROGRESS NOTES
Nephrology Progress Note  Baby Parisian. Date of Admission : 5/1/2021    CC: Follow up for CKD       Assessment and Plan     CKD 3b:  - Dr. Lolita Mccrary pt  - stable Cr  - d/c fluids and monitor    Anemia of CKD:  - cont MICHAEL    GPC bacteremia:  - abx per ID  - ECHO planned    HTN:  - BP stable    DM2:  - on insulin    R AKA    L3-4, L4-5 discitis/osteomyelitis:  - on abx       Interval History:  Seen and examined. Cr better, UOP stable. hgb stable. On IV abx. No cp or sob reported. Eating breakfast.  Current Medications: all current  Medications have been eviewed in EPIC  Review of Systems: Pertinent items are noted in HPI. Objective:  Vitals:    Vitals:    05/05/21 0235 05/05/21 0337 05/05/21 0810 05/05/21 0956   BP:  (!) 161/73 (!) 144/65 (!) 144/65   Pulse:  (!) 102 92    Resp:  15 13    Temp:  99.1 °F (37.3 °C) 97.5 °F (36.4 °C)    SpO2: 98% 100% 97%    Weight:  60.6 kg (133 lb 9.6 oz)  60.3 kg (133 lb)   Height:    5' 11\" (1.803 m)     Intake and Output:  05/05 0701 - 05/05 1900  In: 240 [P.O.:240]  Out: 800 [Urine:800]  05/03 1901 - 05/05 0700  In: 680 [P.O.:480; I.V.:200]  Out: 1950 [Urine:1950]    Physical Examination:  General: Frail, NAD  Neck:  Supple, no mass  Resp:  Lungs CTA B/L, no wheezing , normal respiratory effort  CV:  RRR,  no murmur or rub,R AKA, no edema L  GI:  Soft, NT, + Bowel sounds, no hepatosplenomegaly  Neurologic:  Non focal  Psych:             AAO x 3 appropriate affect   Skin:  No Rash    []    High complexity decision making was performed  []    Patient is at high-risk of decompensation with multiple organ involvement    Lab Data Personally Reviewed: I have reviewed all the pertinent labs, microbiology data and radiology studies during assessment.     Recent Labs     05/05/21  0014 05/04/21  0243 05/03/21  0325   * 135* 134*   K 4.8 4.8 4.9    105 102   CO2 22 25 26   * 153* 123*   BUN 42* 49* 56*   CREA 1.56* 1.66* 1.87*   CA 9.0 8.8 8.8     Recent Labs 05/05/21  0120 05/04/21  0243 05/03/21  1724 05/03/21  0325   WBC 7.9 10.4  --  10.1   HGB 8.1* 7.4* 7.4* 6.0*   HCT 28.3* 25.2*  --  20.9*    361  --  374     Lab Results   Component Value Date/Time    Specimen Description: NARLADY 12/04/2012 10:12 PM    Specimen Description: TOE 12/02/2012 05:03 PM    Specimen Description: TOE 12/02/2012 05:03 PM     Lab Results   Component Value Date/Time    Culture result: NO GROWTH AFTER 11 HOURS 05/04/2021 05:39 PM    Culture result: (A) 05/03/2021 05:24 PM     GRAM POSITIVE COCCI IN CLUSTERS GROWING IN 2 OF 4 BOTTLES DRAWN SITE = [facility]    Culture result: REMAINING BOTTLE(S) HAS/HAVE NO GROWTH SO FAR 05/03/2021 05:24 PM     Recent Results (from the past 24 hour(s))   GLUCOSE, POC    Collection Time: 05/04/21 11:50 AM   Result Value Ref Range    Glucose (POC) 159 (H) 65 - 100 mg/dL    Performed by [facility]    GLUCOSE, POC    Collection Time: 05/04/21  4:34 PM   Result Value Ref Range    Glucose (POC) 138 (H) 65 - 100 mg/dL    Performed by [facility]    CULTURE, BLOOD, PAIRED    Collection Time: 05/04/21  5:39 PM    Specimen: Blood   Result Value Ref Range    Special Requests: NO SPECIAL REQUESTS      Culture result: NO GROWTH AFTER 11 HOURS     GLUCOSE, POC    Collection Time: 05/04/21  9:55 PM   Result Value Ref Range    Glucose (POC) 179 (H) 65 - 100 mg/dL    Performed by [facility], [facility]    Collection Time: 05/05/21 12:14 AM   Result Value Ref Range    Sodium 135 (L) 136 - 145 mmol/L    Potassium 4.8 3.5 - 5.1 mmol/L    Chloride 107 97 - 108 mmol/L    CO2 22 21 - 32 mmol/L    Anion gap 6 5 - 15 mmol/L    Glucose 175 (H) 65 - 100 mg/dL    BUN 42 (H) 6 - 20 MG/DL    Creatinine 1.56 (H) 0.70 - 1.30 MG/DL    BUN/Creatinine ratio 27 (H) 12 - 20      GFR est AA 54 (L) >60 ml/min/1.73m2    GFR est non-AA 45 (L) >60 ml/min/1.73m2    Calcium 9.0 8.5 - 10.1 MG/DL   CBC WITH AUTOMATED DIFF    Collection Time: 05/05/21  1:20 AM   Result Value Ref Range    WBC 7.9 4.1 - 11.1 K/uL    RBC 3.10 (L) 4.10 - 5.70 M/uL    HGB 8.1 (L) 12.1 - 17.0 g/dL    HCT 28.3 (L) 36.6 - 50.3 %    MCV 91.3 80.0 - 99.0 FL    MCH 26.1 26.0 - 34.0 PG    MCHC 28.6 (L) 30.0 - 36.5 g/dL    RDW 17.0 (H) 11.5 - 14.5 %    PLATELET 647 335 - 238 K/uL    MPV 8.7 (L) 8.9 - 12.9 FL    NRBC 0.0 0  WBC    ABSOLUTE NRBC 0.00 0.00 - 0.01 K/uL    NEUTROPHILS 75 32 - 75 %    LYMPHOCYTES 11 (L) 12 - 49 %    MONOCYTES 10 5 - 13 %    EOSINOPHILS 2 0 - 7 %    BASOPHILS 1 0 - 1 %    IMMATURE GRANULOCYTES 1 (H) 0.0 - 0.5 %    ABS. NEUTROPHILS 5.8 1.8 - 8.0 K/UL    ABS. LYMPHOCYTES 0.9 0.8 - 3.5 K/UL    ABS. MONOCYTES 0.8 0.0 - 1.0 K/UL    ABS. EOSINOPHILS 0.2 0.0 - 0.4 K/UL    ABS. BASOPHILS 0.1 0.0 - 0.1 K/UL    ABS. IMM. GRANS. 0.1 (H) 0.00 - 0.04 K/UL    DF SMEAR SCANNED      RBC COMMENTS ANISOCYTOSIS  1+       GLUCOSE, POC    Collection Time: 05/05/21  7:01 AM   Result Value Ref Range    Glucose (POC) 167 (H) 65 - 100 mg/dL    Performed by Rosie Villarreal MD  [facility]   [address] [address]  Phone - [phone]   Fax - [phone]  www. [email or web address]

## 2021-05-05 NOTE — PROGRESS NOTES
Problem: Self Care Deficits Care Plan (Adult)  Goal: *Acute Goals and Plan of Care (Insert Text)  Description:   FUNCTIONAL STATUS PRIOR TO ADMISSION: Patient was modified independent with ADLs and functional mobility/ ambulatory with RLE prosthetic prior to R AKA ~2 weeks PTA at OSH. Patient and wife report progressive functional decline since AKA due to increasing pain and decreased LUE and LLE function. HOME SUPPORT: Patient was admitted to [facility] from [facility] rehab. Prior to that he was at [address] [place] [address] for R AKA. At home he lived with his wife but did not require assistance. Occupational Therapy Goals  Initiated 5/4/2021  1. Patient will perform grooming seated EOB with supervision/set-up within 7 day(s). 2.  Patient will perform upper body dressing with minimal assistance within 7 day(s). 3.  Patient will perform bathing with minimal assistance within 7 day(s). 4.  Patient will perform toilet transfers to [facility] with moderate assistance within 7 day(s). 5.  Patient will perform all aspects of toileting with moderate assistance  within 7 day(s). 6.  Patient will participate in upper extremity therapeutic exercise/activities with supervision/set-up for 10 minutes within 7 day(s). 7.  Patient will utilize fall prevention techniques during functional activities with verbal cues within 7 day(s). Outcome: Progressing Towards Goal   OCCUPATIONAL THERAPY TREATMENT  Patient: Talisha Fountain (76 y.o. male)  Date: 5/5/2021  Diagnosis: Acute delirium [R41.0] Acute delirium       Precautions:    Chart, occupational therapy assessment, plan of care, and goals were reviewed. ASSESSMENT  Patient continues with skilled OT services and is progressing towards goals. Patient received resting in bed, agreeable to session with encouragement. This date, patient achieving sitting EOB with overall mod-max assist x2 for supine>sit transfer.  Once at EOB, patient demonstrating fair balance while working on activity tolerance, functional weight-shifting, and UE/LE strengthening - UE support via bed rail and/or mattress consistently needed for additional stability. Patient tolerating approx 20 minutes EOB with high guard and CGA-min assist throughout for safety. Note patient requiring total assist for donning/doffing L sock and for jai-hygiene in supine. Patient benefits from frequent encouragement and cues for best technique / hand placement. Continue to recommend IPR at discharge as he is very motivated to progress and remains well below mod I baseline at this time. Current Level of Function Impacting Discharge (ADLs): mod-max assist x2 for bed mobility; high guard and CGA-min assist at EOB; up to total A for lower body    Other factors to consider for discharge: motivated         PLAN :  Patient continues to benefit from skilled intervention to address the above impairments. Continue treatment per established plan of care to address goals. Recommend with staff: Chair position in bed for all meals; heels floated and frequent repositioning for skin integrity; encourage max patient participation in self-care tasks    Recommend next OT session: EOB ADLs; sit<>stand    Recommendation for discharge: (in order for the patient to meet his/her long term goals)  Therapy 3 hours per day 5-7 days per week    This discharge recommendation:  Has been made in collaboration with the attending provider and/or case management    IF patient discharges home will need the following DME: TBD       SUBJECTIVE:   Patient stated I want to go fishing.     OBJECTIVE DATA SUMMARY:   Cognitive/Behavioral Status:  Neurologic State: Alert  Orientation Level: Oriented to person;Oriented to place;Oriented to situation  Cognition: Follows commands;Decreased attention/concentration  Perception: Verbal;Visual;Tactile  Perseveration: No perseveration noted  Safety/Judgement: Awareness of environment; Fall prevention;Decreased insight into deficits; Decreased awareness of need for safety    Functional Mobility and Transfers for ADLs:  Bed Mobility:  Rolling: Maximum assistance  Supine to Sit: Moderate assistance;Maximum assistance;Assist x2  Sit to Supine: Moderate assistance;Maximum assistance;Assist x2  Scooting: Maximum assistance;Assist x2 (cues for use of LUE to assist scoot towards HOB in supine)    Balance:  Sitting: Impaired; With support(UE support via mattress / bed rail)  Sitting - Static: Fair (occasional)  Sitting - Dynamic: Poor (constant support)    ADL Intervention:  Feeding  Feeding Assistance: Set-up  Container Management: Set-up    [place]: Total assistance(dependent)  Socks: Total assistance (dependent)(for L sock)  Leg Crossed Method Used: No  Position Performed: Supine;Seated edge of bed  Cues: Verbal cues provided;Physical assistance    Toileting  Toileting Assistance: Total assistance(dependent)  Bladder Hygiene: Total assistance (dependent)(indwelling hoyos)  Bowel Hygiene: Total assistance (dependent)(RN notified of need for sacral bandage change)  Cues: Verbal cues provided;Visual cues provided  Adaptive Equipment: (bed rails)    Cognitive Retraining  Orientation Retraining: Reorienting; Awareness of environment  Problem Solving: Awareness of environment; Identifying the problem  Executive Functions: Executing cognitive plans  Organizing/Sequencing: Breaking task down  Attention to Task: Single task  Safety/Judgement: Awareness of environment; Fall prevention;Decreased insight into deficits; Decreased awareness of need for safety    Therapeutic Exercises:   Patient tolerating approx 20+ minutes seated EOB with min-mod UE support via bed rail or mattress. Note high guard and overall CGA needed throughout EOB sitting. Pain:  Patient reporting lower back pain, improved with repositioning.     Activity Tolerance:   Fair and requires rest breaks    After treatment patient left in no apparent distress:   Supine in bed, Heels elevated for pressure relief, Call bell within reach, Bed / chair alarm activated, Side rails x 3, and bed in chair position modified    COMMUNICATION/COLLABORATION:   The patients plan of care was discussed with: Physical therapist and Registered nurse.      Leopoldo Kayser, OT  Time Calculation: 34 mins

## 2021-05-05 NOTE — PROGRESS NOTES
ID Progress Note  2021    Subjective:     C/o back pain  Review of Systems:            Symptom Y/N Comments   Symptom Y/N Comments   Fever/Chills  n     Chest Pain n       Poor Appetite       Edema        Cough       Abdominal Pain        Sputum       Joint Pain  y  lumbar spine    SOB/MASSEY n      Pruritis/Rash        Nausea/vomit  n     Tolerating PT/OT        Diarrhea  n     Tolerating Diet        Constipation  n     Other           Could NOT obtain due to:       Objective:     Vitals:   Visit Vitals  BP (!) 161/73   Pulse (!) 102   Temp 99.1 °F (37.3 °C)   Resp 15   Ht 5' 11\" (1.803 m)   Wt 60.6 kg (133 lb 9.6 oz)   SpO2 100%   BMI 18.63 kg/m²        Tmax:  Temp (24hrs), Av.1 °F (36.7 °C), Min:96.8 °F (36 °C), Max:99.1 °F (37.3 °C)      PHYSICAL EXAM:  General: Chronically ill appearing, WD, WN. Alert, cooperative, no acute distress    EENT:  EOMI. Anicteric sclerae. MMM  Resp:  Clear in apex with decreased breath sounds at bases, no wheezing or rales. No accessory muscle use  CV:  Regular  rhythm,  No edema  GI:  Soft, Non distended, Non tender. +Bowel sounds  Neurologic:  Alert and oriented X 3, normal speech,   Psych:   Fair insight. Not anxious nor agitated  Skin:  No rashes.   No jaundice, right stump; dressing dry and intact, incision approximated, no erythema, no drainage     Labs:   Lab Results   Component Value Date/Time    WBC 7.9 2021 01:20 AM    Hemoglobin (POC) 5.2 (LL) 2021 08:37 AM    Hemoglobin, POC 11.2 (L) 2014 11:05 PM    HGB 8.1 (L) 2021 01:20 AM    Hematocrit (POC) 26 (L) 10/09/2018 07:16 AM    Hematocrit, POC 33 (L) 2014 11:05 PM    HCT 28.3 (L) 2021 01:20 AM    PLATELET 637  01:20 AM    MCV 91.3 2021 01:20 AM     Lab Results   Component Value Date/Time    Sodium 135 (L) 2021 12:14 AM    Potassium 4.8 2021 12:14 AM    Chloride 107 2021 12:14 AM    CO2 22 2021 12:14 AM    Anion gap 6 2021 12:14 AM Glucose 175 (H) 05/05/2021 12:14 AM    BUN 42 (H) 05/05/2021 12:14 AM    Creatinine 1.56 (H) 05/05/2021 12:14 AM    BUN/Creatinine ratio 27 (H) 05/05/2021 12:14 AM    GFR est AA 54 (L) 05/05/2021 12:14 AM    GFR est non-AA 45 (L) 05/05/2021 12:14 AM    Calcium 9.0 05/05/2021 12:14 AM    Bilirubin, total 0.3 05/02/2021 02:00 AM    Alk. phosphatase 234 (H) 05/02/2021 02:00 AM    Protein, total 7.2 05/02/2021 02:00 AM    Albumin 1.2 (L) 05/02/2021 02:00 AM    Globulin 6.0 (H) 05/02/2021 02:00 AM    A-G Ratio 0.2 (L) 05/02/2021 02:00 AM    ALT (SGPT) 13 05/02/2021 02:00 AM       Assessment and Plan   L4-5 Discitis/OM   HAP  Bacteremia  S/p laminectomy C3-4 (2014)   - WBC normal, afebrile    Blood cx (5/1) MRSA    Blood cx (53) GPC in clusters    Blood cx (5/4) pending      CT of Chest, ABD/PEL (5/2); L4-5 findings most likely represent discitis-osteomyelitis. Soft tissue edema from hypoalbuminemia. Right upper lobe pneumonia. Bilateral lower lobe atelectasis vs pneumonia. Trace bilateral pleural effusions. MRI of lumbar spine (5/3) discitis/OM at L3-4 & L4-5, no evidence of epidural abscess or cord compression    -> ortho recommends medical therapy at this time.      Echo to r/o vegetation    TTE ordered on 5/3; still has not been done       Continue with IV vancomycin; total 6 weeks from negative blood cx     Continue with IV cefepime (last dose 5/10) - for HAP coverage    Anemia  - hgb 7.4; s/p PRBC transfusion    Above plan d/w and agreed by Dr. Basilio Sahu, NP

## 2021-05-05 NOTE — PROGRESS NOTES
Transitions of Care: 18% risk of re-admission      -TBD, anticipate IPR needs- referral placed to [facility] transport   -Anticipate long-term IV antibiotic needs    The CM noted ID recommendations- will likely require long-term IV antibiotics, IV cefepime until 5/10- also on IV vancomycin- will monitor. The CM received call from the patient's spouse, Mrs. Andrea Landon- she is coming to [facility] today between 12:00-1:00 p.m., very concerned about the FMLA paperwork- attending MD endorsed he would be willing to complete, will notify MD when paperwork received by CM. Referral placed in [employer] to [facility], will monitor, discharge date TBD. Will need ongoing PT/OT evaluations. 14:16 p.m.- CM spoke with spouse, she provided FMLA paperwork- CM will follow-up directly with [facility] referral placed yesterday- CM provided FMLA paperwork directly to the patient's MD.     14:40 p.m.- The CM spoke with Clara Shah with [facility] ([phone]), confirmed receipt of referral- still under review. The CM will need specific IV antibiotic orders for IPR.        TATIANA Kwong

## 2021-05-05 NOTE — ROUTINE PROCESS
Bedside and Verbal shift change report given to April (oncoming nurse) by Nadia Garduno (offgoing nurse). Report included the following information SBAR, Kardex, ED Summary, Intake/Output, MAR, Recent Results and Cardiac Rhythm NSR/ST.

## 2021-05-06 LAB
ANION GAP SERPL CALC-SCNC: 5 MMOL/L (ref 5–15)
BASOPHILS # BLD: 0 K/UL (ref 0–0.1)
BASOPHILS NFR BLD: 0 % (ref 0–1)
BUN SERPL-MCNC: 40 MG/DL (ref 6–20)
BUN/CREAT SERPL: 24 (ref 12–20)
CALCIUM SERPL-MCNC: 8.9 MG/DL (ref 8.5–10.1)
CHLORIDE SERPL-SCNC: 109 MMOL/L (ref 97–108)
CO2 SERPL-SCNC: 25 MMOL/L (ref 21–32)
CREAT SERPL-MCNC: 1.64 MG/DL (ref 0.7–1.3)
DIFFERENTIAL METHOD BLD: ABNORMAL
ECHO AO ROOT DIAM: 3.09 CM
ECHO AV AREA PEAK VELOCITY: 2.34 CM2
ECHO AV AREA/BSA PEAK VELOCITY: 1.3 CM2/M2
ECHO AV PEAK GRADIENT: 5.15 MMHG
ECHO AV PEAK VELOCITY: 113.42 CM/S
ECHO LA AREA 4C: 14.11 CM2
ECHO LA MAJOR AXIS: 2.92 CM
ECHO LA MINOR AXIS: 1.65 CM
ECHO LA VOL 2C: 29.75 ML (ref 18–58)
ECHO LA VOL 4C: 30.56 ML (ref 18–58)
ECHO LA VOL BP: 32.05 ML (ref 18–58)
ECHO LA VOL/BSA BIPLANE: 18.07 ML/M2 (ref 16–28)
ECHO LA VOLUME INDEX A2C: 16.78 ML/M2 (ref 16–28)
ECHO LA VOLUME INDEX A4C: 17.23 ML/M2 (ref 16–28)
ECHO LV INTERNAL DIMENSION DIASTOLIC: 3.92 CM (ref 4.2–5.9)
ECHO LV INTERNAL DIMENSION SYSTOLIC: 2.82 CM
ECHO LV IVSD: 1.04 CM (ref 0.6–1)
ECHO LV MASS 2D: 132 G (ref 88–224)
ECHO LV MASS INDEX 2D: 74.4 G/M2 (ref 49–115)
ECHO LV POSTERIOR WALL DIASTOLIC: 1.06 CM (ref 0.6–1)
ECHO LVOT DIAM: 2.05 CM
ECHO LVOT PEAK GRADIENT: 2.62 MMHG
ECHO LVOT PEAK VELOCITY: 80.87 CM/S
ECHO MV A VELOCITY: 80.77 CM/S
ECHO MV AREA PHT: 5.58 CM2
ECHO MV E DECELERATION TIME (DT): 135.89 MS
ECHO MV E VELOCITY: 61.75 CM/S
ECHO MV E/A RATIO: 0.76
ECHO MV PRESSURE HALF TIME (PHT): 39.41 MS
ECHO PV PEAK INSTANTANEOUS GRADIENT SYSTOLIC: 3.41 MMHG
ECHO RV INTERNAL DIMENSION: 3.24 CM
ECHO RV TAPSE: 1.7 CM (ref 1.5–2)
ECHO TV REGURGITANT MAX VELOCITY: 237.8 CM/S
ECHO TV REGURGITANT PEAK GRADIENT: 22.62 MMHG
EOSINOPHIL # BLD: 0.1 K/UL (ref 0–0.4)
EOSINOPHIL NFR BLD: 1 % (ref 0–7)
ERYTHROCYTE [DISTWIDTH] IN BLOOD BY AUTOMATED COUNT: 16.6 % (ref 11.5–14.5)
GLUCOSE BLD STRIP.AUTO-MCNC: 112 MG/DL (ref 65–100)
GLUCOSE BLD STRIP.AUTO-MCNC: 139 MG/DL (ref 65–100)
GLUCOSE BLD STRIP.AUTO-MCNC: 146 MG/DL (ref 65–100)
GLUCOSE BLD STRIP.AUTO-MCNC: 189 MG/DL (ref 65–100)
GLUCOSE SERPL-MCNC: 138 MG/DL (ref 65–100)
HCT VFR BLD AUTO: 24.8 % (ref 36.6–50.3)
HGB BLD-MCNC: 7 G/DL (ref 12.1–17)
IMM GRANULOCYTES # BLD AUTO: 0 K/UL
IMM GRANULOCYTES NFR BLD AUTO: 0 %
LYMPHOCYTES # BLD: 0.6 K/UL (ref 0.8–3.5)
LYMPHOCYTES NFR BLD: 12 % (ref 12–49)
MCH RBC QN AUTO: 25.9 PG (ref 26–34)
MCHC RBC AUTO-ENTMCNC: 28.2 G/DL (ref 30–36.5)
MCV RBC AUTO: 91.9 FL (ref 80–99)
MONOCYTES # BLD: 0.4 K/UL (ref 0–1)
MONOCYTES NFR BLD: 7 % (ref 5–13)
NEUTS SEG # BLD: 4.2 K/UL (ref 1.8–8)
NEUTS SEG NFR BLD: 80 % (ref 32–75)
NRBC # BLD: 0 K/UL (ref 0–0.01)
NRBC BLD-RTO: 0 PER 100 WBC
PLATELET # BLD AUTO: 350 K/UL (ref 150–400)
PMV BLD AUTO: 9.3 FL (ref 8.9–12.9)
POTASSIUM SERPL-SCNC: 4.5 MMOL/L (ref 3.5–5.1)
RBC # BLD AUTO: 2.7 M/UL (ref 4.1–5.7)
RBC MORPH BLD: ABNORMAL
SERVICE CMNT-IMP: ABNORMAL
SODIUM SERPL-SCNC: 139 MMOL/L (ref 136–145)
VANCOMYCIN SERPL-MCNC: 20 UG/ML
WBC # BLD AUTO: 5.3 K/UL (ref 4.1–11.1)

## 2021-05-06 PROCEDURE — 74011000258 HC RX REV CODE- 258: Performed by: NURSE PRACTITIONER

## 2021-05-06 PROCEDURE — 74011250636 HC RX REV CODE- 250/636: Performed by: NURSE PRACTITIONER

## 2021-05-06 PROCEDURE — 74011250637 HC RX REV CODE- 250/637: Performed by: INTERNAL MEDICINE

## 2021-05-06 PROCEDURE — 85025 COMPLETE CBC W/AUTO DIFF WBC: CPT

## 2021-05-06 PROCEDURE — 74011636637 HC RX REV CODE- 636/637: Performed by: INTERNAL MEDICINE

## 2021-05-06 PROCEDURE — 87040 BLOOD CULTURE FOR BACTERIA: CPT

## 2021-05-06 PROCEDURE — 87077 CULTURE AEROBIC IDENTIFY: CPT

## 2021-05-06 PROCEDURE — 80202 ASSAY OF VANCOMYCIN: CPT

## 2021-05-06 PROCEDURE — 36415 COLL VENOUS BLD VENIPUNCTURE: CPT

## 2021-05-06 PROCEDURE — 74011250637 HC RX REV CODE- 250/637: Performed by: FAMILY MEDICINE

## 2021-05-06 PROCEDURE — L0628 LSO FLEX NO RI STAYS PRE OTS: HCPCS

## 2021-05-06 PROCEDURE — 87186 SC STD MICRODIL/AGAR DIL: CPT

## 2021-05-06 PROCEDURE — 65660000000 HC RM CCU STEPDOWN

## 2021-05-06 PROCEDURE — 97530 THERAPEUTIC ACTIVITIES: CPT

## 2021-05-06 PROCEDURE — 74011250636 HC RX REV CODE- 250/636: Performed by: FAMILY MEDICINE

## 2021-05-06 PROCEDURE — 80048 BASIC METABOLIC PNL TOTAL CA: CPT

## 2021-05-06 PROCEDURE — 82962 GLUCOSE BLOOD TEST: CPT

## 2021-05-06 PROCEDURE — 74011250636 HC RX REV CODE- 250/636: Performed by: INTERNAL MEDICINE

## 2021-05-06 RX ADMIN — ACETAMINOPHEN 650 MG: 325 TABLET, FILM COATED ORAL at 13:29

## 2021-05-06 RX ADMIN — ROSUVASTATIN 40 MG: 40 TABLET, FILM COATED ORAL at 20:52

## 2021-05-06 RX ADMIN — ACETAMINOPHEN 650 MG: 325 TABLET, FILM COATED ORAL at 04:18

## 2021-05-06 RX ADMIN — PANTOPRAZOLE SODIUM 40 MG: 40 TABLET, DELAYED RELEASE ORAL at 06:48

## 2021-05-06 RX ADMIN — INSULIN LISPRO 2 UNITS: 100 INJECTION, SOLUTION INTRAVENOUS; SUBCUTANEOUS at 06:53

## 2021-05-06 RX ADMIN — Medication 10 ML: at 22:00

## 2021-05-06 RX ADMIN — Medication 10 ML: at 13:30

## 2021-05-06 RX ADMIN — ASPIRIN 81 MG: 81 TABLET, CHEWABLE ORAL at 08:48

## 2021-05-06 RX ADMIN — CEFEPIME 2 G: 2 INJECTION, POWDER, FOR SOLUTION INTRAVENOUS at 02:39

## 2021-05-06 RX ADMIN — HEPARIN SODIUM 5000 UNITS: 5000 INJECTION INTRAVENOUS; SUBCUTANEOUS at 23:49

## 2021-05-06 RX ADMIN — OXYCODONE 15 MG: 5 TABLET ORAL at 20:52

## 2021-05-06 RX ADMIN — FERROUS SULFATE TAB 325 MG (65 MG ELEMENTAL FE) 325 MG: 325 (65 FE) TAB at 17:55

## 2021-05-06 RX ADMIN — HEPARIN SODIUM 5000 UNITS: 5000 INJECTION INTRAVENOUS; SUBCUTANEOUS at 15:39

## 2021-05-06 RX ADMIN — HEPARIN SODIUM 5000 UNITS: 5000 INJECTION INTRAVENOUS; SUBCUTANEOUS at 08:48

## 2021-05-06 RX ADMIN — ACETAMINOPHEN 650 MG: 325 TABLET, FILM COATED ORAL at 20:52

## 2021-05-06 RX ADMIN — FERROUS SULFATE TAB 325 MG (65 MG ELEMENTAL FE) 325 MG: 325 (65 FE) TAB at 08:48

## 2021-05-06 RX ADMIN — VANCOMYCIN HYDROCHLORIDE 750 MG: 750 INJECTION, POWDER, LYOPHILIZED, FOR SOLUTION INTRAVENOUS at 08:49

## 2021-05-06 RX ADMIN — OXYCODONE 15 MG: 5 TABLET ORAL at 04:18

## 2021-05-06 RX ADMIN — CEFEPIME 2 G: 2 INJECTION, POWDER, FOR SOLUTION INTRAVENOUS at 15:39

## 2021-05-06 RX ADMIN — HEPARIN SODIUM 5000 UNITS: 5000 INJECTION INTRAVENOUS; SUBCUTANEOUS at 01:06

## 2021-05-06 RX ADMIN — OXYCODONE 15 MG: 5 TABLET ORAL at 13:29

## 2021-05-06 NOTE — PROGRESS NOTES
Pharmacist Note - Vancomycin Dosing  Therapy day 4  Indication: Bacteremia, possible aspiration PNA  Current regimen: 750mg IV [id number]    Recent Labs     05/06/21  0241 05/05/21  0120 05/05/21  0014 05/04/21  0243   WBC 5.3 7.9  --  10.4   CREA 1.64*  --  1.56* 1.66*   BUN 40*  --  42* 49*       A Random Level resulted at 20 mcg/mL which was obtained ~18 hrs post-dose. The extrapolated \"true\" trough is approximately 16.45 mcg/mL based on the patient's known kinetics. Goal trough: 15 - 20 mcg/mL     Plan: Continue current regimen as the actual trough is currently therapeutic. Note:  SCr back up again this am.  Pharmacy will continue to monitor this patient daily for changes in clinical status and renal function.

## 2021-05-06 NOTE — PROGRESS NOTES
Nephrology Progress Note  Baby Zac. Date of Admission : 5/1/2021    CC: Follow up for CKD       Assessment and Plan     CKD 3b:  - Dr. Lolita Mccrary pt  - stable Cr   cont present care    Anemia of CKD:  - iron studies ok  - cont MICHAEL 3 times per week    GPC bacteremia:  - abx per ID  - TTE neg for vegetations    HTN:  - BP stable    DM2:  - on insulin    R AKA    L3-4, L4-5 discitis/osteomyelitis:  - on abx       Interval History:  Seen and examined. Cr stable. No new issues. ECHO findings noted. No cp or sob reported. Current Medications: all current  Medications have been eviewed in EPIC  Review of Systems: Pertinent items are noted in HPI. Objective:  Vitals:    Vitals:    05/05/21 2154 05/05/21 2329 05/06/21 0418 05/06/21 0844   BP: (!) 145/59 (!) 169/70 (!) 145/62 133/67   Pulse: 92 95 92 84   Resp: 13 15 23 12   Temp: 98.9 °F (37.2 °C) 98 °F (36.7 °C) 98.8 °F (37.1 °C) 98.5 °F (36.9 °C)   SpO2: 96% 95% 98% 98%   Weight:       Height:         Intake and Output:  No intake/output data recorded. 05/04 1901 - 05/06 0700  In: 240 [P.O.:240]  Out: 1025 [Urine:1025]    Physical Examination:  General: Frail, NAD  Neck:  Supple, no mass  Resp:  Lungs CTA B/L, no wheezing , normal respiratory effort  CV:  RRR,  no murmur or rub,R AKA, no edema L  GI:  Soft, NT, + Bowel sounds, no hepatosplenomegaly  Neurologic:  Non focal  Psych:             AAO x 3 appropriate affect   Skin:  No Rash    []    High complexity decision making was performed  []    Patient is at high-risk of decompensation with multiple organ involvement    Lab Data Personally Reviewed: I have reviewed all the pertinent labs, microbiology data and radiology studies during assessment.     Recent Labs     05/06/21  0241 05/05/21  0014 05/04/21  0243    135* 135*   K 4.5 4.8 4.8   * 107 105   CO2 25 22 25   * 175* 153*   BUN 40* 42* 49*   CREA 1.64* 1.56* 1.66*   CA 8.9 9.0 8.8     Recent Labs     05/06/21  0241 05/05/21  0120 05/04/21  0243   WBC 5.3 7.9 10.4   HGB 7.0* 8.1* 7.4*   HCT 24.8* 28.3* 25.2*    390 361     Lab Results   Component Value Date/Time    Specimen Description: JOSE 12/04/2012 10:12 PM    Specimen Description: TOE 12/02/2012 05:03 PM    Specimen Description: TOE 12/02/2012 05:03 PM     Lab Results   Component Value Date/Time    Culture result: (A) 05/04/2021 05:39 PM     GRAM POSITIVE COCCI IN CLUSTERS GROWING IN 3 OF 4 BOTTLES DRAWN (SITES= L AC AND R ARM)    Culture result: REMAINING BOTTLE(S) HAS/HAVE NO GROWTH SO FAR 05/04/2021 05:39 PM    Culture result: (A) 05/03/2021 05:24 PM     * METHICILLIN RESISTANT STAPHYLOCOCCUS AUREUS * GROWING IN 2 OF 4 BOTTLES DRAWN SITE = L ARM    Culture result: REMAINING BOTTLE(S) HAS/HAVE NO GROWTH SO FAR 05/03/2021 05:24 PM     Recent Results (from the past 24 hour(s))   GLUCOSE, POC    Collection Time: 05/05/21 12:25 PM   Result Value Ref Range    Glucose (POC) 201 (H) 65 - 100 mg/dL    Performed by [address], POC    Collection Time: 05/05/21  5:20 PM   Result Value Ref Range    Glucose (POC) 179 (H) 65 - 100 mg/dL    Performed by Michelle Benavides    GLUCOSE, POC    Collection Time: 05/05/21  9:56 PM   Result Value Ref Range    Glucose (POC) 210 (H) 65 - 100 mg/dL    Performed by Nic Diaz, RANDOM    Collection Time: 05/06/21  2:41 AM   Result Value Ref Range    Vancomycin, random 30.7 UG/ML   METABOLIC PANEL, BASIC    Collection Time: 05/06/21  2:41 AM   Result Value Ref Range    Sodium 139 136 - 145 mmol/L    Potassium 4.5 3.5 - 5.1 mmol/L    Chloride 109 (H) 97 - 108 mmol/L    CO2 25 21 - 32 mmol/L    Anion gap 5 5 - 15 mmol/L    Glucose 138 (H) 65 - 100 mg/dL    BUN 40 (H) 6 - 20 MG/DL    Creatinine 1.64 (H) 0.70 - 1.30 MG/DL    BUN/Creatinine ratio 24 (H) 12 - 20      GFR est AA 51 (L) >60 ml/min/1.73m2    GFR est non-AA 42 (L) >60 ml/min/1.73m2    Calcium 8.9 8.5 - 10.1 MG/DL   CBC WITH AUTOMATED DIFF    Collection Time: 05/06/21 2:41 AM   Result Value Ref Range    WBC 5.3 4.1 - 11.1 K/uL    RBC 2.70 (L) 4.10 - 5.70 M/uL    HGB 7.0 (L) 12.1 - 17.0 g/dL    HCT 24.8 (L) 36.6 - 50.3 %    MCV 91.9 80.0 - 99.0 FL    MCH 25.9 (L) 26.0 - 34.0 PG    MCHC 28.2 (L) 30.0 - 36.5 g/dL    RDW 16.6 (H) 11.5 - 14.5 %    PLATELET 218 607 - 274 K/uL    MPV 9.3 8.9 - 12.9 FL    NRBC 0.0 0  WBC    ABSOLUTE NRBC 0.00 0.00 - 0.01 K/uL    NEUTROPHILS 80 (H) 32 - 75 %    LYMPHOCYTES 12 12 - 49 %    MONOCYTES 7 5 - 13 %    EOSINOPHILS 1 0 - 7 %    BASOPHILS 0 0 - 1 %    IMMATURE GRANULOCYTES 0 %    ABS. NEUTROPHILS 4.2 1.8 - 8.0 K/UL    ABS. LYMPHOCYTES 0.6 (L) 0.8 - 3.5 K/UL    ABS. MONOCYTES 0.4 0.0 - 1.0 K/UL    ABS. EOSINOPHILS 0.1 0.0 - 0.4 K/UL    ABS. BASOPHILS 0.0 0.0 - 0.1 K/UL    ABS. IMM. GRANS. 0.0 K/UL    DF MANUAL      RBC COMMENTS ANISOCYTOSIS  1+       GLUCOSE, POC    Collection Time: 05/06/21  6:50 AM   Result Value Ref Range    Glucose (POC) 146 (H) 65 - 100 mg/dL    Performed by Estephania Mon MD  [address] [place] [address]  Phone - [phone]   Fax - [phone]  [email or web address]

## 2021-05-06 NOTE — PROGRESS NOTES
[address] [facility]  Hospitalist Group                                                                                          Hospitalist Progress Note  Arnie Lawson MD  Answering service: [phone] OR [phone] from in house phone        Date of Service:  2021  NAME:  Patience Lora. :  1955  MRN:  [id number]      Admission Summary: This is a 75-year-old man with a past medical history significant for chronic kidney disease, type 2 diabetes, dyslipidemia, hypertension, obstructive sleep apnea, status post recent right above-knee amputation, degenerative disk disease, and chronic pain syndrome, was in his usual state of health until the day of presentation at the emergency room when it was reported that the patient became lethargic and confused at his rehab facility    Interval history / Subjective:   Blood cultures returned positive from , repeat ordered. Patient without complaints today. Denies any pain. No N/V/Fevers. Assessment & Plan:     MRSA bacteremia  L4-L5 Discitis and OM  - On vancomycin IV  - ID following  - repeat blood cultures ordered, returned positive from .   - TTE without obvious vegetations. Will need 6 weeks IV abx, so no need for AMELIA  - Will need PICC once cultures cleared. HCAP   - On cefepime IV and Vanco IV  - Continue cefepime until 5/10  - On RA and doing well     Acute metabolic encephalopathy - multifactorial due to renal insufficiency, bacteremia, electrolyte abnormalitis  - Improving.  Continue day and night cycles  - Avoid narcotics and sedatives if able  - MRI brain negative, ammonia normal     Anemia - chronic disease CKD   - monitor and transfuse if less than 7   - CBC daily     ESTRELLA on CKD stage III  - Cr improved, and now at baseline   - I and O avoid nephrotoxins   - BMP daily     S/p R BKA - recent  - Wound care following, no sign of local infection  - Will need PT/OT     Hyponatremia - hypovolemic, resolved s/p IVF  HTN - holding lisinopril   Type 2 diabetes - A1c 7.2%  SSI, POCT glucose checks and hypoglycemia protocol   HLD - home statin     Code status: FULL  DVT prophylaxis: heparin     Care Plan discussed with: Patient/Family  Anticipated Disposition: [facility]/Rehab  Anticipated Discharge: Greater than 48 hours, awaiting negative blood cultures      Hospital Problems  Date Reviewed: 5/1/2021          Codes Class Noted POA    * (Principal) Acute delirium ICD-10-CM: R41.0  ICD-9-CM: 780.09  5/1/2021 Yes                Review of Systems:   A comprehensive review of systems was negative except for that written in the HPI. Vital Signs:    Last 24hrs VS reviewed since prior progress note. Most recent are:  Visit Vitals  BP (!) 143/66 (BP 1 Location: Right arm, BP Patient Position: At rest)   Pulse 96   Temp 98.5 °F (36.9 °C)   Resp 12   Ht 5' 11\" (1.803 m)   Wt 60.3 kg (133 lb)   SpO2 98%   BMI 18.55 kg/m²         Intake/Output Summary (Last 24 hours) at 5/6/2021 1455  Last data filed at 5/5/2021 2154  Gross per 24 hour   Intake --   Output 225 ml   Net -225 ml        Physical Examination:     I had a face to face encounter with this patient and independently examined them on 5/6/2021 as outlined below:          Constitutional:  No acute distress, cooperative, pleasant, chronically ill appearing   ENT:  Oral mucosa moist, oropharynx benign. Resp:  CTA bilaterally. No wheezing/rhonchi/rales. No accessory muscle use   CV:  Regular rhythm, normal rate, no murmurs, gallops, rubs    GI:  Soft, non distended, non tender. normoactive bowel sounds, no hepatosplenomegaly     Musculoskeletal:  No edema, warm, 2+ pulses throughout    Neurologic:  Moves all extremities. AAOx3, CN II-XII reviewed, s/p R BKA wound c/d/i      Psych:  Good insight, Not anxious nor agitated.        Data Review:    Review and/or order of clinical lab test  Review and/or order of tests in the radiology section of CPT  Review and/or order of tests in the medicine section of CPT      Labs:     Recent Labs     05/06/21  0241 05/05/21  0120   WBC 5.3 7.9   HGB 7.0* 8.1*   HCT 24.8* 28.3*    390     Recent Labs     05/06/21  0241 05/05/21  0014 05/04/21  0243    135* 135*   K 4.5 4.8 4.8   * 107 105   CO2 25 22 25   BUN 40* 42* 49*   CREA 1.64* 1.56* 1.66*   * 175* 153*   CA 8.9 9.0 8.8     No results for input(s): ALT, AP, TBIL, TBILI, TP, ALB, GLOB, GGT, AML, LPSE in the last 72 hours. No lab exists for component: SGOT, GPT, AMYP, HLPSE  No results for input(s): INR, PTP, APTT, INREXT in the last 72 hours. No results for input(s): FE, TIBC, PSAT, FERR in the last 72 hours. Lab Results   Component Value Date/Time    Folate 7.1 05/02/2021 02:00 AM      No results for input(s): PH, PCO2, PO2 in the last 72 hours. No results for input(s): CPK, CKNDX, TROIQ in the last 72 hours.     No lab exists for component: CPKMB  Lab Results   Component Value Date/Time    Cholesterol, total 239 (H) 03/30/2020 02:32 PM    HDL Cholesterol 40 03/30/2020 02:32 PM    LDL,Direct 120 (H) 09/11/2012 12:00 AM    LDL, calculated 130 (H) 03/30/2020 02:32 PM    Triglyceride 345 (H) 03/30/2020 02:32 PM     Lab Results   Component Value Date/Time    Glucose (POC) 112 (H) 05/06/2021 12:14 PM    Glucose (POC) 146 (H) 05/06/2021 06:50 AM    Glucose (POC) 210 (H) 05/05/2021 09:56 PM    Glucose (POC) 179 (H) 05/05/2021 05:20 PM    Glucose (POC) 201 (H) 05/05/2021 12:25 PM     Lab Results   Component Value Date/Time    Color YELLOW/STRAW 05/01/2021 06:13 PM    Appearance CLOUDY (A) 05/01/2021 06:13 PM    Specific gravity 1.016 05/01/2021 06:13 PM    Specific gravity 1.016 02/19/2016 08:00 PM    pH (UA) 5.0 05/01/2021 06:13 PM    Protein 100 (A) 05/01/2021 06:13 PM    Glucose Negative 05/01/2021 06:13 PM    Ketone Negative 05/01/2021 06:13 PM    Bilirubin Negative 05/01/2021 06:13 PM    Urobilinogen 0.2 05/01/2021 06:13 PM    Nitrites Negative 05/01/2021 06:13 PM    Leukocyte Esterase Negative 05/01/2021 06:13 PM    Epithelial cells FEW 05/01/2021 06:13 PM    Bacteria 2+ (A) 05/01/2021 06:13 PM    WBC 0-4 05/01/2021 06:13 PM    RBC 0-5 05/01/2021 06:13 PM         Medications Reviewed:     Current Facility-Administered Medications   Medication Dose Route Frequency    cyclobenzaprine (FLEXERIL) tablet 5 mg  5 mg Oral TID    oxyCODONE IR (ROXICODONE) tablet 15 mg  15 mg Oral Q4H PRN    Vancomycin - Pharmacy to Dose   Other Rx Dosing/Monitoring    vancomycin (VANCOCIN) 750 mg in 0.9% sodium chloride 250 mL (VIAL-MATE)  750 mg IntraVENous Q24H    0.9% sodium chloride infusion 250 mL  250 mL IntraVENous PRN    epoetin naman-epbx (RETACRIT) injection 20,000 Units  20,000 Units SubCUTAneous Q MON, WED & FRI    cefepime (MAXIPIME) 2 g in 0.9% sodium chloride (MBP/ADV) 100 mL MBP  2 g IntraVENous Q12H    albuterol-ipratropium (DUO-NEB) 2.5 MG-0.5 MG/3 ML  3 mL Nebulization Q4H PRN    aspirin chewable tablet 81 mg  81 mg Oral DAILY    [START ON 5/7/2021] ergocalciferol capsule 50,000 Units  50,000 Units Oral every Friday    ferrous sulfate tablet 325 mg  1 Tab Oral BID WITH MEALS    pantoprazole (PROTONIX) tablet 40 mg  40 mg Oral ACB    rosuvastatin (CRESTOR) tablet 40 mg  40 mg Oral QHS    sodium chloride (NS) flush 5-40 mL  5-40 mL IntraVENous Q8H    sodium chloride (NS) flush 5-40 mL  5-40 mL IntraVENous PRN    acetaminophen (TYLENOL) tablet 650 mg  650 mg Oral Q6H PRN    Or    acetaminophen (TYLENOL) suppository 650 mg  650 mg Rectal Q6H PRN    polyethylene glycol (MIRALAX) packet 17 g  17 g Oral DAILY PRN    promethazine (PHENERGAN) tablet 12.5 mg  12.5 mg Oral Q6H PRN    Or    ondansetron (ZOFRAN) injection 4 mg  4 mg IntraVENous Q6H PRN    heparin (porcine) injection 5,000 Units  5,000 Units SubCUTAneous Q8H    insulin lispro (HUMALOG) injection   SubCUTAneous AC&HS    glucose chewable tablet 16 g  4 Tab Oral PRN    dextrose (D50W) injection syrg 12.5-25 g  12.5-25 g IntraVENous PRN    glucagon (GLUCAGEN) injection 1 mg  1 mg IntraMUSCular PRN     ______________________________________________________________________  EXPECTED LENGTH OF STAY: 4d 16h  ACTUAL LENGTH OF STAY:          5                 Mann Lacey MD

## 2021-05-06 NOTE — PROGRESS NOTES
Transitions of Care: 18% risk of re-admission      -Patient will require IPR, referral placed to [address] transportation     The CM called and left voicemail message for BODØ, Liaison with [employer], requesting a call back to inquire about status of referral.     The CM spoke with attending MD, anticipate patient to remain IP through-the-weekend, will require IV antibiotics at discharge- ID is ordering additional blood cultures- final orders TBD at this time, currently on IV vancomycin and IV cefepime. The CM will follow for transitions of care, awaiting call from [employer]. 15:31 p.m.- CM received call back from RASHIDA, Liaison with [facility]-     She is concerned currently about the patient's ability to tolerate intensive IPR, will follow- she also verbalized concern about patient's ability to manage IV antibiotics at home- their facility will only accept patients that can discharge home after two weeks. CM explained that patient lives with his wife, etc.- pending functioning once discharged from rehab- BODØ will follow, however, she is concerned at this point that patient may be more SNF appropriate. The CM will monitor- discuss with spouse, may need SNF placement- the patient's wife wants him closer to home, will monitor. Will need finalized IV antibiotic orders.        TATIANA Waite

## 2021-05-06 NOTE — PROGRESS NOTES
Problem: Mobility Impaired (Adult and Pediatric)  Goal: *Acute Goals and Plan of Care (Insert Text)  Description: FUNCTIONAL STATUS PRIOR TO ADMISSION: Patient was modified independent using a Rw and R LE prosthesis for functional mobility prior to recent revision of BKA to AKA. Discharged to [facility] and was working on transfers to w/c with one assist    [facility]: The patient lived with his spouse but did not require assist prior to AKA. Physical Therapy Goals  Initiated 5/4/2021  1. Patient will move from supine to sit and sit to supine  and scoot up and down in bed with moderate assistance  within 7 day(s). 2.  Patient will transfer from bed to chair and chair to bed with maximal assistance using the least restrictive device within 7 day(s). 3.  Patient will perform sit to stand with maximal assistance within 7 day(s). 4.  Patient will demo fair sitting balance at EOB x2 min in prep for mobility progression within 7 days. 5. Patient will indep complete B LE there ex program to incr strength within 7 days. Outcome: Progressing Towards Goal    PHYSICAL THERAPY TREATMENT  Patient: Baltazar Jenkins (76 y.o. male)  Date: 5/6/2021  Diagnosis: Acute delirium [R41.0] Acute delirium       Precautions:    Chart, physical therapy assessment, plan of care and goals were reviewed. ASSESSMENT  Patient continues with skilled PT services and is progressing towards goals. Pt required encouragement for participation. Pt reports feeling groggy and pain. Pt was able to achieve EOB with assistance. Pt sliding anteriorly requiring assistance to recover. Leamon Moll brace was administered to pt post discussion with PEDRO LUIS Soto EOB. Pt returned supine and positioned for comfort. Current Level of Function Impacting Discharge (mobility/balance):  Ax2    Other factors to consider for discharge: pain AKA         PLAN :  Patient continues to benefit from skilled intervention to address the above impairments. Continue treatment per established plan of care. to address goals. Recommendation for discharge: (in order for the patient to meet his/her long term goals)  Therapy 3 hours per day 5-7 days per week    This discharge recommendation:  Has not yet been discussed the attending provider and/or case management    IF patient discharges home will need the following DME: to be determined (TBD)       SUBJECTIVE:   Patient stated Thats enough.     OBJECTIVE DATA SUMMARY:   Critical Behavior:  Neurologic State: Alert  Orientation Level: Oriented X4  Cognition: Follows commands  Safety/Judgement: Awareness of environment, Fall prevention, Decreased insight into deficits, Decreased awareness of need for safety  Functional Mobility Training:  Bed Mobility:  Rolling: Maximum assistance  Supine to Sit: Moderate assistance;Maximum assistance;Assist x2  Sit to Supine: Moderate assistance;Assist x2           Transfers:                                   Balance:  Sitting: Impaired  Sitting - Static: Poor (constant support)  Ambulation/Gait Training:                                                        Stairs: Therapeutic Exercises:     Pain Rating:  Back     Activity Tolerance:   Limited by pain     After treatment patient left in no apparent distress:   Supine in bed, Heels elevated for pressure relief, Call bell within reach, and Bed / chair alarm activated    COMMUNICATION/COLLABORATION:   The patients plan of care was discussed with: Occupational therapist and Registered nurse.      Solange aHmilton PTA   Time Calculation: 21 mins

## 2021-05-06 NOTE — PROGRESS NOTES
Problem: Self Care Deficits Care Plan (Adult)  Goal: *Acute Goals and Plan of Care (Insert Text)  Description:   FUNCTIONAL STATUS PRIOR TO ADMISSION: Patient was modified independent with ADLs and functional mobility/ ambulatory with RLE prosthetic prior to R AKA ~2 weeks PTA at OSH. Patient and wife report progressive functional decline since AKA due to increasing pain and decreased LUE and LLE function. HOME SUPPORT: Patient was admitted to [facility] from [facility] rehab. Prior to that he was at [address] [place] [address] for R AKA. At home he lived with his wife but did not require assistance. Occupational Therapy Goals  Initiated 5/4/2021  1. Patient will perform grooming seated EOB with supervision/set-up within 7 day(s). 2.  Patient will perform upper body dressing with minimal assistance within 7 day(s). 3.  Patient will perform bathing with minimal assistance within 7 day(s). 4.  Patient will perform toilet transfers to [facility] with moderate assistance within 7 day(s). 5.  Patient will perform all aspects of toileting with moderate assistance  within 7 day(s). 6.  Patient will participate in upper extremity therapeutic exercise/activities with supervision/set-up for 10 minutes within 7 day(s). 7.  Patient will utilize fall prevention techniques during functional activities with verbal cues within 7 day(s). Outcome: Progressing Towards Goal    OCCUPATIONAL THERAPY TREATMENT  Patient: Baltazar Jenkins (76 y.o. male)  Date: 5/6/2021  Diagnosis: Acute delirium [R41.0] Acute delirium       Precautions:    Chart, occupational therapy assessment, plan of care, and goals were reviewed. ASSESSMENT  Patient continues with skilled OT services and is progressing towards goals. Presents lethargic, needs a lot of encouragement and had difficulty tolerating EOB with impaired sitting balance. He is confused, but able to follow commands. Worked on bed mobility and scooting with PT.  Unable to progress to EOB ADL today cesar to him using BUE support to maintain sitting balance EOB. Plan is to go to rehab at discharge. Current Level of Function Impacting Discharge (ADLs): up to total A    Other factors to consider for discharge: none         PLAN :  Patient continues to benefit from skilled intervention to address the above impairments. Continue treatment per established plan of care to address goals. Recommend with staff: Encourage activity    Recommend next OT session: continue POC    Recommendation for discharge: (in order for the patient to meet his/her long term goals)  Therapy 3 hours per day 5-7 days per week    This discharge recommendation:  A follow-up discussion with the attending provider and/or case management is planned    IF patient discharges home will need the following DME: patient owns DME required for discharge       SUBJECTIVE:   Patient stated I go to move it or lose it.     OBJECTIVE DATA SUMMARY:   Cognitive/Behavioral Status:         Confused, lethargic             Functional Mobility and Transfers for ADLs:  Bed Mobility:  Rolling: Maximum assistance  Supine to Sit: Moderate assistance;Maximum assistance;Assist x2  Sit to Supine: Moderate assistance;Assist x2    Transfers:             Balance:  Sitting: Impaired  Sitting - Static: Poor (constant support)    ADL Intervention:  Feeding  Drink to Mouth: Stand-by assistance  Max A to don quick draw brace    Pain:  Reports back pain    Activity Tolerance:   Fair    After treatment patient left in no apparent distress:   Supine in bed and Call bell within reach    COMMUNICATION/COLLABORATION:   The patients plan of care was discussed with: Physical therapy assistant and Registered nurse.      Lisa Vazquez  Time Calculation: 24 mins

## 2021-05-06 NOTE — PROGRESS NOTES
ID Progress Note  2021    Subjective:     Appear sleepy this morning; just took pain medication  Review of Systems:            Symptom Y/N Comments   Symptom Y/N Comments   Fever/Chills  n     Chest Pain n       Poor Appetite       Edema        Cough       Abdominal Pain        Sputum       Joint Pain  y  lumbar spine    SOB/MASSEY n      Pruritis/Rash        Nausea/vomit  n     Tolerating PT/OT        Diarrhea  n     Tolerating Diet        Constipation  n     Other           Could NOT obtain due to:       Objective:     Vitals:   Visit Vitals  BP (!) 145/62 (BP 1 Location: Left arm, BP Patient Position: At rest)   Pulse 92   Temp 98.8 °F (37.1 °C)   Resp 23   Ht 5' 11\" (1.803 m)   Wt 60.3 kg (133 lb)   SpO2 98%   BMI 18.55 kg/m²        Tmax:  Temp (24hrs), Av.4 °F (36.9 °C), Min:97.5 °F (36.4 °C), Max:98.9 °F (37.2 °C)      PHYSICAL EXAM:  General: Chronically ill appearing, WD, WN. Alert, cooperative, no acute distress    EENT:  EOMI. Anicteric sclerae. MMM  Resp:  Clear in apex with decreased breath sounds at bases, no wheezing or rales. No accessory muscle use  CV:  Regular  rhythm,  No edema  GI:  Soft, Non distended, Non tender. +Bowel sounds  Neurologic:  Alert and oriented X 3, normal speech,   Psych:   Fair insight. Not anxious nor agitated  Skin:  No rashes.   No jaundice, right stump; dressing dry and intact, incision approximated, no erythema, no drainage     Labs:   Lab Results   Component Value Date/Time    WBC 5.3 2021 02:41 AM    Hemoglobin (POC) 5.2 (LL) 2021 08:37 AM    Hemoglobin, POC 11.2 (L) 2014 11:05 PM    HGB 7.0 (L) 2021 02:41 AM    Hematocrit (POC) 26 (L) 10/09/2018 07:16 AM    Hematocrit, POC 33 (L) 2014 11:05 PM    HCT 24.8 (L) 2021 02:41 AM    PLATELET 461  02:41 AM    MCV 91.9 2021 02:41 AM     Lab Results   Component Value Date/Time    Sodium 139 2021 02:41 AM    Potassium 4.5 2021 02:41 AM    Chloride 109 (H) 05/06/2021 02:41 AM    CO2 25 05/06/2021 02:41 AM    Anion gap 5 05/06/2021 02:41 AM    Glucose 138 (H) 05/06/2021 02:41 AM    BUN 40 (H) 05/06/2021 02:41 AM    Creatinine 1.64 (H) 05/06/2021 02:41 AM    BUN/Creatinine ratio 24 (H) 05/06/2021 02:41 AM    GFR est AA 51 (L) 05/06/2021 02:41 AM    GFR est non-AA 42 (L) 05/06/2021 02:41 AM    Calcium 8.9 05/06/2021 02:41 AM    Bilirubin, total 0.3 05/02/2021 02:00 AM    Alk. phosphatase 234 (H) 05/02/2021 02:00 AM    Protein, total 7.2 05/02/2021 02:00 AM    Albumin 1.2 (L) 05/02/2021 02:00 AM    Globulin 6.0 (H) 05/02/2021 02:00 AM    A-G Ratio 0.2 (L) 05/02/2021 02:00 AM    ALT (SGPT) 13 05/02/2021 02:00 AM       Assessment and Plan   L4-5 Discitis/OM   HAP  Bacteremia  S/p laminectomy C3-4 (2014)   - WBC normal, afebrile    Blood cx (5/1, 5/3) MRSA    Blood cx (5/4) GPC    Blood cx (5/6) pending      CT of Chest, ABD/PEL (5/2); L4-5 findings most likely represent discitis-osteomyelitis. Soft tissue edema from hypoalbuminemia. Right upper lobe pneumonia. Bilateral lower lobe atelectasis vs pneumonia. Trace bilateral pleural effusions. MRI of lumbar spine (5/3) discitis/OM at L3-4 & L4-5, no evidence of epidural abscess or cord compression    -> ortho recommends medical therapy at this time.      Echo to r/o vegetation    TTE ordered on 5/3; still has not been done       Continue with IV vancomycin; total 6 weeks from negative blood cx     Continue with IV cefepime (last dose 5/10) - for HAP coverage    Anemia  - hgb 7.0; primary team following    Above plan d/w and agreed by Dr. Abida Rodríguez, NP

## 2021-05-07 LAB
AMORPH CRY URNS QL MICRO: ABNORMAL
ANION GAP SERPL CALC-SCNC: 6 MMOL/L (ref 5–15)
APPEARANCE UR: CLEAR
BACTERIA URNS QL MICRO: NEGATIVE /HPF
BASOPHILS # BLD: 0.1 K/UL (ref 0–0.1)
BASOPHILS NFR BLD: 1 % (ref 0–1)
BILIRUB UR QL: NEGATIVE
BUN SERPL-MCNC: 36 MG/DL (ref 6–20)
BUN/CREAT SERPL: 22 (ref 12–20)
CALCIUM SERPL-MCNC: 9 MG/DL (ref 8.5–10.1)
CHLORIDE SERPL-SCNC: 109 MMOL/L (ref 97–108)
CK SERPL-CCNC: 17 U/L (ref 39–308)
CO2 SERPL-SCNC: 25 MMOL/L (ref 21–32)
COLOR UR: ABNORMAL
CREAT SERPL-MCNC: 1.65 MG/DL (ref 0.7–1.3)
DIFFERENTIAL METHOD BLD: ABNORMAL
EOSINOPHIL # BLD: 0.3 K/UL (ref 0–0.4)
EOSINOPHIL NFR BLD: 4 % (ref 0–7)
EPITH CASTS URNS QL MICRO: ABNORMAL /LPF
ERYTHROCYTE [DISTWIDTH] IN BLOOD BY AUTOMATED COUNT: 16.4 % (ref 11.5–14.5)
GLUCOSE BLD STRIP.AUTO-MCNC: 112 MG/DL (ref 65–100)
GLUCOSE BLD STRIP.AUTO-MCNC: 118 MG/DL (ref 65–100)
GLUCOSE BLD STRIP.AUTO-MCNC: 171 MG/DL (ref 65–100)
GLUCOSE BLD STRIP.AUTO-MCNC: 99 MG/DL (ref 65–100)
GLUCOSE SERPL-MCNC: 149 MG/DL (ref 65–100)
GLUCOSE UR STRIP.AUTO-MCNC: NEGATIVE MG/DL
HCT VFR BLD AUTO: 25.6 % (ref 36.6–50.3)
HGB BLD-MCNC: 7.3 G/DL (ref 12.1–17)
HGB UR QL STRIP: ABNORMAL
IMM GRANULOCYTES # BLD AUTO: 0 K/UL
IMM GRANULOCYTES NFR BLD AUTO: 0 %
KETONES UR QL STRIP.AUTO: NEGATIVE MG/DL
LEUKOCYTE ESTERASE UR QL STRIP.AUTO: NEGATIVE
LYMPHOCYTES # BLD: 1 K/UL (ref 0.8–3.5)
LYMPHOCYTES NFR BLD: 15 % (ref 12–49)
MAGNESIUM SERPL-MCNC: 2.1 MG/DL (ref 1.6–2.4)
MCH RBC QN AUTO: 26.4 PG (ref 26–34)
MCHC RBC AUTO-ENTMCNC: 28.5 G/DL (ref 30–36.5)
MCV RBC AUTO: 92.4 FL (ref 80–99)
MONOCYTES # BLD: 0.6 K/UL (ref 0–1)
MONOCYTES NFR BLD: 10 % (ref 5–13)
NEUTS BAND NFR BLD MANUAL: 5 % (ref 0–6)
NEUTS SEG # BLD: 4.4 K/UL (ref 1.8–8)
NEUTS SEG NFR BLD: 65 % (ref 32–75)
NITRITE UR QL STRIP.AUTO: NEGATIVE
NRBC # BLD: 0 K/UL (ref 0–0.01)
NRBC BLD-RTO: 0 PER 100 WBC
PH UR STRIP: 5.5 [PH] (ref 5–8)
PHOSPHATE SERPL-MCNC: 2.7 MG/DL (ref 2.6–4.7)
PLATELET # BLD AUTO: 348 K/UL (ref 150–400)
PMV BLD AUTO: 9.1 FL (ref 8.9–12.9)
POTASSIUM SERPL-SCNC: 4.1 MMOL/L (ref 3.5–5.1)
PROT UR STRIP-MCNC: 100 MG/DL
RBC # BLD AUTO: 2.77 M/UL (ref 4.1–5.7)
RBC #/AREA URNS HPF: ABNORMAL /HPF (ref 0–5)
RBC MORPH BLD: ABNORMAL
SERVICE CMNT-IMP: ABNORMAL
SERVICE CMNT-IMP: NORMAL
SODIUM SERPL-SCNC: 140 MMOL/L (ref 136–145)
SP GR UR REFRACTOMETRY: 1.01 (ref 1–1.03)
UA: UC IF INDICATED,UAUC: ABNORMAL
UROBILINOGEN UR QL STRIP.AUTO: 0.2 EU/DL (ref 0.2–1)
WBC # BLD AUTO: 6.4 K/UL (ref 4.1–11.1)
WBC URNS QL MICRO: ABNORMAL /HPF (ref 0–4)

## 2021-05-07 PROCEDURE — 74011250636 HC RX REV CODE- 250/636: Performed by: NURSE PRACTITIONER

## 2021-05-07 PROCEDURE — 65270000032 HC RM SEMIPRIVATE

## 2021-05-07 PROCEDURE — 74011000258 HC RX REV CODE- 258: Performed by: NURSE PRACTITIONER

## 2021-05-07 PROCEDURE — 81001 URINALYSIS AUTO W/SCOPE: CPT

## 2021-05-07 PROCEDURE — 83735 ASSAY OF MAGNESIUM: CPT

## 2021-05-07 PROCEDURE — 87040 BLOOD CULTURE FOR BACTERIA: CPT

## 2021-05-07 PROCEDURE — 82550 ASSAY OF CK (CPK): CPT

## 2021-05-07 PROCEDURE — 74011636637 HC RX REV CODE- 636/637: Performed by: INTERNAL MEDICINE

## 2021-05-07 PROCEDURE — 74011000258 HC RX REV CODE- 258: Performed by: INTERNAL MEDICINE

## 2021-05-07 PROCEDURE — 74011250637 HC RX REV CODE- 250/637: Performed by: INTERNAL MEDICINE

## 2021-05-07 PROCEDURE — 82962 GLUCOSE BLOOD TEST: CPT

## 2021-05-07 PROCEDURE — 74011250637 HC RX REV CODE- 250/637: Performed by: FAMILY MEDICINE

## 2021-05-07 PROCEDURE — 84100 ASSAY OF PHOSPHORUS: CPT

## 2021-05-07 PROCEDURE — 36415 COLL VENOUS BLD VENIPUNCTURE: CPT

## 2021-05-07 PROCEDURE — 74011250636 HC RX REV CODE- 250/636: Performed by: INTERNAL MEDICINE

## 2021-05-07 PROCEDURE — 85025 COMPLETE CBC W/AUTO DIFF WBC: CPT

## 2021-05-07 PROCEDURE — 87147 CULTURE TYPE IMMUNOLOGIC: CPT

## 2021-05-07 PROCEDURE — 74011250636 HC RX REV CODE- 250/636: Performed by: FAMILY MEDICINE

## 2021-05-07 PROCEDURE — 80048 BASIC METABOLIC PNL TOTAL CA: CPT

## 2021-05-07 RX ORDER — CYCLOBENZAPRINE HCL 10 MG
5 TABLET ORAL
Status: DISCONTINUED | OUTPATIENT
Start: 2021-05-07 | End: 2021-05-20 | Stop reason: HOSPADM

## 2021-05-07 RX ORDER — FENTANYL CITRATE 50 UG/ML
25 INJECTION, SOLUTION INTRAMUSCULAR; INTRAVENOUS
Status: DISCONTINUED | OUTPATIENT
Start: 2021-05-07 | End: 2021-05-10

## 2021-05-07 RX ADMIN — OXYCODONE 15 MG: 5 TABLET ORAL at 14:36

## 2021-05-07 RX ADMIN — HEPARIN SODIUM 5000 UNITS: 5000 INJECTION INTRAVENOUS; SUBCUTANEOUS at 16:53

## 2021-05-07 RX ADMIN — Medication 10 ML: at 06:50

## 2021-05-07 RX ADMIN — CEFEPIME 2 G: 2 INJECTION, POWDER, FOR SOLUTION INTRAVENOUS at 02:26

## 2021-05-07 RX ADMIN — CYCLOBENZAPRINE 5 MG: 10 TABLET, FILM COATED ORAL at 09:15

## 2021-05-07 RX ADMIN — FERROUS SULFATE TAB 325 MG (65 MG ELEMENTAL FE) 325 MG: 325 (65 FE) TAB at 09:16

## 2021-05-07 RX ADMIN — FERROUS SULFATE TAB 325 MG (65 MG ELEMENTAL FE) 325 MG: 325 (65 FE) TAB at 16:54

## 2021-05-07 RX ADMIN — FENTANYL CITRATE 25 MCG: 50 INJECTION, SOLUTION INTRAMUSCULAR; INTRAVENOUS at 16:53

## 2021-05-07 RX ADMIN — ASPIRIN 81 MG: 81 TABLET, CHEWABLE ORAL at 09:15

## 2021-05-07 RX ADMIN — CEFEPIME 2 G: 2 INJECTION, POWDER, FOR SOLUTION INTRAVENOUS at 15:32

## 2021-05-07 RX ADMIN — ERGOCALCIFEROL 50000 UNITS: 1.25 CAPSULE, LIQUID FILLED ORAL at 06:47

## 2021-05-07 RX ADMIN — OXYCODONE 15 MG: 5 TABLET ORAL at 06:47

## 2021-05-07 RX ADMIN — ACETAMINOPHEN 650 MG: 325 TABLET, FILM COATED ORAL at 02:29

## 2021-05-07 RX ADMIN — SODIUM CHLORIDE 350 MG: 9 INJECTION, SOLUTION INTRAVENOUS at 17:16

## 2021-05-07 RX ADMIN — INSULIN LISPRO 2 UNITS: 100 INJECTION, SOLUTION INTRAVENOUS; SUBCUTANEOUS at 06:46

## 2021-05-07 RX ADMIN — HEPARIN SODIUM 5000 UNITS: 5000 INJECTION INTRAVENOUS; SUBCUTANEOUS at 09:16

## 2021-05-07 RX ADMIN — OXYCODONE 15 MG: 5 TABLET ORAL at 18:53

## 2021-05-07 RX ADMIN — PANTOPRAZOLE SODIUM 40 MG: 40 TABLET, DELAYED RELEASE ORAL at 06:47

## 2021-05-07 RX ADMIN — OXYCODONE 15 MG: 5 TABLET ORAL at 02:29

## 2021-05-07 RX ADMIN — VANCOMYCIN HYDROCHLORIDE 750 MG: 750 INJECTION, POWDER, LYOPHILIZED, FOR SOLUTION INTRAVENOUS at 09:15

## 2021-05-07 NOTE — PROGRESS NOTES
Attempted to call report. Nurse asked to wait until night shift arrives. 1950: attempted to call report again. Nurse will call me back.

## 2021-05-07 NOTE — PROGRESS NOTES
Spiritual Care Assessment/Progress Note  ST. [address]      NAME: Tsosie Castleman. MRN: [id number]  AGE: 77 y.o. SEX: male  [employer] Affiliation: [employer]ism   Language: English     5/7/2021     Total Time (in minutes): 10     Spiritual Assessment begun in [address] [facility] [address] through conversation with:         []Patient        [] Family    [] Friend(s)        Reason for Consult: Initial/Spiritual assessment, patient floor     Spiritual beliefs: (Please include comment if needed)     [] Identifies with a jose juan tradition:         [] Supported by a jose juan community:            [] Claims no spiritual orientation:           [] Seeking spiritual identity:                [] Adheres to an individual form of spirituality:           [x] Not able to assess:                           Identified resources for coping:      [] Prayer                               [] Music                  [] Guided Imagery     [] Family/friends                 [] Pet visits     [] Devotional reading                         [x] Unknown     [] Other:                                           Interventions offered during this visit: (See comments for more details)                Plan of Care:     [] Support spiritual and/or cultural needs    [] Support AMD and/or advance care planning process      [] Support grieving process   [] Coordinate Rites and/or Rituals    [] Coordination with community clergy   [] No spiritual needs identified at this time   [] Detailed Plan of Care below (See Comments)  [] Make referral to Music Therapy  [] Make referral to Pet Therapy     [] Make referral to Addiction services  [] Make referral to [facility]  [] Make referral to Spiritual Care Partner  [] No future visits requested        [x] Follow up upon further referrals     Comments:  visit for initial spiritual assessment. Patient resting in bed, asleep. Appears comfortable. No response from knock at door and verbal greeting.   Telephone began ringing and did not awaken patient. Decided not to disturb patient at this time so he can continue to rest.  Please contact spiritual care for further referral or consult. Rev.  Jenny Cheng MDiv, [facility], [facility]   paging service: [phone])

## 2021-05-07 NOTE — PROGRESS NOTES
ID Progress Note  2021    Subjective:     C/o suprapubic tenderness, bladder scan revealed >600ml    Review of Systems:            Symptom Y/N Comments   Symptom Y/N Comments   Fever/Chills  n     Chest Pain n       Poor Appetite       Edema        Cough       Abdominal Pain        Sputum       Joint Pain  y  lumbar spine    SOB/MASSEY n      Pruritis/Rash        Nausea/vomit  n     Tolerating PT/OT        Diarrhea  n     Tolerating Diet        Constipation  n     Other           Could NOT obtain due to:       Objective:     Vitals:   Visit Vitals  BP (!) 132/59   Pulse 94   Temp 97.6 °F (36.4 °C)   Resp 9   Ht 5' 11\" (1.803 m)   Wt 60.3 kg (133 lb)   SpO2 96%   BMI 18.55 kg/m²        Tmax:  Temp (24hrs), Av °F (36.7 °C), Min:97.5 °F (36.4 °C), Max:98.6 °F (37 °C)      PHYSICAL EXAM:  General: Chronically ill appearing, WD, WN. Alert, cooperative, no acute distress    EENT:  EOMI. Anicteric sclerae. MMM  Resp:  Clear in apex with decreased breath sounds at bases, no wheezing or rales. No accessory muscle use  CV:  Regular  rhythm,  No edema  GI:  Soft, Non distended, Non tender. +Bowel sounds  Neurologic:  Alert and oriented X 3, normal speech,   Psych:   Fair insight. Not anxious nor agitated  Skin:  No rashes.   No jaundice, right stump; dressing dry and intact, incision approximated, no erythema, no drainage     Labs:   Lab Results   Component Value Date/Time    WBC 6.4 2021 05:03 AM    Hemoglobin (POC) 5.2 (LL) 2021 08:37 AM    Hemoglobin, POC 11.2 (L) 2014 11:05 PM    HGB 7.3 (L) 2021 05:03 AM    Hematocrit (POC) 26 (L) 10/09/2018 07:16 AM    Hematocrit, POC 33 (L) 2014 11:05 PM    HCT 25.6 (L) 2021 05:03 AM    PLATELET 985  05:03 AM    MCV 92.4 2021 05:03 AM     Lab Results   Component Value Date/Time    Sodium 140 2021 05:03 AM    Potassium 4.1 2021 05:03 AM    Chloride 109 (H) 2021 05:03 AM    CO2 25 2021 05:03 AM    Anion gap 6 05/07/2021 05:03 AM    Glucose 149 (H) 05/07/2021 05:03 AM    BUN 36 (H) 05/07/2021 05:03 AM    Creatinine 1.65 (H) 05/07/2021 05:03 AM    BUN/Creatinine ratio 22 (H) 05/07/2021 05:03 AM    GFR est AA 51 (L) 05/07/2021 05:03 AM    GFR est non-AA 42 (L) 05/07/2021 05:03 AM    Calcium 9.0 05/07/2021 05:03 AM    Bilirubin, total 0.3 05/02/2021 02:00 AM    Alk. phosphatase 234 (H) 05/02/2021 02:00 AM    Protein, total 7.2 05/02/2021 02:00 AM    Albumin 1.2 (L) 05/02/2021 02:00 AM    Globulin 6.0 (H) 05/02/2021 02:00 AM    A-G Ratio 0.2 (L) 05/02/2021 02:00 AM    ALT (SGPT) 13 05/02/2021 02:00 AM       Assessment and Plan   L4-5 Discitis/OM   HAP  Bacteremia  S/p laminectomy C3-4 (2014)   - WBC normal, afebrile    Blood cx (5/1, 5/3, 5/4) MRSA    Blood cx (5/6) no growth so far      CT of Chest, ABD/PEL (5/2); L4-5 findings most likely represent discitis-osteomyelitis. Soft tissue edema from hypoalbuminemia. Right upper lobe pneumonia. Bilateral lower lobe atelectasis vs pneumonia. Trace bilateral pleural effusions. MRI of lumbar spine (5/3) discitis/OM at L3-4 & L4-5, no evidence of epidural abscess or cord compression    -> ortho recommends medical therapy at this time.       TTE (5/5) no vegetation       Continue with IV vancomycin; total 6 weeks from negative blood cx     Continue with IV cefepime (last dose 5/10) - for HAP coverage    Anemia  - hgb 7.3; primary team following    Bladder retention  - following hospital protocol    Check U/A with reflex    Above plan d/w and agreed by Dr. Starla Warren, NP

## 2021-05-07 NOTE — PROGRESS NOTES
Physical Therapy    Reviewed chart and attempted to treat pt. Pt immediately declining reporting pain post session yesterday. Unable to encourage participation. Educated pt on pressure relief in bed. Pt endorses turning. Pt will need logan for OOB. Will defer at this time and follow up on Monday.

## 2021-05-07 NOTE — PROGRESS NOTES
[address]  Hospitalist Group                                                                                          Hospitalist Progress Note  Ofe Avalos MD  Answering service: [phone] OR [phone] from in house phone        Date of Service:  2021  NAME:  Osiris Melchor. :  1955  MRN:  [id number]      Admission Summary: This is a 68-year-old man with a past medical history significant for chronic kidney disease, type 2 diabetes, dyslipidemia, hypertension, obstructive sleep apnea, status post recent right above-knee amputation, degenerative disk disease, and chronic pain syndrome, was in his usual state of health until the day of presentation at the emergency room when it was reported that the patient became lethargic and confused at his rehab facility    Interval history / Subjective:   Blood cultures drawn yesterday are now returning positive as well. Patient complaining of a lot of pain in his bottom. Sleepy with PT/OT   Wife at bedside and updated on plan of care. Patient with persistent bacteremia. Unlikely to tolerate inpatient rehab at this point. Assessment & Plan:     MRSA bacteremia  L4-L5 Discitis and OM  Sacral wound  - On vancomycin IV, discussed with ID and switched to daptomycin IV given Vanco ALESSANDRO is 4 and patient has still not cleared his blood cultures  - ID following  - repeat blood cultures again today, returned positive from .   - TTE without obvious vegetations. Will need 6 weeks IV abx, so no need for AMELIA  - Will need PICC once cultures cleared. - Wound care following    HCAP   - On cefepime IV, completed 7 days of vanc for PNA. Dapto will not reach the lungs. - Continue cefepime until 5/10  - On RA and doing well     Acute metabolic encephalopathy - multifactorial due to renal insufficiency, bacteremia, electrolyte abnormalitis  - Improving.  Continue day and night cycles  - Avoid narcotics and sedatives if able  - MRI brain negative, ammonia normal     Anemia - chronic disease CKD   - monitor and transfuse if less than 7   - CBC daily     ESTRELLA on CKD stage III  - Cr improved, and now at baseline   - I and O avoid nephrotoxins   - BMP daily     S/p R BKA - recent  - Wound care following, no sign of local infection  - Will need PT/OT     Hyponatremia - hypovolemic, resolved s/p IVF  HTN - holding lisinopril   Type 2 diabetes - A1c 7.2%  SSI, POCT glucose checks and hypoglycemia protocol   HLD - home statin     Code status: FULL  DVT prophylaxis: heparin     Care Plan discussed with: Patient/Family  Anticipated Disposition: [facility]/Rehab  Anticipated Discharge: Greater than 48 hours, awaiting negative blood cultures      Hospital Problems  Date Reviewed: 5/1/2021          Codes Class Noted POA    * (Principal) Acute delirium ICD-10-CM: R41.0  ICD-9-CM: 780.09  5/1/2021 Yes                Review of Systems:   A comprehensive review of systems was negative except for that written in the HPI. Vital Signs:    Last 24hrs VS reviewed since prior progress note. Most recent are:  Visit Vitals  BP (!) 134/58 (BP 1 Location: Right arm, BP Patient Position: At rest)   Pulse 93   Temp 98.5 °F (36.9 °C)   Resp 8   Ht 5' 11\" (1.803 m)   Wt 60.3 kg (133 lb)   SpO2 100%   BMI 18.55 kg/m²         Intake/Output Summary (Last 24 hours) at 5/7/2021 1520  Last data filed at 5/7/2021 1136  Gross per 24 hour   Intake --   Output 700 ml   Net -700 ml        Physical Examination:     I had a face to face encounter with this patient and independently examined them on 5/7/2021 as outlined below:          Constitutional:  No acute distress, cooperative, pleasant, chronically ill appearing   ENT:  Oral mucosa moist, oropharynx benign. Resp:  CTA bilaterally. No wheezing/rhonchi/rales. No accessory muscle use   CV:  Regular rhythm, normal rate, no murmurs, gallops, rubs    GI:  Soft, non distended, non tender.  normoactive bowel sounds, no hepatosplenomegaly Musculoskeletal:  No edema, warm, 2+ pulses throughout    Neurologic:  Moves all extremities. AAOx3, CN II-XII reviewed, s/p R BKA wound c/d/i      Psych:  Good insight, Not anxious nor agitated. Data Review:    Review and/or order of clinical lab test  Review and/or order of tests in the radiology section of [facility]  Review and/or order of tests in the medicine section of [facility]      Labs:     Recent Labs     05/07/21  0503 05/06/21  0241   WBC 6.4 5.3   HGB 7.3* 7.0*   HCT 25.6* 24.8*    350     Recent Labs     05/07/21  0503 05/06/21  0241 05/05/21  0014    139 135*   K 4.1 4.5 4.8   * 109* 107   CO2 25 25 22   BUN 36* 40* 42*   CREA 1.65* 1.64* 1.56*   * 138* 175*   CA 9.0 8.9 9.0   MG 2.1  --   --    PHOS 2.7  --   --      No results for input(s): ALT, AP, TBIL, TBILI, TP, ALB, GLOB, GGT, AML, LPSE in the last 72 hours. No lab exists for component: SGOT, GPT, AMYP, HLPSE  No results for input(s): INR, PTP, APTT, INREXT, INREXT in the last 72 hours. No results for input(s): FE, TIBC, PSAT, FERR in the last 72 hours. Lab Results   Component Value Date/Time    Folate 7.1 05/02/2021 02:00 AM      No results for input(s): PH, PCO2, PO2 in the last 72 hours. No results for input(s): CPK, CKNDX, TROIQ in the last 72 hours.     No lab exists for component: CPKMB  Lab Results   Component Value Date/Time    Cholesterol, total 239 (H) 03/30/2020 02:32 PM    HDL Cholesterol 40 03/30/2020 02:32 PM    LDL,Direct 120 (H) 09/11/2012 12:00 AM    LDL, calculated 130 (H) 03/30/2020 02:32 PM    Triglyceride 345 (H) 03/30/2020 02:32 PM     Lab Results   Component Value Date/Time    Glucose (POC) 118 (H) 05/07/2021 11:35 AM    Glucose (POC) 171 (H) 05/07/2021 06:37 AM    Glucose (POC) 189 (H) 05/06/2021 10:22 PM    Glucose (POC) 139 (H) 05/06/2021 05:11 PM    Glucose (POC) 112 (H) 05/06/2021 12:14 PM     Lab Results   Component Value Date/Time    Color YELLOW/STRAW 05/07/2021 11:43 AM    Appearance CLEAR 05/07/2021 11:43 AM    Specific gravity 1.015 05/07/2021 11:43 AM    Specific gravity 1.016 02/19/2016 08:00 PM    pH (UA) 5.5 05/07/2021 11:43 AM    Protein 100 (A) 05/07/2021 11:43 AM    Glucose Negative 05/07/2021 11:43 AM    Ketone Negative 05/07/2021 11:43 AM    Bilirubin Negative 05/07/2021 11:43 AM    Urobilinogen 0.2 05/07/2021 11:43 AM    Nitrites Negative 05/07/2021 11:43 AM    Leukocyte Esterase Negative 05/07/2021 11:43 AM    Epithelial cells FEW 05/07/2021 11:43 AM    Bacteria Negative 05/07/2021 11:43 AM    WBC 0-4 05/07/2021 11:43 AM    RBC 0-5 05/07/2021 11:43 AM         Medications Reviewed:     Current Facility-Administered Medications   Medication Dose Route Frequency    cyclobenzaprine (FLEXERIL) tablet 5 mg  5 mg Oral TID PRN    fentaNYL citrate (PF) injection 25 mcg  25 mcg IntraVENous Q4H PRN    DAPTOmycin (CUBICIN) 350 mg in sterile water (preservative free) 7 mL IV syringe RF formulation  6 mg/kg IntraVENous Q24H    oxyCODONE IR (ROXICODONE) tablet 15 mg  15 mg Oral Q4H PRN    0.9% sodium chloride infusion 250 mL  250 mL IntraVENous PRN    epoetin naman-epbx (RETACRIT) injection 20,000 Units  20,000 Units SubCUTAneous Q MON, WED & FRI    cefepime (MAXIPIME) 2 g in 0.9% sodium chloride (MBP/ADV) 100 mL MBP  2 g IntraVENous Q12H    albuterol-ipratropium (DUO-NEB) 2.5 MG-0.5 MG/3 ML  3 mL Nebulization Q4H PRN    aspirin chewable tablet 81 mg  81 mg Oral DAILY    ergocalciferol capsule 50,000 Units  50,000 Units Oral every Friday    ferrous sulfate tablet 325 mg  1 Tab Oral BID WITH MEALS    pantoprazole (PROTONIX) tablet 40 mg  40 mg Oral ACB    sodium chloride (NS) flush 5-40 mL  5-40 mL IntraVENous Q8H    sodium chloride (NS) flush 5-40 mL  5-40 mL IntraVENous PRN    acetaminophen (TYLENOL) tablet 650 mg  650 mg Oral Q6H PRN    Or    acetaminophen (TYLENOL) suppository 650 mg  650 mg Rectal Q6H PRN    polyethylene glycol (MIRALAX) packet 17 g  17 g Oral DAILY PRN    promethazine (PHENERGAN) tablet 12.5 mg  12.5 mg Oral Q6H PRN    Or    ondansetron (ZOFRAN) injection 4 mg  4 mg IntraVENous Q6H PRN    heparin (porcine) injection 5,000 Units  5,000 Units SubCUTAneous Q8H    insulin lispro (HUMALOG) injection   SubCUTAneous AC&HS    glucose chewable tablet 16 g  4 Tab Oral PRN    dextrose (D50W) injection syrg 12.5-25 g  12.5-25 g IntraVENous PRN    glucagon (GLUCAGEN) injection 1 mg  1 mg IntraMUSCular PRN     ______________________________________________________________________  EXPECTED LENGTH OF STAY: 4d 16h  ACTUAL LENGTH OF STAY:          6                 Jonnathan Gonzalez MD

## 2021-05-07 NOTE — PROGRESS NOTES
0710 - pt only voided 100 cc's this evening. Bladder scan shows greater than 660 cc's of urine. Pt is refusing straight cath at this time. After turning pt at 0700 pt states he is in too much pain for cath right now.

## 2021-05-07 NOTE — PROGRESS NOTES
Chart reviewed and attempted to see patient for OT intervention. Noted patient unwilling to participate with physical therapy this morning. Introduced self and re-educated patient on the role of OT in the hospital setting, however patient adamantly stating \"I can't do none of that right now! \" and asking for his wife to be present. Will follow up for OT intervention Monday as able. Thank you.

## 2021-05-07 NOTE — PROGRESS NOTES
Nephrology Progress Note  Anabel Meza. Date of Admission : 5/1/2021    CC: Follow up for CKD       Assessment and Plan     CKD 3b:  - Dr. Tony May pt  - stable Cr  - cont present care    Anemia of CKD:  - iron studies ok  - cont MICHAEL 3 times per week    MRSA bacteremia:  - abx per ID  - TTE neg for vegetations    HTN:  - BP stable    DM2:  - on insulin    R AKA    L3-4, L4-5 discitis/osteomyelitis:  - on abx    Sacral wound       Interval History:  Seen and examined. Cr stable. No new issues. Resting in bed, no distress. No cp or sob reported. Current Medications: all current  Medications have been eviewed in EPIC  Review of Systems: Pertinent items are noted in HPI. Objective:  Vitals:    Vitals:    05/06/21 1956 05/06/21 2350 05/07/21 0417 05/07/21 0823   BP: 133/62 134/68 (!) 132/59 (!) 118/59   Pulse: 99 97 94 94   Resp: 10 9 9 12   Temp: 97.5 °F (36.4 °C) 97.5 °F (36.4 °C) 97.6 °F (36.4 °C) 98.6 °F (37 °C)   SpO2: 94% 95% 96% 96%   Weight:       Height:         Intake and Output:  05/07 0701 - 05/07 1900  In: -   Out: 100 [Urine:100]  05/05 1901 - 05/07 0700  In: -   Out: 0590 [Urine:1625]    Physical Examination:  General: Frail, NAD  Neck:  Supple, no mass  Resp:  Lungs CTA B/L, no wheezing , normal respiratory effort  CV:  RRR,  no murmur or rub,R AKA, no edema L  GI:  Soft, NT, + Bowel sounds, no hepatosplenomegaly  Neurologic:  Non focal  Psych:             AAO x 3 appropriate affect   Skin:  No Rash    []    High complexity decision making was performed  []    Patient is at high-risk of decompensation with multiple organ involvement    Lab Data Personally Reviewed: I have reviewed all the pertinent labs, microbiology data and radiology studies during assessment.     Recent Labs     05/07/21  0503 05/06/21  0241 05/05/21  0014    139 135*   K 4.1 4.5 4.8   * 109* 107   CO2 25 25 22   * 138* 175*   BUN 36* 40* 42*   CREA 1.65* 1.64* 1.56*   CA 9.0 8.9 9.0   MG 2.1  --   -- PHOS 2.7  --   --      Recent Labs     05/07/21  0503 05/06/21  0241 05/05/21  0120   WBC 6.4 5.3 7.9   HGB 7.3* 7.0* 8.1*   HCT 25.6* 24.8* 28.3*    350 390     Lab Results   Component Value Date/Time    Specimen Description: NARES 12/04/2012 10:12 PM    Specimen Description: TOE 12/02/2012 05:03 PM    Specimen Description: TOE 12/02/2012 05:03 PM     Lab Results   Component Value Date/Time    Culture result: NO GROWTH AFTER 18 HOURS 05/06/2021 09:27 AM    Culture result: (A) 05/04/2021 05:39 PM     * METHICILLIN RESISTANT STAPHYLOCOCCUS AUREUS * GROWING IN 3 OF 4 BOTTLES DRAWN (SITES= L AC AND R ARM) PLEASE REFER TO [id number] FOR SENSITIVITIES    Culture result: REMAINING BOTTLE(S) HAS/HAVE NO GROWTH SO FAR 05/04/2021 05:39 PM     Recent Results (from the past 24 hour(s))   GLUCOSE, POC    Collection Time: 05/06/21 12:14 PM   Result Value Ref Range    Glucose (POC) 112 (H) 65 - 100 mg/dL    Performed by Eda SANDHU    GLUCOSE, POC    Collection Time: 05/06/21  5:11 PM   Result Value Ref Range    Glucose (POC) 139 (H) 65 - 100 mg/dL    Performed by [address], POC    Collection Time: 05/06/21 10:22 PM   Result Value Ref Range    Glucose (POC) 189 (H) 65 - 100 mg/dL    Performed by Dayanna Wilkinson    MAGNESIUM    Collection Time: 05/07/21  5:03 AM   Result Value Ref Range    Magnesium 2.1 1.6 - 2.4 mg/dL   METABOLIC PANEL, BASIC    Collection Time: 05/07/21  5:03 AM   Result Value Ref Range    Sodium 140 136 - 145 mmol/L    Potassium 4.1 3.5 - 5.1 mmol/L    Chloride 109 (H) 97 - 108 mmol/L    CO2 25 21 - 32 mmol/L    Anion gap 6 5 - 15 mmol/L    Glucose 149 (H) 65 - 100 mg/dL    BUN 36 (H) 6 - 20 MG/DL    Creatinine 1.65 (H) 0.70 - 1.30 MG/DL    BUN/Creatinine ratio 22 (H) 12 - 20      GFR est AA 51 (L) >60 ml/min/1.73m2    GFR est non-AA 42 (L) >60 ml/min/1.73m2    Calcium 9.0 8.5 - 10.1 MG/DL   PHOSPHORUS    Collection Time: 05/07/21  5:03 AM   Result Value Ref Range Phosphorus 2.7 2.6 - 4.7 MG/DL   CBC WITH AUTOMATED DIFF    Collection Time: 05/07/21  5:03 AM   Result Value Ref Range    WBC 6.4 4.1 - 11.1 K/uL    RBC 2.77 (L) 4.10 - 5.70 M/uL    HGB 7.3 (L) 12.1 - 17.0 g/dL    HCT 25.6 (L) 36.6 - 50.3 %    MCV 92.4 80.0 - 99.0 FL    MCH 26.4 26.0 - 34.0 PG    MCHC 28.5 (L) 30.0 - 36.5 g/dL    RDW 16.4 (H) 11.5 - 14.5 %    PLATELET 838 412 - 994 K/uL    MPV 9.1 8.9 - 12.9 FL    NRBC 0.0 0  WBC    ABSOLUTE NRBC 0.00 0.00 - 0.01 K/uL    NEUTROPHILS 65 32 - 75 %    BAND NEUTROPHILS 5 0 - 6 %    LYMPHOCYTES 15 12 - 49 %    MONOCYTES 10 5 - 13 %    EOSINOPHILS 4 0 - 7 %    BASOPHILS 1 0 - 1 %    IMMATURE GRANULOCYTES 0 %    ABS. NEUTROPHILS 4.4 1.8 - 8.0 K/UL    ABS. LYMPHOCYTES 1.0 0.8 - 3.5 K/UL    ABS. MONOCYTES 0.6 0.0 - 1.0 K/UL    ABS. EOSINOPHILS 0.3 0.0 - 0.4 K/UL    ABS. BASOPHILS 0.1 0.0 - 0.1 K/UL    ABS. IMM. GRANS. 0.0 K/UL    DF MANUAL      RBC COMMENTS ANISOCYTOSIS  1+       GLUCOSE, POC    Collection Time: 05/07/21  6:37 AM   Result Value Ref Range    Glucose (POC) 171 (H) 65 - 100 mg/dL    Performed by Kip Wilder MD  [address] [place] [address]  Phone - [phone]   Fax - [phone]  [email or web address]

## 2021-05-07 NOTE — PROGRESS NOTES
Spoke to wife, she would like to speak with MD with some concerns. Passed message to MD. Wife is updated from nursing stand point.

## 2021-05-07 NOTE — PROGRESS NOTES
Problem: Falls - Risk of  Goal: *Absence of Falls  Description: Document Liz Oconnor Fall Risk and appropriate interventions in the flowsheet. Outcome: Progressing Towards Goal  Note: Fall Risk Interventions:       Mentation Interventions: Adequate sleep, hydration, pain control    Medication Interventions: Evaluate medications/consider consulting pharmacy, Teach patient to arise slowly    Elimination Interventions: Call light in reach    History of Falls Interventions: Door open when patient unattended         Problem: Patient Education: Go to Patient Education Activity  Goal: Patient/Family Education  Outcome: Progressing Towards Goal     Problem: Pressure Injury - Risk of  Goal: *Prevention of pressure injury  Description: Document Celestino Scale and appropriate interventions in the flowsheet. Outcome: Progressing Towards Goal  Note: Pressure Injury Interventions:  Sensory Interventions: Pressure redistribution bed/mattress (bed type)    Moisture Interventions: Absorbent underpads    Activity Interventions: Pressure redistribution bed/mattress(bed type)    Mobility Interventions: Pressure redistribution bed/mattress (bed type)    Nutrition Interventions: Document food/fluid/supplement intake    Friction and Shear Interventions: Minimize layers                Problem: Patient Education: Go to Patient Education Activity  Goal: Patient/Family Education  Outcome: Progressing Towards Goal     Problem: Diabetes Self-Management  Goal: *Disease process and treatment process  Description: Define diabetes and identify own type of diabetes; list 3 options for treating diabetes. Outcome: Progressing Towards Goal  Goal: *Incorporating nutritional management into lifestyle  Description: Describe effect of type, amount and timing of food on blood glucose; list 3 methods for planning meals.   Outcome: Progressing Towards Goal  Goal: *Incorporating physical activity into lifestyle  Description: State effect of exercise on blood glucose levels. Outcome: Progressing Towards Goal  Goal: *Developing strategies to promote health/change behavior  Description: Define the ABC's of diabetes; identify appropriate screenings, schedule and personal plan for screenings. Outcome: Progressing Towards Goal  Goal: *Using medications safely  Description: State effect of diabetes medications on diabetes; name diabetes medication taking, action and side effects. Outcome: Progressing Towards Goal  Goal: *Monitoring blood glucose, interpreting and using results  Description: Identify recommended blood glucose targets  and personal targets. Outcome: Progressing Towards Goal  Goal: *Prevention, detection, treatment of acute complications  Description: List symptoms of hyper- and hypoglycemia; describe how to treat low blood sugar and actions for lowering  high blood glucose level. Outcome: Progressing Towards Goal  Goal: *Prevention, detection and treatment of chronic complications  Description: Define the natural course of diabetes and describe the relationship of blood glucose levels to long term complications of diabetes.   Outcome: Progressing Towards Goal  Goal: *Developing strategies to address psychosocial issues  Description: Describe feelings about living with diabetes; identify support needed and support network  Outcome: Progressing Towards Goal  Goal: *Insulin pump training  Outcome: Progressing Towards Goal  Goal: *Sick day guidelines  Outcome: Progressing Towards Goal  Goal: *Patient Specific Goal (EDIT GOAL, INSERT TEXT)  Outcome: Progressing Towards Goal     Problem: Patient Education: Go to Patient Education Activity  Goal: Patient/Family Education  Outcome: Progressing Towards Goal     Problem: Patient Education: Go to Patient Education Activity  Goal: Patient/Family Education  Outcome: Progressing Towards Goal     Problem: Patient Education: Go to Patient Education Activity  Goal: Patient/Family Education  Outcome: Progressing Towards Goal     Problem: Nutrition Deficit  Goal: *Optimize nutritional status  Outcome: Progressing Towards Goal     Problem: Breathing Pattern - Ineffective  Goal: *Absence of hypoxia  Outcome: Progressing Towards Goal  Goal: *Use of effective breathing techniques  Outcome: Progressing Towards Goal  Goal: *PALLIATIVE CARE:  Alleviation of Dyspnea  Outcome: Progressing Towards Goal     Problem: Patient Education: Go to Patient Education Activity  Goal: Patient/Family Education  Outcome: Progressing Towards Goal     Problem: Risk for Spread of Infection  Goal: Prevent transmission of infectious organism to others  Description: Prevent the transmission of infectious organisms to other patients, staff members, and visitors.   Outcome: Progressing Towards Goal     Problem: Patient Education:  Go to Education Activity  Goal: Patient/Family Education  Outcome: Progressing Towards Goal

## 2021-05-07 NOTE — PROGRESS NOTES
Transitions of Care: 18% risk of re-admission      -TBD, IPR vs. SNF, [facility] following, however, concerned about patient's ability to tolerate IPR at this time   -BLS transportation     The patient will remain IP through-the-weekend, CM working on placement- the patient and family would like the patient placed closer to home in [place], [place]. [facility] is following, however, concerned about patent's ability to tolerate IPR- patient declined therapy today due to pain. The CM discussed with MD, agreeable with SNF and anticipates this is appropriate- will monitor progress, if patient not accepted to [facility], will discuss SNF options with patient/spouse in [place], [place]. The patient will need UAI if discharged to SNF, Medicaid secondary- will need specific IV antibiotic orders. Will follow-up with patient and family. 15:13 p.m.- CM met with patient's spouse, she is upset that the patient has a sacral wound, CM discussed SNF placement- concerned at this time that the patient would be able to tolerate IPR- family agrees, CM provided SNF list for SNFs near the patient's address from Medicare. gov- family will review over-the-weekend and notify CM of choice on Monday.      TATIANA Melo

## 2021-05-07 NOTE — PROGRESS NOTES
Day #6 of Vancomycin  Indication:  bacteremia, possible aspiration PNA  -MRSA nasal swab (-)ve  -PMH laminectomy   Current regimen:  750 mg IV Q24h  Abx regimen:  vancomycin, cefepime, clindamycin  ID Following ?: YES  Concomitant nephrotoxic drugs (requires more frequent monitoring): None  Frequency of BMP?: daily    Recent Labs     21  0503 21  0241 21  0120 21  0014   WBC 6.4 5.3 7.9  --    CREA 1.65* 1.64*  --  1.56*   BUN 36* 40*  --  42*     Est CrCl: 35-40 ml/min  Temp (24hrs), Av.1 °F (36.7 °C), Min:97.5 °F (36.4 °C), Max:98.6 °F (37 °C)    Cultures:   5/ blood - MRSA in 4 of 4 bottles, vanc ALESSANDRO 4; final    5/3 blood MRSA in 2/4; prelim    5/4 blood  MRSA  3/4 bottles; prelim    5/6 blood NG x2 days; prelim    Goal trough = 15 - 20 mcg/mL    Recent trough history (date/time/level/dose/action taken):   @ 0241- random level = 20 mcg/ml ~ 18 hrs post dose, extrapolated trough ~ 16-17 mcg/ml, no change    Plan: Continue current regimen. ID plan indicates vanc duration of 6 weeks from negative blood cx and cefepime through 5/10 for HAP treatment. Will re-assess regimen with a trough level q3-5 days or sooner if clinically indicated.      Mel Cruz, RALPHD

## 2021-05-08 LAB
ALBUMIN SERPL-MCNC: 1 G/DL (ref 3.5–5)
ANION GAP SERPL CALC-SCNC: 5 MMOL/L (ref 5–15)
BACTERIA SPEC CULT: ABNORMAL
BACTERIA SPEC CULT: ABNORMAL
BASOPHILS # BLD: 0 K/UL (ref 0–0.1)
BASOPHILS NFR BLD: 0 % (ref 0–1)
BUN SERPL-MCNC: 35 MG/DL (ref 6–20)
BUN/CREAT SERPL: 20 (ref 12–20)
CALCIUM SERPL-MCNC: 9.8 MG/DL (ref 8.5–10.1)
CHLORIDE SERPL-SCNC: 111 MMOL/L (ref 97–108)
CO2 SERPL-SCNC: 24 MMOL/L (ref 21–32)
CREAT SERPL-MCNC: 1.79 MG/DL (ref 0.7–1.3)
DIFFERENTIAL METHOD BLD: ABNORMAL
EOSINOPHIL # BLD: 0.1 K/UL (ref 0–0.4)
EOSINOPHIL NFR BLD: 2 % (ref 0–7)
ERYTHROCYTE [DISTWIDTH] IN BLOOD BY AUTOMATED COUNT: 16.7 % (ref 11.5–14.5)
GLUCOSE BLD STRIP.AUTO-MCNC: 114 MG/DL (ref 65–100)
GLUCOSE BLD STRIP.AUTO-MCNC: 115 MG/DL (ref 65–100)
GLUCOSE BLD STRIP.AUTO-MCNC: 115 MG/DL (ref 65–100)
GLUCOSE BLD STRIP.AUTO-MCNC: 142 MG/DL (ref 65–100)
GLUCOSE SERPL-MCNC: 99 MG/DL (ref 65–100)
HCT VFR BLD AUTO: 27.6 % (ref 36.6–50.3)
HGB BLD-MCNC: 7.8 G/DL (ref 12.1–17)
IMM GRANULOCYTES # BLD AUTO: 0 K/UL
IMM GRANULOCYTES NFR BLD AUTO: 0 %
LYMPHOCYTES # BLD: 0.6 K/UL (ref 0.8–3.5)
LYMPHOCYTES NFR BLD: 23 % (ref 12–49)
MAGNESIUM SERPL-MCNC: 2 MG/DL (ref 1.6–2.4)
MCH RBC QN AUTO: 25.7 PG (ref 26–34)
MCHC RBC AUTO-ENTMCNC: 28.3 G/DL (ref 30–36.5)
MCV RBC AUTO: 91.1 FL (ref 80–99)
MONOCYTES # BLD: 0.2 K/UL (ref 0–1)
MONOCYTES NFR BLD: 8 % (ref 5–13)
NEUTS BAND NFR BLD MANUAL: 2 % (ref 0–6)
NEUTS SEG # BLD: 1.8 K/UL (ref 1.8–8)
NEUTS SEG NFR BLD: 65 % (ref 32–75)
NRBC # BLD: 0.02 K/UL (ref 0–0.01)
NRBC BLD-RTO: 0.7 PER 100 WBC
PHOSPHATE SERPL-MCNC: 2.8 MG/DL (ref 2.6–4.7)
PLATELET # BLD AUTO: 362 K/UL (ref 150–400)
PMV BLD AUTO: 8.9 FL (ref 8.9–12.9)
POTASSIUM SERPL-SCNC: 3.8 MMOL/L (ref 3.5–5.1)
RBC # BLD AUTO: 3.03 M/UL (ref 4.1–5.7)
RBC MORPH BLD: ABNORMAL
SERVICE CMNT-IMP: ABNORMAL
SODIUM SERPL-SCNC: 140 MMOL/L (ref 136–145)
WBC # BLD AUTO: 2.7 K/UL (ref 4.1–11.1)

## 2021-05-08 PROCEDURE — 74011250637 HC RX REV CODE- 250/637: Performed by: FAMILY MEDICINE

## 2021-05-08 PROCEDURE — 74011250637 HC RX REV CODE- 250/637: Performed by: INTERNAL MEDICINE

## 2021-05-08 PROCEDURE — 87147 CULTURE TYPE IMMUNOLOGIC: CPT

## 2021-05-08 PROCEDURE — 74011636637 HC RX REV CODE- 636/637: Performed by: INTERNAL MEDICINE

## 2021-05-08 PROCEDURE — 74011250636 HC RX REV CODE- 250/636: Performed by: INTERNAL MEDICINE

## 2021-05-08 PROCEDURE — 80069 RENAL FUNCTION PANEL: CPT

## 2021-05-08 PROCEDURE — 82962 GLUCOSE BLOOD TEST: CPT

## 2021-05-08 PROCEDURE — 65270000032 HC RM SEMIPRIVATE

## 2021-05-08 PROCEDURE — 85025 COMPLETE CBC W/AUTO DIFF WBC: CPT

## 2021-05-08 PROCEDURE — 74011000258 HC RX REV CODE- 258: Performed by: INTERNAL MEDICINE

## 2021-05-08 PROCEDURE — 36415 COLL VENOUS BLD VENIPUNCTURE: CPT

## 2021-05-08 PROCEDURE — 87040 BLOOD CULTURE FOR BACTERIA: CPT

## 2021-05-08 PROCEDURE — 74011000258 HC RX REV CODE- 258: Performed by: NURSE PRACTITIONER

## 2021-05-08 PROCEDURE — 74011250636 HC RX REV CODE- 250/636: Performed by: NURSE PRACTITIONER

## 2021-05-08 PROCEDURE — 83735 ASSAY OF MAGNESIUM: CPT

## 2021-05-08 RX ORDER — TAMSULOSIN HYDROCHLORIDE 0.4 MG/1
0.4 CAPSULE ORAL DAILY
Status: DISCONTINUED | OUTPATIENT
Start: 2021-05-09 | End: 2021-05-20 | Stop reason: HOSPADM

## 2021-05-08 RX ADMIN — HEPARIN SODIUM 5000 UNITS: 5000 INJECTION INTRAVENOUS; SUBCUTANEOUS at 00:41

## 2021-05-08 RX ADMIN — HEPARIN SODIUM 5000 UNITS: 5000 INJECTION INTRAVENOUS; SUBCUTANEOUS at 08:55

## 2021-05-08 RX ADMIN — FERROUS SULFATE TAB 325 MG (65 MG ELEMENTAL FE) 325 MG: 325 (65 FE) TAB at 17:28

## 2021-05-08 RX ADMIN — FERROUS SULFATE TAB 325 MG (65 MG ELEMENTAL FE) 325 MG: 325 (65 FE) TAB at 08:54

## 2021-05-08 RX ADMIN — Medication 10 ML: at 23:33

## 2021-05-08 RX ADMIN — INSULIN LISPRO 2 UNITS: 100 INJECTION, SOLUTION INTRAVENOUS; SUBCUTANEOUS at 17:28

## 2021-05-08 RX ADMIN — PANTOPRAZOLE SODIUM 40 MG: 40 TABLET, DELAYED RELEASE ORAL at 08:54

## 2021-05-08 RX ADMIN — OXYCODONE 15 MG: 5 TABLET ORAL at 02:34

## 2021-05-08 RX ADMIN — OXYCODONE 15 MG: 5 TABLET ORAL at 08:55

## 2021-05-08 RX ADMIN — CEFEPIME 2 G: 2 INJECTION, POWDER, FOR SOLUTION INTRAVENOUS at 05:11

## 2021-05-08 RX ADMIN — FENTANYL CITRATE 25 MCG: 50 INJECTION, SOLUTION INTRAMUSCULAR; INTRAVENOUS at 00:00

## 2021-05-08 RX ADMIN — OXYCODONE 15 MG: 5 TABLET ORAL at 17:28

## 2021-05-08 RX ADMIN — SODIUM CHLORIDE 350 MG: 9 INJECTION, SOLUTION INTRAVENOUS at 17:28

## 2021-05-08 RX ADMIN — EPOETIN ALFA-EPBX 20000 UNITS: 20000 INJECTION, SOLUTION INTRAVENOUS; SUBCUTANEOUS at 00:43

## 2021-05-08 RX ADMIN — CEFEPIME 2 G: 2 INJECTION, POWDER, FOR SOLUTION INTRAVENOUS at 15:01

## 2021-05-08 RX ADMIN — ASPIRIN 81 MG: 81 TABLET, CHEWABLE ORAL at 08:55

## 2021-05-08 RX ADMIN — Medication 10 ML: at 00:46

## 2021-05-08 RX ADMIN — HEPARIN SODIUM 5000 UNITS: 5000 INJECTION INTRAVENOUS; SUBCUTANEOUS at 17:28

## 2021-05-08 RX ADMIN — Medication 10 ML: at 05:12

## 2021-05-08 NOTE — PROGRESS NOTES
[facility] Adult  Hospitalist Group                                                                                          Hospitalist Progress Note  Valeri Schwab MD  Answering service: [phone] OR [phone] from in house phone        Date of Service:  2021  NAME:  Alesia Jimenez. :  1955  MRN:  [id number]      Admission Summary: This is a 68-year-old man with a past medical history significant for chronic kidney disease, type 2 diabetes, dyslipidemia, hypertension, obstructive sleep apnea, status post recent right above-knee amputation, degenerative disk disease, and chronic pain syndrome, was in his usual state of health until the day of presentation at the emergency room when it was reported that the patient became lethargic and confused at his rehab facility    Interval history / Subjective:      Patient is a bit more confused today, sleepy. Denies any pain     Assessment & Plan:     MRSA bacteremia  L4-L5 Discitis and OM  Sacral wound  - Patient now on daptomycin. Vanc ALESSANDRO is 4  - ID following  - repeat blood cultures again today, returned positive from .   - TTE without obvious vegetations. Will need 6 weeks IV abx, so no need for AMELIA  - Will need PICC once cultures cleared. - Wound care following     HCAP   - On cefepime IV, completed 7 days of vanc for PNA. Dapto will not reach the lungs. - Continue cefepime until 5/10  - On RA and doing well     Acute metabolic encephalopathy - multifactorial due to renal insufficiency, bacteremia, electrolyte abnormalitis  - Improving.  Continue day and night cycles  - Avoid narcotics and sedatives if able  - MRI brain negative, ammonia normal     Anemia - chronic disease CKD   - monitor and transfuse if less than 7   - CBC daily     ESTRELLA on CKD stage III  - Cr improved, and now at baseline   - I and O avoid nephrotoxins   - BMP daily     S/p R BKA - recent  - Wound care following, no sign of local infection   - Will need PT/OT Hyponatremia - hypovolemic, resolved s/p IVF  HTN - holding lisinopril   Type 2 diabetes - A1c 7.2%  SSI, POCT glucose checks and hypoglycemia protocol   HLD - home statin     Code status: FULL  DVT prophylaxis: heparin     Care Plan discussed with: Patient/Family  Anticipated Disposition: [facility]/Rehab  Anticipated Discharge: Greater than 48 hours, awaiting negative blood cultures      Hospital Problems  Date Reviewed: 5/1/2021          Codes Class Noted POA    * (Principal) Acute delirium ICD-10-CM: R41.0  ICD-9-CM: 780.09  5/1/2021 Yes                Review of Systems:   A comprehensive review of systems was negative except for that written in the HPI. Vital Signs:    Last 24hrs VS reviewed since prior progress note. Most recent are:  Visit Vitals  BP (!) 151/71 (BP 1 Location: Right arm, BP Patient Position: At rest)   Pulse (!) 103   Temp 98.5 °F (36.9 °C)   Resp 17   Ht 5' 11\" (1.803 m)   Wt 59.1 kg (130 lb 6.4 oz)   SpO2 96%   BMI 18.19 kg/m²         Intake/Output Summary (Last 24 hours) at 5/8/2021 1559  Last data filed at 5/7/2021 2156  Gross per 24 hour   Intake --   Output 1600 ml   Net -1600 ml        Physical Examination:     I had a face to face encounter with this patient and independently examined them on 5/8/2021 as outlined below:          Constitutional:  No acute distress, cooperative, pleasant, chronically ill appearing   ENT:  Oral mucosa moist, oropharynx benign. Resp:  CTA bilaterally. No wheezing/rhonchi/rales. No accessory muscle use   CV:  Regular rhythm, normal rate, no murmurs, gallops, rubs    GI:  Soft, non distended, non tender. normoactive bowel sounds, no hepatosplenomegaly     Musculoskeletal:  No edema, warm, 2+ pulses throughout    Neurologic:  Moves all extremities. AAOx3, CN II-XII reviewed, s/p R BKA wound c/d/i      Psych:  Good insight, Not anxious nor agitated.        Data Review:    Review and/or order of clinical lab test  Review and/or order of tests in the radiology section of CPT  Review and/or order of tests in the medicine section of CPT      Labs:     Recent Labs     05/08/21  0053 05/07/21  0503   WBC 2.7* 6.4   HGB 7.8* 7.3*   HCT 27.6* 25.6*    348     Recent Labs     05/08/21  0052 05/07/21  0503 05/06/21  0241    140 139   K 3.8 4.1 4.5   * 109* 109*   CO2 24 25 25   BUN 35* 36* 40*   CREA 1.79* 1.65* 1.64*   GLU 99 149* 138*   CA 9.8 9.0 8.9   MG 2.0 2.1  --    PHOS 2.8 2.7  --      Recent Labs     05/08/21 0052   ALB 1.0*     No results for input(s): INR, PTP, APTT, INREXT, INREXT in the last 72 hours. No results for input(s): FE, TIBC, PSAT, FERR in the last 72 hours. Lab Results   Component Value Date/Time    Folate 7.1 05/02/2021 02:00 AM      No results for input(s): PH, PCO2, PO2 in the last 72 hours.   Recent Labs     05/07/21  1640   CPK 17*     Lab Results   Component Value Date/Time    Cholesterol, total 239 (H) 03/30/2020 02:32 PM    HDL Cholesterol 40 03/30/2020 02:32 PM    LDL,Direct 120 (H) 09/11/2012 12:00 AM    LDL, calculated 130 (H) 03/30/2020 02:32 PM    Triglyceride 345 (H) 03/30/2020 02:32 PM     Lab Results   Component Value Date/Time    Glucose (POC) 115 (H) 05/08/2021 11:18 AM    Glucose (POC) 114 (H) 05/08/2021 06:58 AM    Glucose (POC) 112 (H) 05/07/2021 09:20 PM    Glucose (POC) 99 05/07/2021 05:22 PM    Glucose (POC) 118 (H) 05/07/2021 11:35 AM     Lab Results   Component Value Date/Time    Color YELLOW/STRAW 05/07/2021 11:43 AM    Appearance CLEAR 05/07/2021 11:43 AM    Specific gravity 1.015 05/07/2021 11:43 AM    Specific gravity 1.016 02/19/2016 08:00 PM    pH (UA) 5.5 05/07/2021 11:43 AM    Protein 100 (A) 05/07/2021 11:43 AM    Glucose Negative 05/07/2021 11:43 AM    Ketone Negative 05/07/2021 11:43 AM    Bilirubin Negative 05/07/2021 11:43 AM    Urobilinogen 0.2 05/07/2021 11:43 AM    Nitrites Negative 05/07/2021 11:43 AM    Leukocyte Esterase Negative 05/07/2021 11:43 AM    Epithelial cells FEW 05/07/2021 11:43 AM    Bacteria Negative 05/07/2021 11:43 AM    WBC 0-4 05/07/2021 11:43 AM    RBC 0-5 05/07/2021 11:43 AM         Medications Reviewed:     Current Facility-Administered Medications   Medication Dose Route Frequency    cyclobenzaprine (FLEXERIL) tablet 5 mg  5 mg Oral TID PRN    fentaNYL citrate (PF) injection 25 mcg  25 mcg IntraVENous Q4H PRN    DAPTOmycin (CUBICIN) 350 mg in 0.9% sodium chloride 50 mL IVPB RF formulation  350 mg IntraVENous DAILY    oxyCODONE IR (ROXICODONE) tablet 15 mg  15 mg Oral Q4H PRN    0.9% sodium chloride infusion 250 mL  250 mL IntraVENous PRN    epoetin naman-epbx (RETACRIT) injection 20,000 Units  20,000 Units SubCUTAneous Q MON, WED & FRI    cefepime (MAXIPIME) 2 g in 0.9% sodium chloride (MBP/ADV) 100 mL MBP  2 g IntraVENous Q12H    albuterol-ipratropium (DUO-NEB) 2.5 MG-0.5 MG/3 ML  3 mL Nebulization Q4H PRN    aspirin chewable tablet 81 mg  81 mg Oral DAILY    ergocalciferol capsule 50,000 Units  50,000 Units Oral every Friday    ferrous sulfate tablet 325 mg  1 Tab Oral BID WITH MEALS    pantoprazole (PROTONIX) tablet 40 mg  40 mg Oral ACB    sodium chloride (NS) flush 5-40 mL  5-40 mL IntraVENous Q8H    sodium chloride (NS) flush 5-40 mL  5-40 mL IntraVENous PRN    acetaminophen (TYLENOL) tablet 650 mg  650 mg Oral Q6H PRN    Or    acetaminophen (TYLENOL) suppository 650 mg  650 mg Rectal Q6H PRN    polyethylene glycol (MIRALAX) packet 17 g  17 g Oral DAILY PRN    promethazine (PHENERGAN) tablet 12.5 mg  12.5 mg Oral Q6H PRN    Or    ondansetron (ZOFRAN) injection 4 mg  4 mg IntraVENous Q6H PRN    heparin (porcine) injection 5,000 Units  5,000 Units SubCUTAneous Q8H    insulin lispro (HUMALOG) injection   SubCUTAneous AC&HS    glucose chewable tablet 16 g  4 Tab Oral PRN    dextrose (D50W) injection syrg 12.5-25 g  12.5-25 g IntraVENous PRN    glucagon (GLUCAGEN) injection 1 mg  1 mg IntraMUSCular PRN ______________________________________________________________________  EXPECTED LENGTH OF STAY: 4d 16h  ACTUAL LENGTH OF STAY:          7                 Parveen Flores MD

## 2021-05-08 NOTE — PROGRESS NOTES
TRANSFER - OUT REPORT:    Verbal report given to Anabel RN(name) on Osiris Melchor.  being transferred to 6 E(unit) for routine progression of care       Report consisted of patients Situation, Background, Assessment and   Recommendations(SBAR). Information from the following report(s) SBAR, Intake/Output, MAR, Med Rec Status and Cardiac Rhythm nsr was reviewed with the receiving nurse. Lines:   Venous Access Device powerline 06/19/20 Upper chest (subclavicular area, right (Active)       Peripheral IV 05/01/21 Left Forearm (Active)   Site Assessment Clean, dry, & intact 05/07/21 1532   Phlebitis Assessment 0 05/07/21 1532   Infiltration Assessment 0 05/07/21 1532   Dressing Status Clean, dry, & intact 05/07/21 1532   Dressing Type Transparent 05/07/21 1532   Hub Color/Line Status Pink;Capped 05/07/21 1532   Action Taken Open ports on tubing capped 05/07/21 1532   Alcohol Cap Used Yes 05/07/21 1532       Peripheral IV 05/01/21 Right Forearm (Active)   Site Assessment Clean, dry, & intact 05/07/21 1532   Phlebitis Assessment 0 05/07/21 1532   Infiltration Assessment 0 05/07/21 1532   Dressing Status Clean, dry, & intact 05/07/21 1532   Dressing Type Transparent 05/07/21 1532   Hub Color/Line Status Pink; Infusing 05/07/21 1532   Action Taken Open ports on tubing capped 05/07/21 1532   Alcohol Cap Used Yes 05/07/21 1532        Opportunity for questions and clarification was provided.       Patient transported with:   [employer]

## 2021-05-09 LAB
ALBUMIN SERPL-MCNC: 1 G/DL (ref 3.5–5)
ANION GAP SERPL CALC-SCNC: 5 MMOL/L (ref 5–15)
APPEARANCE UR: CLEAR
BACTERIA SPEC CULT: ABNORMAL
BACTERIA SPEC CULT: ABNORMAL
BACTERIA URNS QL MICRO: ABNORMAL /HPF
BASOPHILS # BLD: 0 K/UL (ref 0–0.1)
BASOPHILS NFR BLD: 1 % (ref 0–1)
BILIRUB UR QL: NEGATIVE
BUN SERPL-MCNC: 39 MG/DL (ref 6–20)
BUN/CREAT SERPL: 19 (ref 12–20)
CALCIUM SERPL-MCNC: 9.3 MG/DL (ref 8.5–10.1)
CHLORIDE SERPL-SCNC: 113 MMOL/L (ref 97–108)
CO2 SERPL-SCNC: 24 MMOL/L (ref 21–32)
COLOR UR: ABNORMAL
CREAT SERPL-MCNC: 2.04 MG/DL (ref 0.7–1.3)
CREAT UR-MCNC: 35.2 MG/DL
DIFFERENTIAL METHOD BLD: ABNORMAL
EOSINOPHIL # BLD: 0 K/UL (ref 0–0.4)
EOSINOPHIL #/AREA URNS HPF: NEGATIVE /[HPF]
EOSINOPHIL NFR BLD: 2 % (ref 0–7)
EPITH CASTS URNS QL MICRO: ABNORMAL /LPF
ERYTHROCYTE [DISTWIDTH] IN BLOOD BY AUTOMATED COUNT: 16.7 % (ref 11.5–14.5)
GLUCOSE BLD STRIP.AUTO-MCNC: 114 MG/DL (ref 65–100)
GLUCOSE BLD STRIP.AUTO-MCNC: 122 MG/DL (ref 65–100)
GLUCOSE BLD STRIP.AUTO-MCNC: 141 MG/DL (ref 65–100)
GLUCOSE BLD STRIP.AUTO-MCNC: 156 MG/DL (ref 65–100)
GLUCOSE SERPL-MCNC: 111 MG/DL (ref 65–100)
GLUCOSE UR STRIP.AUTO-MCNC: NEGATIVE MG/DL
HCT VFR BLD AUTO: 24.7 % (ref 36.6–50.3)
HGB BLD-MCNC: 7 G/DL (ref 12.1–17)
HGB UR QL STRIP: ABNORMAL
HYALINE CASTS URNS QL MICRO: ABNORMAL /LPF (ref 0–5)
IMM GRANULOCYTES # BLD AUTO: 0 K/UL
IMM GRANULOCYTES NFR BLD AUTO: 0 %
KETONES UR QL STRIP.AUTO: ABNORMAL MG/DL
LEUKOCYTE ESTERASE UR QL STRIP.AUTO: NEGATIVE
LYMPHOCYTES # BLD: 0.6 K/UL (ref 0.8–3.5)
LYMPHOCYTES NFR BLD: 28 % (ref 12–49)
MAGNESIUM SERPL-MCNC: 2 MG/DL (ref 1.6–2.4)
MCH RBC QN AUTO: 25.6 PG (ref 26–34)
MCHC RBC AUTO-ENTMCNC: 28.3 G/DL (ref 30–36.5)
MCV RBC AUTO: 90.5 FL (ref 80–99)
MONOCYTES # BLD: 0.4 K/UL (ref 0–1)
MONOCYTES NFR BLD: 18 % (ref 5–13)
NEUTS BAND NFR BLD MANUAL: 2 % (ref 0–6)
NEUTS SEG # BLD: 1 K/UL (ref 1.8–8)
NEUTS SEG NFR BLD: 49 % (ref 32–75)
NITRITE UR QL STRIP.AUTO: NEGATIVE
NRBC # BLD: 0.02 K/UL (ref 0–0.01)
NRBC BLD-RTO: 1 PER 100 WBC
PH UR STRIP: 6 [PH] (ref 5–8)
PHOSPHATE SERPL-MCNC: 3 MG/DL (ref 2.6–4.7)
PHOSPHATE SERPL-MCNC: 3.1 MG/DL (ref 2.6–4.7)
PLATELET # BLD AUTO: 340 K/UL (ref 150–400)
PMV BLD AUTO: 9.1 FL (ref 8.9–12.9)
POTASSIUM SERPL-SCNC: 3.6 MMOL/L (ref 3.5–5.1)
PROT UR STRIP-MCNC: 100 MG/DL
RBC # BLD AUTO: 2.73 M/UL (ref 4.1–5.7)
RBC #/AREA URNS HPF: ABNORMAL /HPF (ref 0–5)
RBC MORPH BLD: ABNORMAL
RBC MORPH BLD: ABNORMAL
SERVICE CMNT-IMP: ABNORMAL
SODIUM SERPL-SCNC: 142 MMOL/L (ref 136–145)
SODIUM UR-SCNC: 53 MMOL/L
SP GR UR REFRACTOMETRY: 1.02 (ref 1–1.03)
UROBILINOGEN UR QL STRIP.AUTO: 0.2 EU/DL (ref 0.2–1)
WBC # BLD AUTO: 2 K/UL (ref 4.1–11.1)
WBC URNS QL MICRO: ABNORMAL /HPF (ref 0–4)

## 2021-05-09 PROCEDURE — 80069 RENAL FUNCTION PANEL: CPT

## 2021-05-09 PROCEDURE — 82570 ASSAY OF URINE CREATININE: CPT

## 2021-05-09 PROCEDURE — 74011250636 HC RX REV CODE- 250/636: Performed by: INTERNAL MEDICINE

## 2021-05-09 PROCEDURE — 87040 BLOOD CULTURE FOR BACTERIA: CPT

## 2021-05-09 PROCEDURE — 74011000258 HC RX REV CODE- 258: Performed by: INTERNAL MEDICINE

## 2021-05-09 PROCEDURE — 87205 SMEAR GRAM STAIN: CPT

## 2021-05-09 PROCEDURE — 74011250637 HC RX REV CODE- 250/637: Performed by: FAMILY MEDICINE

## 2021-05-09 PROCEDURE — 82962 GLUCOSE BLOOD TEST: CPT

## 2021-05-09 PROCEDURE — 74011000258 HC RX REV CODE- 258: Performed by: NURSE PRACTITIONER

## 2021-05-09 PROCEDURE — 85025 COMPLETE CBC W/AUTO DIFF WBC: CPT

## 2021-05-09 PROCEDURE — 74011250636 HC RX REV CODE- 250/636: Performed by: NURSE PRACTITIONER

## 2021-05-09 PROCEDURE — 74011250637 HC RX REV CODE- 250/637: Performed by: NURSE PRACTITIONER

## 2021-05-09 PROCEDURE — 84100 ASSAY OF PHOSPHORUS: CPT

## 2021-05-09 PROCEDURE — 74011636637 HC RX REV CODE- 636/637: Performed by: INTERNAL MEDICINE

## 2021-05-09 PROCEDURE — 74011250637 HC RX REV CODE- 250/637: Performed by: INTERNAL MEDICINE

## 2021-05-09 PROCEDURE — 84300 ASSAY OF URINE SODIUM: CPT

## 2021-05-09 PROCEDURE — 81001 URINALYSIS AUTO W/SCOPE: CPT

## 2021-05-09 PROCEDURE — 36415 COLL VENOUS BLD VENIPUNCTURE: CPT

## 2021-05-09 PROCEDURE — 65270000032 HC RM SEMIPRIVATE

## 2021-05-09 PROCEDURE — 83735 ASSAY OF MAGNESIUM: CPT

## 2021-05-09 RX ORDER — LEVOFLOXACIN 750 MG/1
750 TABLET ORAL ONCE
Status: COMPLETED | OUTPATIENT
Start: 2021-05-09 | End: 2021-05-09

## 2021-05-09 RX ORDER — AMOXICILLIN 250 MG
1 CAPSULE ORAL DAILY
Status: DISCONTINUED | OUTPATIENT
Start: 2021-05-10 | End: 2021-05-09

## 2021-05-09 RX ORDER — AMOXICILLIN 250 MG
1 CAPSULE ORAL DAILY
Status: DISCONTINUED | OUTPATIENT
Start: 2021-05-09 | End: 2021-05-20 | Stop reason: HOSPADM

## 2021-05-09 RX ORDER — SODIUM CHLORIDE 9 MG/ML
75 INJECTION, SOLUTION INTRAVENOUS CONTINUOUS
Status: DISCONTINUED | OUTPATIENT
Start: 2021-05-09 | End: 2021-05-10

## 2021-05-09 RX ADMIN — FERROUS SULFATE TAB 325 MG (65 MG ELEMENTAL FE) 325 MG: 325 (65 FE) TAB at 16:58

## 2021-05-09 RX ADMIN — SODIUM CHLORIDE 350 MG: 9 INJECTION, SOLUTION INTRAVENOUS at 17:47

## 2021-05-09 RX ADMIN — Medication 10 ML: at 14:44

## 2021-05-09 RX ADMIN — SODIUM CHLORIDE 75 ML/HR: 9 INJECTION, SOLUTION INTRAVENOUS at 17:47

## 2021-05-09 RX ADMIN — FENTANYL CITRATE 25 MCG: 50 INJECTION, SOLUTION INTRAMUSCULAR; INTRAVENOUS at 19:07

## 2021-05-09 RX ADMIN — Medication 10 ML: at 21:38

## 2021-05-09 RX ADMIN — FENTANYL CITRATE 25 MCG: 50 INJECTION, SOLUTION INTRAMUSCULAR; INTRAVENOUS at 23:25

## 2021-05-09 RX ADMIN — INSULIN LISPRO 2 UNITS: 100 INJECTION, SOLUTION INTRAVENOUS; SUBCUTANEOUS at 16:58

## 2021-05-09 RX ADMIN — OXYCODONE 15 MG: 5 TABLET ORAL at 14:43

## 2021-05-09 RX ADMIN — DOCUSATE SODIUM 50 MG AND SENNOSIDES 8.6 MG 1 TABLET: 8.6; 5 TABLET, FILM COATED ORAL at 16:58

## 2021-05-09 RX ADMIN — HEPARIN SODIUM 5000 UNITS: 5000 INJECTION INTRAVENOUS; SUBCUTANEOUS at 23:25

## 2021-05-09 RX ADMIN — CEFEPIME 2 G: 2 INJECTION, POWDER, FOR SOLUTION INTRAVENOUS at 04:25

## 2021-05-09 RX ADMIN — LEVOFLOXACIN 750 MG: 750 TABLET, FILM COATED ORAL at 12:46

## 2021-05-09 RX ADMIN — OXYCODONE 15 MG: 5 TABLET ORAL at 04:25

## 2021-05-09 RX ADMIN — HEPARIN SODIUM 5000 UNITS: 5000 INJECTION INTRAVENOUS; SUBCUTANEOUS at 00:11

## 2021-05-09 RX ADMIN — FENTANYL CITRATE 25 MCG: 50 INJECTION, SOLUTION INTRAMUSCULAR; INTRAVENOUS at 09:45

## 2021-05-09 RX ADMIN — INSULIN LISPRO 2 UNITS: 100 INJECTION, SOLUTION INTRAVENOUS; SUBCUTANEOUS at 12:46

## 2021-05-09 RX ADMIN — HEPARIN SODIUM 5000 UNITS: 5000 INJECTION INTRAVENOUS; SUBCUTANEOUS at 16:58

## 2021-05-09 RX ADMIN — PANTOPRAZOLE SODIUM 40 MG: 40 TABLET, DELAYED RELEASE ORAL at 06:51

## 2021-05-09 NOTE — PROGRESS NOTES
ID Progress Note  2021    Subjective:     Alert, somewhat agitated it. C/o severe low back pain    Review of Systems:            Symptom Y/N Comments   Symptom Y/N Comments   Fever/Chills       Chest Pain       Poor Appetite       Edema        Cough       Abdominal Pain        Sputum       Joint Pain  y  lumbar spine    SOB/MASSEY      Pruritis/Rash        Nausea/vomit       Tolerating PT/OT        Diarrhea       Tolerating Diet        Constipation       Other           Could NOT obtain due to:       Objective:     Vitals:   Visit Vitals  BP (!) 153/72 (BP 1 Location: Left upper arm, BP Patient Position: At rest)   Pulse (!) 101   Temp 98.4 °F (36.9 °C)   Resp 18   Ht 5' 11\" (1.803 m)   Wt 61.1 kg (134 lb 11.2 oz)   SpO2 (!) 101%   BMI 18.79 kg/m²        Tmax:  Temp (24hrs), Av.6 °F (37 °C), Min:98.4 °F (36.9 °C), Max:98.8 °F (37.1 °C)      PHYSICAL EXAM:  General: Chronically ill appearing, WD, WN. Alert, uncooperative, no acute distress    EENT:  EOMI. Anicteric sclerae. MMM  Resp:  Clear in apex with decreased breath sounds at bases, no wheezing or rales. No accessory muscle use  CV:  Regular  rhythm,  No edema  GI:  Soft, Non distended, Non tender. +Bowel sounds  Neurologic:  Alert and oriented X self, normal speech,   Psych:   Fair insight. Not anxious nor agitated  Skin:  No rashes.   No jaundice, right stump; dressing dry and intact, incision approximated, no erythema, no drainage     Labs:   Lab Results   Component Value Date/Time    WBC 2.0 (L) 2021 01:41 AM    Hemoglobin (POC) 5.2 (LL) 2021 08:37 AM    Hemoglobin, POC 11.2 (L) 2014 11:05 PM    HGB 7.0 (L) 2021 01:41 AM    Hematocrit (POC) 26 (L) 10/09/2018 07:16 AM    Hematocrit, POC 33 (L) 2014 11:05 PM    HCT 24.7 (L) 2021 01:41 AM    PLATELET 925  01:41 AM    MCV 90.5 2021 01:41 AM     Lab Results   Component Value Date/Time    Sodium 142 2021 01:41 AM    Potassium 3.6 2021 01:41 AM Chloride 113 (H) 05/09/2021 01:41 AM    CO2 24 05/09/2021 01:41 AM    Anion gap 5 05/09/2021 01:41 AM    Glucose 111 (H) 05/09/2021 01:41 AM    BUN 39 (H) 05/09/2021 01:41 AM    Creatinine 2.04 (H) 05/09/2021 01:41 AM    BUN/Creatinine ratio 19 05/09/2021 01:41 AM    GFR est AA 40 (L) 05/09/2021 01:41 AM    GFR est non-AA 33 (L) 05/09/2021 01:41 AM    Calcium 9.3 05/09/2021 01:41 AM    Bilirubin, total 0.3 05/02/2021 02:00 AM    Alk. phosphatase 234 (H) 05/02/2021 02:00 AM    Protein, total 7.2 05/02/2021 02:00 AM    Albumin 1.0 (L) 05/09/2021 01:41 AM    Globulin 6.0 (H) 05/02/2021 02:00 AM    A-G Ratio 0.2 (L) 05/02/2021 02:00 AM    ALT (SGPT) 13 05/02/2021 02:00 AM       CT of Chest, ABD/PEL (5/2); L4-5 findings most likely represent discitis-osteomyelitis. Soft tissue edema from hypoalbuminemia. Right upper lobe pneumonia. Bilateral lower lobe atelectasis vs pneumonia. Trace bilateral pleural effusions. Assessment and Plan   L4-5 Discitis/OM   HAP  Bacteremia  S/p laminectomy C3-4 (2014)   - WBC 6->2.0, afebrile    TTE (5/5) no vegetation    Blood cx (5/1, 5/3, 5/4, 5/6, 5/7) MRSA    Blood cx (5/8) no growth so far      MRI of lumbar spine (5/3) discitis/OM at L3-4 & L4-5, no evidence of epidural abscess or cord compression    -> ortho recommends medical therapy at this time.    5/9; ordered repeat MRI to assess progression and worsening of low back pain      IV vancomycin changed to daptomycin on 5/7      D/c IV cefepime on 5/9 due to leukopenia, received 6 days.  One dose of Levofloxacin to complete total 7 days of HAP therapy        Continue with IV Daptomycin; total 6 weeks from negative blood cx     Anemia  - hgb 7.0; primary team following    Bladder retention  - following hospital protocol    U/A (-)    Above plan d/w and agreed by Dr. Ana Maria Hayes, NP

## 2021-05-09 NOTE — PROGRESS NOTES
[address] [facility] Adult  Hospitalist Group                                                                                          Hospitalist Progress Note  Mehran Woo MD  Answering service: [phone] or [phone] from in house phone        Date of Service:  2021  NAME:  Blas Serrato. :  1955  MRN:  [id number]      Admission Summary: This is a 64-year-old man with a past medical history significant for chronic kidney disease, type 2 diabetes, dyslipidemia, hypertension, obstructive sleep apnea, status post recent right above-knee amputation, degenerative disk disease, and chronic pain syndrome, was in his usual state of health until the day of presentation at the emergency room when it was reported that the patient became lethargic and confused at his rehab facility    Interval history / Subjective:      Agitated, in a lot of pain from his wound. Assessment & Plan:     MRSA bacteremia  L4-L5 Discitis and OM  Sacral wound  - Patient now on daptomycin. Vanc ALESSANDRO is 4  - ID following  - repeat blood cultures again today, returned positive from .   - TTE without obvious vegetations. Will need 6 weeks IV abx, so no need for AMELIA  - Will need PICC once cultures cleared. - Wound care following     HCAP   - completed 7 days of vanc for PNA. Dapto will not reach the lungs. - DC Cefepime today due to leukopenia, give x 1 dose levofloxacin to complete the course. - On RA and doing well     Acute metabolic encephalopathy - multifactorial due to renal insufficiency, bacteremia, electrolyte abnormalitis  - Improving.  Continue day and night cycles  - Avoid narcotics and sedatives if able  - MRI brain negative, ammonia normal     Anemia - chronic disease CKD   - monitor and transfuse if less than 7   - CBC daily     ESTRELLA on CKD stage III  - Cr slightly worse today, will add IVF, patient has not been eating or drinking per nursing  - I and O avoid nephrotoxins   - BMP daily     S/p R BKA - recent  - Wound care following, no sign of local infection   - Will need PT/OT      Hyponatremia - hypovolemic, resolved s/p IVF  HTN - holding lisinopril   Type 2 diabetes - A1c 7.2%  SSI, POCT glucose checks and hypoglycemia protocol   HLD - home statin     Code status: FULL  DVT prophylaxis: heparin     Care Plan discussed with: Patient/Family  Anticipated Disposition: [facility]/Rehab  Anticipated Discharge: Greater than 48 hours, awaiting negative blood cultures      Hospital Problems  Date Reviewed: 5/1/2021          Codes Class Noted POA    * (Principal) Acute delirium ICD-10-CM: R41.0  ICD-9-CM: 780.09  5/1/2021 Yes                Review of Systems:   A comprehensive review of systems was negative except for that written in the HPI. Vital Signs:    Last 24hrs VS reviewed since prior progress note. Most recent are:  Visit Vitals  BP (!) 159/79 (BP 1 Location: Right arm, BP Patient Position: At rest)   Pulse 96   Temp 97.2 °F (36.2 °C)   Resp 16   Ht 5' 11\" (1.803 m)   Wt 61.1 kg (134 lb 11.2 oz)   SpO2 95%   BMI 18.79 kg/m²         Intake/Output Summary (Last 24 hours) at 5/9/2021 1533  Last data filed at 5/9/2021 2890  Gross per 24 hour   Intake --   Output 1100 ml   Net -1100 ml        Physical Examination:     I had a face to face encounter with this patient and independently examined them on 5/9/2021 as outlined below:          Constitutional:  No acute distress, cooperative, pleasant, chronically ill appearing   ENT:  Oral mucosa moist, oropharynx benign. Resp:  CTA bilaterally. No wheezing/rhonchi/rales. No accessory muscle use   CV:  Regular rhythm, normal rate, no murmurs, gallops, rubs    GI:  Soft, non distended, non tender. normoactive bowel sounds, no hepatosplenomegaly     Musculoskeletal:  No edema, warm, 2+ pulses throughout    Neurologic:  Moves all extremities. AAOx1-2, CN II-XII reviewed, s/p R BKA wound c/d/i      Psych:  Good insight, Not anxious nor agitated.        Data Review: Review and/or order of clinical lab test  Review and/or order of tests in the radiology section of CPT  Review and/or order of tests in the medicine section of CPT      Labs:     Recent Labs     05/09/21  0141 05/08/21  0053   WBC 2.0* 2.7*   HGB 7.0* 7.8*   HCT 24.7* 27.6*    362     Recent Labs     05/09/21  0141 05/08/21  0052 05/07/21  0503    140 140   K 3.6 3.8 4.1   * 111* 109*   CO2 24 24 25   BUN 39* 35* 36*   CREA 2.04* 1.79* 1.65*   * 99 149*   CA 9.3 9.8 9.0   MG 2.0 2.0 2.1   PHOS 3.1  3.0 2.8 2.7     Recent Labs     05/09/21  0141 05/08/21  0052   ALB 1.0* 1.0*     No results for input(s): INR, PTP, APTT, INREXT, INREXT in the last 72 hours. No results for input(s): FE, TIBC, PSAT, FERR in the last 72 hours. Lab Results   Component Value Date/Time    Folate 7.1 05/02/2021 02:00 AM      No results for input(s): PH, PCO2, PO2 in the last 72 hours.   Recent Labs     05/07/21  1640   CPK 17*     Lab Results   Component Value Date/Time    Cholesterol, total 239 (H) 03/30/2020 02:32 PM    HDL Cholesterol 40 03/30/2020 02:32 PM    LDL,Direct 120 (H) 09/11/2012 12:00 AM    LDL, calculated 130 (H) 03/30/2020 02:32 PM    Triglyceride 345 (H) 03/30/2020 02:32 PM     Lab Results   Component Value Date/Time    Glucose (POC) 141 (H) 05/09/2021 11:28 AM    Glucose (POC) 122 (H) 05/09/2021 06:40 AM    Glucose (POC) 115 (H) 05/08/2021 09:12 PM    Glucose (POC) 142 (H) 05/08/2021 04:32 PM    Glucose (POC) 115 (H) 05/08/2021 11:18 AM     Lab Results   Component Value Date/Time    Color YELLOW/STRAW 05/07/2021 11:43 AM    Appearance CLEAR 05/07/2021 11:43 AM    Specific gravity 1.015 05/07/2021 11:43 AM    Specific gravity 1.016 02/19/2016 08:00 PM    pH (UA) 5.5 05/07/2021 11:43 AM    Protein 100 (A) 05/07/2021 11:43 AM    Glucose Negative 05/07/2021 11:43 AM    Ketone Negative 05/07/2021 11:43 AM    Bilirubin Negative 05/07/2021 11:43 AM    Urobilinogen 0.2 05/07/2021 11:43 AM    Nitrites Negative 05/07/2021 11:43 AM    Leukocyte Esterase Negative 05/07/2021 11:43 AM    Epithelial cells FEW 05/07/2021 11:43 AM    Bacteria Negative 05/07/2021 11:43 AM    WBC 0-4 05/07/2021 11:43 AM    RBC 0-5 05/07/2021 11:43 AM         Medications Reviewed:     Current Facility-Administered Medications   Medication Dose Route Frequency    0.9% sodium chloride infusion  75 mL/hr IntraVENous CONTINUOUS    tamsulosin (FLOMAX) capsule 0.4 mg  0.4 mg Oral DAILY    cyclobenzaprine (FLEXERIL) tablet 5 mg  5 mg Oral TID PRN    fentaNYL citrate (PF) injection 25 mcg  25 mcg IntraVENous Q4H PRN    DAPTOmycin (CUBICIN) 350 mg in 0.9% sodium chloride 50 mL IVPB RF formulation  350 mg IntraVENous DAILY    oxyCODONE IR (ROXICODONE) tablet 15 mg  15 mg Oral Q4H PRN    0.9% sodium chloride infusion 250 mL  250 mL IntraVENous PRN    epoetin naman-epbx (RETACRIT) injection 20,000 Units  20,000 Units SubCUTAneous Q MON, WED & FRI    albuterol-ipratropium (DUO-NEB) 2.5 MG-0.5 MG/3 ML  3 mL Nebulization Q4H PRN    aspirin chewable tablet 81 mg  81 mg Oral DAILY    ergocalciferol capsule 50,000 Units  50,000 Units Oral every Friday    ferrous sulfate tablet 325 mg  1 Tab Oral BID WITH MEALS    pantoprazole (PROTONIX) tablet 40 mg  40 mg Oral ACB    sodium chloride (NS) flush 5-40 mL  5-40 mL IntraVENous Q8H    sodium chloride (NS) flush 5-40 mL  5-40 mL IntraVENous PRN    acetaminophen (TYLENOL) tablet 650 mg  650 mg Oral Q6H PRN    Or    acetaminophen (TYLENOL) suppository 650 mg  650 mg Rectal Q6H PRN    polyethylene glycol (MIRALAX) packet 17 g  17 g Oral DAILY PRN    promethazine (PHENERGAN) tablet 12.5 mg  12.5 mg Oral Q6H PRN    Or    ondansetron (ZOFRAN) injection 4 mg  4 mg IntraVENous Q6H PRN    heparin (porcine) injection 5,000 Units  5,000 Units SubCUTAneous Q8H    insulin lispro (HUMALOG) injection   SubCUTAneous AC&HS    glucose chewable tablet 16 g  4 Tab Oral PRN    dextrose (D50W) injection syrg 12.5-25 g  12.5-25 g IntraVENous PRN    glucagon (GLUCAGEN) injection 1 mg  1 mg IntraMUSCular PRN     ______________________________________________________________________  EXPECTED LENGTH OF STAY: 4d 16h  ACTUAL LENGTH OF STAY:          8                 Trung Hernandez MD

## 2021-05-10 ENCOUNTER — APPOINTMENT (OUTPATIENT)
Dept: MRI IMAGING | Age: 66
DRG: 551 | End: 2021-05-10
Attending: NURSE PRACTITIONER
Payer: MEDICARE

## 2021-05-10 ENCOUNTER — APPOINTMENT (OUTPATIENT)
Dept: INFUSION THERAPY | Age: 66
End: 2021-05-10

## 2021-05-10 LAB
ALBUMIN SERPL-MCNC: 1 G/DL (ref 3.5–5)
ANION GAP SERPL CALC-SCNC: 5 MMOL/L (ref 5–15)
BACTERIA SPEC CULT: ABNORMAL
BASOPHILS # BLD: 0 K/UL (ref 0–0.1)
BASOPHILS NFR BLD: 0 % (ref 0–1)
BUN SERPL-MCNC: 40 MG/DL (ref 6–20)
BUN/CREAT SERPL: 18 (ref 12–20)
CALCIUM SERPL-MCNC: 9.3 MG/DL (ref 8.5–10.1)
CHLORIDE SERPL-SCNC: 116 MMOL/L (ref 97–108)
CO2 SERPL-SCNC: 25 MMOL/L (ref 21–32)
COMMENT, HOLDF: NORMAL
CREAT SERPL-MCNC: 2.21 MG/DL (ref 0.7–1.3)
DIFFERENTIAL METHOD BLD: ABNORMAL
EOSINOPHIL # BLD: 0.1 K/UL (ref 0–0.4)
EOSINOPHIL NFR BLD: 2 % (ref 0–7)
ERYTHROCYTE [DISTWIDTH] IN BLOOD BY AUTOMATED COUNT: 16.9 % (ref 11.5–14.5)
GLUCOSE BLD STRIP.AUTO-MCNC: 127 MG/DL (ref 65–100)
GLUCOSE BLD STRIP.AUTO-MCNC: 211 MG/DL (ref 65–100)
GLUCOSE BLD STRIP.AUTO-MCNC: 214 MG/DL (ref 65–100)
GLUCOSE BLD STRIP.AUTO-MCNC: 237 MG/DL (ref 65–100)
GLUCOSE SERPL-MCNC: 120 MG/DL (ref 65–100)
HCT VFR BLD AUTO: 25.1 % (ref 36.6–50.3)
HGB BLD-MCNC: 7.2 G/DL (ref 12.1–17)
IMM GRANULOCYTES # BLD AUTO: 0 K/UL
IMM GRANULOCYTES NFR BLD AUTO: 0 %
LYMPHOCYTES # BLD: 0.4 K/UL (ref 0.8–3.5)
LYMPHOCYTES NFR BLD: 8 % (ref 12–49)
MAGNESIUM SERPL-MCNC: 2 MG/DL (ref 1.6–2.4)
MCH RBC QN AUTO: 26.2 PG (ref 26–34)
MCHC RBC AUTO-ENTMCNC: 28.7 G/DL (ref 30–36.5)
MCV RBC AUTO: 91.3 FL (ref 80–99)
MONOCYTES # BLD: 0.6 K/UL (ref 0–1)
MONOCYTES NFR BLD: 10 % (ref 5–13)
NEUTS BAND NFR BLD MANUAL: 3 % (ref 0–6)
NEUTS SEG # BLD: 4.4 K/UL (ref 1.8–8)
NEUTS SEG NFR BLD: 77 % (ref 32–75)
NRBC # BLD: 0.02 K/UL (ref 0–0.01)
NRBC BLD-RTO: 0.4 PER 100 WBC
PHOSPHATE SERPL-MCNC: 2.5 MG/DL (ref 2.6–4.7)
PLATELET # BLD AUTO: 346 K/UL (ref 150–400)
PMV BLD AUTO: 9.1 FL (ref 8.9–12.9)
POTASSIUM SERPL-SCNC: 3.4 MMOL/L (ref 3.5–5.1)
RBC # BLD AUTO: 2.75 M/UL (ref 4.1–5.7)
RBC MORPH BLD: ABNORMAL
SAMPLES BEING HELD,HOLD: NORMAL
SERVICE CMNT-IMP: ABNORMAL
SODIUM SERPL-SCNC: 146 MMOL/L (ref 136–145)
WBC # BLD AUTO: 5.5 K/UL (ref 4.1–11.1)

## 2021-05-10 PROCEDURE — 74011636637 HC RX REV CODE- 636/637: Performed by: INTERNAL MEDICINE

## 2021-05-10 PROCEDURE — 74011250636 HC RX REV CODE- 250/636: Performed by: INTERNAL MEDICINE

## 2021-05-10 PROCEDURE — 87040 BLOOD CULTURE FOR BACTERIA: CPT

## 2021-05-10 PROCEDURE — 97530 THERAPEUTIC ACTIVITIES: CPT

## 2021-05-10 PROCEDURE — 36415 COLL VENOUS BLD VENIPUNCTURE: CPT

## 2021-05-10 PROCEDURE — 97535 SELF CARE MNGMENT TRAINING: CPT

## 2021-05-10 PROCEDURE — 82962 GLUCOSE BLOOD TEST: CPT

## 2021-05-10 PROCEDURE — 85025 COMPLETE CBC W/AUTO DIFF WBC: CPT

## 2021-05-10 PROCEDURE — 74011000258 HC RX REV CODE- 258: Performed by: INTERNAL MEDICINE

## 2021-05-10 PROCEDURE — 74011000250 HC RX REV CODE- 250: Performed by: INTERNAL MEDICINE

## 2021-05-10 PROCEDURE — 94760 N-INVAS EAR/PLS OXIMETRY 1: CPT

## 2021-05-10 PROCEDURE — 83735 ASSAY OF MAGNESIUM: CPT

## 2021-05-10 PROCEDURE — 74011250637 HC RX REV CODE- 250/637: Performed by: FAMILY MEDICINE

## 2021-05-10 PROCEDURE — 72148 MRI LUMBAR SPINE W/O DYE: CPT

## 2021-05-10 PROCEDURE — 92610 EVALUATE SWALLOWING FUNCTION: CPT | Performed by: SPEECH-LANGUAGE PATHOLOGIST

## 2021-05-10 PROCEDURE — 74011250637 HC RX REV CODE- 250/637: Performed by: INTERNAL MEDICINE

## 2021-05-10 PROCEDURE — 65270000032 HC RM SEMIPRIVATE

## 2021-05-10 PROCEDURE — 80069 RENAL FUNCTION PANEL: CPT

## 2021-05-10 RX ORDER — SODIUM CHLORIDE 450 MG/100ML
50 INJECTION, SOLUTION INTRAVENOUS CONTINUOUS
Status: DISCONTINUED | OUTPATIENT
Start: 2021-05-10 | End: 2021-05-13

## 2021-05-10 RX ORDER — FENTANYL CITRATE 50 UG/ML
50 INJECTION, SOLUTION INTRAMUSCULAR; INTRAVENOUS
Status: DISCONTINUED | OUTPATIENT
Start: 2021-05-10 | End: 2021-05-20 | Stop reason: HOSPADM

## 2021-05-10 RX ORDER — FENTANYL CITRATE 50 UG/ML
25 INJECTION, SOLUTION INTRAMUSCULAR; INTRAVENOUS
Status: DISCONTINUED | OUTPATIENT
Start: 2021-05-10 | End: 2021-05-10

## 2021-05-10 RX ORDER — SODIUM,POTASSIUM PHOSPHATES 280-250MG
2 POWDER IN PACKET (EA) ORAL ONCE
Status: COMPLETED | OUTPATIENT
Start: 2021-05-10 | End: 2021-05-10

## 2021-05-10 RX ADMIN — FENTANYL CITRATE 25 MCG: 50 INJECTION, SOLUTION INTRAMUSCULAR; INTRAVENOUS at 06:04

## 2021-05-10 RX ADMIN — HEPARIN SODIUM 5000 UNITS: 5000 INJECTION INTRAVENOUS; SUBCUTANEOUS at 09:01

## 2021-05-10 RX ADMIN — FENTANYL CITRATE 50 MCG: 50 INJECTION, SOLUTION INTRAMUSCULAR; INTRAVENOUS at 16:06

## 2021-05-10 RX ADMIN — EPOETIN ALFA-EPBX 20000 UNITS: 20000 INJECTION, SOLUTION INTRAVENOUS; SUBCUTANEOUS at 20:51

## 2021-05-10 RX ADMIN — SODIUM CHLORIDE 75 ML/HR: 9 INJECTION, SOLUTION INTRAVENOUS at 06:10

## 2021-05-10 RX ADMIN — PANTOPRAZOLE SODIUM 40 MG: 40 TABLET, DELAYED RELEASE ORAL at 07:34

## 2021-05-10 RX ADMIN — HEPARIN SODIUM 5000 UNITS: 5000 INJECTION INTRAVENOUS; SUBCUTANEOUS at 23:33

## 2021-05-10 RX ADMIN — Medication 10 ML: at 20:52

## 2021-05-10 RX ADMIN — FERROUS SULFATE TAB 325 MG (65 MG ELEMENTAL FE) 325 MG: 325 (65 FE) TAB at 07:34

## 2021-05-10 RX ADMIN — CYCLOBENZAPRINE 5 MG: 10 TABLET, FILM COATED ORAL at 16:07

## 2021-05-10 RX ADMIN — INSULIN LISPRO 3 UNITS: 100 INJECTION, SOLUTION INTRAVENOUS; SUBCUTANEOUS at 12:12

## 2021-05-10 RX ADMIN — INSULIN LISPRO 3 UNITS: 100 INJECTION, SOLUTION INTRAVENOUS; SUBCUTANEOUS at 17:08

## 2021-05-10 RX ADMIN — TAMSULOSIN HYDROCHLORIDE 0.4 MG: 0.4 CAPSULE ORAL at 10:55

## 2021-05-10 RX ADMIN — Medication 10 ML: at 14:43

## 2021-05-10 RX ADMIN — FENTANYL CITRATE 25 MCG: 50 INJECTION, SOLUTION INTRAMUSCULAR; INTRAVENOUS at 14:48

## 2021-05-10 RX ADMIN — DOCUSATE SODIUM 50 MG AND SENNOSIDES 8.6 MG 1 TABLET: 8.6; 5 TABLET, FILM COATED ORAL at 10:55

## 2021-05-10 RX ADMIN — FENTANYL CITRATE 25 MCG: 50 INJECTION, SOLUTION INTRAMUSCULAR; INTRAVENOUS at 10:53

## 2021-05-10 RX ADMIN — INSULIN LISPRO 2 UNITS: 100 INJECTION, SOLUTION INTRAVENOUS; SUBCUTANEOUS at 23:33

## 2021-05-10 RX ADMIN — SODIUM CHLORIDE 100 ML/HR: 4.5 INJECTION, SOLUTION INTRAVENOUS at 20:51

## 2021-05-10 RX ADMIN — SODIUM CHLORIDE 100 ML/HR: 4.5 INJECTION, SOLUTION INTRAVENOUS at 11:41

## 2021-05-10 RX ADMIN — RIFAMPIN 300 MG: 600 INJECTION, POWDER, LYOPHILIZED, FOR SOLUTION INTRAVENOUS at 18:38

## 2021-05-10 RX ADMIN — DAPTOMYCIN 600 MG: 500 INJECTION, POWDER, LYOPHILIZED, FOR SOLUTION INTRAVENOUS at 17:54

## 2021-05-10 RX ADMIN — HEPARIN SODIUM 5000 UNITS: 5000 INJECTION INTRAVENOUS; SUBCUTANEOUS at 16:25

## 2021-05-10 RX ADMIN — ASPIRIN 81 MG: 81 TABLET, CHEWABLE ORAL at 10:55

## 2021-05-10 RX ADMIN — FERROUS SULFATE TAB 325 MG (65 MG ELEMENTAL FE) 325 MG: 325 (65 FE) TAB at 17:09

## 2021-05-10 RX ADMIN — POTASSIUM & SODIUM PHOSPHATES POWDER PACK 280-160-250 MG 2 PACKET: 280-160-250 PACK at 10:55

## 2021-05-10 RX ADMIN — OXYCODONE 15 MG: 5 TABLET ORAL at 12:17

## 2021-05-10 NOTE — PROGRESS NOTES
Problem: Mobility Impaired (Adult and Pediatric)  Goal: *Acute Goals and Plan of Care (Insert Text)  Description: FUNCTIONAL STATUS PRIOR TO ADMISSION: Patient was modified independent using a Rw and R LE prosthesis for functional mobility prior to recent revision of BKA to AKA. Discharged to [facility] and was working on transfers to w/c with one assist    [address] [place] [address]: The patient lived with his spouse but did not require assist prior to AKA. Physical Therapy Goals  Initiated 5/4/2021  1. Patient will move from supine to sit and sit to supine  and scoot up and down in bed with moderate assistance  within 7 day(s). 2.  Patient will transfer from bed to chair and chair to bed with maximal assistance using the least restrictive device within 7 day(s). 3.  Patient will perform sit to stand with maximal assistance within 7 day(s). 4.  Patient will demo fair sitting balance at EOB x2 min in prep for mobility progression within 7 days. 5. Patient will indep complete B LE there ex program to incr strength within 7 days. Outcome: Progressing Towards Goal   PHYSICAL THERAPY TREATMENT  Patient: Vee Black (76 y.o. male)  Date: 5/10/2021  Diagnosis: Acute delirium [R41.0] Acute delirium       Precautions:  fall, LSO  Chart, physical therapy assessment, plan of care and goals were reviewed. ASSESSMENT  Patient continues with skilled PT services and is slowly progressing towards goals. Barriers to function with mobility include impaired cognition with decreased initiation and attention to task, general weakness, impaired sitting/ standing balance, decreased activity tolerance, increased risk for fall. Pt oriented to self only this date. Received in supine with soiled brief. Pt able to roll R/L with bed rail and min A, otherwise required 2 person assist for bed mob and brief standing trial, assist to maintain sitting balance.  Pt fatigued quickly with activity, unable to progress to OOB this date. Will benefit from balance/ mobility progression with acute PT. Recommend follow up SNF rehab at d/c at this time. Current Level of Function Impacting Discharge (mobility/balance): rolling min A, supine to sit max A x 2, sit to supine mod A x 2, sit to stand max/ total A x 2    Other factors to consider for discharge: modified indep baseline, ongoing back pain due to discitis, BKA revision to AKA         PLAN :  Patient continues to benefit from skilled intervention to address the above impairments. Continue treatment per established plan of care. to address goals. Recommendation for discharge: (in order for the patient to meet his/her long term goals)  Therapy up to 5 days/week in SNF setting    This discharge recommendation:  Has not yet been discussed the attending provider and/or case management    IF patient discharges home will need the following DME: hospital bed, mechanical lift, and wheelchair       SUBJECTIVE:   Patient confused    OBJECTIVE DATA SUMMARY:   Critical Behavior:  Neurologic State: Confused, Restless, Alert  Orientation Level: Oriented to person, Disoriented to place, Disoriented to situation, Disoriented to time  Cognition: Follows commands, Impaired decision making  Safety/Judgement: Awareness of environment, Fall prevention, Decreased insight into deficits, Decreased awareness of need for safety  Functional Mobility Training:  Bed Mobility:  Rolling: Minimum assistance(use of bed rail, assist to initiate)  Supine to Sit: Maximum assistance;Assist x2; Additional time(R sidelying to sit, multimodal cues)  Sit to Supine: Moderate assistance;Assist x2(sit to R sidelying)  Scooting: Maximum assistance;Assist x2        Transfers:  Sit to Stand: Maximum assistance; Total assistance;Assist x2(A for lift/ balance/ blocking L knee)  Stand to Sit: Maximum assistance;Assist x2                             Balance:  Sitting: Impaired  Sitting - Static: Fair (occasional); Poor (constant support)(increased use of UEs for support)  Sitting - Dynamic: Poor (constant support)  Standing: Impaired; With support  Standing - Static: Poor;Constant support(2 person assist, blocking of L knee)  Standing - Dynamic : Not tested  Pain Rating:  Pt reports intermittent back pain    Activity Tolerance:   Fair and requires rest breaks    After treatment patient left in no apparent distress:   Patient positioned in right sidelying for pressure relief, Call bell within reach, Bed / chair alarm activated, and Side rails x 3    COMMUNICATION/COLLABORATION:   The patients plan of care was discussed with: Occupational therapist and Registered nurse.      Epi Hudson, PT   Time Calculation: 27 mins

## 2021-05-10 NOTE — PROGRESS NOTES
Problem: Self Care Deficits Care Plan (Adult)  Goal: *Acute Goals and Plan of Care (Insert Text)  Description:   FUNCTIONAL STATUS PRIOR TO ADMISSION: Patient was modified independent with ADLs and functional mobility/ ambulatory with RLE prosthetic prior to R AKA ~2 weeks PTA at OSH. Patient and wife report progressive functional decline since AKA due to increasing pain and decreased LUE and LLE function. HOME SUPPORT: Patient was admitted to [facility] from [facility] rehab. Prior to that he was at [address] [place] [address] for R AKA. At home he lived with his wife but did not require assistance. Occupational Therapy Goals  Initiated 5/4/2021  1. Patient will perform grooming seated EOB with supervision/set-up within 7 day(s). 2.  Patient will perform upper body dressing with minimal assistance within 7 day(s). 3.  Patient will perform bathing with minimal assistance within 7 day(s). 4.  Patient will perform toilet transfers to [facility] with moderate assistance within 7 day(s). 5.  Patient will perform all aspects of toileting with moderate assistance  within 7 day(s). 6.  Patient will participate in upper extremity therapeutic exercise/activities with supervision/set-up for 10 minutes within 7 day(s). 7.  Patient will utilize fall prevention techniques during functional activities with verbal cues within 7 day(s). Outcome: Progressing Towards Goal     OCCUPATIONAL THERAPY TREATMENT  Patient: Jass Craven (76 y.o. male)  Date: 5/10/2021  Diagnosis: Acute delirium [R41.0] Acute delirium       Precautions:    Chart, occupational therapy assessment, plan of care, and goals were reviewed.     ASSESSMENT  Patient continues with skilled OT services and is progressing towards goals however remains limited by significant confusion, decreased balance, strength, cognition (initiation, sequencing, termination), coordination, safety awareness, orientation (A&O x1), ROM, pain management, and activity tolerance noted with toileting, mobility, and upper body dressing tasks completed this session. Improved participation with therapy despite confusion, requiring Min A for rolling with dependent toileting in supine, MAX multimodal cues for coordination. Able to advance to EOB with fair sitting balance, heavily reliant on RUE for lateral support despite cues for improved abdominal activation. Briefly donned Quick Draw brace with Max-Total A however returned to supine d/t back pain after trial of stand with Total A x2. Considering his PLOF, continued confusion, and global debility, feel he is more appropriate for SNF rehab as he would be unable to tolerate 3 hours/day of therapy at this level. With improved cognition & activity tolerance, may be able to bridge to IPR once appropriate. Current Level of Function Impacting Discharge (ADLs): Mod-Total A for ADLs, Max-Total A x2 for limited mobility     Other factors to consider for discharge: fall risk, PMH, high PLOF, R AKA         PLAN :  Patient continues to benefit from skilled intervention to address the above impairments. Continue treatment per established plan of care to address goals. Recommend with staff: Recommend with nursing, ADLs with assist, modified bed in chair position 3x/day and toileting via bedpan. Thank you for completing as able in order to maintain patient strength, endurance and independence. Recommend next OT session: EOB ADLs, cognition    Recommendation for discharge: (in order for the patient to meet his/her long term goals)  Therapy up to 5 days/week in SNF setting with bridge to IPR when appropriate    This discharge recommendation:  A follow-up discussion with the attending provider and/or case management is planned    IF patient discharges home will need the following DME:  To be determined (TBD)         SUBJECTIVE:   Patient stated I don't know where I am.    OBJECTIVE DATA SUMMARY:   Cognitive/Behavioral Status:  Neurologic State: Alert;Confused  Orientation Level: Oriented to person;Disoriented to place; Disoriented to situation;Disoriented to time  Cognition: Decreased attention/concentration; Follows commands; Impaired decision making;Memory loss;Poor safety awareness  Perception: Appears intact  Perseveration: Perseverates during conversation  Safety/Judgement: Decreased awareness of environment;Decreased awareness of need for assistance;Decreased awareness of need for safety;Decreased insight into deficits    Functional Mobility and Transfers for ADLs:  Bed Mobility:  Rolling: Minimum assistance(use of bed rail, assist to initiate)  Supine to Sit: Maximum assistance;Assist x2; Additional time(R sidelying to sit, multimodal cues)  Sit to Supine: Moderate assistance;Assist x2(sit to R sidelying)  Scooting: Maximum assistance;Assist x2    Transfers:  Sit to Stand: Maximum assistance; Total assistance;Assist x2(A for lift/ balance/ blocking L knee)  Functional Transfers  Toilet Transfer : Minimum assistance(rolling in supine with max cues for coordination/seq)  Cues: Physical assistance; Tactile cues provided;Verbal cues provided;Visual cues provided       Balance:  Sitting: Impaired  Sitting - Static: Fair (occasional); Poor (constant support)(increased use of UEs for support)  Sitting - Dynamic: Poor (constant support)  Standing: Impaired; With support  Standing - Static: Poor;Constant support(2 person assist, blocking of L knee)  Standing - Dynamic : Not tested    ADL Intervention:     Upper Body Dressing Assistance  Dressing Assistance: Maximum assistance  Orthotics(Brace): Maximum assistance(Total A to don, Max A to doff w/ max cues)    Lower Body Dressing Assistance  Dressing Assistance: Total assistance(dependent)  Socks: Total assistance (dependent)(L sock)  Leg Crossed Method Used: No  Position Performed: Seated edge of bed  Cues: Physical assistance; Tactile cues provided;Verbal cues provided;Visual cues provided    Toileting  Toileting Assistance: Total assistance(dependent)  Bladder Hygiene: Total assistance (dependent)(hoyos)  Bowel Hygiene: Total assistance (dependent)(incontinent)  Clothing Management: Total assistance (dependent)(rolling in supine, able to A with rolling)  Cues: Tactile cues provided;Verbal cues provided;Visual cues provided  Adaptive Equipment: (bed rails)    Cognitive Retraining  Orientation Retraining: Awareness of environment; Reorienting;Place;Situation  Executive Functions: Executing cognitive plans  Organizing/Sequencing: Breaking task down  Attention to Task: Single task  Following Commands: Follows one step commands/directions  Safety/Judgement: Decreased awareness of environment;Decreased awareness of need for assistance;Decreased awareness of need for safety;Decreased insight into deficits  Cues: Tactile cues provided;Verbal cues provided;Visual cues provided    Therapeutic Exercises:   - Seated EOB with CGA-Mod A, CGA with hands providing wide JAYESH, heavy reliance on RUE placed on bedrail or out laterally, attempted BUEs in lap with CGA-Min A for balance, quickly returning to compensatory wide JAYESH  - Sit<>stand x1 with Total A x2, LLE blocking and BUE hand held assist, poor tolerance with back pain reported therefore returned to supine    Pain:  Back pain reported    Activity Tolerance:   Fair    After treatment patient left in no apparent distress:   Supine in bed, Call bell within reach, Bed / chair alarm activated, and Side rails x 3    COMMUNICATION/COLLABORATION:   The patients plan of care was discussed with: Physical therapist and Registered nurse.      AMANUEL Jackson, OTR/L   Time Calculation: 29 mins

## 2021-05-10 NOTE — PROGRESS NOTES
Problem: Falls - Risk of  Goal: *Absence of Falls  Description: Document Dianne Corrales Fall Risk and appropriate interventions in the flowsheet. Outcome: Progressing Towards Goal  Note: Fall Risk Interventions:       Mentation Interventions: Adequate sleep, hydration, pain control, Bed/chair exit alarm, Door open when patient unattended, Evaluate medications/consider consulting pharmacy, More frequent rounding, Reorient patient, Room close to nurse's station, Toileting rounds, Update white board    Medication Interventions: Bed/chair exit alarm, Evaluate medications/consider consulting pharmacy    Elimination Interventions: Bed/chair exit alarm, Call light in reach, Patient to call for help with toileting needs, Toileting schedule/hourly rounds(hoyos in place)    History of Falls Interventions: Bed/chair exit alarm, Consult care management for discharge planning, Door open when patient unattended, Evaluate medications/consider consulting pharmacy, Investigate reason for fall         Problem: Patient Education: Go to Patient Education Activity  Goal: Patient/Family Education  Outcome: Progressing Towards Goal     Problem: Pressure Injury - Risk of  Goal: *Prevention of pressure injury  Description: Document Celestino Scale and appropriate interventions in the flowsheet. Outcome: Progressing Towards Goal  Note: Pressure Injury Interventions:  Sensory Interventions: Assess changes in LOC, Avoid rigorous massage over bony prominences, Check visual cues for pain, Float heels, Keep linens dry and wrinkle-free, Minimize linen layers, Pressure redistribution bed/mattress (bed type), Turn and reposition approx.  every two hours (pillows and wedges if needed)    Moisture Interventions: Absorbent underpads, Apply protective barrier, creams and emollients, Check for incontinence Q2 hours and as needed, Internal/External urinary devices, Maintain skin hydration (lotion/cream), Minimize layers, Moisture barrier, Offer toileting Q_hr    Activity Interventions: Pressure redistribution bed/mattress(bed type), PT/OT evaluation    Mobility Interventions: Float heels, HOB 30 degrees or less, Pressure redistribution bed/mattress (bed type), PT/OT evaluation, Turn and reposition approx. every two hours(pillow and wedges)    Nutrition Interventions: Document food/fluid/supplement intake, Offer support with meals,snacks and hydration    Friction and Shear Interventions: Apply protective barrier, creams and emollients, Feet elevated on foot rest, Foam dressings/transparent film/skin sealants, HOB 30 degrees or less, Lift sheet, Minimize layers                Problem: Patient Education: Go to Patient Education Activity  Goal: Patient/Family Education  Outcome: Progressing Towards Goal     Problem: Diabetes Self-Management  Goal: *Disease process and treatment process  Description: Define diabetes and identify own type of diabetes; list 3 options for treating diabetes. Outcome: Progressing Towards Goal  Goal: *Incorporating nutritional management into lifestyle  Description: Describe effect of type, amount and timing of food on blood glucose; list 3 methods for planning meals. Outcome: Progressing Towards Goal  Goal: *Incorporating physical activity into lifestyle  Description: State effect of exercise on blood glucose levels. Outcome: Progressing Towards Goal  Goal: *Developing strategies to promote health/change behavior  Description: Define the ABC's of diabetes; identify appropriate screenings, schedule and personal plan for screenings. Outcome: Progressing Towards Goal  Goal: *Using medications safely  Description: State effect of diabetes medications on diabetes; name diabetes medication taking, action and side effects. Outcome: Progressing Towards Goal  Goal: *Monitoring blood glucose, interpreting and using results  Description: Identify recommended blood glucose targets  and personal targets.   Outcome: Progressing Towards Goal  Goal: *Prevention, detection, treatment of acute complications  Description: List symptoms of hyper- and hypoglycemia; describe how to treat low blood sugar and actions for lowering  high blood glucose level. Outcome: Progressing Towards Goal  Goal: *Prevention, detection and treatment of chronic complications  Description: Define the natural course of diabetes and describe the relationship of blood glucose levels to long term complications of diabetes.   Outcome: Progressing Towards Goal  Goal: *Developing strategies to address psychosocial issues  Description: Describe feelings about living with diabetes; identify support needed and support network  Outcome: Progressing Towards Goal  Goal: *Insulin pump training  Outcome: Progressing Towards Goal  Goal: *Sick day guidelines  Outcome: Progressing Towards Goal  Goal: *Patient Specific Goal (EDIT GOAL, INSERT TEXT)  Outcome: Progressing Towards Goal     Problem: Patient Education: Go to Patient Education Activity  Goal: Patient/Family Education  Outcome: Progressing Towards Goal     Problem: Patient Education: Go to Patient Education Activity  Goal: Patient/Family Education  Outcome: Progressing Towards Goal     Problem: Patient Education: Go to Patient Education Activity  Goal: Patient/Family Education  Outcome: Progressing Towards Goal     Problem: Nutrition Deficit  Goal: *Optimize nutritional status  Outcome: Progressing Towards Goal     Problem: Breathing Pattern - Ineffective  Goal: *Absence of hypoxia  Outcome: Progressing Towards Goal  Goal: *Use of effective breathing techniques  Outcome: Progressing Towards Goal  Goal: *PALLIATIVE CARE:  Alleviation of Dyspnea  Outcome: Progressing Towards Goal     Problem: Patient Education: Go to Patient Education Activity  Goal: Patient/Family Education  Outcome: Progressing Towards Goal     Problem: Risk for Spread of Infection  Goal: Prevent transmission of infectious organism to others  Description: Prevent the transmission of infectious organisms to other patients, staff members, and visitors.   Outcome: Progressing Towards Goal     Problem: Patient Education:  Go to Education Activity  Goal: Patient/Family Education  Outcome: Progressing Towards Goal     Problem: Patient Education: Go to Patient Education Activity  Goal: Patient/Family Education  Outcome: Progressing Towards Goal     Problem: Infection - Risk of, Urinary Catheter-Associated Urinary Tract Infection  Goal: *Absence of infection signs and symptoms  Outcome: Progressing Towards Goal     Problem: Patient Education: Go to Patient Education Activity  Goal: Patient/Family Education  Outcome: Progressing Towards Goal

## 2021-05-10 NOTE — PROGRESS NOTES
Speech Therapy    Orders received per protocol. SLP cannot assess patient with a protocol order. If MD feels patient would benefit from SLP services, please order SLP evaluation. Thank you. Cely Darling M.S., CCC-SLP

## 2021-05-10 NOTE — PROGRESS NOTES
ID Progress Note  5/10/2021    Subjective:     Still with lots of pain, it appears. Not all that interactive. Objective:     Antibiotics:  1. Daptomycin   2. Rifampin       Vitals:   Visit Vitals  BP (!) 159/78 (BP 1 Location: Left upper arm, BP Patient Position: At rest) Comment: pt restless and in pain   Pulse (!) 104 Comment: pt restless and in pain   Temp 98.3 °F (36.8 °C)   Resp 19   Ht 5' 11\" (1.803 m)   Wt 61.1 kg (134 lb 11.2 oz)   SpO2 98%   BMI 18.79 kg/m²        Tmax:  Temp (24hrs), Av.4 °F (36.9 °C), Min:98 °F (36.7 °C), Max:98.8 °F (37.1 °C)      Exam:  General:  distracted, in pain    Labs:      Recent Labs     05/10/21  0219 05/10/21  0217 21  0141 21  0053 21  0052   WBC 5.5  --  2.0* 2.7*  --    HGB 7.2*  --  7.0* 7.8*  --      --  340 362  --    BUN  --  40* 39*  --  35*   CREA  --  2.21* 2.04*  --  1.79*       Cultures:     Lab Results   Component Value Date/Time    Specimen Description: JOSE 2012 10:12 PM    Specimen Description: TOE 2012 05:03 PM    Specimen Description: TOE 2012 05:03 PM     Lab Results   Component Value Date/Time    Culture result: NO GROWTH AFTER 18 HOURS 2021 11:01 AM    Culture result: (A) 2021 12:18 PM     GRAM POSITIVE COCCI IN CLUSTERS GROWING IN 1 OF 4 BOTTLES DRAWN (SITE  POSTERIOR FOREARM)    Culture result: REMAINING BOTTLE(S) HAS/HAVE NO GROWTH SO FAR 2021 12:18 PM       Radiology:     Line/Insert Date:           Assessment:     1. Diskitis/osteo--worse pain--repeat imaging without epidural abscess, etc  2. Pain issues  3. Bacteremia--slow to clear--add rifampin    Objective:     1. Continue daptomycin  2. Add rifampin  3. Cultures until negative  4.  Discussed     Barrington Ordoñez MD

## 2021-05-10 NOTE — WOUND CARE
Wound Care Note:     Follow-up visit for sacral wound    Chart shows:  Admitted for acute delirium   Past Medical History:   Diagnosis Date    Arthritis     BPH (benign prostatic hyperplasia)     Chronic kidney disease     Chronic pain     BACK NEUROPATHY FEET HANDS    DM neuropathy, type II diabetes mellitus [facility] [address]     DM type 2 causing CKD stage 3 ([facility] [address] 12/17/2012    DM type 2 causing vascular disease ([facility] [address]     Dyslipidemia     Foot ulcer due to secondary DM ([address] 12/1/2012    GERD (gastroesophageal reflux disease)     History of esophageal dilatation     HTN     Ill-defined condition 2013    MRSA in wound right leg (BKA)    S/P BKA (below knee amputation) ([facility] [address]     right BKA due to non-healing ulcer    Sleep apnea     Doesnt use CPAP, patient hasnt been retested since weight loss    Thromboembolus ([facility] [address] 1970'S    BLOOD CLOT LEFT LEG     WBC = 5.5 on 5/10/21  Admitted from [address]:   Patient is alert and talking, incontinent with moderate assistance needed in repositioning. Bed: Pri  Patient wearing briefs for incontinence. Diet: Dental soft with nutritional supplements  Patient reports no pain. Left heel and bilateral buttocks skin intact and without erythema. 1. POA unstageable sacral pressure injury is larger, measures 3.5 cm x 5 cm, wound bed is pink along the wound edges and center portion is slough, wound edges are open, jai-wound with pink epithealized tissue proximally and distally. Hydrocolloid applied. 2.  POA right AKA was not assessed today. Patient working with PT/OT. Recommendations:    Sacrum- Please maintain Exuderm Hydrocolloid up to one week or change as needed with soiling or rolled edges.   Please use adhesive remover wipes when changing.     Right AKA- Every other day cleanse with normal saline, apply 4 x 4's and secure with roll gauze and tape.     Specialty bed: [facility] ordered via [employer]. Use only flat sheet and one incontinence pad. Please call Equipment Distribution at  if not delivered and when patient discharged. Skin Care & Pressure Prevention:  Minimize layers of linen/pads under patient to optimize support surface. Turn/reposition approximately every 2 hours and offload heels.   Manage incontinence / promote continence   Nourishing Skin Cream to dry skin, minimize use of briefs when able    Discussed above plan with patient & Yohana Shah RN    Transition of Care: Plan to follow as needed while admitted to hospital.    YUSUF McintyreN, RN, [facility]  office [phone]  pager [phone] or call  to page

## 2021-05-10 NOTE — PROGRESS NOTES
Problem: Dysphagia (Adult)  Goal: *Acute Goals and Plan of Care (Insert Text)  Description: Initiated 5/10/2021  1. Patient will tolerate soft diet, thin liquids free of s/s of aspiration within 7 days  Outcome: Not Met   [address] EVALUATION  Patient: Patience Mcguire (76 y.o. male)  Date: 5/10/2021  Primary Diagnosis: Acute delirium [R41.0]        Precautions: fall       ASSESSMENT :  Based on the objective data described below, the patient presents with suspected esophageal dysphagia along with increased prandial aspiration risk due to confusion, dependence for feeding, and inability to tolerate upright position. Patient appeared to be having discomfort throughout visit related to constipation. RN present and confirmed this is a problem for him. With large sips of thin liquids, he demonstrated burping, followed by throat clear and patient report of sensation that liquid remained in his chest. This did not occur with any other consistency. Of note, he has a history of GERD and esophageal dilatation. There was delayed oral clearance with soft solids, likely related to xerostomia and mentation. Intake was limited for assessment, and has been limited throughout the day per RN. Patient will benefit from skilled intervention to address the above impairments. Patients rehabilitation potential is considered to be Fair     PLAN :  Recommendations and Planned Interventions:  Continue dental soft, thin liquids  If symptoms don't improve as patient is able to sit upright and with improved constipation, consider assessment for esophageal dysphagia  Frequency/Duration: Patient will be followed by speech-language pathology 2 times a week to address goals. Discharge Recommendations: To Be Determined     SUBJECTIVE:   Patient stated I need to have a bowel movement.     OBJECTIVE:     Past Medical History:   Diagnosis Date    Arthritis     BPH (benign prostatic hyperplasia)     Chronic kidney disease     Chronic pain     BACK NEUROPATHY FEET HANDS    DM neuropathy, type II diabetes mellitus ([facility] [address]     DM type 2 causing CKD stage 3 ([address] 12/17/2012    DM type 2 causing vascular disease ([address]     Dyslipidemia     Foot ulcer due to secondary DM ([address] 12/1/2012    GERD (gastroesophageal reflux disease)     History of esophageal dilatation     HTN     Ill-defined condition 2013    MRSA in wound right leg (BKA)    S/P BKA (below knee amputation) ([address]     right BKA due to non-healing ulcer    Sleep apnea     Doesnt use CPAP, patient hasnt been retested since weight loss    Thromboembolus ([facility] [address] 1970'S    BLOOD CLOT LEFT LEG     Past Surgical History:   Procedure Laterality Date    COLONOSCOPY N/A 3/30/2021    COLONOSCOPY performed by Munir Ford MD at [address] [address]    ECHO 2D ADULT  8/09    normal; LVEF 60%    ECHO STRESS  4/09    7 min; normal    ENDOSCOPY, COLON, DIAGNOSTIC      FLEXIBLE SIGMOIDOSCOPY N/A 2/24/2021    SIGMOIDOSCOPY FLEXIBLE performed by Munir Ford MD at [facility]  2012    left great toe    HX AMPUTATION  2007    BKA right    HX BELOW KNEE AMPUTATION Right     HX CERVICAL FUSION  2009    x2    HX ENDOSCOPY      EGD with Dilitation x 2    HX ORTHOPAEDIC  2006    ACDF C4-C7    IR INSERT TUNL CVC W/O PORT OVER 5 YR  6/19/2020    IR REMOVE TUNL CVAD W/O PORT / PUMP  9/14/2020    SD ABDOMEN SURGERY PROC UNLISTED      pilonidal cyst    STRESS TEST MYOVIEW  8/09    walked 8:46; normal; LVEF 51%     Prior Level of Function/Home Situation:    Home Situation  Home Environment: Apartment  # Steps to Enter: 1  One/Two Story Residence: One story  Living Alone: No  Support Systems: Spouse/Significant Other/Partner  Patient Expects to be Discharged to[de-identified] Rehabilitation facility  Current DME Used/Available at Home: [employer], [place], Wheelchair, [address] chair, Other (comment)  Diet prior to admission: patient provides limited history.  Suspect soft with thins  Current Diet: soft with thins   Cognitive and Communication Status:  Neurologic State: Confused  Orientation Level: Oriented to person  Cognition: Decreased command following, Decreased attention/concentration  Perception: Verbal, Visual, Tactile  Perseveration: Perseverates during conversation  Safety/Judgement: Awareness of environment, Fall prevention, Decreased insight into deficits, Decreased awareness of need for safety  Oral Assessment:  Oral Assessment  Labial: No impairment  Dentition: Limited;Natural  Oral Hygiene: dry  Lingual: (decreased command following to assess)  Mandible: No impairment  P.O. Trials:  Patient Position: semi upright sidelying R down. unable to tolerate upright  Vocal quality prior to P.O.: Low volume  Consistency Presented: Ice chips;Puree; Thin liquid;Mechanical soft  How Presented: SLP-fed/presented;Spoon;Straw     Bolus Acceptance: No impairment  Bolus Formation/Control: Impaired  Type of Impairment: Other (comment)(prolonged)  Propulsion: Delayed (# of seconds)  Oral Residue: Less than 10% of bolus; Right        Aspiration Signs/Symptoms: Clear throat(after burping)                        NOMS:   The NOMS functional outcome measure was used to quantify this patient's level of swallowing impairment. Based on the NOMS, the patient was determined to be at level 4 for swallow function       NOMS Swallowing Levels:  Level 1 (CN): NPO  Level 2 (CM): NPO but takes consistency in therapy  Level 3 (CL): Takes less than 50% of nutrition p.o. and continues with nonoral feedings; and/or safe with mod cues; and/or max diet restriction  Level 4 (CK): Safe swallow but needs mod cues; and/or mod diet restriction; and/or still requires some nonoral feeding/supplements  Level 5 (CJ): Safe swallow with min diet restriction; and/or needs min cues  Level 6 (CI): Independent with p.o.; rare cues; usually self cues; may need to avoid some foods or needs extra time  Level 7 [facility]): Independent for all p.o.  QAMAR. (2003). National Outcomes Measurement System (NOMS): Adult Speech-Language Pathology User's Guide. Pain:  Pain Scale 1: Numeric (0 - 10)  Pain Intensity 1: 10  Pain Location 1: Back    After treatment:   Patient left in no apparent distress in bed, Call bell within reach, and Nursing notified    COMMUNICATION/EDUCATION:   Patient was educated regarding purpose of SLP visit. He was confused and did not fully understand. The patient's plan of care including recommendations, planned interventions, and recommended diet changes were discussed with: Registered nurse. Patient is unable to participate in goal setting and plan of care.     Thank you for this referral.  NADER Soto  Time Calculation: 20 mins

## 2021-05-10 NOTE — PROGRESS NOTES
[address] [facility] Adult  Hospitalist Group                                                                                          Hospitalist Progress Note  Jonnathan Gonzalez MD  Answering service: [phone] OR [phone] from in house phone        Date of Service:  5/10/2021  NAME:  Roman Candelaria. :  1955  MRN:  [id number]      Admission Summary: This is a 43-year-old man with a past medical history significant for chronic kidney disease, type 2 diabetes, dyslipidemia, hypertension, obstructive sleep apnea, status post recent right above-knee amputation, degenerative disk disease, and chronic pain syndrome, was in his usual state of health until the day of presentation at the emergency room when it was reported that the patient became lethargic and confused at his rehab facility    Interval history / Subjective:      Cultures from  now positive as well. Patient repeating \"chocolate will help me poop\". Cannot answer orientation questions. Comfortable on my exam. Per report from nursing and wife, his sister in law says he has been in uncontrollable pain. Patient is certainly more confused today. MRI lumbar spine repeated to ensure no abscess. Assessment & Plan:     MRSA bacteremia  L4-L5 Discitis and OM  Sacral wound  - Patient now on daptomycin. Vanc ALESSANDRO is 4 ? If we should add additional agent? Linezolid, gentamycin, rifampin, bactrim? Per IDSA guidelines can also increase dapto dosing  - ID following  - Increase daptomycin dosing, per guidelines should be 8-10mg/kg   - repeat blood cultures again today, returned positive from .   - TTE without obvious vegetations. Will need 6 weeks IV abx, so no need for AMELIA  - Will need PICC once cultures cleared.    - Wound care following   - Repeat MRI ordered per ID    HCAP - completed therapy (Vanc and cefepime/levo)    Acute metabolic encephalopathy - multifactorial due to renal insufficiency, bacteremia, electrolyte abnormalitis  - Improving. Continue day and night cycles  - Avoid narcotics and sedatives if able  - MRI brain negative, ammonia normal     Anemia - chronic disease CKD   - monitor and transfuse if less than 7   - CBC daily     ESTRELLA on CKD stage III - pre renal and sepsis   - Cr slightly worse today, switch IVF to 0.45NS   - I and O avoid nephrotoxins   - BMP daily     S/p R BKA - recent  - Wound care following, no sign of local infection   - Will need PT/OT      Hyponatremia - hypovolemic, resolved s/p IVF  HTN - holding lisinopril   Type 2 diabetes - A1c 7.2%  SSI, POCT glucose checks and hypoglycemia protocol   HLD - home statin     Code status: FULL  DVT prophylaxis: heparin     Care Plan discussed with: Patient/Family  Anticipated Disposition: [facility]/Rehab  Anticipated Discharge: Greater than 48 hours, awaiting negative blood cultures      Hospital Problems  Date Reviewed: 5/1/2021          Codes Class Noted POA    * (Principal) Acute delirium ICD-10-CM: R41.0  ICD-9-CM: 780.09  5/1/2021 Yes                Review of Systems:   A comprehensive review of systems was negative except for that written in the HPI. Vital Signs:    Last 24hrs VS reviewed since prior progress note. Most recent are:  Visit Vitals  BP (!) 152/75 (BP 1 Location: Left upper arm, BP Patient Position: At rest)   Pulse 98   Temp 98.4 °F (36.9 °C)   Resp 17   Ht 5' 11\" (1.803 m)   Wt 61.1 kg (134 lb 11.2 oz)   SpO2 95%   BMI 18.79 kg/m²         Intake/Output Summary (Last 24 hours) at 5/10/2021 1318  Last data filed at 5/10/2021 1048  Gross per 24 hour   Intake 240 ml   Output 1000 ml   Net -760 ml        Physical Examination:     I had a face to face encounter with this patient and independently examined them on 5/10/2021 as outlined below:          Constitutional:  No acute distress, cooperative, pleasant, chronically ill appearing   ENT:  Oral mucosa moist, oropharynx benign. Resp:  CTA bilaterally. No wheezing/rhonchi/rales.  No accessory muscle use CV:  Regular rhythm, normal rate, no murmurs, gallops, rubs    GI:  Soft, non distended, non tender. normoactive bowel sounds, no hepatosplenomegaly     Musculoskeletal:  No edema, warm, 2+ pulses throughout    Neurologic:  Moves all extremities. AAOx1-2, CN II-XII reviewed, s/p R BKA wound c/d/i      Psych:  Good insight, Not anxious nor agitated. Data Review:    Review and/or order of clinical lab test  Review and/or order of tests in the radiology section of CPT  Review and/or order of tests in the medicine section of CPT      Labs:     Recent Labs     05/10/21  0219 05/09/21  0141   WBC 5.5 2.0*   HGB 7.2* 7.0*   HCT 25.1* 24.7*    340     Recent Labs     05/10/21  0217 05/09/21  0141 05/08/21  0052   * 142 140   K 3.4* 3.6 3.8   * 113* 111*   CO2 25 24 24   BUN 40* 39* 35*   CREA 2.21* 2.04* 1.79*   * 111* 99   CA 9.3 9.3 9.8   MG 2.0 2.0 2.0   PHOS 2.5* 3.1  3.0 2.8     Recent Labs     05/10/21  0217 05/09/21  0141 05/08/21  0052   ALB 1.0* 1.0* 1.0*     No results for input(s): INR, PTP, APTT, INREXT, INREXT in the last 72 hours. No results for input(s): FE, TIBC, PSAT, FERR in the last 72 hours. Lab Results   Component Value Date/Time    Folate 7.1 05/02/2021 02:00 AM      No results for input(s): PH, PCO2, PO2 in the last 72 hours.   Recent Labs     05/07/21  1640   CPK 17*     Lab Results   Component Value Date/Time    Cholesterol, total 239 (H) 03/30/2020 02:32 PM    HDL Cholesterol 40 03/30/2020 02:32 PM    LDL,Direct 120 (H) 09/11/2012 12:00 AM    LDL, calculated 130 (H) 03/30/2020 02:32 PM    Triglyceride 345 (H) 03/30/2020 02:32 PM     Lab Results   Component Value Date/Time    Glucose (POC) 211 (H) 05/10/2021 11:38 AM    Glucose (POC) 127 (H) 05/10/2021 07:20 AM    Glucose (POC) 114 (H) 05/09/2021 09:32 PM    Glucose (POC) 156 (H) 05/09/2021 04:08 PM    Glucose (POC) 141 (H) 05/09/2021 11:28 AM     Lab Results   Component Value Date/Time    Color YELLOW/STRAW 05/09/2021 02:51 PM    Appearance CLEAR 05/09/2021 02:51 PM    Specific gravity 1.017 05/09/2021 02:51 PM    Specific gravity 1.016 02/19/2016 08:00 PM    pH (UA) 6.0 05/09/2021 02:51 PM    Protein 100 (A) 05/09/2021 02:51 PM    Glucose Negative 05/09/2021 02:51 PM    Ketone TRACE (A) 05/09/2021 02:51 PM    Bilirubin Negative 05/09/2021 02:51 PM    Urobilinogen 0.2 05/09/2021 02:51 PM    Nitrites Negative 05/09/2021 02:51 PM    Leukocyte Esterase Negative 05/09/2021 02:51 PM    Epithelial cells FEW 05/09/2021 02:51 PM    Bacteria 1+ (A) 05/09/2021 02:51 PM    WBC 0-4 05/09/2021 02:51 PM    RBC 0-5 05/09/2021 02:51 PM         Medications Reviewed:     Current Facility-Administered Medications   Medication Dose Route Frequency    0.45% sodium chloride infusion  100 mL/hr IntraVENous CONTINUOUS    fentaNYL citrate (PF) injection 25 mcg  25 mcg IntraVENous Q3H PRN    senna-docusate (PERICOLACE) 8.6-50 mg per tablet 1 Tab  1 Tab Oral DAILY    tamsulosin (FLOMAX) capsule 0.4 mg  0.4 mg Oral DAILY    cyclobenzaprine (FLEXERIL) tablet 5 mg  5 mg Oral TID PRN    DAPTOmycin (CUBICIN) 350 mg in 0.9% sodium chloride 50 mL IVPB RF formulation  350 mg IntraVENous DAILY    oxyCODONE IR (ROXICODONE) tablet 15 mg  15 mg Oral Q4H PRN    0.9% sodium chloride infusion 250 mL  250 mL IntraVENous PRN    epoetin naman-epbx (RETACRIT) injection 20,000 Units  20,000 Units SubCUTAneous Q MON, WED & FRI    albuterol-ipratropium (DUO-NEB) 2.5 MG-0.5 MG/3 ML  3 mL Nebulization Q4H PRN    aspirin chewable tablet 81 mg  81 mg Oral DAILY    ergocalciferol capsule 50,000 Units  50,000 Units Oral every Friday    ferrous sulfate tablet 325 mg  1 Tab Oral BID WITH MEALS    pantoprazole (PROTONIX) tablet 40 mg  40 mg Oral ACB    sodium chloride (NS) flush 5-40 mL  5-40 mL IntraVENous Q8H    sodium chloride (NS) flush 5-40 mL  5-40 mL IntraVENous PRN    acetaminophen (TYLENOL) tablet 650 mg  650 mg Oral Q6H PRN    Or    acetaminophen (TYLENOL) suppository 650 mg  650 mg Rectal Q6H PRN    polyethylene glycol (MIRALAX) packet 17 g  17 g Oral DAILY PRN    promethazine (PHENERGAN) tablet 12.5 mg  12.5 mg Oral Q6H PRN    Or    ondansetron (ZOFRAN) injection 4 mg  4 mg IntraVENous Q6H PRN    heparin (porcine) injection 5,000 Units  5,000 Units SubCUTAneous Q8H    insulin lispro (HUMALOG) injection   SubCUTAneous AC&HS    glucose chewable tablet 16 g  4 Tab Oral PRN    dextrose (D50W) injection syrg 12.5-25 g  12.5-25 g IntraVENous PRN    glucagon (GLUCAGEN) injection 1 mg  1 mg IntraMUSCular PRN     ______________________________________________________________________  EXPECTED LENGTH OF STAY: 4d 16h  ACTUAL LENGTH OF STAY:          9                 Terrance Nevarez MD

## 2021-05-10 NOTE — PROGRESS NOTES
Nephrology Progress Note  Patience Lora. Date of Admission : 5/1/2021    CC: Follow up for CKD       Assessment and Plan     ESTRELLA on CKD   - suspect abx toxicity, anemia , infection probably contributing to rise in serum Cr   - continue IVF - 1/2 NS as ordered   - Ok to continue Dapto. CPK normal     CKD 3b:  - Dr. Leonor Izquierdo pt    Anemia of CKD:  - iron studies ok  - cont MICHAEL 3 times per week    MRSA bacteremia:  - abx per ID  - TTE neg for vegetations    HTN:  - BP stable    DM2:  - on insulin    R AKA    L3-4, L4-5 discitis/osteomyelitis:  - on abx    Sacral wound       Interval History:  Seen and examined. He just came from MRI. Appears confused and unable to give any hx. BP stable. No cp or sob reported. Current Medications: all current  Medications have been eviewed in EPIC  Review of Systems: Review of systems not obtained due to patient factors. Objective:  Vitals:    Vitals:    05/09/21 1510 05/09/21 2135 05/10/21 0243 05/10/21 0853   BP: (!) 159/79 (!) 142/67 (!) 142/69 (!) 152/75   Pulse: 96 95 94 98   Resp: 16 16 16 17   Temp: 97.2 °F (36.2 °C) 98 °F (36.7 °C) 98.8 °F (37.1 °C) 98.4 °F (36.9 °C)   SpO2: 95% 96% 98% 95%   Weight:       Height:         Intake and Output:  No intake/output data recorded. 05/08 1901 - 05/10 0700  In: -   Out: 1150 [Urine:1150]    Physical Examination:  General: Frail, confused   Neck:  Supple, no mass  Resp:  Lungs CTA B/L, no wheezing , normal respiratory effort  CV:  RRR,  no murmur or rub,R AKA, no edema L  GI:  Soft, NT, + Bowel sounds, no hepatosplenomegaly  Neurologic:  confused  Skin:  No Rash    []    High complexity decision making was performed  []    Patient is at high-risk of decompensation with multiple organ involvement    Lab Data Personally Reviewed: I have reviewed all the pertinent labs, microbiology data and radiology studies during assessment.     Recent Labs     05/10/21  0217 05/09/21  0141 05/08/21  0052   * 142 140   K 3.4* 3.6 3.8   CL 116* 113* 111*   CO2 25 24 24   * 111* 99   BUN 40* 39* 35*   CREA 2.21* 2.04* 1.79*   CA 9.3 9.3 9.8   MG 2.0 2.0 2.0   PHOS 2.5* 3.1  3.0 2.8   ALB 1.0* 1.0* 1.0*     Recent Labs     05/10/21  0219 05/09/21  0141 05/08/21  0053   WBC 5.5 2.0* 2.7*   HGB 7.2* 7.0* 7.8*   HCT 25.1* 24.7* 27.6*    340 362     Lab Results   Component Value Date/Time    Specimen Description: JOSE 12/04/2012 10:12 PM    Specimen Description: TOE 12/02/2012 05:03 PM    Specimen Description: TOE 12/02/2012 05:03 PM     Lab Results   Component Value Date/Time    Culture result: NO GROWTH AFTER 18 HOURS 05/09/2021 11:01 AM    Culture result: (A) 05/08/2021 12:18 PM     GRAM POSITIVE COCCI IN CLUSTERS GROWING IN 1 OF 4 BOTTLES DRAWN (SITE  POSTERIOR FOREARM)    Culture result: REMAINING BOTTLE(S) HAS/HAVE NO GROWTH SO FAR 05/08/2021 12:18 PM     Recent Results (from the past 24 hour(s))   CULTURE, BLOOD, PAIRED    Collection Time: 05/09/21 11:01 AM    Specimen: Blood   Result Value Ref Range    Special Requests: NO SPECIAL REQUESTS      Culture result: NO GROWTH AFTER 18 HOURS     GLUCOSE, POC    Collection Time: 05/09/21 11:28 AM   Result Value Ref Range    Glucose (POC) 141 (H) 65 - 100 mg/dL    Performed by Stefan Adler    EOSINOPHILS, URINE    Collection Time: 05/09/21  2:51 PM   Result Value Ref Range    Eosinophils,urine Negative     SODIUM, UR, RANDOM    Collection Time: 05/09/21  2:51 PM   Result Value Ref Range    Sodium,urine random 53 MMOL/L   CREATININE, UR, RANDOM    Collection Time: 05/09/21  2:51 PM   Result Value Ref Range    Creatinine, urine 35.20 mg/dL   URINALYSIS W/ RFLX MICROSCOPIC    Collection Time: 05/09/21  2:51 PM   Result Value Ref Range    Color YELLOW/STRAW      Appearance CLEAR CLEAR      Specific gravity 1.017 1.003 - 1.030      pH (UA) 6.0 5.0 - 8.0      Protein 100 (A) NEG mg/dL    Glucose Negative NEG mg/dL    Ketone TRACE (A) NEG mg/dL    Bilirubin Negative NEG      Blood SMALL (A) NEG Urobilinogen 0.2 0.2 - 1.0 EU/dL    Nitrites Negative NEG      Leukocyte Esterase Negative NEG      WBC 0-4 0 - 4 /hpf    RBC 0-5 0 - 5 /hpf    Epithelial cells FEW FEW /lpf    Bacteria 1+ (A) NEG /hpf    Hyaline cast 0-2 0 - 5 /lpf   GLUCOSE, POC    Collection Time: 05/09/21  4:08 PM   Result Value Ref Range    Glucose (POC) 156 (H) 65 - 100 mg/dL    Performed by [address] [place] [address], POC    Collection Time: 05/09/21  9:32 PM   Result Value Ref Range    Glucose (POC) 114 (H) 65 - 100 mg/dL    Performed by Jose Quevedo    RENAL FUNCTION PANEL    Collection Time: 05/10/21  2:17 AM   Result Value Ref Range    Sodium 146 (H) 136 - 145 mmol/L    Potassium 3.4 (L) 3.5 - 5.1 mmol/L    Chloride 116 (H) 97 - 108 mmol/L    CO2 25 21 - 32 mmol/L    Anion gap 5 5 - 15 mmol/L    Glucose 120 (H) 65 - 100 mg/dL    BUN 40 (H) 6 - 20 MG/DL    Creatinine 2.21 (H) 0.70 - 1.30 MG/DL    BUN/Creatinine ratio 18 12 - 20      GFR est AA 36 (L) >60 ml/min/1.73m2    GFR est non-AA 30 (L) >60 ml/min/1.73m2    Calcium 9.3 8.5 - 10.1 MG/DL    Phosphorus 2.5 (L) 2.6 - 4.7 MG/DL    Albumin 1.0 (L) 3.5 - 5.0 g/dL   SAMPLES BEING HELD    Collection Time: 05/10/21  2:17 AM   Result Value Ref Range    SAMPLES BEING HELD 1PST     COMMENT        Add-on orders for these samples will be processed based on acceptable specimen integrity and analyte stability, which may vary by analyte.    MAGNESIUM    Collection Time: 05/10/21  2:17 AM   Result Value Ref Range    Magnesium 2.0 1.6 - 2.4 mg/dL   CBC WITH AUTOMATED DIFF    Collection Time: 05/10/21  2:19 AM   Result Value Ref Range    WBC 5.5 4.1 - 11.1 K/uL    RBC 2.75 (L) 4.10 - 5.70 M/uL    HGB 7.2 (L) 12.1 - 17.0 g/dL    HCT 25.1 (L) 36.6 - 50.3 %    MCV 91.3 80.0 - 99.0 FL    MCH 26.2 26.0 - 34.0 PG    MCHC 28.7 (L) 30.0 - 36.5 g/dL    RDW 16.9 (H) 11.5 - 14.5 %    PLATELET 215 701 - 342 K/uL    MPV 9.1 8.9 - 12.9 FL    NRBC 0.4 (H) 0  WBC    ABSOLUTE NRBC 0.02 (H) 0.00 - 0.01 K/uL    NEUTROPHILS 77 (H) 32 - 75 %    BAND NEUTROPHILS 3 0 - 6 %    LYMPHOCYTES 8 (L) 12 - 49 %    MONOCYTES 10 5 - 13 %    EOSINOPHILS 2 0 - 7 %    BASOPHILS 0 0 - 1 %    IMMATURE GRANULOCYTES 0 %    ABS. NEUTROPHILS 4.4 1.8 - 8.0 K/UL    ABS. LYMPHOCYTES 0.4 (L) 0.8 - 3.5 K/UL    ABS. MONOCYTES 0.6 0.0 - 1.0 K/UL    ABS. EOSINOPHILS 0.1 0.0 - 0.4 K/UL    ABS. BASOPHILS 0.0 0.0 - 0.1 K/UL    ABS. IMM. GRANS. 0.0 K/UL    DF MANUAL      RBC COMMENTS ANISOCYTOSIS  1+       GLUCOSE, POC    Collection Time: 05/10/21  7:20 AM   Result Value Ref Range    Glucose (POC) 127 (H) 65 - 100 mg/dL    Performed by Jeannine Ibrahim MD  [address]  Phone - [phone]   Fax - [phone]  [email or web address]

## 2021-05-11 LAB
ALBUMIN SERPL-MCNC: 1 G/DL (ref 3.5–5)
ANION GAP SERPL CALC-SCNC: 9 MMOL/L (ref 5–15)
BACTERIA SPEC CULT: ABNORMAL
BACTERIA SPEC CULT: ABNORMAL
BASOPHILS # BLD: 0 K/UL (ref 0–0.1)
BASOPHILS NFR BLD: 0 % (ref 0–1)
BUN SERPL-MCNC: 40 MG/DL (ref 6–20)
BUN/CREAT SERPL: 18 (ref 12–20)
CALCIUM SERPL-MCNC: 8.8 MG/DL (ref 8.5–10.1)
CHLORIDE SERPL-SCNC: 114 MMOL/L (ref 97–108)
CO2 SERPL-SCNC: 24 MMOL/L (ref 21–32)
COMMENT, HOLDF: NORMAL
CREAT SERPL-MCNC: 2.26 MG/DL (ref 0.7–1.3)
DIFFERENTIAL METHOD BLD: ABNORMAL
EOSINOPHIL # BLD: 0 K/UL (ref 0–0.4)
EOSINOPHIL NFR BLD: 3 % (ref 0–7)
ERYTHROCYTE [DISTWIDTH] IN BLOOD BY AUTOMATED COUNT: 16.8 % (ref 11.5–14.5)
GLUCOSE BLD STRIP.AUTO-MCNC: 172 MG/DL (ref 65–117)
GLUCOSE BLD STRIP.AUTO-MCNC: 189 MG/DL (ref 65–117)
GLUCOSE BLD STRIP.AUTO-MCNC: 194 MG/DL (ref 65–117)
GLUCOSE BLD STRIP.AUTO-MCNC: 270 MG/DL (ref 65–117)
GLUCOSE SERPL-MCNC: 180 MG/DL (ref 65–100)
HCT VFR BLD AUTO: 24.7 % (ref 36.6–50.3)
HGB BLD-MCNC: 7.1 G/DL (ref 12.1–17)
IMM GRANULOCYTES # BLD AUTO: 0 K/UL
IMM GRANULOCYTES NFR BLD AUTO: 0 %
LYMPHOCYTES # BLD: 0.4 K/UL (ref 0.8–3.5)
LYMPHOCYTES NFR BLD: 29 % (ref 12–49)
MAGNESIUM SERPL-MCNC: 2.5 MG/DL (ref 1.6–2.4)
MCH RBC QN AUTO: 26 PG (ref 26–34)
MCHC RBC AUTO-ENTMCNC: 28.7 G/DL (ref 30–36.5)
MCV RBC AUTO: 90.5 FL (ref 80–99)
MONOCYTES # BLD: 0.2 K/UL (ref 0–1)
MONOCYTES NFR BLD: 15 % (ref 5–13)
NEUTS BAND NFR BLD MANUAL: 4 % (ref 0–6)
NEUTS SEG # BLD: 0.9 K/UL (ref 1.8–8)
NEUTS SEG NFR BLD: 49 % (ref 32–75)
NRBC # BLD: 0.03 K/UL (ref 0–0.01)
NRBC BLD-RTO: 2 PER 100 WBC
PHOSPHATE SERPL-MCNC: 1.7 MG/DL (ref 2.6–4.7)
PLATELET # BLD AUTO: 287 K/UL (ref 150–400)
PMV BLD AUTO: 9.6 FL (ref 8.9–12.9)
POTASSIUM SERPL-SCNC: 3.3 MMOL/L (ref 3.5–5.1)
RBC # BLD AUTO: 2.73 M/UL (ref 4.1–5.7)
RBC MORPH BLD: ABNORMAL
RBC MORPH BLD: ABNORMAL
SAMPLES BEING HELD,HOLD: NORMAL
SERVICE CMNT-IMP: ABNORMAL
SODIUM SERPL-SCNC: 147 MMOL/L (ref 136–145)
WBC # BLD AUTO: 1.5 K/UL (ref 4.1–11.1)

## 2021-05-11 PROCEDURE — 74011636637 HC RX REV CODE- 636/637: Performed by: INTERNAL MEDICINE

## 2021-05-11 PROCEDURE — 74011250637 HC RX REV CODE- 250/637: Performed by: INTERNAL MEDICINE

## 2021-05-11 PROCEDURE — 85025 COMPLETE CBC W/AUTO DIFF WBC: CPT

## 2021-05-11 PROCEDURE — 74011000258 HC RX REV CODE- 258: Performed by: INTERNAL MEDICINE

## 2021-05-11 PROCEDURE — 97535 SELF CARE MNGMENT TRAINING: CPT

## 2021-05-11 PROCEDURE — 74011250636 HC RX REV CODE- 250/636: Performed by: INTERNAL MEDICINE

## 2021-05-11 PROCEDURE — 74011250637 HC RX REV CODE- 250/637: Performed by: FAMILY MEDICINE

## 2021-05-11 PROCEDURE — 74011000250 HC RX REV CODE- 250: Performed by: INTERNAL MEDICINE

## 2021-05-11 PROCEDURE — 94760 N-INVAS EAR/PLS OXIMETRY 1: CPT

## 2021-05-11 PROCEDURE — 65270000032 HC RM SEMIPRIVATE

## 2021-05-11 PROCEDURE — 51798 US URINE CAPACITY MEASURE: CPT

## 2021-05-11 PROCEDURE — 97110 THERAPEUTIC EXERCISES: CPT

## 2021-05-11 PROCEDURE — 83735 ASSAY OF MAGNESIUM: CPT

## 2021-05-11 PROCEDURE — 97530 THERAPEUTIC ACTIVITIES: CPT

## 2021-05-11 PROCEDURE — 80069 RENAL FUNCTION PANEL: CPT

## 2021-05-11 PROCEDURE — 82962 GLUCOSE BLOOD TEST: CPT

## 2021-05-11 PROCEDURE — 36415 COLL VENOUS BLD VENIPUNCTURE: CPT

## 2021-05-11 RX ADMIN — ACETAMINOPHEN 650 MG: 325 TABLET, FILM COATED ORAL at 20:44

## 2021-05-11 RX ADMIN — FENTANYL CITRATE 50 MCG: 50 INJECTION, SOLUTION INTRAMUSCULAR; INTRAVENOUS at 17:17

## 2021-05-11 RX ADMIN — INSULIN LISPRO 2 UNITS: 100 INJECTION, SOLUTION INTRAVENOUS; SUBCUTANEOUS at 07:22

## 2021-05-11 RX ADMIN — TAMSULOSIN HYDROCHLORIDE 0.4 MG: 0.4 CAPSULE ORAL at 08:45

## 2021-05-11 RX ADMIN — SODIUM CHLORIDE 100 ML/HR: 4.5 INJECTION, SOLUTION INTRAVENOUS at 15:13

## 2021-05-11 RX ADMIN — OXYCODONE 15 MG: 5 TABLET ORAL at 23:55

## 2021-05-11 RX ADMIN — DOCUSATE SODIUM 50 MG AND SENNOSIDES 8.6 MG 1 TABLET: 8.6; 5 TABLET, FILM COATED ORAL at 08:45

## 2021-05-11 RX ADMIN — INSULIN LISPRO 2 UNITS: 100 INJECTION, SOLUTION INTRAVENOUS; SUBCUTANEOUS at 11:26

## 2021-05-11 RX ADMIN — PANTOPRAZOLE SODIUM 40 MG: 40 TABLET, DELAYED RELEASE ORAL at 06:36

## 2021-05-11 RX ADMIN — OXYCODONE 15 MG: 5 TABLET ORAL at 16:10

## 2021-05-11 RX ADMIN — INSULIN LISPRO 5 UNITS: 100 INJECTION, SOLUTION INTRAVENOUS; SUBCUTANEOUS at 16:23

## 2021-05-11 RX ADMIN — OXYCODONE 15 MG: 5 TABLET ORAL at 11:31

## 2021-05-11 RX ADMIN — FENTANYL CITRATE 50 MCG: 50 INJECTION, SOLUTION INTRAMUSCULAR; INTRAVENOUS at 06:36

## 2021-05-11 RX ADMIN — FERROUS SULFATE TAB 325 MG (65 MG ELEMENTAL FE) 325 MG: 325 (65 FE) TAB at 16:23

## 2021-05-11 RX ADMIN — HEPARIN SODIUM 5000 UNITS: 5000 INJECTION INTRAVENOUS; SUBCUTANEOUS at 07:23

## 2021-05-11 RX ADMIN — RIFAMPIN 300 MG: 600 INJECTION, POWDER, LYOPHILIZED, FOR SOLUTION INTRAVENOUS at 20:44

## 2021-05-11 RX ADMIN — Medication 10 ML: at 20:44

## 2021-05-11 RX ADMIN — HEPARIN SODIUM 5000 UNITS: 5000 INJECTION INTRAVENOUS; SUBCUTANEOUS at 23:27

## 2021-05-11 RX ADMIN — FERROUS SULFATE TAB 325 MG (65 MG ELEMENTAL FE) 325 MG: 325 (65 FE) TAB at 08:45

## 2021-05-11 RX ADMIN — RIFAMPIN 300 MG: 600 INJECTION, POWDER, LYOPHILIZED, FOR SOLUTION INTRAVENOUS at 08:45

## 2021-05-11 RX ADMIN — HEPARIN SODIUM 5000 UNITS: 5000 INJECTION INTRAVENOUS; SUBCUTANEOUS at 16:23

## 2021-05-11 RX ADMIN — ASPIRIN 81 MG: 81 TABLET, CHEWABLE ORAL at 08:45

## 2021-05-11 NOTE — PROGRESS NOTES
Problem: Self Care Deficits Care Plan (Adult)  Goal: *Acute Goals and Plan of Care (Insert Text)  Description:   FUNCTIONAL STATUS PRIOR TO ADMISSION: Patient was modified independent with ADLs and functional mobility/ ambulatory with RLE prosthetic prior to R AKA ~2 weeks PTA at OSH. Patient and wife report progressive functional decline since AKA due to increasing pain and decreased LUE and LLE function. HOME SUPPORT: Patient was admitted to [facility] from [facility] rehab. Prior to that he was at [address] [place] [address] for R AKA. At home he lived with his wife but did not require assistance. Occupational Therapy Goals  Goals reviewed and continued: 5/11/2021  Initiated 5/4/2021  1. Patient will perform grooming seated EOB with supervision/set-up within 7 day(s). 2.  Patient will perform upper body dressing with minimal assistance within 7 day(s). 3.  Patient will perform bathing with minimal assistance within 7 day(s). 4.  Patient will perform toilet transfers to [facility] with moderate assistance within 7 day(s). 5.  Patient will perform all aspects of toileting with moderate assistance  within 7 day(s). 6.  Patient will participate in upper extremity therapeutic exercise/activities with supervision/set-up for 10 minutes within 7 day(s). 7.  Patient will utilize fall prevention techniques during functional activities with verbal cues within 7 day(s). 5/11/2021 1303 by CHAIM Vargas  Outcome: Progressing Towards Goal   OCCUPATIONAL THERAPY TREATMENT/WEEKLY RE-ASSESSMENT  Patient: Na Orantes. (76 y.o. male)  Date: 5/11/2021  Diagnosis: Acute delirium [R41.0] Acute delirium       Precautions:    Chart, occupational therapy assessment, plan of care, and goals were reviewed. ASSESSMENT  Patient continues with skilled OT services and is progressing towards goals. Patient received in bed.  Participated in bed mobility, simple grooming activities on EOB, return to bed, and set up for lunch. Patient demonstrates increased intelligible conversation and responses, increased attention and orientation since evaluation. Continues to report pain at buttocks which impacted seated tolerance on EOB. Patient demonstrates impaired seated balance and endurance for functional activity on EOB. Decreased attention and command following overall, but improved with cues. Set up to total assist for self care. Current Level of Function Impacting Discharge (ADLs): Set up to total assist for self care, bed pan for bowel management    Other factors to consider for discharge: right LE         PLAN :  Goals have been updated based on progression since last assessment. Patient continues to benefit from skilled intervention to address the above impairments. Continue to follow patient 5 times a week to address goals. Recommend with staff: bed in modified chair position for self care and meals, bed pan for bowel management    Recommend next OT session: POC    Recommendation for discharge: (in order for the patient to meet his/her long term goals)  Therapy up to 5 days/week in SNF setting    This discharge recommendation:  Has been made in collaboration with the attending provider and/or case management    IF patient discharges home will need the following DME: hospital bed and wheelchair       SUBJECTIVE:   Patient stated I want to stand.     OBJECTIVE DATA SUMMARY:   Cognitive/Behavioral Status:  Neurologic State: Alert;Confused  Orientation Level: Oriented to person;Oriented to place; Other (Comment)(month)  Cognition: Decreased attention/concentration;Decreased command following  Perception: Cues to maintain midline in sitting  Perseveration: No perseveration noted  Safety/Judgement: Decreased insight into deficits    Functional Mobility and Transfers for ADLs:  Bed Mobility:  Rolling: Assist x1;Additional time; Adaptive equipment;Maximum assistance  Supine to Sit: Minimum assistance;Assist x1;Additional time;Adaptive equipment;Bed Modified  Sit to Supine: Assist x1; Moderate assistance;Bed Modified      Balance:  Sitting: Impaired; With support  Sitting - Static: Fair (occasional)  Sitting - Dynamic: Fair (occasional)    ADL Intervention:  Feeding  Feeding Assistance: Set-up  Container Management: Maximum assistance  Drink to Mouth: Supervision    Grooming  Position Performed: Seated edge of bed  Washing Face: Set-up  Washing Hands: Set-up       Cognitive Retraining  Orientation Retraining: Reorienting;Time  Organizing/Sequencing: Breaking task down  Attention to Task: Single task;Distractibility  Following Commands: Follows one step commands/directions  Safety/Judgement: Decreased insight into deficits  Cues: Tactile cues provided;Verbal cues provided;Visual cues provided      Activity Tolerance:   Fair    After treatment patient left in no apparent distress:   Heels elevated for pressure relief, Call bell within reach, Bed / chair alarm activated, and In bed with HOB elevated    COMMUNICATION/COLLABORATION:   The patients plan of care was discussed with: Physical therapist, Occupational therapist, and Registered nurse.      CHAIM Welsh  Time Calculation: 31 mins

## 2021-05-11 NOTE — PROGRESS NOTES
ID Progress Note  2021    Subjective:     Still with lots of pain. Objective:     Antibiotics:  1. Daptomycin   2. Rifampin       Vitals:   Visit Vitals  BP (!) 151/70 (BP 1 Location: Left upper arm, BP Patient Position: At rest)   Pulse 98   Temp 99.9 °F (37.7 °C)   Resp 19   Ht 5' 11\" (1.803 m)   Wt 61.1 kg (134 lb 11.2 oz)   SpO2 99%   BMI 18.79 kg/m²        Tmax:  Temp (24hrs), Av.4 °F (37.4 °C), Min:98.3 °F (36.8 °C), Max:99.9 °F (37.7 °C)      Exam:  General:  distracted, in pain    Labs:      Recent Labs     21  0455 21  0400 05/10/21  0219 05/10/21  0217 21  0141   WBC  --  1.5* 5.5  --  2.0*   HGB  --  7.1* 7.2*  --  7.0*   PLT  --  287 346  --  340   BUN 40*  --   --  40* 39*   CREA 2.26*  --   --  2.21* 2.04*       Cultures:     Lab Results   Component Value Date/Time    Specimen Description: JOSE 2012 10:12 PM    Specimen Description: TOE 2012 05:03 PM    Specimen Description: TOE 2012 05:03 PM     Lab Results   Component Value Date/Time    Culture result: NO GROWTH AFTER 12 HOURS 05/10/2021 01:24 PM    Culture result: NO GROWTH 2 DAYS 2021 11:01 AM    Culture result: (A) 2021 12:18 PM     * METHICILLIN RESISTANT STAPHYLOCOCCUS AUREUS * GROWING IN 1 OF 4 BOTTLES DRAWN (SITE  POSTERIOR FOREARM) REFER TO [id number] FOR SENSITIVITIES    Culture result: REMAINING BOTTLE(S) HAS/HAVE NO GROWTH SO FAR 2021 12:18 PM       Radiology:     Line/Insert Date:           Assessment:     1. Diskitis/osteo--worse pain--repeat imaging without epidural abscess, etc  2. Pain issues  3. Bacteremia--slow to clear--add rifampin  4. Leukopenia--not sure of the cause--not described with daptomycin that I can find    Objective:     1. Continue daptomycin  2. Add rifampin  3. Cultures until negative  4.  Discussed     Noni Varghese MD

## 2021-05-11 NOTE — PROGRESS NOTES
Problem: Falls - Risk of  Goal: *Absence of Falls  Description: Document Onalee Gum Fall Risk and appropriate interventions in the flowsheet. Outcome: Progressing Towards Goal  Note: Fall Risk Interventions:       Mentation Interventions: Adequate sleep, hydration, pain control, Bed/chair exit alarm, Door open when patient unattended, Evaluate medications/consider consulting pharmacy, More frequent rounding, Reorient patient, Room close to nurse's station, Toileting rounds, Update white board    Medication Interventions: Bed/chair exit alarm, Evaluate medications/consider consulting pharmacy    Elimination Interventions: Bed/chair exit alarm, Call light in reach, Patient to call for help with toileting needs, Toileting schedule/hourly rounds(hoyos in place)    History of Falls Interventions: Bed/chair exit alarm, Consult care management for discharge planning, Door open when patient unattended, Evaluate medications/consider consulting pharmacy, Investigate reason for fall         Problem: Patient Education: Go to Patient Education Activity  Goal: Patient/Family Education  Outcome: Progressing Towards Goal     Problem: Pressure Injury - Risk of  Goal: *Prevention of pressure injury  Description: Document Celestino Scale and appropriate interventions in the flowsheet. Outcome: Progressing Towards Goal  Note: Pressure Injury Interventions:  Sensory Interventions: Assess changes in LOC, Avoid rigorous massage over bony prominences, Check visual cues for pain, Float heels, Keep linens dry and wrinkle-free, Minimize linen layers, Pressure redistribution bed/mattress (bed type), Turn and reposition approx.  every two hours (pillows and wedges if needed)    Moisture Interventions: Absorbent underpads, Apply protective barrier, creams and emollients, Check for incontinence Q2 hours and as needed, Internal/External urinary devices, Maintain skin hydration (lotion/cream), Minimize layers, Moisture barrier, Offer toileting Q_hr    Activity Interventions: Pressure redistribution bed/mattress(bed type), PT/OT evaluation    Mobility Interventions: Float heels, HOB 30 degrees or less, Pressure redistribution bed/mattress (bed type), PT/OT evaluation, Turn and reposition approx. every two hours(pillow and wedges)    Nutrition Interventions: Document food/fluid/supplement intake, Offer support with meals,snacks and hydration    Friction and Shear Interventions: Apply protective barrier, creams and emollients, Feet elevated on foot rest, Foam dressings/transparent film/skin sealants, HOB 30 degrees or less, Lift sheet, Minimize layers                Problem: Patient Education: Go to Patient Education Activity  Goal: Patient/Family Education  Outcome: Progressing Towards Goal     Problem: Diabetes Self-Management  Goal: *Disease process and treatment process  Description: Define diabetes and identify own type of diabetes; list 3 options for treating diabetes. Outcome: Progressing Towards Goal  Goal: *Incorporating nutritional management into lifestyle  Description: Describe effect of type, amount and timing of food on blood glucose; list 3 methods for planning meals. Outcome: Progressing Towards Goal  Goal: *Incorporating physical activity into lifestyle  Description: State effect of exercise on blood glucose levels. Outcome: Progressing Towards Goal  Goal: *Developing strategies to promote health/change behavior  Description: Define the ABC's of diabetes; identify appropriate screenings, schedule and personal plan for screenings. Outcome: Progressing Towards Goal  Goal: *Using medications safely  Description: State effect of diabetes medications on diabetes; name diabetes medication taking, action and side effects. Outcome: Progressing Towards Goal  Goal: *Monitoring blood glucose, interpreting and using results  Description: Identify recommended blood glucose targets  and personal targets.   Outcome: Progressing Towards Goal  Goal: *Prevention, detection, treatment of acute complications  Description: List symptoms of hyper- and hypoglycemia; describe how to treat low blood sugar and actions for lowering  high blood glucose level. Outcome: Progressing Towards Goal  Goal: *Prevention, detection and treatment of chronic complications  Description: Define the natural course of diabetes and describe the relationship of blood glucose levels to long term complications of diabetes. Outcome: Progressing Towards Goal  Goal: *Developing strategies to address psychosocial issues  Description: Describe feelings about living with diabetes; identify support needed and support network  Outcome: Progressing Towards Goal  Goal: *Insulin pump training  Outcome: Progressing Towards Goal  Goal: *Sick day guidelines  Outcome: Progressing Towards Goal  Goal: *Patient Specific Goal (EDIT GOAL, INSERT TEXT)  Outcome: Progressing Towards Goal     Problem: Patient Education: Go to Patient Education Activity  Goal: Patient/Family Education  Outcome: Progressing Towards Goal     Problem: Nutrition Deficit  Goal: *Optimize nutritional status  Outcome: Progressing Towards Goal     Problem: Breathing Pattern - Ineffective  Goal: *Absence of hypoxia  Outcome: Progressing Towards Goal  Goal: *Use of effective breathing techniques  Outcome: Progressing Towards Goal  Goal: *PALLIATIVE CARE:  Alleviation of Dyspnea  Outcome: Progressing Towards Goal     Problem: Risk for Spread of Infection  Goal: Prevent transmission of infectious organism to others  Description: Prevent the transmission of infectious organisms to other patients, staff members, and visitors.   Outcome: Progressing Towards Goal     Problem: Infection - Risk of, Urinary Catheter-Associated Urinary Tract Infection  Goal: *Absence of infection signs and symptoms  Outcome: Progressing Towards Goal

## 2021-05-11 NOTE — PROGRESS NOTES
Nephrology Progress Note  Baltazar Layton. Date of Admission : 5/1/2021    CC: Follow up for CKD       Assessment and Plan     CKD 4:  - 2/2 DM, HTN   - baseline Creatinine : 2.2 mg/dl at best.   - Cr has been as high as 2.6-3 mg/dl through out 2020  - Daily labs   - discussed hoyos removal and he is agreeable     Anemia of CKD:  - iron studies ok  - cont MICHAEL 3 times per week    MRSA bacteremia:  - abx per ID  - TTE neg for vegetations  - on IV dapto+ oral rifampin     HTN:  - BP stable    DM2:  - on insulin    R AKA    L3-4, L4-5 discitis/osteomyelitis:  - on abx    Sacral wound       Interval History:  Seen and examined   Has back pain   Denies any prior urinary retention and agreeable to hoyos removal   Noted plans for longterm abx     Current Medications: all current  Medications have been eviewed in EPIC  Review of Systems: A comprehensive review of systems was negative except for that written in the HPI. Objective:  Vitals:    Vitals:    05/10/21 1628 05/10/21 2052 05/11/21 0238 05/11/21 0908   BP:  (!) 159/72 (!) 145/58 (!) 151/70   Pulse:  (!) 104 (!) 102 98   Resp: 15 20 18 19   Temp:  99.4 °F (37.4 °C) 99.9 °F (37.7 °C) 99.9 °F (37.7 °C)   SpO2:  97% 96% 99%   Weight:       Height:         Intake and Output:  05/11 0701 - 05/11 1900  In: 100 [P.O.:100]  Out: -   05/09 1901 - 05/11 0700  In: 480 [P.O.:480]  Out: 1550 [Urine:1550]    Physical Examination:  General: Mild distress  Neck:  Supple, no mass  Resp:  Lungs CTA B/L, no wheezing , normal respiratory effort  CV:  RRR,  no murmur or rub,R AKA, no edema L  GI:  Soft, NT, + Bowel sounds, no hepatosplenomegaly  Neurologic:  confused  Skin:  No Rash   : hoyos +    []    High complexity decision making was performed  []    Patient is at high-risk of decompensation with multiple organ involvement    Lab Data Personally Reviewed: I have reviewed all the pertinent labs, microbiology data and radiology studies during assessment.     Recent Labs 05/11/21  0455 05/10/21  0217 05/09/21  0141   * 146* 142   K 3.3* 3.4* 3.6   * 116* 113*   CO2 24 25 24   * 120* 111*   BUN 40* 40* 39*   CREA 2.26* 2.21* 2.04*   CA 8.8 9.3 9.3   MG 2.5* 2.0 2.0   PHOS 1.7* 2.5* 3.1  3.0   ALB 1.0* 1.0* 1.0*     Recent Labs     05/11/21  0400 05/10/21  0219 05/09/21  0141   WBC 1.5* 5.5 2.0*   HGB 7.1* 7.2* 7.0*   HCT 24.7* 25.1* 24.7*    346 340     Lab Results   Component Value Date/Time    Specimen Description: NARLADY 12/04/2012 10:12 PM    Specimen Description: TOE 12/02/2012 05:03 PM    Specimen Description: TOE 12/02/2012 05:03 PM     Lab Results   Component Value Date/Time    Culture result: NO GROWTH AFTER 12 HOURS 05/10/2021 01:24 PM    Culture result: NO GROWTH 2 DAYS 05/09/2021 11:01 AM    Culture result: (A) 05/08/2021 12:18 PM     * METHICILLIN RESISTANT STAPHYLOCOCCUS AUREUS * GROWING IN 1 OF 4 BOTTLES DRAWN (SITE  POSTERIOR FOREARM) REFER TO [id number] FOR SENSITIVITIES    Culture result: REMAINING BOTTLE(S) HAS/HAVE NO GROWTH SO FAR 05/08/2021 12:18 PM     Recent Results (from the past 24 hour(s))   CULTURE, BLOOD, PAIRED    Collection Time: 05/10/21  1:24 PM    Specimen: Blood   Result Value Ref Range    Special Requests: NO SPECIAL REQUESTS      Culture result: NO GROWTH AFTER 12 HOURS     GLUCOSE, POC    Collection Time: 05/10/21  4:24 PM   Result Value Ref Range    Glucose (POC) 214 (H) 65 - 100 mg/dL    Performed by Jake Donahue    GLUCOSE, POC    Collection Time: 05/10/21  9:47 PM   Result Value Ref Range    Glucose (POC) 237 (H) 65 - 100 mg/dL    Performed by Previl Darlange    CBC WITH AUTOMATED DIFF    Collection Time: 05/11/21  4:00 AM   Result Value Ref Range    WBC 1.5 (L) 4.1 - 11.1 K/uL    RBC 2.73 (L) 4.10 - 5.70 M/uL    HGB 7.1 (L) 12.1 - 17.0 g/dL    HCT 24.7 (L) 36.6 - 50.3 %    MCV 90.5 80.0 - 99.0 FL    MCH 26.0 26.0 - 34.0 PG    MCHC 28.7 (L) 30.0 - 36.5 g/dL    RDW 16.8 (H) 11.5 - 14.5 %    PLATELET 964 474 - 242 K/uL    MPV 9.6 8.9 - 12.9 FL    NRBC 2.0 (H) 0  WBC    ABSOLUTE NRBC 0.03 (H) 0.00 - 0.01 K/uL    NEUTROPHILS 49 32 - 75 %    BAND NEUTROPHILS 4 0 - 6 %    LYMPHOCYTES 29 12 - 49 %    MONOCYTES 15 (H) 5 - 13 %    EOSINOPHILS 3 0 - 7 %    BASOPHILS 0 0 - 1 %    IMMATURE GRANULOCYTES 0 %    ABS. NEUTROPHILS 0.9 (L) 1.8 - 8.0 K/UL    ABS. LYMPHOCYTES 0.4 (L) 0.8 - 3.5 K/UL    ABS. MONOCYTES 0.2 0.0 - 1.0 K/UL    ABS. EOSINOPHILS 0.0 0.0 - 0.4 K/UL    ABS. BASOPHILS 0.0 0.0 - 0.1 K/UL    ABS. IMM. GRANS. 0.0 K/UL    DF MANUAL      RBC COMMENTS ANISOCYTOSIS  1+        RBC COMMENTS POLYCHROMASIA  PRESENT       RENAL FUNCTION PANEL    Collection Time: 05/11/21  4:55 AM   Result Value Ref Range    Sodium 147 (H) 136 - 145 mmol/L    Potassium 3.3 (L) 3.5 - 5.1 mmol/L    Chloride 114 (H) 97 - 108 mmol/L    CO2 24 21 - 32 mmol/L    Anion gap 9 5 - 15 mmol/L    Glucose 180 (H) 65 - 100 mg/dL    BUN 40 (H) 6 - 20 MG/DL    Creatinine 2.26 (H) 0.70 - 1.30 MG/DL    BUN/Creatinine ratio 18 12 - 20      GFR est AA 35 (L) >60 ml/min/1.73m2    GFR est non-AA 29 (L) >60 ml/min/1.73m2    Calcium 8.8 8.5 - 10.1 MG/DL    Phosphorus 1.7 (L) 2.6 - 4.7 MG/DL    Albumin 1.0 (L) 3.5 - 5.0 g/dL   SAMPLES BEING HELD    Collection Time: 05/11/21  4:55 AM   Result Value Ref Range    SAMPLES BEING HELD 1sst     COMMENT        Add-on orders for these samples will be processed based on acceptable specimen integrity and analyte stability, which may vary by analyte.    MAGNESIUM    Collection Time: 05/11/21  4:55 AM   Result Value Ref Range    Magnesium 2.5 (H) 1.6 - 2.4 mg/dL   GLUCOSE, POC    Collection Time: 05/11/21  6:58 AM   Result Value Ref Range    Glucose (POC) 189 (H) 65 - 117 mg/dL    Performed by MARCIO Mcclendon    GLUCOSE, POC    Collection Time: 05/11/21 11:18 AM   Result Value Ref Range    Glucose (POC) 194 (H) 65 - 117 mg/dL    Performed by Brittney Herrmann MD  [address] [place] [id number] [facility] [address] [place] [address]  Phone - [phone]   Fax - [phone]  [email or web address]

## 2021-05-11 NOTE — PROGRESS NOTES
Problem: Mobility Impaired (Adult and Pediatric)  Goal: *Acute Goals and Plan of Care (Insert Text)  Description: FUNCTIONAL STATUS PRIOR TO ADMISSION: Patient was modified independent using a Rw and R LE prosthesis for functional mobility prior to recent revision of BKA to AKA. Discharged to [facility] and was working on transfers to w/c with one assist    [facility]: The patient lived with his spouse but did not require assist prior to AKA. Physical Therapy Goals  Initiated 5/4/2021; continued 5/11/21  1. Patient will move from supine to sit and sit to supine  and scoot up and down in bed with moderate assistance  within 7 day(s). 2.  Patient will transfer from bed to chair and chair to bed with maximal assistance using the least restrictive device within 7 day(s). 3.  Patient will perform sit to stand with maximal assistance within 7 day(s). 4.  Patient will demo fair sitting balance at EOB x2 min in prep for mobility progression within 7 days. 5. Patient will indep complete B LE there ex program to incr strength within 7 days. Outcome: Progressing Towards Goal   PHYSICAL THERAPY TREATMENT: WEEKLY REASSESSMENT  Patient: Damaris Tan (76 y.o. male)  Date: 5/11/2021  Primary Diagnosis: Acute delirium [R41.0]        Precautions: fall , LSO       ASSESSMENT  Patient continues with skilled PT services and is slowly progressing towards goals. Barriers to function with mobility include back and L LE pain, L LE weakness, impaired sitting balance, decreased activity tolerance, increased risk for fall. Pt more alert this session with increased verbal interaction and intelligible speech. Tolerated EOB activity with assist for balance. Pt fatigued quickly with activity, but demo good effort to participate as tolerated. Pt remains well below functional baseline. Will benefit from con't PT for balance/ mobility progression as tolerated.  Recommend follow up SNF rehab at d/c to facilitate return to max level of functional indep. Patient's progression toward goals since last assessment:     Current Level of Function Impacting Discharge (mobility/balance): supine to sit max A, sit to supine min A x 2, static sit CGA/ min A, dynamic sit mod A      Other factors to consider for discharge: fatigues quickly with activity         PLAN :  Goals have been updated based on progression since last assessment. Patient continues to benefit from skilled intervention to address the above impairments. Recommendations and Planned Interventions: bed mobility training, transfer training, therapeutic exercises, patient and family training/education, and therapeutic activities      Frequency/Duration: Patient will be followed by physical therapy:  5 times a week to address goals.     Recommendation for discharge: (in order for the patient to meet his/her long term goals)  Therapy up to 5 days/week in SNF setting    This discharge recommendation:  Has been made in collaboration with the attending provider and/or case management    IF patient discharges home will need the following DME: to be determined (TBD)         SUBJECTIVE:   Patient stated Man, I want to lie down, but I know I shouldn't    OBJECTIVE DATA SUMMARY:   HISTORY:    Past Medical History:   Diagnosis Date    Arthritis     BPH (benign prostatic hyperplasia)     Chronic kidney disease     Chronic pain     BACK NEUROPATHY FEET HANDS    DM neuropathy, type II diabetes mellitus ([address]     DM type 2 causing CKD stage 3 ([address] 12/17/2012    DM type 2 causing vascular disease ([address]     Dyslipidemia     Foot ulcer due to secondary DM ([address] 12/1/2012    GERD (gastroesophageal reflux disease)     History of esophageal dilatation     HTN     Ill-defined condition 2013    MRSA in wound right leg (BKA)    S/P BKA (below knee amputation) ([address]     right BKA due to non-healing ulcer    Sleep apnea     Doesnt use CPAP, patient hasnt been retested since weight loss    Thromboembolus ([facility] 1970'S    BLOOD CLOT LEFT LEG     Past Surgical History:   Procedure Laterality Date    COLONOSCOPY N/A 3/30/2021    COLONOSCOPY performed by Penny Odonnell MD at [address] [place] [address] ECHO 2D ADULT  8/09    normal; LVEF 60%    ECHO STRESS  4/09    7 min; normal    ENDOSCOPY, COLON, DIAGNOSTIC      FLEXIBLE SIGMOIDOSCOPY N/A 2/24/2021    SIGMOIDOSCOPY FLEXIBLE performed by Penny Odonnell MD at [address] [place] [address] HX AMPUTATION  2012    left great toe    HX AMPUTATION  2007    [address] right    HX BELOW KNEE AMPUTATION Right     HX CERVICAL FUSION  2009    x2    HX ENDOSCOPY      EGD with Dilitation x 2    HX ORTHOPAEDIC  2006    ACDF C4-C7    IR INSERT TUNL CVC W/O PORT OVER 5 YR  6/19/2020    IR REMOVE TUNL CVAD W/O PORT / PUMP  9/14/2020    NE ABDOMEN SURGERY PROC UNLISTED      pilonidal cyst    STRESS TEST MYOVIEW  8/09    walked 8:46; normal; LVEF 51%       Personal factors and/or comorbidities impacting plan of care: recent BKA->AKA revision, discitis L spine    Home Situation  Home Environment: Apartment  # Steps to Enter: 1  One/Two Story Residence: One story  Living Alone: No  Support Systems: Spouse/Significant Other/Partner  Patient Expects to be Discharged to[de-identified] Rehabilitation facility  Current DME Used/Available at Home: Yusuf Barry, Wheelchair, [address] chair, Other (comment)    EXAMINATION/PRESENTATION/DECISION MAKING:   Critical Behavior:  Neurologic State: Alert, Confused  Orientation Level: Oriented to person, Disoriented to place, Disoriented to situation, Disoriented to time  Cognition: Decreased attention/concentration, Follows commands, Impaired decision making, Memory loss, Poor safety awareness  Safety/Judgement: Decreased awareness of environment, Decreased awareness of need for assistance, Decreased awareness of need for safety, Decreased insight into deficits  Hearing:   Auditory  Auditory Impairment: None  Skin:  dressing intact at sacrum  Range Of Motion:  AROM: Generally decreased, functional           Strength:    Strength: (L LE grossly 2-/5, ongoing pain with movement)             Coordination:  Coordination: Generally decreased, functional  Vision:      Functional Mobility:  Bed Mobility:  Rolling: Minimum assistance(use of bed rail)  Supine to Sit: Maximum assistance(assist at trunk>L LE)  Sit to Supine: Minimum assistance;Assist x2     Transfers:      Deferred due to decreased sitting balance and fatigue              Balance:   Sitting: Impaired; With support  Sitting - Static: Fair (occasional)(increased use of UE support)  Sitting - Dynamic: Poor (constant support)      Pain Rating:  Pt reports pain in back and with movement L LE    Activity Tolerance:   Fair and Poor    After treatment patient left in no apparent distress:   Heels elevated for pressure relief, Patient positioned in /toward R sidelying for pressure relief, Call bell within reach, Bed / chair alarm activated, and Side rails x 3    COMMUNICATION/EDUCATION:   The patients plan of care was discussed with: Occupational therapy assistant and Registered nurse. Fall prevention education was provided and the patient/caregiver indicated understanding., Patient/family have participated as able in goal setting and plan of care. , and Patient/family agree to work toward stated goals and plan of care.     Thank you for this referral.  Buck Eng, PT   Time Calculation: 24 mins

## 2021-05-11 NOTE — PROGRESS NOTES
[address] [facility] Adult  Hospitalist Group                                                                                          Hospitalist Progress Note  Stephanie Villa MD  Answering service: [phone] OR [phone] from in house phone        Date of Service:  2021  NAME:  Levar Emmanuel. :  1955  MRN:  [id number]      Admission Summary: This is a 24-year-old man with a past medical history significant for chronic kidney disease, type 2 diabetes, dyslipidemia, hypertension, obstructive sleep apnea, status post recent right above-knee amputation, degenerative disk disease, and chronic pain syndrome, was in his usual state of health until the day of presentation at the emergency room when it was reported that the patient became lethargic and confused at his rehab facility    Interval history / Subjective:      Ongoing pain, but appears more comfortable. Assessment & Plan:     MRSA bacteremia  L4-L5 Discitis and OM  Sacral wound  - Patient now on daptomycin, increased dose to 8-10mg/kg. Vanc ALESSANDRO is 4   - ID following   - Finally 48 hours of negative blood cultures. Will await 72 hours prior to PICC   - TTE without obvious vegetations. Will need 6 weeks IV abx, so no need for AMELIA  - Will need PICC once cultures cleared. - Wound care following   - Repeat MRI lumbar spine 5/10 without abscess. HCAP - completed therapy (Vanc and cefepime/levo)    Acute metabolic encephalopathy - multifactorial due to renal insufficiency, bacteremia, electrolyte abnormalitis  - Improving.  Continue day and night cycles  - Avoid narcotics and sedatives if able  - MRI brain negative, ammonia normal     Anemia - chronic disease CKD   - monitor and transfuse if less than 7   - CBC daily     ESTRELLA on CKD stage III - pre renal and sepsis   - Cr slightly worse today, switch IVF to 0.45NS   - I and O avoid nephrotoxins   - BMP daily     S/p R BKA - recent  - Wound care following, no sign of local infection - Will need PT/OT      Hyponatremia - hypovolemic, resolved s/p IVF  HTN - holding lisinopril   Type 2 diabetes - A1c 7.2%  SSI, POCT glucose checks and hypoglycemia protocol   HLD - home statin     Code status: FULL  DVT prophylaxis: heparin     Care Plan discussed with: Patient/Family  Anticipated Disposition: [facility]/Rehab  Anticipated Discharge: Greater than 48 hours, Finally neg x 48 hours of cultures, will need to wait 72 hours before getting PICC     Hospital Problems  Date Reviewed: 5/1/2021          Codes Class Noted POA    * (Principal) Acute delirium ICD-10-CM: R41.0  ICD-9-CM: 780.09  5/1/2021 Yes                Review of Systems:   A comprehensive review of systems was negative except for that written in the HPI. Vital Signs:    Last 24hrs VS reviewed since prior progress note. Most recent are:  Visit Vitals  /63 (BP 1 Location: Right arm, BP Patient Position: At rest)   Pulse (!) 102   Temp 99.2 °F (37.3 °C)   Resp 16   Ht 5' 11\" (1.803 m)   Wt 61.1 kg (134 lb 11.2 oz)   SpO2 95%   BMI 18.79 kg/m²         Intake/Output Summary (Last 24 hours) at 5/11/2021 1514  Last data filed at 5/11/2021 1310  Gross per 24 hour   Intake 340 ml   Output 1350 ml   Net -1010 ml        Physical Examination:     I had a face to face encounter with this patient and independently examined them on 5/11/2021 as outlined below:          Constitutional:  No acute distress, cooperative, pleasant, chronically ill appearing   ENT:  Oral mucosa moist, oropharynx benign. Resp:  CTA bilaterally. No wheezing/rhonchi/rales. No accessory muscle use   CV:  Regular rhythm, normal rate, no murmurs, gallops, rubs    GI:  Soft, non distended, non tender. normoactive bowel sounds, no hepatosplenomegaly     Musculoskeletal:  No edema, warm, 2+ pulses throughout    Neurologic:  Moves all extremities. AAOx3, CN II-XII reviewed, s/p R BKA wound c/d/i      Psych:  Good insight, Not anxious nor agitated.        Data Review:    Review and/or order of clinical lab test  Review and/or order of tests in the radiology section of CPT  Review and/or order of tests in the medicine section of CPT      Labs:     Recent Labs     05/11/21  0400 05/10/21  0219   WBC 1.5* 5.5   HGB 7.1* 7.2*   HCT 24.7* 25.1*    346     Recent Labs     05/11/21  0455 05/10/21  0217 05/09/21  0141   * 146* 142   K 3.3* 3.4* 3.6   * 116* 113*   CO2 24 25 24   BUN 40* 40* 39*   CREA 2.26* 2.21* 2.04*   * 120* 111*   CA 8.8 9.3 9.3   MG 2.5* 2.0 2.0   PHOS 1.7* 2.5* 3.1  3.0     Recent Labs     05/11/21  0455 05/10/21  0217 05/09/21  0141   ALB 1.0* 1.0* 1.0*     No results for input(s): INR, PTP, APTT, INREXT, INREXT in the last 72 hours. No results for input(s): FE, TIBC, PSAT, FERR in the last 72 hours. Lab Results   Component Value Date/Time    Folate 7.1 05/02/2021 02:00 AM      No results for input(s): PH, PCO2, PO2 in the last 72 hours. No results for input(s): CPK, CKNDX, TROIQ in the last 72 hours.     No lab exists for component: CPKMB  Lab Results   Component Value Date/Time    Cholesterol, total 239 (H) 03/30/2020 02:32 PM    HDL Cholesterol 40 03/30/2020 02:32 PM    LDL,Direct 120 (H) 09/11/2012 12:00 AM    LDL, calculated 130 (H) 03/30/2020 02:32 PM    Triglyceride 345 (H) 03/30/2020 02:32 PM     Lab Results   Component Value Date/Time    Glucose (POC) 194 (H) 05/11/2021 11:18 AM    Glucose (POC) 189 (H) 05/11/2021 06:58 AM    Glucose (POC) 237 (H) 05/10/2021 09:47 PM    Glucose (POC) 214 (H) 05/10/2021 04:24 PM    Glucose (POC) 211 (H) 05/10/2021 11:38 AM     Lab Results   Component Value Date/Time    Color YELLOW/STRAW 05/09/2021 02:51 PM    Appearance CLEAR 05/09/2021 02:51 PM    Specific gravity 1.017 05/09/2021 02:51 PM    Specific gravity 1.016 02/19/2016 08:00 PM    pH (UA) 6.0 05/09/2021 02:51 PM    Protein 100 (A) 05/09/2021 02:51 PM    Glucose Negative 05/09/2021 02:51 PM    Ketone TRACE (A) 05/09/2021 02:51 PM    Bilirubin Negative 05/09/2021 02:51 PM    Urobilinogen 0.2 05/09/2021 02:51 PM    Nitrites Negative 05/09/2021 02:51 PM    Leukocyte Esterase Negative 05/09/2021 02:51 PM    Epithelial cells FEW 05/09/2021 02:51 PM    Bacteria 1+ (A) 05/09/2021 02:51 PM    WBC 0-4 05/09/2021 02:51 PM    RBC 0-5 05/09/2021 02:51 PM         Medications Reviewed:     Current Facility-Administered Medications   Medication Dose Route Frequency    0.45% sodium chloride infusion  100 mL/hr IntraVENous CONTINUOUS    rifAMPin (RIFADIN) 300 mg in 0.9% sodium chloride 100 mL IVPB  300 mg IntraVENous Q12H    DAPTOmycin (CUBICIN) 600 mg in 0.9% sodium chloride 50 mL IVPB RF formulation  600 mg IntraVENous Q48H    fentaNYL citrate (PF) injection 50 mcg  50 mcg IntraVENous Q3H PRN    senna-docusate (PERICOLACE) 8.6-50 mg per tablet 1 Tab  1 Tab Oral DAILY    tamsulosin (FLOMAX) capsule 0.4 mg  0.4 mg Oral DAILY    cyclobenzaprine (FLEXERIL) tablet 5 mg  5 mg Oral TID PRN    oxyCODONE IR (ROXICODONE) tablet 15 mg  15 mg Oral Q4H PRN    0.9% sodium chloride infusion 250 mL  250 mL IntraVENous PRN    epoetin naman-epbx (RETACRIT) injection 20,000 Units  20,000 Units SubCUTAneous Q MON, WED & FRI    albuterol-ipratropium (DUO-NEB) 2.5 MG-0.5 MG/3 ML  3 mL Nebulization Q4H PRN    aspirin chewable tablet 81 mg  81 mg Oral DAILY    ergocalciferol capsule 50,000 Units  50,000 Units Oral every Friday    ferrous sulfate tablet 325 mg  1 Tab Oral BID WITH MEALS    pantoprazole (PROTONIX) tablet 40 mg  40 mg Oral ACB    sodium chloride (NS) flush 5-40 mL  5-40 mL IntraVENous Q8H    sodium chloride (NS) flush 5-40 mL  5-40 mL IntraVENous PRN    acetaminophen (TYLENOL) tablet 650 mg  650 mg Oral Q6H PRN    Or    acetaminophen (TYLENOL) suppository 650 mg  650 mg Rectal Q6H PRN    polyethylene glycol (MIRALAX) packet 17 g  17 g Oral DAILY PRN    promethazine (PHENERGAN) tablet 12.5 mg  12.5 mg Oral Q6H PRN    Or    ondansetron (ZOFRAN) injection 4 mg 4 mg IntraVENous Q6H PRN    heparin (porcine) injection 5,000 Units  5,000 Units SubCUTAneous Q8H    insulin lispro (HUMALOG) injection   SubCUTAneous AC&HS    glucose chewable tablet 16 g  4 Tab Oral PRN    dextrose (D50W) injection syrg 12.5-25 g  12.5-25 g IntraVENous PRN    glucagon (GLUCAGEN) injection 1 mg  1 mg IntraMUSCular PRN     ______________________________________________________________________  EXPECTED LENGTH OF STAY: 4d 7h  ACTUAL LENGTH OF STAY:          10                 Anthony Motta MD

## 2021-05-11 NOTE — PROGRESS NOTES
Transition of Care Plan   RUR- Low  /  Medium / High Risks    DISPOSITION: TBD; pending medical progression  o Referral faxed to [facility], under review    F/U with PCP/Specialist     Transport: [facility]/family    Anticipated DC: Greater than 48hrs. This cm notes PT/OT recommendations for SNF placement with possible bridge to IPR once medically appropriate. The pt is confused and continues to complain of pain. Repeat MMRI ordered by ID. The pt is currently receiving IV daptomycin and rifampin, unitl cultures are negative. Will need 6 weeks abx, Picc to be placed once the pt's cultures are cleared. This cm contacted the pt's wife, Yonis Jones [phone] to discuss GALLITO. The wife identified that she would like a referral submitted to [facility]. This cm spoke with Lisa Calderon, Admission Liaison at [facility], [address] [place] [address] regarding GALLITO. Lisa Calderon prompted this cm to fax the referral to [phone] for review. 3:57pm    This cm acknowledges the attendings note: identifying the pt having 48hrs of Negative cultures; will wait 72hrs prior to getting PICC.    Repeat MRI Lumbar spine 5/10 without abscess    CM: Moustapha Streeter. MSW,   [phone]

## 2021-05-12 ENCOUNTER — APPOINTMENT (OUTPATIENT)
Dept: GENERAL RADIOLOGY | Age: 66
DRG: 551 | End: 2021-05-12
Attending: NURSE PRACTITIONER
Payer: MEDICARE

## 2021-05-12 LAB
ALBUMIN SERPL-MCNC: 1 G/DL (ref 3.5–5)
ANION GAP SERPL CALC-SCNC: 11 MMOL/L (ref 5–15)
ANION GAP SERPL CALC-SCNC: 9 MMOL/L (ref 5–15)
BASOPHILS # BLD: 0 K/UL (ref 0–0.1)
BASOPHILS NFR BLD: 1 % (ref 0–1)
BUN SERPL-MCNC: 39 MG/DL (ref 6–20)
BUN SERPL-MCNC: 39 MG/DL (ref 6–20)
BUN/CREAT SERPL: 17 (ref 12–20)
BUN/CREAT SERPL: 17 (ref 12–20)
CALCIUM SERPL-MCNC: 8.6 MG/DL (ref 8.5–10.1)
CALCIUM SERPL-MCNC: 8.6 MG/DL (ref 8.5–10.1)
CHLORIDE SERPL-SCNC: 113 MMOL/L (ref 97–108)
CHLORIDE SERPL-SCNC: 113 MMOL/L (ref 97–108)
CO2 SERPL-SCNC: 20 MMOL/L (ref 21–32)
CO2 SERPL-SCNC: 21 MMOL/L (ref 21–32)
CREAT SERPL-MCNC: 2.28 MG/DL (ref 0.7–1.3)
CREAT SERPL-MCNC: 2.32 MG/DL (ref 0.7–1.3)
D DIMER PPP FEU-MCNC: 4.17 MG/L FEU (ref 0–0.65)
DIFFERENTIAL METHOD BLD: ABNORMAL
EOSINOPHIL # BLD: 0.1 K/UL (ref 0–0.4)
EOSINOPHIL NFR BLD: 4 % (ref 0–7)
ERYTHROCYTE [DISTWIDTH] IN BLOOD BY AUTOMATED COUNT: 16.8 % (ref 11.5–14.5)
GLUCOSE BLD STRIP.AUTO-MCNC: 131 MG/DL (ref 65–117)
GLUCOSE BLD STRIP.AUTO-MCNC: 147 MG/DL (ref 65–117)
GLUCOSE BLD STRIP.AUTO-MCNC: 162 MG/DL (ref 65–117)
GLUCOSE BLD STRIP.AUTO-MCNC: 188 MG/DL (ref 65–117)
GLUCOSE SERPL-MCNC: 162 MG/DL (ref 65–100)
GLUCOSE SERPL-MCNC: 163 MG/DL (ref 65–100)
HCT VFR BLD AUTO: 26.4 % (ref 36.6–50.3)
HGB BLD-MCNC: 7.7 G/DL (ref 12.1–17)
IMM GRANULOCYTES # BLD AUTO: 0 K/UL
IMM GRANULOCYTES NFR BLD AUTO: 0 %
LYMPHOCYTES # BLD: 0.5 K/UL (ref 0.8–3.5)
LYMPHOCYTES NFR BLD: 37 % (ref 12–49)
MAGNESIUM SERPL-MCNC: 1.8 MG/DL (ref 1.6–2.4)
MCH RBC QN AUTO: 26 PG (ref 26–34)
MCHC RBC AUTO-ENTMCNC: 29.2 G/DL (ref 30–36.5)
MCV RBC AUTO: 89.2 FL (ref 80–99)
METAMYELOCYTES NFR BLD MANUAL: 1 %
MONOCYTES # BLD: 0.1 K/UL (ref 0–1)
MONOCYTES NFR BLD: 9 % (ref 5–13)
NEUTS BAND NFR BLD MANUAL: 3 % (ref 0–6)
NEUTS SEG # BLD: 0.6 K/UL (ref 1.8–8)
NEUTS SEG NFR BLD: 45 % (ref 32–75)
NRBC # BLD: 0.05 K/UL (ref 0–0.01)
NRBC BLD-RTO: 3.9 PER 100 WBC
PHOSPHATE SERPL-MCNC: 1.8 MG/DL (ref 2.6–4.7)
PHOSPHATE SERPL-MCNC: 2 MG/DL (ref 2.6–4.7)
PLATELET # BLD AUTO: 255 K/UL (ref 150–400)
PMV BLD AUTO: 9.7 FL (ref 8.9–12.9)
POTASSIUM SERPL-SCNC: 3.1 MMOL/L (ref 3.5–5.1)
POTASSIUM SERPL-SCNC: 3.1 MMOL/L (ref 3.5–5.1)
PROCALCITONIN SERPL-MCNC: 2.55 NG/ML
RBC # BLD AUTO: 2.96 M/UL (ref 4.1–5.7)
RBC MORPH BLD: ABNORMAL
RBC MORPH BLD: ABNORMAL
SERVICE CMNT-IMP: ABNORMAL
SODIUM SERPL-SCNC: 143 MMOL/L (ref 136–145)
SODIUM SERPL-SCNC: 144 MMOL/L (ref 136–145)
WBC # BLD AUTO: 1.3 K/UL (ref 4.1–11.1)

## 2021-05-12 PROCEDURE — 51798 US URINE CAPACITY MEASURE: CPT

## 2021-05-12 PROCEDURE — 80069 RENAL FUNCTION PANEL: CPT

## 2021-05-12 PROCEDURE — 87040 BLOOD CULTURE FOR BACTERIA: CPT

## 2021-05-12 PROCEDURE — 71045 X-RAY EXAM CHEST 1 VIEW: CPT

## 2021-05-12 PROCEDURE — 80048 BASIC METABOLIC PNL TOTAL CA: CPT

## 2021-05-12 PROCEDURE — 85379 FIBRIN DEGRADATION QUANT: CPT

## 2021-05-12 PROCEDURE — 74011636637 HC RX REV CODE- 636/637: Performed by: INTERNAL MEDICINE

## 2021-05-12 PROCEDURE — 65270000032 HC RM SEMIPRIVATE

## 2021-05-12 PROCEDURE — 83735 ASSAY OF MAGNESIUM: CPT

## 2021-05-12 PROCEDURE — 84145 PROCALCITONIN (PCT): CPT

## 2021-05-12 PROCEDURE — 74011250636 HC RX REV CODE- 250/636: Performed by: INTERNAL MEDICINE

## 2021-05-12 PROCEDURE — 85025 COMPLETE CBC W/AUTO DIFF WBC: CPT

## 2021-05-12 PROCEDURE — 74011250637 HC RX REV CODE- 250/637: Performed by: INTERNAL MEDICINE

## 2021-05-12 PROCEDURE — 74011000250 HC RX REV CODE- 250: Performed by: INTERNAL MEDICINE

## 2021-05-12 PROCEDURE — 99223 1ST HOSP IP/OBS HIGH 75: CPT | Performed by: INTERNAL MEDICINE

## 2021-05-12 PROCEDURE — 2709999900 HC NON-CHARGEABLE SUPPLY

## 2021-05-12 PROCEDURE — 74011250636 HC RX REV CODE- 250/636: Performed by: NURSE PRACTITIONER

## 2021-05-12 PROCEDURE — 94760 N-INVAS EAR/PLS OXIMETRY 1: CPT

## 2021-05-12 PROCEDURE — 74011000258 HC RX REV CODE- 258: Performed by: INTERNAL MEDICINE

## 2021-05-12 PROCEDURE — 94664 DEMO&/EVAL PT USE INHALER: CPT

## 2021-05-12 PROCEDURE — 97110 THERAPEUTIC EXERCISES: CPT

## 2021-05-12 PROCEDURE — 82962 GLUCOSE BLOOD TEST: CPT

## 2021-05-12 PROCEDURE — 84100 ASSAY OF PHOSPHORUS: CPT

## 2021-05-12 PROCEDURE — 94640 AIRWAY INHALATION TREATMENT: CPT

## 2021-05-12 PROCEDURE — 92526 ORAL FUNCTION THERAPY: CPT

## 2021-05-12 PROCEDURE — 74011000258 HC RX REV CODE- 258: Performed by: NURSE PRACTITIONER

## 2021-05-12 PROCEDURE — 36415 COLL VENOUS BLD VENIPUNCTURE: CPT

## 2021-05-12 RX ORDER — SODIUM BICARBONATE 650 MG/1
650 TABLET ORAL 2 TIMES DAILY
Status: DISCONTINUED | OUTPATIENT
Start: 2021-05-12 | End: 2021-05-17

## 2021-05-12 RX ADMIN — HEPARIN SODIUM 5000 UNITS: 5000 INJECTION INTRAVENOUS; SUBCUTANEOUS at 07:03

## 2021-05-12 RX ADMIN — FENTANYL CITRATE 50 MCG: 50 INJECTION, SOLUTION INTRAMUSCULAR; INTRAVENOUS at 22:17

## 2021-05-12 RX ADMIN — SODIUM BICARBONATE 650 MG: 650 TABLET ORAL at 12:42

## 2021-05-12 RX ADMIN — FENTANYL CITRATE 50 MCG: 50 INJECTION, SOLUTION INTRAMUSCULAR; INTRAVENOUS at 19:05

## 2021-05-12 RX ADMIN — TAMSULOSIN HYDROCHLORIDE 0.4 MG: 0.4 CAPSULE ORAL at 10:37

## 2021-05-12 RX ADMIN — RIFAMPIN 300 MG: 600 INJECTION, POWDER, LYOPHILIZED, FOR SOLUTION INTRAVENOUS at 10:44

## 2021-05-12 RX ADMIN — FERROUS SULFATE TAB 325 MG (65 MG ELEMENTAL FE) 325 MG: 325 (65 FE) TAB at 16:57

## 2021-05-12 RX ADMIN — SODIUM BICARBONATE 650 MG: 650 TABLET ORAL at 17:04

## 2021-05-12 RX ADMIN — Medication 10 ML: at 20:18

## 2021-05-12 RX ADMIN — IPRATROPIUM BROMIDE AND ALBUTEROL SULFATE 3 ML: .5; 3 SOLUTION RESPIRATORY (INHALATION) at 16:36

## 2021-05-12 RX ADMIN — ASPIRIN 81 MG: 81 TABLET, CHEWABLE ORAL at 10:37

## 2021-05-12 RX ADMIN — Medication 10 ML: at 13:58

## 2021-05-12 RX ADMIN — FENTANYL CITRATE 50 MCG: 50 INJECTION, SOLUTION INTRAMUSCULAR; INTRAVENOUS at 13:58

## 2021-05-12 RX ADMIN — PIPERACILLIN AND TAZOBACTAM 3.38 G: 3; .375 INJECTION, POWDER, LYOPHILIZED, FOR SOLUTION INTRAVENOUS at 15:27

## 2021-05-12 RX ADMIN — INSULIN LISPRO 2 UNITS: 100 INJECTION, SOLUTION INTRAVENOUS; SUBCUTANEOUS at 12:42

## 2021-05-12 RX ADMIN — HEPARIN SODIUM 5000 UNITS: 5000 INJECTION INTRAVENOUS; SUBCUTANEOUS at 16:57

## 2021-05-12 RX ADMIN — FENTANYL CITRATE 50 MCG: 50 INJECTION, SOLUTION INTRAMUSCULAR; INTRAVENOUS at 07:25

## 2021-05-12 RX ADMIN — PANTOPRAZOLE SODIUM 40 MG: 40 TABLET, DELAYED RELEASE ORAL at 07:03

## 2021-05-12 RX ADMIN — RIFAMPIN 300 MG: 600 INJECTION, POWDER, LYOPHILIZED, FOR SOLUTION INTRAVENOUS at 20:18

## 2021-05-12 RX ADMIN — INSULIN LISPRO 2 UNITS: 100 INJECTION, SOLUTION INTRAVENOUS; SUBCUTANEOUS at 07:25

## 2021-05-12 RX ADMIN — DAPTOMYCIN 600 MG: 500 INJECTION, POWDER, LYOPHILIZED, FOR SOLUTION INTRAVENOUS at 18:06

## 2021-05-12 RX ADMIN — FERROUS SULFATE TAB 325 MG (65 MG ELEMENTAL FE) 325 MG: 325 (65 FE) TAB at 10:50

## 2021-05-12 RX ADMIN — SODIUM CHLORIDE 50 ML/HR: 4.5 INJECTION, SOLUTION INTRAVENOUS at 13:58

## 2021-05-12 RX ADMIN — PIPERACILLIN AND TAZOBACTAM 3.38 G: 3; .375 INJECTION, POWDER, LYOPHILIZED, FOR SOLUTION INTRAVENOUS at 22:15

## 2021-05-12 RX ADMIN — DOCUSATE SODIUM 50 MG AND SENNOSIDES 8.6 MG 1 TABLET: 8.6; 5 TABLET, FILM COATED ORAL at 10:37

## 2021-05-12 RX ADMIN — EPOETIN ALFA-EPBX 20000 UNITS: 20000 INJECTION, SOLUTION INTRAVENOUS; SUBCUTANEOUS at 20:18

## 2021-05-12 NOTE — PROGRESS NOTES
Problem: Mobility Impaired (Adult and Pediatric)  Goal: *Acute Goals and Plan of Care (Insert Text)  Description: FUNCTIONAL STATUS PRIOR TO ADMISSION: Patient was modified independent using a Rw and R LE prosthesis for functional mobility prior to recent revision of BKA to AKA. Discharged to [facility] and was working on transfers to w/c with one assist    [address] [facility]: The patient lived with his spouse but did not require assist prior to AKA. Physical Therapy Goals  Initiated 5/4/2021; continued 5/11/21  1. Patient will move from supine to sit and sit to supine  and scoot up and down in bed with moderate assistance  within 7 day(s). 2.  Patient will transfer from bed to chair and chair to bed with maximal assistance using the least restrictive device within 7 day(s). 3.  Patient will perform sit to stand with maximal assistance within 7 day(s). 4.  Patient will demo fair sitting balance at EOB x2 min in prep for mobility progression within 7 days. 5. Patient will indep complete B LE there ex program to incr strength within 7 days. Outcome: Progressing Towards Goal   PHYSICAL THERAPY TREATMENT  Patient: George Sinha (76 y.o. male)  Date: 5/12/2021  Diagnosis: Acute delirium [R41.0] Acute delirium       Precautions:    Chart, physical therapy assessment, plan of care and goals were reviewed. ASSESSMENT  Patient continues with skilled PT services and is not progressing towards goals. Pt focused on obtaining a  for residual limb. Pt reports unable to perform exercises or sit EOB, Pt reporting \"I am trying to prevent a problem. \"  Attempted to educate pt on the importance of residual limb exercises for ROM and maintain strength and sitting EOB to assist with mobility and independence. Decrease understanding or openness to education. Residual limb is is shaping well and pt has not had a  since surgery. Hospital does not have shrinkers for residual limb.  Pt may be able to utilize tubular gauze or ace wrap. Current Level of Function Impacting Discharge (mobility/balance): Other factors to consider for discharge:          PLAN :  Patient continues to benefit from skilled intervention to address the above impairments. Continue treatment per established plan of care. to address goals. Recommendation for discharge: (in order for the patient to meet his/her long term goals)  Therapy up to 5 days/week in SNF setting    This discharge recommendation:  Has not yet been discussed the attending provider and/or case management    IF patient discharges home will need the following DME: to be determined (TBD)       SUBJECTIVE:   Patient stated I need a shink wrap for my leg.     OBJECTIVE DATA SUMMARY:   Critical Behavior:  Neurologic State: Alert, Confused  Orientation Level: Oriented to person, Oriented to place, Other (Comment)  Cognition: Decreased command following, Decreased attention/concentration  Safety/Judgement: Decreased insight into deficits  Functional Mobility Training:  Bed Mobility:       Transfers:    Balance:     Ambulation/Gait Training:       Therapeutic Exercises:   Pt demonstrated SLR, hip abduction, quad sets  Pain Rating:      Activity Tolerance:       After treatment patient left in no apparent distress:   Supine in bed, Call bell within reach, and Bed / chair alarm activated    COMMUNICATION/COLLABORATION:   The patients plan of care was discussed with: Registered nurse.      Sukhjinder Torres PTA   Time Calculation: 8 mins

## 2021-05-12 NOTE — PROGRESS NOTES
Problem: Falls - Risk of  Goal: *Absence of Falls  Description: Document Celine Gross Fall Risk and appropriate interventions in the flowsheet. Outcome: Progressing Towards Goal  Note: Fall Risk Interventions:       Mentation Interventions: Adequate sleep, hydration, pain control    Medication Interventions: Bed/chair exit alarm    Elimination Interventions: Bed/chair exit alarm, Patient to call for help with toileting needs    History of Falls Interventions: Bed/chair exit alarm, Door open when patient unattended         Problem: Patient Education: Go to Patient Education Activity  Goal: Patient/Family Education  Outcome: Progressing Towards Goal     Problem: Pressure Injury - Risk of  Goal: *Prevention of pressure injury  Description: Document Celestino Scale and appropriate interventions in the flowsheet. Outcome: Progressing Towards Goal  Note: Pressure Injury Interventions:  Sensory Interventions: Assess changes in LOC    Moisture Interventions: Absorbent underpads, Apply protective barrier, creams and emollients    Activity Interventions: Increase time out of bed, Pressure redistribution bed/mattress(bed type)    Mobility Interventions: HOB 30 degrees or less, Float heels, Pressure redistribution bed/mattress (bed type)    Nutrition Interventions: Document food/fluid/supplement intake    Friction and Shear Interventions: Apply protective barrier, creams and emollients                Problem: Patient Education: Go to Patient Education Activity  Goal: Patient/Family Education  Outcome: Progressing Towards Goal     Problem: Diabetes Self-Management  Goal: *Disease process and treatment process  Description: Define diabetes and identify own type of diabetes; list 3 options for treating diabetes. Outcome: Progressing Towards Goal  Goal: *Incorporating nutritional management into lifestyle  Description: Describe effect of type, amount and timing of food on blood glucose; list 3 methods for planning meals.   Outcome: Progressing Towards Goal  Goal: *Incorporating physical activity into lifestyle  Description: State effect of exercise on blood glucose levels. Outcome: Progressing Towards Goal  Goal: *Developing strategies to promote health/change behavior  Description: Define the ABC's of diabetes; identify appropriate screenings, schedule and personal plan for screenings. Outcome: Progressing Towards Goal  Goal: *Using medications safely  Description: State effect of diabetes medications on diabetes; name diabetes medication taking, action and side effects. Outcome: Progressing Towards Goal  Goal: *Monitoring blood glucose, interpreting and using results  Description: Identify recommended blood glucose targets  and personal targets. Outcome: Progressing Towards Goal  Goal: *Prevention, detection, treatment of acute complications  Description: List symptoms of hyper- and hypoglycemia; describe how to treat low blood sugar and actions for lowering  high blood glucose level. Outcome: Progressing Towards Goal  Goal: *Prevention, detection and treatment of chronic complications  Description: Define the natural course of diabetes and describe the relationship of blood glucose levels to long term complications of diabetes.   Outcome: Progressing Towards Goal  Goal: *Developing strategies to address psychosocial issues  Description: Describe feelings about living with diabetes; identify support needed and support network  Outcome: Progressing Towards Goal  Goal: *Insulin pump training  Outcome: Progressing Towards Goal  Goal: *Sick day guidelines  Outcome: Progressing Towards Goal  Goal: *Patient Specific Goal (EDIT GOAL, INSERT TEXT)  Outcome: Progressing Towards Goal     Problem: Patient Education: Go to Patient Education Activity  Goal: Patient/Family Education  Outcome: Progressing Towards Goal     Problem: Patient Education: Go to Patient Education Activity  Goal: Patient/Family Education  Outcome: Progressing Towards Goal Problem: Patient Education: Go to Patient Education Activity  Goal: Patient/Family Education  Outcome: Progressing Towards Goal     Problem: Risk for Spread of Infection  Goal: Prevent transmission of infectious organism to others  Description: Prevent the transmission of infectious organisms to other patients, staff members, and visitors.   Outcome: Progressing Towards Goal     Problem: Patient Education:  Go to Education Activity  Goal: Patient/Family Education  Outcome: Progressing Towards Goal     Problem: Patient Education: Go to Patient Education Activity  Goal: Patient/Family Education  Outcome: Progressing Towards Goal     Problem: Infection - Risk of, Urinary Catheter-Associated Urinary Tract Infection  Goal: *Absence of infection signs and symptoms  Outcome: Progressing Towards Goal     Problem: Patient Education: Go to Patient Education Activity  Goal: Patient/Family Education  Outcome: Progressing Towards Goal

## 2021-05-12 NOTE — PROGRESS NOTES
[facility] [place] [address], [address], [place]: [phone]  F: [phone]    Reason for Evaluation   Patience Mcguire is a 77 y.o. male who is seen in in hospital as a new patient for evaluation of leucopenia    History of Present Illness:   Patient is a 77 y.o. male admitted 5/3/2021 with lethargy and confusion 2 weeks after amputation. He was diagnosed with Gram positive bacteremia, CT suggestive of RUL pneumonia and L4-L5 discitis/ osteomyelitis. He was started on Vancomycin and Zosyn. On 5/3, Zosyn was stopped and switched to Cefepime. Jaylan Hopson stopped 5/9 as WBCs were low. Received Levaquin x 1. IV vancomycin was changed to Daptomycin on 5/7 and Rifampin added. We are consulted as his WBC count that was normal on admission, dropped to 2200 on 5/9/2021, once cefepime was stopped improved to 5500 on 5/11/2021 but has since dropped again to 1300 with an [address] [place] [address] Way of 600. He also has acute on chronic anemia. Work up on 5/2/2021 showed no iron or B12 deficiency . No hemolysis. He has known CKD and is on Erythropoietin     He denies bleeding, pain is controlled, has no nausea, afebrile, no cough at the moment.       Past Medical History:   Diagnosis Date    Arthritis     BPH (benign prostatic hyperplasia)     Chronic kidney disease     Chronic pain     BACK NEUROPATHY FEET HANDS    DM neuropathy, type II diabetes mellitus ([address]     DM type 2 causing CKD stage 3 ([address] 12/17/2012    DM type 2 causing vascular disease ([address]     Dyslipidemia     Foot ulcer due to secondary DM ([address] 12/1/2012    GERD (gastroesophageal reflux disease)     History of esophageal dilatation     HTN     Ill-defined condition 2013    MRSA in wound right leg (BKA)    S/P BKA (below knee amputation) ([address]     right BKA due to non-healing ulcer    Sleep apnea     Doesnt use CPAP, patient hasnt been retested since weight loss    Thromboembolus ([address] 1970'S    BLOOD CLOT LEFT LEG Past Surgical History:   Procedure Laterality Date    COLONOSCOPY N/A 3/30/2021    COLONOSCOPY performed by Laura Mendez MD at [address] [place] [address] ECHO 2D ADULT  8/09    normal; LVEF 60%    ECHO STRESS  4/09    7 min; normal    ENDOSCOPY, COLON, DIAGNOSTIC      FLEXIBLE SIGMOIDOSCOPY N/A 2/24/2021    SIGMOIDOSCOPY FLEXIBLE performed by Laura Mendez MD at [address] [place] [address] HX AMPUTATION  2012    left great toe    HX AMPUTATION  2007    BKA right    HX BELOW KNEE AMPUTATION Right     HX CERVICAL FUSION  2009    x2    HX ENDOSCOPY      EGD with Dilitation x 2    HX ORTHOPAEDIC  2006    ACDF C4-C7    IR INSERT TUNL CVC W/O PORT OVER 5 YR  6/19/2020    IR REMOVE TUNL CVAD W/O PORT / PUMP  9/14/2020    CA ABDOMEN SURGERY PROC UNLISTED      pilonidal cyst    STRESS TEST MYOVIEW  8/09    walked 8:46; normal; LVEF 51%      Social History     Tobacco Use    Smoking status: Never Smoker    Smokeless tobacco: Never Used   Substance Use Topics    Alcohol use: No      Family History   Problem Relation Age of Onset    Hypertension Mother    [place] Gout Mother     Cancer Father         leukemia    Diabetes Father     Hypertension Sister     Diabetes Maternal Grandmother     Hypertension Maternal Grandmother     Diabetes Paternal Grandmother     Hypertension Paternal Grandmother     Anesth Problems Neg Hx      Current Facility-Administered Medications   Medication Dose Route Frequency    sodium bicarbonate tablet 650 mg  650 mg Oral BID    piperacillin-tazobactam (ZOSYN) 3.375 g in 0.9% sodium chloride (MBP/ADV) 100 mL MBP  3.375 g IntraVENous Q8H    0.45% sodium chloride infusion  50 mL/hr IntraVENous CONTINUOUS    rifAMPin (RIFADIN) 300 mg in 0.9% sodium chloride 100 mL IVPB  300 mg IntraVENous Q12H    DAPTOmycin (CUBICIN) 600 mg in 0.9% sodium chloride 50 mL IVPB RF formulation  600 mg IntraVENous Q48H    fentaNYL citrate (PF) injection 50 mcg  50 mcg IntraVENous Q3H PRN    senna-docusate (PERICOLACE) 8.6-50 mg per tablet 1 Tab  1 Tab Oral DAILY    tamsulosin (FLOMAX) capsule 0.4 mg  0.4 mg Oral DAILY    cyclobenzaprine (FLEXERIL) tablet 5 mg  5 mg Oral TID PRN    oxyCODONE IR (ROXICODONE) tablet 15 mg  15 mg Oral Q4H PRN    0.9% sodium chloride infusion 250 mL  250 mL IntraVENous PRN    epoetin naman-epbx (RETACRIT) injection 20,000 Units  20,000 Units SubCUTAneous Q MON, WED & FRI    albuterol-ipratropium (DUO-NEB) 2.5 MG-0.5 MG/3 ML  3 mL Nebulization Q4H PRN    aspirin chewable tablet 81 mg  81 mg Oral DAILY    ergocalciferol capsule 50,000 Units  50,000 Units Oral every Friday    ferrous sulfate tablet 325 mg  1 Tab Oral BID WITH MEALS    pantoprazole (PROTONIX) tablet 40 mg  40 mg Oral ACB    sodium chloride (NS) flush 5-40 mL  5-40 mL IntraVENous Q8H    sodium chloride (NS) flush 5-40 mL  5-40 mL IntraVENous PRN    acetaminophen (TYLENOL) tablet 650 mg  650 mg Oral Q6H PRN    Or    acetaminophen (TYLENOL) suppository 650 mg  650 mg Rectal Q6H PRN    polyethylene glycol (MIRALAX) packet 17 g  17 g Oral DAILY PRN    promethazine (PHENERGAN) tablet 12.5 mg  12.5 mg Oral Q6H PRN    Or    ondansetron (ZOFRAN) injection 4 mg  4 mg IntraVENous Q6H PRN    heparin (porcine) injection 5,000 Units  5,000 Units SubCUTAneous Q8H    insulin lispro (HUMALOG) injection   SubCUTAneous AC&HS    glucose chewable tablet 16 g  4 Tab Oral PRN    dextrose (D50W) injection syrg 12.5-25 g  12.5-25 g IntraVENous PRN    glucagon (GLUCAGEN) injection 1 mg  1 mg IntraMUSCular PRN      Allergies   Allergen Reactions    Adhesive Tape-Silicones Hives    Gabapentin Anxiety     Anxiety w/ hallucinations    Sulfa (Sulfonamide Antibiotics) Swelling     Lips eyes        Review of Systems: A complete review of systems was obtained, negative except as described above.     Physical Exam:     Visit Vitals  /69   Pulse 98   Temp 98.1 °F (36.7 °C)   Resp 20   Ht 5' 11\" (1.803 m)   Wt 125 lb 3.2 oz (56.8 kg)   SpO2 100%   BMI 17.46 kg/m²     ECOG PS: 2  General: No distress, frail  Eyes: PERRLA, anicteric sclerae  HENT: Atraumatic, dry mucosa  Neck: Supple  Lymphatic: No cervical, supraclavicular, or inguinal adenopathy  Respiratory:  normal respiratory effort  CV: Normal rate  GI: Soft, nontender  Skin: No rashes, ecchymoses, or petechiae. Normal temperature, turgor, and texture. Psych: Alert, oriented, appropriate affect, normal judgment/insight    Results:     Lab Results   Component Value Date/Time    WBC 1.3 (L) 05/12/2021 04:37 AM    HGB 7.7 (L) 05/12/2021 04:37 AM    HCT 26.4 (L) 05/12/2021 04:37 AM    PLATELET 946 82/36/4677 04:37 AM    MCV 89.2 05/12/2021 04:37 AM    ABS. NEUTROPHILS 0.6 (L) 05/12/2021 04:37 AM    Hemoglobin (POC) 5.2 (LL) 03/30/2021 08:37 AM    Hemoglobin, POC 11.2 (L) 08/20/2014 11:05 PM    Hematocrit (POC) 26 (L) 10/09/2018 07:16 AM    Hematocrit, POC 33 (L) 08/20/2014 11:05 PM     Lab Results   Component Value Date/Time    Sodium 144 05/12/2021 04:38 AM    Potassium 3.1 (L) 05/12/2021 04:38 AM    Chloride 113 (H) 05/12/2021 04:38 AM    CO2 20 (L) 05/12/2021 04:38 AM    Glucose 162 (H) 05/12/2021 04:38 AM    BUN 39 (H) 05/12/2021 04:38 AM    Creatinine 2.32 (H) 05/12/2021 04:38 AM    GFR est AA 34 (L) 05/12/2021 04:38 AM    GFR est non-AA 28 (L) 05/12/2021 04:38 AM    Calcium 8.6 05/12/2021 04:38 AM    Sodium (POC) 140 10/09/2018 07:16 AM    Potassium (POC) 3.9 10/09/2018 07:16 AM    Chloride,  (H) 08/20/2014 11:05 PM    Chloride (POC) 118 (H) 10/09/2018 07:16 AM    Glucose (POC) 162 (H) 05/12/2021 12:18 PM    BUN (POC) 32 (H) 10/09/2018 07:16 AM    Creatinine, POC 2.5 (H) 08/20/2014 11:05 PM    Creatinine (POC) 2.3 (H) 10/09/2018 07:16 AM    Calcium, ionized (POC) 1.12 10/09/2018 07:16 AM     Lab Results   Component Value Date/Time    Bilirubin, total 0.3 05/02/2021 02:00 AM    ALT (SGPT) 13 05/02/2021 02:00 AM    Alk.  phosphatase 234 (H) 05/02/2021 02:00 AM Protein, total 7.2 05/02/2021 02:00 AM    Albumin 1.0 (L) 05/12/2021 04:38 AM    Globulin 6.0 (H) 05/02/2021 02:00 AM     Lab Results   Component Value Date/Time    Reticulocyte count 1.2 12/01/2012 03:05 PM    Iron % saturation 27 05/02/2021 02:00 AM    TIBC 109 (L) 05/02/2021 02:00 AM    Ferritin 2,753 (H) 05/02/2021 02:00 AM    Vitamin B12 683 05/02/2021 02:00 AM    Folate 7.1 05/02/2021 02:00 AM    Haptoglobin 171 06/15/2009 10:59 AM     06/15/2009 10:59 AM    Sed rate (ESR) >130 (H) 11/04/2009 02:45 AM    C-Reactive protein >25.00 (H) 11/04/2009 02:45 AM    C-Reactive Protein, Cardiac 9.75 (H) 10/17/2011 02:30 PM    TSH 3.01 05/02/2021 02:00 AM    Lipase 113 05/02/2021 02:00 AM     Lab Results   Component Value Date/Time    INR 1.0 10/22/2014 12:29 PM    aPTT 25.1 05/25/2009 05:25 PM    D-dimer 4.17 (H) 05/12/2021 12:08 PM       Records reviewed and summarized above. Pathology report(s) reviewed above. Radiology report(s) reviewed above. MRI   IMPRESSION     1. Again noted are changes suspicious for discitis at L3-L4 and L4-L5 levels  with mild diffuse bulging of the disc and facet arthropathy resulting in  moderate central stenosis with narrowing of neural foramina. 2. No epidural collection is identified. There is slight improvement in the  edema within L4 vertebral body. 3. Marked disc space narrowing at L5-S1 is unchanged. 4. Diffuse anasarca is again noted. . Diffuse low signal throughout the  visualized bone marrow is again noted consistent with metabolic bone disease  most likely related to renal osteodystrophy.   Assessment:   1) Leucopenia    This is drug induced, MAR reviewed and suspected culprit is Rifampin  If ongoing use is indicated,, given severe systemic infection would consider giving granix for an ANC of < = 500 until [address] is stable at 1000    2) Anemia    Secondary to inflammation and CKD  He is on Erythropoietin  As he is on Rifampin consider r/o G6PD deficiency and monitor for hemolysis    3) Discitis  Management per ID    4) CKD  Nephrology consultation  Reviewed   Plan:     ·  G6PD , haptoglobin, LD, smear, HIV   · If Rifampin is indicated, consider GSF- granix at 5 mcg/kg daily  if ANC is < 500, adminiter till Afebrile for atleast 2 days or ANC > 1000 whichever comes sooner  · CBC diff daily    I appreciate the opportunity to participate in Mr. Charli Blake 's care.     Signed By: Ted Green MD

## 2021-05-12 NOTE — PROGRESS NOTES
Comprehensive Nutrition Assessment    Type and Reason for Visit: Reassess     Nutrition Recommendations/Plan:      1. Recommend trial of appetite stimulant  2. It is unlikely for pt to meet kcal/protein needs at this time. Severely malnourished without much improvement/ if any of PO intake during this admission. Ideally would benefit from enteral nutrition support to assist with healing/ regaining strength, but ?if within goals of care. 3. Continue dental soft diet, increase ONS Ensure Enlive to TID with meals. Continue Magic Cup once daily. 4. Added MVI with minerals. ?consider B complex, thiamine in setting of malnutrition  5. PRN electrolyte repletion   6. Consider GI eval if esophageal dysphagia symptoms persist    Malnutrition Assessment:  Malnutrition Status:  Severe malnutrition    Context:  Chronic illness     Findings of the 6 clinical characteristics of malnutrition:   Energy Intake:  7 - 75% or less est energy requirements for 1 month or longer  Weight Loss:  7.00 - Greater than 10% over 6 months     Body Fat Loss:  7 - Severe body fat loss, Triceps, Orbital   Muscle Mass Loss:  7 - Severe muscle mass loss, Temples (temporalis), Clavicles (pectoralis &deltoids), Hand (interosseous)  Fluid Accumulation:  No significant fluid accumulation,     Strength:  Not performed      Nutrition Assessment:    PMHx of CKD, Type 2 DM, dyslipidemia, HTN, sleep apnea, DJD, chronic pain syndrome, and s/p recent R AKA in July 2020. Pt admitted after becoming lethargic/confused at [facility] facility. Noted MRSA bacteremia, L4-L5 discitis and OM, sacral wound, leukopenia, acute metabolic encephalopathy, anemia, and ESTRELLA. Nephrology, ID, and hematology following. Met with pt and daughter at bedside for follow-up. Pt is more awake/alert than before, but poor appetite persists. He is eating slightly better than before, but still largely inadequate. wt loss continues during admission. Tolerating dental soft diet. Likes the Ensure, but only drinking 1x/day as ordered (prefers chocolate). He is unsure of [employer]. He is agreeable to increasing Ensure Enlive intake to at least BID. States he knows he needs to gain weight to become stronger. Also open to trying appetite stimulant. Still issues feeding self, but better per RN. Noted previous Reports hx esophageal dilation and eats soft/easy to chew/swallow foods. Seen by SLP yesterday who recommends continuation of dental soft/ thin liquids and to consider GI f/u if dysphagia persists. UBW of 180# - ~ 6months ago. Pt also s/p R AKA since this time, but would only account for ~18 lb of this wt loss. Using 162 lb as base weight in July, pt has lost another 37 lb (22% of BW) since that time - which is significant. Severe muscle wasting/ loss of SQ fat. Meets criteria for severe protein-calorie malnutrition. Nutritionally Significant Medications: Cubicin, Retacrit, ergocalciferol, fentanyl citrate, ferrous sulfate, correctional scale, oxycodone, Protonix, Zosyn, Pericolace. PRN: Miralax      Estimated Daily Nutrient Needs:  Energy (kcal): 0817-5349(35-40 kcal/kg); Weight Used for Energy Requirements: Current(56.5 kg)  Protein (g): (1.5-2 gm/kg);  Weight Used for Protein Requirements: Current  Fluid (ml/day): 1 mL/kcal; Method Used for Fluid Requirements: 1 ml/kcal    Nutrition Related Findings:   BM: 5/12 - formed  Edema: none    Wounds:  Pressure injury, Unstageable to sacrum    Current Nutrition Therapies:  Diet: dental soft  Supplements: Ensure Enlive @ lunch, Magic Cup @ dinner  Meal intake:   Patient Vitals for the past 168 hrs:   % Diet Eaten   05/11/21 1254 26 - 50%   05/11/21 1033 51 - 75%   05/10/21 1711 1 - 25%   05/10/21 1628 26 - 50%   05/10/21 1044 0%     Supplement intake:   Patient Vitals for the past 168 hrs:   Supplement intake %   05/10/21 1711 51 - 75%   05/10/21 1628 76 - 100%   05/10/21 1044 0       Anthropometric Measures:  · Height:  5' 11\" (180.3 cm)  · Current Body Wt:  56.5 kg (124 lb 9.6 oz)   · Admission Body Wt:  136 lb 11 oz    · Usual Body Wt:  81.6 kg (180 lb)     · Ideal Body Wt:  172 lbs:  72.4 %   · Adjusted Body Weight:  137.2;  Weight Adjustment for: Amputation   · Adjusted BMI:  19.2    · BMI Category:  Normal weight (BMI 22.0-24.9) age over 72       Wt Readings from Last 20 Encounters:   05/13/21 56.5 kg (124 lb 9.6 oz)   05/09/21 60.8 kg (134 lb)   04/07/21 65 kg (143 lb 3.2 oz)   03/30/21 67.9 kg (149 lb 11.1 oz)   03/05/21 77.1 kg (170 lb)   02/24/21 69.5 kg (153 lb 3.5 oz)   02/01/21 74.8 kg (165 lb)   02/01/21 74.8 kg (165 lb)   12/07/20 72.7 kg (160 lb 3.2 oz)   07/14/20 86.2 kg (190 lb)   06/23/20 79.4 kg (175 lb)   06/19/20 83.9 kg (185 lb)   06/16/20 83.5 kg (184 lb)   03/23/20 83.9 kg (185 lb)   03/04/20 83.8 kg (184 lb 12.8 oz)   12/03/19 80.3 kg (177 lb)   06/06/19 82.2 kg (181 lb 3.2 oz)   05/11/19 88.5 kg (195 lb)   11/08/18 81.6 kg (179 lb 12.8 oz)   10/09/18 83.9 kg (185 lb)       Nutrition Diagnosis:   · Inadequate oral intake related to cognitive or neurological impairment, biting/chewing (masticatory) difficulty, altered GI structure, psychological cause or life stress, increased demand for energy/nutrients as evidenced by weight loss, BMI, severe loss of subcutaneous fat, severe muscle loss      Nutrition Interventions:   Food and/or Nutrient Delivery: Continue current diet, Continue oral nutrition supplement, Modify oral nutrition supplement  Nutrition Education and Counseling: No recommendations at this time  Coordination of Nutrition Care: Continue to monitor while inpatient, Interdisciplinary rounds, Feeding assistance/environmental change    Goals:  PO intake >/=50% of meals with at least 2 ONS daily within 3-4 days vs nutrition support       Nutrition Monitoring and Evaluation:   Behavioral-Environmental Outcomes: Beliefs and attitudes, Readiness for change  Food/Nutrient Intake Outcomes: Supplement intake, Food and nutrient intake  Physical Signs/Symptoms Outcomes: Biochemical data, Chewing or swallowing, Meal time behavior, Weight, Skin, Nutrition focused physical findings    Discharge Planning:    Continue oral nutrition supplement     Recent Labs     05/13/21  0108 05/12/21  0438 05/12/21  0437 05/11/21  0455   * 162* 163* 180*   BUN 37* 39* 39* 40*   CREA 2.40* 2.32* 2.28* 2.26*    144 143 147*   K 2.9* 3.1* 3.1* 3.3*   * 113* 113* 114*   CO2 21 20* 21 24   CA 8.7 8.6 8.6 8.8   PHOS 2.2* 2.0* 1.8* 1.7*   MG 1.9  --  1.8 2.5*       Recent Labs     05/13/21  0108 05/12/21  0438 05/11/21  0455   ALB 0.8* 1.0* 1.0*       No results for input(s): LAC in the last 72 hours. Recent Labs     05/13/21 0108 05/12/21  0437   WBC 1.4* 1.3*   HGB 6.4* 7.7*   HCT 22.4* 26.4*    255       No results for input(s): PREALB in the last 72 hours. No results for input(s): TRIGL in the last 72 hours.     Recent Labs     05/13/21  1629 05/13/21  1159 05/13/21  0650 05/12/21  2153 05/12/21  1657 05/12/21  1218 05/12/21  0710 05/11/21  2128 05/11/21  1613 05/11/21  1118 05/11/21  0658 05/10/21  2147   GLUCPOC 135* 162* 152* 147* 131* 162* 188* 172* 270* 194* 189* 237*       Lab Results   Component Value Date/Time    Hemoglobin A1c 7.2 (H) 05/02/2021 02:00 AM    Hemoglobin A1c 11.4 (H) 03/30/2020 02:32 PM    Hemoglobin A1c 12.2 (H) 06/06/2019 12:10 PM    Hemoglobin A1c, External 10.0 08/05/2020       Iron Profile  Iron   Date Value Ref Range Status   05/02/2021 29 (L) 35 - 150 ug/dL Final   03/05/2021 13 (L) 35 - 150 ug/dL Final   01/18/2021 14 (L) 35 - 150 ug/dL Final   10/09/2020 35 35 - 150 ug/dL Final   12/01/2012 43 35 - 150 ug/dL Final     TIBC   Date Value Ref Range Status   05/02/2021 109 (L) 250 - 450 ug/dL Final   03/05/2021 142 (L) 250 - 450 ug/dL Final   01/18/2021 117 (L) 250 - 450 ug/dL Final   10/09/2020 200 (L) 250 - 450 ug/dL Final   12/01/2012 280 250 - 450 ug/dL Final     Iron % saturation   Date Value Ref Range Status   05/02/2021 27 20 - 50 % Final   03/05/2021 9 (L) 20 - 50 % Final   01/18/2021 12 (L) 20 - 50 % Final   10/09/2020 18 (L) 20 - 50 % Final   12/01/2012 15 (L) 20 - 50 % Final       Ferritin   Date Value Ref Range Status   05/02/2021 2,753 (H) 26 - 388 NG/ML Final   03/05/2021 596 (H) 26 - 388 NG/ML Final   01/18/2021 1,149 (H) 26 - 388 NG/ML Final       Folate   Date Value Ref Range Status   05/02/2021 7.1 5.0 - 21.0 ng/mL Final   12/01/2012 9.5 5.0 - 21.0 ng/mL Final   11/11/2009 6.1 5.4 - 24.0 ng/mL Final     Vitamin B12   Date Value Ref Range Status   05/02/2021 683 193 - 986 pg/mL Final   12/01/2012 290 211 - 946 pg/mL Final     Comment:     (NOTE)  Performed at:  [facility]  [address]  [id number]  : Thu Hastings MD, Phone:  [phone]   06/15/2009 510 211 - 911 pg/mL Final         Ammonia   Date Value Ref Range Status   05/02/2021 22 <32 UMOL/L Final     Comment:     Ammonia determinations are subject to marked lability. Upon standing, ammonia levels increase rapidly due to red cell metabolism. Concentrations may more than double in plasma if sample is stored at room temperature for 6 hours.                Betty Tubbs RD   Contact via [employer]

## 2021-05-12 NOTE — PROGRESS NOTES
Pt c/o chest tightness and SOB. Pt's RR 20 and O2 sat 100%. Wife states pt uses an inhaler at home prn. Respiratory notified that pt needing a prn duo-neb treatment. After repositioning pt in bed, he states he feels better.

## 2021-05-12 NOTE — PROGRESS NOTES
ID Progress Note  2021    Subjective:     Alert, orient to himself only. Still c/o low back pain    Review of Systems:            Symptom Y/N Comments   Symptom Y/N Comments   Fever/Chills       Chest Pain       Poor Appetite       Edema        Cough       Abdominal Pain        Sputum       Joint Pain  y  lumbar spine    SOB/MASSEY      Pruritis/Rash        Nausea/vomit       Tolerating PT/OT        Diarrhea       Tolerating Diet        Constipation       Other           Could NOT obtain due to:       Objective:     Vitals:   Visit Vitals  /61 (BP 1 Location: Left arm, BP Patient Position: At rest)   Pulse 99   Temp 98.9 °F (37.2 °C)   Resp 16   Ht 5' 11\" (1.803 m)   Wt 56.8 kg (125 lb 3.2 oz)   SpO2 97%   BMI 17.46 kg/m²        Tmax:  Temp (24hrs), Av.3 °F (37.9 °C), Min:98.9 °F (37.2 °C), Max:102.9 °F (39.4 °C)      PHYSICAL EXAM:  General: Chronically ill appearing, WD, WN. Alert, uncooperative, no acute distress    EENT:  EOMI. Anicteric sclerae. MMM  Resp:  Clear in apex with decreased breath sounds at bases, no wheezing or rales. No accessory muscle use  CV:  Regular  rhythm,  No edema  GI:  Soft, Non distended, Non tender. +Bowel sounds  Neurologic:  Alert and oriented X self, normal speech,   Psych:   Fair insight. Not anxious nor agitated  Skin:  No rashes.   No jaundice, right stump; dressing dry and intact, incision approximated, no erythema, no drainage     Labs:   Lab Results   Component Value Date/Time    WBC 1.3 (L) 2021 04:37 AM    Hemoglobin (POC) 5.2 (LL) 2021 08:37 AM    Hemoglobin, POC 11.2 (L) 2014 11:05 PM    HGB 7.7 (L) 2021 04:37 AM    Hematocrit (POC) 26 (L) 10/09/2018 07:16 AM    Hematocrit, POC 33 (L) 2014 11:05 PM    HCT 26.4 (L) 2021 04:37 AM    PLATELET 027  04:37 AM    MCV 89.2 2021 04:37 AM     Lab Results   Component Value Date/Time    Sodium 144 2021 04:38 AM    Potassium 3.1 (L) 2021 04:38 AM    Chloride 113 (H) 05/12/2021 04:38 AM    CO2 20 (L) 05/12/2021 04:38 AM    Anion gap 11 05/12/2021 04:38 AM    Glucose 162 (H) 05/12/2021 04:38 AM    BUN 39 (H) 05/12/2021 04:38 AM    Creatinine 2.32 (H) 05/12/2021 04:38 AM    BUN/Creatinine ratio 17 05/12/2021 04:38 AM    GFR est AA 34 (L) 05/12/2021 04:38 AM    GFR est non-AA 28 (L) 05/12/2021 04:38 AM    Calcium 8.6 05/12/2021 04:38 AM    Bilirubin, total 0.3 05/02/2021 02:00 AM    Alk. phosphatase 234 (H) 05/02/2021 02:00 AM    Protein, total 7.2 05/02/2021 02:00 AM    Albumin 1.0 (L) 05/12/2021 04:38 AM    Globulin 6.0 (H) 05/02/2021 02:00 AM    A-G Ratio 0.2 (L) 05/02/2021 02:00 AM    ALT (SGPT) 13 05/02/2021 02:00 AM       CT of Chest, ABD/PEL (5/2); L4-5 findings most likely represent discitis-osteomyelitis. Soft tissue edema from hypoalbuminemia. Right upper lobe pneumonia. Bilateral lower lobe atelectasis vs pneumonia. Trace bilateral pleural effusions. Assessment and Plan   HAP; new onset of fever secondary to recurrent HAP; IV zosyn was added. Pt is in high risk of recurrent PNA and aspiration PNA. L4-5 Discitis/OM   Bacteremia  S/p laminectomy C3-4 (2014)   - WBC 6->2.0, afebrile    TTE (5/5) no vegetation    Blood cx (5/1, 5/3, 5/4, 5/6, 5/7, 5/8) MRSA    Blood cx (5/9 & 5/10) no growth so far      MRI of lumbar spine (5/3) discitis/OM at L3-4 & L4-5, no evidence of epidural abscess or cord compression    Once again, repeat MRI of lumbar spine (5/10) revealed no epidural abscess    -> ortho recommends medical therapy at this time. IV vancomycin changed to daptomycin on 5/7      D/c IV cefepime on 5/9 due to leukopenia, received 6 days. One dose of Levofloxacin to complete total 7 days of HAP therapy        Continue with IV Daptomycin & Rifampin; total 6 weeks from negative blood cx,(5/9).  Last dose 6/19      Anemia  - hgb 7.0; primary team following    Bladder retention  - following hospital protocol    U/A (-)    Above plan d/w and agreed by  Derrick Parker, NP

## 2021-05-12 NOTE — PROGRESS NOTES
[address] [facility] Adult  Hospitalist Group                                                                                          Hospitalist Progress Note  Gayle Fatima MD  Answering service: [phone] OR [phone] from in house phone        Date of Service:  2021  NAME:  Nadia Leiva. :  1955  MRN:  [id number]      Admission Summary: This is a 78-year-old man with a past medical history significant for chronic kidney disease, type 2 diabetes, dyslipidemia, hypertension, obstructive sleep apnea, status post recent right above-knee amputation, degenerative disk disease, and chronic pain syndrome, was in his usual state of health until the day of presentation at the emergency room when it was reported that the patient became lethargic and confused at his rehab facility    Interval history / Subjective:      Febrile last night. Currently complaining of black food particles present on his tongue. Wife is very concerned about him not having a BKA stump wrap that he should be wearing. Cannot find it in his prior room/6E. Per orthopedic surgeon he should be wearing it for future prosthesis. Wife requested surgical consult to assist.     Fever, despite clearance of blood cultures on . Obtained repeat. No SOB, CP, or hypoxia to suggest PE. Assessment & Plan:     MRSA bacteremia - first cleared culture  (persistnet prior since )  L4-L5 Discitis and OM  Sacral wound  New Fever   - Patient now on daptomycin, increased dose to 8-10mg/kg. Vanc ALESSANDRO is 4   - ID following   - Finally 48 hours of negative blood cultures. Will await 72 hours prior to PICC   - TTE without obvious vegetations. Will need 6 weeks IV abx, so no need for AMELIA  - Will need Murali (no PICC per renal) once cultures cleared, would await at least 24 hours since fever.   - Wound care following   - Repeat MRI lumbar spine 5/10 without abscess.    - Rifampin added for synergy  - Zosyn added by ID , empiric? Leukopenia   - Heme consulted by ID  - Agree with drug induced vs. sepsis. Previously to cefepime with recovery after DC  - May worsen with zosyn. - ?if we can change rifampin to another agent. Acute metabolic encephalopathy - multifactorial due to renal insufficiency, bacteremia, electrolyte abnormalitis  - Improving. Continue day and night cycles  - Avoid narcotics and sedatives if able  - MRI brain negative, ammonia normal     Anemia - chronic disease CKD   - monitor and transfuse if less than 7   - CBC daily   - EPO per nephrology     ESTRELLA on CKD stage III - pre renal and sepsis   - Cr slightly worse today, switch IVF to 0.45NS   - I and O avoid nephrotoxins   - BMP daily     S/p R BKA - recent  - Wound care following, no sign of local infection   - PT/OT    - Vascular consult for prosthesis recs    Hyponatremia - hypovolemic, resolved s/p IVF  HTN - holding lisinopril   Type 2 diabetes - A1c 7.2%  SSI, POCT glucose checks and hypoglycemia protocol   HLD - home statin   HCAP - completed therapy (Vanc and cefepime/levo)  Urinary retention - now failed x 2 void trials. Keep hoyos. Continue flomax     Code status: FULL  DVT prophylaxis: heparin     Care Plan discussed with: Patient/Family  Anticipated Disposition: [facility]/Rehab  Anticipated Discharge: Greater than 48 hours, Needs to remain afebrile. Final ABx plan. Needs Murali by IR for Abx. Hospital Problems  Date Reviewed: 5/1/2021          Codes Class Noted POA    * (Principal) Acute delirium ICD-10-CM: R41.0  ICD-9-CM: 780.09  5/1/2021 Yes                Review of Systems:   A comprehensive review of systems was negative except for that written in the HPI. Vital Signs:    Last 24hrs VS reviewed since prior progress note.  Most recent are:  Visit Vitals  /69   Pulse 98   Temp 98.1 °F (36.7 °C)   Resp 20   Ht 5' 11\" (1.803 m)   Wt 56.8 kg (125 lb 3.2 oz)   SpO2 100%   BMI 17.46 kg/m²         Intake/Output Summary (Last 24 hours) at 5/12/2021 1659  Last data filed at 5/11/2021 1843  Gross per 24 hour   Intake --   Output 375 ml   Net -375 ml        Physical Examination:     I had a face to face encounter with this patient and independently examined them on 5/12/2021 as outlined below:          Constitutional:  No acute distress, cooperative, pleasant, chronically ill appearing   ENT:  Oral mucosa moist, oropharynx benign. Resp:  CTA bilaterally. No wheezing/rhonchi/rales. No accessory muscle use   CV:  Regular rhythm, normal rate, no murmurs, gallops, rubs    GI:  Soft, non distended, non tender. normoactive bowel sounds, no hepatosplenomegaly     Musculoskeletal:  No edema, warm, 2+ pulses throughout    Neurologic:  Moves all extremities. AAOx3, CN II-XII reviewed, s/p R BKA wound c/d/i      Psych:  Good insight, Not anxious nor agitated. Data Review:    Review and/or order of clinical lab test  Review and/or order of tests in the radiology section of CPT  Review and/or order of tests in the medicine section of CPT      Labs:     Recent Labs     05/12/21 0437 05/11/21  0400   WBC 1.3* 1.5*   HGB 7.7* 7.1*   HCT 26.4* 24.7*    287     Recent Labs     05/12/21  0438 05/12/21  0437 05/11/21  0455 05/10/21  0217    143 147* 146*   K 3.1* 3.1* 3.3* 3.4*   * 113* 114* 116*   CO2 20* 21 24 25   BUN 39* 39* 40* 40*   CREA 2.32* 2.28* 2.26* 2.21*   * 163* 180* 120*   CA 8.6 8.6 8.8 9.3   MG  --  1.8 2.5* 2.0   PHOS 2.0* 1.8* 1.7* 2.5*     Recent Labs     05/12/21  0438 05/11/21  0455 05/10/21  0217   ALB 1.0* 1.0* 1.0*     No results for input(s): INR, PTP, APTT, INREXT, INREXT in the last 72 hours. No results for input(s): FE, TIBC, PSAT, FERR in the last 72 hours. Lab Results   Component Value Date/Time    Folate 7.1 05/02/2021 02:00 AM      No results for input(s): PH, PCO2, PO2 in the last 72 hours. No results for input(s): CPK, CKNDX, TROIQ in the last 72 hours.     No lab exists for component: CPKMB  Lab Results   Component Value Date/Time    Cholesterol, total 239 (H) 03/30/2020 02:32 PM    HDL Cholesterol 40 03/30/2020 02:32 PM    LDL,Direct 120 (H) 09/11/2012 12:00 AM    LDL, calculated 130 (H) 03/30/2020 02:32 PM    Triglyceride 345 (H) 03/30/2020 02:32 PM     Lab Results   Component Value Date/Time    Glucose (POC) 131 (H) 05/12/2021 04:57 PM    Glucose (POC) 162 (H) 05/12/2021 12:18 PM    Glucose (POC) 188 (H) 05/12/2021 07:10 AM    Glucose (POC) 172 (H) 05/11/2021 09:28 PM    Glucose (POC) 270 (H) 05/11/2021 04:13 PM     Lab Results   Component Value Date/Time    Color YELLOW/STRAW 05/09/2021 02:51 PM    Appearance CLEAR 05/09/2021 02:51 PM    Specific gravity 1.017 05/09/2021 02:51 PM    Specific gravity 1.016 02/19/2016 08:00 PM    pH (UA) 6.0 05/09/2021 02:51 PM    Protein 100 (A) 05/09/2021 02:51 PM    Glucose Negative 05/09/2021 02:51 PM    Ketone TRACE (A) 05/09/2021 02:51 PM    Bilirubin Negative 05/09/2021 02:51 PM    Urobilinogen 0.2 05/09/2021 02:51 PM    Nitrites Negative 05/09/2021 02:51 PM    Leukocyte Esterase Negative 05/09/2021 02:51 PM    Epithelial cells FEW 05/09/2021 02:51 PM    Bacteria 1+ (A) 05/09/2021 02:51 PM    WBC 0-4 05/09/2021 02:51 PM    RBC 0-5 05/09/2021 02:51 PM         Medications Reviewed:     Current Facility-Administered Medications   Medication Dose Route Frequency    sodium bicarbonate tablet 650 mg  650 mg Oral BID    piperacillin-tazobactam (ZOSYN) 3.375 g in 0.9% sodium chloride (MBP/ADV) 100 mL MBP  3.375 g IntraVENous Q8H    0.45% sodium chloride infusion  50 mL/hr IntraVENous CONTINUOUS    rifAMPin (RIFADIN) 300 mg in 0.9% sodium chloride 100 mL IVPB  300 mg IntraVENous Q12H    DAPTOmycin (CUBICIN) 600 mg in 0.9% sodium chloride 50 mL IVPB RF formulation  600 mg IntraVENous Q48H    fentaNYL citrate (PF) injection 50 mcg  50 mcg IntraVENous Q3H PRN    senna-docusate (PERICOLACE) 8.6-50 mg per tablet 1 Tab  1 Tab Oral DAILY    tamsulosin (FLOMAX) capsule 0.4 mg  0.4 mg Oral DAILY    cyclobenzaprine (FLEXERIL) tablet 5 mg  5 mg Oral TID PRN    oxyCODONE IR (ROXICODONE) tablet 15 mg  15 mg Oral Q4H PRN    0.9% sodium chloride infusion 250 mL  250 mL IntraVENous PRN    epoetin naman-epbx (RETACRIT) injection 20,000 Units  20,000 Units SubCUTAneous Q MON, WED & FRI    albuterol-ipratropium (DUO-NEB) 2.5 MG-0.5 MG/3 ML  3 mL Nebulization Q4H PRN    aspirin chewable tablet 81 mg  81 mg Oral DAILY    ergocalciferol capsule 50,000 Units  50,000 Units Oral every Friday    ferrous sulfate tablet 325 mg  1 Tab Oral BID WITH MEALS    pantoprazole (PROTONIX) tablet 40 mg  40 mg Oral ACB    sodium chloride (NS) flush 5-40 mL  5-40 mL IntraVENous Q8H    sodium chloride (NS) flush 5-40 mL  5-40 mL IntraVENous PRN    acetaminophen (TYLENOL) tablet 650 mg  650 mg Oral Q6H PRN    Or    acetaminophen (TYLENOL) suppository 650 mg  650 mg Rectal Q6H PRN    polyethylene glycol (MIRALAX) packet 17 g  17 g Oral DAILY PRN    promethazine (PHENERGAN) tablet 12.5 mg  12.5 mg Oral Q6H PRN    Or    ondansetron (ZOFRAN) injection 4 mg  4 mg IntraVENous Q6H PRN    heparin (porcine) injection 5,000 Units  5,000 Units SubCUTAneous Q8H    insulin lispro (HUMALOG) injection   SubCUTAneous AC&HS    glucose chewable tablet 16 g  4 Tab Oral PRN    dextrose (D50W) injection syrg 12.5-25 g  12.5-25 g IntraVENous PRN    glucagon (GLUCAGEN) injection 1 mg  1 mg IntraMUSCular PRN     ______________________________________________________________________  EXPECTED LENGTH OF STAY: 4d 7h  ACTUAL LENGTH OF STAY:          11                 Nitin Suggs MD

## 2021-05-12 NOTE — PROGRESS NOTES
Problem: Dysphagia (Adult)  Goal: *Acute Goals and Plan of Care (Insert Text)  Description: Initiated 5/10/2021  1. Patient will tolerate soft diet, thin liquids free of s/s of aspiration within 7 days MET  Outcome: Progressing Towards Goal     SPEECH LANGUAGE PATHOLOGY DYSPHAGIA TREATMENT/DISCHARGE  Patient: Alexandria Sales (76 y.o. male)  Date: 5/12/2021  Diagnosis: Acute delirium [R41.0] Acute delirium       Precautions:       ASSESSMENT:  Patient with [facility] oral/pharyngeal swallow with belching after swallow and then coughed x1 with thin suspect related to esophageal dysphagia. Patient stated that he preferred softer food. PLAN:  - dental soft/thin liquid diet   - Follow-up with GI if concerns for dysphagia persist   Patient will be discharged from acute skilled speech therapy at this time. Rationale for discharge:  Goals achieved    Discharge Recommendations: To Be Determined     SUBJECTIVE:   Patient stated that's too hard after biting into pili cracker. OBJECTIVE:   Cognitive and Communication Status:  Neurologic State: Alert, Confused  Orientation Level: Oriented to person, Oriented to place, Other (Comment)  Cognition: Decreased command following, Decreased attention/concentration    Perception: Cues to maintain midline in sitting    Perseveration: No perseveration noted    Safety/Judgement: Decreased insight into deficits  Dysphagia Treatment:  Oral Assessment:     P.O. Trials:  Patient Position: not fully upright in bed   Vocal quality prior to P.O.: Low volume  Consistency Presented: Thin liquid; Solid  How Presented: Self-fed/presented; Successive swallows;Straw     Bolus Acceptance: No impairment  Bolus Formation/Control: Impaired  Type of Impairment: Mastication(said cracker was too hard)  Propulsion: No impairment  Oral Residue: None  Initiation of Swallow: No impairment  Laryngeal Elevation: Functional  Aspiration Signs/Symptoms: None  Pharyngeal Phase Characteristics: No impairment, issues, or problems   Effective Modifications: None  Cues for Modifications: None          NOMS:   The NOMS functional outcome measure was used to quantify this patient's level of swallowing impairment. Based on the NOMS, the patient was determined to be at level 7 for swallow function     NOMS Swallowing Levels:  Level 1 (CN): NPO  Level 2 (CM): NPO but takes consistency in therapy  Level 3 (CL): Takes less than 50% of nutrition p.o. and continues with nonoral feedings; and/or safe with mod cues; and/or max diet restriction  Level 4 (CK): Safe swallow but needs mod cues; and/or mod diet restriction; and/or still requires some nonoral feeding/supplements  Level 5 (CJ): Safe swallow with min diet restriction; and/or needs min cues  Level 6 (CI): Independent with p.o.; rare cues; usually self cues; may need to avoid some foods or needs extra time  Level 7 ([address] [place] [address]): Independent for all p.o.  QAMAR. (2003). National Outcomes Measurement System (NOMS): Adult Speech-Language Pathology User's Guide. Pain:             After treatment:   Patient left in no apparent distress in bed    COMMUNICATION/EDUCATION:   The patient's plan of care including recommendations, planned interventions, and recommended diet changes were discussed with: Registered nurse.      Princess Machuca  Time Calculation: 10 mins

## 2021-05-12 NOTE — PROGRESS NOTES
Nephrology Progress Note  June Shorten. Date of Admission : 5/1/2021    CC: Follow up for CKD       Assessment and Plan     CKD 4:  - 2/2 DM, HTN   - baseline Creatinine : 2.2 mg/dl at best.   - Cr has been as high as 2.6-3 mg/dl through out 2020  - avoid PICC line   - Daily labs     Urinary retention:  - failed voiding trial again   - place hoyos   - continue flomax    Anemia of CKD:  - iron studies ok  - cont MICHAEL 3 times per week    MRSA bacteremia:  - abx per ID  - TTE neg for vegetations  - on IV dapto+ oral rifampin     HTN:  - BP stable    DM2:  - on insulin    R AKA    L3-4, L4-5 discitis/osteomyelitis:  - on abx    Sacral wound       Interval History:  Seen and examined   Needed CIC x 3 since hoyos removal   Had fever       Current Medications: all current  Medications have been eviewed in EPIC  Review of Systems: A comprehensive review of systems was negative except for that written in the HPI. Objective:  Vitals:    Vitals:    05/11/21 2028 05/11/21 2235 05/12/21 0222 05/12/21 0859   BP: 133/63  129/61 (!) 129/59   Pulse: (!) 106  99 98   Resp: 14  16 19   Temp: (!) 102.9 °F (39.4 °C) (!) 100.8 °F (38.2 °C) 98.9 °F (37.2 °C) 97.8 °F (36.6 °C)   SpO2: 92%  97% 93%   Weight:   56.8 kg (125 lb 3.2 oz)    Height:         Intake and Output:  No intake/output data recorded. 05/10 1901 - 05/12 0700  In: 400 [P.O.:400]  Out: 1475 [Urine:1475]    Physical Examination:  General: Mild distress  Neck:  Supple, no mass  Resp:  Lungs CTA B/L, no wheezing , normal respiratory effort  CV:  RRR,  no murmur or rub,R AKA, no edema L  GI:  Soft, NT, + Bowel sounds, no hepatosplenomegaly  Neurologic:  confused  Skin:  No Rash   : hoyos +    []    High complexity decision making was performed  []    Patient is at high-risk of decompensation with multiple organ involvement    Lab Data Personally Reviewed: I have reviewed all the pertinent labs, microbiology data and radiology studies during assessment.     Recent Labs 05/12/21  0438 05/12/21 0437 05/11/21  0455 05/10/21  0217    143 147* 146*   K 3.1* 3.1* 3.3* 3.4*   * 113* 114* 116*   CO2 20* 21 24 25   * 163* 180* 120*   BUN 39* 39* 40* 40*   CREA 2.32* 2.28* 2.26* 2.21*   CA 8.6 8.6 8.8 9.3   MG  --  1.8 2.5* 2.0   PHOS 2.0* 1.8* 1.7* 2.5*   ALB 1.0*  --  1.0* 1.0*     Recent Labs     05/12/21  0437 05/11/21  0400 05/10/21  0219   WBC 1.3* 1.5* 5.5   HGB 7.7* 7.1* 7.2*   HCT 26.4* 24.7* 25.1*    287 346     Lab Results   Component Value Date/Time    Specimen Description: JOSE 12/04/2012 10:12 PM    Specimen Description: TOE 12/02/2012 05:03 PM    Specimen Description: TOE 12/02/2012 05:03 PM     Lab Results   Component Value Date/Time    Culture result: NO GROWTH 2 DAYS 05/10/2021 01:24 PM    Culture result: NO GROWTH 3 DAYS 05/09/2021 11:01 AM    Culture result: (A) 05/08/2021 12:18 PM     * METHICILLIN RESISTANT STAPHYLOCOCCUS AUREUS * GROWING IN 1 OF 4 BOTTLES DRAWN (SITE  POSTERIOR FOREARM) REFER TO [id number] FOR SENSITIVITIES    Culture result: REMAINING BOTTLE(S) HAS/HAVE NO GROWTH SO FAR 05/08/2021 12:18 PM     Recent Results (from the past 24 hour(s))   GLUCOSE, POC    Collection Time: 05/11/21  4:13 PM   Result Value Ref Range    Glucose (POC) 270 (H) 65 - 117 mg/dL    Performed by Thomas Aguirre    GLUCOSE, POC    Collection Time: 05/11/21  9:28 PM   Result Value Ref Range    Glucose (POC) 172 (H) 65 - 117 mg/dL    Performed by MARCIO Mcclendon    MAGNESIUM    Collection Time: 05/12/21  4:37 AM   Result Value Ref Range    Magnesium 1.8 1.6 - 2.4 mg/dL   METABOLIC PANEL, BASIC    Collection Time: 05/12/21  4:37 AM   Result Value Ref Range    Sodium 143 136 - 145 mmol/L    Potassium 3.1 (L) 3.5 - 5.1 mmol/L    Chloride 113 (H) 97 - 108 mmol/L    CO2 21 21 - 32 mmol/L    Anion gap 9 5 - 15 mmol/L    Glucose 163 (H) 65 - 100 mg/dL    BUN 39 (H) 6 - 20 MG/DL    Creatinine 2.28 (H) 0.70 - 1.30 MG/DL    BUN/Creatinine ratio 17 12 - 20      GFR est AA 35 (L) >60 ml/min/1.73m2    GFR est non-AA 29 (L) >60 ml/min/1.73m2    Calcium 8.6 8.5 - 10.1 MG/DL   PHOSPHORUS    Collection Time: 05/12/21  4:37 AM   Result Value Ref Range    Phosphorus 1.8 (L) 2.6 - 4.7 MG/DL   CBC WITH AUTOMATED DIFF    Collection Time: 05/12/21  4:37 AM   Result Value Ref Range    WBC 1.3 (L) 4.1 - 11.1 K/uL    RBC 2.96 (L) 4.10 - 5.70 M/uL    HGB 7.7 (L) 12.1 - 17.0 g/dL    HCT 26.4 (L) 36.6 - 50.3 %    MCV 89.2 80.0 - 99.0 FL    MCH 26.0 26.0 - 34.0 PG    MCHC 29.2 (L) 30.0 - 36.5 g/dL    RDW 16.8 (H) 11.5 - 14.5 %    PLATELET 875 022 - 928 K/uL    MPV 9.7 8.9 - 12.9 FL    NRBC 3.9 (H) 0  WBC    ABSOLUTE NRBC 0.05 (H) 0.00 - 0.01 K/uL    NEUTROPHILS 45 32 - 75 %    BAND NEUTROPHILS 3 0 - 6 %    LYMPHOCYTES 37 12 - 49 %    MONOCYTES 9 5 - 13 %    EOSINOPHILS 4 0 - 7 %    BASOPHILS 1 0 - 1 %    METAMYELOCYTES 1 (H) 0 %    IMMATURE GRANULOCYTES 0 %    ABS. NEUTROPHILS 0.6 (L) 1.8 - 8.0 K/UL    ABS. LYMPHOCYTES 0.5 (L) 0.8 - 3.5 K/UL    ABS. MONOCYTES 0.1 0.0 - 1.0 K/UL    ABS. EOSINOPHILS 0.1 0.0 - 0.4 K/UL    ABS. BASOPHILS 0.0 0.0 - 0.1 K/UL    ABS. IMM.  GRANS. 0.0 K/UL    DF MANUAL      RBC COMMENTS ANISOCYTOSIS  1+        RBC COMMENTS MICROCYTOSIS  1+       RENAL FUNCTION PANEL    Collection Time: 05/12/21  4:38 AM   Result Value Ref Range    Sodium 144 136 - 145 mmol/L    Potassium 3.1 (L) 3.5 - 5.1 mmol/L    Chloride 113 (H) 97 - 108 mmol/L    CO2 20 (L) 21 - 32 mmol/L    Anion gap 11 5 - 15 mmol/L    Glucose 162 (H) 65 - 100 mg/dL    BUN 39 (H) 6 - 20 MG/DL    Creatinine 2.32 (H) 0.70 - 1.30 MG/DL    BUN/Creatinine ratio 17 12 - 20      GFR est AA 34 (L) >60 ml/min/1.73m2    GFR est non-AA 28 (L) >60 ml/min/1.73m2    Calcium 8.6 8.5 - 10.1 MG/DL    Phosphorus 2.0 (L) 2.6 - 4.7 MG/DL    Albumin 1.0 (L) 3.5 - 5.0 g/dL   GLUCOSE, POC    Collection Time: 05/12/21  7:10 AM   Result Value Ref Range    Glucose (POC) 188 (H) 65 - 117 mg/dL    Performed by Ellen Marr Kimberly Aaron MD  [address] [place] [address]  Phone - [phone]   Fax - [phone]  [email or web address]

## 2021-05-12 NOTE — WOUND CARE
Patient seen during prevalence. Left elbow with abrasion measuring 0.4 cm x 0.5 cm x 0.1 cm, wound bed is pink, blanchable, moist, wound edges are open, jai-wound intact. Xeroform gauze and Optifoam Gentle applied. Right AKA incision looks good, no drainage, no erythema. Can be left open to air. Ortho is being consulted to determine if sutures can come out. Patient was scheduled to have follow up appointment today for AKA but is hospitalized. Wife concerned about ; this nurse has not seen the  for his AKA. Recommendation:  Right AKA no longer needs a dressing    Sacrum- Please maintain Exuderm Hydrocolloid up to one week or change as needed with soiling or rolled edges.  Please use adhesive remover wipes when changing. Left elbow- Every other day cleanse with normal saline, wipe wound bed clean and dry, apply a piece of Xeroform gauze that has been folded in half and cover.     Specialty bed: Prius ordered via [facility]. Use only flat sheet and one incontinence pad. Please call Equipment Distribution at  if not delivered and when patient discharged. Skin Care & Pressure Prevention:  Minimize layers of linen/pads under patient to optimize support surface. Turn/reposition approximately every 2 hours and offload heels.   Manage incontinence / promote continence   Nourishing Skin Cream to dry skin, minimize use of briefs when able     Discussed above plan with patient & Babatunde Mohr RN     Transition of Care: Plan to follow as needed while admitted to hospital.     YUSUF RodgersN, RN, [facility], [facility].  office [phone]  pager [phone] or call  to page

## 2021-05-13 PROBLEM — E43 SEVERE PROTEIN-CALORIE MALNUTRITION (HCC): Chronic | Status: ACTIVE | Noted: 2021-05-13

## 2021-05-13 LAB
ALBUMIN SERPL-MCNC: 0.8 G/DL (ref 3.5–5)
ANION GAP SERPL CALC-SCNC: 7 MMOL/L (ref 5–15)
ANION GAP SERPL CALC-SCNC: 7 MMOL/L (ref 5–15)
BACTERIA SPEC CULT: ABNORMAL
BACTERIA SPEC CULT: ABNORMAL
BASOPHILS # BLD: 0 K/UL (ref 0–0.1)
BASOPHILS NFR BLD: 0 % (ref 0–1)
BUN SERPL-MCNC: 37 MG/DL (ref 6–20)
BUN SERPL-MCNC: 37 MG/DL (ref 6–20)
BUN/CREAT SERPL: 14 (ref 12–20)
BUN/CREAT SERPL: 15 (ref 12–20)
CALCIUM SERPL-MCNC: 8.7 MG/DL (ref 8.5–10.1)
CALCIUM SERPL-MCNC: 8.8 MG/DL (ref 8.5–10.1)
CHLORIDE SERPL-SCNC: 111 MMOL/L (ref 97–108)
CHLORIDE SERPL-SCNC: 113 MMOL/L (ref 97–108)
CO2 SERPL-SCNC: 21 MMOL/L (ref 21–32)
CO2 SERPL-SCNC: 24 MMOL/L (ref 21–32)
COMMENT, HOLDF: NORMAL
CREAT SERPL-MCNC: 2.4 MG/DL (ref 0.7–1.3)
CREAT SERPL-MCNC: 2.59 MG/DL (ref 0.7–1.3)
DIFFERENTIAL METHOD BLD: ABNORMAL
EOSINOPHIL # BLD: 0.1 K/UL (ref 0–0.4)
EOSINOPHIL NFR BLD: 6 % (ref 0–7)
ERYTHROCYTE [DISTWIDTH] IN BLOOD BY AUTOMATED COUNT: 17 % (ref 11.5–14.5)
ERYTHROCYTE [DISTWIDTH] IN BLOOD BY AUTOMATED COUNT: 17.2 % (ref 11.5–14.5)
GLUCOSE BLD STRIP.AUTO-MCNC: 135 MG/DL (ref 65–117)
GLUCOSE BLD STRIP.AUTO-MCNC: 152 MG/DL (ref 65–117)
GLUCOSE BLD STRIP.AUTO-MCNC: 162 MG/DL (ref 65–117)
GLUCOSE BLD STRIP.AUTO-MCNC: 192 MG/DL (ref 65–117)
GLUCOSE SERPL-MCNC: 136 MG/DL (ref 65–100)
GLUCOSE SERPL-MCNC: 163 MG/DL (ref 65–100)
HAPTOGLOB SERPL-MCNC: 330 MG/DL (ref 30–200)
HCT VFR BLD AUTO: 22.4 % (ref 36.6–50.3)
HCT VFR BLD AUTO: 26.7 % (ref 36.6–50.3)
HGB BLD-MCNC: 6.4 G/DL (ref 12.1–17)
HGB BLD-MCNC: 8 G/DL (ref 12.1–17)
HISTORY CHECKED?,CKHIST: NORMAL
HIV 1+2 AB+HIV1 P24 AG SERPL QL IA: NONREACTIVE
HIV12 RESULT COMMENT, HHIVC: NORMAL
IMM GRANULOCYTES # BLD AUTO: 0 K/UL
IMM GRANULOCYTES NFR BLD AUTO: 0 %
LDH SERPL L TO P-CCNC: 151 U/L (ref 85–241)
LYMPHOCYTES # BLD: 0.9 K/UL (ref 0.8–3.5)
LYMPHOCYTES NFR BLD: 65 % (ref 12–49)
MAGNESIUM SERPL-MCNC: 1.8 MG/DL (ref 1.6–2.4)
MAGNESIUM SERPL-MCNC: 1.9 MG/DL (ref 1.6–2.4)
MCH RBC QN AUTO: 25.7 PG (ref 26–34)
MCH RBC QN AUTO: 26.8 PG (ref 26–34)
MCHC RBC AUTO-ENTMCNC: 28.6 G/DL (ref 30–36.5)
MCHC RBC AUTO-ENTMCNC: 30 G/DL (ref 30–36.5)
MCV RBC AUTO: 89.3 FL (ref 80–99)
MCV RBC AUTO: 90 FL (ref 80–99)
MONOCYTES # BLD: 0.2 K/UL (ref 0–1)
MONOCYTES NFR BLD: 13 % (ref 5–13)
NEUTS BAND NFR BLD MANUAL: 1 % (ref 0–6)
NEUTS SEG # BLD: 0.2 K/UL (ref 1.8–8)
NEUTS SEG NFR BLD: 15 % (ref 32–75)
NRBC # BLD: 0.04 K/UL (ref 0–0.01)
NRBC # BLD: 0.04 K/UL (ref 0–0.01)
NRBC BLD-RTO: 1.3 PER 100 WBC
NRBC BLD-RTO: 2.8 PER 100 WBC
PHOSPHATE SERPL-MCNC: 2.2 MG/DL (ref 2.6–4.7)
PHOSPHATE SERPL-MCNC: 2.4 MG/DL (ref 2.6–4.7)
PLATELET # BLD AUTO: 211 K/UL (ref 150–400)
PLATELET # BLD AUTO: 213 K/UL (ref 150–400)
PMV BLD AUTO: 10.2 FL (ref 8.9–12.9)
PMV BLD AUTO: 10.3 FL (ref 8.9–12.9)
POTASSIUM SERPL-SCNC: 2.9 MMOL/L (ref 3.5–5.1)
POTASSIUM SERPL-SCNC: 3.2 MMOL/L (ref 3.5–5.1)
RBC # BLD AUTO: 2.49 M/UL (ref 4.1–5.7)
RBC # BLD AUTO: 2.99 M/UL (ref 4.1–5.7)
RBC MORPH BLD: ABNORMAL
RBC MORPH BLD: ABNORMAL
SAMPLES BEING HELD,HOLD: NORMAL
SERVICE CMNT-IMP: ABNORMAL
SODIUM SERPL-SCNC: 141 MMOL/L (ref 136–145)
SODIUM SERPL-SCNC: 142 MMOL/L (ref 136–145)
WBC # BLD AUTO: 1.4 K/UL (ref 4.1–11.1)
WBC # BLD AUTO: 3.2 K/UL (ref 4.1–11.1)

## 2021-05-13 PROCEDURE — 74011250637 HC RX REV CODE- 250/637: Performed by: INTERNAL MEDICINE

## 2021-05-13 PROCEDURE — 83735 ASSAY OF MAGNESIUM: CPT

## 2021-05-13 PROCEDURE — 36415 COLL VENOUS BLD VENIPUNCTURE: CPT

## 2021-05-13 PROCEDURE — 85027 COMPLETE CBC AUTOMATED: CPT

## 2021-05-13 PROCEDURE — 80048 BASIC METABOLIC PNL TOTAL CA: CPT

## 2021-05-13 PROCEDURE — 74011636637 HC RX REV CODE- 636/637: Performed by: INTERNAL MEDICINE

## 2021-05-13 PROCEDURE — 82962 GLUCOSE BLOOD TEST: CPT

## 2021-05-13 PROCEDURE — 36430 TRANSFUSION BLD/BLD COMPNT: CPT

## 2021-05-13 PROCEDURE — 85025 COMPLETE CBC W/AUTO DIFF WBC: CPT

## 2021-05-13 PROCEDURE — 83010 ASSAY OF HAPTOGLOBIN QUANT: CPT

## 2021-05-13 PROCEDURE — 82955 ASSAY OF G6PD ENZYME: CPT

## 2021-05-13 PROCEDURE — 74011250636 HC RX REV CODE- 250/636: Performed by: INTERNAL MEDICINE

## 2021-05-13 PROCEDURE — 74011000258 HC RX REV CODE- 258: Performed by: NURSE PRACTITIONER

## 2021-05-13 PROCEDURE — 65270000032 HC RM SEMIPRIVATE

## 2021-05-13 PROCEDURE — 94760 N-INVAS EAR/PLS OXIMETRY 1: CPT

## 2021-05-13 PROCEDURE — 84100 ASSAY OF PHOSPHORUS: CPT

## 2021-05-13 PROCEDURE — 87389 HIV-1 AG W/HIV-1&-2 AB AG IA: CPT

## 2021-05-13 PROCEDURE — 74011250637 HC RX REV CODE- 250/637: Performed by: FAMILY MEDICINE

## 2021-05-13 PROCEDURE — 83615 LACTATE (LD) (LDH) ENZYME: CPT

## 2021-05-13 PROCEDURE — 86923 COMPATIBILITY TEST ELECTRIC: CPT

## 2021-05-13 PROCEDURE — 82040 ASSAY OF SERUM ALBUMIN: CPT

## 2021-05-13 PROCEDURE — 74011250637 HC RX REV CODE- 250/637: Performed by: NURSE PRACTITIONER

## 2021-05-13 PROCEDURE — 74011250636 HC RX REV CODE- 250/636: Performed by: NURSE PRACTITIONER

## 2021-05-13 PROCEDURE — 86901 BLOOD TYPING SEROLOGIC RH(D): CPT

## 2021-05-13 PROCEDURE — P9016 RBC LEUKOCYTES REDUCED: HCPCS

## 2021-05-13 RX ORDER — SODIUM CHLORIDE 9 MG/ML
250 INJECTION, SOLUTION INTRAVENOUS AS NEEDED
Status: DISCONTINUED | OUTPATIENT
Start: 2021-05-13 | End: 2021-05-20 | Stop reason: HOSPADM

## 2021-05-13 RX ORDER — FUROSEMIDE 10 MG/ML
40 INJECTION INTRAMUSCULAR; INTRAVENOUS ONCE
Status: COMPLETED | OUTPATIENT
Start: 2021-05-13 | End: 2021-05-13

## 2021-05-13 RX ORDER — POTASSIUM CHLORIDE 750 MG/1
40 TABLET, FILM COATED, EXTENDED RELEASE ORAL
Status: COMPLETED | OUTPATIENT
Start: 2021-05-13 | End: 2021-05-13

## 2021-05-13 RX ORDER — THERA TABS 400 MCG
1 TAB ORAL DAILY
Status: DISCONTINUED | OUTPATIENT
Start: 2021-05-14 | End: 2021-05-20 | Stop reason: HOSPADM

## 2021-05-13 RX ADMIN — SODIUM BICARBONATE 650 MG: 650 TABLET ORAL at 18:20

## 2021-05-13 RX ADMIN — FERROUS SULFATE TAB 325 MG (65 MG ELEMENTAL FE) 325 MG: 325 (65 FE) TAB at 18:20

## 2021-05-13 RX ADMIN — PIPERACILLIN AND TAZOBACTAM 3.38 G: 3; .375 INJECTION, POWDER, LYOPHILIZED, FOR SOLUTION INTRAVENOUS at 22:31

## 2021-05-13 RX ADMIN — POTASSIUM CHLORIDE 40 MEQ: 750 TABLET, EXTENDED RELEASE ORAL at 11:53

## 2021-05-13 RX ADMIN — INSULIN LISPRO 2 UNITS: 100 INJECTION, SOLUTION INTRAVENOUS; SUBCUTANEOUS at 06:57

## 2021-05-13 RX ADMIN — PIPERACILLIN AND TAZOBACTAM 3.38 G: 3; .375 INJECTION, POWDER, LYOPHILIZED, FOR SOLUTION INTRAVENOUS at 07:02

## 2021-05-13 RX ADMIN — SODIUM BICARBONATE 650 MG: 650 TABLET ORAL at 09:03

## 2021-05-13 RX ADMIN — FUROSEMIDE 40 MG: 10 INJECTION, SOLUTION INTRAMUSCULAR; INTRAVENOUS at 11:54

## 2021-05-13 RX ADMIN — POTASSIUM BICARBONATE 40 MEQ: 782 TABLET, EFFERVESCENT ORAL at 03:50

## 2021-05-13 RX ADMIN — FENTANYL CITRATE 50 MCG: 50 INJECTION, SOLUTION INTRAMUSCULAR; INTRAVENOUS at 14:09

## 2021-05-13 RX ADMIN — HEPARIN SODIUM 5000 UNITS: 5000 INJECTION INTRAVENOUS; SUBCUTANEOUS at 06:36

## 2021-05-13 RX ADMIN — TAMSULOSIN HYDROCHLORIDE 0.4 MG: 0.4 CAPSULE ORAL at 09:03

## 2021-05-13 RX ADMIN — INSULIN LISPRO 2 UNITS: 100 INJECTION, SOLUTION INTRAVENOUS; SUBCUTANEOUS at 11:54

## 2021-05-13 RX ADMIN — DOCUSATE SODIUM 50 MG AND SENNOSIDES 8.6 MG 1 TABLET: 8.6; 5 TABLET, FILM COATED ORAL at 09:03

## 2021-05-13 RX ADMIN — Medication 10 ML: at 06:36

## 2021-05-13 RX ADMIN — HEPARIN SODIUM 5000 UNITS: 5000 INJECTION INTRAVENOUS; SUBCUTANEOUS at 22:31

## 2021-05-13 RX ADMIN — FERROUS SULFATE TAB 325 MG (65 MG ELEMENTAL FE) 325 MG: 325 (65 FE) TAB at 06:36

## 2021-05-13 RX ADMIN — Medication 10 ML: at 14:28

## 2021-05-13 RX ADMIN — TBO-FILGRASTIM 300 MCG: 300 INJECTION, SOLUTION SUBCUTANEOUS at 14:09

## 2021-05-13 RX ADMIN — FENTANYL CITRATE 50 MCG: 50 INJECTION, SOLUTION INTRAMUSCULAR; INTRAVENOUS at 03:52

## 2021-05-13 RX ADMIN — FENTANYL CITRATE 50 MCG: 50 INJECTION, SOLUTION INTRAMUSCULAR; INTRAVENOUS at 01:00

## 2021-05-13 RX ADMIN — PIPERACILLIN AND TAZOBACTAM 3.38 G: 3; .375 INJECTION, POWDER, LYOPHILIZED, FOR SOLUTION INTRAVENOUS at 14:08

## 2021-05-13 RX ADMIN — HEPARIN SODIUM 5000 UNITS: 5000 INJECTION INTRAVENOUS; SUBCUTANEOUS at 01:02

## 2021-05-13 RX ADMIN — FENTANYL CITRATE 50 MCG: 50 INJECTION, SOLUTION INTRAMUSCULAR; INTRAVENOUS at 07:00

## 2021-05-13 RX ADMIN — FENTANYL CITRATE 50 MCG: 50 INJECTION, SOLUTION INTRAMUSCULAR; INTRAVENOUS at 18:20

## 2021-05-13 RX ADMIN — ASPIRIN 81 MG: 81 TABLET, CHEWABLE ORAL at 09:03

## 2021-05-13 RX ADMIN — OXYCODONE 15 MG: 5 TABLET ORAL at 22:40

## 2021-05-13 RX ADMIN — Medication 10 ML: at 22:31

## 2021-05-13 RX ADMIN — PANTOPRAZOLE SODIUM 40 MG: 40 TABLET, DELAYED RELEASE ORAL at 06:36

## 2021-05-13 RX ADMIN — HEPARIN SODIUM 5000 UNITS: 5000 INJECTION INTRAVENOUS; SUBCUTANEOUS at 18:20

## 2021-05-13 NOTE — PROGRESS NOTES
Con NP Progress note    Name: Jass Oshea. YOB: 1955  MRN: [id number]  Admission Date: 5/1/2021  4:38 PM    Date of service: 5/13/2021 1:54 AM                                Overnight Update:        Notified by patient's nurse of current HGB/HCT:    Lab Results   Component Value Date/Time    HGB 6.4 (L) 05/13/2021 01:08 AM    HCT 22.4 (L) 05/13/2021 01:08 AM     Will transfuse 1 unit(s) of PRBC. Consent noted on chart.     Addendum:  - Paged for potassium 2.9 - 40meq oral replacement ordered     Sumanth AHMADI-C, PA-C  [phone] or [place]

## 2021-05-13 NOTE — PROGRESS NOTES
Nephrology Progress Note  Roman Candelaria. Date of Admission : 5/1/2021    CC: Follow up for CKD       Assessment and Plan     CKD 4:  - 2/2 DM, HTN   - baseline Creatinine : 2.2 mg/dl at best.   - Cr has been as high as 2.6-3 mg/dl through out 2020  - avoid PICC line   - stopped Hypotonic fluids and ordered a dose of lasix   - poor candidate for longterm dialysis   - Daily labs     Urinary retention:  - failed voiding trial again   - hoyos re-inserted 5/12  - continue flomax    Hypo K :  - replete PRN     Anemia of CKD:  - iron studies ok  - cont MICHAEL 3 times per week  - hematology r/o hemolysis     MRSA bacteremia:  - abx per ID  - TTE neg for vegetations  - on IV dapto+ oral rifampin     HTN:  - BP stable    DM2:  - on insulin    R AKA    L3-4, L4-5 discitis/osteomyelitis:  - on abx    Sacral wound       Interval History:  Seen and examined   K low   CXR reviewed   ? Aspiration   No more fevers   Persistent leucopenia       Current Medications: all current  Medications have been eviewed in EPIC  Review of Systems: A comprehensive review of systems was negative except for that written in the HPI. Objective:  Vitals:    Vitals:    05/13/21 0439 05/13/21 0511 05/13/21 0556 05/13/21 0814   BP: (!) 140/60 (!) 149/79 139/73 (!) 158/75   Pulse: 89 (!) 102 (!) 103 90   Resp: 20 18 20 18   Temp: 98.9 °F (37.2 °C) 98.9 °F (37.2 °C) 99 °F (37.2 °C) 99.6 °F (37.6 °C)   SpO2: 96% 98% 97% 98%   Weight:       Height:         Intake and Output:  No intake/output data recorded.   05/11 1901 - 05/13 0700  In: 240 [P.O.:240]  Out: 1000 [Urine:1000]    Physical Examination:  General: Mild distress  Neck:  Supple, no mass  Resp:  Lungs CTA B/L, no wheezing , normal respiratory effort  CV:  RRR,  no murmur or rub,R AKA, no edema L  GI:  Soft, NT, + Bowel sounds, no hepatosplenomegaly  Neurologic:  confused  Skin:  No Rash   : hoyos +    []    High complexity decision making was performed  []    Patient is at high-risk of decompensation with multiple organ involvement    Lab Data Personally Reviewed: I have reviewed all the pertinent labs, microbiology data and radiology studies during assessment.     Recent Labs     05/13/21 0108 05/12/21 0438 05/12/21 0437 05/11/21  0455    144 143 147*   K 2.9* 3.1* 3.1* 3.3*   * 113* 113* 114*   CO2 21 20* 21 24   * 162* 163* 180*   BUN 37* 39* 39* 40*   CREA 2.40* 2.32* 2.28* 2.26*   CA 8.7 8.6 8.6 8.8   MG 1.9  --  1.8 2.5*   PHOS 2.2* 2.0* 1.8* 1.7*   ALB 0.8* 1.0*  --  1.0*     Recent Labs     05/13/21 0108 05/12/21 0437 05/11/21  0400   WBC 1.4* 1.3* 1.5*   HGB 6.4* 7.7* 7.1*   HCT 22.4* 26.4* 24.7*    255 287     Lab Results   Component Value Date/Time    Specimen Description: JOSE 12/04/2012 10:12 PM    Specimen Description: TOE 12/02/2012 05:03 PM    Specimen Description: TOE 12/02/2012 05:03 PM     Lab Results   Component Value Date/Time    Culture result: NO GROWTH AFTER 15 HOURS 05/12/2021 12:06 PM    Culture result: NO GROWTH 3 DAYS 05/10/2021 01:24 PM    Culture result: NO GROWTH 4 DAYS 05/09/2021 11:01 AM     Recent Results (from the past 24 hour(s))   CULTURE, BLOOD, PAIRED    Collection Time: 05/12/21 12:06 PM    Specimen: Blood   Result Value Ref Range    Special Requests: NO SPECIAL REQUESTS      Culture result: NO GROWTH AFTER 15 HOURS     PROCALCITONIN    Collection Time: 05/12/21 12:08 PM   Result Value Ref Range    Procalcitonin 2.55 ng/mL   D DIMER    Collection Time: 05/12/21 12:08 PM   Result Value Ref Range    D-dimer 4.17 (H) 0.00 - 0.65 mg/L FEU   GLUCOSE, POC    Collection Time: 05/12/21 12:18 PM   Result Value Ref Range    Glucose (POC) 162 (H) 65 - 117 mg/dL    Performed by [address] [place] [address], POC    Collection Time: 05/12/21  4:57 PM   Result Value Ref Range    Glucose (POC) 131 (H) 65 - 117 mg/dL    Performed by Bailey YOUNG (TENA)    GLUCOSE, POC    Collection Time: 05/12/21  9:53 PM   Result Value Ref Range    Glucose (POC) 147 (H) 65 - 117 mg/dL    Performed by Claire Morrison    Collection Time: 05/13/21  1:00 AM   Result Value Ref Range    Haptoglobin 330 (H) 30 - 200 mg/dL   HIV 1/2 AG/AB, 4TH GENERATION,W RFLX CONFIRM    Collection Time: 05/13/21  1:00 AM   Result Value Ref Range    HIV 1/2 Interpretation NONREACTIVE NR      HIV 1/2 result comment SEE NOTE     MAGNESIUM    Collection Time: 05/13/21  1:08 AM   Result Value Ref Range    Magnesium 1.9 1.6 - 2.4 mg/dL   METABOLIC PANEL, BASIC    Collection Time: 05/13/21  1:08 AM   Result Value Ref Range    Sodium 141 136 - 145 mmol/L    Potassium 2.9 (L) 3.5 - 5.1 mmol/L    Chloride 113 (H) 97 - 108 mmol/L    CO2 21 21 - 32 mmol/L    Anion gap 7 5 - 15 mmol/L    Glucose 136 (H) 65 - 100 mg/dL    BUN 37 (H) 6 - 20 MG/DL    Creatinine 2.40 (H) 0.70 - 1.30 MG/DL    BUN/Creatinine ratio 15 12 - 20      GFR est AA 33 (L) >60 ml/min/1.73m2    GFR est non-AA 27 (L) >60 ml/min/1.73m2    Calcium 8.7 8.5 - 10.1 MG/DL   PHOSPHORUS    Collection Time: 05/13/21  1:08 AM   Result Value Ref Range    Phosphorus 2.2 (L) 2.6 - 4.7 MG/DL   CBC WITH AUTOMATED DIFF    Collection Time: 05/13/21  1:08 AM   Result Value Ref Range    WBC 1.4 (L) 4.1 - 11.1 K/uL    RBC 2.49 (L) 4.10 - 5.70 M/uL    HGB 6.4 (L) 12.1 - 17.0 g/dL    HCT 22.4 (L) 36.6 - 50.3 %    MCV 90.0 80.0 - 99.0 FL    MCH 25.7 (L) 26.0 - 34.0 PG    MCHC 28.6 (L) 30.0 - 36.5 g/dL    RDW 17.0 (H) 11.5 - 14.5 %    PLATELET 635 504 - 828 K/uL    MPV 10.2 8.9 - 12.9 FL    NRBC 2.8 (H) 0  WBC    ABSOLUTE NRBC 0.04 (H) 0.00 - 0.01 K/uL    NEUTROPHILS 15 (L) 32 - 75 %    BAND NEUTROPHILS 1 0 - 6 %    LYMPHOCYTES 65 (H) 12 - 49 %    MONOCYTES 13 5 - 13 %    EOSINOPHILS 6 0 - 7 %    BASOPHILS 0 0 - 1 %    IMMATURE GRANULOCYTES 0 %    ABS. NEUTROPHILS 0.2 (L) 1.8 - 8.0 K/UL    ABS. LYMPHOCYTES 0.9 0.8 - 3.5 K/UL    ABS. MONOCYTES 0.2 0.0 - 1.0 K/UL    ABS. EOSINOPHILS 0.1 0.0 - 0.4 K/UL    ABS.  BASOPHILS 0.0 0.0 - 0.1 K/UL    ABS. IMM. GRANS. 0.0 K/UL    DF MANUAL      RBC COMMENTS ANISOCYTOSIS  1+        RBC COMMENTS POLYCHROMASIA  1+       ALBUMIN    Collection Time: 05/13/21  1:08 AM   Result Value Ref Range    Albumin 0.8 (L) 3.5 - 5.0 g/dL   SAMPLES BEING HELD    Collection Time: 05/13/21  1:08 AM   Result Value Ref Range    SAMPLES BEING HELD 2PST     COMMENT        Add-on orders for these samples will be processed based on acceptable specimen integrity and analyte stability, which may vary by analyte. LD    Collection Time: 05/13/21  1:08 AM   Result Value Ref Range     85 - 241 U/L   RBC, ALLOCATE    Collection Time: 05/13/21  2:00 AM   Result Value Ref Range    HISTORY CHECKED? Historical check performed    TYPE & SCREEN    Collection Time: 05/13/21  2:13 AM   Result Value Ref Range    Crossmatch Expiration 05/16/2021,2352     ABO/Rh(D) Terrance Alt POSITIVE     Antibody screen NEG     Unit number [id number]     Blood component type RC LR     Unit division 00     Status of unit ISSUED     Crossmatch result Compatible    GLUCOSE, POC    Collection Time: 05/13/21  6:50 AM   Result Value Ref Range    Glucose (POC) 152 (H) 65 - 117 mg/dL    Performed by Saul Koch MD  [address]  Phone - [phone]   Fax - [phone]  www. [facility] [employer]

## 2021-05-13 NOTE — PROGRESS NOTES
Physical Therapy   5/13/2021    Chart reviewed. Noted pt has Hgb of 6.4. Hgb out of therapeutic range and will defer PT until POC known re: transfusion. Will follow up at later time as able and appropriate.      Suzie Means, PTA

## 2021-05-13 NOTE — PROGRESS NOTES
ID Progress Note  2021    Subjective:     Much more alert today  \"breathing treatment helped to breath better\"    Review of Systems:            Symptom Y/N Comments   Symptom Y/N Comments   Fever/Chills n      Chest Pain n      Poor Appetite       Edema        Cough y      Abdominal Pain        Sputum y      Joint Pain  y  lumbar spine    SOB/MASSEY n     Pruritis/Rash        Nausea/vomit n      Tolerating PT/OT        Diarrhea  n     Tolerating Diet        Constipation  n     Other           Could NOT obtain due to:       Objective:     Vitals:   Visit Vitals  /73 (BP 1 Location: Left upper arm, BP Patient Position: At rest)   Pulse (!) 103   Temp 99 °F (37.2 °C)   Resp 20   Ht 5' 11\" (1.803 m)   Wt 56.5 kg (124 lb 9.6 oz)   SpO2 97%   BMI 17.38 kg/m²        Tmax:  Temp (24hrs), Av.6 °F (37 °C), Min:97.8 °F (36.6 °C), Max:99.2 °F (37.3 °C)      PHYSICAL EXAM:  General: Chronically ill appearing, WD, WN. Alert, uncooperative, no acute distress    EENT:  EOMI. Anicteric sclerae. MMM  Resp:  Clear in apex with decreased breath sounds at bases, no wheezing or rales. No accessory muscle use  CV:  Regular  rhythm,  No edema  GI:  Soft, Non distended, Non tender. +Bowel sounds  Neurologic:  Alert and oriented X self, place, normal speech,   Psych:   Fair insight. Not anxious nor agitated  Skin:  No rashes.   No jaundice, right stump; dressing dry and intact, incision approximated, no erythema, no drainage     Labs:   Lab Results   Component Value Date/Time    WBC 1.4 (L) 2021 01:08 AM    Hemoglobin (POC) 5.2 (LL) 2021 08:37 AM    Hemoglobin, POC 11.2 (L) 2014 11:05 PM    HGB 6.4 (L) 2021 01:08 AM    Hematocrit (POC) 26 (L) 10/09/2018 07:16 AM    Hematocrit, POC 33 (L) 2014 11:05 PM    HCT 22.4 (L) 2021 01:08 AM    PLATELET 716  01:08 AM    MCV 90.0 2021 01:08 AM     Lab Results   Component Value Date/Time    Sodium 141 2021 01:08 AM    Potassium 2.9 (L) 05/13/2021 01:08 AM    Chloride 113 (H) 05/13/2021 01:08 AM    CO2 21 05/13/2021 01:08 AM    Anion gap 7 05/13/2021 01:08 AM    Glucose 136 (H) 05/13/2021 01:08 AM    BUN 37 (H) 05/13/2021 01:08 AM    Creatinine 2.40 (H) 05/13/2021 01:08 AM    BUN/Creatinine ratio 15 05/13/2021 01:08 AM    GFR est AA 33 (L) 05/13/2021 01:08 AM    GFR est non-AA 27 (L) 05/13/2021 01:08 AM    Calcium 8.7 05/13/2021 01:08 AM    Bilirubin, total 0.3 05/02/2021 02:00 AM    Alk. phosphatase 234 (H) 05/02/2021 02:00 AM    Protein, total 7.2 05/02/2021 02:00 AM    Albumin 0.8 (L) 05/13/2021 01:08 AM    Globulin 6.0 (H) 05/02/2021 02:00 AM    A-G Ratio 0.2 (L) 05/02/2021 02:00 AM    ALT (SGPT) 13 05/02/2021 02:00 AM       CT of Chest, ABD/PEL (5/2); L4-5 findings most likely represent discitis-osteomyelitis. Soft tissue edema from hypoalbuminemia. Right upper lobe pneumonia. Bilateral lower lobe atelectasis vs pneumonia. Trace bilateral pleural effusions. ABX hx:   IV cefepime 5/3-5/8   PO Levo 5/9  PO clindamycin 5/1-5/5  IV zosyn 5/3, 5/12-  IV vancomycin 5/3-5/7  IV Daptomycin 5/7 -  IV Rifampin 5/10-    Assessment and Plan     HAP; completed first tmt on 5/9  HAP  - recurrent fever  5/12, afebrile overnight    Procal 2.5    CXR (5/12) New bibasilar interstitial and patchy airspace disease.     IV zosyn started on 5/12     Pt is in high risk of recurrent PNA and aspiration PNA. Encourage IS 10x/every hour when awake    L4-5 Discitis/OM   Bacteremia  S/p laminectomy C3-4 (2014)   - WBC 6->2.0, afebrile    TTE (5/5) no vegetation    Blood cx (5/1, 5/3, 5/4, 5/6, 5/7, 5/8) MRSA    Blood cx (5/9 & 5/10) no growth so far      MRI of lumbar spine (5/3) discitis/OM at L3-4 & L4-5, no evidence of epidural abscess or cord compression    Once again, repeat MRI of lumbar spine (5/10) revealed no epidural abscess    -> ortho recommends medical therapy at this time.      IV vancomycin changed to daptomycin on 5/7     Rifampin was d/c'd on 5/13 with concerns of leukopenia        Continue with IV Daptomycin, total 6 weeks from negative blood cx,(5/9).  Last dose 6/1    Leukopenia  - appreciate heme/onc input    Anemia  - hgb 6.4; primary team following    Bladder retention  - following hospital protocol    U/A (-)    Above plan d/w and agreed by Dr. Burgess Lawrence, NP

## 2021-05-13 NOTE — PROGRESS NOTES
Vascular:    Right AKA mid July at [place]. Incision looks good, sutures in place - can be removed. No specific dressing needed. Call if questions.

## 2021-05-13 NOTE — PROGRESS NOTES
[facility]  Hospitalist Group                                                                                          Hospitalist Progress Note  Abdi Lewis MD  Answering service: [phone] from in house phone        Date of Service:  2021  NAME:  Nancy Sun. :  1955  MRN:  [id number]      Admission Summary: This is a 77-year-old man with a past medical history significant for chronic kidney disease, type 2 diabetes, dyslipidemia, hypertension, obstructive sleep apnea, status post recent right above-knee amputation, degenerative disk disease, and chronic pain syndrome, was in his usual state of health until the day of presentation at the emergency room when it was reported that the patient became lethargic and confused at his rehab facility    Interval history / Subjective:      Patient is seen and examined. Feeling well. Daughter at bedside. Denied any fever, chills, pain. Hb 6.4 this morning. K2.9 this morning  On IV daptomycin with plan for 6 weeks. ID note suggest from 2021 to 2021 which seems like a typo. From first negative blood culture on 2021, 6 weeks madhuri at 2021. Will defer to ID for clarification. Morrissey catheter in place. Good output. Bladder training with intermittent Morrissey clamping every 3 hours discussed with nursing team and initiated today. Follow with nephrology for consideration of Morrissey discontinuation/removal.  Close I/O monitoring discussed with patient and nursing team as well when Morrissey to be removed. However not today. Assessment & Plan:     MRSA bacteremia - first cleared culture  (persistnet prior since )  L4-L5 Discitis and OM  Sacral wound  New Fever   - Patient now on daptomycin, increased dose to 8-10mg/kg. Vanc ALESSANDRO is 4   - ID following   - Finally 48 hours of negative blood cultures. No PICC line per nephrology.  - TTE without obvious vegetations.  Will need 6 weeks IV abx, so no need for AMELIA  - Will need Murali (no PICC per renal) once cultures cleared, would await at least 24 hours since fever. Closer to discharge  - Wound care following   - Repeat MRI lumbar spine 5/10 without abscess. - Rifampin added for synergy. Discontinued on 5/12 with concern of leukopenia  - Zosyn added by ID 5/12, empiric? For aspiration pneumonia? Follow ID recommendation. Leukopenia   - Heme consulted by ID  - Agree with drug induced vs. sepsis. Previously to cefepime with recovery after DC  - May worsen with zosyn. Will defer to ID for any change  -Rifampin DC'd as noted above    Acute metabolic encephalopathy - multifactorial due to renal insufficiency, bacteremia, electrolyte abnormalitis  - Improving. Continue day and night cycles  - Avoid narcotics and sedatives if able  - MRI brain negative, ammonia normal     Anemia - chronic disease CKD   - monitor and transfuse if less than 7   - CBC daily   - EPO per nephrology     ESTRELLA on CKD stage III - pre renal and sepsis   - Cr slightly worse today, switch IVF to 0.45NS   - I and O avoid nephrotoxins   - BMP daily     S/p R BKA - recent  - Wound care following, no sign of local infection   - PT/OT    - Vascular consult for prosthesis recs    Hyponatremia - hypovolemic, resolved s/p IVF  HTN - holding lisinopril   Type 2 diabetes - A1c 7.2%  SSI, POCT glucose checks and hypoglycemia protocol   HLD - home statin   HCAP - completed therapy (Vanc and cefepime/levo)  Urinary retention - now failed x 2 void trials. Keep hoyos. Continue flomax     Code status: FULL  DVT prophylaxis: heparin     Care Plan discussed with: Patient/Family  Anticipated Disposition: [facility]/Rehab  Anticipated Discharge: Greater than 48 hours, Needs to remain afebrile. Final ABx plan. Needs Murali by IR for Abx.       Hospital Problems  Date Reviewed: 5/1/2021          Codes Class Noted POA    Severe protein-calorie malnutrition ([facility] [address] (Chronic) ICD-10-CM: E39  ICD-9-CM: 258  5/13/2021 Yes        * (Principal) Acute delirium ICD-10-CM: R41.0  ICD-9-CM: 780.09  5/1/2021 Yes                Review of Systems:   A comprehensive review of systems was negative except for that written in the HPI. Vital Signs:    Last 24hrs VS reviewed since prior progress note. Most recent are:  Visit Vitals  BP (!) 146/80 (BP 1 Location: Left upper arm, BP Patient Position: At rest)   Pulse 91   Temp 98.2 °F (36.8 °C)   Resp 18   Ht 5' 11\" (1.803 m)   Wt 56.5 kg (124 lb 9.6 oz)   SpO2 92%   BMI 17.38 kg/m²         Intake/Output Summary (Last 24 hours) at 5/13/2021 1747  Last data filed at 5/13/2021 0433  Gross per 24 hour   Intake 240 ml   Output 1000 ml   Net -760 ml        Physical Examination:     I had a face to face encounter with this patient and independently examined them on 5/13/2021 as outlined below:          Constitutional:  Alert, no acute distress, cooperative, pleasant, chronically ill appearing   ENT:  Oral mucosa moist, oropharynx benign. Resp:  CTA bilaterally. No wheezing/rhonchi/rales. No accessory muscle use   CV:  Regular rhythm, normal rate, no murmurs, gallops, rubs    GI:  Soft, non distended, non tender. normoactive bowel sounds, no hepatosplenomegaly     Musculoskeletal:  No edema, warm, 2+ pulses throughout    Neurologic:  Moves all extremities. AAOx3, CN II-XII reviewed, s/p R BKA wound c/d/i      Psych:  Good insight, Not anxious nor agitated. Data Review:    Review and/or order of clinical lab test  Review and/or order of tests in the radiology section of CPT  Review and/or order of tests in the medicine section of CPT    Xr Chest Sngl V    Result Date: 5/1/2021  No acute process. Xr Spine Lumb 2 Or 3 V    Result Date: 5/1/2021  No acute abnormality. Degenerative disc and facet changes at L3-4 through L5-S1. Mri Brain Wo Cont    Result Date: 5/2/2021  Mild chronic microvascular ischemic disease. No acute infarct or mass effect.     Mri Lumb Spine Wo Cont    Result Date: 5/10/2021  1. Again noted are changes suspicious for discitis at L3-L4 and L4-L5 levels with mild diffuse bulging of the disc and facet arthropathy resulting in moderate central stenosis with narrowing of neural foramina. 2. No epidural collection is identified. There is slight improvement in the edema within L4 vertebral body. 3. Marked disc space narrowing at L5-S1 is unchanged. 4. Diffuse anasarca is again noted. . Diffuse low signal throughout the visualized bone marrow is again noted consistent with metabolic bone disease most likely related to renal osteodystrophy. Mri Lumb Spine W Wo Cont    Result Date: 5/4/2021  1. Edema and abnormal bright signal in moderately narrowed disks at L3-4 and L4-5 worrisome for discitis osteomyelitis. Amyloid arthropathy, gout, or more typical spondylosis with also be within the differential. 2. Trace edema along the L5-S1 endplates with severe disc space narrowing is likely degenerative. 3. Spondylosis in the lower lumbar spine as above. Ct Head Wo Cont    Result Date: 5/1/2021  No acute intracranial abnormality. Ct Chest Wo Cont    Result Date: 5/2/2021  1. L4-5 findings most likely represent discitis-osteomyelitis. 2. Soft tissue edema from hypoalbuminemia. 3. Right upper lobe pneumonia. Bilateral lower lobe atelectasis versus pneumonia. Trace bilateral pleural effusions. Ct Abd Pelv Wo Cont    Result Date: 5/2/2021  1. L4-5 findings most likely represent discitis-osteomyelitis. 2. Soft tissue edema from hypoalbuminemia. 3. Right upper lobe pneumonia. Bilateral lower lobe atelectasis versus pneumonia. Trace bilateral pleural effusions. Xr Chest Port    Result Date: 5/12/2021  New bibasilar interstitial and patchy airspace disease.        Labs:     Recent Labs     05/13/21  0108 05/12/21  0437   WBC 1.4* 1.3*   HGB 6.4* 7.7*   HCT 22.4* 26.4*    255     Recent Labs     05/13/21  0108 05/12/21  0438 05/12/21  0437 05/11/21  0455    144 143 147* K 2.9* 3.1* 3.1* 3.3*   * 113* 113* 114*   CO2 21 20* 21 24   BUN 37* 39* 39* 40*   CREA 2.40* 2.32* 2.28* 2.26*   * 162* 163* 180*   CA 8.7 8.6 8.6 8.8   MG 1.9  --  1.8 2.5*   PHOS 2.2* 2.0* 1.8* 1.7*     Recent Labs     05/13/21  0108 05/12/21  0438 05/11/21  0455   ALB 0.8* 1.0* 1.0*     No results for input(s): INR, PTP, APTT, INREXT, INREXT in the last 72 hours. No results for input(s): FE, TIBC, PSAT, FERR in the last 72 hours. Lab Results   Component Value Date/Time    Folate 7.1 05/02/2021 02:00 AM      No results for input(s): PH, PCO2, PO2 in the last 72 hours. No results for input(s): CPK, CKNDX, TROIQ in the last 72 hours.     No lab exists for component: CPKMB  Lab Results   Component Value Date/Time    Cholesterol, total 239 (H) 03/30/2020 02:32 PM    HDL Cholesterol 40 03/30/2020 02:32 PM    LDL,Direct 120 (H) 09/11/2012 12:00 AM    LDL, calculated 130 (H) 03/30/2020 02:32 PM    Triglyceride 345 (H) 03/30/2020 02:32 PM     Lab Results   Component Value Date/Time    Glucose (POC) 135 (H) 05/13/2021 04:29 PM    Glucose (POC) 162 (H) 05/13/2021 11:59 AM    Glucose (POC) 152 (H) 05/13/2021 06:50 AM    Glucose (POC) 147 (H) 05/12/2021 09:53 PM    Glucose (POC) 131 (H) 05/12/2021 04:57 PM     Lab Results   Component Value Date/Time    Color YELLOW/STRAW 05/09/2021 02:51 PM    Appearance CLEAR 05/09/2021 02:51 PM    Specific gravity 1.017 05/09/2021 02:51 PM    Specific gravity 1.016 02/19/2016 08:00 PM    pH (UA) 6.0 05/09/2021 02:51 PM    Protein 100 (A) 05/09/2021 02:51 PM    Glucose Negative 05/09/2021 02:51 PM    Ketone TRACE (A) 05/09/2021 02:51 PM    Bilirubin Negative 05/09/2021 02:51 PM    Urobilinogen 0.2 05/09/2021 02:51 PM    Nitrites Negative 05/09/2021 02:51 PM    Leukocyte Esterase Negative 05/09/2021 02:51 PM    Epithelial cells FEW 05/09/2021 02:51 PM    Bacteria 1+ (A) 05/09/2021 02:51 PM    WBC 0-4 05/09/2021 02:51 PM    RBC 0-5 05/09/2021 02:51 PM         Medications Reviewed:     Current Facility-Administered Medications   Medication Dose Route Frequency    0.9% sodium chloride infusion 250 mL  250 mL IntraVENous PRN    [START ON 5/14/2021] therapeutic multivitamin (THERAGRAN) tablet 1 Tab  1 Tab Oral DAILY    sodium bicarbonate tablet 650 mg  650 mg Oral BID    piperacillin-tazobactam (ZOSYN) 3.375 g in 0.9% sodium chloride (MBP/ADV) 100 mL MBP  3.375 g IntraVENous Q8H    DAPTOmycin (CUBICIN) 600 mg in 0.9% sodium chloride 50 mL IVPB RF formulation  600 mg IntraVENous Q48H    fentaNYL citrate (PF) injection 50 mcg  50 mcg IntraVENous Q3H PRN    senna-docusate (PERICOLACE) 8.6-50 mg per tablet 1 Tab  1 Tab Oral DAILY    tamsulosin (FLOMAX) capsule 0.4 mg  0.4 mg Oral DAILY    cyclobenzaprine (FLEXERIL) tablet 5 mg  5 mg Oral TID PRN    oxyCODONE IR (ROXICODONE) tablet 15 mg  15 mg Oral Q4H PRN    0.9% sodium chloride infusion 250 mL  250 mL IntraVENous PRN    epoetin naman-epbx (RETACRIT) injection 20,000 Units  20,000 Units SubCUTAneous Q MON, WED & FRI    albuterol-ipratropium (DUO-NEB) 2.5 MG-0.5 MG/3 ML  3 mL Nebulization Q4H PRN    aspirin chewable tablet 81 mg  81 mg Oral DAILY    ergocalciferol capsule 50,000 Units  50,000 Units Oral every Friday    ferrous sulfate tablet 325 mg  1 Tab Oral BID WITH MEALS    pantoprazole (PROTONIX) tablet 40 mg  40 mg Oral ACB    sodium chloride (NS) flush 5-40 mL  5-40 mL IntraVENous Q8H    sodium chloride (NS) flush 5-40 mL  5-40 mL IntraVENous PRN    acetaminophen (TYLENOL) tablet 650 mg  650 mg Oral Q6H PRN    Or    acetaminophen (TYLENOL) suppository 650 mg  650 mg Rectal Q6H PRN    polyethylene glycol (MIRALAX) packet 17 g  17 g Oral DAILY PRN    promethazine (PHENERGAN) tablet 12.5 mg  12.5 mg Oral Q6H PRN    Or    ondansetron (ZOFRAN) injection 4 mg  4 mg IntraVENous Q6H PRN    heparin (porcine) injection 5,000 Units  5,000 Units SubCUTAneous Q8H    insulin lispro (HUMALOG) injection   SubCUTAneous AC&HS  glucose chewable tablet 16 g  4 Tab Oral PRN    dextrose (D50W) injection syrg 12.5-25 g  12.5-25 g IntraVENous PRN    glucagon (GLUCAGEN) injection 1 mg  1 mg IntraMUSCular PRN     ______________________________________________________________________  EXPECTED LENGTH OF STAY: 4d 7h  ACTUAL LENGTH OF STAY:          12                 Peace Cannon MD

## 2021-05-13 NOTE — PROGRESS NOTES
Occupational Therapy  5/13/2021    Chart reviewed. Pt with HgB of 6.4 and scheduled for PRBC. Will follow for OT. Thank you.  Ishmael Navarrete, OT

## 2021-05-14 ENCOUNTER — HOSPITAL ENCOUNTER (OUTPATIENT)
Dept: INFUSION THERAPY | Age: 66
Discharge: HOME | End: 2021-05-14

## 2021-05-14 LAB
ABO + RH BLD: NORMAL
ALBUMIN SERPL-MCNC: 1 G/DL (ref 3.5–5)
ANION GAP SERPL CALC-SCNC: 9 MMOL/L (ref 5–15)
BACTERIA SPEC CULT: NORMAL
BLD PROD TYP BPU: NORMAL
BLOOD GROUP ANTIBODIES SERPL: NORMAL
BPU ID: NORMAL
BUN SERPL-MCNC: 37 MG/DL (ref 6–20)
BUN/CREAT SERPL: 14 (ref 12–20)
CALCIUM SERPL-MCNC: 8.6 MG/DL (ref 8.5–10.1)
CHLORIDE SERPL-SCNC: 111 MMOL/L (ref 97–108)
CK SERPL-CCNC: 13 U/L (ref 39–308)
CO2 SERPL-SCNC: 22 MMOL/L (ref 21–32)
CREAT SERPL-MCNC: 2.64 MG/DL (ref 0.7–1.3)
CREAT UR-MCNC: 25.4 MG/DL
CROSSMATCH RESULT,%XM: NORMAL
G6PD BLD QN: 231 U/10E12 RBC (ref 127–427)
GLUCOSE BLD STRIP.AUTO-MCNC: 120 MG/DL (ref 65–117)
GLUCOSE BLD STRIP.AUTO-MCNC: 130 MG/DL (ref 65–117)
GLUCOSE BLD STRIP.AUTO-MCNC: 134 MG/DL (ref 65–117)
GLUCOSE BLD STRIP.AUTO-MCNC: 148 MG/DL (ref 65–117)
GLUCOSE SERPL-MCNC: 149 MG/DL (ref 65–100)
PHOSPHATE SERPL-MCNC: 2.4 MG/DL (ref 2.6–4.7)
POTASSIUM SERPL-SCNC: 3.3 MMOL/L (ref 3.5–5.1)
PROT UR-MCNC: 201 MG/DL (ref 0–11.9)
PROT/CREAT UR-RTO: 7.9
RBC # BLD AUTO: 2.45 X10E6/UL (ref 4.14–5.8)
SERVICE CMNT-IMP: ABNORMAL
SERVICE CMNT-IMP: NORMAL
SODIUM SERPL-SCNC: 142 MMOL/L (ref 136–145)
SPECIMEN EXP DATE BLD: NORMAL
STATUS OF UNIT,%ST: NORMAL
UNIT DIVISION, %UDIV: 0

## 2021-05-14 PROCEDURE — 74011250637 HC RX REV CODE- 250/637: Performed by: HOSPITALIST

## 2021-05-14 PROCEDURE — 97530 THERAPEUTIC ACTIVITIES: CPT

## 2021-05-14 PROCEDURE — 74011250637 HC RX REV CODE- 250/637: Performed by: FAMILY MEDICINE

## 2021-05-14 PROCEDURE — 82550 ASSAY OF CK (CPK): CPT

## 2021-05-14 PROCEDURE — 74011250636 HC RX REV CODE- 250/636: Performed by: INTERNAL MEDICINE

## 2021-05-14 PROCEDURE — 74011250637 HC RX REV CODE- 250/637: Performed by: INTERNAL MEDICINE

## 2021-05-14 PROCEDURE — 74011636637 HC RX REV CODE- 636/637: Performed by: INTERNAL MEDICINE

## 2021-05-14 PROCEDURE — 65270000032 HC RM SEMIPRIVATE

## 2021-05-14 PROCEDURE — 80069 RENAL FUNCTION PANEL: CPT

## 2021-05-14 PROCEDURE — 36415 COLL VENOUS BLD VENIPUNCTURE: CPT

## 2021-05-14 PROCEDURE — 82962 GLUCOSE BLOOD TEST: CPT

## 2021-05-14 PROCEDURE — 74011000258 HC RX REV CODE- 258: Performed by: INTERNAL MEDICINE

## 2021-05-14 PROCEDURE — 74011000258 HC RX REV CODE- 258: Performed by: NURSE PRACTITIONER

## 2021-05-14 PROCEDURE — 74011250636 HC RX REV CODE- 250/636: Performed by: NURSE PRACTITIONER

## 2021-05-14 PROCEDURE — 84156 ASSAY OF PROTEIN URINE: CPT

## 2021-05-14 RX ORDER — LANOLIN ALCOHOL/MO/W.PET/CERES
100 CREAM (GRAM) TOPICAL DAILY
Status: DISCONTINUED | OUTPATIENT
Start: 2021-05-14 | End: 2021-05-20 | Stop reason: HOSPADM

## 2021-05-14 RX ORDER — SODIUM,POTASSIUM PHOSPHATES 280-250MG
2 POWDER IN PACKET (EA) ORAL 2 TIMES DAILY
Status: COMPLETED | OUTPATIENT
Start: 2021-05-14 | End: 2021-05-15

## 2021-05-14 RX ADMIN — SODIUM BICARBONATE 650 MG: 650 TABLET ORAL at 10:29

## 2021-05-14 RX ADMIN — FENTANYL CITRATE 50 MCG: 50 INJECTION, SOLUTION INTRAMUSCULAR; INTRAVENOUS at 14:15

## 2021-05-14 RX ADMIN — Medication 10 ML: at 14:18

## 2021-05-14 RX ADMIN — DOCUSATE SODIUM 50 MG AND SENNOSIDES 8.6 MG 1 TABLET: 8.6; 5 TABLET, FILM COATED ORAL at 10:29

## 2021-05-14 RX ADMIN — Medication 100 MG: at 12:27

## 2021-05-14 RX ADMIN — HEPARIN SODIUM 5000 UNITS: 5000 INJECTION INTRAVENOUS; SUBCUTANEOUS at 06:33

## 2021-05-14 RX ADMIN — DAPTOMYCIN 600 MG: 500 INJECTION, POWDER, LYOPHILIZED, FOR SOLUTION INTRAVENOUS at 18:21

## 2021-05-14 RX ADMIN — FERROUS SULFATE TAB 325 MG (65 MG ELEMENTAL FE) 325 MG: 325 (65 FE) TAB at 10:28

## 2021-05-14 RX ADMIN — PANTOPRAZOLE SODIUM 40 MG: 40 TABLET, DELAYED RELEASE ORAL at 06:33

## 2021-05-14 RX ADMIN — POTASSIUM & SODIUM PHOSPHATES POWDER PACK 280-160-250 MG 2 PACKET: 280-160-250 PACK at 10:31

## 2021-05-14 RX ADMIN — SODIUM BICARBONATE 650 MG: 650 TABLET ORAL at 18:13

## 2021-05-14 RX ADMIN — PIPERACILLIN AND TAZOBACTAM 3.38 G: 3; .375 INJECTION, POWDER, LYOPHILIZED, FOR SOLUTION INTRAVENOUS at 22:41

## 2021-05-14 RX ADMIN — PIPERACILLIN AND TAZOBACTAM 3.38 G: 3; .375 INJECTION, POWDER, LYOPHILIZED, FOR SOLUTION INTRAVENOUS at 06:33

## 2021-05-14 RX ADMIN — ERGOCALCIFEROL 50000 UNITS: 1.25 CAPSULE, LIQUID FILLED ORAL at 06:33

## 2021-05-14 RX ADMIN — ASPIRIN 81 MG: 81 TABLET, CHEWABLE ORAL at 10:29

## 2021-05-14 RX ADMIN — FENTANYL CITRATE 50 MCG: 50 INJECTION, SOLUTION INTRAMUSCULAR; INTRAVENOUS at 19:28

## 2021-05-14 RX ADMIN — HEPARIN SODIUM 5000 UNITS: 5000 INJECTION INTRAVENOUS; SUBCUTANEOUS at 23:25

## 2021-05-14 RX ADMIN — FENTANYL CITRATE 50 MCG: 50 INJECTION, SOLUTION INTRAMUSCULAR; INTRAVENOUS at 23:25

## 2021-05-14 RX ADMIN — INSULIN LISPRO 2 UNITS: 100 INJECTION, SOLUTION INTRAVENOUS; SUBCUTANEOUS at 06:44

## 2021-05-14 RX ADMIN — EPOETIN ALFA-EPBX 20000 UNITS: 20000 INJECTION, SOLUTION INTRAVENOUS; SUBCUTANEOUS at 22:49

## 2021-05-14 RX ADMIN — PIPERACILLIN AND TAZOBACTAM 3.38 G: 3; .375 INJECTION, POWDER, LYOPHILIZED, FOR SOLUTION INTRAVENOUS at 15:39

## 2021-05-14 RX ADMIN — FERROUS SULFATE TAB 325 MG (65 MG ELEMENTAL FE) 325 MG: 325 (65 FE) TAB at 17:17

## 2021-05-14 RX ADMIN — TAMSULOSIN HYDROCHLORIDE 0.4 MG: 0.4 CAPSULE ORAL at 10:28

## 2021-05-14 RX ADMIN — HEPARIN SODIUM 5000 UNITS: 5000 INJECTION INTRAVENOUS; SUBCUTANEOUS at 17:16

## 2021-05-14 RX ADMIN — POTASSIUM & SODIUM PHOSPHATES POWDER PACK 280-160-250 MG 2 PACKET: 280-160-250 PACK at 18:14

## 2021-05-14 RX ADMIN — THERA TABS 1 TABLET: TAB at 10:29

## 2021-05-14 RX ADMIN — Medication 10 ML: at 22:42

## 2021-05-14 RX ADMIN — OXYCODONE 15 MG: 5 TABLET ORAL at 18:13

## 2021-05-14 RX ADMIN — OXYCODONE 15 MG: 5 TABLET ORAL at 12:27

## 2021-05-14 NOTE — PROGRESS NOTES
[address] Adult  Hospitalist Group                                                                                          Hospitalist Progress Note  Parker Morse MD  Answering service: [phone] from in house phone        Date of Service:  2021  NAME:  Damián Campoverde. :  1955  MRN:  [id number]      Admission Summary: This is a 68-year-old man with a past medical history significant for chronic kidney disease, type 2 diabetes, dyslipidemia, hypertension, obstructive sleep apnea, status post recent right above-knee amputation, degenerative disk disease, and chronic pain syndrome, was in his usual state of health until the day of presentation at the emergency room when it was reported that the patient became lethargic and confused at his rehab facility    Interval history / Subjective:      Patient is seen and examined. Feeling well. Denied any fever, chills, pain. pt concerned about nutrition asking for a PEG tube   On IV daptomycin with plan for 6 weeks. ID note suggest from 2021 to 2021  Will defer to ID for clarification. Morrissey catheter in place. Good output. Bladder training with intermittent Morrissey clamping every 3 hours discussed with nursing team and initiated today. Follow with nephrology for consideration of Morrissey discontinuation/removal.        Assessment & Plan:     MRSA bacteremia - first cleared culture  (persistnet prior since )  L4-L5 Discitis and OM  Sacral wound  New Fever   - Patient now on daptomycin, increased dose to 8-10mg/kg. Vanc ALESSANDRO is 4   - ID following   - Finally 48 hours of negative blood cultures. No PICC line per nephrology. [facility]  - TTE without obvious vegetations. Will need 6 weeks IV abx, so no need for AMELIA  - Will need Murali (no PICC per renal) once cultures cleared, would await at least 24 hours since fever.  Closer to discharge  - Wound care following   - Repeat MRI lumbar spine 5/10 without abscess. - Rifampin added for synergy. Discontinued on 5/12 with concern of leukopenia  - Zosyn added by ID 5/12, empiric? For aspiration pneumonia? Follow ID recommendation. Leukopenia   - Heme consulted by ID  - Agree with drug induced vs. sepsis. Previously to cefepime with recovery after DC  - May worsen with zosyn. Will defer to ID for any change  - Rifampin DC'd as noted above    Acute metabolic encephalopathy - multifactorial due to renal insufficiency, bacteremia, electrolyte abnormalitis  - Improving. Continue day and night cycles  - Avoid narcotics and sedatives if able  - MRI brain negative, ammonia normal     Anemia - chronic disease CKD   - monitor and transfuse if less than 7   - CBC daily   - EPO per nephrology     ESTRELLA on CKD stage III - pre renal and sepsis   - Cr slightly worse today, switch IVF to 0.45NS   - I and O avoid nephrotoxins   - BMP daily     S/p R BKA - recent  - Wound care following, no sign of local infection   - PT/OT    - Vascular consult for prosthesis recs    Hyponatremia - hypovolemic, resolved s/p IVF  HTN - holding lisinopril   Type 2 diabetes - A1c 7.2%  SSI, POCT glucose checks and hypoglycemia protocol   HLD - home statin   HCAP - completed therapy (Vanc and cefepime/levo)  Urinary retention - now failed x 2 void trials. Keep hoyos. Continue flomax     Need PEG for nutrition ? GI consulted     Code status: FULL  DVT prophylaxis: heparin     Care Plan discussed with: Patient/Family  Anticipated Disposition: [facility]/Rehab  Anticipated Discharge: Greater than 48 hours, Needs to remain afebrile. Final ABx plan. Needs Murali by IR for Abx.       Hospital Problems  Date Reviewed: 5/1/2021          Codes Class Noted POA    Severe protein-calorie malnutrition ([facility] [address] (Chronic) ICD-10-CM: E64  ICD-9-CM: 934  5/13/2021 Yes        * (Principal) Acute delirium ICD-10-CM: R41.0  ICD-9-CM: 780.09  5/1/2021 Yes                Review of Systems:   A comprehensive review of systems was negative except for that written in the HPI. Vital Signs:    Last 24hrs VS reviewed since prior progress note. Most recent are:  Visit Vitals  BP (!) 147/69 (BP 1 Location: Left arm, BP Patient Position: At rest)   Pulse 84   Temp 98 °F (36.7 °C)   Resp 17   Ht 5' 11\" (1.803 m)   Wt 56.5 kg (124 lb 9.6 oz)   SpO2 97%   BMI 17.38 kg/m²         Intake/Output Summary (Last 24 hours) at 5/14/2021 1045  Last data filed at 5/13/2021 2234  Gross per 24 hour   Intake --   Output 1900 ml   Net -1900 ml        Physical Examination:     I had a face to face encounter with this patient and independently examined them on 5/14/2021 as outlined below:          Constitutional:  chronically ill appearing   ENT:  mmm   Resp:  CTA bilaterally. CV:  Regular rhythm, normal rate,    GI:  Soft, non distended, non tender. bs+    Musculoskeletal:  No edema, warm, 2+ pulses throughout    Neurologic:  Moves all extremities. AAOx3, CN II-XII reviewed, s/p R BKA wound c/d/i      Psych:  Good insight, Not anxious nor agitated. Data Review:    Review and/or order of clinical lab test  Review and/or order of tests in the radiology section of CPT  Review and/or order of tests in the medicine section of CPT    Xr Chest Sngl V    Result Date: 5/1/2021  No acute process. Xr Spine Lumb 2 Or 3 V    Result Date: 5/1/2021  No acute abnormality. Degenerative disc and facet changes at L3-4 through L5-S1. Mri Brain Wo Cont    Result Date: 5/2/2021  Mild chronic microvascular ischemic disease. No acute infarct or mass effect. Mri Lumb Spine Wo Cont    Result Date: 5/10/2021  1. Again noted are changes suspicious for discitis at L3-L4 and L4-L5 levels with mild diffuse bulging of the disc and facet arthropathy resulting in moderate central stenosis with narrowing of neural foramina. 2. No epidural collection is identified. There is slight improvement in the edema within L4 vertebral body.  3. Marked disc space narrowing at L5-S1 is unchanged. 4. Diffuse anasarca is again noted. . Diffuse low signal throughout the visualized bone marrow is again noted consistent with metabolic bone disease most likely related to renal osteodystrophy. Mri Lumb Spine W Wo Cont    Result Date: 5/4/2021  1. Edema and abnormal bright signal in moderately narrowed disks at L3-4 and L4-5 worrisome for discitis osteomyelitis. Amyloid arthropathy, gout, or more typical spondylosis with also be within the differential. 2. Trace edema along the L5-S1 endplates with severe disc space narrowing is likely degenerative. 3. Spondylosis in the lower lumbar spine as above. Ct Head Wo Cont    Result Date: 5/1/2021  No acute intracranial abnormality. Ct Chest Wo Cont    Result Date: 5/2/2021  1. L4-5 findings most likely represent discitis-osteomyelitis. 2. Soft tissue edema from hypoalbuminemia. 3. Right upper lobe pneumonia. Bilateral lower lobe atelectasis versus pneumonia. Trace bilateral pleural effusions. Ct Abd Pelv Wo Cont    Result Date: 5/2/2021  1. L4-5 findings most likely represent discitis-osteomyelitis. 2. Soft tissue edema from hypoalbuminemia. 3. Right upper lobe pneumonia. Bilateral lower lobe atelectasis versus pneumonia. Trace bilateral pleural effusions. Xr Chest Port    Result Date: 5/12/2021  New bibasilar interstitial and patchy airspace disease.        Labs:     Recent Labs     05/13/21 2026 05/13/21  0108   WBC 3.2* 1.4*   HGB 8.0* 6.4*   HCT 26.7* 22.4*    211     Recent Labs     05/14/21  0413 05/13/21 2026 05/13/21 2017 05/13/21  0108 05/12/21  0437 05/12/21  0437    142  --  141   < > 143   K 3.3* 3.2*  --  2.9*   < > 3.1*   * 111*  --  113*   < > 113*   CO2 22 24  --  21   < > 21   BUN 37* 37*  --  37*   < > 39*   CREA 2.64* 2.59*  --  2.40*   < > 2.28*   * 163*  --  136*   < > 163*   CA 8.6 8.8  --  8.7   < > 8.6   MG  --  1.8  --  1.9  --  1.8   PHOS 2.4*  --  2.4* 2.2*   < > 1.8*    < > = values in this interval not displayed. Recent Labs     05/14/21  0413 05/13/21  0108 05/12/21  0438   ALB 1.0* 0.8* 1.0*     No results for input(s): INR, PTP, APTT, INREXT, INREXT in the last 72 hours. No results for input(s): FE, TIBC, PSAT, FERR in the last 72 hours. Lab Results   Component Value Date/Time    Folate 7.1 05/02/2021 02:00 AM      No results for input(s): PH, PCO2, PO2 in the last 72 hours.   Recent Labs     05/14/21  0413   CPK 13*     Lab Results   Component Value Date/Time    Cholesterol, total 239 (H) 03/30/2020 02:32 PM    HDL Cholesterol 40 03/30/2020 02:32 PM    LDL,Direct 120 (H) 09/11/2012 12:00 AM    LDL, calculated 130 (H) 03/30/2020 02:32 PM    Triglyceride 345 (H) 03/30/2020 02:32 PM     Lab Results   Component Value Date/Time    Glucose (POC) 148 (H) 05/14/2021 06:32 AM    Glucose (POC) 192 (H) 05/13/2021 10:30 PM    Glucose (POC) 135 (H) 05/13/2021 04:29 PM    Glucose (POC) 162 (H) 05/13/2021 11:59 AM    Glucose (POC) 152 (H) 05/13/2021 06:50 AM     Lab Results   Component Value Date/Time    Color YELLOW/STRAW 05/09/2021 02:51 PM    Appearance CLEAR 05/09/2021 02:51 PM    Specific gravity 1.017 05/09/2021 02:51 PM    Specific gravity 1.016 02/19/2016 08:00 PM    pH (UA) 6.0 05/09/2021 02:51 PM    Protein 100 (A) 05/09/2021 02:51 PM    Glucose Negative 05/09/2021 02:51 PM    Ketone TRACE (A) 05/09/2021 02:51 PM    Bilirubin Negative 05/09/2021 02:51 PM    Urobilinogen 0.2 05/09/2021 02:51 PM    Nitrites Negative 05/09/2021 02:51 PM    Leukocyte Esterase Negative 05/09/2021 02:51 PM    Epithelial cells FEW 05/09/2021 02:51 PM    Bacteria 1+ (A) 05/09/2021 02:51 PM    WBC 0-4 05/09/2021 02:51 PM    RBC 0-5 05/09/2021 02:51 PM         Medications Reviewed:     Current Facility-Administered Medications   Medication Dose Route Frequency    potassium, sodium phosphates (NEUTRA-PHOS) packet 2 Packet  2 Packet Oral BID    0.9% sodium chloride infusion 250 mL  250 mL IntraVENous PRN    therapeutic multivitamin (THERAGRAN) tablet 1 Tab  1 Tab Oral DAILY    sodium bicarbonate tablet 650 mg  650 mg Oral BID    piperacillin-tazobactam (ZOSYN) 3.375 g in 0.9% sodium chloride (MBP/ADV) 100 mL MBP  3.375 g IntraVENous Q8H    DAPTOmycin (CUBICIN) 600 mg in 0.9% sodium chloride 50 mL IVPB RF formulation  600 mg IntraVENous Q48H    fentaNYL citrate (PF) injection 50 mcg  50 mcg IntraVENous Q3H PRN    senna-docusate (PERICOLACE) 8.6-50 mg per tablet 1 Tab  1 Tab Oral DAILY    tamsulosin (FLOMAX) capsule 0.4 mg  0.4 mg Oral DAILY    cyclobenzaprine (FLEXERIL) tablet 5 mg  5 mg Oral TID PRN    oxyCODONE IR (ROXICODONE) tablet 15 mg  15 mg Oral Q4H PRN    0.9% sodium chloride infusion 250 mL  250 mL IntraVENous PRN    epoetin naman-epbx (RETACRIT) injection 20,000 Units  20,000 Units SubCUTAneous Q MON, WED & FRI    albuterol-ipratropium (DUO-NEB) 2.5 MG-0.5 MG/3 ML  3 mL Nebulization Q4H PRN    aspirin chewable tablet 81 mg  81 mg Oral DAILY    ergocalciferol capsule 50,000 Units  50,000 Units Oral every Friday    ferrous sulfate tablet 325 mg  1 Tab Oral BID WITH MEALS    pantoprazole (PROTONIX) tablet 40 mg  40 mg Oral ACB    sodium chloride (NS) flush 5-40 mL  5-40 mL IntraVENous Q8H    sodium chloride (NS) flush 5-40 mL  5-40 mL IntraVENous PRN    acetaminophen (TYLENOL) tablet 650 mg  650 mg Oral Q6H PRN    Or    acetaminophen (TYLENOL) suppository 650 mg  650 mg Rectal Q6H PRN    polyethylene glycol (MIRALAX) packet 17 g  17 g Oral DAILY PRN    promethazine (PHENERGAN) tablet 12.5 mg  12.5 mg Oral Q6H PRN    Or    ondansetron (ZOFRAN) injection 4 mg  4 mg IntraVENous Q6H PRN    heparin (porcine) injection 5,000 Units  5,000 Units SubCUTAneous Q8H    insulin lispro (HUMALOG) injection   SubCUTAneous AC&HS    glucose chewable tablet 16 g  4 Tab Oral PRN    dextrose (D50W) injection syrg 12.5-25 g  12.5-25 g IntraVENous PRN    glucagon (GLUCAGEN) injection 1 mg  1 mg IntraMUSCular PRN     ______________________________________________________________________  EXPECTED LENGTH OF STAY: 4d 7h  ACTUAL LENGTH OF STAY:          Sarah Monge, MD

## 2021-05-14 NOTE — PROGRESS NOTES
Occupational Therapy: Chart reviewed. Patient received in bed. Reports he was up in chair earlier, but \"all hell broke loose\". States he became fatigued and could not continue to sit up. After return to bed, patient reports pain right LE at a 7/10 with a request for pain medication from nurse at this time as well as fatigue. States he is unable to participate in any activity at this time. Will follow.   CHAIM Bowman/GABRIEL

## 2021-05-14 NOTE — PROGRESS NOTES
Vascular:    Right AKA looks good with sutures out. OK to use stump  sleeve if desired. Will see PRN.

## 2021-05-14 NOTE — PROGRESS NOTES
Problem: Mobility Impaired (Adult and Pediatric)  Goal: *Acute Goals and Plan of Care (Insert Text)  Description: FUNCTIONAL STATUS PRIOR TO ADMISSION: Patient was modified independent using a Rw and R LE prosthesis for functional mobility prior to recent revision of BKA to AKA. Discharged to [facility] and was working on transfers to w/c with one assist    [facility]: The patient lived with his spouse but did not require assist prior to AKA. Physical Therapy Goals  Initiated 5/4/2021; continued 5/11/21  1. Patient will move from supine to sit and sit to supine  and scoot up and down in bed with moderate assistance  within 7 day(s). 2.  Patient will transfer from bed to chair and chair to bed with maximal assistance using the least restrictive device within 7 day(s). 3.  Patient will perform sit to stand with maximal assistance within 7 day(s). 4.  Patient will demo fair sitting balance at EOB x2 min in prep for mobility progression within 7 days. 5. Patient will indep complete B LE there ex program to incr strength within 7 days. Outcome: Progressing Towards Goal    PHYSICAL THERAPY TREATMENT  Patient: June Shorten. (76 y.o. male)  Date: 5/14/2021  Diagnosis: Acute delirium [R41.0] Acute delirium       Precautions:    Chart, physical therapy assessment, plan of care and goals were reviewed. ASSESSMENT  Patient continues with skilled PT services and is progressing towards goals. Pt more agreeable to therapy. Utilized tubular gauze donned on residual limb. Unable to obtain stump  at this time. [facility] Tubular gauze has decrease compression for shaping. Pt requesting transfer for OOB. Pt was able to transfer to chair via squat pivot. Discussed with RN to have mobility assist with return to bed via lateral to left.  Pt tolerating OOB in chair    Current Level of Function Impacting Discharge (mobility/balance): max A     Other factors to consider for discharge:AKA. Decrease mobility          PLAN :  Patient continues to benefit from skilled intervention to address the above impairments. Continue treatment per established plan of care. to address goals. Recommendation for discharge: (in order for the patient to meet his/her long term goals)  Therapy up to 5 days/week in SNF setting    This discharge recommendation:  Has not yet been discussed the attending provider and/or case management    IF patient discharges home will need the following DME: to be determined (TBD)       SUBJECTIVE:   Patient stated is there cushion in there.  pillow placed in chair     OBJECTIVE DATA SUMMARY:   Critical Behavior:  Neurologic State: Alert, Confused  Orientation Level: Oriented to person, Oriented to place, Oriented to time, Disoriented to time  Cognition: Decreased attention/concentration, Follows commands  Safety/Judgement: Decreased insight into deficits  Functional Mobility Training:  Bed Mobility:  Rolling: Moderate assistance  Supine to Sit: Moderate assistance              Transfers:              Bed to Chair: Maximum assistance(squat pivot )                    Balance:  Sitting: Impaired  Sitting - Static: Fair (occasional)  Sitting - Dynamic: Poor (constant support)  Standing: Impaired  Standing - Static: Poor  Ambulation/Gait Training:                                                      Stairs: Therapeutic Exercises:       Pain Rating:  Back pain    Activity Tolerance:   Poor    After treatment patient left in no apparent distress:   Sitting in chair and Call bell within reach    COMMUNICATION/COLLABORATION:   The patients plan of care was discussed with: Registered nurse.      Flor Galdamez PTA   Time Calculation: 28 mins

## 2021-05-14 NOTE — CONSULTS
118 [facility].  217 [facility] 140 Unruly Krishnan, [address]  [phone]                     GI CONSULTATION NOTE      NAME:  Jeffrey Peres. :   1955   MRN:   [id number]     Consult Date: 2021     Chief Complaint: PEG evaluation for malnutrition     History of Present Illness:  Patient is a 77 y.o. who is seen in consultation at the request of Dr. Patti Man for the above problem. Mr. Jarod Belle has a past medical history significant for CKD 3, DM 2, anemia, right AKA in , GBAI, DJD, and chronic pain syndrome. He was at rehab following his AKA and became unresponsive and was transferred to [address] [place] [address] on 21. Over the course of his extensive post operative and rehabilitation he reports his appetite has significantly decreased. He reports occasionally choking with certain foods about able to swallow soft foods such as Jello without difficulty. Denies pain with swallowing. He states his normal weight is around 200lbs prior to surgery but he has been continuing to loose weight. Last EGD was in 10/2018, history of Hiatal hernia 2cm and above this an acquired Schatzki's ring which is biopsied and dilated with 20mm balloon.       PMH:  Past Medical History:   Diagnosis Date    Arthritis     BPH (benign prostatic hyperplasia)     Chronic kidney disease     Chronic pain     BACK NEUROPATHY FEET HANDS    DM neuropathy, type II diabetes mellitus ([address]     DM type 2 causing CKD stage 3 ([address] 2012    DM type 2 causing vascular disease ([address]     Dyslipidemia     Foot ulcer due to secondary DM ([address] 2012    GERD (gastroesophageal reflux disease)     History of esophageal dilatation     HTN     Ill-defined condition     MRSA in wound right leg (BKA)    S/P BKA (below knee amputation) ([address]     right BKA due to non-healing ulcer    Sleep apnea     Doesnt use CPAP, patient hasnt been retested since weight loss    Thromboembolus ([address] 'S    BLOOD CLOT LEFT LEG PSH:  Past Surgical History:   Procedure Laterality Date    COLONOSCOPY N/A 3/30/2021    COLONOSCOPY performed by Laura Long MD at [place] [address] ECHO 2D ADULT      normal; LVEF 60%    ECHO STRESS      7 min; normal    ENDOSCOPY, COLON, DIAGNOSTIC      FLEXIBLE SIGMOIDOSCOPY N/A 2021    SIGMOIDOSCOPY FLEXIBLE performed by Laura Long MD at [place] [address] HX AMPUTATION  2012    left great toe    HX AMPUTATION  2007    BKA right    HX BELOW KNEE AMPUTATION Right     HX CERVICAL FUSION  2009    x2    HX ENDOSCOPY      EGD with Dilitation x 2    HX ORTHOPAEDIC  2006    ACDF C4-C7    IR INSERT TUNL CVC W/O PORT OVER 5 YR  2020    IR REMOVE TUNL CVAD W/O PORT / PUMP  2020    KS ABDOMEN SURGERY PROC UNLISTED      pilonidal cyst    STRESS TEST MYOVIEW      walked 8:46; normal; LVEF 51%       Allergies: Allergies   Allergen Reactions    Adhesive Tape-Silicones Hives    Gabapentin Anxiety     Anxiety w/ hallucinations    Sulfa (Sulfonamide Antibiotics) Swelling     Lips eyes       Home Medications:  Prior to Admission Medications   Prescriptions Last Dose Informant Patient Reported? Taking? Lantus Solostar U-100 Insulin 100 unit/mL (3 mL) inpn   No No   Sig: INJECT 40 TO 50 UNITS SUBCUTANEOUSLY TWICE DAILY AS DIRECTED   SSD 1 % topical cream Unknown at Unknown time  No No   Sig: APPLY TO AFFECTED AREA DAILY   albuterol (Ventolin HFA) 90 mcg/actuation inhaler 2021 at Unknown time Self No Yes   Sig: Take 2 Puffs by inhalation every four (4) hours as needed for Wheezing. aspirin 81 mg chewable tablet Not Taking at Unknown time Self Yes No   Sig: Take 81 mg by mouth daily. clindamycin (CLEOCIN) 300 mg capsule 5/3/2021 at Unknown time  Yes Yes   Sig: Take 300 mg by mouth every twelve (12) hours.    epoetin naman (EPOGEN;PROCRIT) 20,000 unit/mL injection Unknown at Unknown time Self Yes No   Si,000-25,000 Units by SubCUTAneous route Once every 2 weeks. ergocalciferol (ERGOCALCIFEROL) 1,250 mcg (50,000 unit) capsule 4/26/2021 at Unknown time Self Yes Yes   Sig: Take 50,000 Units by mouth Every Friday. ferrous sulfate 325 mg (65 mg iron) tablet 4/26/2021 at Unknown time  No Yes   Sig: TAKE ONE TABLET BY MOUTH 2 TIMES A DAY   insulin lispro (HUMALOG) 100 unit/mL kwikpen 4/26/2021 at Unknown time  No Yes   Sig: INJECT 18 TO 30 UNITS SUBCUTANEOUSLY 3 TIMES DAILY BEFORE BREAKFAST, LUNCH, AND DINNER   lisinopriL (PRINIVIL, ZESTRIL) 10 mg tablet Not Taking at Unknown time Self No No   Sig: Take 1 Tab by mouth two (2) times a day. oxyCODONE IR (OXY-IR) 15 mg immediate release tablet 4/26/2021 at Unknown time  Yes Yes   Sig: Take 15 mg by mouth every four (4) hours as needed for Pain. oxyCODONE-acetaminophen (PERCOCET) 5-325 mg per tablet 5/3/2021 at Unknown time  No Yes   Sig: Take 1 Tab by mouth every six (6) hours as needed for Pain for up to 30 days. Max Daily Amount: 4 Tabs. pantoprazole (PROTONIX) 40 mg tablet Not Taking at Unknown time  No No   Sig: TAKE 1 TABLET BY MOUTH EVERY DAY   rosuvastatin (CRESTOR) 40 mg tablet 5/3/2021 at Unknown time Self No Yes   Sig: TAKE ONE TABLET BY MOUTH EVERY DAY   sulfaSALAzine (AZULFIDINE) 500 mg tablet Unknown at Unknown time  Yes No   Sig: Take 800 mg by mouth two (2) times a day.       Facility-Administered Medications: None       Hospital Medications:  Current Facility-Administered Medications   Medication Dose Route Frequency    potassium, sodium phosphates (NEUTRA-PHOS) packet 2 Packet  2 Packet Oral BID    thiamine HCL (B-1) tablet 100 mg  100 mg Oral DAILY    0.9% sodium chloride infusion 250 mL  250 mL IntraVENous PRN    therapeutic multivitamin (THERAGRAN) tablet 1 Tab  1 Tab Oral DAILY    sodium bicarbonate tablet 650 mg  650 mg Oral BID    piperacillin-tazobactam (ZOSYN) 3.375 g in 0.9% sodium chloride (MBP/ADV) 100 mL MBP  3.375 g IntraVENous Q8H    DAPTOmycin (CUBICIN) 600 mg in 0.9% sodium chloride 50 mL IVPB RF formulation  600 mg IntraVENous Q48H    fentaNYL citrate (PF) injection 50 mcg  50 mcg IntraVENous Q3H PRN    senna-docusate (PERICOLACE) 8.6-50 mg per tablet 1 Tab  1 Tab Oral DAILY    tamsulosin (FLOMAX) capsule 0.4 mg  0.4 mg Oral DAILY    cyclobenzaprine (FLEXERIL) tablet 5 mg  5 mg Oral TID PRN    oxyCODONE IR (ROXICODONE) tablet 15 mg  15 mg Oral Q4H PRN    0.9% sodium chloride infusion 250 mL  250 mL IntraVENous PRN    epoetin naman-epbx (RETACRIT) injection 20,000 Units  20,000 Units SubCUTAneous Q MON, WED & FRI    albuterol-ipratropium (DUO-NEB) 2.5 MG-0.5 MG/3 ML  3 mL Nebulization Q4H PRN    aspirin chewable tablet 81 mg  81 mg Oral DAILY    ergocalciferol capsule 50,000 Units  50,000 Units Oral every Friday    ferrous sulfate tablet 325 mg  1 Tab Oral BID WITH MEALS    pantoprazole (PROTONIX) tablet 40 mg  40 mg Oral ACB    sodium chloride (NS) flush 5-40 mL  5-40 mL IntraVENous Q8H    sodium chloride (NS) flush 5-40 mL  5-40 mL IntraVENous PRN    acetaminophen (TYLENOL) tablet 650 mg  650 mg Oral Q6H PRN    Or    acetaminophen (TYLENOL) suppository 650 mg  650 mg Rectal Q6H PRN    polyethylene glycol (MIRALAX) packet 17 g  17 g Oral DAILY PRN    promethazine (PHENERGAN) tablet 12.5 mg  12.5 mg Oral Q6H PRN    Or    ondansetron (ZOFRAN) injection 4 mg  4 mg IntraVENous Q6H PRN    heparin (porcine) injection 5,000 Units  5,000 Units SubCUTAneous Q8H    insulin lispro (HUMALOG) injection   SubCUTAneous AC&HS    glucose chewable tablet 16 g  4 Tab Oral PRN    dextrose (D50W) injection syrg 12.5-25 g  12.5-25 g IntraVENous PRN    glucagon (GLUCAGEN) injection 1 mg  1 mg IntraMUSCular PRN       Social History:  Social History     Tobacco Use    Smoking status: Never Smoker    Smokeless tobacco: Never Used   Substance Use Topics    Alcohol use: No       Family History:  Family History   Problem Relation Age of Onset    Hypertension Mother    [place] Gout Mother    [place] Cancer Father         leukemia    Diabetes Father     Hypertension Sister     Diabetes Maternal Grandmother     Hypertension Maternal Grandmother     Diabetes Paternal Grandmother     Hypertension Paternal Grandmother     Anesth Problems Neg Hx        Review of Systems:    Constitutional: negative fever, negative chills, +weight loss  Eyes:   negative visual changes  ENT:   negative sore throat, tongue or lip swelling  Respiratory:  negative cough, negative dyspnea  Cards:  negative for chest pain, palpitations, lower extremity edema  GI:   See HPI  :  negative for frequency, dysuria  Integument:  negative for rash and pruritus  Heme:  negative for easy bruising and gum/nose bleeding  Musculoskel: negative for myalgias,  +back pain and + muscle weakness  Neuro: negative for headaches, dizziness, vertigo  Psych:  negative for feelings of anxiety, depression      Objective:     Patient Vitals for the past 8 hrs:   BP Temp Pulse Resp SpO2   05/14/21 1426 136/67 97.8 °F (36.6 °C) 86 16 100 %   05/14/21 0805 (!) 147/69 98 °F (36.7 °C) 84 17 97 %     05/14 0701 - 05/14 1900  In: -   Out: 950 [Urine:950]  05/12 1901 - 05/14 0700  In: 240 [P.O.:240]  Out: 2900 [Urine:2900]      PHYSICAL EXAM:  General appearance: cooperative, no distress, cachetic   Skin: Extremities and face reveal no rashes. HEENT: Sclerae anicteric. Ex  Cardiovascular: Regular rate and rhythm. No murmurs, gallops, or rubs. Respiratory: Comfortable breathing with no accessory muscle use. Clear breath sounds with no wheezes, rales, or rhonchi. GI: Abdomen nondistended, scaphoid, soft, and nontender. Normal active bowel sounds. No enlargement of the liver or spleen. No masses palpable. Rectal: Deferred   Musculoskeletal: Right AKA   Neurological: Patient is alert and oriented. Psychiatric:  No anxiety or agitation.       Data Review     Recent Labs     05/13/21 2026 05/13/21  0108   WBC 3.2* 1.4*   HGB 8.0* 6.4*   HCT 26.7* 22.4*   PLT 213 211     Recent Labs     05/14/21 0413 05/13/21 2026 05/13/21 2017    142  --    K 3.3* 3.2*  --    * 111*  --    CO2 22 24  --    BUN 37* 37*  --    CREA 2.64* 2.59*  --    * 163*  --    PHOS 2.4*  --  2.4*   CA 8.6 8.8  --      Recent Labs     05/14/21 0413 05/13/21  0108   ALB 1.0* 0.8*     No results for input(s): INR, PTP, APTT, INREXT in the last 72 hours. Imaging studies reviewed    Assessment / Plan :      PEG evaluation for malnutrition   - EGD with PEG placement on 5/1/7/21  - NPO at midnight 5/17/21  - hold Heparin midnight 5/17/21       Patient Active Hospital Problem List:   Principal Problem:    Acute delirium (5/1/2021)    Active Problems:    Severe protein-calorie malnutrition ([facility] (5/13/2021)    I have personally reviewed the history and independently examined the patient. I have reviewed the chart and agree with the documentation recorded by the Mid Level Provider, including the assessment, treatment plan, and disposition.     Savita Molina MD

## 2021-05-14 NOTE — PROGRESS NOTES
Nephrology Progress Note  Alesia Jimenez. Date of Admission : 5/1/2021    CC: Follow up for CKD       Assessment and Plan     CKD 4:  - 2/2 DM, HTN   - baseline Creatinine : 2.2 mg/dl at best.   - Cr has been as high as 2.6-3 mg/dl through out 2020  - Severe Hypoalbuminemia and resultant anasarca : PRN diuretics   - poor candidate for longterm dialysis   - Daily labs  - we will see him again on Monday      Urinary retention:  - failed voiding trial again   - hoyos re-inserted 5/12  - continue flomax    Hypo K and Hypo Phos  :  - ordered Neutra phos    Severe protein calorie Malnutrition   - consider TF     Anemia of CKD:  - iron studies ok  - cont MICHAEL 3 times per week  - hematology following    MRSA bacteremia:  - abx per ID  - TTE neg for vegetations  - on IV dapto+ oral rifampin     HTN:  - BP stable    DM2:  - on insulin    R AKA    L3-4, L4-5 discitis/osteomyelitis:  - on abx    Sacral wound       Interval History:  Seen and examined   He has many complaints about lack of access to fluids and nursing care   Cr stable   UOP 1.9 L   K low   Poor po intake       Current Medications: all current  Medications have been eviewed in EPIC  Review of Systems: Review of systems not obtained due to patient factors. Objective:  Vitals:    Vitals:    05/13/21 1605 05/13/21 1841 05/14/21 0305 05/14/21 0805   BP:  (!) 146/70 (!) 141/69 (!) 147/69   Pulse:  97 90 84   Resp:  19 18 17   Temp:  98.8 °F (37.1 °C) 97.5 °F (36.4 °C) 98 °F (36.7 °C)   SpO2:  98% 99% 97%   Weight:       Height: 5' 11\" (1.803 m)        Intake and Output:  No intake/output data recorded.   05/12 1901 - 05/14 0700  In: 240 [P.O.:240]  Out: 2900 [Urine:2900]    Physical Examination:  General: Mild distress  Neck:  Supple, no mass  Resp:  Lungs CTA B/L, no wheezing , normal respiratory effort  CV:  RRR,  no murmur or rub,R AKA, no edema L  GI:  Soft, NT, + Bowel sounds, no hepatosplenomegaly  Neurologic:  confused  Skin:  No Rash   : romain +    [] High complexity decision making was performed  []    Patient is at high-risk of decompensation with multiple organ involvement    Lab Data Personally Reviewed: I have reviewed all the pertinent labs, microbiology data and radiology studies during assessment.     Recent Labs     05/14/21  0413 05/13/21 2026 05/13/21 2017 05/13/21 0108 05/12/21 0438 05/12/21 0437    142  --  141 144 143   K 3.3* 3.2*  --  2.9* 3.1* 3.1*   * 111*  --  113* 113* 113*   CO2 22 24  --  21 20* 21   * 163*  --  136* 162* 163*   BUN 37* 37*  --  37* 39* 39*   CREA 2.64* 2.59*  --  2.40* 2.32* 2.28*   CA 8.6 8.8  --  8.7 8.6 8.6   MG  --  1.8  --  1.9  --  1.8   PHOS 2.4*  --  2.4* 2.2* 2.0* 1.8*   ALB 1.0*  --   --  0.8* 1.0*  --      Recent Labs     05/13/21 2026 05/13/21 0108 05/12/21 0437   WBC 3.2* 1.4* 1.3*   HGB 8.0* 6.4* 7.7*   HCT 26.7* 22.4* 26.4*    211 255     Lab Results   Component Value Date/Time    Specimen Description: JOSE 12/04/2012 10:12 PM    Specimen Description: TOE 12/02/2012 05:03 PM    Specimen Description: TOE 12/02/2012 05:03 PM     Lab Results   Component Value Date/Time    Culture result: NO GROWTH 2 DAYS 05/12/2021 12:06 PM    Culture result: NO GROWTH 4 DAYS 05/10/2021 01:24 PM    Culture result: NO GROWTH 5 DAYS 05/09/2021 11:01 AM     Recent Results (from the past 24 hour(s))   GLUCOSE, POC    Collection Time: 05/13/21 11:59 AM   Result Value Ref Range    Glucose (POC) 162 (H) 65 - 117 mg/dL    Performed by Mitzy Alexandre    GLUCOSE, POC    Collection Time: 05/13/21  4:29 PM   Result Value Ref Range    Glucose (POC) 135 (H) 65 - 117 mg/dL    Performed by Mitzy Alexandre    PHOSPHORUS    Collection Time: 05/13/21  8:17 PM   Result Value Ref Range    Phosphorus 2.4 (L) 2.6 - 4.7 MG/DL   CBC W/O DIFF    Collection Time: 05/13/21  8:26 PM   Result Value Ref Range    WBC 3.2 (L) 4.1 - 11.1 K/uL    RBC 2.99 (L) 4.10 - 5.70 M/uL    HGB 8.0 (L) 12.1 - 17.0 g/dL    HCT 26.7 (L) 36.6 - 50.3 %    MCV 89.3 80.0 - 99.0 FL    MCH 26.8 26.0 - 34.0 PG    MCHC 30.0 30.0 - 36.5 g/dL    RDW 17.2 (H) 11.5 - 14.5 %    PLATELET 267 707 - 131 K/uL    MPV 10.3 8.9 - 12.9 FL    NRBC 1.3 (H) 0  WBC    ABSOLUTE NRBC 0.04 (H) 0.00 - 4.29 K/uL   METABOLIC PANEL, BASIC    Collection Time: 05/13/21  8:26 PM   Result Value Ref Range    Sodium 142 136 - 145 mmol/L    Potassium 3.2 (L) 3.5 - 5.1 mmol/L    Chloride 111 (H) 97 - 108 mmol/L    CO2 24 21 - 32 mmol/L    Anion gap 7 5 - 15 mmol/L    Glucose 163 (H) 65 - 100 mg/dL    BUN 37 (H) 6 - 20 MG/DL    Creatinine 2.59 (H) 0.70 - 1.30 MG/DL    BUN/Creatinine ratio 14 12 - 20      GFR est AA 30 (L) >60 ml/min/1.73m2    GFR est non-AA 25 (L) >60 ml/min/1.73m2    Calcium 8.8 8.5 - 10.1 MG/DL   MAGNESIUM    Collection Time: 05/13/21  8:26 PM   Result Value Ref Range    Magnesium 1.8 1.6 - 2.4 mg/dL   GLUCOSE, POC    Collection Time: 05/13/21 10:30 PM   Result Value Ref Range    Glucose (POC) 192 (H) 65 - 117 mg/dL    Performed by Tony Bird    RENAL FUNCTION PANEL    Collection Time: 05/14/21  4:13 AM   Result Value Ref Range    Sodium 142 136 - 145 mmol/L    Potassium 3.3 (L) 3.5 - 5.1 mmol/L    Chloride 111 (H) 97 - 108 mmol/L    CO2 22 21 - 32 mmol/L    Anion gap 9 5 - 15 mmol/L    Glucose 149 (H) 65 - 100 mg/dL    BUN 37 (H) 6 - 20 MG/DL    Creatinine 2.64 (H) 0.70 - 1.30 MG/DL    BUN/Creatinine ratio 14 12 - 20      GFR est AA 29 (L) >60 ml/min/1.73m2    GFR est non-AA 24 (L) >60 ml/min/1.73m2    Calcium 8.6 8.5 - 10.1 MG/DL    Phosphorus 2.4 (L) 2.6 - 4.7 MG/DL    Albumin 1.0 (L) 3.5 - 5.0 g/dL   CK    Collection Time: 05/14/21  4:13 AM   Result Value Ref Range    CK 13 (L) 39 - 308 U/L   GLUCOSE, POC    Collection Time: 05/14/21  6:32 AM   Result Value Ref Range    Glucose (POC) 148 (H) 65 - 117 mg/dL    Performed by Sherly Cosby MD  [address] - [phone]   Fax - [phone]  [email or web address]

## 2021-05-14 NOTE — PROGRESS NOTES
Nutrition Note    Discussed in rounds. Noted GI consult for potential PEG tube. Please refer to RD note from yesterday. Pt has severe protein-calorie malnutrition. Agree PEG would be beneficial as pt is unable to meet his needs PO d/t poor appetite, weakness, dyspnea with eating, coughing/esophageal dysphagia. If plan is to proceed with PEG, I suspect will not happen until next week. Therefore, may want to consider starting DHT feeds now for slow reintroduction of calories as pt will be a very high refeeding risk and it will take time to get him to goal tube feeds. Recommend starting 100 mg thiamine once daily now - may wish to increase to total of 200-300 gm daily, and/or switching to IV. Continue to correct electrolytes aggressively. If tube feeds needed over weekend, recommend Glucerna 1.2 @ no more than 15 mL/hr. Provides 396 kcal, or ~7 kcal/kg. Pt should receive no more than 5-20 kcal/kg for first 3-4 days. Given severity of his malnutrition and already low lytes, favor starting on lower end of calorie goals. RD will continue to follow along closely.     Recent Labs     05/14/21  0413 05/13/21 2026 05/13/21  2017 05/13/21  0108 05/12/21  0438 05/12/21  0437   * 163*  --  136* 162* 163*   BUN 37* 37*  --  37* 39* 39*   CREA 2.64* 2.59*  --  2.40* 2.32* 2.28*    142  --  141 144 143   K 3.3* 3.2*  --  2.9* 3.1* 3.1*   * 111*  --  113* 113* 113*   CO2 22 24  --  21 20* 21   CA 8.6 8.8  --  8.7 8.6 8.6   PHOS 2.4*  --  2.4* 2.2* 2.0* 1.8*   MG  --  1.8  --  1.9  --  1.8       Recent Labs     05/14/21  0632 05/13/21  2230 05/13/21  1629 05/13/21  1159 05/13/21  0650 05/12/21  2153 05/12/21  1657 05/12/21  1218 05/12/21  0710 05/11/21  2128 05/11/21  1613 05/11/21  1118   GLUCPOC 148* 192* 135* 162* 152* 147* 131* 162* 188* 172* 270* 194*           Estimated Daily Nutrient Needs:  Energy (kcal):  4035-9779(35-40 kcal/kg)  Protein (g):  (1.5-2 gm/kg)       Fluid (ml/day):  1 mL/kcal      Chrystine Stephen, RD  Available via [employer]

## 2021-05-14 NOTE — PROGRESS NOTES
Transition of Care Plan   RUR- 23%    DISPOSITION: The disposition plan is transition to a SNF; pending medical progression  o Referral: [facility]; [place] [address] Admissions liaison P:[phone]/F:[phone]    F/U with PCP/Specialist     Transport: Medical transport     Following: Nephro, Hematology, ID     ABX: The pt is on IV dapotmycin plan for 6weeks, IV Zosyn 5/12   S/P right AKA mid July at [facility] Presented: lethargic and confused from [address] contact precaution         The pt failed voiding trial, hoyos reinserted on 5/12; per [facility]. PICC line to be placed once cultures are cleared; The pt will require 6 weeks of IV abx. Last fever 5/12 GI eval to be considered if esophageal dysphagia symptoms persist; per Registered Dietitian. This cm faxed updated clinicals to [facility], [address]     CM: [address] [place],   [phone]

## 2021-05-14 NOTE — PROGRESS NOTES
ID Progress Note  2021    Subjective:     Breathing better, c/o low appetite, waiting to have 'feeding tube placement.' Defer the topic discussion with primary team    Review of Systems:            Symptom Y/N Comments   Symptom Y/N Comments   Fever/Chills n      Chest Pain n      Poor Appetite  y     Edema        Cough y      Abdominal Pain        Sputum y      Joint Pain  y  lumbar spine    SOB/MASSEY n     Pruritis/Rash        Nausea/vomit n      Tolerating PT/OT        Diarrhea  n     Tolerating Diet        Constipation  n     Other           Could NOT obtain due to:       Objective:     Vitals:   Visit Vitals  BP (!) 141/69 (BP 1 Location: Left upper arm, BP Patient Position: At rest)   Pulse 90   Temp 97.5 °F (36.4 °C)   Resp 18   Ht 5' 11\" (1.803 m)   Wt 56.5 kg (124 lb 9.6 oz)   SpO2 99%   BMI 17.38 kg/m²        Tmax:  Temp (24hrs), Av.5 °F (36.9 °C), Min:97.5 °F (36.4 °C), Max:99.6 °F (37.6 °C)      PHYSICAL EXAM:  General: Chronically ill appearing, WD, WN. Alert, uncooperative, no acute distress    EENT:  EOMI. Anicteric sclerae. MMM  Resp:  Clear in apex with decreased breath sounds at bases, no wheezing or rales. No accessory muscle use  CV:  Regular  rhythm,  No edema  GI:  Soft, Non distended, Non tender. +Bowel sounds  Neurologic:  Alert and oriented X self, place, normal speech,   Psych:   Fair insight. Not anxious nor agitated  Skin:  No rashes.   No jaundice, right stump; dressing dry and intact, incision approximated, no erythema, no drainage     Labs:   Lab Results   Component Value Date/Time    WBC 3.2 (L) 2021 08:26 PM    Hemoglobin (POC) 5.2 (LL) 2021 08:37 AM    Hemoglobin, POC 11.2 (L) 2014 11:05 PM    HGB 8.0 (L) 2021 08:26 PM    Hematocrit (POC) 26 (L) 10/09/2018 07:16 AM    Hematocrit, POC 33 (L) 2014 11:05 PM    HCT 26.7 (L) 2021 08:26 PM    PLATELET 266  08:26 PM    MCV 89.3 2021 08:26 PM     Lab Results   Component Value Date/Time    Sodium 142 05/14/2021 04:13 AM    Potassium 3.3 (L) 05/14/2021 04:13 AM    Chloride 111 (H) 05/14/2021 04:13 AM    CO2 22 05/14/2021 04:13 AM    Anion gap 9 05/14/2021 04:13 AM    Glucose 149 (H) 05/14/2021 04:13 AM    BUN 37 (H) 05/14/2021 04:13 AM    Creatinine 2.64 (H) 05/14/2021 04:13 AM    BUN/Creatinine ratio 14 05/14/2021 04:13 AM    GFR est AA 29 (L) 05/14/2021 04:13 AM    GFR est non-AA 24 (L) 05/14/2021 04:13 AM    Calcium 8.6 05/14/2021 04:13 AM    Bilirubin, total 0.3 05/02/2021 02:00 AM    Alk. phosphatase 234 (H) 05/02/2021 02:00 AM    Protein, total 7.2 05/02/2021 02:00 AM    Albumin 1.0 (L) 05/14/2021 04:13 AM    Globulin 6.0 (H) 05/02/2021 02:00 AM    A-G Ratio 0.2 (L) 05/02/2021 02:00 AM    ALT (SGPT) 13 05/02/2021 02:00 AM       CT of Chest, ABD/PEL (5/2); L4-5 findings most likely represent discitis-osteomyelitis. Soft tissue edema from hypoalbuminemia. Right upper lobe pneumonia. Bilateral lower lobe atelectasis vs pneumonia. Trace bilateral pleural effusions. ABX hx:   IV cefepime 5/3-5/8   PO Levo 5/9  PO clindamycin 5/1-5/5  IV zosyn 5/3, 5/12-  IV vancomycin 5/3-5/7  IV Daptomycin 5/7 -  IV Rifampin 5/10-    Assessment and Plan     HAP; completed first tmt on 5/9  - recurrent fever  5/12, afebrile overnight    Procal 2.5    CXR (5/12) New bibasilar interstitial and patchy airspace disease.     IV zosyn started on 5/12, continue with IV Zosyn     Pt is in high risk of recurrent PNA and aspiration PNA.      Encourage IS 10x/every hour when awake    L4-5 Discitis/OM   Bacteremia  S/p laminectomy C3-4 (2014)   - WBC 6->2.0->3.2, afebrile    TTE (5/5) no vegetation    Blood cx (5/1, 5/3, 5/4, 5/6, 5/7, 5/8) MRSA    Blood cx (5/9) no growth    Blood cx (5/10 & 5/12) no growth so far      MRI of lumbar spine (5/3) discitis/OM at L3-4 & L4-5, no evidence of epidural abscess or cord compression    Once again, repeat MRI of lumbar spine (5/10) revealed no epidural abscess    -> ortho recommends medical therapy at this time. IV vancomycin changed to daptomycin on 5/7     Rifampin was d/c'd on 5/13 with concerns of leukopenia        Continue with IV Daptomycin, total 6 weeks from negative blood cx,(5/9). Last dose 6/1     CK 17->13 (5/14)    Leukopenia  - appreciate heme/onc input; received granix on 5/13    Anemia  - hgb 8.0 (5/13); primary team following    Bladder retention  -  Morrissey in place. U/A (-)    Above plan d/w and agreed by Dr. Mary Higginbotham will see prn this weekend, call if issues arise.     Bell Rowe NP

## 2021-05-15 LAB
ALBUMIN SERPL-MCNC: 0.8 G/DL (ref 3.5–5)
ANION GAP SERPL CALC-SCNC: 8 MMOL/L (ref 5–15)
BACTERIA SPEC CULT: NORMAL
BASOPHILS # BLD: 0 K/UL (ref 0–0.1)
BASOPHILS NFR BLD: 0 % (ref 0–1)
BUN SERPL-MCNC: 33 MG/DL (ref 6–20)
BUN/CREAT SERPL: 12 (ref 12–20)
CALCIUM SERPL-MCNC: 8.7 MG/DL (ref 8.5–10.1)
CHLORIDE SERPL-SCNC: 112 MMOL/L (ref 97–108)
CO2 SERPL-SCNC: 23 MMOL/L (ref 21–32)
CREAT SERPL-MCNC: 2.66 MG/DL (ref 0.7–1.3)
DIFFERENTIAL METHOD BLD: ABNORMAL
EOSINOPHIL # BLD: 0.2 K/UL (ref 0–0.4)
EOSINOPHIL NFR BLD: 3 % (ref 0–7)
ERYTHROCYTE [DISTWIDTH] IN BLOOD BY AUTOMATED COUNT: 17.2 % (ref 11.5–14.5)
GLUCOSE BLD STRIP.AUTO-MCNC: 116 MG/DL (ref 65–117)
GLUCOSE BLD STRIP.AUTO-MCNC: 147 MG/DL (ref 65–117)
GLUCOSE BLD STRIP.AUTO-MCNC: 188 MG/DL (ref 65–117)
GLUCOSE BLD STRIP.AUTO-MCNC: 96 MG/DL (ref 65–117)
GLUCOSE SERPL-MCNC: 116 MG/DL (ref 65–100)
HCT VFR BLD AUTO: 25.9 % (ref 36.6–50.3)
HGB BLD-MCNC: 7.9 G/DL (ref 12.1–17)
IMM GRANULOCYTES # BLD AUTO: 0 K/UL
IMM GRANULOCYTES NFR BLD AUTO: 0 %
LYMPHOCYTES # BLD: 1.4 K/UL (ref 0.8–3.5)
LYMPHOCYTES NFR BLD: 18 % (ref 12–49)
MCH RBC QN AUTO: 27.5 PG (ref 26–34)
MCHC RBC AUTO-ENTMCNC: 30.5 G/DL (ref 30–36.5)
MCV RBC AUTO: 90.2 FL (ref 80–99)
MONOCYTES # BLD: 1.1 K/UL (ref 0–1)
MONOCYTES NFR BLD: 14 % (ref 5–13)
NEUTS BAND NFR BLD MANUAL: 2 % (ref 0–6)
NEUTS SEG # BLD: 5.2 K/UL (ref 1.8–8)
NEUTS SEG NFR BLD: 63 % (ref 32–75)
NRBC # BLD: 0.02 K/UL (ref 0–0.01)
NRBC BLD-RTO: 0.3 PER 100 WBC
PHOSPHATE SERPL-MCNC: 3.4 MG/DL (ref 2.6–4.7)
PLATELET # BLD AUTO: 170 K/UL (ref 150–400)
PLATELET COMMENTS,PCOM: ABNORMAL
PMV BLD AUTO: 10.5 FL (ref 8.9–12.9)
POTASSIUM SERPL-SCNC: 3 MMOL/L (ref 3.5–5.1)
RBC # BLD AUTO: 2.87 M/UL (ref 4.1–5.7)
RBC MORPH BLD: ABNORMAL
SERVICE CMNT-IMP: ABNORMAL
SERVICE CMNT-IMP: ABNORMAL
SERVICE CMNT-IMP: NORMAL
SODIUM SERPL-SCNC: 143 MMOL/L (ref 136–145)
WBC # BLD AUTO: 7.9 K/UL (ref 4.1–11.1)

## 2021-05-15 PROCEDURE — 74011250637 HC RX REV CODE- 250/637: Performed by: INTERNAL MEDICINE

## 2021-05-15 PROCEDURE — 74011250636 HC RX REV CODE- 250/636: Performed by: NURSE PRACTITIONER

## 2021-05-15 PROCEDURE — 74011636637 HC RX REV CODE- 636/637: Performed by: INTERNAL MEDICINE

## 2021-05-15 PROCEDURE — 74011250636 HC RX REV CODE- 250/636: Performed by: INTERNAL MEDICINE

## 2021-05-15 PROCEDURE — 36415 COLL VENOUS BLD VENIPUNCTURE: CPT

## 2021-05-15 PROCEDURE — 80069 RENAL FUNCTION PANEL: CPT

## 2021-05-15 PROCEDURE — 85025 COMPLETE CBC W/AUTO DIFF WBC: CPT

## 2021-05-15 PROCEDURE — 74011000258 HC RX REV CODE- 258: Performed by: NURSE PRACTITIONER

## 2021-05-15 PROCEDURE — 74011250637 HC RX REV CODE- 250/637: Performed by: HOSPITALIST

## 2021-05-15 PROCEDURE — 65270000032 HC RM SEMIPRIVATE

## 2021-05-15 PROCEDURE — 82962 GLUCOSE BLOOD TEST: CPT

## 2021-05-15 RX ORDER — POTASSIUM CHLORIDE 750 MG/1
40 TABLET, FILM COATED, EXTENDED RELEASE ORAL
Status: COMPLETED | OUTPATIENT
Start: 2021-05-15 | End: 2021-05-15

## 2021-05-15 RX ADMIN — CYCLOBENZAPRINE 5 MG: 10 TABLET, FILM COATED ORAL at 22:29

## 2021-05-15 RX ADMIN — POTASSIUM & SODIUM PHOSPHATES POWDER PACK 280-160-250 MG 2 PACKET: 280-160-250 PACK at 18:06

## 2021-05-15 RX ADMIN — ASPIRIN 81 MG: 81 TABLET, CHEWABLE ORAL at 09:31

## 2021-05-15 RX ADMIN — POTASSIUM CHLORIDE 40 MEQ: 750 TABLET, EXTENDED RELEASE ORAL at 15:41

## 2021-05-15 RX ADMIN — Medication 100 MG: at 09:31

## 2021-05-15 RX ADMIN — THERA TABS 1 TABLET: TAB at 09:31

## 2021-05-15 RX ADMIN — PANTOPRAZOLE SODIUM 40 MG: 40 TABLET, DELAYED RELEASE ORAL at 06:30

## 2021-05-15 RX ADMIN — CYCLOBENZAPRINE 5 MG: 10 TABLET, FILM COATED ORAL at 16:17

## 2021-05-15 RX ADMIN — PIPERACILLIN AND TAZOBACTAM 3.38 G: 3; .375 INJECTION, POWDER, LYOPHILIZED, FOR SOLUTION INTRAVENOUS at 06:29

## 2021-05-15 RX ADMIN — FENTANYL CITRATE 50 MCG: 50 INJECTION, SOLUTION INTRAMUSCULAR; INTRAVENOUS at 16:23

## 2021-05-15 RX ADMIN — SODIUM BICARBONATE 650 MG: 650 TABLET ORAL at 18:07

## 2021-05-15 RX ADMIN — POTASSIUM & SODIUM PHOSPHATES POWDER PACK 280-160-250 MG 2 PACKET: 280-160-250 PACK at 09:30

## 2021-05-15 RX ADMIN — Medication 10 ML: at 20:53

## 2021-05-15 RX ADMIN — FERROUS SULFATE TAB 325 MG (65 MG ELEMENTAL FE) 325 MG: 325 (65 FE) TAB at 18:07

## 2021-05-15 RX ADMIN — DOCUSATE SODIUM 50 MG AND SENNOSIDES 8.6 MG 1 TABLET: 8.6; 5 TABLET, FILM COATED ORAL at 09:31

## 2021-05-15 RX ADMIN — TAMSULOSIN HYDROCHLORIDE 0.4 MG: 0.4 CAPSULE ORAL at 09:31

## 2021-05-15 RX ADMIN — PIPERACILLIN AND TAZOBACTAM 3.38 G: 3; .375 INJECTION, POWDER, LYOPHILIZED, FOR SOLUTION INTRAVENOUS at 14:53

## 2021-05-15 RX ADMIN — HEPARIN SODIUM 5000 UNITS: 5000 INJECTION INTRAVENOUS; SUBCUTANEOUS at 23:47

## 2021-05-15 RX ADMIN — FENTANYL CITRATE 50 MCG: 50 INJECTION, SOLUTION INTRAMUSCULAR; INTRAVENOUS at 05:39

## 2021-05-15 RX ADMIN — FENTANYL CITRATE 50 MCG: 50 INJECTION, SOLUTION INTRAMUSCULAR; INTRAVENOUS at 20:54

## 2021-05-15 RX ADMIN — FENTANYL CITRATE 50 MCG: 50 INJECTION, SOLUTION INTRAMUSCULAR; INTRAVENOUS at 02:54

## 2021-05-15 RX ADMIN — FERROUS SULFATE TAB 325 MG (65 MG ELEMENTAL FE) 325 MG: 325 (65 FE) TAB at 09:31

## 2021-05-15 RX ADMIN — SODIUM BICARBONATE 650 MG: 650 TABLET ORAL at 09:31

## 2021-05-15 RX ADMIN — HEPARIN SODIUM 5000 UNITS: 5000 INJECTION INTRAVENOUS; SUBCUTANEOUS at 16:25

## 2021-05-15 RX ADMIN — Medication 10 ML: at 05:39

## 2021-05-15 RX ADMIN — Medication 10 ML: at 14:54

## 2021-05-15 RX ADMIN — PIPERACILLIN AND TAZOBACTAM 3.38 G: 3; .375 INJECTION, POWDER, LYOPHILIZED, FOR SOLUTION INTRAVENOUS at 22:29

## 2021-05-15 RX ADMIN — INSULIN LISPRO 2 UNITS: 100 INJECTION, SOLUTION INTRAVENOUS; SUBCUTANEOUS at 12:03

## 2021-05-15 RX ADMIN — HEPARIN SODIUM 5000 UNITS: 5000 INJECTION INTRAVENOUS; SUBCUTANEOUS at 06:29

## 2021-05-15 NOTE — PROGRESS NOTES
5/15/21 5:06 AM   [phone] Hospital or Facility: [facility], [facility] From: Cintia Brady RE: Reyna Schaefer RM: [phone] Patient's potassium is 3.0 this AM. Need Callback: NO CALLBACK REQ [phone]    5/15/21 5:08 AM   Ok, will defer to primary team/nephrology    5/15/21 5:17 AM   Okay, thanks!

## 2021-05-15 NOTE — PROGRESS NOTES
Problem: Falls - Risk of  Goal: *Absence of Falls  Description: Document Carol Christie Fall Risk and appropriate interventions in the flowsheet. Outcome: Progressing Towards Goal  Note: Fall Risk Interventions:       Mentation Interventions: Adequate sleep, hydration, pain control, Door open when patient unattended, Evaluate medications/consider consulting pharmacy, More frequent rounding, Reorient patient, Room close to nurse's station, Toileting rounds, Update white board    Medication Interventions: Evaluate medications/consider consulting pharmacy    Elimination Interventions: Call light in reach, Patient to call for help with toileting needs, Toileting schedule/hourly rounds(hoyos in place)    History of Falls Interventions: Consult care management for discharge planning, Door open when patient unattended, Evaluate medications/consider consulting pharmacy, Room close to nurse's station         Problem: Patient Education: Go to Patient Education Activity  Goal: Patient/Family Education  Outcome: Progressing Towards Goal     Problem: Pressure Injury - Risk of  Goal: *Prevention of pressure injury  Description: Document Celestino Scale and appropriate interventions in the flowsheet. Outcome: Progressing Towards Goal  Note: Pressure Injury Interventions:  Sensory Interventions: Assess changes in LOC, Avoid rigorous massage over bony prominences, Check visual cues for pain, Float heels, Keep linens dry and wrinkle-free, Maintain/enhance activity level, Minimize linen layers, Pressure redistribution bed/mattress (bed type), Turn and reposition approx.  every two hours (pillows and wedges if needed)    Moisture Interventions: Absorbent underpads, Apply protective barrier, creams and emollients, Check for incontinence Q2 hours and as needed, Internal/External urinary devices, Maintain skin hydration (lotion/cream), Moisture barrier, Minimize layers, Offer toileting Q_hr    Activity Interventions: Increase time out of bed, Pressure redistribution bed/mattress(bed type), PT/OT evaluation    Mobility Interventions: Float heels, HOB 30 degrees or less, Pressure redistribution bed/mattress (bed type), PT/OT evaluation, Turn and reposition approx. every two hours(pillow and wedges)    Nutrition Interventions: Document food/fluid/supplement intake, Offer support with meals,snacks and hydration, Discuss nutritional consult with provider    Friction and Shear Interventions: Apply protective barrier, creams and emollients, Feet elevated on foot rest, Foam dressings/transparent film/skin sealants, HOB 30 degrees or less, Lift sheet, Minimize layers                Problem: Patient Education: Go to Patient Education Activity  Goal: Patient/Family Education  Outcome: Progressing Towards Goal     Problem: Diabetes Self-Management  Goal: *Disease process and treatment process  Description: Define diabetes and identify own type of diabetes; list 3 options for treating diabetes. Outcome: Progressing Towards Goal  Goal: *Incorporating nutritional management into lifestyle  Description: Describe effect of type, amount and timing of food on blood glucose; list 3 methods for planning meals. Outcome: Progressing Towards Goal  Goal: *Incorporating physical activity into lifestyle  Description: State effect of exercise on blood glucose levels. Outcome: Progressing Towards Goal  Goal: *Developing strategies to promote health/change behavior  Description: Define the ABC's of diabetes; identify appropriate screenings, schedule and personal plan for screenings. Outcome: Progressing Towards Goal  Goal: *Using medications safely  Description: State effect of diabetes medications on diabetes; name diabetes medication taking, action and side effects. Outcome: Progressing Towards Goal  Goal: *Monitoring blood glucose, interpreting and using results  Description: Identify recommended blood glucose targets  and personal targets.   Outcome: Progressing Towards Goal  Goal: *Prevention, detection, treatment of acute complications  Description: List symptoms of hyper- and hypoglycemia; describe how to treat low blood sugar and actions for lowering  high blood glucose level. Outcome: Progressing Towards Goal  Goal: *Prevention, detection and treatment of chronic complications  Description: Define the natural course of diabetes and describe the relationship of blood glucose levels to long term complications of diabetes.   Outcome: Progressing Towards Goal  Goal: *Developing strategies to address psychosocial issues  Description: Describe feelings about living with diabetes; identify support needed and support network  Outcome: Progressing Towards Goal  Goal: *Insulin pump training  Outcome: Progressing Towards Goal  Goal: *Sick day guidelines  Outcome: Progressing Towards Goal  Goal: *Patient Specific Goal (EDIT GOAL, INSERT TEXT)  Outcome: Progressing Towards Goal     Problem: Patient Education: Go to Patient Education Activity  Goal: Patient/Family Education  Outcome: Progressing Towards Goal     Problem: Patient Education: Go to Patient Education Activity  Goal: Patient/Family Education  Outcome: Progressing Towards Goal     Problem: Patient Education: Go to Patient Education Activity  Goal: Patient/Family Education  Outcome: Progressing Towards Goal     Problem: Nutrition Deficit  Goal: *Optimize nutritional status  Outcome: Progressing Towards Goal     Problem: Breathing Pattern - Ineffective  Goal: *Absence of hypoxia  Outcome: Progressing Towards Goal  Goal: *Use of effective breathing techniques  Outcome: Progressing Towards Goal  Goal: *PALLIATIVE CARE:  Alleviation of Dyspnea  Outcome: Progressing Towards Goal     Problem: Patient Education: Go to Patient Education Activity  Goal: Patient/Family Education  Outcome: Progressing Towards Goal     Problem: Risk for Spread of Infection  Goal: Prevent transmission of infectious organism to others  Description: Prevent the transmission of infectious organisms to other patients, staff members, and visitors.   Outcome: Progressing Towards Goal     Problem: Patient Education:  Go to Education Activity  Goal: Patient/Family Education  Outcome: Progressing Towards Goal     Problem: Patient Education: Go to Patient Education Activity  Goal: Patient/Family Education  Outcome: Progressing Towards Goal     Problem: Infection - Risk of, Urinary Catheter-Associated Urinary Tract Infection  Goal: *Absence of infection signs and symptoms  Outcome: Progressing Towards Goal     Problem: Patient Education: Go to Patient Education Activity  Goal: Patient/Family Education  Outcome: Progressing Towards Goal

## 2021-05-15 NOTE — PROGRESS NOTES
[address] [facility]  Hospitalist Group                                                                                          Hospitalist Progress Note  Chelsea Hutchinson MD  Answering service: [phone] from in house phone        Date of Service:  5/15/2021  NAME:  Nancy Sun. :  1955  MRN:  [id number]      Admission Summary: This is a 57-year-old man with a past medical history significant for chronic kidney disease, type 2 diabetes, dyslipidemia, hypertension, obstructive sleep apnea, status post recent right above-knee amputation, degenerative disk disease, and chronic pain syndrome, was in his usual state of health until the day of presentation at the emergency room when it was reported that the patient became lethargic and confused at his rehab facility    Interval history / Subjective:      Patient is seen and examined. Feeling well. No fever on dapto and zosyn  Denied any fever, chills, pain. pt concerned about nutrition asking for a PEG tube  - planned for   On IV daptomycin with plan for 6 weeks. ID note suggest from 2021 to 2021  Will defer to ID for clarification. Morrissey catheter in place. Failed voiding trial        Assessment & Plan:     MRSA bacteremia - first cleared culture  (persistnet prior since )  L4-L5 Discitis and OM  Sacral wound  New Fever   - Patient now on daptomycin, increased dose to 8-10mg/kg. Vanc ALESSANDRO is 4   - ID following   - Finally 48 hours of negative blood cultures. No PICC line per nephrology. Will need [facility] cath  - TTE without obvious vegetations. Will need 6 weeks IV abx, so no need for AMELIA  - Will need Murali (no PICC per renal) once cultures cleared, would await at least 24 hours since fever. Closer to discharge  - Wound care following   - Repeat MRI lumbar spine 5/10 without abscess. - Rifampin added for synergy. Discontinued on  with concern of leukopenia  - Zosyn added by ID , empiric?   For aspiration pneumonia? Follow ID recommendation. Leukopenia   - Heme consulted by ID  - Agree with drug induced vs. sepsis. Previously to cefepime with recovery after DC  - Rifampin DC'd as noted above    Acute metabolic encephalopathy - multifactorial due to renal insufficiency, bacteremia, electrolyte abnormalitis  - Improving. Continue day and night cycles  - Avoid narcotics and sedatives if able  - MRI brain negative, ammonia normal     Anemia - chronic disease CKD   - monitor and transfuse if less than 7   - CBC daily   - EPO per nephrology     ESTRELLA on CKD stage III - pre renal and sepsis   - Cr slightly worse today, switch IVF to 0.45NS   - I and O avoid nephrotoxins   - BMP daily     S/p R BKA - recent  - Wound care following, no sign of local infection   - PT/OT    - Vascular consult for prosthesis recs    Hyponatremia - hypovolemic, resolved s/p IVF  HTN - holding lisinopril   Type 2 diabetes - A1c 7.2%  SSI, POCT glucose checks and hypoglycemia protocol   HLD - home statin   HCAP - completed therapy (Vanc and cefepime/levo)  Urinary retention - now failed x 2 void trials. Keep hoyos. Continue flomax     Need PEG for nutrition - plan for 5/17    Code status: FULL  DVT prophylaxis: heparin     Care Plan discussed with: Patient/Family  Anticipated Disposition: [facility]/Rehab  Anticipated Discharge: Greater than 48 hours, Needs to remain afebrile. Final ABx plan. Needs Murali by IR for Abx. Hospital Problems  Date Reviewed: 5/1/2021          Codes Class Noted POA    Severe protein-calorie malnutrition ([facility] [address] (Chronic) ICD-10-CM: G63  ICD-9-CM: 642  5/13/2021 Yes        * (Principal) Acute delirium ICD-10-CM: R41.0  ICD-9-CM: 780.09  5/1/2021 Yes                Review of Systems:   A comprehensive review of systems was negative except for that written in the HPI. Vital Signs:    Last 24hrs VS reviewed since prior progress note.  Most recent are:  Visit Vitals  /70 (BP 1 Location: Left arm, BP Patient Position: At rest)   Pulse 83   Temp 98 °F (36.7 °C)   Resp 16   Ht 5' 11\" (1.803 m)   Wt 62.9 kg (138 lb 9.6 oz)   SpO2 98%   BMI 19.33 kg/m²         Intake/Output Summary (Last 24 hours) at 5/15/2021 1140  Last data filed at 5/15/2021 0947  Gross per 24 hour   Intake 953 ml   Output 1750 ml   Net -797 ml        Physical Examination:     I had a face to face encounter with this patient and independently examined them on 5/15/2021 as outlined below:          Constitutional:  chronically ill appearing   ENT:  mmm   Resp:  CTA bilaterally. CV:  Regular rhythm, normal rate,    GI:  Soft, non distended, non tender. bs+    Musculoskeletal:  No edema, warm, 2+ pulses throughout    Neurologic:  Moves all extremities. AAOx3, CN II-XII reviewed, s/p R BKA wound c/d/i      Psych:  Good insight, Not anxious nor agitated. Data Review:    Review and/or order of clinical lab test  Review and/or order of tests in the radiology section of CPT  Review and/or order of tests in the medicine section of CPT    Xr Chest Sngl V    Result Date: 5/1/2021  No acute process. Xr Spine Lumb 2 Or 3 V    Result Date: 5/1/2021  No acute abnormality. Degenerative disc and facet changes at L3-4 through L5-S1. Mri Brain Wo Cont    Result Date: 5/2/2021  Mild chronic microvascular ischemic disease. No acute infarct or mass effect. Mri Lumb Spine Wo Cont    Result Date: 5/10/2021  1. Again noted are changes suspicious for discitis at L3-L4 and L4-L5 levels with mild diffuse bulging of the disc and facet arthropathy resulting in moderate central stenosis with narrowing of neural foramina. 2. No epidural collection is identified. There is slight improvement in the edema within L4 vertebral body. 3. Marked disc space narrowing at L5-S1 is unchanged. 4. Diffuse anasarca is again noted. . Diffuse low signal throughout the visualized bone marrow is again noted consistent with metabolic bone disease most likely related to renal osteodystrophy.     Mri Lumb Spine W Wo Cont    Result Date: 5/4/2021  1. Edema and abnormal bright signal in moderately narrowed disks at L3-4 and L4-5 worrisome for discitis osteomyelitis. Amyloid arthropathy, gout, or more typical spondylosis with also be within the differential. 2. Trace edema along the L5-S1 endplates with severe disc space narrowing is likely degenerative. 3. Spondylosis in the lower lumbar spine as above. Ct Head Wo Cont    Result Date: 5/1/2021  No acute intracranial abnormality. Ct Chest Wo Cont    Result Date: 5/2/2021  1. L4-5 findings most likely represent discitis-osteomyelitis. 2. Soft tissue edema from hypoalbuminemia. 3. Right upper lobe pneumonia. Bilateral lower lobe atelectasis versus pneumonia. Trace bilateral pleural effusions. Ct Abd Pelv Wo Cont    Result Date: 5/2/2021  1. L4-5 findings most likely represent discitis-osteomyelitis. 2. Soft tissue edema from hypoalbuminemia. 3. Right upper lobe pneumonia. Bilateral lower lobe atelectasis versus pneumonia. Trace bilateral pleural effusions. Xr Chest Port    Result Date: 5/12/2021  New bibasilar interstitial and patchy airspace disease. Labs:     Recent Labs     05/15/21  0253 05/13/21  2026   WBC 7.9 3.2*   HGB 7.9* 8.0*   HCT 25.9* 26.7*    213     Recent Labs     05/15/21  0253 05/14/21  0413 05/13/21  2026 05/13/21  2017 05/13/21  0108    142 142  --  141   K 3.0* 3.3* 3.2*  --  2.9*   * 111* 111*  --  113*   CO2 23 22 24  --  21   BUN 33* 37* 37*  --  37*   CREA 2.66* 2.64* 2.59*  --  2.40*   * 149* 163*  --  136*   CA 8.7 8.6 8.8  --  8.7   MG  --   --  1.8  --  1.9   PHOS 3.4 2.4*  --  2.4* 2.2*     Recent Labs     05/15/21  0253 05/14/21  0413 05/13/21  0108   ALB 0.8* 1.0* 0.8*     No results for input(s): INR, PTP, APTT, INREXT, INREXT in the last 72 hours. No results for input(s): FE, TIBC, PSAT, FERR in the last 72 hours.    Lab Results   Component Value Date/Time    Folate 7.1 05/02/2021 02:00 AM      No results for input(s): PH, PCO2, PO2 in the last 72 hours.   Recent Labs     05/14/21  0413   CPK 13*     Lab Results   Component Value Date/Time    Cholesterol, total 239 (H) 03/30/2020 02:32 PM    HDL Cholesterol 40 03/30/2020 02:32 PM    LDL,Direct 120 (H) 09/11/2012 12:00 AM    LDL, calculated 130 (H) 03/30/2020 02:32 PM    Triglyceride 345 (H) 03/30/2020 02:32 PM     Lab Results   Component Value Date/Time    Glucose (POC) 147 (H) 05/15/2021 11:03 AM    Glucose (POC) 116 05/15/2021 06:20 AM    Glucose (POC) 130 (H) 05/14/2021 10:37 PM    Glucose (POC) 120 (H) 05/14/2021 04:02 PM    Glucose (POC) 134 (H) 05/14/2021 11:04 AM     Lab Results   Component Value Date/Time    Color YELLOW/STRAW 05/09/2021 02:51 PM    Appearance CLEAR 05/09/2021 02:51 PM    Specific gravity 1.017 05/09/2021 02:51 PM    Specific gravity 1.016 02/19/2016 08:00 PM    pH (UA) 6.0 05/09/2021 02:51 PM    Protein 100 (A) 05/09/2021 02:51 PM    Glucose Negative 05/09/2021 02:51 PM    Ketone TRACE (A) 05/09/2021 02:51 PM    Bilirubin Negative 05/09/2021 02:51 PM    Urobilinogen 0.2 05/09/2021 02:51 PM    Nitrites Negative 05/09/2021 02:51 PM    Leukocyte Esterase Negative 05/09/2021 02:51 PM    Epithelial cells FEW 05/09/2021 02:51 PM    Bacteria 1+ (A) 05/09/2021 02:51 PM    WBC 0-4 05/09/2021 02:51 PM    RBC 0-5 05/09/2021 02:51 PM         Medications Reviewed:     Current Facility-Administered Medications   Medication Dose Route Frequency    potassium, sodium phosphates (NEUTRA-PHOS) packet 2 Packet  2 Packet Oral BID    thiamine HCL (B-1) tablet 100 mg  100 mg Oral DAILY    0.9% sodium chloride infusion 250 mL  250 mL IntraVENous PRN    therapeutic multivitamin (THERAGRAN) tablet 1 Tab  1 Tab Oral DAILY    sodium bicarbonate tablet 650 mg  650 mg Oral BID    piperacillin-tazobactam (ZOSYN) 3.375 g in 0.9% sodium chloride (MBP/ADV) 100 mL MBP  3.375 g IntraVENous Q8H    DAPTOmycin (CUBICIN) 600 mg in 0.9% sodium chloride 50 mL IVPB RF formulation  600 mg IntraVENous Q48H    fentaNYL citrate (PF) injection 50 mcg  50 mcg IntraVENous Q3H PRN    senna-docusate (PERICOLACE) 8.6-50 mg per tablet 1 Tab  1 Tab Oral DAILY    tamsulosin (FLOMAX) capsule 0.4 mg  0.4 mg Oral DAILY    cyclobenzaprine (FLEXERIL) tablet 5 mg  5 mg Oral TID PRN    oxyCODONE IR (ROXICODONE) tablet 15 mg  15 mg Oral Q4H PRN    0.9% sodium chloride infusion 250 mL  250 mL IntraVENous PRN    epoetin naman-epbx (RETACRIT) injection 20,000 Units  20,000 Units SubCUTAneous Q MON, WED & FRI    albuterol-ipratropium (DUO-NEB) 2.5 MG-0.5 MG/3 ML  3 mL Nebulization Q4H PRN    aspirin chewable tablet 81 mg  81 mg Oral DAILY    ergocalciferol capsule 50,000 Units  50,000 Units Oral every Friday    ferrous sulfate tablet 325 mg  1 Tab Oral BID WITH MEALS    pantoprazole (PROTONIX) tablet 40 mg  40 mg Oral ACB    sodium chloride (NS) flush 5-40 mL  5-40 mL IntraVENous Q8H    sodium chloride (NS) flush 5-40 mL  5-40 mL IntraVENous PRN    acetaminophen (TYLENOL) tablet 650 mg  650 mg Oral Q6H PRN    Or    acetaminophen (TYLENOL) suppository 650 mg  650 mg Rectal Q6H PRN    polyethylene glycol (MIRALAX) packet 17 g  17 g Oral DAILY PRN    promethazine (PHENERGAN) tablet 12.5 mg  12.5 mg Oral Q6H PRN    Or    ondansetron (ZOFRAN) injection 4 mg  4 mg IntraVENous Q6H PRN    heparin (porcine) injection 5,000 Units  5,000 Units SubCUTAneous Q8H    insulin lispro (HUMALOG) injection   SubCUTAneous AC&HS    glucose chewable tablet 16 g  4 Tab Oral PRN    dextrose (D50W) injection syrg 12.5-25 g  12.5-25 g IntraVENous PRN    glucagon (GLUCAGEN) injection 1 mg  1 mg IntraMUSCular PRN     ______________________________________________________________________  EXPECTED LENGTH OF STAY: 4d 7h  ACTUAL LENGTH OF STAY:          14                 Lucius Lynne MD

## 2021-05-15 NOTE — PROGRESS NOTES
Problem: Falls - Risk of  Goal: *Absence of Falls  Description: Document Anthony Brown Fall Risk and appropriate interventions in the flowsheet. Outcome: Progressing Towards Goal  Note: Fall Risk Interventions:       Mentation Interventions: Adequate sleep, hydration, pain control, Door open when patient unattended, Evaluate medications/consider consulting pharmacy, More frequent rounding, Reorient patient, Room close to nurse's station, Toileting rounds, Update white board    Medication Interventions: Evaluate medications/consider consulting pharmacy    Elimination Interventions: Call light in reach, Patient to call for help with toileting needs, Toileting schedule/hourly rounds(hoyos in place)    History of Falls Interventions: Consult care management for discharge planning, Door open when patient unattended, Evaluate medications/consider consulting pharmacy, Room close to nurse's station, Investigate reason for fall         Problem: Patient Education: Go to Patient Education Activity  Goal: Patient/Family Education  Outcome: Progressing Towards Goal     Problem: Pressure Injury - Risk of  Goal: *Prevention of pressure injury  Description: Document Celestino Scale and appropriate interventions in the flowsheet. Outcome: Progressing Towards Goal  Note: Pressure Injury Interventions:  Sensory Interventions: Assess changes in LOC, Avoid rigorous massage over bony prominences, Check visual cues for pain, Float heels, Keep linens dry and wrinkle-free, Minimize linen layers, Pressure redistribution bed/mattress (bed type), Turn and reposition approx.  every two hours (pillows and wedges if needed)    Moisture Interventions: Absorbent underpads, Apply protective barrier, creams and emollients, Check for incontinence Q2 hours and as needed, Internal/External urinary devices, Maintain skin hydration (lotion/cream), Minimize layers, Moisture barrier    Activity Interventions: Increase time out of bed, Pressure redistribution bed/mattress(bed type), PT/OT evaluation    Mobility Interventions: Float heels, HOB 30 degrees or less, Pressure redistribution bed/mattress (bed type), PT/OT evaluation, Turn and reposition approx. every two hours(pillow and wedges)    Nutrition Interventions: Document food/fluid/supplement intake, Offer support with meals,snacks and hydration, Discuss nutritional consult with provider    Friction and Shear Interventions: Apply protective barrier, creams and emollients, Foam dressings/transparent film/skin sealants, Feet elevated on foot rest, HOB 30 degrees or less, Lift sheet, Minimize layers                Problem: Patient Education: Go to Patient Education Activity  Goal: Patient/Family Education  Outcome: Progressing Towards Goal     Problem: Diabetes Self-Management  Goal: *Disease process and treatment process  Description: Define diabetes and identify own type of diabetes; list 3 options for treating diabetes. Outcome: Progressing Towards Goal  Goal: *Incorporating nutritional management into lifestyle  Description: Describe effect of type, amount and timing of food on blood glucose; list 3 methods for planning meals. Outcome: Progressing Towards Goal  Goal: *Incorporating physical activity into lifestyle  Description: State effect of exercise on blood glucose levels. Outcome: Progressing Towards Goal  Goal: *Developing strategies to promote health/change behavior  Description: Define the ABC's of diabetes; identify appropriate screenings, schedule and personal plan for screenings. Outcome: Progressing Towards Goal  Goal: *Using medications safely  Description: State effect of diabetes medications on diabetes; name diabetes medication taking, action and side effects. Outcome: Progressing Towards Goal  Goal: *Monitoring blood glucose, interpreting and using results  Description: Identify recommended blood glucose targets  and personal targets.   Outcome: Progressing Towards Goal  Goal: *Prevention, detection, treatment of acute complications  Description: List symptoms of hyper- and hypoglycemia; describe how to treat low blood sugar and actions for lowering  high blood glucose level. Outcome: Progressing Towards Goal  Goal: *Prevention, detection and treatment of chronic complications  Description: Define the natural course of diabetes and describe the relationship of blood glucose levels to long term complications of diabetes.   Outcome: Progressing Towards Goal  Goal: *Developing strategies to address psychosocial issues  Description: Describe feelings about living with diabetes; identify support needed and support network  Outcome: Progressing Towards Goal  Goal: *Insulin pump training  Outcome: Progressing Towards Goal  Goal: *Sick day guidelines  Outcome: Progressing Towards Goal  Goal: *Patient Specific Goal (EDIT GOAL, INSERT TEXT)  Outcome: Progressing Towards Goal     Problem: Patient Education: Go to Patient Education Activity  Goal: Patient/Family Education  Outcome: Progressing Towards Goal     Problem: Patient Education: Go to Patient Education Activity  Goal: Patient/Family Education  Outcome: Progressing Towards Goal     Problem: Patient Education: Go to Patient Education Activity  Goal: Patient/Family Education  Outcome: Progressing Towards Goal     Problem: Nutrition Deficit  Goal: *Optimize nutritional status  Outcome: Progressing Towards Goal     Problem: Breathing Pattern - Ineffective  Goal: *Absence of hypoxia  Outcome: Progressing Towards Goal  Goal: *Use of effective breathing techniques  Outcome: Progressing Towards Goal  Goal: *PALLIATIVE CARE:  Alleviation of Dyspnea  Outcome: Progressing Towards Goal     Problem: Patient Education: Go to Patient Education Activity  Goal: Patient/Family Education  Outcome: Progressing Towards Goal     Problem: Risk for Spread of Infection  Goal: Prevent transmission of infectious organism to others  Description: Prevent the transmission of infectious organisms to other patients, staff members, and visitors.   Outcome: Progressing Towards Goal     Problem: Patient Education:  Go to Education Activity  Goal: Patient/Family Education  Outcome: Progressing Towards Goal     Problem: Patient Education: Go to Patient Education Activity  Goal: Patient/Family Education  Outcome: Progressing Towards Goal     Problem: Infection - Risk of, Urinary Catheter-Associated Urinary Tract Infection  Goal: *Absence of infection signs and symptoms  Outcome: Progressing Towards Goal     Problem: Patient Education: Go to Patient Education Activity  Goal: Patient/Family Education  Outcome: Progressing Towards Goal

## 2021-05-15 NOTE — PROGRESS NOTES
Physician Progress Note      Farooq Small  [facility] #:                  [id number]  :                       1955  ADMIT DATE:       2021 4:38 PM  [address] [place] DATE:  RESPONDING  PROVIDER #:        Matthew Raymond MD          QUERY TEXT:    Patient admitted with Discitis. Noted documentation of sepsis in attending PN 5/10/21. In order to support the diagnosis of sepsis, please include additional clinical indicators in your documentation. Or please document if the diagnosis of sepsis has been ruled out after further study. The medical record reflects the following:  Risk Factors: 78 y/o male 55-year-old man with discitis and OM, sacral wound, status post recent right above-knee amputation ([facility]). found to have Positive blood cultures( and  MRSA bacteremia), Metabolic encephalopathy, ESTRELLA on CKD    Clinical Indicators: 5/10/21 Attending MD PN: ESTRELLA on CKD stage III - pre renal and sepsis  Acute metabolic encephalopathy - multifactorial due to renal insufficiency, bacteremia, electrolyte abnormalities    Labs  2021 16:58 WBC: 13.4 (H)  2021 18:10 Lactic acid: 1.0  2021 12:08 Procalcitonin: 2.55  2021 01:08 WBC: 1.4 (L)    vitals    ; 2100  ; 2130  RR 21  21  T 102.9  (Tylenol 650mg po )    21 Blood Culture result: Abnormal       Final  * METHICILLIN RESISTANT STAPHYLOCOCCUS AUREUS    21 Blood Culture result: Abnormal     ? Preliminary  * METHICILLIN RESISTANT STAPHYLOCOCCUS AUREUS    Treatment: Hospitalist consult, ID consult, Blood Cultures, IV fluids 0.45ns, I/O's, avoid nephrotoxins, BMP daily,  ABX- Daptomycin, Zosyn,  Options provided:  -- Sepsis present as evidenced by, Please document evidence.   -- Sepsis was ruled out after study  -- Other - I will add my own diagnosis  -- Disagree - Not applicable / Not valid  -- Disagree - Clinically unable to determine / Unknown  -- Refer to Clinical Documentation Reviewer    PROVIDER RESPONSE TEXT:    Per h and p no spesis on adnm    Query created by: Fatuma Vargas on 5/13/2021 2:27 PM      Electronically signed by:  Bee Howard MD 5/15/2021 11:38 AM

## 2021-05-16 LAB
ALBUMIN SERPL-MCNC: 0.9 G/DL (ref 3.5–5)
ANION GAP SERPL CALC-SCNC: 6 MMOL/L (ref 5–15)
BUN SERPL-MCNC: 34 MG/DL (ref 6–20)
BUN/CREAT SERPL: 14 (ref 12–20)
CALCIUM SERPL-MCNC: 8.2 MG/DL (ref 8.5–10.1)
CHLORIDE SERPL-SCNC: 112 MMOL/L (ref 97–108)
CO2 SERPL-SCNC: 22 MMOL/L (ref 21–32)
CREAT SERPL-MCNC: 2.51 MG/DL (ref 0.7–1.3)
GLUCOSE BLD STRIP.AUTO-MCNC: 125 MG/DL (ref 65–117)
GLUCOSE BLD STRIP.AUTO-MCNC: 136 MG/DL (ref 65–117)
GLUCOSE BLD STRIP.AUTO-MCNC: 160 MG/DL (ref 65–117)
GLUCOSE BLD STRIP.AUTO-MCNC: 171 MG/DL (ref 65–117)
GLUCOSE SERPL-MCNC: 161 MG/DL (ref 65–100)
PHOSPHATE SERPL-MCNC: 3.7 MG/DL (ref 2.6–4.7)
POTASSIUM SERPL-SCNC: 3.6 MMOL/L (ref 3.5–5.1)
SERVICE CMNT-IMP: ABNORMAL
SODIUM SERPL-SCNC: 140 MMOL/L (ref 136–145)

## 2021-05-16 PROCEDURE — 74011250636 HC RX REV CODE- 250/636: Performed by: NURSE PRACTITIONER

## 2021-05-16 PROCEDURE — 74011250637 HC RX REV CODE- 250/637: Performed by: INTERNAL MEDICINE

## 2021-05-16 PROCEDURE — 74011250637 HC RX REV CODE- 250/637: Performed by: HOSPITALIST

## 2021-05-16 PROCEDURE — 65270000032 HC RM SEMIPRIVATE

## 2021-05-16 PROCEDURE — 74011000258 HC RX REV CODE- 258: Performed by: NURSE PRACTITIONER

## 2021-05-16 PROCEDURE — 74011000258 HC RX REV CODE- 258: Performed by: INTERNAL MEDICINE

## 2021-05-16 PROCEDURE — 80069 RENAL FUNCTION PANEL: CPT

## 2021-05-16 PROCEDURE — 82962 GLUCOSE BLOOD TEST: CPT

## 2021-05-16 PROCEDURE — 36415 COLL VENOUS BLD VENIPUNCTURE: CPT

## 2021-05-16 PROCEDURE — 74011250636 HC RX REV CODE- 250/636: Performed by: INTERNAL MEDICINE

## 2021-05-16 PROCEDURE — 74011636637 HC RX REV CODE- 636/637: Performed by: INTERNAL MEDICINE

## 2021-05-16 RX ADMIN — Medication 10 ML: at 15:02

## 2021-05-16 RX ADMIN — SODIUM BICARBONATE 650 MG: 650 TABLET ORAL at 10:36

## 2021-05-16 RX ADMIN — THERA TABS 1 TABLET: TAB at 10:36

## 2021-05-16 RX ADMIN — ASPIRIN 81 MG: 81 TABLET, CHEWABLE ORAL at 10:36

## 2021-05-16 RX ADMIN — FERROUS SULFATE TAB 325 MG (65 MG ELEMENTAL FE) 325 MG: 325 (65 FE) TAB at 17:20

## 2021-05-16 RX ADMIN — PIPERACILLIN AND TAZOBACTAM 3.38 G: 3; .375 INJECTION, POWDER, LYOPHILIZED, FOR SOLUTION INTRAVENOUS at 22:20

## 2021-05-16 RX ADMIN — PANTOPRAZOLE SODIUM 40 MG: 40 TABLET, DELAYED RELEASE ORAL at 06:42

## 2021-05-16 RX ADMIN — FERROUS SULFATE TAB 325 MG (65 MG ELEMENTAL FE) 325 MG: 325 (65 FE) TAB at 06:42

## 2021-05-16 RX ADMIN — FENTANYL CITRATE 50 MCG: 50 INJECTION, SOLUTION INTRAMUSCULAR; INTRAVENOUS at 17:41

## 2021-05-16 RX ADMIN — DAPTOMYCIN 600 MG: 500 INJECTION, POWDER, LYOPHILIZED, FOR SOLUTION INTRAVENOUS at 17:49

## 2021-05-16 RX ADMIN — HEPARIN SODIUM 5000 UNITS: 5000 INJECTION INTRAVENOUS; SUBCUTANEOUS at 06:42

## 2021-05-16 RX ADMIN — Medication 10 ML: at 21:16

## 2021-05-16 RX ADMIN — CYCLOBENZAPRINE 5 MG: 10 TABLET, FILM COATED ORAL at 17:43

## 2021-05-16 RX ADMIN — PIPERACILLIN AND TAZOBACTAM 3.38 G: 3; .375 INJECTION, POWDER, LYOPHILIZED, FOR SOLUTION INTRAVENOUS at 06:41

## 2021-05-16 RX ADMIN — TAMSULOSIN HYDROCHLORIDE 0.4 MG: 0.4 CAPSULE ORAL at 10:36

## 2021-05-16 RX ADMIN — FENTANYL CITRATE 50 MCG: 50 INJECTION, SOLUTION INTRAMUSCULAR; INTRAVENOUS at 21:16

## 2021-05-16 RX ADMIN — PIPERACILLIN AND TAZOBACTAM 3.38 G: 3; .375 INJECTION, POWDER, LYOPHILIZED, FOR SOLUTION INTRAVENOUS at 15:02

## 2021-05-16 RX ADMIN — Medication 100 MG: at 10:36

## 2021-05-16 RX ADMIN — FENTANYL CITRATE 50 MCG: 50 INJECTION, SOLUTION INTRAMUSCULAR; INTRAVENOUS at 10:57

## 2021-05-16 RX ADMIN — SODIUM BICARBONATE 650 MG: 650 TABLET ORAL at 17:20

## 2021-05-16 RX ADMIN — Medication 10 ML: at 10:38

## 2021-05-16 RX ADMIN — INSULIN LISPRO 2 UNITS: 100 INJECTION, SOLUTION INTRAVENOUS; SUBCUTANEOUS at 06:40

## 2021-05-16 NOTE — PROGRESS NOTES
Problem: Falls - Risk of  Goal: *Absence of Falls  Description: Document Raymundo De La Cruzke Fall Risk and appropriate interventions in the flowsheet. Outcome: Progressing Towards Goal  Note: Fall Risk Interventions:       Mentation Interventions: Adequate sleep, hydration, pain control, Door open when patient unattended, Evaluate medications/consider consulting pharmacy, More frequent rounding, Room close to nurse's station, Toileting rounds, Update white board, Reorient patient    Medication Interventions: Evaluate medications/consider consulting pharmacy    Elimination Interventions: Call light in reach, Patient to call for help with toileting needs, Toileting schedule/hourly rounds(hoyos in place)    History of Falls Interventions: Consult care management for discharge planning, Door open when patient unattended, Evaluate medications/consider consulting pharmacy         Problem: Patient Education: Go to Patient Education Activity  Goal: Patient/Family Education  Outcome: Progressing Towards Goal     Problem: Pressure Injury - Risk of  Goal: *Prevention of pressure injury  Description: Document Celestino Scale and appropriate interventions in the flowsheet. Outcome: Progressing Towards Goal  Note: Pressure Injury Interventions:  Sensory Interventions: Assess changes in LOC, Avoid rigorous massage over bony prominences, Float heels, Keep linens dry and wrinkle-free, Maintain/enhance activity level, Minimize linen layers, Pressure redistribution bed/mattress (bed type), Turn and reposition approx.  every two hours (pillows and wedges if needed)    Moisture Interventions: Absorbent underpads, Apply protective barrier, creams and emollients, Check for incontinence Q2 hours and as needed, Internal/External urinary devices, Maintain skin hydration (lotion/cream), Moisture barrier, Minimize layers    Activity Interventions: Increase time out of bed, Pressure redistribution bed/mattress(bed type), PT/OT evaluation    Mobility Interventions: Float heels, HOB 30 degrees or less, Pressure redistribution bed/mattress (bed type), PT/OT evaluation, Turn and reposition approx. every two hours(pillow and wedges)    Nutrition Interventions: Document food/fluid/supplement intake, Discuss nutritional consult with provider, Offer support with meals,snacks and hydration    Friction and Shear Interventions: Apply protective barrier, creams and emollients, Feet elevated on foot rest, Foam dressings/transparent film/skin sealants, HOB 30 degrees or less, Lift sheet, Minimize layers                Problem: Patient Education: Go to Patient Education Activity  Goal: Patient/Family Education  Outcome: Progressing Towards Goal     Problem: Diabetes Self-Management  Goal: *Disease process and treatment process  Description: Define diabetes and identify own type of diabetes; list 3 options for treating diabetes. Outcome: Progressing Towards Goal  Goal: *Incorporating nutritional management into lifestyle  Description: Describe effect of type, amount and timing of food on blood glucose; list 3 methods for planning meals. Outcome: Progressing Towards Goal  Goal: *Incorporating physical activity into lifestyle  Description: State effect of exercise on blood glucose levels. Outcome: Progressing Towards Goal  Goal: *Developing strategies to promote health/change behavior  Description: Define the ABC's of diabetes; identify appropriate screenings, schedule and personal plan for screenings. Outcome: Progressing Towards Goal  Goal: *Using medications safely  Description: State effect of diabetes medications on diabetes; name diabetes medication taking, action and side effects. Outcome: Progressing Towards Goal  Goal: *Monitoring blood glucose, interpreting and using results  Description: Identify recommended blood glucose targets  and personal targets.   Outcome: Progressing Towards Goal  Goal: *Prevention, detection, treatment of acute complications  Description: List symptoms of hyper- and hypoglycemia; describe how to treat low blood sugar and actions for lowering  high blood glucose level. Outcome: Progressing Towards Goal  Goal: *Prevention, detection and treatment of chronic complications  Description: Define the natural course of diabetes and describe the relationship of blood glucose levels to long term complications of diabetes.   Outcome: Progressing Towards Goal  Goal: *Developing strategies to address psychosocial issues  Description: Describe feelings about living with diabetes; identify support needed and support network  Outcome: Progressing Towards Goal  Goal: *Insulin pump training  Outcome: Progressing Towards Goal  Goal: *Sick day guidelines  Outcome: Progressing Towards Goal  Goal: *Patient Specific Goal (EDIT GOAL, INSERT TEXT)  Outcome: Progressing Towards Goal     Problem: Patient Education: Go to Patient Education Activity  Goal: Patient/Family Education  Outcome: Progressing Towards Goal     Problem: Patient Education: Go to Patient Education Activity  Goal: Patient/Family Education  Outcome: Progressing Towards Goal     Problem: Patient Education: Go to Patient Education Activity  Goal: Patient/Family Education  Outcome: Progressing Towards Goal     Problem: Nutrition Deficit  Goal: *Optimize nutritional status  Outcome: Progressing Towards Goal     Problem: Breathing Pattern - Ineffective  Goal: *Absence of hypoxia  Outcome: Progressing Towards Goal  Goal: *Use of effective breathing techniques  Outcome: Progressing Towards Goal  Goal: *PALLIATIVE CARE:  Alleviation of Dyspnea  Outcome: Progressing Towards Goal     Problem: Patient Education: Go to Patient Education Activity  Goal: Patient/Family Education  Outcome: Progressing Towards Goal     Problem: Risk for Spread of Infection  Goal: Prevent transmission of infectious organism to others  Description: Prevent the transmission of infectious organisms to other patients, staff members, and visitors.   Outcome: Progressing Towards Goal     Problem: Patient Education:  Go to Education Activity  Goal: Patient/Family Education  Outcome: Progressing Towards Goal     Problem: Patient Education: Go to Patient Education Activity  Goal: Patient/Family Education  Outcome: Progressing Towards Goal     Problem: Infection - Risk of, Urinary Catheter-Associated Urinary Tract Infection  Goal: *Absence of infection signs and symptoms  Outcome: Progressing Towards Goal     Problem: Patient Education: Go to Patient Education Activity  Goal: Patient/Family Education  Outcome: Progressing Towards Goal

## 2021-05-16 NOTE — PROGRESS NOTES
Problem: Falls - Risk of  Goal: *Absence of Falls  Description: Document Janae Fung Fall Risk and appropriate interventions in the flowsheet. Outcome: Progressing Towards Goal  Note: Fall Risk Interventions:       Mentation Interventions: Adequate sleep, hydration, pain control    Medication Interventions: Bed/chair exit alarm, Evaluate medications/consider consulting pharmacy    Elimination Interventions: Bed/chair exit alarm, Toileting schedule/hourly rounds    History of Falls Interventions: Consult care management for discharge planning         Problem: Patient Education: Go to Patient Education Activity  Goal: Patient/Family Education  Outcome: Progressing Towards Goal     Problem: Pressure Injury - Risk of  Goal: *Prevention of pressure injury  Description: Document Celestino Scale and appropriate interventions in the flowsheet. Outcome: Progressing Towards Goal  Note: Pressure Injury Interventions:  Sensory Interventions: Assess changes in LOC, Assess need for specialty bed, Avoid rigorous massage over bony prominences, Discuss PT/OT consult with provider    Moisture Interventions: Absorbent underpads, Apply protective barrier, creams and emollients    Activity Interventions: Increase time out of bed    Mobility Interventions: Float heels, HOB 30 degrees or less, Pressure redistribution bed/mattress (bed type)    Nutrition Interventions: Document food/fluid/supplement intake, Offer support with meals,snacks and hydration    Friction and Shear Interventions: Apply protective barrier, creams and emollients, HOB 30 degrees or less, Lift sheet, Feet elevated on foot rest, Minimize layers                Problem: Pressure Injury - Risk of  Goal: *Prevention of pressure injury  Description: Document Celestino Scale and appropriate interventions in the flowsheet.   Outcome: Progressing Towards Goal  Note: Pressure Injury Interventions:  Sensory Interventions: Assess changes in LOC, Assess need for specialty bed, Avoid rigorous massage over bony prominences, Discuss PT/OT consult with provider    Moisture Interventions: Absorbent underpads, Apply protective barrier, creams and emollients    Activity Interventions: Increase time out of bed    Mobility Interventions: Float heels, HOB 30 degrees or less, Pressure redistribution bed/mattress (bed type)    Nutrition Interventions: Document food/fluid/supplement intake, Offer support with meals,snacks and hydration    Friction and Shear Interventions: Apply protective barrier, creams and emollients, HOB 30 degrees or less, Lift sheet, Feet elevated on foot rest, Minimize layers                Problem: Patient Education: Go to Patient Education Activity  Goal: Patient/Family Education  Outcome: Progressing Towards Goal     Problem: Diabetes Self-Management  Goal: *Disease process and treatment process  Description: Define diabetes and identify own type of diabetes; list 3 options for treating diabetes. Outcome: Progressing Towards Goal  Goal: *Incorporating nutritional management into lifestyle  Description: Describe effect of type, amount and timing of food on blood glucose; list 3 methods for planning meals. Outcome: Progressing Towards Goal  Goal: *Incorporating physical activity into lifestyle  Description: State effect of exercise on blood glucose levels. Outcome: Progressing Towards Goal  Goal: *Developing strategies to promote health/change behavior  Description: Define the ABC's of diabetes; identify appropriate screenings, schedule and personal plan for screenings. Outcome: Progressing Towards Goal  Goal: *Using medications safely  Description: State effect of diabetes medications on diabetes; name diabetes medication taking, action and side effects. Outcome: Progressing Towards Goal  Goal: *Monitoring blood glucose, interpreting and using results  Description: Identify recommended blood glucose targets  and personal targets.   Outcome: Progressing Towards Goal  Goal: *Prevention, detection, treatment of acute complications  Description: List symptoms of hyper- and hypoglycemia; describe how to treat low blood sugar and actions for lowering  high blood glucose level. Outcome: Progressing Towards Goal  Goal: *Prevention, detection and treatment of chronic complications  Description: Define the natural course of diabetes and describe the relationship of blood glucose levels to long term complications of diabetes.   Outcome: Progressing Towards Goal  Goal: *Developing strategies to address psychosocial issues  Description: Describe feelings about living with diabetes; identify support needed and support network  Outcome: Progressing Towards Goal  Goal: *Insulin pump training  Outcome: Progressing Towards Goal  Goal: *Sick day guidelines  Outcome: Progressing Towards Goal  Goal: *Patient Specific Goal (EDIT GOAL, INSERT TEXT)  Outcome: Progressing Towards Goal     Problem: Patient Education: Go to Patient Education Activity  Goal: Patient/Family Education  Outcome: Progressing Towards Goal     Problem: Patient Education: Go to Patient Education Activity  Goal: Patient/Family Education  Outcome: Progressing Towards Goal     Problem: Patient Education: Go to Patient Education Activity  Goal: Patient/Family Education  Outcome: Progressing Towards Goal     Problem: Nutrition Deficit  Goal: *Optimize nutritional status  Outcome: Progressing Towards Goal     Problem: Breathing Pattern - Ineffective  Goal: *Absence of hypoxia  Outcome: Progressing Towards Goal  Goal: *Use of effective breathing techniques  Outcome: Progressing Towards Goal  Goal: *PALLIATIVE CARE:  Alleviation of Dyspnea  Outcome: Progressing Towards Goal     Problem: Patient Education: Go to Patient Education Activity  Goal: Patient/Family Education  Outcome: Progressing Towards Goal     Problem: Risk for Spread of Infection  Goal: Prevent transmission of infectious organism to others  Description: Prevent the transmission of infectious organisms to other patients, staff members, and visitors.   Outcome: Progressing Towards Goal     Problem: Patient Education:  Go to Education Activity  Goal: Patient/Family Education  Outcome: Progressing Towards Goal     Problem: Patient Education: Go to Patient Education Activity  Goal: Patient/Family Education  Outcome: Progressing Towards Goal     Problem: Infection - Risk of, Urinary Catheter-Associated Urinary Tract Infection  Goal: *Absence of infection signs and symptoms  Outcome: Progressing Towards Goal     Problem: Patient Education: Go to Patient Education Activity  Goal: Patient/Family Education  Outcome: Progressing Towards Goal

## 2021-05-16 NOTE — PROGRESS NOTES
[address]  [address], [address]  [phone]                GI PROGRESS NOTE      NAME:   Checo Miguel. :    1955   MRN:    [id number]     Subjective:     Pt resting, no issues. Objective:     VITALS:   Last 24hrs VS reviewed since prior hospitalist progress note. Most recent are:  Visit Vitals  BP (!) 144/76 (BP 1 Location: Right arm, BP Patient Position: At rest)   Pulse 91   Temp 97.8 °F (36.6 °C)   Resp 18   Ht 5' 11\" (1.803 m)   Wt 62.5 kg (137 lb 12.8 oz)   SpO2 97%   BMI 19.22 kg/m²       Intake/Output Summary (Last 24 hours) at 2021 1329  Last data filed at 2021 1034  Gross per 24 hour   Intake 1217 ml   Output 1200 ml   Net 17 ml        PHYSICAL EXAM:  NAD, AAM    Lab Data Reviewed     Medications: Reviewed    Assessment/Plan     Checo Workman is a 77 y.o.  male whom we are asked to see re: malnutrition and need for PEG. Plan for EGD w PEG tomorrow.  NPO midnight    Attending Physician: Patric Lord MD   Date/Time:  2021

## 2021-05-16 NOTE — PROGRESS NOTES
ID Progress Note  2021    Subjective:     No new complaints  Aware of getting tube feeding placement tomorrow  Review of Systems:            Symptom Y/N Comments   Symptom Y/N Comments   Fever/Chills n      Chest Pain n      Poor Appetite  y     Edema        Cough y      Abdominal Pain        Sputum y      Joint Pain  y  lumbar spine    SOB/MASSEY n     Pruritis/Rash        Nausea/vomit n      Tolerating PT/OT        Diarrhea  n     Tolerating Diet        Constipation  n     Other           Could NOT obtain due to:       Objective:     Vitals:   Visit Vitals  /66   Pulse 89   Temp 98 °F (36.7 °C)   Resp 16   Ht 5' 11\" (1.803 m)   Wt 62.5 kg (137 lb 12.8 oz)   SpO2 98%   BMI 19.22 kg/m²        Tmax:  Temp (24hrs), Av.1 °F (36.7 °C), Min:97.5 °F (36.4 °C), Max:98.7 °F (37.1 °C)      PHYSICAL EXAM:  General: Chronically ill appearing, WD, WN. Alert, uncooperative, no acute distress    EENT:  EOMI. Anicteric sclerae. MMM  Resp:  Clear in apex with decreased breath sounds at bases, no wheezing or rales. No accessory muscle use  CV:  Regular  rhythm,  No edema  GI:  Soft, Non distended, Non tender. +Bowel sounds  Neurologic:  Alert and oriented X self, place, normal speech,   Psych:   Fair insight. Not anxious nor agitated  Skin:  No rashes.   No jaundice, right stump; dressing dry and intact, incision approximated, no erythema, no drainage     Labs:   Lab Results   Component Value Date/Time    WBC 7.9 05/15/2021 02:53 AM    Hemoglobin (POC) 5.2 (LL) 2021 08:37 AM    Hemoglobin, POC 11.2 (L) 2014 11:05 PM    HGB 7.9 (L) 05/15/2021 02:53 AM    Hematocrit (POC) 26 (L) 10/09/2018 07:16 AM    Hematocrit, POC 33 (L) 2014 11:05 PM    HCT 25.9 (L) 05/15/2021 02:53 AM    PLATELET 352  02:53 AM    MCV 90.2 05/15/2021 02:53 AM     Lab Results   Component Value Date/Time    Sodium 140 2021 02:30 AM    Potassium 3.6 2021 02:30 AM    Chloride 112 (H) 2021 02:30 AM    CO2 22 05/16/2021 02:30 AM    Anion gap 6 05/16/2021 02:30 AM    Glucose 161 (H) 05/16/2021 02:30 AM    BUN 34 (H) 05/16/2021 02:30 AM    Creatinine 2.51 (H) 05/16/2021 02:30 AM    BUN/Creatinine ratio 14 05/16/2021 02:30 AM    GFR est AA 31 (L) 05/16/2021 02:30 AM    GFR est non-AA 26 (L) 05/16/2021 02:30 AM    Calcium 8.2 (L) 05/16/2021 02:30 AM    Bilirubin, total 0.3 05/02/2021 02:00 AM    Alk. phosphatase 234 (H) 05/02/2021 02:00 AM    Protein, total 7.2 05/02/2021 02:00 AM    Albumin 0.9 (L) 05/16/2021 02:30 AM    Globulin 6.0 (H) 05/02/2021 02:00 AM    A-G Ratio 0.2 (L) 05/02/2021 02:00 AM    ALT (SGPT) 13 05/02/2021 02:00 AM       CT of Chest, ABD/PEL (5/2); L4-5 findings most likely represent discitis-osteomyelitis. Soft tissue edema from hypoalbuminemia. Right upper lobe pneumonia. Bilateral lower lobe atelectasis vs pneumonia. Trace bilateral pleural effusions. ABX hx:   IV cefepime 5/3-5/8   PO Levo 5/9  PO clindamycin 5/1-5/5  IV zosyn 5/3, 5/12-  IV vancomycin 5/3-5/7  IV Daptomycin 5/7 -  IV Rifampin 5/10-    Assessment and Plan     HAP; completed first tmt on 5/9  - recurrent fever  5/12, afebrile overnight    Procal 2.5    CXR (5/12) New bibasilar interstitial and patchy airspace disease.     IV zosyn started on 5/12, continue with IV Zosyn     Pt is in high risk of recurrent PNA and aspiration PNA. Encourage IS 10x/every hour when awake    L4-5 Discitis/OM   Bacteremia  S/p laminectomy C3-4 (2014)   - WBC 7 (s/p granix on 5/13) afebrile    TTE (5/5) no vegetation    Blood cx (5/1, 5/3, 5/4, 5/6, 5/7, 5/8) MRSA    Blood cx (5/9) no growth    Blood cx (5/10 & 5/12) no growth so far      MRI of lumbar spine (5/3) discitis/OM at L3-4 & L4-5, no evidence of epidural abscess or cord compression    Once again, repeat MRI of lumbar spine (5/10) revealed no epidural abscess    -> ortho recommends medical therapy at this time.      IV vancomycin changed to daptomycin on 5/7     Rifampin was d/c'd on 5/13 with concerns of leukopenia        Continue with IV Daptomycin, total 6 weeks from negative blood cx,(5/9). Last dose 6/1     CK 17->13 (5/14)    Leukopenia  - appreciate heme/onc input; received granix on 5/13    Anemia  - hgb 8.0 (5/13); primary team following    Bladder retention  -  Morrissey in place.  U/A (-)    severe protein-calorie malnutrition  - schedule to have PEG tube placement 5/17    Bell Lamar, NP

## 2021-05-16 NOTE — PROGRESS NOTES
[facility]  Hospitalist Group                                                                                          Hospitalist Progress Note  Genie Garcia MD  Answering service: [phone] from in house phone        Date of Service:  2021  NAME:  Shabnam Hernandez. :  1955  MRN:  [id number]      Admission Summary: This is a 70-year-old man with a past medical history significant for chronic kidney disease, type 2 diabetes, dyslipidemia, hypertension, obstructive sleep apnea, status post recent right above-knee amputation, degenerative disk disease, and chronic pain syndrome, was in his usual state of health until the day of presentation at the emergency room when it was reported that the patient became lethargic and confused at his rehab facility    Interval history / Subjective:      Patient is seen and examined. Feeling well. No fever on dapto and zosyn  Resting comfortably PEG tube scheduled for tomorrow NPO midnight hold a/c   On IV daptomycin with plan for 6 weeks. ID note suggest from 2021 to 2021  Will defer to ID for clarification. Morrissey catheter in place. Failed voiding trial        Assessment & Plan:     MRSA bacteremia - first cleared culture  (persistnet prior since )  L4-L5 Discitis and OM  Sacral wound   - Patient now on daptomycin, increased dose to 8-10mg/kg. Vanc ALESSANDRO is 4   - ID following   - Finally 48 hours of negative blood cultures. - TTE without obvious vegetations. Will need 6 weeks IV abx, so no need for AMELIA  - Will need Murali (no PICC per renal) once cultures cleared, would await at least 24 hours since fever. Closer to discharge  - Wound care following   - Repeat MRI lumbar spine 5/10 without abscess. - Rifampin added for synergy. Discontinued on  with concern of leukopenia  - Zosyn added by ID , empiric? For aspiration pneumonia? Follow ID recommendation.     Leukopenia   - Heme consulted by ID  - Agree with drug induced vs. sepsis. Previously to cefepime with recovery after DC  - Rifampin DC'd as noted above    Acute metabolic encephalopathy - multifactorial due to renal insufficiency, bacteremia, electrolyte abnormalities resolved   - Improving. Continue day and night cycles  - Avoid narcotics and sedatives if able  - MRI brain negative, ammonia normal     Anemia - chronic disease CKD   - monitor and transfuse if less than 7   - CBC daily   - EPO per nephrology     ESTRELLA on CKD stage III - pre renal and sepsis   - Cr slightly worse today, switch IVF to 0.45NS   - I and O avoid nephrotoxins   - BMP daily     S/p R BKA - recent  - Wound care following, no sign of local infection   - PT/OT    - Vascular consult for prosthesis recs    Hyponatremia - hypovolemic, resolved s/p IVF  HTN - holding lisinopril   Type 2 diabetes - A1c 7.2%  SSI, POCT glucose checks and hypoglycemia protocol   HLD - home statin   HCAP - completed therapy (Vanc and cefepime/levo)  Urinary retention - now failed x 2 void trials. Keep hoyos. Continue flomax     Need PEG for nutrition - plan for 5/17 Monday per GI     Code status: FULL  DVT prophylaxis: heparin     Care Plan discussed with: Patient/Family  Anticipated Disposition: [facility]/Rehab  Anticipated Discharge: Greater than 48 hours,  Needs Murali by IR for Abx. , PEG tube      Hospital Problems  Date Reviewed: 5/1/2021          Codes Class Noted POA    Severe protein-calorie malnutrition ([facility] [address] (Chronic) ICD-10-CM: E52  ICD-9-CM: 262  5/13/2021 Yes        * (Principal) Acute delirium ICD-10-CM: R41.0  ICD-9-CM: 780.09  5/1/2021 Yes                Review of Systems:   A comprehensive review of systems was negative except for that written in the HPI. Vital Signs:    Last 24hrs VS reviewed since prior progress note.  Most recent are:  Visit Vitals  BP (!) 144/76 (BP 1 Location: Right arm, BP Patient Position: At rest)   Pulse 91   Temp 97.8 °F (36.6 °C)   Resp 18   Ht 5' 11\" (1.803 m)   Wt 62.5 kg (137 lb 12.8 oz)   SpO2 97%   BMI 19.22 kg/m²         Intake/Output Summary (Last 24 hours) at 5/16/2021 1150  Last data filed at 5/16/2021 1034  Gross per 24 hour   Intake 1217 ml   Output 1200 ml   Net 17 ml        Physical Examination:     I had a face to face encounter with this patient and independently examined them on 5/16/2021 as outlined below:          Constitutional:  chronically ill appearing   ENT:  mmm   Resp:  CTA bilaterally. CV:  Regular rhythm, normal rate,    GI:  Soft, non distended, non tender. bs+    Musculoskeletal:  No edema, warm, 2+ pulses throughout    Neurologic:  Moves all extremities. AAOx3, CN II-XII reviewed, s/p R BKA wound c/d/i            Data Review:    Review and/or order of clinical lab test  Review and/or order of tests in the radiology section of CPT  Review and/or order of tests in the medicine section of CPT    Xr Chest Sngl V    Result Date: 5/1/2021  No acute process. Xr Spine Lumb 2 Or 3 V    Result Date: 5/1/2021  No acute abnormality. Degenerative disc and facet changes at L3-4 through L5-S1. Mri Brain Wo Cont    Result Date: 5/2/2021  Mild chronic microvascular ischemic disease. No acute infarct or mass effect. Mri Lumb Spine Wo Cont    Result Date: 5/10/2021  1. Again noted are changes suspicious for discitis at L3-L4 and L4-L5 levels with mild diffuse bulging of the disc and facet arthropathy resulting in moderate central stenosis with narrowing of neural foramina. 2. No epidural collection is identified. There is slight improvement in the edema within L4 vertebral body. 3. Marked disc space narrowing at L5-S1 is unchanged. 4. Diffuse anasarca is again noted. . Diffuse low signal throughout the visualized bone marrow is again noted consistent with metabolic bone disease most likely related to renal osteodystrophy. Mri Lumb Spine W Wo Cont    Result Date: 5/4/2021  1.  Edema and abnormal bright signal in moderately narrowed disks at L3-4 and L4-5 worrisome for discitis osteomyelitis. Amyloid arthropathy, gout, or more typical spondylosis with also be within the differential. 2. Trace edema along the L5-S1 endplates with severe disc space narrowing is likely degenerative. 3. Spondylosis in the lower lumbar spine as above. Ct Head Wo Cont    Result Date: 5/1/2021  No acute intracranial abnormality. Ct Chest Wo Cont    Result Date: 5/2/2021  1. L4-5 findings most likely represent discitis-osteomyelitis. 2. Soft tissue edema from hypoalbuminemia. 3. Right upper lobe pneumonia. Bilateral lower lobe atelectasis versus pneumonia. Trace bilateral pleural effusions. Ct Abd Pelv Wo Cont    Result Date: 5/2/2021  1. L4-5 findings most likely represent discitis-osteomyelitis. 2. Soft tissue edema from hypoalbuminemia. 3. Right upper lobe pneumonia. Bilateral lower lobe atelectasis versus pneumonia. Trace bilateral pleural effusions. Xr Chest Port    Result Date: 5/12/2021  New bibasilar interstitial and patchy airspace disease. Labs:     Recent Labs     05/15/21  0253 05/13/21  2026   WBC 7.9 3.2*   HGB 7.9* 8.0*   HCT 25.9* 26.7*    213     Recent Labs     05/16/21  0230 05/15/21  0253 05/14/21  0413 05/13/21  2026    143 142 142   K 3.6 3.0* 3.3* 3.2*   * 112* 111* 111*   CO2 22 23 22 24   BUN 34* 33* 37* 37*   CREA 2.51* 2.66* 2.64* 2.59*   * 116* 149* 163*   CA 8.2* 8.7 8.6 8.8   MG  --   --   --  1.8   PHOS 3.7 3.4 2.4*  --      Recent Labs     05/16/21  0230 05/15/21  0253 05/14/21  0413   ALB 0.9* 0.8* 1.0*     No results for input(s): INR, PTP, APTT, INREXT, INREXT in the last 72 hours. No results for input(s): FE, TIBC, PSAT, FERR in the last 72 hours. Lab Results   Component Value Date/Time    Folate 7.1 05/02/2021 02:00 AM      No results for input(s): PH, PCO2, PO2 in the last 72 hours.   Recent Labs     05/14/21  0413   CPK 13*     Lab Results   Component Value Date/Time    Cholesterol, total 239 (H) 03/30/2020 02:32 PM    HDL Cholesterol 40 03/30/2020 02:32 PM    LDL,Direct 120 (H) 09/11/2012 12:00 AM    LDL, calculated 130 (H) 03/30/2020 02:32 PM    Triglyceride 345 (H) 03/30/2020 02:32 PM     Lab Results   Component Value Date/Time    Glucose (POC) 136 (H) 05/16/2021 10:59 AM    Glucose (POC) 171 (H) 05/16/2021 06:19 AM    Glucose (POC) 188 (H) 05/15/2021 09:16 PM    Glucose (POC) 96 05/15/2021 04:00 PM    Glucose (POC) 147 (H) 05/15/2021 11:03 AM     Lab Results   Component Value Date/Time    Color YELLOW/STRAW 05/09/2021 02:51 PM    Appearance CLEAR 05/09/2021 02:51 PM    Specific gravity 1.017 05/09/2021 02:51 PM    Specific gravity 1.016 02/19/2016 08:00 PM    pH (UA) 6.0 05/09/2021 02:51 PM    Protein 100 (A) 05/09/2021 02:51 PM    Glucose Negative 05/09/2021 02:51 PM    Ketone TRACE (A) 05/09/2021 02:51 PM    Bilirubin Negative 05/09/2021 02:51 PM    Urobilinogen 0.2 05/09/2021 02:51 PM    Nitrites Negative 05/09/2021 02:51 PM    Leukocyte Esterase Negative 05/09/2021 02:51 PM    Epithelial cells FEW 05/09/2021 02:51 PM    Bacteria 1+ (A) 05/09/2021 02:51 PM    WBC 0-4 05/09/2021 02:51 PM    RBC 0-5 05/09/2021 02:51 PM         Medications Reviewed:     Current Facility-Administered Medications   Medication Dose Route Frequency    thiamine HCL (B-1) tablet 100 mg  100 mg Oral DAILY    0.9% sodium chloride infusion 250 mL  250 mL IntraVENous PRN    therapeutic multivitamin (THERAGRAN) tablet 1 Tab  1 Tab Oral DAILY    sodium bicarbonate tablet 650 mg  650 mg Oral BID    piperacillin-tazobactam (ZOSYN) 3.375 g in 0.9% sodium chloride (MBP/ADV) 100 mL MBP  3.375 g IntraVENous Q8H    DAPTOmycin (CUBICIN) 600 mg in 0.9% sodium chloride 50 mL IVPB RF formulation  600 mg IntraVENous Q48H    fentaNYL citrate (PF) injection 50 mcg  50 mcg IntraVENous Q3H PRN    senna-docusate (PERICOLACE) 8.6-50 mg per tablet 1 Tab  1 Tab Oral DAILY    tamsulosin (FLOMAX) capsule 0.4 mg  0.4 mg Oral DAILY    cyclobenzaprine (FLEXERIL) tablet 5 mg  5 mg Oral TID PRN    oxyCODONE IR (ROXICODONE) tablet 15 mg  15 mg Oral Q4H PRN    0.9% sodium chloride infusion 250 mL  250 mL IntraVENous PRN    epoetin naman-epbx (RETACRIT) injection 20,000 Units  20,000 Units SubCUTAneous Q MON, WED & FRI    albuterol-ipratropium (DUO-NEB) 2.5 MG-0.5 MG/3 ML  3 mL Nebulization Q4H PRN    aspirin chewable tablet 81 mg  81 mg Oral DAILY    ergocalciferol capsule 50,000 Units  50,000 Units Oral every Friday    ferrous sulfate tablet 325 mg  1 Tab Oral BID WITH MEALS    pantoprazole (PROTONIX) tablet 40 mg  40 mg Oral ACB    sodium chloride (NS) flush 5-40 mL  5-40 mL IntraVENous Q8H    sodium chloride (NS) flush 5-40 mL  5-40 mL IntraVENous PRN    acetaminophen (TYLENOL) tablet 650 mg  650 mg Oral Q6H PRN    Or    acetaminophen (TYLENOL) suppository 650 mg  650 mg Rectal Q6H PRN    polyethylene glycol (MIRALAX) packet 17 g  17 g Oral DAILY PRN    promethazine (PHENERGAN) tablet 12.5 mg  12.5 mg Oral Q6H PRN    Or    ondansetron (ZOFRAN) injection 4 mg  4 mg IntraVENous Q6H PRN    heparin (porcine) injection 5,000 Units  5,000 Units SubCUTAneous Q8H    insulin lispro (HUMALOG) injection   SubCUTAneous AC&HS    glucose chewable tablet 16 g  4 Tab Oral PRN    dextrose (D50W) injection syrg 12.5-25 g  12.5-25 g IntraVENous PRN    glucagon (GLUCAGEN) injection 1 mg  1 mg IntraMUSCular PRN     ______________________________________________________________________  EXPECTED LENGTH OF STAY: 4d 7h  ACTUAL LENGTH OF STAY:          15                 Antionette Buckner MD

## 2021-05-17 ENCOUNTER — ANESTHESIA EVENT (OUTPATIENT)
Dept: ENDOSCOPY | Age: 66
DRG: 551 | End: 2021-05-17
Payer: MEDICARE

## 2021-05-17 ENCOUNTER — ANESTHESIA (OUTPATIENT)
Dept: ENDOSCOPY | Age: 66
DRG: 551 | End: 2021-05-17
Payer: MEDICARE

## 2021-05-17 LAB
ALBUMIN SERPL-MCNC: 1 G/DL (ref 3.5–5)
ANION GAP SERPL CALC-SCNC: 7 MMOL/L (ref 5–15)
BACTERIA SPEC CULT: NORMAL
BUN SERPL-MCNC: 28 MG/DL (ref 6–20)
BUN/CREAT SERPL: 12 (ref 12–20)
CALCIUM SERPL-MCNC: 8 MG/DL (ref 8.5–10.1)
CHLORIDE SERPL-SCNC: 109 MMOL/L (ref 97–108)
CO2 SERPL-SCNC: 23 MMOL/L (ref 21–32)
CREAT SERPL-MCNC: 2.28 MG/DL (ref 0.7–1.3)
GLUCOSE BLD STRIP.AUTO-MCNC: 106 MG/DL (ref 65–117)
GLUCOSE BLD STRIP.AUTO-MCNC: 119 MG/DL (ref 65–117)
GLUCOSE BLD STRIP.AUTO-MCNC: 133 MG/DL (ref 65–117)
GLUCOSE BLD STRIP.AUTO-MCNC: 94 MG/DL (ref 65–117)
GLUCOSE SERPL-MCNC: 128 MG/DL (ref 65–100)
PHOSPHATE SERPL-MCNC: 3.5 MG/DL (ref 2.6–4.7)
POTASSIUM SERPL-SCNC: 3.2 MMOL/L (ref 3.5–5.1)
SERVICE CMNT-IMP: ABNORMAL
SERVICE CMNT-IMP: ABNORMAL
SERVICE CMNT-IMP: NORMAL
SODIUM SERPL-SCNC: 139 MMOL/L (ref 136–145)

## 2021-05-17 PROCEDURE — 74011250636 HC RX REV CODE- 250/636: Performed by: NURSE ANESTHETIST, CERTIFIED REGISTERED

## 2021-05-17 PROCEDURE — P9047 ALBUMIN (HUMAN), 25%, 50ML: HCPCS | Performed by: INTERNAL MEDICINE

## 2021-05-17 PROCEDURE — 88342 IMHCHEM/IMCYTCHM 1ST ANTB: CPT

## 2021-05-17 PROCEDURE — 88312 SPECIAL STAINS GROUP 1: CPT

## 2021-05-17 PROCEDURE — 77030021593 HC FCPS BIOP ENDOSC BSC -A: Performed by: SPECIALIST

## 2021-05-17 PROCEDURE — 36415 COLL VENOUS BLD VENIPUNCTURE: CPT

## 2021-05-17 PROCEDURE — 74011250637 HC RX REV CODE- 250/637: Performed by: INTERNAL MEDICINE

## 2021-05-17 PROCEDURE — 74011000250 HC RX REV CODE- 250: Performed by: NURSE ANESTHETIST, CERTIFIED REGISTERED

## 2021-05-17 PROCEDURE — 3E0G76Z INTRODUCTION OF NUTRITIONAL SUBSTANCE INTO UPPER GI, VIA NATURAL OR ARTIFICIAL OPENING: ICD-10-PCS | Performed by: SPECIALIST

## 2021-05-17 PROCEDURE — 80069 RENAL FUNCTION PANEL: CPT

## 2021-05-17 PROCEDURE — 88305 TISSUE EXAM BY PATHOLOGIST: CPT

## 2021-05-17 PROCEDURE — 2709999900 HC NON-CHARGEABLE SUPPLY: Performed by: SPECIALIST

## 2021-05-17 PROCEDURE — 74011250636 HC RX REV CODE- 250/636: Performed by: INTERNAL MEDICINE

## 2021-05-17 PROCEDURE — 74011000258 HC RX REV CODE- 258: Performed by: NURSE PRACTITIONER

## 2021-05-17 PROCEDURE — 77030005123 HC CATH GASTMY PEG BSC -C: Performed by: SPECIALIST

## 2021-05-17 PROCEDURE — 82962 GLUCOSE BLOOD TEST: CPT

## 2021-05-17 PROCEDURE — 74011250636 HC RX REV CODE- 250/636: Performed by: NURSE PRACTITIONER

## 2021-05-17 PROCEDURE — 65270000032 HC RM SEMIPRIVATE

## 2021-05-17 PROCEDURE — 51798 US URINE CAPACITY MEASURE: CPT

## 2021-05-17 PROCEDURE — 76040000007: Performed by: SPECIALIST

## 2021-05-17 PROCEDURE — 74011000250 HC RX REV CODE- 250: Performed by: SPECIALIST

## 2021-05-17 PROCEDURE — 0DH63UZ INSERTION OF FEEDING DEVICE INTO STOMACH, PERCUTANEOUS APPROACH: ICD-10-PCS | Performed by: SPECIALIST

## 2021-05-17 PROCEDURE — 74011250636 HC RX REV CODE- 250/636: Performed by: SPECIALIST

## 2021-05-17 PROCEDURE — 74011000250 HC RX REV CODE- 250: Performed by: INTERNAL MEDICINE

## 2021-05-17 PROCEDURE — 76060000032 HC ANESTHESIA 0.5 TO 1 HR: Performed by: SPECIALIST

## 2021-05-17 PROCEDURE — 0DB68ZX EXCISION OF STOMACH, VIA NATURAL OR ARTIFICIAL OPENING ENDOSCOPIC, DIAGNOSTIC: ICD-10-PCS | Performed by: SPECIALIST

## 2021-05-17 RX ORDER — LIDOCAINE HYDROCHLORIDE 20 MG/ML
INJECTION, SOLUTION EPIDURAL; INFILTRATION; INTRACAUDAL; PERINEURAL AS NEEDED
Status: DISCONTINUED | OUTPATIENT
Start: 2021-05-17 | End: 2021-05-17 | Stop reason: HOSPADM

## 2021-05-17 RX ORDER — SODIUM BICARBONATE 650 MG/1
325 TABLET ORAL 2 TIMES DAILY
Status: DISCONTINUED | OUTPATIENT
Start: 2021-05-17 | End: 2021-05-20 | Stop reason: HOSPADM

## 2021-05-17 RX ORDER — SODIUM CHLORIDE 0.9 % (FLUSH) 0.9 %
5-40 SYRINGE (ML) INJECTION AS NEEDED
Status: DISCONTINUED | OUTPATIENT
Start: 2021-05-17 | End: 2021-05-20 | Stop reason: HOSPADM

## 2021-05-17 RX ORDER — SODIUM CHLORIDE 9 MG/ML
INJECTION, SOLUTION INTRAVENOUS
Status: DISCONTINUED | OUTPATIENT
Start: 2021-05-17 | End: 2021-05-17 | Stop reason: HOSPADM

## 2021-05-17 RX ORDER — NALOXONE HYDROCHLORIDE 0.4 MG/ML
0.4 INJECTION, SOLUTION INTRAMUSCULAR; INTRAVENOUS; SUBCUTANEOUS
Status: DISCONTINUED | OUTPATIENT
Start: 2021-05-17 | End: 2021-05-17 | Stop reason: HOSPADM

## 2021-05-17 RX ORDER — FLUMAZENIL 0.1 MG/ML
0.2 INJECTION INTRAVENOUS
Status: DISCONTINUED | OUTPATIENT
Start: 2021-05-17 | End: 2021-05-17 | Stop reason: HOSPADM

## 2021-05-17 RX ORDER — SODIUM CHLORIDE 9 MG/ML
50 INJECTION, SOLUTION INTRAVENOUS CONTINUOUS
Status: DISPENSED | OUTPATIENT
Start: 2021-05-17 | End: 2021-05-17

## 2021-05-17 RX ORDER — ATROPINE SULFATE 0.1 MG/ML
0.5 INJECTION INTRAVENOUS
Status: DISCONTINUED | OUTPATIENT
Start: 2021-05-17 | End: 2021-05-17 | Stop reason: HOSPADM

## 2021-05-17 RX ORDER — ALBUMIN HUMAN 250 G/1000ML
12.5 SOLUTION INTRAVENOUS EVERY 6 HOURS
Status: DISPENSED | OUTPATIENT
Start: 2021-05-17 | End: 2021-05-18

## 2021-05-17 RX ORDER — PROPOFOL 10 MG/ML
INJECTION, EMULSION INTRAVENOUS AS NEEDED
Status: DISCONTINUED | OUTPATIENT
Start: 2021-05-17 | End: 2021-05-17 | Stop reason: HOSPADM

## 2021-05-17 RX ORDER — EPINEPHRINE 0.1 MG/ML
1 INJECTION INTRACARDIAC; INTRAVENOUS
Status: DISCONTINUED | OUTPATIENT
Start: 2021-05-17 | End: 2021-05-17 | Stop reason: HOSPADM

## 2021-05-17 RX ORDER — SODIUM CHLORIDE 0.9 % (FLUSH) 0.9 %
5-40 SYRINGE (ML) INJECTION EVERY 8 HOURS
Status: DISCONTINUED | OUTPATIENT
Start: 2021-05-17 | End: 2021-05-20 | Stop reason: HOSPADM

## 2021-05-17 RX ORDER — CEFAZOLIN SODIUM 1 G/3ML
INJECTION, POWDER, FOR SOLUTION INTRAMUSCULAR; INTRAVENOUS
Status: COMPLETED
Start: 2021-05-17 | End: 2021-05-17

## 2021-05-17 RX ORDER — CEFAZOLIN SODIUM 1 G/3ML
INJECTION, POWDER, FOR SOLUTION INTRAMUSCULAR; INTRAVENOUS AS NEEDED
Status: DISCONTINUED | OUTPATIENT
Start: 2021-05-17 | End: 2021-05-17 | Stop reason: HOSPADM

## 2021-05-17 RX ORDER — DEXTROMETHORPHAN/PSEUDOEPHED 2.5-7.5/.8
1.2 DROPS ORAL
Status: DISCONTINUED | OUTPATIENT
Start: 2021-05-17 | End: 2021-05-17 | Stop reason: HOSPADM

## 2021-05-17 RX ORDER — BUMETANIDE 0.25 MG/ML
1 INJECTION INTRAMUSCULAR; INTRAVENOUS DAILY
Status: DISCONTINUED | OUTPATIENT
Start: 2021-05-17 | End: 2021-05-20 | Stop reason: HOSPADM

## 2021-05-17 RX ADMIN — Medication 10 ML: at 21:31

## 2021-05-17 RX ADMIN — EPOETIN ALFA-EPBX 20000 UNITS: 20000 INJECTION, SOLUTION INTRAVENOUS; SUBCUTANEOUS at 21:18

## 2021-05-17 RX ADMIN — PROPOFOL 30 MG: 10 INJECTION, EMULSION INTRAVENOUS at 16:00

## 2021-05-17 RX ADMIN — SODIUM BICARBONATE 325 MG: 650 TABLET ORAL at 18:17

## 2021-05-17 RX ADMIN — FERROUS SULFATE TAB 325 MG (65 MG ELEMENTAL FE) 325 MG: 325 (65 FE) TAB at 18:17

## 2021-05-17 RX ADMIN — ALBUMIN (HUMAN) 12.5 G: 0.25 INJECTION, SOLUTION INTRAVENOUS at 10:59

## 2021-05-17 RX ADMIN — PROPOFOL 30 MG: 10 INJECTION, EMULSION INTRAVENOUS at 15:50

## 2021-05-17 RX ADMIN — FENTANYL CITRATE 50 MCG: 50 INJECTION, SOLUTION INTRAMUSCULAR; INTRAVENOUS at 23:40

## 2021-05-17 RX ADMIN — FENTANYL CITRATE 50 MCG: 50 INJECTION, SOLUTION INTRAMUSCULAR; INTRAVENOUS at 01:02

## 2021-05-17 RX ADMIN — PIPERACILLIN AND TAZOBACTAM 3.38 G: 3; .375 INJECTION, POWDER, LYOPHILIZED, FOR SOLUTION INTRAVENOUS at 06:31

## 2021-05-17 RX ADMIN — CEFAZOLIN SODIUM 2 G: 1 INJECTION, POWDER, FOR SOLUTION INTRAMUSCULAR; INTRAVENOUS at 17:59

## 2021-05-17 RX ADMIN — SODIUM CHLORIDE: 900 INJECTION, SOLUTION INTRAVENOUS at 15:41

## 2021-05-17 RX ADMIN — PROPOFOL 30 MG: 10 INJECTION, EMULSION INTRAVENOUS at 15:55

## 2021-05-17 RX ADMIN — Medication 10 ML: at 18:03

## 2021-05-17 RX ADMIN — PROPOFOL 30 MG: 10 INJECTION, EMULSION INTRAVENOUS at 15:48

## 2021-05-17 RX ADMIN — PROPOFOL 50 MG: 10 INJECTION, EMULSION INTRAVENOUS at 15:43

## 2021-05-17 RX ADMIN — PIPERACILLIN AND TAZOBACTAM 3.38 G: 3; .375 INJECTION, POWDER, LYOPHILIZED, FOR SOLUTION INTRAVENOUS at 18:12

## 2021-05-17 RX ADMIN — CEFAZOLIN 1 G: 330 INJECTION, POWDER, FOR SOLUTION INTRAMUSCULAR; INTRAVENOUS at 15:59

## 2021-05-17 RX ADMIN — Medication 10 ML: at 18:04

## 2021-05-17 RX ADMIN — LIDOCAINE HYDROCHLORIDE 60 MG: 20 INJECTION, SOLUTION EPIDURAL; INFILTRATION; INTRACAUDAL; PERINEURAL at 15:43

## 2021-05-17 RX ADMIN — BUMETANIDE 1 MG: 0.25 INJECTION INTRAMUSCULAR; INTRAVENOUS at 11:56

## 2021-05-17 RX ADMIN — ALBUMIN (HUMAN) 12.5 G: 0.25 INJECTION, SOLUTION INTRAVENOUS at 21:17

## 2021-05-17 RX ADMIN — SODIUM CHLORIDE 50 ML/HR: 9 INJECTION, SOLUTION INTRAVENOUS at 18:14

## 2021-05-17 RX ADMIN — PROPOFOL 30 MG: 10 INJECTION, EMULSION INTRAVENOUS at 15:45

## 2021-05-17 NOTE — PROGRESS NOTES
Home IV Antibiotic Orders     1. Diagnosis:  L4-5 Discitis/OM     MRSA Bacteremia    2. Routine Murali catheter care per protocol    3. Antibiotic:   IV Daptomycin 600 mg every 24 hours  4. Lab each Monday & Thursdays             CBC/diff/platelets             BMP             CRP, CK (every Mondays)             5.  Fax lab to Dr. Keena Han @ [phone]. 6.  Call Dr. Keena Han @ [phone] for WBC <4, PLT <100, and/or Creat > 1.5    7. Duration of therapy: 6/19/2021       Please call Dr. Keena Han @ [phone] before stopping therapy. 8.  Allergies: Allergies   Adhesive Tape-silicones Hives Not Specified  4/1/2009  Past Updates. .. Gabapentin Anxiety Not Specified Systemic 5/3/2021  Past Updates. .. Anxiety w/ hallucinations      Sulfa (sulfonamide Antibiotics) Swelling Not Specified  2/2/2012  Past Updates. .. Lips eyes          9.   Case management has been instructed to Call [phone] with name of [address] [place] [address] Way       Follow up with Dr. Keena Han in 2-3 weeks     Bell Queen NP

## 2021-05-17 NOTE — PERIOP NOTES
TRANSFER - IN REPORT:    Verbal report received from Theodora Mcwilliams rn(name) on [employer]  being received from [facility]) for ordered procedure      Report consisted of patients Situation, Background, Assessment and   Recommendations(SBAR). Information from the following report(s) SBAR, Kardex, Intake/Output, MAR and Procedure Verification was reviewed with the receiving nurse. Opportunity for questions and clarification was provided. Assessment completed upon patients arrival to unit and care assumed.

## 2021-05-17 NOTE — PROGRESS NOTES
ID Progress Note  2021    Subjective:     No new complaints  C/o feeling tired  Review of Systems:            Symptom Y/N Comments   Symptom Y/N Comments   Fever/Chills n      Chest Pain n      Poor Appetite  y     Edema        Cough n      Abdominal Pain        Sputum n      Joint Pain  y  lumbar spine    SOB/MASSEY n     Pruritis/Rash        Nausea/vomit n      Tolerating PT/OT        Diarrhea  n     Tolerating Diet        Constipation  n     Other           Could NOT obtain due to:       Objective:     Vitals:   Visit Vitals  BP (!) 156/79   Pulse 89   Temp 97.5 °F (36.4 °C)   Resp 18   Ht 5' 11\" (1.803 m)   Wt 64 kg (141 lb 1.6 oz)   SpO2 97%   BMI 19.68 kg/m²        Tmax:  Temp (24hrs), Av.8 °F (36.6 °C), Min:97.5 °F (36.4 °C), Max:98 °F (36.7 °C)      PHYSICAL EXAM:  General: Chronically ill appearing, WD, WN. Alert, uncooperative, no acute distress    EENT:  EOMI. Anicteric sclerae. MMM  Resp:  Clear in apex with decreased breath sounds at bases, no wheezing or rales. No accessory muscle use  CV:  Regular  rhythm,  No edema  GI:  Soft, Non distended, Non tender. +Bowel sounds  Neurologic:  Alert and oriented X self, place, normal speech,   Psych:   Fair insight. Not anxious nor agitated  Skin:  No rashes.   No jaundice, right stump; dressing dry and intact, incision approximated, no erythema, no drainage     Labs:   Lab Results   Component Value Date/Time    WBC 7.9 05/15/2021 02:53 AM    Hemoglobin (POC) 5.2 (LL) 2021 08:37 AM    Hemoglobin, POC 11.2 (L) 2014 11:05 PM    HGB 7.9 (L) 05/15/2021 02:53 AM    Hematocrit (POC) 26 (L) 10/09/2018 07:16 AM    Hematocrit, POC 33 (L) 2014 11:05 PM    HCT 25.9 (L) 05/15/2021 02:53 AM    PLATELET 179  02:53 AM    MCV 90.2 05/15/2021 02:53 AM     Lab Results   Component Value Date/Time    Sodium 139 2021 03:16 AM    Potassium 3.2 (L) 2021 03:16 AM    Chloride 109 (H) 2021 03:16 AM    CO2 23 2021 03:16 AM    Anion gap 7 05/17/2021 03:16 AM    Glucose 128 (H) 05/17/2021 03:16 AM    BUN 28 (H) 05/17/2021 03:16 AM    Creatinine 2.28 (H) 05/17/2021 03:16 AM    BUN/Creatinine ratio 12 05/17/2021 03:16 AM    GFR est AA 35 (L) 05/17/2021 03:16 AM    GFR est non-AA 29 (L) 05/17/2021 03:16 AM    Calcium 8.0 (L) 05/17/2021 03:16 AM    Bilirubin, total 0.3 05/02/2021 02:00 AM    Alk. phosphatase 234 (H) 05/02/2021 02:00 AM    Protein, total 7.2 05/02/2021 02:00 AM    Albumin 1.0 (L) 05/17/2021 03:16 AM    Globulin 6.0 (H) 05/02/2021 02:00 AM    A-G Ratio 0.2 (L) 05/02/2021 02:00 AM    ALT (SGPT) 13 05/02/2021 02:00 AM       CT of Chest, ABD/PEL (5/2); L4-5 findings most likely represent discitis-osteomyelitis. Soft tissue edema from hypoalbuminemia. Right upper lobe pneumonia. Bilateral lower lobe atelectasis vs pneumonia. Trace bilateral pleural effusions. ABX hx:   IV cefepime 5/3-5/8   PO Levo 5/9  PO clindamycin 5/1-5/5  IV zosyn 5/3, 5/12-  IV vancomycin 5/3-5/7  IV Daptomycin 5/7 -  IV Rifampin 5/10-    Assessment and Plan     HAP; completed first tmt on 5/9  - recurrent fever  5/12, afebrile overnight    Procal 2.5    CXR (5/12) New bibasilar interstitial and patchy airspace disease.     IV zosyn started on 5/12, continue with IV Zosyn until 5/18     Pt is in high risk of recurrent PNA and aspiration PNA. Encourage IS 10x/every hour when awake    L4-5 Discitis/OM   Bacteremia  S/p laminectomy C3-4 (2014)   - WBC 7 (s/p granix on 5/13) afebrile    TTE (5/5) no vegetation    Blood cx (5/1, 5/3, 5/4, 5/6, 5/7, 5/8) MRSA    Blood cx (5/9) no growth    Blood cx (5/10 & 5/12) no growth so far      MRI of lumbar spine (5/3) discitis/OM at L3-4 & L4-5, no evidence of epidural abscess or cord compression    Once again, repeat MRI of lumbar spine (5/10) revealed no epidural abscess    -> ortho recommends medical therapy at this time.      IV vancomycin changed to daptomycin on 5/7     Rifampin was d/c'd on 5/13 with concerns of leukopenia        Continue with IV Daptomycin, total 6 weeks from negative blood cx,(5/9). Last dose 6/1     Pt will need Murali catheter upon discharge. Please order the catheter placement when pt is close to be discharged. Discharge IV ABX order in place     CK 17->13 (5/14)    Leukopenia  - appreciate heme/onc input; received granix on 5/13    Anemia  - hgb 8.0 (5/13); primary team following    Bladder retention  -  Morrissey in place.  U/A (-)    severe protein-calorie malnutrition  - schedule to have PEG tube placement 5/17        Bell Pastrana NP

## 2021-05-17 NOTE — PROGRESS NOTES
Spiritual Care Assessment/Progress Note  [address]      NAME: Anabel Meza. MRN: [id number]  AGE: 77 y.o. SEX: male  Worship Affiliation: Muslim   Language: English     5/17/2021     Total Time (in minutes): 8     Spiritual Assessment begun in [facility] through conversation with:         [x]Patient        [] Family    [] Friend(s)        Reason for Consult: Initial/Spiritual assessment, patient floor     Spiritual beliefs: (Please include comment if needed)     [] Identifies with a jose juan tradition:         [] Supported by a jose juan community:            [] Claims no spiritual orientation:           [] Seeking spiritual identity:                [] Adheres to an individual form of spirituality:           [x] Not able to assess:                           Identified resources for coping:      [] Prayer                               [] Music                  [] Guided Imagery     [] Family/friends                 [] Pet visits     [] Devotional reading                         [x] Unknown     [] Other:                                               Interventions offered during this visit: (See comments for more details)                Plan of Care:     [] Support spiritual and/or cultural needs    [] Support AMD and/or advance care planning process      [] Support grieving process   [] Coordinate Rites and/or Rituals    [] Coordination with community clergy   [] No spiritual needs identified at this time   [] Detailed Plan of Care below (See Comments)  [] Make referral to Music Therapy  [] Make referral to Pet Therapy     [] Make referral to Addiction services  [] Make referral to [facility]  [] Make referral to Spiritual Care Partner  [] No future visits requested        [x] Follow up upon further referrals     Comments:  for initial visit. Pt was sleeping and did not wake to . Please contact [phone] [address] for further support.      [address] [employer], [place], [address] ([id number])

## 2021-05-17 NOTE — ROUTINE PROCESS

## 2021-05-17 NOTE — ANESTHESIA PREPROCEDURE EVALUATION
Anesthetic History   No history of anesthetic complications            Review of Systems / Medical History  Patient summary reviewed and pertinent labs reviewed    Pulmonary        Sleep apnea: No treatment    Asthma : well controlled    Comments: Pt has GABI - has been noncompliant with CPAP  Asthma - mild, uses albuterol several times per month   Neuro/Psych   Within defined limits           Cardiovascular    Hypertension          PAD and hyperlipidemia    Exercise tolerance: >4 METS  Comments: PVD - s/p BKA   GI/Hepatic/Renal     GERD    Renal disease: CRI      Comments: GERD - well controlled  Dysphagia Endo/Other    Diabetes: type 2    Arthritis and anemia (epo injections, last transfusion 3 weeks ago)     Other Findings   Comments: OA   H/o cervical fusion x 2    Severe, chronic Iron deficiency Anemia; last Hgb 5; received iron and Epogen infusion; rechecking H/H prior to Colonoscopy; patient asymptomatic; chronically low Hgb b/t 5-6           Physical Exam    Airway  Mallampati: II  TM Distance: 4 - 6 cm  Neck ROM: decreased range of motion   Mouth opening: Normal     Cardiovascular  Regular rate and rhythm,  S1 and S2 normal,  no murmur, click, rub, or gallop  Rhythm: regular  Rate: normal         Dental  No notable dental hx    Comments: Several missing teeth   Pulmonary  Breath sounds clear to auscultation               Abdominal  GI exam deferred       Other Findings            Anesthetic Plan    ASA: 3  Anesthesia type: MAC          Induction: Intravenous  Anesthetic plan and risks discussed with: Patient

## 2021-05-17 NOTE — PROGRESS NOTES
TRANSFER - OUT REPORT:    Verbal report given to Do RN (name) on Fatmatamicah Osullivanevaristo.  being transferred to 60 (unit) for routine post - op       Report consisted of patients Situation, Background, Assessment and   Recommendations(SBAR). Information from the following report(s) SBAR, Kardex and Procedure Summary was reviewed with the receiving nurse. Lines:   Venous Access Device powerline 06/19/20 Upper chest (subclavicular area, right (Active)       Peripheral IV 05/09/21 Anterior;Right Forearm (Active)   Site Assessment Clean, dry, & intact 05/17/21 1500   Phlebitis Assessment 0 05/17/21 1500   Infiltration Assessment 0 05/17/21 1500   Dressing Status Clean, dry, & intact 05/17/21 1500   Dressing Type Transparent 05/17/21 1500   Hub Color/Line Status Blue;Flushed;Patent 05/17/21 1500   Action Taken Open ports on tubing capped 05/17/21 1500   Alcohol Cap Used Yes 05/17/21 1500        Opportunity for questions and clarification was provided.

## 2021-05-17 NOTE — PROGRESS NOTES
Problem: Falls - Risk of  Goal: *Absence of Falls  Description: Document Onalee Gum Fall Risk and appropriate interventions in the flowsheet. Outcome: Progressing Towards Goal  Note: Fall Risk Interventions:       Mentation Interventions: Adequate sleep, hydration, pain control    Medication Interventions: Teach patient to arise slowly    Elimination Interventions: Call light in reach    History of Falls Interventions: Evaluate medications/consider consulting pharmacy, Consult care management for discharge planning         Problem: Patient Education: Go to Patient Education Activity  Goal: Patient/Family Education  Outcome: Progressing Towards Goal     Problem: Pressure Injury - Risk of  Goal: *Prevention of pressure injury  Description: Document Celestino Scale and appropriate interventions in the flowsheet. Outcome: Progressing Towards Goal  Note: Pressure Injury Interventions:  Sensory Interventions: Assess changes in LOC    Moisture Interventions: Absorbent underpads, Apply protective barrier, creams and emollients    Activity Interventions: Increase time out of bed, PT/OT evaluation    Mobility Interventions: Float heels, HOB 30 degrees or less, Pressure redistribution bed/mattress (bed type)    Nutrition Interventions: Document food/fluid/supplement intake, Discuss nutritional consult with provider    Friction and Shear Interventions: Apply protective barrier, creams and emollients, HOB 30 degrees or less, Lift sheet, Minimize layers                Problem: Patient Education: Go to Patient Education Activity  Goal: Patient/Family Education  Outcome: Progressing Towards Goal     Problem: Diabetes Self-Management  Goal: *Disease process and treatment process  Description: Define diabetes and identify own type of diabetes; list 3 options for treating diabetes.   Outcome: Progressing Towards Goal  Goal: *Incorporating nutritional management into lifestyle  Description: Describe effect of type, amount and timing of food on blood glucose; list 3 methods for planning meals. Outcome: Progressing Towards Goal  Goal: *Incorporating physical activity into lifestyle  Description: State effect of exercise on blood glucose levels. Outcome: Progressing Towards Goal  Goal: *Developing strategies to promote health/change behavior  Description: Define the ABC's of diabetes; identify appropriate screenings, schedule and personal plan for screenings. Outcome: Progressing Towards Goal  Goal: *Using medications safely  Description: State effect of diabetes medications on diabetes; name diabetes medication taking, action and side effects. Outcome: Progressing Towards Goal  Goal: *Monitoring blood glucose, interpreting and using results  Description: Identify recommended blood glucose targets  and personal targets. Outcome: Progressing Towards Goal  Goal: *Prevention, detection, treatment of acute complications  Description: List symptoms of hyper- and hypoglycemia; describe how to treat low blood sugar and actions for lowering  high blood glucose level. Outcome: Progressing Towards Goal  Goal: *Prevention, detection and treatment of chronic complications  Description: Define the natural course of diabetes and describe the relationship of blood glucose levels to long term complications of diabetes.   Outcome: Progressing Towards Goal  Goal: *Developing strategies to address psychosocial issues  Description: Describe feelings about living with diabetes; identify support needed and support network  Outcome: Progressing Towards Goal  Goal: *Insulin pump training  Outcome: Progressing Towards Goal  Goal: *Sick day guidelines  Outcome: Progressing Towards Goal  Goal: *Patient Specific Goal (EDIT GOAL, INSERT TEXT)  Outcome: Progressing Towards Goal     Problem: Patient Education: Go to Patient Education Activity  Goal: Patient/Family Education  Outcome: Progressing Towards Goal     Problem: Patient Education: Go to Patient Education Activity  Goal: Patient/Family Education  Outcome: Progressing Towards Goal     Problem: Patient Education: Go to Patient Education Activity  Goal: Patient/Family Education  Outcome: Progressing Towards Goal     Problem: Nutrition Deficit  Goal: *Optimize nutritional status  Outcome: Progressing Towards Goal     Problem: Breathing Pattern - Ineffective  Goal: *Absence of hypoxia  Outcome: Progressing Towards Goal  Goal: *Use of effective breathing techniques  Outcome: Progressing Towards Goal  Goal: *PALLIATIVE CARE:  Alleviation of Dyspnea  Outcome: Progressing Towards Goal     Problem: Patient Education: Go to Patient Education Activity  Goal: Patient/Family Education  Outcome: Progressing Towards Goal     Problem: Risk for Spread of Infection  Goal: Prevent transmission of infectious organism to others  Description: Prevent the transmission of infectious organisms to other patients, staff members, and visitors.   Outcome: Progressing Towards Goal     Problem: Patient Education:  Go to Education Activity  Goal: Patient/Family Education  Outcome: Progressing Towards Goal     Problem: Patient Education: Go to Patient Education Activity  Goal: Patient/Family Education  Outcome: Progressing Towards Goal     Problem: Infection - Risk of, Urinary Catheter-Associated Urinary Tract Infection  Goal: *Absence of infection signs and symptoms  Outcome: Progressing Towards Goal     Problem: Patient Education: Go to Patient Education Activity  Goal: Patient/Family Education  Outcome: Progressing Towards Goal

## 2021-05-17 NOTE — PROGRESS NOTES
Nephrology Progress Note  Shabnam David. Date of Admission : 5/1/2021    CC: Follow up for CKD       Assessment and Plan     CKD 4:  - 2/2 DM, HTN   - baseline Creatinine : 2.2 mg/dl at best.   - Cr has been as high as 2.6-3 mg/dl through out 2020  - Severe Hypoalbuminemia and resultant anasarca : ordered Bumex   - poor candidate for longterm dialysis due to chronic malnutrition and debilitated state   - Daily labs    Urinary retention:  - failed voiding trial again   - hoyos re-inserted 5/12  - continue flomax    Hypo K and Hypo Phos  :  - on  Neutra phos    Severe protein calorie Malnutrition   - PEG today     Anemia of CKD:  - iron studies ok  - cont MICHAEL 3 times per week  - hematology following    MRSA bacteremia:  - abx per ID  - TTE neg for vegetations  - on IV dapto+ oral rifampin     HTN:  - BP stable    DM2:  - on insulin    R AKA    L3-4, L4-5 discitis/osteomyelitis:  - on abx    Sacral wound       Interval History:  Seen and examined   Noted plans for PEG   He understands the need for better nutrition '  Cr stable   Edema improving        Current Medications: all current  Medications have been eviewed in EPIC  Review of Systems: Review of systems not obtained due to patient factors. Objective:  Vitals:    Vitals:    05/16/21 2049 05/17/21 0330 05/17/21 0631 05/17/21 0802   BP: (!) 165/82 (!) 156/79  (!) 167/84   Pulse: 86 89  92   Resp: 16 18  18   Temp: 98 °F (36.7 °C) 97.5 °F (36.4 °C)  97.5 °F (36.4 °C)   SpO2: 98% 97%  99%   Weight:   64 kg (141 lb 1.6 oz)    Height:         Intake and Output:  No intake/output data recorded.   05/15 1901 - 05/17 0700  In: 740 [P.O.:740]  Out: 1750 [Urine:1750]    Physical Examination:  General: Mild distress  Neck:  Supple, no mass  Resp:  Lungs CTA B/L, no wheezing , normal respiratory effort  CV:  RRR,  no murmur or rub,R AKA, no edema L  GI:  Soft, NT, + Bowel sounds, no hepatosplenomegaly  Neurologic:  confused  Skin:  No Rash   : hoyos +    []    High complexity decision making was performed  []    Patient is at high-risk of decompensation with multiple organ involvement    Lab Data Personally Reviewed: I have reviewed all the pertinent labs, microbiology data and radiology studies during assessment.     Recent Labs     05/17/21  0316 05/16/21  0230 05/15/21  0253    140 143   K 3.2* 3.6 3.0*   * 112* 112*   CO2 23 22 23   * 161* 116*   BUN 28* 34* 33*   CREA 2.28* 2.51* 2.66*   CA 8.0* 8.2* 8.7   PHOS 3.5 3.7 3.4   ALB 1.0* 0.9* 0.8*     Recent Labs     05/15/21  0253   WBC 7.9   HGB 7.9*   HCT 25.9*        Lab Results   Component Value Date/Time    Specimen Description: JOSE 12/04/2012 10:12 PM    Specimen Description: TOE 12/02/2012 05:03 PM    Specimen Description: TOE 12/02/2012 05:03 PM     Lab Results   Component Value Date/Time    Culture result: NO GROWTH 5 DAYS 05/12/2021 12:06 PM    Culture result: NO GROWTH 5 DAYS 05/10/2021 01:24 PM    Culture result: NO GROWTH 5 DAYS 05/09/2021 11:01 AM     Recent Results (from the past 24 hour(s))   GLUCOSE, POC    Collection Time: 05/16/21 10:59 AM   Result Value Ref Range    Glucose (POC) 136 (H) 65 - 117 mg/dL    Performed by [address] [place] [address], POC    Collection Time: 05/16/21  4:08 PM   Result Value Ref Range    Glucose (POC) 125 (H) 65 - 117 mg/dL    Performed by [address] [place] [address], POC    Collection Time: 05/16/21  9:22 PM   Result Value Ref Range    Glucose (POC) 160 (H) 65 - 117 mg/dL    Performed by Shaneka Newaygo    RENAL FUNCTION PANEL    Collection Time: 05/17/21  3:16 AM   Result Value Ref Range    Sodium 139 136 - 145 mmol/L    Potassium 3.2 (L) 3.5 - 5.1 mmol/L    Chloride 109 (H) 97 - 108 mmol/L    CO2 23 21 - 32 mmol/L    Anion gap 7 5 - 15 mmol/L    Glucose 128 (H) 65 - 100 mg/dL    BUN 28 (H) 6 - 20 MG/DL    Creatinine 2.28 (H) 0.70 - 1.30 MG/DL    BUN/Creatinine ratio 12 12 - 20      GFR est AA 35 (L) >60 ml/min/1.73m2    GFR est non-AA 29 (L) >60 ml/min/1.73m2    Calcium 8.0 (L) 8.5 - 10.1 MG/DL    Phosphorus 3.5 2.6 - 4.7 MG/DL    Albumin 1.0 (L) 3.5 - 5.0 g/dL   GLUCOSE, POC    Collection Time: 05/17/21  6:35 AM   Result Value Ref Range    Glucose (POC) 133 (H) 65 - 117 mg/dL    Performed by Mikhail Ocasio MD  [address]  Phone - [phone]   Fax - [phone]  [email or web address]

## 2021-05-17 NOTE — PROGRESS NOTES
Occupational Therapy  05/17/21    Chart reviewed, patient KAYLA for PEG placement. Will follow up tomorrow for OT treatment as able & appropriate.      Thank you,   Jamaica Andrews, OTWAYNE, OTR/L

## 2021-05-17 NOTE — PROGRESS NOTES
Problem: Falls - Risk of  Goal: *Absence of Falls  Description: Document Stephanie Campos Fall Risk and appropriate interventions in the flowsheet. Outcome: Progressing Towards Goal  Note: Fall Risk Interventions:       Mentation Interventions: Adequate sleep, hydration, pain control    Medication Interventions: Teach patient to arise slowly    Elimination Interventions: Call light in reach    History of Falls Interventions: Evaluate medications/consider consulting pharmacy         Problem: Patient Education: Go to Patient Education Activity  Goal: Patient/Family Education  Outcome: Progressing Towards Goal     Problem: Pressure Injury - Risk of  Goal: *Prevention of pressure injury  Description: Document Celestino Scale and appropriate interventions in the flowsheet. Outcome: Progressing Towards Goal  Note: Pressure Injury Interventions:  Sensory Interventions: Assess changes in LOC    Moisture Interventions: Absorbent underpads    Activity Interventions: Increase time out of bed    Mobility Interventions: Float heels, HOB 30 degrees or less    Nutrition Interventions: Document food/fluid/supplement intake, Discuss nutritional consult with provider, Offer support with meals,snacks and hydration    Friction and Shear Interventions: Apply protective barrier, creams and emollients, Lift sheet, Minimize layers                Problem: Patient Education: Go to Patient Education Activity  Goal: Patient/Family Education  Outcome: Progressing Towards Goal     Problem: Diabetes Self-Management  Goal: *Disease process and treatment process  Description: Define diabetes and identify own type of diabetes; list 3 options for treating diabetes. Outcome: Progressing Towards Goal  Goal: *Incorporating nutritional management into lifestyle  Description: Describe effect of type, amount and timing of food on blood glucose; list 3 methods for planning meals.   Outcome: Progressing Towards Goal  Goal: *Incorporating physical activity into lifestyle  Description: State effect of exercise on blood glucose levels. Outcome: Progressing Towards Goal  Goal: *Developing strategies to promote health/change behavior  Description: Define the ABC's of diabetes; identify appropriate screenings, schedule and personal plan for screenings. Outcome: Progressing Towards Goal  Goal: *Using medications safely  Description: State effect of diabetes medications on diabetes; name diabetes medication taking, action and side effects. Outcome: Progressing Towards Goal  Goal: *Monitoring blood glucose, interpreting and using results  Description: Identify recommended blood glucose targets  and personal targets. Outcome: Progressing Towards Goal  Goal: *Prevention, detection, treatment of acute complications  Description: List symptoms of hyper- and hypoglycemia; describe how to treat low blood sugar and actions for lowering  high blood glucose level. Outcome: Progressing Towards Goal  Goal: *Prevention, detection and treatment of chronic complications  Description: Define the natural course of diabetes and describe the relationship of blood glucose levels to long term complications of diabetes.   Outcome: Progressing Towards Goal  Goal: *Developing strategies to address psychosocial issues  Description: Describe feelings about living with diabetes; identify support needed and support network  Outcome: Progressing Towards Goal  Goal: *Insulin pump training  Outcome: Progressing Towards Goal  Goal: *Sick day guidelines  Outcome: Progressing Towards Goal  Goal: *Patient Specific Goal (EDIT GOAL, INSERT TEXT)  Outcome: Progressing Towards Goal     Problem: Patient Education: Go to Patient Education Activity  Goal: Patient/Family Education  Outcome: Progressing Towards Goal     Problem: Patient Education: Go to Patient Education Activity  Goal: Patient/Family Education  Outcome: Progressing Towards Goal     Problem: Patient Education: Go to Patient Education Activity  Goal: Patient/Family Education  Outcome: Progressing Towards Goal     Problem: Nutrition Deficit  Goal: *Optimize nutritional status  Outcome: Progressing Towards Goal     Problem: Breathing Pattern - Ineffective  Goal: *Absence of hypoxia  Outcome: Progressing Towards Goal  Goal: *Use of effective breathing techniques  Outcome: Progressing Towards Goal  Goal: *PALLIATIVE CARE:  Alleviation of Dyspnea  Outcome: Progressing Towards Goal     Problem: Patient Education: Go to Patient Education Activity  Goal: Patient/Family Education  Outcome: Progressing Towards Goal     Problem: Risk for Spread of Infection  Goal: Prevent transmission of infectious organism to others  Description: Prevent the transmission of infectious organisms to other patients, staff members, and visitors.   Outcome: Progressing Towards Goal     Problem: Patient Education:  Go to Education Activity  Goal: Patient/Family Education  Outcome: Progressing Towards Goal     Problem: Patient Education: Go to Patient Education Activity  Goal: Patient/Family Education  Outcome: Progressing Towards Goal     Problem: Infection - Risk of, Urinary Catheter-Associated Urinary Tract Infection  Goal: *Absence of infection signs and symptoms  Outcome: Progressing Towards Goal     Problem: Patient Education: Go to Patient Education Activity  Goal: Patient/Family Education  Outcome: Progressing Towards Goal

## 2021-05-17 NOTE — PROGRESS NOTES
Physical Therapy  5/17/2021    Chart reviewed. Attempted to see pt this afternoon, however pt reports he is to have procedure done son (PEG placement). He is declining activity at this time. PT to follow up tomorrow as able and appropriate.      Suzie Saleem, PTA

## 2021-05-17 NOTE — PROGRESS NOTES
Transition of Care Plan   RUR- 27%    DISPOSITION: The disposition plan is transition to a SNF; pending medical progression  o [facility] is following the pt   F/U with PCP/Specialist     Transport: [facility]     The pt is scheduled to have PEG tube placed today due to poor nutrition. Per, the attending the pt will need a [facility] catheter prior to d/c(No PICC)   Per, ID the pt will require 6 week daptomycin abx treatment following DC. Morrissey placed 5/12 due to failed void trial     This cm faxed updated clinicals to [facility], [phone]. This cm contacted the [facility] Admissions Liaison, Philipp Sin [phone] to provide an update on the pt's status.  A message was left        CM: Alexis Thakkar. MS,   [phone]

## 2021-05-17 NOTE — PROGRESS NOTES
[address] [facility] Adult  Hospitalist Group                                                                                          Hospitalist Progress Note  Miley Antoine MD  Answering service: [phone] from in house phone        Date of Service:  2021  NAME:  Roman Candelaria. :  1955  MRN:  [id number]      Admission Summary: This is a 24-year-old man with a past medical history significant for chronic kidney disease, type 2 diabetes, dyslipidemia, hypertension, obstructive sleep apnea, status post recent right above-knee amputation, degenerative disk disease, and chronic pain syndrome, was in his usual state of health until the day of presentation at the emergency room when it was reported that the patient became lethargic and confused at his rehab facility    Interval history / Subjective:      Patient is seen and examined. Plan for PEG today     On IV daptomycin with plan for 6 weeks. ID note suggest from 2021 to 2021 need [place] catheter before d/c   [place] catheter in place. Failed voiding trial        Assessment & Plan:     MRSA bacteremia - first cleared culture  (persistnet prior since )  L4-L5 Discitis and OM  Sacral wound   - Patient now on daptomycin, increased dose to 8-10mg/kg. Vanc ALESSANDRO is 4   - ID following   - Finally 48 hours of negative blood cultures. - TTE without obvious vegetations. Will need 6 weeks IV abx, so no need for AMELIA  - Will need Murali (no PICC per renal) once cultures cleared, would await at least 24 hours since fever. Closer to discharge  - Wound care following   - Repeat MRI lumbar spine 5/10 without abscess. - Rifampin added for synergy. Discontinued on  with concern of leukopenia  - Zosyn added by ID , empiric? For aspiration pneumonia? Follow ID recommendation. Leukopenia   - Heme consulted by ID  - Agree with drug induced vs. sepsis.  Previously to cefepime with recovery after DC  - Rifampin DC'd as noted above    Acute metabolic encephalopathy - multifactorial due to renal insufficiency, bacteremia, electrolyte abnormalities resolved   - Improving. Continue day and night cycles  - Avoid narcotics and sedatives if able  - MRI brain negative, ammonia normal     Anemia - chronic disease CKD   - monitor and transfuse if less than 7   - CBC daily   - EPO per nephrology     ESTRELLA on CKD stage III - pre renal and sepsis   - Cr slightly worse today, switch IVF to 0.45NS   - I and O avoid nephrotoxins   - BMP daily     S/p R BKA - recent  - Wound care following, no sign of local infection   - PT/OT    - Vascular consult for prosthesis recs    Hyponatremia - hypovolemic, resolved s/p IVF  HTN - holding lisinopril   Type 2 diabetes - A1c 7.2%  SSI, POCT glucose checks and hypoglycemia protocol   HLD - home statin   HCAP - completed therapy (Vanc and cefepime/levo)  Urinary retention - now failed x 2 void trials. Keep hoyos. Continue flomax     Need PEG for nutrition - plan for 5/17 Monday per GI     Code status: FULL  DVT prophylaxis: heparin     Care Plan discussed with: Patient/Family  Anticipated Disposition: [facility]/Rehab  Anticipated Discharge: Greater than 48 hours,  Needs Murali by IR for Abx. , PEG tube      Hospital Problems  Date Reviewed: 5/1/2021          Codes Class Noted POA    Severe protein-calorie malnutrition ([facility] [address] (Chronic) ICD-10-CM: R49  ICD-9-CM: 262  5/13/2021 Yes        * (Principal) Acute delirium ICD-10-CM: R41.0  ICD-9-CM: 780.09  5/1/2021 Yes                Review of Systems:   A comprehensive review of systems was negative except for that written in the HPI. Vital Signs:    Last 24hrs VS reviewed since prior progress note.  Most recent are:  Visit Vitals  BP (!) 167/84   Pulse 92   Temp 97.5 °F (36.4 °C)   Resp 18   Ht 5' 11\" (1.803 m)   Wt 64 kg (141 lb 1.6 oz)   SpO2 99%   BMI 19.68 kg/m²         Intake/Output Summary (Last 24 hours) at 5/17/2021 0995  Last data filed at 5/17/2021 0333  Gross per 24 hour   Intake 740 ml   Output 1050 ml   Net -310 ml        Physical Examination:     I had a face to face encounter with this patient and independently examined them on 5/17/2021 as outlined below:          Constitutional:  chronically ill appearing   ENT:  mmm   Resp:  CTA bilaterally. CV:  Regular rhythm, normal rate,    GI:  Soft, non distended, non tender. bs+    Musculoskeletal:  No edema, warm, 2+ pulses throughout    Neurologic:  Moves all extremities. AAOx3, CN II-XII reviewed, s/p R BKA wound c/d/i            Data Review:    Review and/or order of clinical lab test  Review and/or order of tests in the radiology section of CPT  Review and/or order of tests in the medicine section of CPT    Xr Chest Sngl V    Result Date: 5/1/2021  No acute process. Xr Spine Lumb 2 Or 3 V    Result Date: 5/1/2021  No acute abnormality. Degenerative disc and facet changes at L3-4 through L5-S1. Mri Brain Wo Cont    Result Date: 5/2/2021  Mild chronic microvascular ischemic disease. No acute infarct or mass effect. Mri Lumb Spine Wo Cont    Result Date: 5/10/2021  1. Again noted are changes suspicious for discitis at L3-L4 and L4-L5 levels with mild diffuse bulging of the disc and facet arthropathy resulting in moderate central stenosis with narrowing of neural foramina. 2. No epidural collection is identified. There is slight improvement in the edema within L4 vertebral body. 3. Marked disc space narrowing at L5-S1 is unchanged. 4. Diffuse anasarca is again noted. . Diffuse low signal throughout the visualized bone marrow is again noted consistent with metabolic bone disease most likely related to renal osteodystrophy. Mri Lumb Spine W Wo Cont    Result Date: 5/4/2021  1. Edema and abnormal bright signal in moderately narrowed disks at L3-4 and L4-5 worrisome for discitis osteomyelitis.  Amyloid arthropathy, gout, or more typical spondylosis with also be within the differential. 2. Trace edema along the L5-S1 endplates with severe disc space narrowing is likely degenerative. 3. Spondylosis in the lower lumbar spine as above. Ct Head Wo Cont    Result Date: 5/1/2021  No acute intracranial abnormality. Ct Chest Wo Cont    Result Date: 5/2/2021  1. L4-5 findings most likely represent discitis-osteomyelitis. 2. Soft tissue edema from hypoalbuminemia. 3. Right upper lobe pneumonia. Bilateral lower lobe atelectasis versus pneumonia. Trace bilateral pleural effusions. Ct Abd Pelv Wo Cont    Result Date: 5/2/2021  1. L4-5 findings most likely represent discitis-osteomyelitis. 2. Soft tissue edema from hypoalbuminemia. 3. Right upper lobe pneumonia. Bilateral lower lobe atelectasis versus pneumonia. Trace bilateral pleural effusions. Xr Chest Port    Result Date: 5/12/2021  New bibasilar interstitial and patchy airspace disease. Labs:     Recent Labs     05/15/21  0253   WBC 7.9   HGB 7.9*   HCT 25.9*        Recent Labs     05/17/21  0316 05/16/21  0230 05/15/21  0253    140 143   K 3.2* 3.6 3.0*   * 112* 112*   CO2 23 22 23   BUN 28* 34* 33*   CREA 2.28* 2.51* 2.66*   * 161* 116*   CA 8.0* 8.2* 8.7   PHOS 3.5 3.7 3.4     Recent Labs     05/17/21 0316 05/16/21  0230 05/15/21  0253   ALB 1.0* 0.9* 0.8*     No results for input(s): INR, PTP, APTT, INREXT, INREXT in the last 72 hours. No results for input(s): FE, TIBC, PSAT, FERR in the last 72 hours. Lab Results   Component Value Date/Time    Folate 7.1 05/02/2021 02:00 AM      No results for input(s): PH, PCO2, PO2 in the last 72 hours. No results for input(s): CPK, CKNDX, TROIQ in the last 72 hours.     No lab exists for component: CPKMB  Lab Results   Component Value Date/Time    Cholesterol, total 239 (H) 03/30/2020 02:32 PM    HDL Cholesterol 40 03/30/2020 02:32 PM    LDL,Direct 120 (H) 09/11/2012 12:00 AM    LDL, calculated 130 (H) 03/30/2020 02:32 PM    Triglyceride 345 (H) 03/30/2020 02:32 PM     Lab Results   Component Value Date/Time    Glucose (POC) 133 (H) 05/17/2021 06:35 AM    Glucose (POC) 160 (H) 05/16/2021 09:22 PM    Glucose (POC) 125 (H) 05/16/2021 04:08 PM    Glucose (POC) 136 (H) 05/16/2021 10:59 AM    Glucose (POC) 171 (H) 05/16/2021 06:19 AM     Lab Results   Component Value Date/Time    Color YELLOW/STRAW 05/09/2021 02:51 PM    Appearance CLEAR 05/09/2021 02:51 PM    Specific gravity 1.017 05/09/2021 02:51 PM    Specific gravity 1.016 02/19/2016 08:00 PM    pH (UA) 6.0 05/09/2021 02:51 PM    Protein 100 (A) 05/09/2021 02:51 PM    Glucose Negative 05/09/2021 02:51 PM    Ketone TRACE (A) 05/09/2021 02:51 PM    Bilirubin Negative 05/09/2021 02:51 PM    Urobilinogen 0.2 05/09/2021 02:51 PM    Nitrites Negative 05/09/2021 02:51 PM    Leukocyte Esterase Negative 05/09/2021 02:51 PM    Epithelial cells FEW 05/09/2021 02:51 PM    Bacteria 1+ (A) 05/09/2021 02:51 PM    WBC 0-4 05/09/2021 02:51 PM    RBC 0-5 05/09/2021 02:51 PM         Medications Reviewed:     Current Facility-Administered Medications   Medication Dose Route Frequency    albumin human 25% (BUMINATE) solution 12.5 g  12.5 g IntraVENous Q6H    bumetanide (BUMEX) injection 1 mg  1 mg IntraVENous DAILY    potassium bicarb-citric acid (EFFER-K) tablet 40 mEq  40 mEq Oral DAILY    sodium bicarbonate tablet 325 mg  325 mg Oral BID    thiamine HCL (B-1) tablet 100 mg  100 mg Oral DAILY    0.9% sodium chloride infusion 250 mL  250 mL IntraVENous PRN    therapeutic multivitamin (THERAGRAN) tablet 1 Tab  1 Tab Oral DAILY    piperacillin-tazobactam (ZOSYN) 3.375 g in 0.9% sodium chloride (MBP/ADV) 100 mL MBP  3.375 g IntraVENous Q8H    DAPTOmycin (CUBICIN) 600 mg in 0.9% sodium chloride 50 mL IVPB RF formulation  600 mg IntraVENous Q48H    fentaNYL citrate (PF) injection 50 mcg  50 mcg IntraVENous Q3H PRN    senna-docusate (PERICOLACE) 8.6-50 mg per tablet 1 Tab  1 Tab Oral DAILY    tamsulosin (FLOMAX) capsule 0.4 mg  0.4 mg Oral DAILY    cyclobenzaprine (FLEXERIL) tablet 5 mg  5 mg Oral TID PRN    oxyCODONE IR (ROXICODONE) tablet 15 mg  15 mg Oral Q4H PRN    0.9% sodium chloride infusion 250 mL  250 mL IntraVENous PRN    epoetin naman-epbx (RETACRIT) injection 20,000 Units  20,000 Units SubCUTAneous Q MON, WED & FRI    albuterol-ipratropium (DUO-NEB) 2.5 MG-0.5 MG/3 ML  3 mL Nebulization Q4H PRN    aspirin chewable tablet 81 mg  81 mg Oral DAILY    ergocalciferol capsule 50,000 Units  50,000 Units Oral every Friday    ferrous sulfate tablet 325 mg  1 Tab Oral BID WITH MEALS    pantoprazole (PROTONIX) tablet 40 mg  40 mg Oral ACB    sodium chloride (NS) flush 5-40 mL  5-40 mL IntraVENous Q8H    sodium chloride (NS) flush 5-40 mL  5-40 mL IntraVENous PRN    acetaminophen (TYLENOL) tablet 650 mg  650 mg Oral Q6H PRN    Or    acetaminophen (TYLENOL) suppository 650 mg  650 mg Rectal Q6H PRN    polyethylene glycol (MIRALAX) packet 17 g  17 g Oral DAILY PRN    promethazine (PHENERGAN) tablet 12.5 mg  12.5 mg Oral Q6H PRN    Or    ondansetron (ZOFRAN) injection 4 mg  4 mg IntraVENous Q6H PRN    [Held by provider] heparin (porcine) injection 5,000 Units  5,000 Units SubCUTAneous Q8H    insulin lispro (HUMALOG) injection   SubCUTAneous AC&HS    glucose chewable tablet 16 g  4 Tab Oral PRN    dextrose (D50W) injection syrg 12.5-25 g  12.5-25 g IntraVENous PRN    glucagon (GLUCAGEN) injection 1 mg  1 mg IntraMUSCular PRN     ______________________________________________________________________  EXPECTED LENGTH OF STAY: 4d 7h  ACTUAL LENGTH OF STAY:          16                 Terry Edward MD

## 2021-05-17 NOTE — PROCEDURES
[address]  [address] [facility], [address] [place] [address]                      :  Dinesh Alcazar MD    Referring Provider: Emily Keenan MD    Sedation:  MAC anesthesia Propofol        Prior to the procedure its objectives, risks, consequences and alternatives were discussed with the patient who then elected to proceed. The patient had the opportunity to ask questions and those questions were answered. A physical exam was performed. The heart, lungs, and mental status were examined prior to the procedure and found to be satisfactory for conscious sedation and for the procedure. Conscious sedation was initiated by the physician. Continuous pulse oximetry and blood pressure monitoring were used throughout the procedure. After appropriate pharyngeal anesthesia, the endoscope was passed into the esophagus without difficulty. The proximal esophagus is normal as is the distal esophagus. The fundus,  pylorus, bulb and postbulbar area are unremarkable. Patchy erythema and On slow withdrawal of the scope, the stomach was transilluminated into the abdominal wall. Under sterile conditions and 1% Xylocaine anesthesia, a small incision was made in the abdominal wall. A needle was passed through the incision, and under direct vision into the stomach. A wire was passed through the needle, snared and brought out the mouth. The PEG tube was passed over the wire and brought out the abdominal wall without difficulty. The scope was then reinserted and the positioning of the PEG tube was excellent. He tolerated the procedure without complication and will return to He room in satisfactory condition. Specimen Removed:  gastric biopsy    Complications: None. EBL:  None. Recommendations: 1. May use PEG tube for meds/water flushes today     2. Start PEG feedings in AM per nutritional recommendations     3.  Elevate head end to 30 degrees during feedings    Dinesh Alcazar MD  5/17/2021  4:10 PM

## 2021-05-18 ENCOUNTER — HOSPITAL ENCOUNTER (OUTPATIENT)
Dept: INTERVENTIONAL RADIOLOGY/VASCULAR | Age: 66
Discharge: HOME | DRG: 551 | End: 2021-05-18
Attending: HOSPITALIST
Payer: MEDICARE

## 2021-05-18 VITALS
OXYGEN SATURATION: 100 % | SYSTOLIC BLOOD PRESSURE: 142 MMHG | RESPIRATION RATE: 14 BRPM | HEART RATE: 85 BPM | DIASTOLIC BLOOD PRESSURE: 80 MMHG

## 2021-05-18 LAB
ANION GAP SERPL CALC-SCNC: 6 MMOL/L (ref 5–15)
BASOPHILS # BLD: 0.1 K/UL (ref 0–0.1)
BASOPHILS NFR BLD: 1 % (ref 0–1)
BUN SERPL-MCNC: 23 MG/DL (ref 6–20)
BUN/CREAT SERPL: 12 (ref 12–20)
CALCIUM SERPL-MCNC: 7.7 MG/DL (ref 8.5–10.1)
CHLORIDE SERPL-SCNC: 108 MMOL/L (ref 97–108)
CO2 SERPL-SCNC: 26 MMOL/L (ref 21–32)
CREAT SERPL-MCNC: 1.99 MG/DL (ref 0.7–1.3)
DIFFERENTIAL METHOD BLD: ABNORMAL
EOSINOPHIL # BLD: 0.1 K/UL (ref 0–0.4)
EOSINOPHIL NFR BLD: 1 % (ref 0–7)
ERYTHROCYTE [DISTWIDTH] IN BLOOD BY AUTOMATED COUNT: 18.1 % (ref 11.5–14.5)
GLUCOSE BLD STRIP.AUTO-MCNC: 140 MG/DL (ref 65–117)
GLUCOSE BLD STRIP.AUTO-MCNC: 78 MG/DL (ref 65–117)
GLUCOSE BLD STRIP.AUTO-MCNC: 94 MG/DL (ref 65–117)
GLUCOSE SERPL-MCNC: 73 MG/DL (ref 65–100)
HCT VFR BLD AUTO: 29.5 % (ref 36.6–50.3)
HGB BLD-MCNC: 8.7 G/DL (ref 12.1–17)
IMM GRANULOCYTES # BLD AUTO: 0.2 K/UL (ref 0–0.04)
IMM GRANULOCYTES NFR BLD AUTO: 2 % (ref 0–0.5)
LYMPHOCYTES # BLD: 1.5 K/UL (ref 0.8–3.5)
LYMPHOCYTES NFR BLD: 13 % (ref 12–49)
MAGNESIUM SERPL-MCNC: 2 MG/DL (ref 1.6–2.4)
MCH RBC QN AUTO: 27.1 PG (ref 26–34)
MCHC RBC AUTO-ENTMCNC: 29.5 G/DL (ref 30–36.5)
MCV RBC AUTO: 91.9 FL (ref 80–99)
MONOCYTES # BLD: 0.6 K/UL (ref 0–1)
MONOCYTES NFR BLD: 5 % (ref 5–13)
NEUTS SEG # BLD: 9 K/UL (ref 1.8–8)
NEUTS SEG NFR BLD: 78 % (ref 32–75)
NRBC # BLD: 0.03 K/UL (ref 0–0.01)
NRBC BLD-RTO: 0.3 PER 100 WBC
PHOSPHATE SERPL-MCNC: 3.6 MG/DL (ref 2.6–4.7)
PLATELET # BLD AUTO: 310 K/UL (ref 150–400)
PMV BLD AUTO: 9.3 FL (ref 8.9–12.9)
POTASSIUM SERPL-SCNC: 3 MMOL/L (ref 3.5–5.1)
RBC # BLD AUTO: 3.21 M/UL (ref 4.1–5.7)
SERVICE CMNT-IMP: ABNORMAL
SERVICE CMNT-IMP: NORMAL
SERVICE CMNT-IMP: NORMAL
SODIUM SERPL-SCNC: 140 MMOL/L (ref 136–145)
WBC # BLD AUTO: 11.5 K/UL (ref 4.1–11.1)

## 2021-05-18 PROCEDURE — 74011000250 HC RX REV CODE- 250: Performed by: INTERNAL MEDICINE

## 2021-05-18 PROCEDURE — 85025 COMPLETE CBC W/AUTO DIFF WBC: CPT

## 2021-05-18 PROCEDURE — 74011250636 HC RX REV CODE- 250/636: Performed by: RADIOLOGY

## 2021-05-18 PROCEDURE — 74011250636 HC RX REV CODE- 250/636: Performed by: INTERNAL MEDICINE

## 2021-05-18 PROCEDURE — 74011250637 HC RX REV CODE- 250/637: Performed by: HOSPITALIST

## 2021-05-18 PROCEDURE — 74011250637 HC RX REV CODE- 250/637: Performed by: INTERNAL MEDICINE

## 2021-05-18 PROCEDURE — 74011250636 HC RX REV CODE- 250/636: Performed by: NURSE PRACTITIONER

## 2021-05-18 PROCEDURE — 77030011229 HC DIL VESL COON COOK -A

## 2021-05-18 PROCEDURE — C1769 GUIDE WIRE: HCPCS

## 2021-05-18 PROCEDURE — 2709999900 HC NON-CHARGEABLE SUPPLY

## 2021-05-18 PROCEDURE — C1751 CATH, INF, PER/CENT/MIDLINE: HCPCS

## 2021-05-18 PROCEDURE — 74011000258 HC RX REV CODE- 258: Performed by: NURSE PRACTITIONER

## 2021-05-18 PROCEDURE — 74011636637 HC RX REV CODE- 636/637: Performed by: INTERNAL MEDICINE

## 2021-05-18 PROCEDURE — 83735 ASSAY OF MAGNESIUM: CPT

## 2021-05-18 PROCEDURE — 74011000258 HC RX REV CODE- 258: Performed by: INTERNAL MEDICINE

## 2021-05-18 PROCEDURE — 77030010507 HC ADH SKN DERMBND J&J -B

## 2021-05-18 PROCEDURE — 0JH63XZ INSERTION OF TUNNELED VASCULAR ACCESS DEVICE INTO CHEST SUBCUTANEOUS TISSUE AND FASCIA, PERCUTANEOUS APPROACH: ICD-10-PCS | Performed by: RADIOLOGY

## 2021-05-18 PROCEDURE — 02HV33Z INSERTION OF INFUSION DEVICE INTO SUPERIOR VENA CAVA, PERCUTANEOUS APPROACH: ICD-10-PCS | Performed by: RADIOLOGY

## 2021-05-18 PROCEDURE — 77030002996 HC SUT SLK J&J -A

## 2021-05-18 PROCEDURE — 80048 BASIC METABOLIC PNL TOTAL CA: CPT

## 2021-05-18 PROCEDURE — P9047 ALBUMIN (HUMAN), 25%, 50ML: HCPCS | Performed by: INTERNAL MEDICINE

## 2021-05-18 PROCEDURE — 84100 ASSAY OF PHOSPHORUS: CPT

## 2021-05-18 PROCEDURE — 74011000250 HC RX REV CODE- 250: Performed by: RADIOLOGY

## 2021-05-18 PROCEDURE — 65270000032 HC RM SEMIPRIVATE

## 2021-05-18 PROCEDURE — 76942 ECHO GUIDE FOR BIOPSY: CPT

## 2021-05-18 PROCEDURE — 82962 GLUCOSE BLOOD TEST: CPT

## 2021-05-18 PROCEDURE — 36415 COLL VENOUS BLD VENIPUNCTURE: CPT

## 2021-05-18 RX ORDER — LIDOCAINE HYDROCHLORIDE 20 MG/ML
20 INJECTION, SOLUTION INFILTRATION; PERINEURAL
Status: COMPLETED | OUTPATIENT
Start: 2021-05-18 | End: 2021-05-18

## 2021-05-18 RX ORDER — MIDAZOLAM HYDROCHLORIDE 1 MG/ML
5 INJECTION, SOLUTION INTRAMUSCULAR; INTRAVENOUS
Status: DISCONTINUED | OUTPATIENT
Start: 2021-05-18 | End: 2021-05-18

## 2021-05-18 RX ORDER — HEPARIN 100 UNIT/ML
300 SYRINGE INTRAVENOUS AS NEEDED
Status: DISCONTINUED | OUTPATIENT
Start: 2021-05-18 | End: 2021-05-18

## 2021-05-18 RX ORDER — SODIUM CHLORIDE 9 MG/ML
25 INJECTION, SOLUTION INTRAVENOUS
Status: COMPLETED | OUTPATIENT
Start: 2021-05-18 | End: 2021-05-18

## 2021-05-18 RX ORDER — FENTANYL CITRATE 50 UG/ML
100 INJECTION, SOLUTION INTRAMUSCULAR; INTRAVENOUS
Status: DISCONTINUED | OUTPATIENT
Start: 2021-05-18 | End: 2021-05-18

## 2021-05-18 RX ORDER — AMLODIPINE BESYLATE 5 MG/1
5 TABLET ORAL DAILY
Status: DISCONTINUED | OUTPATIENT
Start: 2021-05-18 | End: 2021-05-20 | Stop reason: HOSPADM

## 2021-05-18 RX ADMIN — SODIUM CHLORIDE 25 ML/HR: 9 INJECTION, SOLUTION INTRAVENOUS at 10:45

## 2021-05-18 RX ADMIN — Medication 10 ML: at 14:31

## 2021-05-18 RX ADMIN — ALBUMIN (HUMAN) 12.5 G: 0.25 INJECTION, SOLUTION INTRAVENOUS at 13:42

## 2021-05-18 RX ADMIN — FENTANYL CITRATE 50 MCG: 50 INJECTION, SOLUTION INTRAMUSCULAR; INTRAVENOUS at 10:46

## 2021-05-18 RX ADMIN — TAMSULOSIN HYDROCHLORIDE 0.4 MG: 0.4 CAPSULE ORAL at 14:24

## 2021-05-18 RX ADMIN — POTASSIUM BICARBONATE 40 MEQ: 782 TABLET, EFFERVESCENT ORAL at 14:23

## 2021-05-18 RX ADMIN — DAPTOMYCIN 600 MG: 500 INJECTION, POWDER, LYOPHILIZED, FOR SOLUTION INTRAVENOUS at 19:28

## 2021-05-18 RX ADMIN — FENTANYL CITRATE 50 MCG: 50 INJECTION, SOLUTION INTRAMUSCULAR; INTRAVENOUS at 23:51

## 2021-05-18 RX ADMIN — SODIUM BICARBONATE 325 MG: 650 TABLET ORAL at 17:37

## 2021-05-18 RX ADMIN — INSULIN LISPRO 2 UNITS: 100 INJECTION, SOLUTION INTRAVENOUS; SUBCUTANEOUS at 08:34

## 2021-05-18 RX ADMIN — THERA TABS 1 TABLET: TAB at 14:26

## 2021-05-18 RX ADMIN — Medication 100 MG: at 14:53

## 2021-05-18 RX ADMIN — DOCUSATE SODIUM 50 MG AND SENNOSIDES 8.6 MG 1 TABLET: 8.6; 5 TABLET, FILM COATED ORAL at 14:23

## 2021-05-18 RX ADMIN — FENTANYL CITRATE 50 MCG: 50 INJECTION, SOLUTION INTRAMUSCULAR; INTRAVENOUS at 13:42

## 2021-05-18 RX ADMIN — LIDOCAINE HYDROCHLORIDE 400 MG: 20 INJECTION, SOLUTION INFILTRATION; PERINEURAL at 10:46

## 2021-05-18 RX ADMIN — PIPERACILLIN AND TAZOBACTAM 3.38 G: 3; .375 INJECTION, POWDER, LYOPHILIZED, FOR SOLUTION INTRAVENOUS at 05:04

## 2021-05-18 RX ADMIN — Medication 10 ML: at 08:35

## 2021-05-18 RX ADMIN — Medication 10 ML: at 23:49

## 2021-05-18 RX ADMIN — FERROUS SULFATE TAB 325 MG (65 MG ELEMENTAL FE) 325 MG: 325 (65 FE) TAB at 17:36

## 2021-05-18 RX ADMIN — BUMETANIDE 1 MG: 0.25 INJECTION INTRAMUSCULAR; INTRAVENOUS at 14:21

## 2021-05-18 RX ADMIN — MIDAZOLAM HYDROCHLORIDE 1 MG: 1 INJECTION, SOLUTION INTRAMUSCULAR; INTRAVENOUS at 10:46

## 2021-05-18 RX ADMIN — AMLODIPINE BESYLATE 5 MG: 5 TABLET ORAL at 13:42

## 2021-05-18 RX ADMIN — ASPIRIN 81 MG: 81 TABLET, CHEWABLE ORAL at 14:21

## 2021-05-18 RX ADMIN — Medication 300 UNITS: at 11:04

## 2021-05-18 RX ADMIN — ALBUMIN (HUMAN) 12.5 G: 0.25 INJECTION, SOLUTION INTRAVENOUS at 04:57

## 2021-05-18 NOTE — ANESTHESIA POSTPROCEDURE EVALUATION
Post-Anesthesia Evaluation and Assessment    Patient: Na Gallegos MRN: [id number]  SSN: [id number]    YOB: 1955  Age: 77 y.o. Sex: male      I have evaluated the patient and they are stable and ready for discharge from the PACU. Cardiovascular Function/Vital Signs  Visit Vitals  BP (!) 176/86 (BP 1 Location: Left upper arm, BP Patient Position: At rest)   Pulse 85   Temp (!) 35.9 °C (96.7 °F)   Resp 16   Ht 5' 11\" (1.803 m)   Wt 61.4 kg (135 lb 4.8 oz)   SpO2 99%   BMI 18.87 kg/m²       Patient is status post MAC anesthesia for Procedure(s):  PERCUTANEOUS ENDOSCOPIC GASTROSTOMY TUBE INSERTION  ESOPHAGOGASTRODUODENOSCOPY (EGD)  ESOPHAGOGASTRODUODENAL (EGD) BIOPSY. Nausea/Vomiting: None    Postoperative hydration reviewed and adequate. Pain:  Pain Scale 1: Numeric (0 - 10) (05/17/21 2346)  Pain Intensity 1: 8 (05/17/21 2346)   Managed    Neurological Status:   Neuro  Neurologic State: Alert (05/17/21 2116)  Orientation Level: Oriented X4 (05/17/21 2116)  Cognition: Follows commands (05/16/21 2100)  Speech: Clear (05/16/21 2100)  Assessment L Pupil: Brisk (05/01/21 2254)  Size L Pupil (mm): 3 (05/01/21 2254)  Assessment R Pupil: Brisk (05/01/21 2254)  Size R Pupil (mm): 3 (05/01/21 2254)  LUE Motor Response: Weak (05/17/21 0853)  LLE Motor Response: Weak (05/17/21 0853)  RUE Motor Response: Weak (05/17/21 0853)  RLE Motor Response: Weak;Other(comment)(AKA) (05/17/21 0853)   At baseline    Mental Status, Level of Consciousness: Alert and  oriented to person, place, and time    Pulmonary Status:   O2 Device: None (Room air) (05/17/21 1645)   Adequate oxygenation and airway patent    Complications related to anesthesia: None    Post-anesthesia assessment completed.  No concerns    Signed By: Juanito Thomson MD     May 18, 2021              Procedure(s):  PERCUTANEOUS ENDOSCOPIC GASTROSTOMY TUBE INSERTION  ESOPHAGOGASTRODUODENOSCOPY (EGD)  ESOPHAGOGASTRODUODENAL (EGD) BIOPSY.     MAC    <BSHSIANPOST>    INITIAL Post-op Vital signs:   Vitals Value Taken Time   /86 05/18/21 0231   Temp 35.9 °C (96.7 °F) 05/18/21 0231   Pulse 85 05/18/21 0231   Resp 16 05/18/21 0231   SpO2 99 % 05/18/21 0231

## 2021-05-18 NOTE — PROGRESS NOTES
Chart reviewed, patient off the floor for a procedure. Will f/u tomorrow for weekly re-evaluation. Recommend with nursing, ADLs with supervision/setup, logan to chair 1x/day and toileting via bedpan. Thank you for completing as able in order to maintain patient strength, endurance and independence.

## 2021-05-18 NOTE — PROGRESS NOTES
TRANSFER - OUT REPORT:    Verbal report given to Francisca Esqueda RN(name) on [employer].  being transferred to 608(unit) for routine progression of care       Report consisted of patients Situation, Background, Assessment and   Recommendations(SBAR). Information from the following report(s) SBAR, Procedure Summary and MAR was reviewed with the receiving nurse. Lines:   Venous Access Device dual lumen with cuff (\"Murali\") CVC 05/18/21 Upper chest (subclavicular area, right (Active)       Peripheral IV 05/09/21 Anterior;Right Forearm (Active)   Site Assessment Clean, dry, & intact 05/17/21 2117   Phlebitis Assessment 0 05/17/21 2117   Infiltration Assessment 0 05/17/21 2117   Dressing Status Clean, dry, & intact 05/17/21 2117   Dressing Type Transparent 05/17/21 2117   Hub Color/Line Status Blue;Flushed 05/17/21 2117   Action Taken Open ports on tubing capped 05/17/21 2117   Alcohol Cap Used Yes 05/17/21 2117        Opportunity for questions and clarification was provided.

## 2021-05-18 NOTE — PROGRESS NOTES
[address] [facility] Adult  Hospitalist Group                                                                                          Hospitalist Progress Note  Des Alfred MD  Answering service: [phone] from in house phone        Date of Service:  2021  NAME:  Jass Oshea. :  1955  MRN:  [id number]      Admission Summary: This is a 57-year-old man with a past medical history significant for chronic kidney disease, type 2 diabetes, dyslipidemia, hypertension, obstructive sleep apnea, status post recent right above-knee amputation, degenerative disk disease, and chronic pain syndrome, was in his usual state of health until the day of presentation at the emergency room when it was reported that the patient became lethargic and confused at his rehab facility    Interval history / Subjective:      Patient is seen and examined. Status post insertion of PEG tube continue tube feeding as per nutrition services  Patient will get a ANNAMARIA [facility] and catheter will be placed by IR  Patient will need 6 weeks of IV daptomycin  ID note suggest from 2021 to 2021 need [facility] [place] Feet catheter before d/c   Morrissey catheter in place. Failed voiding trial    D/w CM ready for d/c in 24 hrs     Assessment & Plan:     MRSA bacteremia - first cleared culture  (persistnet prior since )  L4-L5 Discitis and OM  Sacral wound   - Patient now on daptomycin, increased dose to 8-10mg/kg. - ID following   - TTE without obvious vegetations. Will need 6 weeks IV abx, so no need for AMELIA  - Will need Murali (no PICC per renal)   - Wound care following   - Repeat MRI lumbar spine 5/10 without abscess. - Rifampin added for synergy. Discontinued on  with concern of leukopenia  - Zosyn added by ID , empiric? For aspiration pneumonia? Follow ID recommendation. 7 days done will d/c     Leukopenia   - Heme consulted by ID  - Agree with drug induced vs. Sepsis, . resolved     Acute metabolic encephalopathy - multifactorial due to renal insufficiency, bacteremia, electrolyte abnormalities resolved   - Improving. Continue day and night cycles  - Avoid narcotics and sedatives if able  - MRI brain negative, ammonia normal     Anemia - chronic disease CKD   - monitor and transfuse if less than 7   - EPO per nephrology     ESTRELLA on CKD stage III - pre renal and sepsis   - Cr 2.28 stable    - I and O avoid nephrotoxins   - BMP daily     S/p R BKA - recent  - Wound care following, no sign of local infection   - PT/OT    - Vascular consult for prosthesis recs    Hyponatremia - resolved   HTN - holding lisinopril may use CCB   Type 2 diabetes - A1c 7.2%  SSI, POCT glucose checks and hypoglycemia protocol   HLD - home statin   HCAP - completed therapy (Vanc and cefepime/levo)  Urinary retention - now failed x 2 void trials. Keep hoyos. Continue flomax     PEG - nutrition consulted for MGMT of TF   ROSEANNE cathetr to be placed today     Code status: FULL  DVT prophylaxis: heparin     Care Plan discussed with: Patient/Family  Anticipated Disposition: [facility]/Rehab  Anticipated Discharge: ready for d/c to [facility] Problems  Date Reviewed: 5/1/2021          Codes Class Noted POA    Severe protein-calorie malnutrition ([facility] [address] (Chronic) ICD-10-CM: H51  ICD-9-CM: 333  5/13/2021 Yes        * (Principal) Acute delirium ICD-10-CM: R41.0  ICD-9-CM: 780.09  5/1/2021 Yes                Review of Systems:   A comprehensive review of systems was negative except for that written in the HPI. Vital Signs:    Last 24hrs VS reviewed since prior progress note.  Most recent are:  Visit Vitals  BP (!) 164/85   Pulse 87   Temp 97.9 °F (36.6 °C)   Resp 16   Ht 5' 11\" (1.803 m)   Wt 61.4 kg (135 lb 4.8 oz)   SpO2 98%   BMI 18.87 kg/m²         Intake/Output Summary (Last 24 hours) at 5/18/2021 1030  Last data filed at 5/18/2021 0916  Gross per 24 hour   Intake --   Output 1400 ml   Net -1400 ml        Physical Examination:     I had a face to face encounter with this patient and independently examined them on 5/18/2021 as outlined below:          Constitutional:  chronically ill appearing   ENT:  mmm   Resp:  CTA bilaterally. CV:  Regular rhythm, normal rate,    GI:  Soft, non distended, non tender. bs+ PEG +     Musculoskeletal:  No edema, warm, 2+ pulses throughout    Neurologic:  Moves all extremities. AAOx3, CN II-XII reviewed, s/p R BKA wound c/d/i            Data Review:    Review and/or order of clinical lab test  Review and/or order of tests in the radiology section of CPT  Review and/or order of tests in the medicine section of CPT    No results found. Labs:     No results for input(s): WBC, HGB, HCT, PLT, HGBEXT, HCTEXT, PLTEXT, HGBEXT, HCTEXT, PLTEXT in the last 72 hours. Recent Labs     05/17/21  0316 05/16/21  0230    140   K 3.2* 3.6   * 112*   CO2 23 22   BUN 28* 34*   CREA 2.28* 2.51*   * 161*   CA 8.0* 8.2*   PHOS 3.5 3.7     Recent Labs     05/17/21  0316 05/16/21  0230   ALB 1.0* 0.9*     No results for input(s): INR, PTP, APTT, INREXT, INREXT in the last 72 hours. No results for input(s): FE, TIBC, PSAT, FERR in the last 72 hours. Lab Results   Component Value Date/Time    Folate 7.1 05/02/2021 02:00 AM      No results for input(s): PH, PCO2, PO2 in the last 72 hours. No results for input(s): CPK, CKNDX, TROIQ in the last 72 hours.     No lab exists for component: CPKMB  Lab Results   Component Value Date/Time    Cholesterol, total 239 (H) 03/30/2020 02:32 PM    HDL Cholesterol 40 03/30/2020 02:32 PM    LDL,Direct 120 (H) 09/11/2012 12:00 AM    LDL, calculated 130 (H) 03/30/2020 02:32 PM    Triglyceride 345 (H) 03/30/2020 02:32 PM     Lab Results   Component Value Date/Time    Glucose (POC) 140 (H) 05/18/2021 07:06 AM    Glucose (POC) 106 05/17/2021 09:14 PM    Glucose (POC) 94 05/17/2021 05:38 PM    Glucose (POC) 119 (H) 05/17/2021 11:25 AM    Glucose (POC) 133 (H) 05/17/2021 06:35 AM     Lab Results Component Value Date/Time    Color YELLOW/STRAW 05/09/2021 02:51 PM    Appearance CLEAR 05/09/2021 02:51 PM    Specific gravity 1.017 05/09/2021 02:51 PM    Specific gravity 1.016 02/19/2016 08:00 PM    pH (UA) 6.0 05/09/2021 02:51 PM    Protein 100 (A) 05/09/2021 02:51 PM    Glucose Negative 05/09/2021 02:51 PM    Ketone TRACE (A) 05/09/2021 02:51 PM    Bilirubin Negative 05/09/2021 02:51 PM    Urobilinogen 0.2 05/09/2021 02:51 PM    Nitrites Negative 05/09/2021 02:51 PM    Leukocyte Esterase Negative 05/09/2021 02:51 PM    Epithelial cells FEW 05/09/2021 02:51 PM    Bacteria 1+ (A) 05/09/2021 02:51 PM    WBC 0-4 05/09/2021 02:51 PM    RBC 0-5 05/09/2021 02:51 PM         Medications Reviewed:     Current Facility-Administered Medications   Medication Dose Route Frequency    midazolam (VERSED) injection 5 mg  5 mg IntraVENous Rad Multiple    fentaNYL citrate (PF) injection 100 mcg  100 mcg IntraVENous Rad Multiple    0.9% sodium chloride infusion  25 mL/hr IntraVENous RAD ONCE    albumin human 25% (BUMINATE) solution 12.5 g  12.5 g IntraVENous Q6H    bumetanide (BUMEX) injection 1 mg  1 mg IntraVENous DAILY    potassium bicarb-citric acid (EFFER-K) tablet 40 mEq  40 mEq Oral DAILY    sodium bicarbonate tablet 325 mg  325 mg Oral BID    sodium chloride (NS) flush 5-40 mL  5-40 mL IntraVENous Q8H    sodium chloride (NS) flush 5-40 mL  5-40 mL IntraVENous PRN    thiamine HCL (B-1) tablet 100 mg  100 mg Oral DAILY    0.9% sodium chloride infusion 250 mL  250 mL IntraVENous PRN    therapeutic multivitamin (THERAGRAN) tablet 1 Tab  1 Tab Oral DAILY    piperacillin-tazobactam (ZOSYN) 3.375 g in 0.9% sodium chloride (MBP/ADV) 100 mL MBP  3.375 g IntraVENous Q8H    DAPTOmycin (CUBICIN) 600 mg in 0.9% sodium chloride 50 mL IVPB RF formulation  600 mg IntraVENous Q48H    fentaNYL citrate (PF) injection 50 mcg  50 mcg IntraVENous Q3H PRN    senna-docusate (PERICOLACE) 8.6-50 mg per tablet 1 Tab  1 Tab Oral DAILY    tamsulosin (FLOMAX) capsule 0.4 mg  0.4 mg Oral DAILY    cyclobenzaprine (FLEXERIL) tablet 5 mg  5 mg Oral TID PRN    oxyCODONE IR (ROXICODONE) tablet 15 mg  15 mg Oral Q4H PRN    0.9% sodium chloride infusion 250 mL  250 mL IntraVENous PRN    epoetin naman-epbx (RETACRIT) injection 20,000 Units  20,000 Units SubCUTAneous Q MON, WED & FRI    albuterol-ipratropium (DUO-NEB) 2.5 MG-0.5 MG/3 ML  3 mL Nebulization Q4H PRN    aspirin chewable tablet 81 mg  81 mg Oral DAILY    ergocalciferol capsule 50,000 Units  50,000 Units Oral every Friday    ferrous sulfate tablet 325 mg  1 Tab Oral BID WITH MEALS    pantoprazole (PROTONIX) tablet 40 mg  40 mg Oral ACB    sodium chloride (NS) flush 5-40 mL  5-40 mL IntraVENous Q8H    sodium chloride (NS) flush 5-40 mL  5-40 mL IntraVENous PRN    acetaminophen (TYLENOL) tablet 650 mg  650 mg Oral Q6H PRN    Or    acetaminophen (TYLENOL) suppository 650 mg  650 mg Rectal Q6H PRN    polyethylene glycol (MIRALAX) packet 17 g  17 g Oral DAILY PRN    promethazine (PHENERGAN) tablet 12.5 mg  12.5 mg Oral Q6H PRN    Or    ondansetron (ZOFRAN) injection 4 mg  4 mg IntraVENous Q6H PRN    [Held by provider] heparin (porcine) injection 5,000 Units  5,000 Units SubCUTAneous Q8H    insulin lispro (HUMALOG) injection   SubCUTAneous AC&HS    glucose chewable tablet 16 g  4 Tab Oral PRN    dextrose (D50W) injection syrg 12.5-25 g  12.5-25 g IntraVENous PRN    glucagon (GLUCAGEN) injection 1 mg  1 mg IntraMUSCular PRN     Facility-Administered Medications Ordered in Other Encounters   Medication Dose Route Frequency    lidocaine (XYLOCAINE) 20 mg/mL (2 %) injection 400 mg  20 mL SubCUTAneous RAD ONCE    heparin (porcine) pf 300 Units  300 Units InterCATHeter PRN     ______________________________________________________________________  EXPECTED LENGTH OF STAY: 4d 7h  ACTUAL LENGTH OF STAY:          Mingo Rosenthal MD

## 2021-05-18 NOTE — PROGRESS NOTES
[address]   [address] [facility] [address], [address]  [phone]                GI PROGRESS NOTE      NAME:   Alesia Jimenez. :    1955   MRN:    [id number]     Subjective:     Pt PATTIE during morning rounding. Discussed with nurse:  No problems reported overnight with his PEG site. He is in IR at this time getting a line placed prior to discharge. The PEG tube was flushed without problems. Still awaiting dietician input for TF/formula recommendations. Objective:     VITALS:   Last 24hrs VS reviewed since prior hospitalist progress note. Most recent are:  Visit Vitals  BP (!) 164/85   Pulse 87   Temp 97.9 °F (36.6 °C)   Resp 16   Ht 5' 11\" (1.803 m)   Wt 61.4 kg (135 lb 4.8 oz)   SpO2 98%   BMI 18.87 kg/m²       Intake/Output Summary (Last 24 hours) at 2021 1029  Last data filed at 2021 0916  Gross per 24 hour   Intake --   Output 1400 ml   Net -1400 ml        PHYSICAL EXAM:      Lab Data Reviewed     Medications: Reviewed    Assessment/Plan     Malnutrition  - PEG placed 21 by Dr. Sonia Varma  - Can start TFs today, awaiting dietician input  - GI signing off, please call if we are needed again during this admission. I have examined the patient. I have reviewed the chart and agree with the documentation recorded by the NP, including the assessment, treatment plan, and disposition.       Jeanne Pollack MD

## 2021-05-18 NOTE — WOUND CARE
WOCN Note:     Follow-up visit for sacral wound  Assessed in Room: 608  Incontinent of stool/ cleaned / hoyos in. Resting on a Prius Bed  Refusing turning on side. Lays with a flat pillow on side of each back: with sacrum resting on bed / ON pressure point. Explained this is not: turning off bottom. Writer explained,  \" You are laying on your bottom with no pressure relief. \" A flat pillow on side of back is NOT turning off bottom. Assessment:   Patient is A&O x 3 , communicative, incontinent and not mobile. 2 assist to turn properly which patient refuses. Patient wearing briefs for incontinence and  has a Hoyos. Bed: PRIUS  Patient reports no pain when turned properly and off bottom, he claims that is painful. Patient repositioned flat in bed with pillows running along side. pillow under left leg to float heel. Sacral wound: worsened: patient refusing proper turning. Wound was: 3.5cm x 5.0cm x 0.0cm NOW 9.0cm x 8.5cm x 0.1cm / unstageable / POA / 30% scattered pink / 30% eschar and 40% slough. Recommendations:    - Hydrocolloid ( large) to sacrum and change every 3 days    Minimize layers of linen/pads under patient to optimize support surface. Turn/reposition approximately every 2 hours and offload heels. Manage incontinence / promote continence; Calmazine: orange tube). to buttocks and sacrum daily and as needed with incontinence care. Specialty bed: [facility]  Discussed above plan with patient USAMA Neal.     Transition of Care: Plan to follow weekly and*as needed while admitted to hospital.     Vijaya WEST RN  Wound Care Department  Office: [phone]  Pager: [phone]

## 2021-05-18 NOTE — CONSULTS
Nutrition Note    Consult received for tube feed management. Noted plans for d/c to SNF tomorrow. Dietitian recommendations were put in Friday. Pt is a very high refeeding risk. He should have labs for today which I requested this AM, but off at procedure, so not yet completed. K+ was not corrected yesterday (ordered PO, but held d/t NPO status) and lytes should be normal/ corrected prior to starting nutrition support. Awaiting labs for today. Will place order for trickle feeds to start once labs are resulted and has orders for correction if low. As stated Friday, will likely take a while to get him to goal d/t severe malnutrition and refeeding - which it why recommended DHT feeds over weekend. He has been NPO since Sunday night d/t PEG Monday and diet was not resumed afterwards. Currently remains NPO although he can take a PO diet of dental soft/ thin liquids. He has not received thiamine for past 2 days. Once above, Recommend Glucerna 1.2 @ no more than 15 mL/hr for at least first 24 hours. Provides 396 kcal, or ~7 kcal/kg. Pt should receive no more than 5-20 kcal/kg for first 3-4 days. Given severity of his malnutrition and already low lytes, favor starting on lower end of calorie goals and slowly advancing to goal rate q 24 hours. For now, Goal TF will be 5 cartons of Glucerna 1.2 @ 55 mL/hr with 50 mL H2O q 4 hours to provide 1185 mL, 1422 kcal, 71 gm pro, 960+932=4131 mL free H2O. This provides 75% kcal and 84% pro needs respectively. Recommend eventual switch to nocturnal feeds to promote PO intake during the day. In this case, would switch to 4 cans of Glucerna 1.5 @ 80 mL/hr x 12 hours with 50 mL H2O q 4 hours while tube feeds running. This provides 948 mL, 1422 kcal, 78 gm pro, 720+074=355 mL free H2O. Obviously goals can/ will change with respective PO intake and weight trends.   Currently aiming for at least 75% of needs so long as pt is taking some PO intake/supps during the day. RD will continue to follow along closely. Addendum @ 1600 - RN stated pt had questions about tube feeds. I met with pt in room. Questions asked/answered. Pt tells me that RN recently told him he was probably lactose intolerant/ not tolerating the Ensure because he had 4 bouts of diarrhea in 1 day over the weekend. I asked him if this was the 1st time he experience diarrhea as he has been consuming Ensure most of this admission and he did not report any of those issues to me when I saw him last week. He stated he had not previously had issues with the Ensure. I explained that there can be several reasons he is experiencing loose stools/ diarrhea, but I suspect it is likely from his prolonged use of IV abx. I think this is worth documenting now as tube feeds were just hung and prior to tube feeds starting, he was already experiencing loose stool/ diarrhea. He agreed that abx can cause diarrhea and verbalized understanding. Also to note, today's labs returned and K=3. Has been given 40 mg Effer-K. Phos WNL.       Recent Labs     05/17/21  0316 05/16/21  0230   * 161*   BUN 28* 34*   CREA 2.28* 2.51*    140   K 3.2* 3.6   * 112*   CO2 23 22   CA 8.0* 8.2*   PHOS 3.5 3.7       Recent Labs     05/18/21  0706 05/17/21  2114 05/17/21  1738 05/17/21  1125 05/17/21  0635 05/16/21  2122 05/16/21  1608 05/16/21  1059 05/16/21  0619 05/15/21  2116 05/15/21  1600   GLUCPOC 140* 106 94 119* 133* 160* 125* 136* 171* 188* 96       Estimated Daily Nutrient Needs:  Energy (kcal):  4140-4117(35-40 kcal/kg)  Protein (g):  (1.5-2 gm/kg)       Fluid (ml/day):  1 mL/kcal      Luis Felipe Carreon RD  Available via [employer]

## 2021-05-18 NOTE — PROGRESS NOTES
Nephrology Progress Note  Evelio Escamilla. Date of Admission : 5/1/2021    CC: Follow up for CKD       Assessment and Plan     CKD 4:  - 2/2 DM, HTN   - Cr at baseline  - cont current diuretics  - poor candidate for longterm dialysis due to chronic malnutrition and debilitated state   - Daily labs    Urinary retention:  - failed voiding trial again   - hoyos re-inserted 5/12  - continue flomax    Hypo K and Hypo Phos  :  - on  Neutra phos and oral KCL liquid    Severe protein calorie Malnutrition   - PEG placed 5/17    Anemia of CKD:  - iron studies ok  - cont MICHAEL 3 times per week  - hematology following    MRSA bacteremia:  - abx per ID  - TTE neg for vegetations  - on IV dapto+ oral rifampin     HTN:  - BP stable    DM2:  - on insulin    R AKA    L3-4, L4-5 discitis/osteomyelitis:  - on abx    Sacral wound       Interval History:  Seen and examined. Cr stable, K low. S/p Murali catheter placement this AM.  No cp, sob, n/v/d. Current Medications: all current  Medications have been eviewed in EPIC  Review of Systems: Review of systems not obtained due to patient factors.     Objective:  Vitals:    Vitals:    05/17/21 2019 05/18/21 0231 05/18/21 0647 05/18/21 0817   BP: (!) 157/82 (!) 176/86  (!) 164/85   Pulse: 94 85  87   Resp: 16 16 16   Temp: 97.6 °F (36.4 °C) (!) 96.7 °F (35.9 °C)  97.9 °F (36.6 °C)   SpO2: 98% 99%  98%   Weight:   61.4 kg (135 lb 4.8 oz)    Height:         Intake and Output:  05/18 0701 - 05/18 1900  In: -   Out: 1124 [[id number]]  05/16 1901 - 05/18 0700  In: -   Out: 550 [Urine:550]    Physical Examination:  General: Mild distress  Neck:  Supple, no mass  Resp:  Lungs CTA B/L, no wheezing , normal respiratory effort  CV:  RRR,  no murmur or rub,R AKA, no edema L  GI:  Soft, NT, + Bowel sounds, no hepatosplenomegaly  Neurologic:  confused  Skin:  No Rash   : hoyos +    []    High complexity decision making was performed  []    Patient is at high-risk of decompensation with multiple organ involvement    Lab Data Personally Reviewed: I have reviewed all the pertinent labs, microbiology data and radiology studies during assessment. Recent Labs     05/17/21  0316 05/16/21  0230    140   K 3.2* 3.6   * 112*   CO2 23 22   * 161*   BUN 28* 34*   CREA 2.28* 2.51*   CA 8.0* 8.2*   PHOS 3.5 3.7   ALB 1.0* 0.9*     No results for input(s): WBC, HGB, HCT, PLT, HGBEXT, HCTEXT, PLTEXT, HGBEXT, HCTEXT, PLTEXT in the last 72 hours. Lab Results   Component Value Date/Time    Specimen Description: JOSE 12/04/2012 10:12 PM    Specimen Description: TOE 12/02/2012 05:03 PM    Specimen Description: TOE 12/02/2012 05:03 PM     Lab Results   Component Value Date/Time    Culture result: NO GROWTH 5 DAYS 05/12/2021 12:06 PM    Culture result: NO GROWTH 5 DAYS 05/10/2021 01:24 PM    Culture result: NO GROWTH 5 DAYS 05/09/2021 11:01 AM     Recent Results (from the past 24 hour(s))   GLUCOSE, POC    Collection Time: 05/17/21  5:38 PM   Result Value Ref Range    Glucose (POC) 94 65 - 117 mg/dL    Performed by EDUARDAJESUS Graham  PCT    GLUCOSE, POC    Collection Time: 05/17/21  9:14 PM   Result Value Ref Range    Glucose (POC) 106 65 - 117 mg/dL    Performed by [facility] Cent[facility]  PCT    GLUCOSE, POC    Collection Time: 05/18/21  7:06 AM   Result Value Ref Range    Glucose (POC) 140 (H) 65 - 117 mg/dL    Performed by Shreya Walker MD  [facility]   [address] [address]  Phone - [phone]   Fax - [phone]  [email or web address]

## 2021-05-18 NOTE — PROGRESS NOTES
Physical Therapy  Patient off the floor this am for procedure. Will follow up later today or tomorrow.   Joey Raymond, PT

## 2021-05-18 NOTE — PROGRESS NOTES
ID Progress Note  2021    Subjective:     C/o feeling tired    Review of Systems:            Symptom Y/N Comments   Symptom Y/N Comments   Fever/Chills n      Chest Pain n      Poor Appetite  y     Edema        Cough n      Abdominal Pain        Sputum n      Joint Pain  y  lumbar spine    SOB/MASSEY n     Pruritis/Rash        Nausea/vomit n      Tolerating PT/OT        Diarrhea  n     Tolerating Diet        Constipation  n     Other           Could NOT obtain due to:       Objective:     Vitals:   Visit Vitals  BP (!) 176/86 (BP 1 Location: Left upper arm, BP Patient Position: At rest)   Pulse 85   Temp (!) 96.7 °F (35.9 °C)   Resp 16   Ht 5' 11\" (1.803 m)   Wt 61.4 kg (135 lb 4.8 oz)   SpO2 99%   BMI 18.87 kg/m²        Tmax:  Temp (24hrs), Av.7 °F (36.5 °C), Min:96.7 °F (35.9 °C), Max:98.9 °F (37.2 °C)      PHYSICAL EXAM:  General: Chronically ill appearing, WD, WN. Alert, uncooperative, no acute distress    EENT:  EOMI. Anicteric sclerae. MMM  Resp:  Clear in apex with decreased breath sounds at bases, no wheezing or rales. No accessory muscle use  CV:  Regular  rhythm,  No edema  GI:  Soft, Non distended, Non tender. +Bowel sounds, PEG tube in place  Neurologic:  Alert and oriented X self, place, normal speech,   Psych:   Fair insight. Not anxious nor agitated  Skin:  No rashes.   No jaundice, right stump; dressing dry and intact, incision approximated, no erythema, no drainage     Labs:   Lab Results   Component Value Date/Time    WBC 7.9 05/15/2021 02:53 AM    Hemoglobin (POC) 5.2 (LL) 2021 08:37 AM    Hemoglobin, POC 11.2 (L) 2014 11:05 PM    HGB 7.9 (L) 05/15/2021 02:53 AM    Hematocrit (POC) 26 (L) 10/09/2018 07:16 AM    Hematocrit, POC 33 (L) 2014 11:05 PM    HCT 25.9 (L) 05/15/2021 02:53 AM    PLATELET 053  02:53 AM    MCV 90.2 05/15/2021 02:53 AM     Lab Results   Component Value Date/Time    Sodium 139 2021 03:16 AM    Potassium 3.2 (L) 2021 03:16 AM Chloride 109 (H) 05/17/2021 03:16 AM    CO2 23 05/17/2021 03:16 AM    Anion gap 7 05/17/2021 03:16 AM    Glucose 128 (H) 05/17/2021 03:16 AM    BUN 28 (H) 05/17/2021 03:16 AM    Creatinine 2.28 (H) 05/17/2021 03:16 AM    BUN/Creatinine ratio 12 05/17/2021 03:16 AM    GFR est AA 35 (L) 05/17/2021 03:16 AM    GFR est non-AA 29 (L) 05/17/2021 03:16 AM    Calcium 8.0 (L) 05/17/2021 03:16 AM    Bilirubin, total 0.3 05/02/2021 02:00 AM    Alk. phosphatase 234 (H) 05/02/2021 02:00 AM    Protein, total 7.2 05/02/2021 02:00 AM    Albumin 1.0 (L) 05/17/2021 03:16 AM    Globulin 6.0 (H) 05/02/2021 02:00 AM    A-G Ratio 0.2 (L) 05/02/2021 02:00 AM    ALT (SGPT) 13 05/02/2021 02:00 AM       CT of Chest, ABD/PEL (5/2); L4-5 findings most likely represent discitis-osteomyelitis. Soft tissue edema from hypoalbuminemia. Right upper lobe pneumonia. Bilateral lower lobe atelectasis vs pneumonia. Trace bilateral pleural effusions. ABX hx:   IV cefepime 5/3-5/8   PO Levo 5/9  PO clindamycin 5/1-5/5  IV zosyn 5/3, 5/12-  IV vancomycin 5/3-5/7  IV Daptomycin 5/7 -  IV Rifampin 5/10-    Assessment and Plan     HAP; completed first tmt on 5/9  - recurrent fever  5/12, afebrile overnight    Procal 2.5    CXR (5/12) New bibasilar interstitial and patchy airspace disease.    completing second tmt for HAP with IV Zosyn as of 5/18     Pt is in high risk of recurrent PNA and aspiration PNA.  Elevate HOB to 30 degree during feeding     Encourage IS 10x/every hour when awake    L4-5 Discitis/OM   Bacteremia  S/p laminectomy C3-4 (2014)   - WBC 7 (s/p granix on 5/13) afebrile    TTE (5/5) no vegetation    Blood cx (5/1, 5/3, 5/4, 5/6, 5/7, 5/8) MRSA    Blood cx (5/9) no growth    Blood cx (5/10 & 5/12) no growth so far      MRI of lumbar spine (5/3) discitis/OM at L3-4 & L4-5, no evidence of epidural abscess or cord compression    Once again, repeat MRI of lumbar spine (5/10) revealed no epidural abscess    -> ortho recommends medical therapy at this time. IV vancomycin changed to daptomycin on 5/7     Rifampin was d/c'd on 5/13 with concerns of leukopenia        Continue with IV Daptomycin, total 6 weeks from negative blood cx,(5/9). Last dose 6/19/2021     Pt will need Murali catheter 5/18     Discharge IV ABX order in place 5/17     CK 17->13 (5/14)    Leukopenia  - appreciate heme/onc input; received granix on 5/13    Anemia  - hgb 8.0 (5/13); primary team following    Bladder retention  -  Morrissey in place. U/A (-)    severe protein-calorie malnutrition  - s/p PEG tube placement 5/17    Pt is clear to discharge in ID stand point.     Bell Chaudhari, NP

## 2021-05-18 NOTE — PROGRESS NOTES
Transition of Care Plan   RUR- Low  /  Medium / High Risks    DISPOSITION: The disposition plan is transition to a SNF; pending medical progression  o Referrals pending. ..    F/U with PCP/Specialist     [facility]: [facility]; 2:00pm 5/19 Due, to long distance [facility] requires 24hr notice. Referrals submitted to [facility] and [facility], per the wife's request. The pt has been accepted to [facility]; awaiting a response from [facility]. This cm met with the pt at bedside to review [facility]. The pt stated that he does not have a preference in facility, but he desires to be closer to home. The pt encouraged the cm to do an open search and select the best option. The pt expressed that he wants to get better so that he can return home, to his family. Transition of Care Plan: SNF    The Plan for Transition of Care is related to the following treatment goals: Open search     The Patient and/or patient representative  was provided with a choice of provider and agrees  with the discharge plan. Yes [x] No []    A Freedom of choice list was provided with basic dialogue that supports the patient's individualized plan of care/goals and shares the quality data associated with the providers.        Yes [x] No []      CM: [address] [place],   [phone]

## 2021-05-18 NOTE — PROGRESS NOTES
Problem: Falls - Risk of  Goal: *Absence of Falls  Description: Document Eusebio Horan Fall Risk and appropriate interventions in the flowsheet. Outcome: Progressing Towards Goal  Note: Fall Risk Interventions:       Mentation Interventions: Adequate sleep, hydration, pain control    Medication Interventions: Patient to call before getting OOB, Evaluate medications/consider consulting pharmacy    Elimination Interventions: Call light in reach    History of Falls Interventions: Evaluate medications/consider consulting pharmacy         Problem: Patient Education: Go to Patient Education Activity  Goal: Patient/Family Education  Outcome: Progressing Towards Goal     Problem: Pressure Injury - Risk of  Goal: *Prevention of pressure injury  Description: Document Celestino Scale and appropriate interventions in the flowsheet. Outcome: Progressing Towards Goal  Note: Pressure Injury Interventions:  Sensory Interventions: Assess changes in LOC    Moisture Interventions: Absorbent underpads    Activity Interventions: Increase time out of bed    Mobility Interventions: Float heels, HOB 30 degrees or less    Nutrition Interventions: Document food/fluid/supplement intake    Friction and Shear Interventions: Apply protective barrier, creams and emollients                Problem: Patient Education: Go to Patient Education Activity  Goal: Patient/Family Education  Outcome: Progressing Towards Goal     Problem: Diabetes Self-Management  Goal: *Disease process and treatment process  Description: Define diabetes and identify own type of diabetes; list 3 options for treating diabetes. Outcome: Progressing Towards Goal  Goal: *Incorporating nutritional management into lifestyle  Description: Describe effect of type, amount and timing of food on blood glucose; list 3 methods for planning meals.   Outcome: Progressing Towards Goal  Goal: *Incorporating physical activity into lifestyle  Description: State effect of exercise on blood glucose levels. Outcome: Progressing Towards Goal  Goal: *Developing strategies to promote health/change behavior  Description: Define the ABC's of diabetes; identify appropriate screenings, schedule and personal plan for screenings. Outcome: Progressing Towards Goal  Goal: *Using medications safely  Description: State effect of diabetes medications on diabetes; name diabetes medication taking, action and side effects. Outcome: Progressing Towards Goal  Goal: *Monitoring blood glucose, interpreting and using results  Description: Identify recommended blood glucose targets  and personal targets. Outcome: Progressing Towards Goal  Goal: *Prevention, detection, treatment of acute complications  Description: List symptoms of hyper- and hypoglycemia; describe how to treat low blood sugar and actions for lowering  high blood glucose level. Outcome: Progressing Towards Goal  Goal: *Prevention, detection and treatment of chronic complications  Description: Define the natural course of diabetes and describe the relationship of blood glucose levels to long term complications of diabetes.   Outcome: Progressing Towards Goal  Goal: *Developing strategies to address psychosocial issues  Description: Describe feelings about living with diabetes; identify support needed and support network  Outcome: Progressing Towards Goal  Goal: *Insulin pump training  Outcome: Progressing Towards Goal  Goal: *Sick day guidelines  Outcome: Progressing Towards Goal  Goal: *Patient Specific Goal (EDIT GOAL, INSERT TEXT)  Outcome: Progressing Towards Goal     Problem: Patient Education: Go to Patient Education Activity  Goal: Patient/Family Education  Outcome: Progressing Towards Goal     Problem: Patient Education: Go to Patient Education Activity  Goal: Patient/Family Education  Outcome: Progressing Towards Goal     Problem: Patient Education: Go to Patient Education Activity  Goal: Patient/Family Education  Outcome: Progressing Towards Goal Problem: Nutrition Deficit  Goal: *Optimize nutritional status  Outcome: Progressing Towards Goal     Problem: Breathing Pattern - Ineffective  Goal: *Absence of hypoxia  Outcome: Progressing Towards Goal  Goal: *Use of effective breathing techniques  Outcome: Progressing Towards Goal  Goal: *PALLIATIVE CARE:  Alleviation of Dyspnea  Outcome: Progressing Towards Goal     Problem: Patient Education: Go to Patient Education Activity  Goal: Patient/Family Education  Outcome: Progressing Towards Goal     Problem: Risk for Spread of Infection  Goal: Prevent transmission of infectious organism to others  Description: Prevent the transmission of infectious organisms to other patients, staff members, and visitors.   Outcome: Progressing Towards Goal     Problem: Patient Education:  Go to Education Activity  Goal: Patient/Family Education  Outcome: Progressing Towards Goal     Problem: Patient Education: Go to Patient Education Activity  Goal: Patient/Family Education  Outcome: Progressing Towards Goal     Problem: Infection - Risk of, Urinary Catheter-Associated Urinary Tract Infection  Goal: *Absence of infection signs and symptoms  Outcome: Progressing Towards Goal     Problem: Patient Education: Go to Patient Education Activity  Goal: Patient/Family Education  Outcome: Progressing Towards Goal

## 2021-05-19 ENCOUNTER — DOCUMENTATION ONLY (OUTPATIENT)
Dept: ONCOLOGY | Age: 66
End: 2021-05-19

## 2021-05-19 LAB
ALBUMIN SERPL-MCNC: 1.3 G/DL (ref 3.5–5)
ANION GAP SERPL CALC-SCNC: 10 MMOL/L (ref 5–15)
ANION GAP SERPL CALC-SCNC: 10 MMOL/L (ref 5–15)
BUN SERPL-MCNC: 22 MG/DL (ref 6–20)
BUN SERPL-MCNC: 22 MG/DL (ref 6–20)
BUN/CREAT SERPL: 11 (ref 12–20)
BUN/CREAT SERPL: 11 (ref 12–20)
CALCIUM SERPL-MCNC: 7.7 MG/DL (ref 8.5–10.1)
CALCIUM SERPL-MCNC: 7.8 MG/DL (ref 8.5–10.1)
CHLORIDE SERPL-SCNC: 108 MMOL/L (ref 97–108)
CHLORIDE SERPL-SCNC: 109 MMOL/L (ref 97–108)
CO2 SERPL-SCNC: 18 MMOL/L (ref 21–32)
CO2 SERPL-SCNC: 20 MMOL/L (ref 21–32)
CREAT SERPL-MCNC: 2 MG/DL (ref 0.7–1.3)
CREAT SERPL-MCNC: 2.03 MG/DL (ref 0.7–1.3)
GLUCOSE BLD STRIP.AUTO-MCNC: 104 MG/DL (ref 65–117)
GLUCOSE BLD STRIP.AUTO-MCNC: 137 MG/DL (ref 65–117)
GLUCOSE BLD STRIP.AUTO-MCNC: 171 MG/DL (ref 65–117)
GLUCOSE BLD STRIP.AUTO-MCNC: 255 MG/DL (ref 65–117)
GLUCOSE SERPL-MCNC: 107 MG/DL (ref 65–100)
GLUCOSE SERPL-MCNC: 112 MG/DL (ref 65–100)
MAGNESIUM SERPL-MCNC: 1.9 MG/DL (ref 1.6–2.4)
PHOSPHATE SERPL-MCNC: 3.1 MG/DL (ref 2.6–4.7)
PHOSPHATE SERPL-MCNC: 3.2 MG/DL (ref 2.6–4.7)
POTASSIUM SERPL-SCNC: 3.5 MMOL/L (ref 3.5–5.1)
POTASSIUM SERPL-SCNC: 3.5 MMOL/L (ref 3.5–5.1)
SERVICE CMNT-IMP: ABNORMAL
SERVICE CMNT-IMP: NORMAL
SODIUM SERPL-SCNC: 137 MMOL/L (ref 136–145)
SODIUM SERPL-SCNC: 138 MMOL/L (ref 136–145)

## 2021-05-19 PROCEDURE — 83735 ASSAY OF MAGNESIUM: CPT

## 2021-05-19 PROCEDURE — 74011250637 HC RX REV CODE- 250/637: Performed by: INTERNAL MEDICINE

## 2021-05-19 PROCEDURE — 84100 ASSAY OF PHOSPHORUS: CPT

## 2021-05-19 PROCEDURE — 74011000258 HC RX REV CODE- 258: Performed by: NURSE PRACTITIONER

## 2021-05-19 PROCEDURE — 74011250636 HC RX REV CODE- 250/636: Performed by: INTERNAL MEDICINE

## 2021-05-19 PROCEDURE — 65270000032 HC RM SEMIPRIVATE

## 2021-05-19 PROCEDURE — 80069 RENAL FUNCTION PANEL: CPT

## 2021-05-19 PROCEDURE — 80048 BASIC METABOLIC PNL TOTAL CA: CPT

## 2021-05-19 PROCEDURE — 74011636637 HC RX REV CODE- 636/637: Performed by: INTERNAL MEDICINE

## 2021-05-19 PROCEDURE — 74011250636 HC RX REV CODE- 250/636: Performed by: NURSE PRACTITIONER

## 2021-05-19 PROCEDURE — 36415 COLL VENOUS BLD VENIPUNCTURE: CPT

## 2021-05-19 PROCEDURE — 74011250637 HC RX REV CODE- 250/637: Performed by: HOSPITALIST

## 2021-05-19 PROCEDURE — 97535 SELF CARE MNGMENT TRAINING: CPT

## 2021-05-19 PROCEDURE — 94760 N-INVAS EAR/PLS OXIMETRY 1: CPT

## 2021-05-19 PROCEDURE — 74011000250 HC RX REV CODE- 250: Performed by: INTERNAL MEDICINE

## 2021-05-19 PROCEDURE — 82962 GLUCOSE BLOOD TEST: CPT

## 2021-05-19 RX ORDER — LOPERAMIDE HYDROCHLORIDE 2 MG/1
2 CAPSULE ORAL
Status: DISCONTINUED | OUTPATIENT
Start: 2021-05-19 | End: 2021-05-20 | Stop reason: HOSPADM

## 2021-05-19 RX ADMIN — FENTANYL CITRATE 50 MCG: 50 INJECTION, SOLUTION INTRAMUSCULAR; INTRAVENOUS at 11:59

## 2021-05-19 RX ADMIN — FENTANYL CITRATE 50 MCG: 50 INJECTION, SOLUTION INTRAMUSCULAR; INTRAVENOUS at 19:02

## 2021-05-19 RX ADMIN — AMLODIPINE BESYLATE 5 MG: 5 TABLET ORAL at 10:31

## 2021-05-19 RX ADMIN — BUMETANIDE 1 MG: 0.25 INJECTION INTRAMUSCULAR; INTRAVENOUS at 10:40

## 2021-05-19 RX ADMIN — FERROUS SULFATE TAB 325 MG (65 MG ELEMENTAL FE) 325 MG: 325 (65 FE) TAB at 09:13

## 2021-05-19 RX ADMIN — INSULIN LISPRO 3 UNITS: 100 INJECTION, SOLUTION INTRAVENOUS; SUBCUTANEOUS at 21:53

## 2021-05-19 RX ADMIN — Medication 10 ML: at 07:14

## 2021-05-19 RX ADMIN — LOPERAMIDE HYDROCHLORIDE 2 MG: 2 CAPSULE ORAL at 19:01

## 2021-05-19 RX ADMIN — TAMSULOSIN HYDROCHLORIDE 0.4 MG: 0.4 CAPSULE ORAL at 10:35

## 2021-05-19 RX ADMIN — PANTOPRAZOLE SODIUM 40 MG: 40 TABLET, DELAYED RELEASE ORAL at 09:13

## 2021-05-19 RX ADMIN — FENTANYL CITRATE 50 MCG: 50 INJECTION, SOLUTION INTRAMUSCULAR; INTRAVENOUS at 05:30

## 2021-05-19 RX ADMIN — Medication 10 ML: at 14:37

## 2021-05-19 RX ADMIN — FENTANYL CITRATE 50 MCG: 50 INJECTION, SOLUTION INTRAMUSCULAR; INTRAVENOUS at 15:33

## 2021-05-19 RX ADMIN — CYCLOBENZAPRINE 5 MG: 10 TABLET, FILM COATED ORAL at 19:18

## 2021-05-19 RX ADMIN — Medication 100 MG: at 10:34

## 2021-05-19 RX ADMIN — INSULIN LISPRO 2 UNITS: 100 INJECTION, SOLUTION INTRAVENOUS; SUBCUTANEOUS at 11:50

## 2021-05-19 RX ADMIN — DAPTOMYCIN 600 MG: 500 INJECTION, POWDER, LYOPHILIZED, FOR SOLUTION INTRAVENOUS at 20:16

## 2021-05-19 RX ADMIN — Medication 10 ML: at 21:54

## 2021-05-19 RX ADMIN — THERA TABS 1 TABLET: TAB at 10:36

## 2021-05-19 RX ADMIN — ASPIRIN 81 MG: 81 TABLET, CHEWABLE ORAL at 10:31

## 2021-05-19 RX ADMIN — EPOETIN ALFA-EPBX 20000 UNITS: 20000 INJECTION, SOLUTION INTRAVENOUS; SUBCUTANEOUS at 21:54

## 2021-05-19 RX ADMIN — SODIUM BICARBONATE 325 MG: 650 TABLET ORAL at 18:02

## 2021-05-19 RX ADMIN — FERROUS SULFATE TAB 325 MG (65 MG ELEMENTAL FE) 325 MG: 325 (65 FE) TAB at 18:02

## 2021-05-19 RX ADMIN — POTASSIUM BICARBONATE 40 MEQ: 782 TABLET, EFFERVESCENT ORAL at 10:29

## 2021-05-19 RX ADMIN — SODIUM BICARBONATE 325 MG: 650 TABLET ORAL at 10:37

## 2021-05-19 NOTE — PROGRESS NOTES
Nutrition Contact Note for transition of care to SNF    Brief check in with initiation of tube feeds yesterday and labs/refeeding. Currently receiving TF of Glucerna 1.2 @ 15 mL/hr. Labs look good. K+ improved with repletion yesterday, Phos dropped some but still WNL. Recommend increasing rate to 25 mL/hr (11 kcal/kg) for next 24 hours and if labs continue to look OK tomorrow, continue advancing by 10 mL q 24 hours until reaches goal.  Pt remains a very high refeeding risk and should not be rushed to goal with his tube feeds, even with plans to d/c to SNF tomorrow. Tube feed goals can/ will change with respective PO intake and weight trends, and possibly with SNF schedule and formulary. Currently aiming for at least 75% of needs so long as pt is taking some PO intake/supps during the day. Pt's baseline diet is dental soft/ thin liquids. He has severe protein-calorie malnutrition with ongoing wt loss and poor PO intake d/t poor appetite and some esophageal dysphagia. UBW of 180-190#  ~ 6-9months ago. Pt also s/p R AKA since this time, but would only account for ~18 lb of this wt loss. Using 162 lb as base weight in July, pt has lost at least another 37 lb (22% of BW) since that time - which is significant. Severe muscle wasting/ loss of SQ fat observed.      Pt started experiencing loose stools this past weekend PRIOR to starting tube feeds. Given course of IV abx, may want to consider starting probiotic. Nutrition Recommendations/Plan:    1. Recommend continuing to check K, Mg, Phos q 12-24 hours with PRN repletion prior to advancing tube feeds. 2. Continue 100 mg of thiamine daily for at least another 5-7 days. 3. Consider starting probiotic given IV abx course and reports of loose stool that started PRIOR to initiation of tube feeds.   4. Plan to advance TF of Glucerna 1.2 to 25 mL/hr today and for next 24 hours, then recommend continuing to advance by 10 mL q 24 hours until reaches goal (see below). For now, Goal TF will be 5 cartons of Glucerna 1.2 @ 55 mL/hr with 50 mL H2O q 4 hours to provide 1185 mL, 1422 kcal, 71 gm pro, 960+911=5985 mL free H2O. This provides 75% kcal and 84% pro needs respectively. Recommend eventual switch to nocturnal feeds to promote PO intake during the day. In this case, would switch to 4 cans of Glucerna 1.5 @ 80 mL/hr x 12 hours with 50 mL H2O q 4 hours while tube feeds running. This provides 948 mL, 1422 kcal, 78 gm pro, 720+480=475 mL free H2O. RD will continue to follow as long as remains in house.         Recent Labs     05/19/21  0712 05/18/21  1259 05/17/21  0316   * 73 128*   BUN 22* 23* 28*   CREA 2.03* 1.99* 2.28*    140 139   K 3.5 3.0* 3.2*    108 109*   CO2 20* 26 23   CA 7.7* 7.7* 8.0*   PHOS 3.1 3.6 3.5   MG 1.9 2.0  --        Recent Labs     05/19/21  0644 05/18/21  2214 05/18/21  1633 05/18/21  0706 05/17/21  2114 05/17/21  1738 05/17/21  1125 05/17/21  0635 05/16/21  2122 05/16/21  1608 05/16/21  1059   GLUCPOC 137* 94 78 140* 106 94 119* 133* 160* 125* 136*       Estimated Daily Nutrient Needs:  Energy (kcal):  8804-1375 (35-40 kcal/kg)  Protein (g):   (1.5-2 gm/kg)       Fluid (ml/day):  1 mL/kcal  Weight used: 56.5 kg (lowest wt this admission)    Whit Epps RD  Available via [employer]

## 2021-05-19 NOTE — PROGRESS NOTES
Nephrology Progress Note  Cristopher Cousin. Date of Admission : 5/1/2021    CC: Follow up for CKD       Assessment and Plan     CKD 4:  - 2/2 DM, HTN   - Cr at baseline  - cont Bumex 1mg daily on d/c   - Daily labs    Urinary retention:  - failed voiding trial x 2   - hoyos re-inserted 5/12  - continue flomax    Hypo K and Hypo Phos  :  - on  Neutra phos and oral KCL liquid    Severe protein calorie Malnutrition   - PEG placed 5/17    Anemia of CKD:  - iron studies ok  - cont MICHAEL 3 times per week  - hematology following    MRSA bacteremia:  - abx per ID  - TTE neg for vegetations  - on IV dapto+ oral rifampin     HTN:  - BP stable    DM2:  - on insulin    R AKA    L3-4, L4-5 discitis/osteomyelitis:  - on abx    Sacral wound       Interval History:  Seen and examined. No new sx . No cp, sob, n/v/d. Current Medications: all current  Medications have been eviewed in EPIC  Review of Systems: A comprehensive review of systems was negative except for that written in the HPI.     Objective:  Vitals:    Vitals:    05/18/21 1445 05/18/21 2033 05/19/21 0325 05/19/21 0804   BP: (!) 175/85 (!) 163/80 (!) 155/79 (!) 173/85   Pulse: 82 85 83 89   Resp: 15 18 18 17   Temp: 97.5 °F (36.4 °C) 98.3 °F (36.8 °C) 98.4 °F (36.9 °C) 97.6 °F (36.4 °C)   SpO2: 99% 100% 99% 100%   Weight:   60.6 kg (133 lb 11.2 oz)    Height:         Intake and Output:  05/19 0701 - 05/19 1900  In: -   Out: 600 [Urine:600]  05/17 1901 - 05/19 0700  In: 100   Out: 2825 [Urine:2825]    Physical Examination:  General: Mild distress  Neck:  Supple, no mass  Resp:  Lungs CTA B/L, no wheezing , normal respiratory effort  CV:  RRR,  no murmur or rub,R AKA, no edema L  GI:  Soft, NT, + Bowel sounds, no hepatosplenomegaly  Neurologic:  confused  Skin:  No Rash   : hoyos +    []    High complexity decision making was performed  []    Patient is at high-risk of decompensation with multiple organ involvement    Lab Data Personally Reviewed: I have reviewed all the pertinent labs, microbiology data and radiology studies during assessment. Recent Labs     05/19/21  0712 05/18/21  1259 05/17/21  0316     138 140 139   K 3.5  3.5 3.0* 3.2*   *  108 108 109*   CO2 18*  20* 26 23   *  112* 73 128*   BUN 22*  22* 23* 28*   CREA 2.00*  2.03* 1.99* 2.28*   CA 7.8*  7.7* 7.7* 8.0*   MG 1.9 2.0  --    PHOS 3.2  3.1 3.6 3.5   ALB 1.3*  --  1.0*     Recent Labs     05/18/21  1259   WBC 11.5*   HGB 8.7*   HCT 29.5*        Lab Results   Component Value Date/Time    Specimen Description: JOSE 12/04/2012 10:12 PM    Specimen Description: TOE 12/02/2012 05:03 PM    Specimen Description: TOE 12/02/2012 05:03 PM     Lab Results   Component Value Date/Time    Culture result: NO GROWTH 5 DAYS 05/12/2021 12:06 PM    Culture result: NO GROWTH 5 DAYS 05/10/2021 01:24 PM    Culture result: NO GROWTH 5 DAYS 05/09/2021 11:01 AM     Recent Results (from the past 24 hour(s))   CBC WITH AUTOMATED DIFF    Collection Time: 05/18/21 12:59 PM   Result Value Ref Range    WBC 11.5 (H) 4.1 - 11.1 K/uL    RBC 3.21 (L) 4.10 - 5.70 M/uL    HGB 8.7 (L) 12.1 - 17.0 g/dL    HCT 29.5 (L) 36.6 - 50.3 %    MCV 91.9 80.0 - 99.0 FL    MCH 27.1 26.0 - 34.0 PG    MCHC 29.5 (L) 30.0 - 36.5 g/dL    RDW 18.1 (H) 11.5 - 14.5 %    PLATELET 089 743 - 774 K/uL    MPV 9.3 8.9 - 12.9 FL    NRBC 0.3 (H) 0  WBC    ABSOLUTE NRBC 0.03 (H) 0.00 - 0.01 K/uL    NEUTROPHILS 78 (H) 32 - 75 %    LYMPHOCYTES 13 12 - 49 %    MONOCYTES 5 5 - 13 %    EOSINOPHILS 1 0 - 7 %    BASOPHILS 1 0 - 1 %    IMMATURE GRANULOCYTES 2 (H) 0.0 - 0.5 %    ABS. NEUTROPHILS 9.0 (H) 1.8 - 8.0 K/UL    ABS. LYMPHOCYTES 1.5 0.8 - 3.5 K/UL    ABS. MONOCYTES 0.6 0.0 - 1.0 K/UL    ABS. EOSINOPHILS 0.1 0.0 - 0.4 K/UL    ABS. BASOPHILS 0.1 0.0 - 0.1 K/UL    ABS. IMM.  GRANS. 0.2 (H) 0.00 - 0.04 K/UL    DF AUTOMATED     METABOLIC PANEL, BASIC    Collection Time: 05/18/21 12:59 PM   Result Value Ref Range    Sodium 140 136 - 145 mmol/L    Potassium 3.0 (L) 3.5 - 5.1 mmol/L    Chloride 108 97 - 108 mmol/L    CO2 26 21 - 32 mmol/L    Anion gap 6 5 - 15 mmol/L    Glucose 73 65 - 100 mg/dL    BUN 23 (H) 6 - 20 MG/DL    Creatinine 1.99 (H) 0.70 - 1.30 MG/DL    BUN/Creatinine ratio 12 12 - 20      GFR est AA 41 (L) >60 ml/min/1.73m2    GFR est non-AA 34 (L) >60 ml/min/1.73m2    Calcium 7.7 (L) 8.5 - 10.1 MG/DL   MAGNESIUM    Collection Time: 05/18/21 12:59 PM   Result Value Ref Range    Magnesium 2.0 1.6 - 2.4 mg/dL   PHOSPHORUS    Collection Time: 05/18/21 12:59 PM   Result Value Ref Range    Phosphorus 3.6 2.6 - 4.7 MG/DL   GLUCOSE, POC    Collection Time: 05/18/21  4:33 PM   Result Value Ref Range    Glucose (POC) 78 65 - 117 mg/dL    Performed by Y[address]ie [address], POC    Collection Time: 05/18/21 10:14 PM   Result Value Ref Range    Glucose (POC) 94 65 - 117 mg/dL    Performed by [address] [place] [address], POC    Collection Time: 05/19/21  6:44 AM   Result Value Ref Range    Glucose (POC) 137 (H) 65 - 117 mg/dL    Performed by Terri Mooney    RENAL FUNCTION PANEL    Collection Time: 05/19/21  7:12 AM   Result Value Ref Range    Sodium 137 136 - 145 mmol/L    Potassium 3.5 3.5 - 5.1 mmol/L    Chloride 109 (H) 97 - 108 mmol/L    CO2 18 (L) 21 - 32 mmol/L    Anion gap 10 5 - 15 mmol/L    Glucose 107 (H) 65 - 100 mg/dL    BUN 22 (H) 6 - 20 MG/DL    Creatinine 2.00 (H) 0.70 - 1.30 MG/DL    BUN/Creatinine ratio 11 (L) 12 - 20      GFR est AA 41 (L) >60 ml/min/1.73m2    GFR est non-AA 33 (L) >60 ml/min/1.73m2    Calcium 7.8 (L) 8.5 - 10.1 MG/DL    Phosphorus 3.2 2.6 - 4.7 MG/DL    Albumin 1.3 (L) 3.5 - 5.0 g/dL   METABOLIC PANEL, BASIC    Collection Time: 05/19/21  7:12 AM   Result Value Ref Range    Sodium 138 136 - 145 mmol/L    Potassium 3.5 3.5 - 5.1 mmol/L    Chloride 108 97 - 108 mmol/L    CO2 20 (L) 21 - 32 mmol/L    Anion gap 10 5 - 15 mmol/L    Glucose 112 (H) 65 - 100 mg/dL    BUN 22 (H) 6 - 20 MG/DL    Creatinine 2.03 (H) 0.70 - 1.30 MG/DL    BUN/Creatinine ratio 11 (L) 12 - 20      GFR est AA 40 (L) >60 ml/min/1.73m2    GFR est non-AA 33 (L) >60 ml/min/1.73m2    Calcium 7.7 (L) 8.5 - 10.1 MG/DL   MAGNESIUM    Collection Time: 05/19/21  7:12 AM   Result Value Ref Range    Magnesium 1.9 1.6 - 2.4 mg/dL   PHOSPHORUS    Collection Time: 05/19/21  7:12 AM   Result Value Ref Range    Phosphorus 3.1 2.6 - 4.7 MG/DL           Dain Stevenson MD  [facility]   [id number] [place] [address]  Phone - [phone]   Fax - [phone]  [email or web address]

## 2021-05-19 NOTE — PROGRESS NOTES
Transition of Care Plan   RUR- 28%   DISPOSITION: The disposition plan is transition to a SNF; pending medical progression  o The pt has been accepted by [facility] [address] F/U with PCP/Specialist     Transport: [facility]; 11am        This cm spoke with the pt's wife and she informed this CM that her and the pt have decided to proceed with the [id number] [place] [address] for SNF. This cm will contact the facility to provide a update and to ensure the facility has a bed available tomorrow.      CM: [address]. [place],   [phone]

## 2021-05-19 NOTE — PROGRESS NOTES
Clinical Pharmacy Note: Antibiotic Renal Dosing    Medication/Indication: Daptomycin for MRSA bacteremia  Current regimen:  600 mg every 48 hours    Recent Labs     21  0712 21  1259 21  0316   WBC  --  11.5*  --    CREA 2.00*  2.03* 1.99* 2.28*   BUN 22*  22* 23* 28*     Temp (24hrs), Av °F (36.7 °C), Min:97.5 °F (36.4 °C), Max:98.4 °F (36.9 °C)    Est CrCl: 30.7 ml/min     Plan: Change to 600 mg every 24 hours for CrCl >/= to 30 mL/min, per [facility] P&T approved renal dosing protocol. Pharmacy will monitor daily and will make adjustments as necessary for changes in renal function.

## 2021-05-19 NOTE — PROGRESS NOTES
Problem: Self Care Deficits Care Plan (Adult)  Goal: *Acute Goals and Plan of Care (Insert Text)  Description:   FUNCTIONAL STATUS PRIOR TO ADMISSION: Patient was modified independent with ADLs and functional mobility/ ambulatory with RLE prosthetic prior to R AKA ~2 weeks PTA at OSH. Patient and wife report progressive functional decline since AKA due to increasing pain and decreased LUE and LLE function. HOME SUPPORT: Patient was admitted to [facility] from [facility] rehab. Prior to that he was at [address] [place] [address] for R AKA. At home he lived with his wife but did not require assistance. Occupational Therapy Goals  Goals reviewed and continued: 5/19/2021  Goals reviewed and continued: 5/11/2021  Initiated 5/4/2021  1. Patient will perform grooming seated EOB with supervision/set-up within 7 day(s). 2.  Patient will perform upper body dressing with minimal assistance within 7 day(s). 3.  Patient will perform bathing with minimal assistance within 7 day(s). 4.  Patient will perform toilet transfers to [facility] with moderate assistance within 7 day(s). 5.  Patient will perform all aspects of toileting with moderate assistance  within 7 day(s). 6.  Patient will participate in upper extremity therapeutic exercise/activities with supervision/set-up for 10 minutes within 7 day(s). 7.  Patient will utilize fall prevention techniques during functional activities with verbal cues within 7 day(s). 5/19/2021 1601 by CHAIM Richmond  Outcome: Not Met   OCCUPATIONAL THERAPY TREATMENT/WEEKLY RE-ASSESSMENT  Patient: Baltazar Jenkins  (76 y.o. male)  Date: 5/19/2021  Diagnosis: Acute delirium [R41.0] Acute delirium  Procedure(s) (LRB):  PERCUTANEOUS ENDOSCOPIC GASTROSTOMY TUBE INSERTION (N/A)  ESOPHAGOGASTRODUODENOSCOPY (EGD) (N/A)  ESOPHAGOGASTRODUODENAL (EGD) BIOPSY (N/A) 2 Days Post-Op  Precautions:    Chart, occupational therapy assessment, plan of care, and goals were reviewed. ASSESSMENT  Patient continues with skilled OT services and is progressing, but has not met goals. Patient has not been seen since last weekly reassessment secondary to low Hgb, PEG placement, KAYLA for Murali catheter placement, and one decline by patient. Patient seen today for mobility to EOB and simulated as well as active self care. Participation impacted by impaired strength and endurance for functional activity, impaired seated balance, back pain with radiation down left LE per patient report, impaired left UE AROM, impaired reach to LE. Patient with new PEG tube this date. Patient reporting incontinence of bowel this date. Current Level of Function Impacting Discharge (ADLs): UE self care in supported sitting with set up and on EOB with min assist, LE self care with toileting with max to total assist.     Other factors to consider for discharge: none         PLAN :  Goals have been updated based on progression since last assessment. Patient continues to benefit from skilled intervention to address the above impairments. Continue to follow patient 5 times a week to address goals. Recommend with staff: bed in modified chair position for meals and self care, participation in all self care as able, bed pan    Recommend next OT session: POC    Recommendation for discharge: (in order for the patient to meet his/her long term goals)  Therapy up to 5 days/week in SNF setting    This discharge recommendation:  Has been made in collaboration with the attending provider and/or case management    IF patient discharges home will need the following DME: hospital bed       SUBJECTIVE:   Patient stated I need to get stronger.     OBJECTIVE DATA SUMMARY:   Cognitive/Behavioral Status:  Neurologic State: Alert  Orientation Level: Oriented X4  Cognition: Appropriate decision making; Appropriate for age attention/concentration; Follows commands  Perception: Appears intact  Perseveration: No perseveration noted  Safety/Judgement: Awareness of environment    Functional Mobility and Transfers for ADLs:  Bed Mobility:  Rolling: Stand-by assistance  Supine to Sit: Moderate assistance;Assist x1;Additional time; Adaptive equipment  Sit to Supine: Moderate assistance;Assist x1;Additional time; Adaptive equipment    Transfers:             Balance:  Sitting: Impaired; Without support  Sitting - Static: Fair (occasional)  Sitting - Dynamic: Poor (constant support)    ADL Intervention:            Upper Body Bathing  Bathing Assistance: Minimum assistance (in simulation)  Position Performed: Seated edge of bed  Cues: Physical assistance;Verbal cues provided    Lower Body Bathing  Bathing Assistance: Total assistance(dependent)  Perineal  : Total assistance (dependent)  Position Performed: Supine  Cues: Other(comment) (physical assist)  Adaptive Equipment: Other(comment) (bedrails)  Lower Body : Maximum assistance  Position Performed: Seated edge of bed  Cues: Physical assistance         Lower Body Dressing Assistance  Protective Undergarmet: Total assistance (dependent)  Leg Crossed Method Used: No  Position Performed: Seated edge of bed;Supine  Cues: Physical assistance    Toileting  Toileting Assistance: Total assistance(dependent) (impacted by incontinence)  Bladder Hygiene: Total assistance (dependent)  Bowel Hygiene: Total assistance (dependent)  Clothing Management: Total assistance (dependent)  Cues: Physical assistance for pants up;Physical assistance for pants down;Verbal cues provided;Visual cues provided; Tactile cues provided  Adaptive Equipment: Other (comment) (bedrails)    Cognitive Retraining  Safety/Judgement: Awareness of environment      Activity Tolerance:   Fair    After treatment patient left in no apparent distress:   Supine in bed, Heels elevated for pressure relief, Call bell within reach, Side rails x 3, and HOB elevated > 30 degrees, pillows under each side of trunk for relief for sacrum, and PEG  feeding running    COMMUNICATION/COLLABORATION:   The patients plan of care was discussed with: Occupational therapist and Registered nurse.      CHAIM Stephen  Time Calculation: 37 mins

## 2021-05-19 NOTE — PROGRESS NOTES
ID Progress Note  2021    Subjective:     C/o feeling tired    Review of Systems:            Symptom Y/N Comments   Symptom Y/N Comments   Fever/Chills n      Chest Pain n      Poor Appetite  y     Edema        Cough n      Abdominal Pain        Sputum n      Joint Pain  y  lumbar spine    SOB/MASSEY n     Pruritis/Rash        Nausea/vomit n      Tolerating PT/OT        Diarrhea  n     Tolerating Diet        Constipation  n     Other           Could NOT obtain due to:       Objective:     Vitals:   Visit Vitals  BP (!) 173/85 (BP 1 Location: Left arm, BP Patient Position: At rest)   Pulse 89   Temp 97.6 °F (36.4 °C)   Resp 17   Ht 5' 11\" (1.803 m)   Wt 60.6 kg (133 lb 11.2 oz)   SpO2 100%   BMI 18.65 kg/m²        Tmax:  Temp (24hrs), Av.9 °F (36.6 °C), Min:97.5 °F (36.4 °C), Max:98.4 °F (36.9 °C)      PHYSICAL EXAM:  General: Chronically ill appearing, WD, WN. Alert, uncooperative, no acute distress    EENT:  EOMI. Anicteric sclerae. MMM  Resp:  Clear in apex with decreased breath sounds at bases, no wheezing or rales. No accessory muscle use  CV:  Regular  rhythm,  No edema  GI:  Soft, Non distended, Non tender. +Bowel sounds, PEG tube in place  Neurologic:  Alert and oriented X self, place, normal speech,   Psych:   Fair insight. Not anxious nor agitated  Skin:  No rashes.   No jaundice, right stump; dressing dry and intact, incision approximated, no erythema, no drainage     Labs:   Lab Results   Component Value Date/Time    WBC 11.5 (H) 2021 12:59 PM    Hemoglobin (POC) 5.2 (LL) 2021 08:37 AM    Hemoglobin, POC 11.2 (L) 2014 11:05 PM    HGB 8.7 (L) 2021 12:59 PM    Hematocrit (POC) 26 (L) 10/09/2018 07:16 AM    Hematocrit, POC 33 (L) 2014 11:05 PM    HCT 29.5 (L) 2021 12:59 PM    PLATELET 232  12:59 PM    MCV 91.9 2021 12:59 PM     Lab Results   Component Value Date/Time    Sodium 140 2021 12:59 PM    Potassium 3.0 (L) 2021 12:59 PM Chloride 108 05/18/2021 12:59 PM    CO2 26 05/18/2021 12:59 PM    Anion gap 6 05/18/2021 12:59 PM    Glucose 73 05/18/2021 12:59 PM    BUN 23 (H) 05/18/2021 12:59 PM    Creatinine 1.99 (H) 05/18/2021 12:59 PM    BUN/Creatinine ratio 12 05/18/2021 12:59 PM    GFR est AA 41 (L) 05/18/2021 12:59 PM    GFR est non-AA 34 (L) 05/18/2021 12:59 PM    Calcium 7.7 (L) 05/18/2021 12:59 PM    Bilirubin, total 0.3 05/02/2021 02:00 AM    Alk. phosphatase 234 (H) 05/02/2021 02:00 AM    Protein, total 7.2 05/02/2021 02:00 AM    Albumin 1.0 (L) 05/17/2021 03:16 AM    Globulin 6.0 (H) 05/02/2021 02:00 AM    A-G Ratio 0.2 (L) 05/02/2021 02:00 AM    ALT (SGPT) 13 05/02/2021 02:00 AM       CT of Chest, ABD/PEL (5/2); L4-5 findings most likely represent discitis-osteomyelitis. Soft tissue edema from hypoalbuminemia. Right upper lobe pneumonia. Bilateral lower lobe atelectasis vs pneumonia. Trace bilateral pleural effusions. ABX hx:   IV cefepime 5/3-5/8   PO Levo 5/9  PO clindamycin 5/1-5/5  IV zosyn 5/3, 5/12-5/18  IV vancomycin 5/3-5/7  IV Daptomycin 5/7 -  IV Rifampin 5/10-5/13    Assessment and Plan     HAP; completed first tmt on 5/9  - recurrent fever  5/12, afebrile overnight    Procal 2.5    CXR (5/12) New bibasilar interstitial and patchy airspace disease.    completed second tmt for HAP with IV Zosyn as of 5/18     Pt is in high risk of recurrent PNA and aspiration PNA.  Elevate HOB to 30 degree during feeding     Encourage IS 10x/every hour when awake    L4-5 Discitis/OM   Bacteremia  S/p laminectomy C3-4 (2014)   - WBC 11.5 (s/p granix on 5/13) afebrile    TTE (5/5) no vegetation    Blood cx (5/1, 5/3, 5/4, 5/6, 5/7, 5/8) MRSA    Blood cx (5/9) no growth    Blood cx (5/10 & 5/12) no growth so far      MRI of lumbar spine (5/3) discitis/OM at L3-4 & L4-5, no evidence of epidural abscess or cord compression    Once again, repeat MRI of lumbar spine (5/10) revealed no epidural abscess    -> ortho recommends medical therapy at this time. IV vancomycin changed to daptomycin on 5/7     Rifampin was d/c'd on 5/13 with concerns of leukopenia        Continue with IV Daptomycin, total 6 weeks from negative blood cx,(5/9). Last dose 6/19     Pt will need Murali catheter 5/18     Discharge IV ABX order (5/17) has been updated it. CK 17->13 (5/14)    Leukopenia  G6PD deficiency  - appreciate heme/onc input; received granix on 5/13    Anemia secondary to CKD  - hgb 8.7 (5/18); primary team following    Bladder retention  -  Morrissey in place. U/A (-)    severe protein-calorie malnutrition  - s/p PEG tube placement 5/17    Pt is clear to discharge in ID stand point.     Bell Pugh NP

## 2021-05-19 NOTE — PROGRESS NOTES
[facility] Adult  Hospitalist Group                                                                                          Hospitalist Progress Note  Vadim Loco MD  Answering service: [phone] from in house phone        Date of Service:  2021  NAME:  Hillary Miguel. :  1955  MRN:  [id number]      Admission Summary: This is a 67-year-old man with a past medical history significant for chronic kidney disease, type 2 diabetes, dyslipidemia, hypertension, obstructive sleep apnea, status post recent right above-knee amputation, degenerative disk disease, and chronic pain syndrome, was in his usual state of health until the day of presentation at the emergency room when it was reported that the patient became lethargic and confused at his rehab facility    Interval history / Subjective:      Patient is seen and examined. Status post insertion of PEG tube continue tube feeding as per nutrition services  Murali  catheter placed continue antibiotics as per ID Patient will need 6 weeks of IV daptomycin    Morrissey catheter in place. Failed voiding trial will repeat today  Ready for discharge family awaiting choice of places accepted at [facility]  Requested 24 hours to give them to go to the SNF and take a look  Ready for discharge    Assessment & Plan:     MRSA bacteremia - first cleared culture  (persistnet prior since )  L4-L5 Discitis and OM  Sacral wound   - Patient now on daptomycin, increased dose to 8-10mg/kg. - ID following   - TTE without obvious vegetations. Will need 6 weeks IV abx, so no need for AMELIA  - Will need Murali (no PICC per renal)   - Wound care following   - Repeat MRI lumbar spine 5/10 without abscess. - Rifampin added for synergy. Discontinued on  with concern of leukopenia  - Zosyn added by ID , empiric? For aspiration pneumonia? Follow ID recommendation.  7 days done will d/c     Leukopenia   - Heme consulted by ID  - Agree with drug induced vs. Sepsis, . resolved     Acute metabolic encephalopathy - multifactorial due to renal insufficiency, bacteremia, electrolyte abnormalities resolved   - Improving. Continue day and night cycles  - Avoid narcotics and sedatives if able  - MRI brain negative, ammonia normal     Anemia - chronic disease CKD   - monitor and transfuse if less than 7   - EPO per nephrology     ESTRELLA on CKD stage III - pre renal and sepsis   - Cr 2.28 stable    - I and O avoid nephrotoxins   - BMP daily     S/p R BKA - recent  - Wound care following, no sign of local infection   - PT/OT    - Vascular consult for prosthesis recs    Hyponatremia - resolved   HTN - holding lisinopril may use CCB   Type 2 diabetes - A1c 7.2%  SSI, POCT glucose checks and hypoglycemia protocol   HLD - home statin   HCAP - completed therapy (Vanc and cefepime/levo)  Urinary retention - now failed x 2 void trials. Keep hoyos. Continue flomax     PEG - nutrition consulted for MGMT of TF   ROSEANNE cathetr to be placed today     Code status: FULL  DVT prophylaxis: heparin     Care Plan discussed with: Patient/Family  Anticipated Disposition: [facility]/Rehab  Anticipated Discharge: ready for d/c to [facility] Problems  Date Reviewed: 5/1/2021        Codes Class Noted POA    Severe protein-calorie malnutrition ([facility] [address] (Chronic) ICD-10-CM: S95  ICD-9-CM: 352  5/13/2021 Yes        * (Principal) Acute delirium ICD-10-CM: R41.0  ICD-9-CM: 780.09  5/1/2021 Yes                Review of Systems:   A comprehensive review of systems was negative except for that written in the HPI. Vital Signs:    Last 24hrs VS reviewed since prior progress note.  Most recent are:  Visit Vitals  BP (!) 173/85 (BP 1 Location: Left arm, BP Patient Position: At rest)   Pulse 89   Temp 97.6 °F (36.4 °C)   Resp 17   Ht 5' 11\" (1.803 m)   Wt 60.6 kg (133 lb 11.2 oz)   SpO2 100%   BMI 18.65 kg/m²         Intake/Output Summary (Last 24 hours) at 5/19/2021 1322  Last data filed at 5/19/2021 1155  Gross per 24 hour   Intake 1875 ml   Output 2375 ml   Net -500 ml        Physical Examination:     I had a face to face encounter with this patient and independently examined them on 5/19/2021 as outlined below:          Constitutional:  chronically ill appearing   ENT:  mmm   Resp:  CTA bilaterally. CV:  Regular rhythm, normal rate,    GI:  Soft, non distended, non tender. bs+ PEG +     Musculoskeletal:  No edema, warm, 2+ pulses throughout    Neurologic:  Moves all extremities. AAOx3, CN II-XII reviewed, s/p R BKA wound c/d/i            Data Review:    Review and/or order of clinical lab test  Review and/or order of tests in the radiology section of CPT  Review and/or order of tests in the medicine section of CPT    IR INSERT TUNL CVC W/O PORT OVER 5 YR    Result Date: 5/18/2021  Successful placement of right internal jugular tunneled long-term central venous catheter. There catheter is ready for immediate use. Labs:     Recent Labs     05/18/21  1259   WBC 11.5*   HGB 8.7*   HCT 29.5*        Recent Labs     05/19/21  0712 05/18/21  1259 05/17/21  0316     138 140 139   K 3.5  3.5 3.0* 3.2*   *  108 108 109*   CO2 18*  20* 26 23   BUN 22*  22* 23* 28*   CREA 2.00*  2.03* 1.99* 2.28*   *  112* 73 128*   CA 7.8*  7.7* 7.7* 8.0*   MG 1.9 2.0  --    PHOS 3.2  3.1 3.6 3.5     Recent Labs     05/19/21  0712 05/17/21  0316   ALB 1.3* 1.0*     No results for input(s): INR, PTP, APTT, INREXT, INREXT in the last 72 hours. No results for input(s): FE, TIBC, PSAT, FERR in the last 72 hours. Lab Results   Component Value Date/Time    Folate 7.1 05/02/2021 02:00 AM      No results for input(s): PH, PCO2, PO2 in the last 72 hours. No results for input(s): CPK, CKNDX, TROIQ in the last 72 hours.     No lab exists for component: CPKMB  Lab Results   Component Value Date/Time    Cholesterol, total 239 (H) 03/30/2020 02:32 PM    HDL Cholesterol 40 03/30/2020 02:32 PM LDL,Direct 120 (H) 09/11/2012 12:00 AM    LDL, calculated 130 (H) 03/30/2020 02:32 PM    Triglyceride 345 (H) 03/30/2020 02:32 PM     Lab Results   Component Value Date/Time    Glucose (POC) 171 (H) 05/19/2021 11:26 AM    Glucose (POC) 137 (H) 05/19/2021 06:44 AM    Glucose (POC) 94 05/18/2021 10:14 PM    Glucose (POC) 78 05/18/2021 04:33 PM    Glucose (POC) 140 (H) 05/18/2021 07:06 AM     Lab Results   Component Value Date/Time    Color YELLOW/STRAW 05/09/2021 02:51 PM    Appearance CLEAR 05/09/2021 02:51 PM    Specific gravity 1.017 05/09/2021 02:51 PM    Specific gravity 1.016 02/19/2016 08:00 PM    pH (UA) 6.0 05/09/2021 02:51 PM    Protein 100 (A) 05/09/2021 02:51 PM    Glucose Negative 05/09/2021 02:51 PM    Ketone TRACE (A) 05/09/2021 02:51 PM    Bilirubin Negative 05/09/2021 02:51 PM    Urobilinogen 0.2 05/09/2021 02:51 PM    Nitrites Negative 05/09/2021 02:51 PM    Leukocyte Esterase Negative 05/09/2021 02:51 PM    Epithelial cells FEW 05/09/2021 02:51 PM    Bacteria 1+ (A) 05/09/2021 02:51 PM    WBC 0-4 05/09/2021 02:51 PM    RBC 0-5 05/09/2021 02:51 PM         Medications Reviewed:     Current Facility-Administered Medications   Medication Dose Route Frequency    DAPTOmycin (CUBICIN) 600 mg in 0.9% sodium chloride 50 mL IVPB RF formulation  600 mg IntraVENous Q24H    amLODIPine (NORVASC) tablet 5 mg  5 mg Oral DAILY    bumetanide (BUMEX) injection 1 mg  1 mg IntraVENous DAILY    potassium bicarb-citric acid (EFFER-K) tablet 40 mEq  40 mEq Oral DAILY    sodium bicarbonate tablet 325 mg  325 mg Oral BID    sodium chloride (NS) flush 5-40 mL  5-40 mL IntraVENous Q8H    sodium chloride (NS) flush 5-40 mL  5-40 mL IntraVENous PRN    thiamine HCL (B-1) tablet 100 mg  100 mg Oral DAILY    0.9% sodium chloride infusion 250 mL  250 mL IntraVENous PRN    therapeutic multivitamin (THERAGRAN) tablet 1 Tab  1 Tablet Oral DAILY    fentaNYL citrate (PF) injection 50 mcg  50 mcg IntraVENous Q3H PRN    senna-docusate (PERICOLACE) 8.6-50 mg per tablet 1 Tab  1 Tablet Oral DAILY    tamsulosin (FLOMAX) capsule 0.4 mg  0.4 mg Oral DAILY    cyclobenzaprine (FLEXERIL) tablet 5 mg  5 mg Oral TID PRN    oxyCODONE IR (ROXICODONE) tablet 15 mg  15 mg Oral Q4H PRN    0.9% sodium chloride infusion 250 mL  250 mL IntraVENous PRN    epoetin naman-epbx (RETACRIT) injection 20,000 Units  20,000 Units SubCUTAneous Q MON, WED & FRI    albuterol-ipratropium (DUO-NEB) 2.5 MG-0.5 MG/3 ML  3 mL Nebulization Q4H PRN    aspirin chewable tablet 81 mg  81 mg Oral DAILY    ergocalciferol capsule 50,000 Units  50,000 Units Oral every Friday    ferrous sulfate tablet 325 mg  1 Tablet Oral BID WITH MEALS    pantoprazole (PROTONIX) tablet 40 mg  40 mg Oral ACB    sodium chloride (NS) flush 5-40 mL  5-40 mL IntraVENous Q8H    sodium chloride (NS) flush 5-40 mL  5-40 mL IntraVENous PRN    acetaminophen (TYLENOL) tablet 650 mg  650 mg Oral Q6H PRN    Or    acetaminophen (TYLENOL) suppository 650 mg  650 mg Rectal Q6H PRN    polyethylene glycol (MIRALAX) packet 17 g  17 g Oral DAILY PRN    promethazine (PHENERGAN) tablet 12.5 mg  12.5 mg Oral Q6H PRN    Or    ondansetron (ZOFRAN) injection 4 mg  4 mg IntraVENous Q6H PRN    [Held by provider] heparin (porcine) injection 5,000 Units  5,000 Units SubCUTAneous Q8H    insulin lispro (HUMALOG) injection   SubCUTAneous AC&HS    glucose chewable tablet 16 g  4 Tablet Oral PRN    dextrose (D50W) injection syrg 12.5-25 g  12.5-25 g IntraVENous PRN    glucagon (GLUCAGEN) injection 1 mg  1 mg IntraMUSCular PRN     ______________________________________________________________________  EXPECTED LENGTH OF STAY: 4d 7h  ACTUAL LENGTH OF STAY:          Jimbo Burns MD

## 2021-05-19 NOTE — PROGRESS NOTES
Leucopenia has resolved  Anemia secondary to CKD and inflammation    Has G6PD deficiency   Has no hemolysis  All drugs should be checked before use to ensure they are not linked to precipitation of hemolysis.     Heme Onc will sign off

## 2021-05-20 VITALS
WEIGHT: 133.7 LBS | DIASTOLIC BLOOD PRESSURE: 82 MMHG | HEIGHT: 71 IN | SYSTOLIC BLOOD PRESSURE: 159 MMHG | TEMPERATURE: 98 F | HEART RATE: 97 BPM | OXYGEN SATURATION: 97 % | RESPIRATION RATE: 17 BRPM | BODY MASS INDEX: 18.72 KG/M2

## 2021-05-20 PROBLEM — R41.0 ACUTE DELIRIUM: Status: RESOLVED | Noted: 2021-05-01 | Resolved: 2021-05-20

## 2021-05-20 LAB
ANION GAP SERPL CALC-SCNC: 8 MMOL/L (ref 5–15)
BASOPHILS # BLD: 0.1 K/UL (ref 0–0.1)
BASOPHILS NFR BLD: 1 % (ref 0–1)
BUN SERPL-MCNC: 19 MG/DL (ref 6–20)
BUN/CREAT SERPL: 11 (ref 12–20)
CALCIUM SERPL-MCNC: 6.8 MG/DL (ref 8.5–10.1)
CHLORIDE SERPL-SCNC: 107 MMOL/L (ref 97–108)
CO2 SERPL-SCNC: 25 MMOL/L (ref 21–32)
CREAT SERPL-MCNC: 1.7 MG/DL (ref 0.7–1.3)
DIFFERENTIAL METHOD BLD: ABNORMAL
EOSINOPHIL # BLD: 0.1 K/UL (ref 0–0.4)
EOSINOPHIL NFR BLD: 1 % (ref 0–7)
ERYTHROCYTE [DISTWIDTH] IN BLOOD BY AUTOMATED COUNT: 18.8 % (ref 11.5–14.5)
GLUCOSE BLD STRIP.AUTO-MCNC: 239 MG/DL (ref 65–117)
GLUCOSE BLD STRIP.AUTO-MCNC: 245 MG/DL (ref 65–117)
GLUCOSE SERPL-MCNC: 203 MG/DL (ref 65–100)
HCT VFR BLD AUTO: 27.6 % (ref 36.6–50.3)
HGB BLD-MCNC: 8.3 G/DL (ref 12.1–17)
IMM GRANULOCYTES # BLD AUTO: 0.1 K/UL (ref 0–0.04)
IMM GRANULOCYTES NFR BLD AUTO: 1 % (ref 0–0.5)
LYMPHOCYTES # BLD: 1.4 K/UL (ref 0.8–3.5)
LYMPHOCYTES NFR BLD: 14 % (ref 12–49)
MAGNESIUM SERPL-MCNC: 1.8 MG/DL (ref 1.6–2.4)
MCH RBC QN AUTO: 27.4 PG (ref 26–34)
MCHC RBC AUTO-ENTMCNC: 30.1 G/DL (ref 30–36.5)
MCV RBC AUTO: 91.1 FL (ref 80–99)
MONOCYTES # BLD: 0.6 K/UL (ref 0–1)
MONOCYTES NFR BLD: 6 % (ref 5–13)
NEUTS SEG # BLD: 7.6 K/UL (ref 1.8–8)
NEUTS SEG NFR BLD: 77 % (ref 32–75)
NRBC # BLD: 0.03 K/UL (ref 0–0.01)
NRBC BLD-RTO: 0.3 PER 100 WBC
PHOSPHATE SERPL-MCNC: 2.6 MG/DL (ref 2.6–4.7)
PLATELET # BLD AUTO: 312 K/UL (ref 150–400)
PMV BLD AUTO: 9.1 FL (ref 8.9–12.9)
POTASSIUM SERPL-SCNC: 3.5 MMOL/L (ref 3.5–5.1)
RBC # BLD AUTO: 3.03 M/UL (ref 4.1–5.7)
SERVICE CMNT-IMP: ABNORMAL
SERVICE CMNT-IMP: ABNORMAL
SODIUM SERPL-SCNC: 140 MMOL/L (ref 136–145)
WBC # BLD AUTO: 9.8 K/UL (ref 4.1–11.1)

## 2021-05-20 PROCEDURE — 84100 ASSAY OF PHOSPHORUS: CPT

## 2021-05-20 PROCEDURE — 82962 GLUCOSE BLOOD TEST: CPT

## 2021-05-20 PROCEDURE — 74011000250 HC RX REV CODE- 250: Performed by: INTERNAL MEDICINE

## 2021-05-20 PROCEDURE — 36415 COLL VENOUS BLD VENIPUNCTURE: CPT

## 2021-05-20 PROCEDURE — 74011636637 HC RX REV CODE- 636/637: Performed by: INTERNAL MEDICINE

## 2021-05-20 PROCEDURE — 74011250637 HC RX REV CODE- 250/637: Performed by: INTERNAL MEDICINE

## 2021-05-20 PROCEDURE — 77030005513 HC CATH URETH FOL11 MDII -B

## 2021-05-20 PROCEDURE — 80048 BASIC METABOLIC PNL TOTAL CA: CPT

## 2021-05-20 PROCEDURE — 74011250637 HC RX REV CODE- 250/637: Performed by: FAMILY MEDICINE

## 2021-05-20 PROCEDURE — 83735 ASSAY OF MAGNESIUM: CPT

## 2021-05-20 PROCEDURE — 51798 US URINE CAPACITY MEASURE: CPT

## 2021-05-20 PROCEDURE — 85025 COMPLETE CBC W/AUTO DIFF WBC: CPT

## 2021-05-20 PROCEDURE — 74011250636 HC RX REV CODE- 250/636: Performed by: INTERNAL MEDICINE

## 2021-05-20 PROCEDURE — 74011250637 HC RX REV CODE- 250/637: Performed by: HOSPITALIST

## 2021-05-20 RX ORDER — BUMETANIDE 1 MG/1
1 TABLET ORAL DAILY
Qty: 30 TABLET | Refills: 0 | Status: SHIPPED | COMMUNITY
Start: 2021-05-20 | End: 2021-06-19

## 2021-05-20 RX ORDER — INSULIN LISPRO 100 [IU]/ML
5 INJECTION, SOLUTION INTRAVENOUS; SUBCUTANEOUS
Qty: 15 ML | Refills: 3 | Status: SHIPPED | OUTPATIENT
Start: 2021-05-20 | End: 2021-09-02

## 2021-05-20 RX ORDER — AMLODIPINE BESYLATE 5 MG/1
5 TABLET ORAL DAILY
Qty: 30 TABLET | Refills: 0 | Status: SHIPPED | COMMUNITY
Start: 2021-05-20 | End: 2021-06-19

## 2021-05-20 RX ORDER — INSULIN GLARGINE 100 [IU]/ML
10 INJECTION, SOLUTION SUBCUTANEOUS
Qty: 3 ML | Refills: 0 | Status: SHIPPED
Start: 2021-05-20 | End: 2021-06-19

## 2021-05-20 RX ORDER — SODIUM BICARBONATE 325 MG/1
325 TABLET ORAL 2 TIMES DAILY
Qty: 60 TABLET | Refills: 0 | Status: SHIPPED | COMMUNITY
Start: 2021-05-20 | End: 2021-06-19

## 2021-05-20 RX ORDER — TAMSULOSIN HYDROCHLORIDE 0.4 MG/1
0.4 CAPSULE ORAL DAILY
Qty: 30 CAPSULE | Refills: 0 | Status: SHIPPED | COMMUNITY
Start: 2021-05-20 | End: 2021-06-19

## 2021-05-20 RX ADMIN — Medication 100 MG: at 09:07

## 2021-05-20 RX ADMIN — FENTANYL CITRATE 50 MCG: 50 INJECTION, SOLUTION INTRAMUSCULAR; INTRAVENOUS at 02:31

## 2021-05-20 RX ADMIN — LOPERAMIDE HYDROCHLORIDE 2 MG: 2 CAPSULE ORAL at 02:31

## 2021-05-20 RX ADMIN — ASPIRIN 81 MG: 81 TABLET, CHEWABLE ORAL at 09:07

## 2021-05-20 RX ADMIN — FERROUS SULFATE TAB 325 MG (65 MG ELEMENTAL FE) 325 MG: 325 (65 FE) TAB at 09:09

## 2021-05-20 RX ADMIN — THERA TABS 1 TABLET: TAB at 09:08

## 2021-05-20 RX ADMIN — FENTANYL CITRATE 50 MCG: 50 INJECTION, SOLUTION INTRAMUSCULAR; INTRAVENOUS at 05:04

## 2021-05-20 RX ADMIN — BUMETANIDE 1 MG: 0.25 INJECTION INTRAMUSCULAR; INTRAVENOUS at 09:09

## 2021-05-20 RX ADMIN — INSULIN LISPRO 3 UNITS: 100 INJECTION, SOLUTION INTRAVENOUS; SUBCUTANEOUS at 09:06

## 2021-05-20 RX ADMIN — TAMSULOSIN HYDROCHLORIDE 0.4 MG: 0.4 CAPSULE ORAL at 09:09

## 2021-05-20 RX ADMIN — OXYCODONE 15 MG: 5 TABLET ORAL at 13:00

## 2021-05-20 RX ADMIN — POTASSIUM BICARBONATE 40 MEQ: 782 TABLET, EFFERVESCENT ORAL at 09:09

## 2021-05-20 RX ADMIN — CYCLOBENZAPRINE 5 MG: 10 TABLET, FILM COATED ORAL at 05:13

## 2021-05-20 RX ADMIN — FENTANYL CITRATE 50 MCG: 50 INJECTION, SOLUTION INTRAMUSCULAR; INTRAVENOUS at 10:43

## 2021-05-20 RX ADMIN — PANTOPRAZOLE SODIUM 40 MG: 40 TABLET, DELAYED RELEASE ORAL at 09:09

## 2021-05-20 RX ADMIN — INSULIN LISPRO 3 UNITS: 100 INJECTION, SOLUTION INTRAVENOUS; SUBCUTANEOUS at 12:52

## 2021-05-20 RX ADMIN — SODIUM BICARBONATE 325 MG: 650 TABLET ORAL at 09:07

## 2021-05-20 RX ADMIN — AMLODIPINE BESYLATE 5 MG: 5 TABLET ORAL at 09:07

## 2021-05-20 NOTE — PROGRESS NOTES
Nutrition Brief    Please see note from yesterday for more details. Pt with scheduled d/c in place. Showing clear signs of refeeding. TF only running at a rate of 25 mL/hr. Significant drop in phos and hyperglycemia. Mg on downward trend as well. K+ stable, but receiving schedule Effer-K 40 meq daily. Discussed with Dr. Ezra Cabot about ordering schedule Neutra Phos for a few days when goes to SNF and giving dose today as well. Stated he would order. No changes to previous recommendations. Would plan to increasing TF by 10 mL q 24 hours as currently ordered. Will attempt to contact RD at SNF for transition of care.         Recent Labs     05/20/21  0509 05/19/21  0712 05/18/21  1259   * 107*  112* 73   BUN 19 22*  22* 23*   CREA 1.70* 2.00*  2.03* 1.99*    137  138 140   K 3.5 3.5  3.5 3.0*    109*  108 108   CO2 25 18*  20* 26   CA 6.8* 7.8*  7.7* 7.7*   PHOS 2.6 3.2  3.1 3.6   MG 1.8 1.9 2.0       Recent Labs     05/20/21  0647 05/19/21  2113 05/19/21  1629 05/19/21  1126 05/19/21  0644 05/18/21  2214 05/18/21  1633 05/18/21  0706 05/17/21  2114 05/17/21  1738 05/17/21  1125   GLUCPOC 239* 255* 104 171* 137* 94 78 140* 106 94 119*       Branyd Roberto RD  Available via "[employer]

## 2021-05-20 NOTE — PROGRESS NOTES
Transition of Care Plan to SNF/Rehab    SNF/Rehab Transition:  Patient has been accepted to [facility] and meets criteria for admission. Patient will transported by [facility] [place] and expected to leave at 11am.    Communication to Patient/Family:  Met with patient and they are agreeable to the transition plan. Communication to SNF/Rehab:  Bedside RN, Winston Curtis, has been notified to update the transition plan to the facility and call report (phone number [phone]). Discharge information has been updated on the AVS.          Nursing Please include all hard scripts for controlled substances, med rec and dc summary, and AVS in packet. Reviewed and confirmed with facility, Admission liaison, can manage the patient care needs for the following:     SNF/Rehab Transition:  Patient to follow-up with Home Health: [facility], other ,none)  PCP/Specialist:         Reviewed and confirmed with facility, Liaison they can manage the patient care needs for the following:     Phi Romano with (X) only those applicable:    Medication:  []  Medications will be available at the facility  [x]  IV Antibiotics \"IV Daptomycin\" end 6/19  []  Controlled Substance - hard copy to be sent with patient   []  Weekly Labs   Documents:  [x] Hard RX  [x] MAR  [x] Kardex  [x] AVS  [x]Transfer Summary  [x]Discharge   Equipment:  []  CPAP/BiPAP  []  Wound Vacuum  [x]  Morrissey or Urinary Device  []  PICC/Central Line  []  Nebulizer  []  Ventilator   Treatment:  []Isolation (for MRSA, VRE, etc.)  []Surgical Drain Management  []Tracheostomy Care  []Dressing Changes  []Dialysis with transportation and chair time. [x]PEG Care  []Oxygen  []Daily Weights for Heart Failure   Dietary:  []Any diet limitations  [x]Tube Feedings   []Total Parenteral Management (TPN)   Eligible for Medicaid Long Term Services and Supports  Yes:  [x] Eligible for medical assistance or will become eligible within 180 days and UAI completed.    [] Provider/Patient and/or support system has requested screening. [] UAI copy provided to patient or responsible party,.  [] UAI unavailable at discharge will send once processed to SNF provider. [] UAI unavailable at discharged mailed to patient  No:   [] Private pay and is not financially eligible for Medicaid within the next 180 days. [] Reside out-of-state. [] A residents of a state owned/operated facility that is licensed  by [facility] and [facility] or [facility]  [] Enrollment in [facility] hosp[facility] services  [] [address] [place] [address]  [] Patient /Family declines to have screening completed or provide financial information for screening     Financial Resources:  Medicaid    [] Initiated and application pending   [] Full coverage     Advanced Care Plan:  []Surrogate Decision Maker of Care  []POA  [x]Communicated Code Status ( \"Full\")    Other       Medicare pt has received, reviewed, and signed 2nd IM letter informing them of their right to appeal the discharge. Signed copy has been placed on pt bedside chart.     CM: [address] [place],   [phone]

## 2021-05-20 NOTE — PROGRESS NOTES
Justin Notified Dr. Libra Thakkar that pt was bladder scanned at 683mLs, Morrissey was removed around 0530 per primary nurse. Received order to reinsert Morrissey. Nikita Colvin Notified Dr. Libra Thakkar that pt voided, re-bladder scanned at 185, but then voided again. Order received to d/c Morrissey reinsertion order.

## 2021-05-20 NOTE — PROGRESS NOTES
Problem: Falls - Risk of  Goal: *Absence of Falls  Description: Document Starla Carmona Fall Risk and appropriate interventions in the flowsheet. Outcome: Progressing Towards Goal  Note: Fall Risk Interventions:       Mentation Interventions: Adequate sleep, hydration, pain control, Door open when patient unattended, More frequent rounding, Toileting rounds, Update white board    Medication Interventions: Evaluate medications/consider consulting pharmacy    Elimination Interventions: Call light in reach, Patient to call for help with toileting needs, Toileting schedule/hourly rounds (hoyos in place)    History of Falls Interventions: Consult care management for discharge planning, Door open when patient unattended, Evaluate medications/consider consulting pharmacy         Problem: Patient Education: Go to Patient Education Activity  Goal: Patient/Family Education  Outcome: Progressing Towards Goal     Problem: Pressure Injury - Risk of  Goal: *Prevention of pressure injury  Description: Document Celestino Scale and appropriate interventions in the flowsheet. Outcome: Progressing Towards Goal  Note: Pressure Injury Interventions:  Sensory Interventions: Assess changes in LOC, Avoid rigorous massage over bony prominences, Float heels, Keep linens dry and wrinkle-free, Maintain/enhance activity level, Minimize linen layers, Pressure redistribution bed/mattress (bed type), Turn and reposition approx.  every two hours (pillows and wedges if needed)    Moisture Interventions: Absorbent underpads, Apply protective barrier, creams and emollients, Check for incontinence Q2 hours and as needed, Internal/External urinary devices, Maintain skin hydration (lotion/cream), Minimize layers, Moisture barrier    Activity Interventions: Increase time out of bed, Pressure redistribution bed/mattress(bed type), PT/OT evaluation    Mobility Interventions: Float heels, Pressure redistribution bed/mattress (bed type), PT/OT evaluation, Turn and reposition approx. every two hours(pillow and wedges)    Nutrition Interventions: Document food/fluid/supplement intake, Offer support with meals,snacks and hydration (tube feeds)    Friction and Shear Interventions: Apply protective barrier, creams and emollients, Feet elevated on foot rest, Foam dressings/transparent film/skin sealants, Lift sheet, Minimize layers                Problem: Patient Education: Go to Patient Education Activity  Goal: Patient/Family Education  Outcome: Progressing Towards Goal     Problem: Diabetes Self-Management  Goal: *Disease process and treatment process  Description: Define diabetes and identify own type of diabetes; list 3 options for treating diabetes. Outcome: Progressing Towards Goal  Goal: *Incorporating nutritional management into lifestyle  Description: Describe effect of type, amount and timing of food on blood glucose; list 3 methods for planning meals. Outcome: Progressing Towards Goal  Goal: *Incorporating physical activity into lifestyle  Description: State effect of exercise on blood glucose levels. Outcome: Progressing Towards Goal  Goal: *Developing strategies to promote health/change behavior  Description: Define the ABC's of diabetes; identify appropriate screenings, schedule and personal plan for screenings. Outcome: Progressing Towards Goal  Goal: *Using medications safely  Description: State effect of diabetes medications on diabetes; name diabetes medication taking, action and side effects. Outcome: Progressing Towards Goal  Goal: *Monitoring blood glucose, interpreting and using results  Description: Identify recommended blood glucose targets  and personal targets. Outcome: Progressing Towards Goal  Goal: *Prevention, detection, treatment of acute complications  Description: List symptoms of hyper- and hypoglycemia; describe how to treat low blood sugar and actions for lowering  high blood glucose level.   Outcome: Progressing Towards Goal  Goal: *Prevention, detection and treatment of chronic complications  Description: Define the natural course of diabetes and describe the relationship of blood glucose levels to long term complications of diabetes. Outcome: Progressing Towards Goal  Goal: *Developing strategies to address psychosocial issues  Description: Describe feelings about living with diabetes; identify support needed and support network  Outcome: Progressing Towards Goal  Goal: *Insulin pump training  Outcome: Progressing Towards Goal  Goal: *Sick day guidelines  Outcome: Progressing Towards Goal  Goal: *Patient Specific Goal (EDIT GOAL, INSERT TEXT)  Outcome: Progressing Towards Goal     Problem: Patient Education: Go to Patient Education Activity  Goal: Patient/Family Education  Outcome: Progressing Towards Goal     Problem: Patient Education: Go to Patient Education Activity  Goal: Patient/Family Education  Outcome: Progressing Towards Goal     Problem: Patient Education: Go to Patient Education Activity  Goal: Patient/Family Education  Outcome: Progressing Towards Goal     Problem: Nutrition Deficit  Goal: *Optimize nutritional status  Outcome: Progressing Towards Goal     Problem: Breathing Pattern - Ineffective  Goal: *Absence of hypoxia  Outcome: Progressing Towards Goal  Goal: *Use of effective breathing techniques  Outcome: Progressing Towards Goal  Goal: *PALLIATIVE CARE:  Alleviation of Dyspnea  Outcome: Progressing Towards Goal     Problem: Patient Education: Go to Patient Education Activity  Goal: Patient/Family Education  Outcome: Progressing Towards Goal     Problem: Risk for Spread of Infection  Goal: Prevent transmission of infectious organism to others  Description: Prevent the transmission of infectious organisms to other patients, staff members, and visitors.   Outcome: Progressing Towards Goal     Problem: Patient Education:  Go to Education Activity  Goal: Patient/Family Education  Outcome: Progressing Towards Goal     Problem: Patient Education: Go to Patient Education Activity  Goal: Patient/Family Education  Outcome: Progressing Towards Goal     Problem: Infection - Risk of, Urinary Catheter-Associated Urinary Tract Infection  Goal: *Absence of infection signs and symptoms  Outcome: Progressing Towards Goal     Problem: Patient Education: Go to Patient Education Activity  Goal: Patient/Family Education  Outcome: Progressing Towards Goal

## 2021-05-20 NOTE — PROGRESS NOTES
Transition of Care Plan/Discharge   RUR- 26%   DISPOSITION: The disposition plan is transition to a SNF; [facility]   o Call Report: [phone]   F/U with PCP/Specialist     Transport: [facility]; 11am transport 5/20; Transport delayed until 1pm       1047am    [facility] contacted this cm informed him that the pt's transport has been delayed until 1:00pm.      Care Management Interventions  PCP Verified by CM: Yes  Mode of Transport at Discharge: ALS ([facility]; 11am)  Transition of Care Consult (CM Consult): SNF  Partner SNF: Yes  MyChart Signup: Yes  Discharge Durable Medical Equipment: No  Physical Therapy Consult: Yes  Occupational Therapy Consult: Yes  Speech Therapy Consult: Yes  Current Support Network: Lives with Spouse, Family Lives Nearby  Confirm Follow Up Transport: Family  The Patient and/or Patient Representative was Provided with a Choice of Provider and Agrees with the Discharge Plan?: Yes  Name of the Patient Representative Who was Provided with a Choice of Provider and Agrees with the Discharge Plan: Eugene Quintero  Freedom of Choice List was Provided with Basic Dialogue that Supports the Patient's Individualized Plan of Care/Goals, Treatment Preferences and Shares the Quality Data Associated with the Providers?: Yes  Discharge Location  Discharge Placement: Skilled nursing facility ([facility] )    Medicare pt has received, reviewed, and signed 2nd IM letter informing them of their right to appeal the discharge. Signed copy has been placed on pt bedside chart.   CM: [address] [place],   [phone]

## 2021-05-20 NOTE — PROGRESS NOTES
ID Progress Note  2021    Subjective:     No new complaints    Review of Systems:            Symptom Y/N Comments   Symptom Y/N Comments   Fever/Chills n      Chest Pain n      Poor Appetite  y     Edema        Cough n      Abdominal Pain        Sputum n      Joint Pain  y  lumbar spine    SOB/MASSEY n     Pruritis/Rash        Nausea/vomit n      Tolerating PT/OT        Diarrhea  n     Tolerating Diet        Constipation  n     Other           Could NOT obtain due to:       Objective:     Vitals:   Visit Vitals  BP (!) 145/77 (BP 1 Location: Left upper arm, BP Patient Position: At rest)   Pulse 97   Temp 98 °F (36.7 °C)   Resp 16   Ht 5' 11\" (1.803 m)   Wt 60.6 kg (133 lb 11.2 oz)   SpO2 100%   BMI 18.65 kg/m²        Tmax:  Temp (24hrs), Av.8 °F (36.6 °C), Min:97.4 °F (36.3 °C), Max:98 °F (36.7 °C)      PHYSICAL EXAM:  General: Chronically ill appearing, WD, WN. Alert, uncooperative, no acute distress    EENT:  EOMI. Anicteric sclerae. MMM  Resp:  Clear in apex with decreased breath sounds at bases, no wheezing or rales. No accessory muscle use  CV:  Regular  rhythm,  No edema  GI:  Soft, Non distended, Non tender. +Bowel sounds, PEG tube in place  Neurologic:  Alert and oriented X self, place, normal speech,   Psych:   Fair insight. Not anxious nor agitated  Skin:  No rashes.   No jaundice, right stump; dressing dry and intact, incision approximated, no erythema, no drainage     Labs:   Lab Results   Component Value Date/Time    WBC 9.8 2021 05:15 AM    Hemoglobin (POC) 5.2 (LL) 2021 08:37 AM    Hemoglobin, POC 11.2 (L) 2014 11:05 PM    HGB 8.3 (L) 2021 05:15 AM    Hematocrit (POC) 26 (L) 10/09/2018 07:16 AM    Hematocrit, POC 33 (L) 2014 11:05 PM    HCT 27.6 (L) 2021 05:15 AM    PLATELET 704 8232 05:15 AM    MCV 91.1 2021 05:15 AM     Lab Results   Component Value Date/Time    Sodium 140 2021 05:09 AM    Potassium 3.5 2021 05:09 AM    Chloride 107 05/20/2021 05:09 AM    CO2 25 05/20/2021 05:09 AM    Anion gap 8 05/20/2021 05:09 AM    Glucose 203 (H) 05/20/2021 05:09 AM    BUN 19 05/20/2021 05:09 AM    Creatinine 1.70 (H) 05/20/2021 05:09 AM    BUN/Creatinine ratio 11 (L) 05/20/2021 05:09 AM    GFR est AA 49 (L) 05/20/2021 05:09 AM    GFR est non-AA 40 (L) 05/20/2021 05:09 AM    Calcium 6.8 (L) 05/20/2021 05:09 AM    Bilirubin, total 0.3 05/02/2021 02:00 AM    Alk. phosphatase 234 (H) 05/02/2021 02:00 AM    Protein, total 7.2 05/02/2021 02:00 AM    Albumin 1.3 (L) 05/19/2021 07:12 AM    Globulin 6.0 (H) 05/02/2021 02:00 AM    A-G Ratio 0.2 (L) 05/02/2021 02:00 AM    ALT (SGPT) 13 05/02/2021 02:00 AM       CT of Chest, ABD/PEL (5/2); L4-5 findings most likely represent discitis-osteomyelitis. Soft tissue edema from hypoalbuminemia. Right upper lobe pneumonia. Bilateral lower lobe atelectasis vs pneumonia. Trace bilateral pleural effusions. ABX hx:   IV cefepime 5/3-5/8   PO Levo 5/9  PO clindamycin 5/1-5/5  IV zosyn 5/3, 5/12-5/18  IV vancomycin 5/3-5/7  IV Daptomycin 5/7 -  IV Rifampin 5/10-5/13    Assessment and Plan     HAP; completed first tmt on 5/9  - recurrent fever  5/12, afebrile overnight    Procal 2.5    CXR (5/12) New bibasilar interstitial and patchy airspace disease.    completed second tmt for HAP with IV Zosyn as of 5/18     Pt is in high risk of recurrent PNA and aspiration PNA.  Elevate HOB to 30 degree during feeding     Encourage IS 10x/every hour when awake    L4-5 Discitis/OM   Bacteremia  S/p laminectomy C3-4 (2014)   - WBC 11.5 (s/p granix on 5/13) afebrile    TTE (5/5) no vegetation    Blood cx (5/1, 5/3, 5/4, 5/6, 5/7, 5/8) MRSA    Blood cx (5/9) no growth    Blood cx (5/10 & 5/12) no growth so far      MRI of lumbar spine (5/3) discitis/OM at L3-4 & L4-5, no evidence of epidural abscess or cord compression    Once again, repeat MRI of lumbar spine (5/10) revealed no epidural abscess    -> ortho recommends medical therapy at this time.     IV vancomycin changed to daptomycin on 5/7     Rifampin was d/c'd on 5/13 with concerns of leukopenia        Continue with IV Daptomycin, total 6 weeks from negative blood cx,(5/9). Last dose 6/19     Pt will need Murali catheter 5/18     Discharge IV ABX order (5/17) has been updated it. CK 17->13 (5/14)    Leukopenia  G6PD deficiency  - appreciate heme/onc input; received granix on 5/13    Anemia secondary to CKD  - hgb 8.3; primary team following    Bladder retention  -  Morrissey in place. U/A (-)    severe protein-calorie malnutrition  - s/p PEG tube placement 5/17    Pt is clear to discharge in ID stand point. ID team signing off. Please contact us with any questions.     Hyunsun Meryle Bound, NP

## 2021-05-20 NOTE — PROGRESS NOTES
Nephrology Progress Note  Evelio Escamilla. Date of Admission : 5/1/2021    CC: Follow up for CKD       Assessment and Plan     CKD 4:  - 2/2 DM, HTN   - Cr well below his baseline   - cont Bumex 1mg daily on d/c   - Daily labs    Urinary retention:  - failed voiding trial x 2   - hoyos re-inserted 5/12  - continue flomax  - voiding trial at [facility]     Hypo K and Hypo Phos  :  - replete PRN     Severe protein calorie Malnutrition   - PEG placed 5/17    Anemia of CKD:  - iron studies ok  - cont MICHAEL 3 times per week   - hematology following    MRSA bacteremia:  - abx per ID  - TTE neg for vegetations  - on IV dapto+ oral rifampin     HTN:  - BP stable    DM2:  - on insulin    R AKA    L3-4, L4-5 discitis/osteomyelitis:  - on abx    Sacral wound       Interval History:  Seen and examined. He reports going to [facility] today. Cr down to 1.7 mg/dl  . No cp, sob, n/v/d. Current Medications: all current  Medications have been eviewed in EPIC  Review of Systems: A comprehensive review of systems was negative except for that written in the HPI. Objective:  Vitals:    Vitals:    05/19/21 1436 05/19/21 1906 05/19/21 2155 05/20/21 0249   BP: (!) 158/88 (!) 192/93 (!) 167/87 (!) 145/77   Pulse: 94 92  97   Resp: 16 17  16   Temp: 97.4 °F (36.3 °C) 98 °F (36.7 °C)  98 °F (36.7 °C)   SpO2: 100% 99%  100%   Weight:       Height:         Intake and Output:  No intake/output data recorded.   05/18 1901 - 05/20 0700  In: 2225 [P.O.:1200]  Out: 1900 [Urine:1900]    Physical Examination:  General: No distress  Neck:  Supple, no mass  Resp:  Lungs CTA B/L, no wheezing , normal respiratory effort  CV:  RRR,  no murmur or rub,R AKA, no edema L  GI:  Soft, NT, + PEG   Neurologic:  aao x 3   Skin:  No Rash   : hoyos +    []    High complexity decision making was performed  []    Patient is at high-risk of decompensation with multiple organ involvement    Lab Data Personally Reviewed: I have reviewed all the pertinent labs, microbiology data and radiology studies during assessment.     Recent Labs     05/20/21  0509 05/19/21  0712 05/18/21  1259    137  138 140   K 3.5 3.5  3.5 3.0*    109*  108 108   CO2 25 18*  20* 26   * 107*  112* 73   BUN 19 22*  22* 23*   CREA 1.70* 2.00*  2.03* 1.99*   CA 6.8* 7.8*  7.7* 7.7*   MG 1.8 1.9 2.0   PHOS 2.6 3.2  3.1 3.6   ALB  --  1.3*  --      Recent Labs     05/20/21  0515 05/18/21  1259   WBC 9.8 11.5*   HGB 8.3* 8.7*   HCT 27.6* 29.5*    310     Lab Results   Component Value Date/Time    Specimen Description: JOSE 12/04/2012 10:12 PM    Specimen Description: TOE 12/02/2012 05:03 PM    Specimen Description: TOE 12/02/2012 05:03 PM     Lab Results   Component Value Date/Time    Culture result: NO GROWTH 5 DAYS 05/12/2021 12:06 PM    Culture result: NO GROWTH 5 DAYS 05/10/2021 01:24 PM    Culture result: NO GROWTH 5 DAYS 05/09/2021 11:01 AM     Recent Results (from the past 24 hour(s))   GLUCOSE, POC    Collection Time: 05/19/21 11:26 AM   Result Value Ref Range    Glucose (POC) 171 (H) 65 - 117 mg/dL    Performed by Ysitie 6, POC    Collection Time: 05/19/21  4:29 PM   Result Value Ref Range    Glucose (POC) 104 65 - 117 mg/dL    Performed by Nguyễn Mullins   PCT    GLUCOSE, POC    Collection Time: 05/19/21  9:13 PM   Result Value Ref Range    Glucose (POC) 255 (H) 65 - 117 mg/dL    Performed by MARCIO Mcclendon    METABOLIC PANEL, BASIC    Collection Time: 05/20/21  5:09 AM   Result Value Ref Range    Sodium 140 136 - 145 mmol/L    Potassium 3.5 3.5 - 5.1 mmol/L    Chloride 107 97 - 108 mmol/L    CO2 25 21 - 32 mmol/L    Anion gap 8 5 - 15 mmol/L    Glucose 203 (H) 65 - 100 mg/dL    BUN 19 6 - 20 MG/DL    Creatinine 1.70 (H) 0.70 - 1.30 MG/DL    BUN/Creatinine ratio 11 (L) 12 - 20      GFR est AA 49 (L) >60 ml/min/1.73m2    GFR est non-AA 40 (L) >60 ml/min/1.73m2    Calcium 6.8 (L) 8.5 - 10.1 MG/DL   MAGNESIUM    Collection Time: 05/20/21  5:09 AM   Result Value Ref Range    Magnesium 1.8 1.6 - 2.4 mg/dL   PHOSPHORUS    Collection Time: 05/20/21  5:09 AM   Result Value Ref Range    Phosphorus 2.6 2.6 - 4.7 MG/DL   CBC WITH AUTOMATED DIFF    Collection Time: 05/20/21  5:15 AM   Result Value Ref Range    WBC 9.8 4.1 - 11.1 K/uL    RBC 3.03 (L) 4.10 - 5.70 M/uL    HGB 8.3 (L) 12.1 - 17.0 g/dL    HCT 27.6 (L) 36.6 - 50.3 %    MCV 91.1 80.0 - 99.0 FL    MCH 27.4 26.0 - 34.0 PG    MCHC 30.1 30.0 - 36.5 g/dL    RDW 18.8 (H) 11.5 - 14.5 %    PLATELET 883 179 - 992 K/uL    MPV 9.1 8.9 - 12.9 FL    NRBC 0.3 (H) 0  WBC    ABSOLUTE NRBC 0.03 (H) 0.00 - 0.01 K/uL    NEUTROPHILS 77 (H) 32 - 75 %    LYMPHOCYTES 14 12 - 49 %    MONOCYTES 6 5 - 13 %    EOSINOPHILS 1 0 - 7 %    BASOPHILS 1 0 - 1 %    IMMATURE GRANULOCYTES 1 (H) 0.0 - 0.5 %    ABS. NEUTROPHILS 7.6 1.8 - 8.0 K/UL    ABS. LYMPHOCYTES 1.4 0.8 - 3.5 K/UL    ABS. MONOCYTES 0.6 0.0 - 1.0 K/UL    ABS. EOSINOPHILS 0.1 0.0 - 0.4 K/UL    ABS. BASOPHILS 0.1 0.0 - 0.1 K/UL    ABS. IMM. GRANS. 0.1 (H) 0.00 - 0.04 K/UL    DF AUTOMATED     GLUCOSE, POC    Collection Time: 05/20/21  6:47 AM   Result Value Ref Range    Glucose (POC) 239 (H) 65 - 117 mg/dL    Performed by [address], [place]  [address] [place] [address]  Phone - [phone]   Fax - [phone]  [email or web address]

## 2021-05-20 NOTE — PROGRESS NOTES
Problem: Falls - Risk of  Goal: *Absence of Falls  Description: Document Jose Veronica Fall Risk and appropriate interventions in the flowsheet. Outcome: Resolved/Met  Note: Fall Risk Interventions:     Mentation Interventions: Adequate sleep, hydration, pain control    Medication Interventions: Evaluate medications/consider consulting pharmacy    Elimination Interventions: Call light in reach    History of Falls Interventions: Door open when patient unattended    Problem: Patient Education: Go to Patient Education Activity  Goal: Patient/Family Education  Outcome: Resolved/Met     Problem: Pressure Injury - Risk of  Goal: *Prevention of pressure injury  Description: Document Celestino Scale and appropriate interventions in the flowsheet. Outcome: Resolved/Met  Note: Pressure Injury Interventions:  Sensory Interventions: Assess changes in LOC    Moisture Interventions: Absorbent underpads    Activity Interventions: Assess need for specialty bed    Mobility Interventions: Float heels    Nutrition Interventions: Document food/fluid/supplement intake    Friction and Shear Interventions: Apply protective barrier, creams and emollients    Problem: Patient Education: Go to Patient Education Activity  Goal: Patient/Family Education  Outcome: Resolved/Met     Problem: Diabetes Self-Management  Goal: *Disease process and treatment process  Description: Define diabetes and identify own type of diabetes; list 3 options for treating diabetes. Outcome: Resolved/Met  Goal: *Incorporating nutritional management into lifestyle  Description: Describe effect of type, amount and timing of food on blood glucose; list 3 methods for planning meals. Outcome: Resolved/Met  Goal: *Incorporating physical activity into lifestyle  Description: State effect of exercise on blood glucose levels.   Outcome: Resolved/Met  Goal: *Developing strategies to promote health/change behavior  Description: Define the ABC's of diabetes; identify appropriate screenings, schedule and personal plan for screenings. Outcome: Resolved/Met  Goal: *Using medications safely  Description: State effect of diabetes medications on diabetes; name diabetes medication taking, action and side effects. Outcome: Resolved/Met  Goal: *Monitoring blood glucose, interpreting and using results  Description: Identify recommended blood glucose targets  and personal targets. Outcome: Resolved/Met  Goal: *Prevention, detection, treatment of acute complications  Description: List symptoms of hyper- and hypoglycemia; describe how to treat low blood sugar and actions for lowering  high blood glucose level. Outcome: Resolved/Met  Goal: *Prevention, detection and treatment of chronic complications  Description: Define the natural course of diabetes and describe the relationship of blood glucose levels to long term complications of diabetes.   Outcome: Resolved/Met  Goal: *Developing strategies to address psychosocial issues  Description: Describe feelings about living with diabetes; identify support needed and support network  Outcome: Resolved/Met  Goal: *Insulin pump training  Outcome: Resolved/Met  Goal: *Sick day guidelines  Outcome: Resolved/Met     Problem: Patient Education: Go to Patient Education Activity  Goal: Patient/Family Education  Outcome: Resolved/Met     Problem: Patient Education: Go to Patient Education Activity  Goal: Patient/Family Education  Outcome: Resolved/Met     Problem: Patient Education: Go to Patient Education Activity  Goal: Patient/Family Education  Outcome: Resolved/Met     Problem: Nutrition Deficit  Goal: *Optimize nutritional status  Outcome: Resolved/Met     Problem: Breathing Pattern - Ineffective  Goal: *Absence of hypoxia  Outcome: Resolved/Met  Goal: *Use of effective breathing techniques  Outcome: Resolved/Met  Goal: *PALLIATIVE CARE:  Alleviation of Dyspnea  Outcome: Resolved/Met     Problem: Patient Education: Go to Patient Education Activity  Goal: Patient/Family Education  Outcome: Resolved/Met     Problem: Risk for Spread of Infection  Goal: Prevent transmission of infectious organism to others  Description: Prevent the transmission of infectious organisms to other patients, staff members, and visitors.   Outcome: Resolved/Met     Problem: Patient Education:  Go to Education Activity  Goal: Patient/Family Education  Outcome: Resolved/Met     Problem: Patient Education: Go to Patient Education Activity  Goal: Patient/Family Education  Outcome: Resolved/Met     Problem: Infection - Risk of, Urinary Catheter-Associated Urinary Tract Infection  Goal: *Absence of infection signs and symptoms  Outcome: Resolved/Met     Problem: Patient Education: Go to Patient Education Activity  Goal: Patient/Family Education  Outcome: Resolved/Met

## 2021-05-20 NOTE — DISCHARGE INSTRUCTIONS
Discharge Instructions       PATIENT ID: June Shorten. MRN: [id number]   YOB: 1955    DATE OF ADMISSION: 5/1/2021  4:38 PM    DATE OF DISCHARGE: 5/20/2021    PRIMARY CARE PROVIDER: Alex Gaona MD     ATTENDING PHYSICIAN: Amy Phelps MD  DISCHARGING PROVIDER: Tomas Rosen MD    To contact this individual call [phone] and ask the  to page. If unavailable ask to be transferred the Adult Hospitalist Department. DISCHARGE DIAGNOSES  MRSA bacteremia / OM    CONSULTATIONS: IP CONSULT TO NEPHROLOGY  IP CONSULT TO INFECTIOUS DISEASES  IP CONSULT TO ONCOLOGY  IP CONSULT TO GASTROENTEROLOGY  IP CONSULT TO ORTHOPEDIC SURGERY  IP CONSULT TO VASCULAR SURGERY    PROCEDURES/SURGERIES: Procedure(s):  PERCUTANEOUS ENDOSCOPIC GASTROSTOMY TUBE INSERTION  ESOPHAGOGASTRODUODENOSCOPY (EGD)  ESOPHAGOGASTRODUODENAL (EGD) BIOPSY    PENDING TEST RESULTS:   At the time of discharge the following test results are still pending:     FOLLOW UP APPOINTMENTS:   Follow-up Information     Follow up With Specialties Details Why Contact Info    [facility] [place]  [phone]    Alex Gaona MD Internal Medicine In 1 week  [address] [place]  [address] [place]  [phone]             ADDITIONAL CARE RECOMMENDATIONS: please call [facility] urology for a voiding trial appointment in 2-3 weeks     Increase TF of Glucerna 1.2 to 25 mL/hr, and then continue increasing by 10 mL q 24 hours until reaches goal of 55 mL/hr. High refeeding risk, needs electrolytes monitored closely with PRN repletion. Flush with 50 mL h2O q 4 hours.       DIET: Regular Diet soft       ACTIVITY: Activity as tolerated    WOUND CARE: as instructed    EQUIPMENT needed:       Radiology      DISCHARGE MEDICATIONS:   See Medication Reconciliation Form    · It is important that you take the medication exactly as they are prescribed. · Keep your medication in the bottles provided by the pharmacist and keep a list of the medication names, dosages, and times to be taken in your wallet. · Do not take other medications without consulting your doctor. NOTIFY YOUR PHYSICIAN FOR ANY OF THE FOLLOWING:   Fever over 101 degrees for 24 hours. Chest pain, shortness of breath, fever, chills, nausea, vomiting, diarrhea, change in mentation, falling, weakness, bleeding. Severe pain or pain not relieved by medications. Or, any other signs or symptoms that you may have questions about.       DISPOSITION:    Home With:   OT  PT  HH  RN      x SNF/Inpatient Rehab/LTAC    Independent/assisted living    Hospice    Other:     CDMP Checked:   Yes x     PROBLEM LIST Updated:  Yes x       Signed:   Melody Armstrong MD  5/20/2021  8:57 AM

## 2021-05-20 NOTE — PROGRESS NOTES
TRANSFER - OUT REPORT:    Verbal report given to Javire Marcos on Tsosie Castleman.  being transferred to [facility] for routine progression of care       Report consisted of patients Situation, Background, Assessment and   Recommendations(SBAR). Information from the following report(s) SBAR, Kardex, Intake/Output, MAR, Recent Results and Med Rec Status was reviewed with the receiving nurse. Lines:   Venous Access Device dual lumen with cuff (\"Murali\") CVC 05/18/21 Upper chest (subclavicular area, right (Active)   Central Line Being Utilized Yes 05/20/21 0900   Criteria for Appropriate Use Long term IV/antibiotic administration 05/20/21 0900   Site Assessment Clean, dry, & intact 05/20/21 0900   Date of Last Dressing Change 05/18/21 05/20/21 0900   Dressing Status Clean, dry, & intact 05/20/21 0900   Dressing Type Disk with Chlorhexadine gluconate (CHG) 05/20/21 0900   Action Taken Other (comment) 05/20/21 0900   Positive Blood Return (Medial Site) Yes 05/19/21 2130   Positive Blood Return (Lateral Site) Yes 05/19/21 2130   Alcohol Cap Used Yes 05/20/21 0900        Opportunity for questions and clarification was provided. Patient transported with:    JOSE    Discharge education completed with pt and wife. IV removed and information packet on tube feeds was sent with [facility].

## 2021-05-20 NOTE — DISCHARGE SUMMARY
Discharge Summary       PATIENT ID: Juan Carlos Briones MRN: [id number]   YOB: 1955    DATE OF ADMISSION: 5/1/2021  4:38 PM    DATE OF DISCHARGE: 5/20/21   PRIMARY CARE PROVIDER: Renu Mcgarry MD     ATTENDING PHYSICIAN: Deb Sesay  DISCHARGING PROVIDER: Althea Bailey MD    To contact this individual call [phone] and ask the  to page. If unavailable ask to be transferred the Adult Hospitalist Department. CONSULTATIONS: IP CONSULT TO NEPHROLOGY  IP CONSULT TO INFECTIOUS DISEASES  IP CONSULT TO ONCOLOGY  IP CONSULT TO GASTROENTEROLOGY  IP CONSULT TO ORTHOPEDIC SURGERY  IP CONSULT TO VASCULAR SURGERY    PROCEDURES/SURGERIES: Procedure(s):  PERCUTANEOUS ENDOSCOPIC GASTROSTOMY TUBE INSERTION  ESOPHAGOGASTRODUODENOSCOPY (EGD)  ESOPHAGOGASTRODUODENAL (EGD) BIOPSY    ADMITTING [address] [place] [address] COURSE:   This is a 70-year-old man with a past medical history significant for chronic kidney disease, type 2 diabetes, dyslipidemia, hypertension, obstructive sleep apnea, status post recent right above-knee amputation, degenerative disk disease, and chronic pain syndrome, was in his usual state of health until the day of presentation at the emergency room when it was reported that the patient became lethargic and confused at his rehab facility             Assessment & Plan:      MRSA bacteremia - first cleared culture 5/9 (persistnet prior since 5/1)  L4-L5 Discitis and OM  Sacral wound   Bacteremia  S/p laminectomy C3-4 (2014)   - EPI 27.6 (s/p granix on 5/13) afebrile    TTE (5/5) no vegetation    Blood cx (5/1, 5/3, 5/4, 5/6, 5/7, 5/8) MRSA    Blood cx (5/9) no growth    Blood cx (5/10 & 5/12) no growth so far       MRI of lumbar spine (5/3) discitis/OM at L3-4 & L4-5, no evidence of epidural abscess or cord compression    Once again, repeat MRI of lumbar spine (5/10) revealed no epidural abscess    -> ortho recommends medical therapy at this time.      IV vancomycin changed to daptomycin on 5/7     Rifampin was d/c'd on 5/13 with concerns of leukopenia        Continue with IV Daptomycin, total 6 weeks from negative blood cx,(5/9). Last dose 6/19          Acute metabolic encephalopathy - multifactorial resolved      Anemia - chronic disease CKD  On bumex / f/u with nephrology d/c'd  ACE     ESTRELLA on CKD stage III - pre renal and sepsis   - Cr 1.7 @ d/c      S/p R BKA - recent  - Wound care following, no sign of local infection   - Vascular consult for prosthesis recs     Hyponatremia - resolved   HTN - holding lisinopril may use CCB   Type 2 diabetes - A1c 7.2%  SSI, POCT glucose checks and hypoglycemia protocol   HLD - home statin   HCAP - treated   Urinary retention - now failed x 2 void trials. Keep hoyos. Continue flomax      PEG - Increase TF of Glucerna 1.2 to 25 mL/hr, and then continue increasing by 10 mL q 24 hours until reaches goal of 55 mL/hr. High refeeding risk, needs electrolytes monitored closely with PRN repletion. Flush with 50 mL h2O q 4 hours. Can eat soft diet as well     ROSEANNE catheter for IV abx             DISCHARGE DIAGNOSES / PLAN:      1. D/c to SNF today      ADDITIONAL CARE RECOMMENDATIONS:       PENDING TEST RESULTS:   At the time of discharge the following test results are still pending:     FOLLOW UP APPOINTMENTS:    Follow-up Information     Follow up With Specialties Details Why Contact Jessica ROSARIO [facility] Fahad QUICK Flores [phone] [facility]    Carolina Moya MD Internal Medicine In 1 week  [address] [facility]  [phone]               DIET: PEG feeding at ml/hr and Dysphagia diet-pureed  PEG - Increase TF of Glucerna 1.2 to 25 mL/hr, and then continue increasing by 10 mL q 24 hours until reaches goal of 55 mL/hr. High refeeding risk, needs electrolytes monitored closely with PRN repletion. Flush with 50 mL h2O q 4 hours.   Can eat soft diet as well       ACTIVITY: Activity as tolerated    WOUND CARE: as instructed     EQUIPMENT needed:       DISCHARGE MEDICATIONS:  Current Discharge Medication List      START taking these medications    Details   amLODIPine (NORVASC) 5 mg tablet Take 1 Tablet by mouth daily for 30 days. Qty: 30 Tablet, Refills: 0  Start date: 5/20/2021, End date: 6/19/2021      tamsulosin (FLOMAX) 0.4 mg capsule Take 1 Capsule by mouth daily for 30 days. Qty: 30 Capsule, Refills: 0  Start date: 5/20/2021, End date: 6/19/2021      bumetanide (BUMEX) 1 mg tablet Take 1 Tablet by mouth daily for 30 days. Qty: 30 Tablet, Refills: 0  Start date: 5/20/2021, End date: 6/19/2021      DAPTOmycin (CUBICIN RF) IVPB 600 mg by IntraVENous route every twenty-four (24) hours for 30 days. Qty: 30 Dose, Refills: 0  Start date: 5/20/2021, End date: 6/19/2021      sodium bicarbonate 325 mg tablet Take 1 Tablet by mouth two (2) times a day for 30 days. Qty: 60 Tablet, Refills: 0  Start date: 5/20/2021, End date: 6/19/2021      Added Neutra-Phos to current medication list for next 15 days to prevent refeeding syndrome     CONTINUE these medications which have CHANGED    Details   insulin glargine (Lantus Solostar U-100 Insulin) 100 unit/mL (3 mL) inpn 10 Units by SubCUTAneous route nightly for 30 days. Qty: 3 mL, Refills: 0  Start date: 5/20/2021, End date: 6/19/2021      insulin lispro (HUMALOG) 100 unit/mL kwikpen 5 Units by SubCUTAneous route Before breakfast, lunch, and dinner. Qty: 15 mL, Refills: 3  Start date: 5/20/2021         CONTINUE these medications which have NOT CHANGED    Details   ferrous sulfate 325 mg (65 mg iron) tablet TAKE ONE TABLET BY MOUTH 2 TIMES A DAY  Qty: 100 Tab, Refills: 1    Associated Diagnoses: Iron deficiency anemia, unspecified iron deficiency anemia type      ergocalciferol (ERGOCALCIFEROL) 1,250 mcg (50,000 unit) capsule Take 50,000 Units by mouth Every Friday.       rosuvastatin (CRESTOR) 40 mg tablet TAKE ONE TABLET BY MOUTH EVERY DAY  Qty: 80 Tab, Refills: 0    Associated Diagnoses: Dyslipidemia; Neuropathy      albuterol (Ventolin HFA) 90 mcg/actuation inhaler Take 2 Puffs by inhalation every four (4) hours as needed for Wheezing. Qty: 1 Inhaler, Refills: 4      SSD 1 % topical cream APPLY TO AFFECTED AREA DAILY  Qty: 90 g, Refills: 0    Associated Diagnoses: Ulcer of right lower extremity with fat layer exposed (HCC)      pantoprazole (PROTONIX) 40 mg tablet TAKE 1 TABLET BY MOUTH EVERY DAY  Qty: 90 Tab, Refills: 1    Associated Diagnoses: Gastroesophageal reflux disease without esophagitis      sulfaSALAzine (AZULFIDINE) 500 mg tablet Take 800 mg by mouth two (2) times a day. epoetin naman (EPOGEN;PROCRIT) 20,000 unit/mL injection 20,000-25,000 Units by SubCUTAneous route Once every 2 weeks. aspirin 81 mg chewable tablet Take 81 mg by mouth daily. STOP taking these medications       oxyCODONE IR (OXY-IR) 15 mg immediate release tablet Comments:   Reason for Stopping:         oxyCODONE-acetaminophen (PERCOCET) 5-325 mg per tablet Comments:   Reason for Stopping:         clindamycin (CLEOCIN) 300 mg capsule Comments:   Reason for Stopping:         lisinopriL (PRINIVIL, ZESTRIL) 10 mg tablet Comments:   Reason for Stopping:                 NOTIFY YOUR PHYSICIAN FOR ANY OF THE FOLLOWING:   Fever over 101 degrees for 24 hours. Chest pain, shortness of breath, fever, chills, nausea, vomiting, diarrhea, change in mentation, falling, weakness, bleeding. Severe pain or pain not relieved by medications. Or, any other signs or symptoms that you may have questions about.     DISPOSITION:    Home With:   OT  PT  HH  RN      x Long term SNF/Inpatient Rehab    Independent/assisted living    Hospice    Other:       PATIENT CONDITION AT DISCHARGE:     Functional status   x Poor     Deconditioned     Independent      Cognition    x Lucid     Forgetful     Dementia      Catheters/lines (plus indication) [place]    x [facility] cath    PEG     None      Code status   x  Full code     DNR      PHYSICAL EXAMINATION AT DISCHARGE:  Constitutional:  chronically ill appearing   ENT:  mmm   Resp:  CTA bilaterally. CV:  Regular rhythm, normal rate,    GI:  Soft, non distended, non tender. bs+ PEG +     Musculoskeletal:  No edema, warm, 2+ pulses throughout    Neurologic:  Moves all extremities.   AAOx3, CN II-XII reviewed, s/p R BKA wound c/d/i                       CHRONIC MEDICAL DIAGNOSES:  Problem List as of 5/20/2021 Date Reviewed: 5/1/2021        Codes Class Noted - Resolved    Severe protein-calorie malnutrition ([facility] (Chronic) ICD-10-CM: M28  ICD-9-CM: 262  5/13/2021 - Present        Chronic osteomyelitis of right lower leg with draining sinus ([facility] ICD-10-CM: M86.461  ICD-9-CM: 730.16  6/22/2020 - Present        Cataract, nuclear ICD-10-CM: [id number]  ICD-9-CM: 366.16  4/25/2017 - Present        Presbyopia ICD-10-CM: H52.4  ICD-9-CM: 367.4  4/25/2017 - Present        Type 2 diabetes mellitus without retinopathy ([facility] ICD-10-CM: E11.9  ICD-9-CM: 250.00  4/25/2017 - Present        GABI (obstructive sleep apnea) ICD-10-CM: G47.33  ICD-9-CM: 327.23  3/11/2015 - Present        Disc herniation ICD-10-CM: [id number]  ICD-9-CM: 722.2  11/13/2014 - Present        Spinal stenosis in cervical region ICD-10-CM: M48.02  ICD-9-CM: 723.0  11/13/2014 - Present        ARF (acute renal failure) (HCC) ICD-10-CM: N17.9  ICD-9-CM: 584.9  12/17/2012 - Present        DM type 2 causing CKD stage 3 (HCC) (Chronic) ICD-10-CM: E11.22, N18.30  ICD-9-CM: 250.40, 585.3  12/17/2012 - Present        CKD (chronic kidney disease) stage 3, GFR 30-59 ml/min (HCC) (Chronic) ICD-10-CM: N18.30  ICD-9-CM: 585.3  12/17/2012 - Present        DM type 2 causing vascular disease (HCC) ICD-10-CM: E11.59  ICD-9-CM: 250.70, 443.81  Unknown - Present        DM neuropathy, type II diabetes mellitus ([facility] ICD-10-CM: E11.40  ICD-9-CM: 250.60, 357.2  Unknown - Present Anemia, unspecified ICD-10-CM: D64.9  ICD-9-CM: 285.9  12/1/2012 - Present        Dyslipidemia ICD-10-CM: E78.5  ICD-9-CM: 272.4  Unknown - Present        Neuropathy (Chronic) ICD-10-CM: G62.9  ICD-9-CM: 355.9  3/3/2009 - Present        HTN (hypertension) (Chronic) ICD-10-CM: I10  ICD-9-CM: 401.9  3/3/2009 - Present        * (Principal) RESOLVED: Acute delirium ICD-10-CM: R41.0  ICD-9-CM: 780.09  5/1/2021 - 5/20/2021        RESOLVED: Diabetes mellitus with skin ulcer ([facility] ICD-10-CM: O83.833, L98.499  ICD-9-CM: 250.80, 707.9  6/22/2020 - 4/7/2021        RESOLVED: Diabetic foot ulcer with osteomyelitis ([facility] ICD-10-CM: E11.621, E11.69, L97.509, M86.9  ICD-9-CM: 250.80, 707.15, 730.27, 731.8  6/10/2020 - 6/22/2020        RESOLVED: 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 ([facility] ICD-10-CM: X62.611  ICD-9-CM: 707.12, 730.16  6/10/2020 - 4/7/2021        RESOLVED: Status post above knee amputation ICD-10-CM: Z89.619  ICD-9-CM: V49.76  4/9/2018 - 12/7/2020        RESOLVED: Status post amputation of left great toe ([facility] ICD-10-CM: D97.893  ICD-9-CM: V49.71  4/9/2018 - 12/7/2020        RESOLVED: Foot ulcer due to secondary DM ([facility] ICD-10-CM: E13.621, B60.342  ICD-9-CM: 249.80, 707.15  12/1/2012 - 4/7/2021              Greater than 30  minutes were spent with the patient on counseling and coordination of care    Signed:   Melody Armstrong MD  5/20/2021  8:59 AM

## 2021-05-21 ENCOUNTER — PATIENT OUTREACH (OUTPATIENT)
Dept: CASE MANAGEMENT | Age: 66
End: 2021-05-21

## 2021-05-24 ENCOUNTER — APPOINTMENT (OUTPATIENT)
Dept: INFUSION THERAPY | Age: 66
End: 2021-05-24

## 2021-05-26 ENCOUNTER — PATIENT OUTREACH (OUTPATIENT)
Dept: CASE MANAGEMENT | Age: 66
End: 2021-05-26

## 2021-05-28 NOTE — PROGRESS NOTES
Post Acute Facility Update     *The information contained in this note was received during a weekly care coordination call with the post acute facility*    Current Facility: [address] (SNF)  Anticipated Discharge Date: TBD    Facility Nursing Update: : having a lot of pain,  c-diff negative,  IV for amputation and osteomyelitis. Needs medications prior to therapy  Facility Social Work Update: no current updates  Upper Extremity Assistance: Moderate Assistance   Lower Extremity Assistance: Dependent  Bed Mobility: Maximum Assistance  Transfers: Mod/Max Assistance  Ambulation: N/A  How far (in feet) is the patient ambulating?  0  Device Used: N/A  Barriers to Discharge: GÓMEZ GOLDBERGN, RN  [facility]

## 2021-06-01 DIAGNOSIS — M54.41 CHRONIC BILATERAL LOW BACK PAIN WITH BILATERAL SCIATICA: Primary | ICD-10-CM

## 2021-06-01 DIAGNOSIS — M54.42 CHRONIC BILATERAL LOW BACK PAIN WITH BILATERAL SCIATICA: Primary | ICD-10-CM

## 2021-06-01 DIAGNOSIS — G89.29 CHRONIC BILATERAL LOW BACK PAIN WITH BILATERAL SCIATICA: Primary | ICD-10-CM

## 2021-06-01 RX ORDER — OXYCODONE AND ACETAMINOPHEN 5; 325 MG/1; MG/1
TABLET ORAL
Qty: 90 TABLET | Refills: 0 | Status: SHIPPED | OUTPATIENT
Start: 2021-06-01 | End: 2021-07-01

## 2021-06-04 ENCOUNTER — PATIENT OUTREACH (OUTPATIENT)
Dept: CASE MANAGEMENT | Age: 66
End: 2021-06-04

## 2021-06-07 ENCOUNTER — APPOINTMENT (OUTPATIENT)
Dept: INFUSION THERAPY | Age: 66
End: 2021-06-07
Payer: MEDICARE

## 2021-06-07 ENCOUNTER — TELEPHONE (OUTPATIENT)
Dept: INTERNAL MEDICINE CLINIC | Age: 66
End: 2021-06-07

## 2021-06-07 NOTE — TELEPHONE ENCOUNTER
Spoke with Hank Molina with [facility] [address] and advised her that Dr. Diogenes Hebert will follow patient through home health. She understood and was thankful for the call.

## 2021-06-07 NOTE — TELEPHONE ENCOUNTER
----- Message from Ross Mayorga sent at 6/7/2021  1:16 PM EDT -----  Regarding: /telephone  Contact: [phone]  General Message/Vendor Calls    Caller's first and last name: Nicolas Quintero with [facility]      Reason for call: She would like to know if the provider is willing to sign home health orders.        Callback required yes/no and why: Yes      Best contact number(s): [phone]      Details to clarify the request: N/A      Ross Mayorga

## 2021-06-09 ENCOUNTER — PATIENT OUTREACH (OUTPATIENT)
Dept: CASE MANAGEMENT | Age: 66
End: 2021-06-09

## 2021-06-09 NOTE — PROGRESS NOTES
[address] [place] [address] Dr Discharge Follow-Up      Date/Time:  2021 1:02 PM    Patient was admitted to [facility] on 2021 and discharged to [address] [place] [address] on 2021. The patients discharge diagnosis was acute delirium. Patient was discharge on 2021 from [id number] [place] [address]. The discharge summary from the post acute facilty was not available at the time of outreach. Patient was contacted within 2 business days of discharge from the post acute facility. Challenges to be reviewed by the provider   Additional needs identified to be addressed with provider: yes  home health care- patient and spouse reports d/cd home w/o HH established, medications- pt not taking following medications Bumex 1mg daily, sodium bicarbonate 325 mg bid, Azulfidine 500 mg 800 mg bid and flomax 0.4 mg daily and DME- patient would like a logan lift to assist with mobility. LPN Care Coordinator contacted the patient by telephone to perform post hospital discharge follow up. Verified name and  with patient as identifiers. Provided introduction to self, and explanation of the LPN Care Coordinator role. Patient recieved  post acute facility discharge instructions. LPN Care Coordinator reviewed general  discharge instructions and red flags with patient who verbalized understanding. Patient given an opportunity to ask questions and does not have any further questions or concerns at this time. The patient agrees to contact the PCP office for questions related to their healthcare. LPN Care Coordinator provided contact information for future reference.     Advance Care Planning:   Does patient have an Advance Directive:  reviewed and current     Home Health orders at discharge: PT, OT, [address]: [facility])  Date of initial visit: 6/10/2021    Durable Medical Equipment ordered at discharge: none   Patient reports he has a wheelchair and a adjustable bed, and needs a logan lift  Durable [address]: n/a  Durable Medical Equipment received: n/a    Medication(s):   The post acute facility medication discharge list was not available for this call. Medication review was performed with patient, who verbalizes understanding of administration of home medications. There were barriers to obtaining medications identified at this time. Patient left SNF before Daptomycin 600 mg IV q 24 hr was completed, has missed 2 doses as Home health has not started     [facility] follow up appointment(s):   Future Appointments   Date Time Provider Alisa Ku   6/11/2021 10:45 AM Rosy Mendez MD [facility] BS AMB   6/11/2021 11:30 [facility]   6/25/2021 11:30 AM SS INF6 CH4 <1H [facility]      Non-[facility] follow up appointment(s): Dr. Juan Manuel Daly (GI) 7/19/2021 for removal of PEG-tube.    [facility]:  out of service area

## 2021-06-10 ENCOUNTER — PATIENT OUTREACH (OUTPATIENT)
Dept: CASE MANAGEMENT | Age: 66
End: 2021-06-10

## 2021-06-10 NOTE — PROGRESS NOTES
Called patient to introduce self and plan of care  ID x 2  Reports HH is scheduled to see him this afternoon  No concerns at time  CTN contact information given  CTN plans to contact patient next week

## 2021-06-16 NOTE — PROGRESS NOTES
[address] [place] [address] Dr Discharge Note    *The information contained in this note was received during a weekly care coordination call with the post acute facility*      Patient was discharged from [address] ([facility]) on 6/7/2021. Family will schedule PCP f/u appointment. PCP: MD JASON Bishop Aas appointment:   Future Appointments   Date Time Provider Alisa Ku   6/23/2021  2:00 PM Dora Rosenbaum MD [facility] BS AMB   6/25/2021 11:30 AM SS INF6 CH4 <1H [facility]/Outpatient orders at discharge: home health care  [address]: [facility] ordered at discharge: [address]: [address] received prior to discharge: No, PCP will order for pt    LPN Care Coordinator managing patient: JODIEJoe Terence Sharif. Community Care Team will sign off at this time. Medications were not reconciled and general patient assessment was not completed during this skilled nursing facility outreach.      TATIANA Amaya  [facility] Team

## 2021-06-17 ENCOUNTER — PATIENT OUTREACH (OUTPATIENT)
Dept: CASE MANAGEMENT | Age: 66
End: 2021-06-17

## 2021-06-17 NOTE — PROGRESS NOTES
Called patient to follow up  Reports doing well  Tolerating PO  Continues to work with [facility] to improve strength and mobility  No bladder or bowel concerns    Goals      Attend follow up appointments on schedule        06/17/21   Will attend follow up appt with PCP scheduled 6/23   Will contact [facility]) to r/s appt for 6/23 or to another location closer to [place], where he resides   Will attend follow up appt with GI for PEG tube removal       Prevent complications post hospitalization.         06/17/21   Reports new PICC line dressing applied yesterday by Regine Mejía Denies evidence of infection, will continue to monitor   Reports compliance with IV infusion, going well

## 2021-06-24 ENCOUNTER — OFFICE VISIT (OUTPATIENT)
Dept: INTERNAL MEDICINE CLINIC | Age: 66
End: 2021-06-24
Payer: MEDICARE

## 2021-06-24 VITALS
SYSTOLIC BLOOD PRESSURE: 144 MMHG | DIASTOLIC BLOOD PRESSURE: 79 MMHG | TEMPERATURE: 97.7 F | HEART RATE: 89 BPM | RESPIRATION RATE: 16 BRPM | OXYGEN SATURATION: 100 %

## 2021-06-24 DIAGNOSIS — R78.81 MRSA BACTEREMIA: Primary | ICD-10-CM

## 2021-06-24 DIAGNOSIS — E43 SEVERE PROTEIN-CALORIE MALNUTRITION (HCC): ICD-10-CM

## 2021-06-24 DIAGNOSIS — B95.62 MRSA BACTEREMIA: Primary | ICD-10-CM

## 2021-06-24 DIAGNOSIS — S88.119A BELOW KNEE AMPUTATION (HCC): ICD-10-CM

## 2021-06-24 DIAGNOSIS — D63.1 ANEMIA DUE TO STAGE 3 CHRONIC KIDNEY DISEASE, UNSPECIFIED WHETHER STAGE 3A OR 3B CKD (HCC): ICD-10-CM

## 2021-06-24 DIAGNOSIS — N18.30 STAGE 3 CHRONIC KIDNEY DISEASE, UNSPECIFIED WHETHER STAGE 3A OR 3B CKD (HCC): ICD-10-CM

## 2021-06-24 DIAGNOSIS — R33.9 URINARY RETENTION: ICD-10-CM

## 2021-06-24 DIAGNOSIS — N18.30 ANEMIA DUE TO STAGE 3 CHRONIC KIDNEY DISEASE, UNSPECIFIED WHETHER STAGE 3A OR 3B CKD (HCC): ICD-10-CM

## 2021-06-24 DIAGNOSIS — E78.5 DYSLIPIDEMIA: ICD-10-CM

## 2021-06-24 DIAGNOSIS — E11.59 DM TYPE 2 CAUSING VASCULAR DISEASE (HCC): ICD-10-CM

## 2021-06-24 PROCEDURE — G8754 DIAS BP LESS 90: HCPCS | Performed by: INTERNAL MEDICINE

## 2021-06-24 PROCEDURE — 1101F PT FALLS ASSESS-DOCD LE1/YR: CPT | Performed by: INTERNAL MEDICINE

## 2021-06-24 PROCEDURE — 99215 OFFICE O/P EST HI 40 MIN: CPT | Performed by: INTERNAL MEDICINE

## 2021-06-24 PROCEDURE — G8753 SYS BP > OR = 140: HCPCS | Performed by: INTERNAL MEDICINE

## 2021-06-24 PROCEDURE — G8432 DEP SCR NOT DOC, RNG: HCPCS | Performed by: INTERNAL MEDICINE

## 2021-06-24 PROCEDURE — 2022F DILAT RTA XM EVC RTNOPTHY: CPT | Performed by: INTERNAL MEDICINE

## 2021-06-24 PROCEDURE — G8427 DOCREV CUR MEDS BY ELIG CLIN: HCPCS | Performed by: INTERNAL MEDICINE

## 2021-06-24 PROCEDURE — 3017F COLORECTAL CA SCREEN DOC REV: CPT | Performed by: INTERNAL MEDICINE

## 2021-06-24 PROCEDURE — G8536 NO DOC ELDER MAL SCRN: HCPCS | Performed by: INTERNAL MEDICINE

## 2021-06-24 PROCEDURE — G0463 HOSPITAL OUTPT CLINIC VISIT: HCPCS | Performed by: INTERNAL MEDICINE

## 2021-06-24 PROCEDURE — G8420 CALC BMI NORM PARAMETERS: HCPCS | Performed by: INTERNAL MEDICINE

## 2021-06-24 RX ORDER — AMLODIPINE BESYLATE 5 MG/1
5 TABLET ORAL DAILY
COMMUNITY

## 2021-06-24 RX ORDER — TAMSULOSIN HYDROCHLORIDE 0.4 MG/1
0.4 CAPSULE ORAL DAILY
COMMUNITY
End: 2022-03-07

## 2021-06-24 NOTE — PROGRESS NOTES
Baltazar Layton. (: 1955) is a 77 y.o. male, established patient, here for evaluation of the following chief complaint(s):  Follow-up (HTN, DM)       ASSESSMENT/PLAN:  Below is the assessment and plan developed based on review of pertinent history, physical exam, labs, studies, and medications. 1. MRSA bacteremia with L3/L4 discitis   Stable and well-managed. Continue with ongoing regimen of IV Daptomycin for one more week and then will message me when it is time to D/C the pic line   2. DM type 2 causing vascular disease (HCC)  Sugars stable. Continue to follow diabetic diet and monitor sugars. Continue Humalog 8 units prior to meals and lantus 16 untis . 3. Stage 3 chronic kidney disease, unspecified whether stage 3a or 3b CKD ([facility]  Stable and well-managed by specialist. I advised him to hold Bumex and discuss with Dr. Karena Denny. His anasarca is better and not sure if he needs to cont taking it and he will also be monitoring his hgb  4. Anemia due to stage 3 chronic kidney disease, unspecified whether stage 3a or 3b CKD (HCC)  Stable and well-managed. Continue with ongoing regimen of Retacrit infusions. But will need to figure out how to do them as currently he cannot stand or get up- he needs full time care and assistance  5. Severe protein-calorie malnutrition ([facility]  Pt has an appt with Dr. Milo Kirkland today to remove PEG. He is eating well at home and working to gain weight. Will continue to monitor for improvements or changes. 6. Dyslipidemia  Stable, patient is tolerating medications, no myalgias. I do not recommend any change in medications. Continue rosuvastatin. 7. Urinary retention  Stable and well-managed. Continue with ongoing regimen of Flomax. 8. Below knee amputation ([facility]  I provided an order for prosthesis and supplies, but explained that it may not work and I may need to fill out a  form. No follow-ups on file.       SUBJECTIVE/OBJECTIVE:  [facility] F/u: Pt had surgery for osteomyelitis at [facility] on 4/19/2021. He was transferred to [facility], at which time he was c/o back pain. Pt was admitted to [facility] on 5/1/2021, after he presented to the ED with lethargy, confusion, anemia, and left leg and left arm weakness.   -He was diagnosed with MRSA bacteremia, L4-5 discitis and OM, and sacral wound. -MRSA: Blood culture showed MRSA 5/1-5/8. He was discharged on 6 weeks of IV Daptomycin. When discharged from the [facility], he was not given Daptomycin, so he missed about 1 week of medication. He has since restarted.   -Low back pain: MRI of lumbar spine (5/10) showed changes suspicious for discitis at L3-L4 and L4-L5 levels with mild diffuse bulging of the disc and facet arthropathy resulting in moderate central stenosis with narriowing of neural foramina. No epidural collection is identified and there is slight improvement in the edema within L4 vertebral body. He is taking Percocet for pain. -Malnutrition: PEG tube was placed in the hospital secondary to low appetite. He is scheduled to see Dr. Nallely Vega for feeding tube removal today, as he is eating well. -CKD: Due to edema, they started him on Bumex and stopped lisinopril.   -Hyponatremia: resolved. -HTN: Holding lisinopril.  -T2DM: A1c 7.2%. -HLD: Continue statin.   -Urinary retention: He was using hoyos catheter, but retention has resolved. He was not taking Flomax, as he did not understand what it was for. -S/p R BKA: No sign of local infection. Wound care following. Vascular consult for prosthesis recs. -He was recently discharged from the [facility] at [facility]. He is now at home with his partner. His partner states he will have an aide starting tomorrow. Diabetic Review of Systems - medication compliance: compliant all of the time. Other symptoms and concerns: Pt's last a1c was 7.2% on 5/20/2021. His partner states his BGs at home have been 120-130s.  He is taking 8u of Humalog with meals and 16u of Lantus. Anemia: He has not had iron infusions since discharging from the hospital. He is taking oral iron supplements. Urine: His partner states he is having intermittent cloudy urine. Pt states his urine stream is normal.       Review of Systems   Musculoskeletal: Positive for back pain. All other systems reviewed and are negative. Physical Exam  Constitutional:       Comments: Laying in stretcher   HENT:      Right Ear: External ear normal.      Left Ear: External ear normal.      Mouth/Throat:      Mouth: Mucous membranes are moist.      Pharynx: Oropharynx is clear. Cardiovascular:      Rate and Rhythm: Normal rate and regular rhythm. Pulses: Normal pulses. Heart sounds: Normal heart sounds. Pulmonary:      Effort: Pulmonary effort is normal.      Breath sounds: Normal breath sounds. Abdominal:      General: Abdomen is flat. Bowel sounds are normal.      Palpations: Abdomen is soft. Musculoskeletal:      Cervical back: Normal range of motion and neck supple. Comments: Right BKA ; laying flat in stretcher   Skin:     General: Skin is warm and dry. Neurological:      Mental Status: He is alert and oriented to person, place, and time. Psychiatric:         Mood and Affect: Mood normal.         Behavior: Behavior normal.           On this date 06/24/2021 I have spent 50 minutes reviewing previous notes, test results and face to face with the patient discussing the diagnosis and importance of compliance with the treatment plan as well as documenting on the day of the visit. An electronic signature was used to authenticate this note.     Written by Ronit Perez, as dictated by Dr. Kisha Zelaya MD.    -- Michelle Johnson

## 2021-06-28 ENCOUNTER — TELEPHONE (OUTPATIENT)
Dept: INTERNAL MEDICINE CLINIC | Age: 66
End: 2021-06-28

## 2021-06-28 NOTE — TELEPHONE ENCOUNTER
----- Message from Ronni Denton sent at 6/28/2021  1:09 PM EDT -----  Regarding: /telephone  Contact: [phone]  General Message/Vendor Calls    Caller's first and last name: [employer]. Reason for call: She would like to know if the letter of medical necessity had been received for incontinence supplies that was faxed on June 23.        Callback required yes/no and why: Yes      Best contact number(s): [phone]      Details to clarify the request: N/A      Ronni Denton

## 2021-06-29 ENCOUNTER — TELEPHONE (OUTPATIENT)
Dept: INTERNAL MEDICINE CLINIC | Age: 66
End: 2021-06-29

## 2021-06-29 DIAGNOSIS — M54.41 CHRONIC BILATERAL LOW BACK PAIN WITH BILATERAL SCIATICA: Primary | ICD-10-CM

## 2021-06-29 DIAGNOSIS — M54.42 CHRONIC BILATERAL LOW BACK PAIN WITH BILATERAL SCIATICA: Primary | ICD-10-CM

## 2021-06-29 DIAGNOSIS — G89.29 CHRONIC BILATERAL LOW BACK PAIN WITH BILATERAL SCIATICA: Primary | ICD-10-CM

## 2021-06-29 NOTE — TELEPHONE ENCOUNTER
----- Message from Eldon Abraham sent at 6/29/2021 10:06 AM EDT -----  Regarding: /telephone  Contact: [phone]  General Message/Vendor Calls    Caller's first and last name: N/A      Reason for call: He needs a provider referral faxed to Dr. Rebekah Yañez (neurosurgeon)  for his lower back lumbar area. The fax number [phone].  They will not schedule an appt until they receive the referral.       Callback required yes/no and why: Yes      Best contact number(s):[phone]       Details to clarify the request: N/A      Eldon Abraham

## 2021-06-29 NOTE — TELEPHONE ENCOUNTER
Patient calling states he needs an order to have his pick line removed. \"IV in his chest\". Fax #[phone]. Please call back and advise.

## 2021-06-29 NOTE — TELEPHONE ENCOUNTER
Referral entered into system and faxed with two most recent office visit notes and patient snapshot to number provided.

## 2021-07-01 ENCOUNTER — TELEPHONE (OUTPATIENT)
Dept: INTERNAL MEDICINE CLINIC | Age: 66
End: 2021-07-01

## 2021-07-01 NOTE — TELEPHONE ENCOUNTER
Patient called again requesting to speak with nurse regarding having his picc line removed - per nurse \"we do not do this here, he needs to contact home health to make arrangements\"   I provided patient the phone number to the hospital he had his surgery at and the doctor who put this in - I advised him to reach out to his home health nurses so they could advocate and figure this out for patient.

## 2021-07-07 ENCOUNTER — PATIENT OUTREACH (OUTPATIENT)
Dept: CASE MANAGEMENT | Age: 66
End: 2021-07-07

## 2021-07-07 NOTE — PROGRESS NOTES
Patient has graduated from the Transitions of Care Coordination  program on 7/7/2021. Patient/family has the ability to self-manage at this time Care management goals have been completed. Patient was not referred to the [facility] team for further management. Reports PICC line is scheduled to be removed on 7/15. Goals Addressed                 This Visit's Progress     COMPLETED: Attend follow up appointments on schedule          06/17/21   Will attend follow up appt with PCP scheduled 6/23   Will contact [facility]) to r/s appt for 6/23 or to another location closer to [place], where he resides   Will attend follow up appt with GI for PEG tube removal       COMPLETED: Prevent complications post hospitalization. 06/17/21   Reports new PICC line dressing applied yesterday by Leanne Rubio Denies evidence of infection, will continue to monitor   Reports compliance with IV infusion, going well              Patient has Care Transition Nurse's contact information for any further questions, concerns, or needs.   Patients upcoming visits:    Future Appointments   Date Time Provider Alisa Ku   7/15/2021  1:30 PM [address] [place] [address]

## 2021-07-15 ENCOUNTER — HOSPITAL ENCOUNTER (OUTPATIENT)
Dept: INTERVENTIONAL RADIOLOGY/VASCULAR | Age: 66
Discharge: HOME | End: 2021-07-15
Attending: RADIOLOGY | Admitting: STUDENT IN AN ORGANIZED HEALTH CARE EDUCATION/TRAINING PROGRAM
Payer: MEDICARE

## 2021-07-15 VITALS
TEMPERATURE: 98.3 F | RESPIRATION RATE: 16 BRPM | SYSTOLIC BLOOD PRESSURE: 182 MMHG | HEART RATE: 100 BPM | DIASTOLIC BLOOD PRESSURE: 88 MMHG | OXYGEN SATURATION: 96 %

## 2021-07-15 DIAGNOSIS — R78.81 MRSA BACTEREMIA: ICD-10-CM

## 2021-07-15 DIAGNOSIS — B95.62 MRSA BACTEREMIA: ICD-10-CM

## 2021-07-15 PROCEDURE — 36589 REMOVAL TUNNELED CV CATH: CPT

## 2021-07-15 NOTE — DISCHARGE INSTRUCTIONS
[facility] CATHETER REMOVAL  DISCHARGE INSTRUCTIONS    General Information:     Your doctor has ordered for us to remove your catheter. This could be that you do not need it anymore, it is not doing its job, your physician has decided on another plan for your treatment and/or it may need replacing. Home Care Instructions: You can resume your regular diet and medication regimen. Do not lift anything heavier than a gallon of milk, or do anything strenuous for the next 24 hours. You will notice a dressing over the site of the removal. This dressing should stay in place until the site is healed. The dressing should be changed at least every 48 hours. You should change the dressing sooner if it becomes soiled or wet. The nurse who discharges you to home should review with you any wound care instructions. Resume your normal level of activity slowly. You may shower after 24 hours, but do not take a bath, swim or immerse yourself in water until the site has healed, and keep the dressing dry with plastic wrap while showering. The site may ooze for a couple of days. This drainage should lessen with each passing day. Call If:     You should call your Physician and/or the Radiology Nurse if you have any bleeding other than a small spot on your bandage. Call if you have any signs of infection, fever, or increased pain at the site of the tube. Call if the oozing increases, if it changes color, or begins to have an odor. Follow-Up Instructions: Please see your ordering doctor as he/she has requested. To Reach Us:  Side effects of sedation medications and other medications used today have been reviewed. Notify us of nausea, itching, hives, dizziness, or anything else out of the ordinary. Should you experience any of these significant changes, please call [phone] between the hours of 7:30 am and 10 pm or [phone] after hours.  After hours, ask the  to page the X-ray Technologist, and describe the problem to the technologist.          Patient Signature:  Date: 7/15/2021  Discharging Nurse: Joseph Joseph RN

## 2021-07-15 NOTE — PROGRESS NOTES
80 Pt arrives via [facility] from [place] to angio department accompanied by  [employer] for powerline removal procedure. All assessments completed and consent was reviewed. Education given was regarding procedure, post-procedure care and  management/follow-up. Opportunity for questions was provided and all questions and concerns were addressed. Pt denies taking any blood thinners or antiplatelet agents. [address] arrived to transport pt back to his home in [place]. Pt's right chest bandage dry and intact. Pt denies pain or SOB. Reviewed discharge instructions with pt; pt verbalized understanding of instructions.

## 2021-08-03 DIAGNOSIS — M54.41 CHRONIC BILATERAL LOW BACK PAIN WITH BILATERAL SCIATICA: Primary | ICD-10-CM

## 2021-08-03 DIAGNOSIS — M54.42 CHRONIC BILATERAL LOW BACK PAIN WITH BILATERAL SCIATICA: Primary | ICD-10-CM

## 2021-08-03 DIAGNOSIS — G89.29 CHRONIC BILATERAL LOW BACK PAIN WITH BILATERAL SCIATICA: Primary | ICD-10-CM

## 2021-08-03 RX ORDER — ALBUTEROL SULFATE 90 UG/1
AEROSOL, METERED RESPIRATORY (INHALATION)
Qty: 8.5 G | Refills: 0 | Status: SHIPPED | OUTPATIENT
Start: 2021-08-03 | End: 2021-09-02

## 2021-08-03 RX ORDER — OXYCODONE AND ACETAMINOPHEN 5; 325 MG/1; MG/1
TABLET ORAL
Qty: 90 TABLET | Refills: 0 | Status: SHIPPED | OUTPATIENT
Start: 2021-08-03 | End: 2021-09-02

## 2021-09-02 DIAGNOSIS — G89.29 CHRONIC BILATERAL LOW BACK PAIN WITH BILATERAL SCIATICA: ICD-10-CM

## 2021-09-02 DIAGNOSIS — M54.42 CHRONIC BILATERAL LOW BACK PAIN WITH BILATERAL SCIATICA: ICD-10-CM

## 2021-09-02 DIAGNOSIS — M54.41 CHRONIC BILATERAL LOW BACK PAIN WITH BILATERAL SCIATICA: ICD-10-CM

## 2021-09-02 RX ORDER — OXYCODONE AND ACETAMINOPHEN 5; 325 MG/1; MG/1
TABLET ORAL
Qty: 90 TABLET | Refills: 0 | Status: SHIPPED | OUTPATIENT
Start: 2021-09-02 | End: 2021-10-02

## 2021-09-02 RX ORDER — INSULIN LISPRO 100 [IU]/ML
INJECTION, SOLUTION INTRAVENOUS; SUBCUTANEOUS
Qty: 15 ML | Refills: 0 | Status: SHIPPED | OUTPATIENT
Start: 2021-09-02 | End: 2021-12-27

## 2021-09-02 RX ORDER — ALBUTEROL SULFATE 90 UG/1
AEROSOL, METERED RESPIRATORY (INHALATION)
Qty: 8.5 G | Refills: 0 | Status: SHIPPED | OUTPATIENT
Start: 2021-09-02 | End: 2021-11-08

## 2021-09-02 RX ORDER — ERGOCALCIFEROL 1.25 MG/1
CAPSULE ORAL
Qty: 4 CAPSULE | Refills: 0 | Status: SHIPPED | OUTPATIENT
Start: 2021-09-02 | End: 2021-11-08

## 2021-10-28 ENCOUNTER — TELEPHONE (OUTPATIENT)
Dept: INTERNAL MEDICINE CLINIC | Age: 66
End: 2021-10-28

## 2021-10-28 NOTE — TELEPHONE ENCOUNTER
Patient states [address] [address] [address] will be faxing over forms that need to be filled out so he can get his prosthetic leg at his appointment tomorrow. PSR already spoke with Susan a few minutes before the patient called and Susan said they would fax over the information shortly.

## 2021-11-08 DIAGNOSIS — G89.29 CHRONIC BILATERAL LOW BACK PAIN WITH BILATERAL SCIATICA: Primary | ICD-10-CM

## 2021-11-08 DIAGNOSIS — M54.41 CHRONIC BILATERAL LOW BACK PAIN WITH BILATERAL SCIATICA: Primary | ICD-10-CM

## 2021-11-08 DIAGNOSIS — M54.42 CHRONIC BILATERAL LOW BACK PAIN WITH BILATERAL SCIATICA: Primary | ICD-10-CM

## 2021-11-08 RX ORDER — ERGOCALCIFEROL 1.25 MG/1
CAPSULE ORAL
Qty: 4 CAPSULE | Refills: 0 | Status: SHIPPED | OUTPATIENT
Start: 2021-11-08 | End: 2022-01-06

## 2021-11-08 RX ORDER — ALBUTEROL SULFATE 90 UG/1
AEROSOL, METERED RESPIRATORY (INHALATION)
Qty: 8.5 G | Refills: 0 | Status: SHIPPED | OUTPATIENT
Start: 2021-11-08 | End: 2021-12-27

## 2021-11-08 RX ORDER — OXYCODONE AND ACETAMINOPHEN 5; 325 MG/1; MG/1
TABLET ORAL
Qty: 90 TABLET | Refills: 0 | Status: SHIPPED | OUTPATIENT
Start: 2021-11-08 | End: 2021-12-02

## 2021-11-08 RX ORDER — INSULIN GLARGINE 100 [IU]/ML
INJECTION, SOLUTION SUBCUTANEOUS
Qty: 15 ML | Refills: 0 | Status: SHIPPED | OUTPATIENT
Start: 2021-11-08 | End: 2021-12-02

## 2021-12-02 DIAGNOSIS — M54.41 CHRONIC BILATERAL LOW BACK PAIN WITH BILATERAL SCIATICA: ICD-10-CM

## 2021-12-02 DIAGNOSIS — M54.42 CHRONIC BILATERAL LOW BACK PAIN WITH BILATERAL SCIATICA: ICD-10-CM

## 2021-12-02 DIAGNOSIS — L97.912 ULCER OF RIGHT LOWER EXTREMITY WITH FAT LAYER EXPOSED (HCC): ICD-10-CM

## 2021-12-02 DIAGNOSIS — G89.29 CHRONIC BILATERAL LOW BACK PAIN WITH BILATERAL SCIATICA: ICD-10-CM

## 2021-12-02 RX ORDER — SILVER SULFADIAZINE 10 G/1000G
CREAM TOPICAL
Qty: 60 G | Refills: 0 | Status: SHIPPED | OUTPATIENT
Start: 2021-12-02 | End: 2022-01-03

## 2021-12-02 RX ORDER — OXYCODONE AND ACETAMINOPHEN 5; 325 MG/1; MG/1
TABLET ORAL
Qty: 90 TABLET | Refills: 0 | Status: SHIPPED | OUTPATIENT
Start: 2021-12-02 | End: 2022-01-01

## 2021-12-02 RX ORDER — INSULIN GLARGINE 100 [IU]/ML
INJECTION, SOLUTION SUBCUTANEOUS
Qty: 15 ML | Refills: 0 | Status: SHIPPED | OUTPATIENT
Start: 2021-12-02 | End: 2022-01-03

## 2021-12-03 ENCOUNTER — TELEPHONE (OUTPATIENT)
Dept: INTERNAL MEDICINE CLINIC | Age: 66
End: 2021-12-03

## 2021-12-03 NOTE — TELEPHONE ENCOUNTER
Roland Burkitt from [facility] called to state the patient missed a PT session on 11/26 as he asked Roland Burkitt to cancel it as he was having family over.

## 2021-12-21 ENCOUNTER — TELEPHONE (OUTPATIENT)
Dept: INTERNAL MEDICINE CLINIC | Age: 66
End: 2021-12-21

## 2021-12-21 NOTE — TELEPHONE ENCOUNTER
Anayeli Sprague with [facility] called to state he was not able to see the patient for his therapy on the 16th as the patient's wife had an appointment and there was no one to let him inside to see the patient. He also was not able to see the patient today as his potassium is out of order according to another provider who wants the patient to wait and have more tests done before his next therapy session.

## 2021-12-23 ENCOUNTER — TELEPHONE (OUTPATIENT)
Dept: INTERNAL MEDICINE CLINIC | Age: 66
End: 2021-12-23

## 2021-12-23 NOTE — TELEPHONE ENCOUNTER
Larayne Meckel with [facility] called to state the patient had to miss another PT visit as his potassium levels are still not where they need to be. Larayne Meckel is hoping to resume PT next week.

## 2021-12-27 RX ORDER — ALBUTEROL SULFATE 90 UG/1
AEROSOL, METERED RESPIRATORY (INHALATION)
Qty: 8.5 G | Refills: 0 | Status: SHIPPED | OUTPATIENT
Start: 2021-12-27 | End: 2022-01-26

## 2021-12-27 RX ORDER — INSULIN LISPRO 100 [IU]/ML
INJECTION, SOLUTION INTRAVENOUS; SUBCUTANEOUS
Qty: 15 ML | Refills: 0 | Status: SHIPPED | OUTPATIENT
Start: 2021-12-27 | End: 2022-01-26

## 2022-01-03 ENCOUNTER — TELEPHONE (OUTPATIENT)
Dept: INTERNAL MEDICINE CLINIC | Age: 67
End: 2022-01-03

## 2022-01-03 DIAGNOSIS — L97.912 ULCER OF RIGHT LOWER EXTREMITY WITH FAT LAYER EXPOSED (HCC): ICD-10-CM

## 2022-01-03 RX ORDER — SILVER SULFADIAZINE 10 G/1000G
CREAM TOPICAL
Qty: 60 G | Refills: 0 | Status: SHIPPED | OUTPATIENT
Start: 2022-01-03 | End: 2022-03-14

## 2022-01-03 RX ORDER — OXYCODONE AND ACETAMINOPHEN 5; 325 MG/1; MG/1
TABLET ORAL
Qty: 90 TABLET | Refills: 0 | Status: SHIPPED | OUTPATIENT
Start: 2022-01-03 | End: 2022-02-23 | Stop reason: SDUPTHER

## 2022-01-03 RX ORDER — INSULIN GLARGINE 100 [IU]/ML
INJECTION, SOLUTION SUBCUTANEOUS
Qty: 15 ML | Refills: 0 | Status: SHIPPED | OUTPATIENT
Start: 2022-01-03 | End: 2022-01-26

## 2022-01-06 RX ORDER — ERGOCALCIFEROL 1.25 MG/1
CAPSULE ORAL
Qty: 4 CAPSULE | Refills: 0 | Status: SHIPPED | OUTPATIENT
Start: 2022-01-06 | End: 2022-02-23 | Stop reason: SDUPTHER

## 2022-01-20 LAB — HBA1C MFR BLD HPLC: 5.5 %

## 2022-01-25 ENCOUNTER — TELEPHONE (OUTPATIENT)
Dept: INTERNAL MEDICINE CLINIC | Age: 67
End: 2022-01-25

## 2022-01-25 NOTE — TELEPHONE ENCOUNTER
Spoke with Haydee and advised her that Dr. Jeremy Simmons is not comfortable with taking over orders in regards to IV antibiotics. Haydee stated that she will find out from [facility] if there is an infectious disease doctor involved. She understood and was thankful for the call.

## 2022-01-25 NOTE — TELEPHONE ENCOUNTER
----- Message from Sanket Wilton sent at 1/25/2022 10:47 AM EST -----  Subject: Message to Provider    QUESTIONS  Information for Provider? Haydee/[facility] - pt being dc from   [facility] today or tomorrow -pt w/need IV antibiotics at home &   want to make sure  agreeable to [facility] & orders if necessary-please call Haydee conner to advise  ---------------------------------------------------------------------------  --------------  CALL BACK INFO  What is the best way for the office to contact you? OK to leave message on   voicemail  Preferred Call Back Phone Number? [phone]  ---------------------------------------------------------------------------  --------------  SCRIPT ANSWERS  Relationship to Patient? Third Party  Representative Name?  Haydee

## 2022-01-25 NOTE — TELEPHONE ENCOUNTER
I havent seen him since June- who is ordering the IV antibiotics? What is it for? I cannot really make decisions on this when I have not seen him for the condition or following the condition. Is there an ID doctor involved?

## 2022-01-26 RX ORDER — ALBUTEROL SULFATE 90 UG/1
AEROSOL, METERED RESPIRATORY (INHALATION)
Qty: 8.5 G | Refills: 0 | Status: SHIPPED | OUTPATIENT
Start: 2022-01-26 | End: 2022-02-23 | Stop reason: SDUPTHER

## 2022-01-26 RX ORDER — INSULIN GLARGINE 100 [IU]/ML
INJECTION, SOLUTION SUBCUTANEOUS
Qty: 15 ML | Refills: 0 | Status: SHIPPED | OUTPATIENT
Start: 2022-01-26 | End: 2022-04-04

## 2022-01-26 RX ORDER — INSULIN LISPRO 100 [IU]/ML
INJECTION, SOLUTION INTRAVENOUS; SUBCUTANEOUS
Qty: 15 ML | Refills: 0 | Status: SHIPPED | OUTPATIENT
Start: 2022-01-26 | End: 2022-04-04

## 2022-02-14 ENCOUNTER — TELEPHONE (OUTPATIENT)
Dept: INTERNAL MEDICINE CLINIC | Age: 67
End: 2022-02-14

## 2022-02-14 NOTE — TELEPHONE ENCOUNTER
Spoke with patient's wife and she states that patient is in the hospital at [address] [place] [address] in the ICU with a severe UTI and infection in T10-T11. Appointment moved from Dr. Yessi Colón to Dr. Mert Freeman on 2/22/22 at 11:00am.  Pt's wife understood and was thankful for the call.

## 2022-02-14 NOTE — TELEPHONE ENCOUNTER
----- Message from Salomón Addison sent at 2/11/2022  3:36 PM EST -----  Subject: Message to Provider    QUESTIONS  Information for Provider? patient was scheduled for a hospital f/u &   medication refill with Keith palacios for feb. 22 no appointments available   for lawson rikki jeter   ---------------------------------------------------------------------------  --------------  CALL BACK INFO  What is the best way for the office to contact you? OK to leave message on   voicemail  Preferred Call Back Phone Number? [phone]  ---------------------------------------------------------------------------  --------------  SCRIPT ANSWERS  Relationship to Patient?  Third Party  Representative Name? Jessenia Pike ( [address] [place] [address],[address])

## 2022-02-22 ENCOUNTER — TELEPHONE (OUTPATIENT)
Dept: INTERNAL MEDICINE CLINIC | Age: 67
End: 2022-02-22

## 2022-02-22 NOTE — TELEPHONE ENCOUNTER
Attempted to contact patient to confirm if he is going to be at his appointment today at 11:00am since last time nurse spoke with patient's wife he was in the ICU. Requested they return nurse's call to confirm appointment.

## 2022-02-22 NOTE — TELEPHONE ENCOUNTER
After reviewing care everywhere records it appears that patient is still in the hospital after having spinal surgery on 2/18/22. Appointment cancelled.

## 2022-02-23 DIAGNOSIS — L97.912 ULCER OF RIGHT LOWER EXTREMITY WITH FAT LAYER EXPOSED (HCC): ICD-10-CM

## 2022-02-23 RX ORDER — ERGOCALCIFEROL 1.25 MG/1
CAPSULE ORAL
Qty: 4 CAPSULE | Refills: 0 | Status: SHIPPED | OUTPATIENT
Start: 2022-02-23 | End: 2022-04-25

## 2022-02-23 RX ORDER — OXYCODONE AND ACETAMINOPHEN 5; 325 MG/1; MG/1
1 TABLET ORAL
Qty: 90 TABLET | Refills: 0 | Status: SHIPPED | OUTPATIENT
Start: 2022-02-23 | End: 2022-03-07

## 2022-02-23 RX ORDER — ALBUTEROL SULFATE 90 UG/1
AEROSOL, METERED RESPIRATORY (INHALATION)
Qty: 8.5 G | Refills: 0 | Status: SHIPPED | OUTPATIENT
Start: 2022-02-23 | End: 2022-04-04

## 2022-03-07 PROBLEM — R33.9 URINARY RETENTION: Status: ACTIVE | Noted: 2022-03-07

## 2022-03-10 PROBLEM — R53.1 WEAKNESS: Status: ACTIVE | Noted: 2021-12-13

## 2022-03-10 PROBLEM — M54.50 LOW BACK PAIN RADIATING TO LEFT LEG: Status: ACTIVE | Noted: 2021-07-08

## 2022-03-10 PROBLEM — N18.9 ANEMIA OF CHRONIC RENAL FAILURE: Status: ACTIVE | Noted: 2021-11-08

## 2022-03-10 PROBLEM — S78.111D: Status: ACTIVE | Noted: 2021-12-13

## 2022-03-10 PROBLEM — N39.0 UTI (URINARY TRACT INFECTION): Status: ACTIVE | Noted: 2022-02-12

## 2022-03-10 PROBLEM — M46.46 DISCITIS OF LUMBAR REGION: Status: ACTIVE | Noted: 2022-01-19

## 2022-03-10 PROBLEM — M79.605 LOW BACK PAIN RADIATING TO LEFT LEG: Status: ACTIVE | Noted: 2021-07-08

## 2022-03-10 PROBLEM — R10.9 LEFT SIDED ABDOMINAL PAIN: Status: ACTIVE | Noted: 2022-02-12

## 2022-03-10 PROBLEM — K68.12 PSOAS MUSCLE ABSCESS (HCC): Status: ACTIVE | Noted: 2021-08-06

## 2022-03-10 PROBLEM — M46.26 OSTEOMYELITIS OF LUMBAR SPINE (HCC): Status: ACTIVE | Noted: 2021-07-08

## 2022-03-10 PROBLEM — D63.1 ANEMIA OF CHRONIC RENAL FAILURE: Status: ACTIVE | Noted: 2021-11-08

## 2022-03-10 PROBLEM — R26.2 DIFFICULTY WALKING: Status: ACTIVE | Noted: 2021-12-13

## 2022-03-11 ENCOUNTER — TELEPHONE (OUTPATIENT)
Dept: INTERNAL MEDICINE CLINIC | Age: 67
End: 2022-03-11

## 2022-03-11 NOTE — TELEPHONE ENCOUNTER
Patient dropped off Serious Medical Condition Certification Form For [employer] to be completed by PCP.  Form may be located in PCP Rx folder @ the front office.- thank you

## 2022-03-14 DIAGNOSIS — L97.912 ULCER OF RIGHT LOWER EXTREMITY WITH FAT LAYER EXPOSED (HCC): ICD-10-CM

## 2022-03-14 RX ORDER — SILVER SULFADIAZINE 10 G/1000G
CREAM TOPICAL
Qty: 60 G | Refills: 0 | Status: SHIPPED | OUTPATIENT
Start: 2022-03-14 | End: 2022-04-28 | Stop reason: ALTCHOICE

## 2022-03-18 PROBLEM — R26.2 DIFFICULTY WALKING: Status: ACTIVE | Noted: 2021-12-13

## 2022-03-18 PROBLEM — R53.1 WEAKNESS: Status: ACTIVE | Noted: 2021-12-13

## 2022-03-18 PROBLEM — S78.111D: Status: ACTIVE | Noted: 2021-12-13

## 2022-03-18 PROBLEM — E11.9 TYPE 2 DIABETES MELLITUS WITHOUT RETINOPATHY (HCC): Status: ACTIVE | Noted: 2017-04-25

## 2022-03-18 PROBLEM — R33.9 URINARY RETENTION: Status: ACTIVE | Noted: 2022-03-07

## 2022-03-18 PROBLEM — E43 SEVERE PROTEIN-CALORIE MALNUTRITION (HCC): Status: ACTIVE | Noted: 2021-05-13

## 2022-03-18 PROBLEM — M46.26 OSTEOMYELITIS OF LUMBAR SPINE (HCC): Status: ACTIVE | Noted: 2021-07-08

## 2022-03-19 PROBLEM — N18.9 ANEMIA OF CHRONIC RENAL FAILURE: Status: ACTIVE | Noted: 2021-11-08

## 2022-03-19 PROBLEM — D63.1 ANEMIA OF CHRONIC RENAL FAILURE: Status: ACTIVE | Noted: 2021-11-08

## 2022-03-19 PROBLEM — M54.50 LOW BACK PAIN RADIATING TO LEFT LEG: Status: ACTIVE | Noted: 2021-07-08

## 2022-03-19 PROBLEM — M86.461: Status: ACTIVE | Noted: 2020-06-22

## 2022-03-19 PROBLEM — M46.46 DISCITIS OF LUMBAR REGION: Status: ACTIVE | Noted: 2022-01-19

## 2022-03-19 PROBLEM — K68.12 PSOAS MUSCLE ABSCESS (HCC): Status: ACTIVE | Noted: 2021-08-06

## 2022-03-19 PROBLEM — M79.605 LOW BACK PAIN RADIATING TO LEFT LEG: Status: ACTIVE | Noted: 2021-07-08

## 2022-03-19 PROBLEM — R10.9 LEFT SIDED ABDOMINAL PAIN: Status: ACTIVE | Noted: 2022-02-12

## 2022-03-19 PROBLEM — H52.4 PRESBYOPIA: Status: ACTIVE | Noted: 2017-04-25

## 2022-03-20 PROBLEM — N39.0 UTI (URINARY TRACT INFECTION): Status: ACTIVE | Noted: 2022-02-12

## 2022-04-09 PROBLEM — N39.0 UTI (URINARY TRACT INFECTION): Status: RESOLVED | Noted: 2022-02-12 | Resolved: 2022-04-09

## 2022-04-13 ENCOUNTER — NURSE TRIAGE (OUTPATIENT)
Dept: OTHER | Facility: CLINIC | Age: 67
End: 2022-04-13

## 2022-04-13 NOTE — TELEPHONE ENCOUNTER
Received call from Lacey Arboleda at [facility] with Red Flag Complaint. Subjective: Caller states \"swelling in foot and leg\"     Current Symptoms: left leg and foot swelling;  R AKA; Wearing a back brace from a resent back surgery; Recent hospitalization for UTI and back surgery and went to rehab; Onset: 2 weeks ago; intermittent    Associated Symptoms: NA    Pain Severity: 0/10; stiffness when swelling;     Temperature: denies    What has been tried: denies    Recommended disposition: See in Office Today    Care advice provided, patient verbalizes understanding; denies any other questions or concerns; instructed to call back for any new or worsening symptoms. Patient/Caller agrees with recommended disposition; writer provided warm transfer to [facility] for appointment scheduling    Attention Provider: Thank you for allowing me to participate in the care of your patient. The patient was connected to triage in response to information provided to the ECC. Please do not respond through this encounter as the response is not directed to a shared pool.       Reason for Disposition   MODERATE swelling of both ankles (e.g., swelling extends up to the knees) AND new-onset or worsening    Protocols used: LEG SWELLING AND EDEMA-ADULT-OH

## 2022-04-25 RX ORDER — BLOOD SUGAR DIAGNOSTIC
STRIP MISCELLANEOUS
Qty: 50 STRIP | Refills: 0 | Status: SHIPPED | OUTPATIENT
Start: 2022-04-25 | End: 2022-04-28 | Stop reason: ALTCHOICE

## 2022-04-26 RX ORDER — OXYCODONE AND ACETAMINOPHEN 10; 325 MG/1; MG/1
TABLET ORAL
OUTPATIENT
Start: 2022-04-26

## 2022-04-27 NOTE — PROGRESS NOTES
Jovanni Pretty. (: 1955) is a 74 y.o. male, established patient, here for evaluation of the following chief complaint(s):  Hospital Follow Up (leg swelling and UTI)       ASSESSMENT/PLAN:  Below is the assessment and plan developed based on review of pertinent history, physical exam, labs, studies, and medications. 1. Amputation of lower extremity above knee with complication, right, subsequent encounter ([facility]  I will give him a rx for a better fitting socket. I am hopeful that this will improve his mobility. 2. Severe protein-calorie malnutrition ([facility]  I am pleased to hear that his appetite has improved. 3. DM type 2 causing vascular disease ([facility]  Most recent A1c was stable. I agree that he should f/u with endocrinology after nephrology and ID. Continue with ongoing regimen of Humalog PRN. 4. Stage 3 chronic kidney disease, unspecified whether stage 3a or 3b CKD ([facility]  Not at goal due to abx use, followed by Dr. Lisa Goldberg. 5. Chronic bilateral low back pain with bilateral sciatica  Stable, continue with ongoing regimen of Percocet. 6. Osteomyelitis of thoracic region [facility])  Not currently on abx due to kidney function/hyperkalemia , ID followed by Dr. Delroy Tolliver. Continue undergoing PT. 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 He was desensitized to Bactrim, which he is still taking. Pt also received oral rifampin and IV daptomycin, . He is followed by infectious disease (Dr. Delroy Tolliver). Pt no longer takes any abx (holding Bactrim and rifampin) for the sake of his kidnyes. He underwent 2 weeks of inpatient PT in 2022 and was discharged from inpatient on . Neurosurgery cleared him for outpatient PT at the [place]. CKD: Pt's kidney fx was affected by abx.  He plans to consult  Afshin Marin.     Discitis/Back pain: Pt manages his back pain with Percocet daily. Anemia: Pt received an infusion (Epo). Diabetic Review of Systems - medication compliance: compliant all of the time. Other symptoms and concerns: Pt's last a1c was 5.5 on 2/12/22. He plans to schedule a f/u with endocrinology now that he is out of the hospital.     UTI: Pt has finished abx. His sx consisted of severe pain in abdomen. He notes a persistent, foul smell to his urine. His wife attributes this to him being desensitized to bactrim. Amputation: Pt expresses interest in a new prosthesis secondary to his socket not fitting. Malnutrition: Pt and his wife endorse an improvement in his appetite. Leg swelling: Pt c/o leg swelling. He notes improvement from elevating his legs. Review of Systems   All other systems reviewed and are negative. Physical Exam  Constitutional:       Appearance: Normal appearance. HENT:      Right Ear: Tympanic membrane and external ear normal.      Left Ear: Tympanic membrane and external ear normal.      Mouth/Throat:      Mouth: Mucous membranes are moist.      Pharynx: Oropharynx is clear. Cardiovascular:      Rate and Rhythm: Normal rate and regular rhythm. Pulses: Normal pulses. Heart sounds: Normal heart sounds. Pulmonary:      Effort: Pulmonary effort is normal.      Breath sounds: Normal breath sounds. Musculoskeletal:         General: Normal range of motion. Skin:     General: Skin is warm and dry. Neurological:      General: No focal deficit present. Mental Status: He is alert and oriented to person, place, and time.    Psychiatric:         Mood and Affect: Mood normal.         Behavior: Behavior normal.       On this date 04/28/2022 I have spent 45 minutes reviewing previous notes, test results and face to face with the patient discussing the diagnosis and importance of compliance with the treatment plan as well as documenting on the day of the visit. An electronic signature was used to authenticate this note. Written by Janette Otto as dictated by Dr. Sofie Carmona.    -- Janette Otto

## 2022-04-28 ENCOUNTER — OFFICE VISIT (OUTPATIENT)
Dept: INTERNAL MEDICINE CLINIC | Age: 67
End: 2022-04-28
Payer: MEDICARE

## 2022-04-28 VITALS
OXYGEN SATURATION: 100 % | TEMPERATURE: 97.7 F | HEIGHT: 71 IN | HEART RATE: 90 BPM | BODY MASS INDEX: 18.55 KG/M2 | SYSTOLIC BLOOD PRESSURE: 183 MMHG | RESPIRATION RATE: 16 BRPM | DIASTOLIC BLOOD PRESSURE: 90 MMHG

## 2022-04-28 DIAGNOSIS — M46.24 OSTEOMYELITIS OF THORACIC REGION (HCC): ICD-10-CM

## 2022-04-28 DIAGNOSIS — E43 SEVERE PROTEIN-CALORIE MALNUTRITION (HCC): ICD-10-CM

## 2022-04-28 DIAGNOSIS — E11.59 DM TYPE 2 CAUSING VASCULAR DISEASE (HCC): ICD-10-CM

## 2022-04-28 DIAGNOSIS — S78.111D: Primary | ICD-10-CM

## 2022-04-28 DIAGNOSIS — M54.41 CHRONIC BILATERAL LOW BACK PAIN WITH BILATERAL SCIATICA: ICD-10-CM

## 2022-04-28 DIAGNOSIS — N18.30 STAGE 3 CHRONIC KIDNEY DISEASE, UNSPECIFIED WHETHER STAGE 3A OR 3B CKD (HCC): ICD-10-CM

## 2022-04-28 DIAGNOSIS — M54.42 CHRONIC BILATERAL LOW BACK PAIN WITH BILATERAL SCIATICA: ICD-10-CM

## 2022-04-28 DIAGNOSIS — G89.29 CHRONIC BILATERAL LOW BACK PAIN WITH BILATERAL SCIATICA: ICD-10-CM

## 2022-04-28 PROCEDURE — G0463 HOSPITAL OUTPT CLINIC VISIT: HCPCS | Performed by: INTERNAL MEDICINE

## 2022-04-28 PROCEDURE — G8753 SYS BP > OR = 140: HCPCS | Performed by: INTERNAL MEDICINE

## 2022-04-28 PROCEDURE — G8420 CALC BMI NORM PARAMETERS: HCPCS | Performed by: INTERNAL MEDICINE

## 2022-04-28 PROCEDURE — G8755 DIAS BP > OR = 90: HCPCS | Performed by: INTERNAL MEDICINE

## 2022-04-28 PROCEDURE — 99215 OFFICE O/P EST HI 40 MIN: CPT | Performed by: INTERNAL MEDICINE

## 2022-04-28 PROCEDURE — 2022F DILAT RTA XM EVC RTNOPTHY: CPT | Performed by: INTERNAL MEDICINE

## 2022-04-28 PROCEDURE — G8427 DOCREV CUR MEDS BY ELIG CLIN: HCPCS | Performed by: INTERNAL MEDICINE

## 2022-04-28 PROCEDURE — G8510 SCR DEP NEG, NO PLAN REQD: HCPCS | Performed by: INTERNAL MEDICINE

## 2022-04-28 PROCEDURE — G8536 NO DOC ELDER MAL SCRN: HCPCS | Performed by: INTERNAL MEDICINE

## 2022-04-28 PROCEDURE — 3046F HEMOGLOBIN A1C LEVEL >9.0%: CPT | Performed by: INTERNAL MEDICINE

## 2022-04-28 PROCEDURE — 1101F PT FALLS ASSESS-DOCD LE1/YR: CPT | Performed by: INTERNAL MEDICINE

## 2022-04-28 PROCEDURE — 3017F COLORECTAL CA SCREEN DOC REV: CPT | Performed by: INTERNAL MEDICINE

## 2022-04-28 RX ORDER — CHLORPHENIRAMINE MALEATE 4 MG
TABLET ORAL 2 TIMES DAILY
COMMUNITY
Start: 2022-03-15 | End: 2023-03-15

## 2022-04-28 RX ORDER — SODIUM ZIRCONIUM CYCLOSILICATE 5 G/5G
POWDER, FOR SUSPENSION ORAL
COMMUNITY
Start: 2022-04-13

## 2022-04-28 RX ORDER — LOPERAMIDE HYDROCHLORIDE 2 MG/1
2 CAPSULE ORAL AS NEEDED
COMMUNITY

## 2022-04-28 RX ORDER — SULFAMETHOXAZOLE AND TRIMETHOPRIM 800; 160 MG/1; MG/1
0.5 TABLET ORAL AT BEDTIME
COMMUNITY
Start: 2022-03-15

## 2022-04-28 RX ORDER — SODIUM BICARBONATE 650 MG/1
1 TABLET ORAL 2 TIMES DAILY
COMMUNITY
Start: 2022-03-31

## 2022-05-23 DIAGNOSIS — L97.912 ULCER OF RIGHT LOWER EXTREMITY WITH FAT LAYER EXPOSED (HCC): ICD-10-CM

## 2022-05-24 DIAGNOSIS — L97.912 ULCER OF RIGHT LOWER EXTREMITY WITH FAT LAYER EXPOSED (HCC): ICD-10-CM

## 2022-05-24 RX ORDER — OXYCODONE AND ACETAMINOPHEN 5; 325 MG/1; MG/1
TABLET ORAL
Qty: 90 TABLET | Refills: 0 | Status: SHIPPED | OUTPATIENT
Start: 2022-05-24 | End: 2022-06-23

## 2022-05-24 RX ORDER — OXYCODONE AND ACETAMINOPHEN 5; 325 MG/1; MG/1
1 TABLET ORAL
Qty: 90 TABLET | Refills: 0 | Status: SHIPPED | OUTPATIENT
Start: 2022-05-24 | End: 2022-06-21 | Stop reason: SDUPTHER

## 2022-06-14 ENCOUNTER — TELEPHONE (OUTPATIENT)
Dept: INTERNAL MEDICINE CLINIC | Age: 67
End: 2022-06-14

## 2022-06-14 NOTE — TELEPHONE ENCOUNTER
----- Message from Jany Becerra sent at 6/14/2022  1:48 PM EDT -----  Subject: Message to Provider    QUESTIONS  Information for Provider? Patient is calling to get a script for the   [facility] for his leg. He is being told that he needs a new script. Fax it to the patient [phone] . If you need him to come in just let   him know.   ---------------------------------------------------------------------------  --------------  CALL BACK INFO  What is the best way for the office to contact you? OK to leave message on   voicemail  Preferred Call Back Phone Number? [phone]  ---------------------------------------------------------------------------  --------------  SCRIPT ANSWERS  Relationship to Patient?  Self

## 2022-06-15 ENCOUNTER — TELEPHONE (OUTPATIENT)
Dept: INTERNAL MEDICINE CLINIC | Age: 67
End: 2022-06-15

## 2022-06-16 ENCOUNTER — TELEPHONE (OUTPATIENT)
Dept: INTERNAL MEDICINE CLINIC | Age: 67
End: 2022-06-16

## 2022-06-16 NOTE — TELEPHONE ENCOUNTER
Spoke with patient and he needs a new prosthetic leg, sees Susan in [place] on Monday and needs an order faxed to them. Order written and faxed.

## 2022-06-21 DIAGNOSIS — L97.912 ULCER OF RIGHT LOWER EXTREMITY WITH FAT LAYER EXPOSED (HCC): ICD-10-CM

## 2022-06-21 DIAGNOSIS — G89.29 CHRONIC BILATERAL LOW BACK PAIN, UNSPECIFIED WHETHER SCIATICA PRESENT: Primary | ICD-10-CM

## 2022-06-21 DIAGNOSIS — M54.50 CHRONIC BILATERAL LOW BACK PAIN, UNSPECIFIED WHETHER SCIATICA PRESENT: Primary | ICD-10-CM

## 2022-06-21 RX ORDER — OXYCODONE AND ACETAMINOPHEN 5; 325 MG/1; MG/1
TABLET ORAL
Qty: 90 TABLET | Refills: 0 | Status: SHIPPED | OUTPATIENT
Start: 2022-06-21 | End: 2022-07-21

## 2022-06-21 RX ORDER — OXYCODONE AND ACETAMINOPHEN 5; 325 MG/1; MG/1
1 TABLET ORAL
Qty: 90 TABLET | Refills: 0 | Status: SHIPPED | OUTPATIENT
Start: 2022-06-21 | End: 2022-07-28

## 2022-06-29 RX ORDER — ERGOCALCIFEROL 1.25 MG/1
CAPSULE ORAL
Qty: 4 CAPSULE | Refills: 0 | Status: SHIPPED | OUTPATIENT
Start: 2022-06-29

## 2022-07-28 ENCOUNTER — OFFICE VISIT (OUTPATIENT)
Dept: INTERNAL MEDICINE CLINIC | Age: 67
End: 2022-07-28
Payer: MEDICARE

## 2022-07-28 VITALS
RESPIRATION RATE: 16 BRPM | DIASTOLIC BLOOD PRESSURE: 71 MMHG | HEART RATE: 90 BPM | SYSTOLIC BLOOD PRESSURE: 145 MMHG | OXYGEN SATURATION: 100 % | HEIGHT: 71 IN | BODY MASS INDEX: 18.55 KG/M2 | TEMPERATURE: 97.8 F

## 2022-07-28 DIAGNOSIS — N18.30 STAGE 3 CHRONIC KIDNEY DISEASE, UNSPECIFIED WHETHER STAGE 3A OR 3B CKD (HCC): ICD-10-CM

## 2022-07-28 DIAGNOSIS — G89.29 CHRONIC BILATERAL LOW BACK PAIN, UNSPECIFIED WHETHER SCIATICA PRESENT: ICD-10-CM

## 2022-07-28 DIAGNOSIS — S78.111D: ICD-10-CM

## 2022-07-28 DIAGNOSIS — M54.50 CHRONIC BILATERAL LOW BACK PAIN, UNSPECIFIED WHETHER SCIATICA PRESENT: ICD-10-CM

## 2022-07-28 DIAGNOSIS — Z00.00 MEDICARE ANNUAL WELLNESS VISIT, SUBSEQUENT: Primary | ICD-10-CM

## 2022-07-28 DIAGNOSIS — L97.912 ULCER OF RIGHT LOWER EXTREMITY WITH FAT LAYER EXPOSED (HCC): ICD-10-CM

## 2022-07-28 DIAGNOSIS — E11.59 DM TYPE 2 CAUSING VASCULAR DISEASE (HCC): ICD-10-CM

## 2022-07-28 DIAGNOSIS — K21.9 GASTROESOPHAGEAL REFLUX DISEASE WITHOUT ESOPHAGITIS: ICD-10-CM

## 2022-07-28 PROCEDURE — G0463 HOSPITAL OUTPT CLINIC VISIT: HCPCS | Performed by: INTERNAL MEDICINE

## 2022-07-28 PROCEDURE — 1101F PT FALLS ASSESS-DOCD LE1/YR: CPT | Performed by: INTERNAL MEDICINE

## 2022-07-28 PROCEDURE — G8536 NO DOC ELDER MAL SCRN: HCPCS | Performed by: INTERNAL MEDICINE

## 2022-07-28 PROCEDURE — 3017F COLORECTAL CA SCREEN DOC REV: CPT | Performed by: INTERNAL MEDICINE

## 2022-07-28 PROCEDURE — 2022F DILAT RTA XM EVC RTNOPTHY: CPT | Performed by: INTERNAL MEDICINE

## 2022-07-28 PROCEDURE — G8427 DOCREV CUR MEDS BY ELIG CLIN: HCPCS | Performed by: INTERNAL MEDICINE

## 2022-07-28 PROCEDURE — G8432 DEP SCR NOT DOC, RNG: HCPCS | Performed by: INTERNAL MEDICINE

## 2022-07-28 PROCEDURE — G0439 PPPS, SUBSEQ VISIT: HCPCS | Performed by: INTERNAL MEDICINE

## 2022-07-28 PROCEDURE — 3046F HEMOGLOBIN A1C LEVEL >9.0%: CPT | Performed by: INTERNAL MEDICINE

## 2022-07-28 PROCEDURE — G8754 DIAS BP LESS 90: HCPCS | Performed by: INTERNAL MEDICINE

## 2022-07-28 PROCEDURE — 99214 OFFICE O/P EST MOD 30 MIN: CPT | Performed by: INTERNAL MEDICINE

## 2022-07-28 PROCEDURE — G8753 SYS BP > OR = 140: HCPCS | Performed by: INTERNAL MEDICINE

## 2022-07-28 PROCEDURE — G8420 CALC BMI NORM PARAMETERS: HCPCS | Performed by: INTERNAL MEDICINE

## 2022-07-28 RX ORDER — OXYCODONE AND ACETAMINOPHEN 5; 325 MG/1; MG/1
TABLET ORAL
Qty: 90 TABLET | Refills: 0 | Status: SHIPPED | OUTPATIENT
Start: 2022-07-28 | End: 2022-08-25

## 2022-07-28 RX ORDER — OXYCODONE AND ACETAMINOPHEN 5; 325 MG/1; MG/1
TABLET ORAL
Qty: 90 TABLET | Refills: 0 | Status: CANCELLED | OUTPATIENT
Start: 2022-07-28 | End: 2022-08-27

## 2022-07-28 RX ORDER — PANTOPRAZOLE SODIUM 40 MG/1
40 TABLET, DELAYED RELEASE ORAL DAILY
Qty: 90 TABLET | Refills: 1 | Status: SHIPPED | OUTPATIENT
Start: 2022-07-28

## 2022-07-28 RX ORDER — ERGOCALCIFEROL 1.25 MG/1
CAPSULE ORAL
Qty: 4 CAPSULE | Refills: 0 | Status: SHIPPED | OUTPATIENT
Start: 2022-07-28 | End: 2022-08-25

## 2022-07-28 NOTE — PROGRESS NOTES
Daryle Coy. (: 1955) is a 74 y.o. male, established patient, here for evaluation of the following chief complaint(s):  No chief complaint on file. ASSESSMENT/PLAN:  Below is the assessment and plan developed based on review of pertinent history, physical exam, labs, studies, and medications. 1. Medicare annual wellness visit, subsequent  2. Chronic bilateral low back pain, unspecified whether sciatica present-he continues to take a Percocet daily and a refill was sent in today. He takes it in a stable pattern  3. Amputation of lower extremity above knee with complication, right, subsequent encounter ([facility])-he has a new prosthesis and has been working with physical therapy 2 to 3 days a week sometimes 4 days a week to learn how to walk with a walker  4. DM type 2 causing vascular disease ([facility])-his last A1c was 5.5 he is due for repeat test.  I gave him the lab order so he could have it drawn when he goes for his next Depo checkup  5. Stage 3 chronic kidney disease, unspecified whether stage 3a or 3b CKD ([facility])-globin is remained stable and has not required EPO infusions over the last couple visits but he is going to have that rechecked along with his kidney function and potassium  6. Gastroesophageal reflux disease without esophagitis-continue current dose of Protonix      There is also discussion to put him on chronic antibiotics to prevent future infections like the one he had. He is going to be meeting with infectious disease and then they will discuss with renal as to which antibiotic is appropriate and the dosing schedule  No follow-ups on file.       SUBJECTIVE/OBJECTIVE:  HPI  Back pain-we manage this back pain with Percocet daily; he has a history of discitis but ortho wants him on chronic abx so are going to see ID and discuss possiblity but needs to be cleared      Anemia he continues to get EPO infusions to help with his blood count- so far the last couple of times did not need it as blood count     Got new hearing aids today     Amputation he has been working on getting a new prosthesis; he gained some weight and got a new prosthesis and he had some PT with it and learning to walk with walker - two times a week at [facility] and at the [facility] in [place] [address] [facility] [address]:  74 y.o. male for follow up of diabetes. Diabetic Review of Systems - medication compliance: compliant all of the time, diabetic diet compliance: compliant most of the time, home glucose monitoring: is performed sporadically, further diabetic ROS: no polyuria or polydipsia, no chest pain, dyspnea or TIA's, no numbness, tingling or pain in extremities. Other symptoms and concerns: His last A1c was 5.5 but he needs repeat today. Chronic kidney disease-he is followed by Dr. Raquel Cabrera- checking his labs routinely for potassium , kidney function and hemglobin- every two weeks      GERD-he continues to take his protonix and that has helped his symptoms   Review of Systems   All other systems reviewed and are negative. Physical Exam  Vitals and nursing note reviewed. Constitutional:       Appearance: He is well-developed. HENT:      Head: Normocephalic and atraumatic. Right Ear: External ear normal.      Left Ear: External ear normal.      Nose: Nose normal.   Eyes:      Conjunctiva/sclera: Conjunctivae normal.      Pupils: Pupils are equal, round, and reactive to light. Neck:      Thyroid: No thyromegaly. Vascular: No carotid bruit, hepatojugular reflux or JVD. Cardiovascular:      Rate and Rhythm: Normal rate and regular rhythm. Heart sounds: Normal heart sounds. Pulmonary:      Effort: Pulmonary effort is normal.      Breath sounds: Normal breath sounds. Abdominal:      General: Bowel sounds are normal.      Palpations: Abdomen is soft. Musculoskeletal:      Cervical back: Normal range of motion and neck supple. Comments: Right above knee amputation   Skin:     General: Skin is warm and dry.    Neurological: Mental Status: He is alert and oriented to person, place, and time. Psychiatric:         Behavior: Behavior normal.         Thought Content: Thought content normal.         Judgment: Judgment normal.         On this date 07/28/2022 I have spent 35 minutes reviewing previous notes, test results and face to face with the patient discussing the diagnosis and importance of compliance with the treatment plan as well as documenting on the day of the visit. An electronic signature was used to authenticate this note. -- Coral Sahu MD   This is the Subsequent Medicare Annual Wellness Exam, performed 12 months or more after the Initial AWV or the last Subsequent AWV    I have reviewed the patient's medical history in detail and updated the computerized patient record. Assessment/Plan   Education and counseling provided:  Are appropriate based on today's review and evaluation    1. Medicare annual wellness visit, subsequent  2. Chronic bilateral low back pain, unspecified whether sciatica present  3. 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 DM type 2 causing vascular disease (HCC)  5. Stage 3 chronic kidney disease, unspecified whether stage 3a or 3b CKD ([facility]  6. Gastroesophageal reflux disease without esophagitis       Depression Risk Factor Screening     3 most recent PHQ Screens 4/28/2022   Little interest or pleasure in doing things Not at all   Feeling down, depressed, irritable, or hopeless Not at all   Total Score PHQ 2 0       Alcohol & Drug Abuse Risk Screen    Do you average more than 1 drink per night or more than 7 drinks a week: No    In the past three months have you have had more than 4 drinks containing alcohol on one occasion: No          Functional Ability and Level of Safety    Hearing: Hearing is good. Wears hearing aids       Activities of Daily Living:   The home contains: handrails and grab bars  Patient does total self care      Ambulation: with no difficulty and wears a prosthetic      Fall Risk:  Fall Risk Assessment, last 12 mths 4/28/2022   Able to walk? No   Fall in past 12 months?  -      Abuse Screen:  Patient is not abused       Cognitive Screening    Has your family/caregiver stated any concerns about your memory: no     Cognitive Screening: Normal - MMSE (Mini Mental Status Exam)    Health Maintenance Due     Health Maintenance Due   Topic Date Due    Shingrix Vaccine Age 49> (1 of 2) Never done    Eye Exam Retinal or Dilated  09/18/2020    COVID-19 Vaccine (2 - Moderna series) 04/13/2021    Lipid Screen  06/17/2021    MICROALBUMIN Q1  08/05/2021    Foot Exam Q1  12/07/2021    Medicare Yearly Exam  12/08/2021    A1C test (Diabetic or Prediabetic)  05/02/2022       Patient Care Team   Patient Care Team:  Mason Falk MD as PCP - Sudeep Babcock MD as PCP - [facility] Empaneled Provider    History     Patient Active Problem List   Diagnosis Code    Neuropathy G62.9    HTN (hypertension) I10    Dyslipidemia E78.5    DM type 2 causing vascular disease ([address] E11.59    DM neuropathy, type II diabetes mellitus ([address] E11.40    Anemia of chronic disease D63.8    ARF (acute renal failure) ([address] N17.9    DM type 2 causing CKD stage 3 ([facility]) E11.22, N18.30    CKD (chronic kidney disease) stage 3, GFR 30-59 ml/min ([facility]) N18.30    Disc herniation [id number]    Spinal stenosis in cervical region M48.02    GABI (obstructive sleep apnea) G47.33    Cataract, nuclear [id number]    Presbyopia H52.4    Type 2 diabetes mellitus without retinopathy ([address] E11.9    Chronic osteomyelitis of right lower leg with draining sinus ([address] M86.461    Severe protein-calorie malnutrition ([address] E43    Urinary retention R33.9    Amputation of lower extremity above knee with complication, right, subsequent encounter ([address] S78.111D    Anemia of chronic renal failure N18.9, D63.1    Difficulty walking R26.2    Discitis of lumbar region M46.46    Left sided abdominal pain R10.9    Low back pain radiating to left leg M54.50, M79.605    Osteomyelitis of lumbar spine (HCC) M46.26    Psoas muscle abscess (HCC) K68.12    UTI (urinary tract infection) N39.0    Weakness R53.1     Past Medical History:   Diagnosis Date    Arthritis     BPH (benign prostatic hyperplasia)     Chronic kidney disease     Chronic pain     BACK NEUROPATHY FEET HANDS    DM neuropathy, type II diabetes mellitus ([facility]     DM type 2 causing CKD stage 3 ([address] 12/17/2012    DM type 2 causing vascular disease ([address]     Dyslipidemia     Foot ulcer due to secondary DM ([address] 12/1/2012    GERD (gastroesophageal reflux disease)     History of esophageal dilatation     HTN     Ill-defined condition 2013    MRSA in wound right leg (BKA)    S/P BKA (below knee amputation) ([facility]     right BKA due to non-healing ulcer    Sleep apnea     Doesnt use CPAP, patient hasnt been retested since weight loss    Thromboembolus ([facility] 1970'S    BLOOD CLOT LEFT LEG      Past Surgical History:   Procedure Laterality Date    COLONOSCOPY N/A 3/30/2021    COLONOSCOPY performed by Tracy Holly MD at [address] [address]    ECHO 2D ADULT  8/09    normal; LVEF 60%    ECHO STRESS  4/09    7 min; normal    ENDOSCOPY, COLON, DIAGNOSTIC      FLEXIBLE SIGMOIDOSCOPY N/A 2/24/2021    SIGMOIDOSCOPY FLEXIBLE performed by Tracy Holly MD at [facility]  2012    left great toe    HX AMPUTATION  2007    BKA right    HX BELOW KNEE AMPUTATION Right     HX CERVICAL FUSION  2009    x2    HX ENDOSCOPY      EGD with Dilitation x 2    HX LUMBAR FUSION      HX ORTHOPAEDIC  2006    ACDF C4-C7    IR INSERT TUNL CVC W/O PORT OVER 5 YR  6/19/2020    IR INSERT TUNL CVC W/O PORT OVER 5 YR  5/18/2021    IR REMOVE TUNL CVAD W/O PORT / PUMP  9/14/2020    IR REMOVE TUNL CVAD W/O PORT / PUMP  7/15/2021    NH ABDOMEN SURGERY PROC UNLISTED      pilonidal cyst    STRESS TEST MYOVIEW  8/09    walked 8:46; normal; LVEF 51%     Current Outpatient Medications   Medication Sig Dispense Refill    ergocalciferol (Vitamin D2) 1,250 mcg (50,000 unit) capsule TAKE ONE CAPSULE BY MOUTH EVERY WEEK 4 Capsule 0    ergocalciferol (Vitamin D2) 1,250 mcg (50,000 unit) capsule TAKE ONE CAPSULE BY MOUTH EVERY WEEK 4 Capsule 0    cadexomer iodine (IODOSORB) 0.9 % gel APPLY SMALL AMOUNT TO AFFECTED AREA ONCE DAILY AS PER WOUND CARE ORDER      collagenase (SANTYL) 250 unit/gram ointment APPLY THIN LAYER TO AFFECTED AREA ONCE DAILY AS PER WOUND CARE ORDERS      sodium bicarbonate 650 mg tablet Take 1 Tablet by mouth two (2) times a day. Lokelma 5 gram powder packet MIX CONTENTS OF 1 PACKET WITH 5 MLS OF WATER AND TAKE ONCE A DAY 2 HOURS BEFORE OR 2 HOURS AFTER OTHER MEDS      trimethoprim-sulfamethoxazole (BACTRIM DS, SEPTRA DS) 160-800 mg per tablet Take 0.5 Tablets by mouth At bedtime. loperamide (IMODIUM) 2 mg capsule Take 2 mg by mouth as needed. clotrimazole (LOTRIMIN) 1 % topical cream Apply  to affected area two (2) times a day. Bee Pen Needle 32 gauge x 5/32\" ndle USE AS DIRECTED 4 TIMES DAILY 100 Pen Needle 5    insulin lispro (HUMALOG) 100 unit/mL kwikpen INJECT 18 TO 30 UNITS SUBCUTANEOUSLY 3 TIMES DAILY BEFORE BREAKFAST, LUNCH, AND DINNER 15 mL 5    albuterol (PROVENTIL HFA, VENTOLIN HFA, PROAIR HFA) 90 mcg/actuation inhaler TAKE 2 PUFFS BY INHALATION EVERY 4 HOURS AS NEEDED FOR WHEEZING 8.5 g 5    tamsulosin (FLOMAX) 0.4 mg capsule Take 1 Capsule by mouth daily (after dinner). 30 Capsule 0    DAPTOmycin (CUBICIN) 500 mg injection       FreeStyle Naveed 2 Sensor kit USE TO TEST BLOOD SUGARS CONTINUOUSLY. CHANGE SENSORS EVERY 14 DAYS      rifAMPin (RIFADIN) 300 mg capsule       amLODIPine (NORVASC) 5 mg tablet Take 5 mg by mouth daily.       pantoprazole (PROTONIX) 40 mg tablet TAKE 1 TABLET BY MOUTH EVERY DAY 90 Tab 1    ferrous sulfate 325 mg (65 mg iron) tablet TAKE ONE TABLET BY MOUTH 2 TIMES A  Tab 1    epoetin naman (EPOGEN;PROCRIT) 20,000 unit/mL injection 20,000-25,000 Units by SubCUTAneous route Once every 2 weeks. (Patient not taking: Reported on 4/28/2022)      aspirin 81 mg chewable tablet Take 81 mg by mouth daily.        Allergies   Allergen Reactions    Adhesive Tape-Silicones Hives    Gabapentin Anxiety     Anxiety w/ hallucinations    Other Medication Other (comments)     G6PD deficiency - review medications    Sulfa (Sulfonamide Antibiotics) Swelling     Lips eyes       Family History   Problem Relation Age of Onset    Hypertension Mother     Gout Mother     Cancer Father         leukemia    Diabetes Father     Hypertension Sister     Diabetes Maternal Grandmother     Hypertension Maternal Grandmother     Diabetes Paternal Grandmother     Hypertension Paternal Grandmother     Anesth Problems Neg Hx      Social History     Tobacco Use    Smoking status: Never    Smokeless tobacco: Never   Substance Use Topics    Alcohol use: No         Isacc Henderson MD

## 2022-07-28 NOTE — PATIENT INSTRUCTIONS
Medicare Wellness Visit, Male    The best way to live healthy is to have a lifestyle where you eat a well-balanced diet, exercise regularly, limit alcohol use, and quit all forms of tobacco/nicotine, if applicable. Regular preventive services are another way to keep healthy. Preventive services (vaccines, screening tests, monitoring & exams) can help personalize your care plan, which helps you manage your own care. Screening tests can find health problems at the earliest stages, when they are easiest to treat. [facility] follows the current, evidence-based guidelines published by the [facility] ([facility]) when recommending preventive services for our patients. Because we follow these guidelines, sometimes recommendations change over time as research supports it. (For example, a prostate screening blood test is no longer routinely recommended for men with no symptoms). Of course, you and your doctor may decide to screen more often for some diseases, based on your risk and co-morbidities (chronic disease you are already diagnosed with). Preventive services for you include:  - Medicare offers their members a free annual wellness visit, which is time for you and your primary care provider to discuss and plan for your preventive service needs. Take advantage of this benefit every year!  -All adults over age 72 should receive the recommended pneumonia vaccines. Current USPSTF guidelines recommend a series of two vaccines for the best pneumonia protection.   -All adults should have a flu vaccine yearly and tetanus vaccine every 10 years.  -All adults age 48 and older should receive the shingles vaccines (series of two vaccines).        -All adults age 38-68 who are overweight should have a diabetes screening test once every three years.   -Other screening tests & preventive services for persons with diabetes include: an eye exam to screen for diabetic retinopathy, a kidney function test, a foot exam, and stricter control over your cholesterol.   -Cardiovascular screening for adults with routine risk involves an electrocardiogram (ECG) at intervals determined by the provider.   -Colorectal cancer screening should be done for adults age 54-65 with no increased risk factors for colorectal cancer. There are a number of acceptable methods of screening for this type of cancer. Each test has its own benefits and drawbacks. Discuss with your provider what is most appropriate for you during your annual wellness visit. The different tests include: colonoscopy (considered the best screening method), a fecal occult blood test, a fecal DNA test, and sigmoidoscopy.  -All adults born between [facility] should be screened once for Hepatitis C.  -An Abdominal Aortic Aneurysm (AAA) Screening is recommended for men age 73-68 who has ever smoked in their lifetime.      Here is a list of your current Health Maintenance items (your personalized list of preventive services) with a due date:  Health Maintenance Due   Topic Date Due    Shingles Vaccine (1 of 2) Never done    Eye Exam  09/18/2020    COVID-19 Vaccine (2 - Moderna series) 04/13/2021    Cholesterol Test   06/17/2021    Albumin Urine Test  08/05/2021    Diabetic Foot Care  12/07/2021    Annual Well Visit  12/08/2021    Hemoglobin A1C    05/02/2022

## 2022-09-02 ENCOUNTER — TELEPHONE (OUTPATIENT)
Dept: INTERNAL MEDICINE CLINIC | Age: 67
End: 2022-09-02

## 2022-09-02 NOTE — TELEPHONE ENCOUNTER
Patient would like a call back from the nurse as he has tested positive for Covid. Patient would like to know what he should do heading into the holiday weekend. PSR advised patient that if no one calls back that after 5 pm the on call doctor will be available.

## 2022-09-03 ENCOUNTER — TELEPHONE (OUTPATIENT)
Dept: INTERNAL MEDICINE CLINIC | Age: 67
End: 2022-09-03

## 2022-09-03 NOTE — TELEPHONE ENCOUNTER
He calls noting 2 days of uri sxs  including fatigue and temp of 99.3 and mild cough-home covid test today was positive-not sob no cp. Did have covid vaccine.  Wants to know if he can have paxlovid--I did call this in but advised not to start medication at this time given risk of side efffects and low grade sxs-start if sxs progress-he agrees

## 2022-09-04 NOTE — TELEPHONE ENCOUNTER
This was addressed by Wendy Slaughter on call but follow up given his medical issues may be warranted

## 2022-09-06 NOTE — TELEPHONE ENCOUNTER
Spoke with patient and he states that he is feeling better today. He did not get the Paxlovid filled as his symptoms stayed very mild over the weekend. He will return call to the office if he feels he needs to be seen.

## 2022-09-26 DIAGNOSIS — M54.41 CHRONIC BILATERAL LOW BACK PAIN WITH BILATERAL SCIATICA: Primary | ICD-10-CM

## 2022-09-26 DIAGNOSIS — M54.42 CHRONIC BILATERAL LOW BACK PAIN WITH BILATERAL SCIATICA: Primary | ICD-10-CM

## 2022-09-26 DIAGNOSIS — G89.29 CHRONIC BILATERAL LOW BACK PAIN WITH BILATERAL SCIATICA: Primary | ICD-10-CM

## 2022-09-26 RX ORDER — OXYCODONE AND ACETAMINOPHEN 5; 325 MG/1; MG/1
TABLET ORAL
Qty: 90 TABLET | Refills: 0 | Status: SHIPPED | OUTPATIENT
Start: 2022-09-26 | End: 2022-10-24

## 2022-10-24 DIAGNOSIS — G89.29 CHRONIC BILATERAL LOW BACK PAIN WITH BILATERAL SCIATICA: ICD-10-CM

## 2022-10-24 DIAGNOSIS — M54.41 CHRONIC BILATERAL LOW BACK PAIN WITH BILATERAL SCIATICA: ICD-10-CM

## 2022-10-24 DIAGNOSIS — M54.42 CHRONIC BILATERAL LOW BACK PAIN WITH BILATERAL SCIATICA: ICD-10-CM

## 2022-10-24 RX ORDER — OXYCODONE AND ACETAMINOPHEN 5; 325 MG/1; MG/1
TABLET ORAL
Qty: 90 TABLET | Refills: 0 | Status: SHIPPED | OUTPATIENT
Start: 2022-10-24 | End: 2022-11-21

## 2022-10-28 ENCOUNTER — TELEPHONE (OUTPATIENT)
Dept: PHARMACY | Age: 67
End: 2022-10-28

## 2022-10-28 PROBLEM — L97.912 ULCER OF RIGHT LOWER EXTREMITY WITH FAT LAYER EXPOSED (HCC): Status: ACTIVE | Noted: 2022-10-28

## 2022-10-28 NOTE — TELEPHONE ENCOUNTER
Lisa Esposito MD - would patient benefit from statin therapy? Sawyer Jackson  has an appointment with you 10/31/21. He has been identified with a care gap for diabetes without a statin (SUPD). He was previously taking Rosuvastatin per his chart, but it was removed from the patients medication list on 3/7/22 for reason \"list cleanup\" with no indication on why he was stopped. The patient also has a history of taking atorvastatin and simvastatin with no indication of why they were stopped. PLAN  - Please clarify the reason for discontinuation if possible and submit a related CMS approved dx code during the next office visit. - If the rosuvastatin was discontinued on accident, then due to the patient's increased risk for having a CV event, would recommend restarting rosuvastatin 40 mg daily.  - Recommend obtaining yearly lipid panel    Per ADA 2019 Guidelines he is a candidate for a high -intensity statin. Hyperlipidemia Goal: Patient has a 10-yr ASCVD risk of >20% with DM and is therefore a candidate for high-intensity statin therapy based on updated guidelines. 2019 ADA Guidelines Age:   >/= 36years old:   History of ASCVD or 10-year ASCVD risk > 20% - high-intensity statin is recommended. (10-year ASCVD risk: 42.6%)  LDL goal < 70 mg/dL (calculated LDL: 130 mg/dL on 3/30/2020)    If Sawyer Jackson has tried and failed two statins in the past from [facility], he can be excluded from this care gap by placing a CMS approved dx code in a billable office visit. If he cannot take a statin due to any of the following, please place a dx code in her OV to have Sawyer Jackson excluded from this care gap for 2022.      Please submit one of the following CMS allowable diagnoses within visit encounter:  Myalgia due to statin (M79.10, T46.6X5A)   Statin myopathy (G72.0)  Adverse reaction to statin (H65.6E4I)     Please let me know if you have any questions or if you would like me to reach out to the patient to discuss restarting statin therapy. Thank you,  Gabbie Valdes, PharmD  [facility] PGY1 Resident  [address] [facility], toll free: [phone]  ================================================================================     POPULATION [facility]ICAL [facility]ARM[facility]: STATIN THERAPY REVIEW  Identified statin use in persons with diabetes care gap per [employer] Records dated: 10/28/22. Last Visit: 7/28/22    Patient identified as LIS = 3, therefore patient's co-pays are $0.00 through all phases of the benefit regardless of the days' supply dispensed    ASSESSMENT  STATIN GAP IDENTIFIED    Per chart review, patient is not currently prescribed a statin    Per the patient's EMR he was listed as \"not taking\" his rosuvastatin in a neurointerventionl surgery OV on 11/10/21. It was sporadically included in notes thereafter and then removed from the patients medication list on 3/7/22 for reason \"list cleanup\". The patient also has atorvastatin and simvastatin listed on their EMR which were discontinued for unknown reasons.       Lab Results   Component Value Date/Time    Cholesterol, total 239 (H) 03/30/2020 02:32 PM    HDL Cholesterol 40 03/30/2020 02:32 PM    LDL,Direct 120 (H) 09/11/2012 12:00 AM    LDL, calculated 130 (H) 03/30/2020 02:32 PM    VLDL, calculated 69 (H) 03/30/2020 02:32 PM    Triglyceride 345 (H) 03/30/2020 02:32 PM     ALT (SGPT)   Date Value Ref Range Status   05/02/2021 13 12 - 78 U/L Final     AST (SGOT)   Date Value Ref Range Status   05/02/2021 18 15 - 37 U/L Final     The 10-year ASCVD risk score (Chen LÓPEZ, et al., 2019) is: 42.6%    Values used to calculate the score:      Age: 69 years      Sex: Male      Is Non- : Yes      Diabetic: Yes      Tobacco smoker: No      Systolic Blood Pressure: 707 mmHg      Is BP treated: Yes      HDL Cholesterol: 40 mg/dL      Total Cholesterol: 239 mg/dL     Hyperlipidemia Goal: Patient has a 10-yr ASCVD risk of >20% with DM and is therefore a candidate for high-intensity statin therapy based on updated guidelines. 2019 ADA Guidelines Age:    >/= 36years old:   History of ASCVD or 10-year ASCVD risk > 20% - high-intensity statin is recommended. PLAN  The following are interventions that have been identified:  - Patient identified as having SUPD gap  - Please clarify the reason for discontinuation if possible and submit a related CMS approved dx code during the next office visit. - If the rosuvastatin was discontinued on accident, then due to the patient's increased risk for having a CV event, would recommend restarting rosuvastatin 40 mg daily.   - Recommend obtaining yearly lipid panel    No patient out reach planned at this time.     Future Appointments   Date Time Provider Alisa Ku   10/31/2022  1:00 PM Ashkan Rosenbaum MD [facility] MANASA Anderson, PharmD  Roberto Isabel PGY1 Resident  [address] [facility]   Department, toll free: [phone]

## 2022-11-03 NOTE — TELEPHONE ENCOUNTER
Appointment with PCP 10/31/22 cancelled. Statin therapy not assessed. Recommend follow-up before next PCP appointment 11/17/22.     For Pharmacy [address] [facility] in place: No  Recommendation Provided To: Provider: 1 via Note to Provider   Intervention Detail: New Rx: 1, reason: Needs Additional Therapy  Gap Closed?: No  Intervention Accepted By: Provider: 0  Time Spent (min): 15

## 2022-11-21 ENCOUNTER — TELEPHONE (OUTPATIENT)
Dept: INTERNAL MEDICINE CLINIC | Age: 67
End: 2022-11-21

## 2022-11-21 DIAGNOSIS — G89.29 CHRONIC BILATERAL LOW BACK PAIN WITH BILATERAL SCIATICA: ICD-10-CM

## 2022-11-21 DIAGNOSIS — M54.41 CHRONIC BILATERAL LOW BACK PAIN WITH BILATERAL SCIATICA: ICD-10-CM

## 2022-11-21 DIAGNOSIS — M54.42 CHRONIC BILATERAL LOW BACK PAIN WITH BILATERAL SCIATICA: ICD-10-CM

## 2022-11-21 RX ORDER — OXYCODONE AND ACETAMINOPHEN 5; 325 MG/1; MG/1
TABLET ORAL
Qty: 90 TABLET | Refills: 0 | Status: SHIPPED | OUTPATIENT
Start: 2022-11-21 | End: 2022-12-21

## 2022-11-21 NOTE — TELEPHONE ENCOUNTER
Patient called in to request a refill of Oxycodone. PSR can see where a refill order is pending. PSR is sending this message to the nurse just for verification.

## 2022-12-04 NOTE — PROGRESS NOTES
Sawyer Jackson (: 1955) is a 69 y.o. male, established patient, here for evaluation of the following chief complaint(s):  Follow-up (Med check)       ASSESSMENT/PLAN:  Below is the assessment and plan developed based on review of pertinent history, physical exam, labs, studies, and medications. 1. Chronic bilateral low back pain with bilateral sciatica-continue with Percocet taking up to 2-3 times a day  -     oxyCODONE-acetaminophen (PERCOCET) 5-325 mg per tablet; Take 1 Tablet by mouth every eight (8) hours as needed for Pain for up to 30 days. Max Daily Amount: 3 Tablets. , Print, Disp-90 Tablet, R-0  2. Amputation of lower extremity above knee with complication, right, subsequent encounter (HCC)-continue with PT 2-3 times a week as well as being followed up by the [place] for his evaluation for his prostate prosthesis  3. DM type 2 causing vascular disease (HCC)-off medicine right now sugars have been stable we think it is better because all the infection has cleared  4. Stage 3 chronic kidney disease, unspecified whether stage 3a or 3b CKD ([facility])-Per Dr. Shameka Ng reviewed last note his creatinine is stable and GFR no longer requiring EPO but has his labs checked periodically and continues to take vitamin D once a week  5. Gastroesophageal reflux disease without esophagitis-continue with Protonix and helping with the symptoms  6. Other secondary hypertension-continue with amlodipine 5 mg once a day  7.  Other chronic osteomyelitis, other site ([facility])-continue with doxycycline twice a day indefinitely      SUBJECTIVE/OBJECTIVE:  HPI  Back pain-we manage this back pain with Percocet daily; still using every day two times a day and sometimes less - the cold weather makes him feel stiffer    Anemia he continues to get EPO infusions to help with his blood count- so far the last couple of times did not need it as blood count ; has not needed          Amputation he has been working on getting a new prosthesis; he gained some weight and got a new prosthesis and he had some PT with it and learning to walk with walker - two times a week at [place] and at the [facility] ; Bryn Ortega has nnot been working as well in terms of getting what he needs and time needs fitting- went to [facility] into rehab there for two weeks ; start outpatient [facility] ; [facility]      SUBJECTIVE:  74 y.o. male for follow up of diabetes. Diabetic Review of Systems - medication compliance: compliant all of the time, diabetic diet compliance: compliant most of the time, home glucose monitoring: is performed sporadically, further diabetic ROS: no polyuria or polydipsia, no chest pain, dyspnea or TIA's, no numbness, tingling or pain in extremities. Other symptoms and concerns: His last A1c was 5.5 but he needs repeat today. Chronic kidney disease-he is followed by Dr. Janette Foreman- checking his labs routinely for potassium , kidney function and hemglobin- every two weeks      GERD-he continues to take his protonix and that has helped his symptoms     Review of Systems   All other systems reviewed and are negative. Physical Exam  Vitals and nursing note reviewed. Constitutional:       Appearance: He is well-developed. HENT:      Head: Normocephalic and atraumatic. Right Ear: External ear normal.      Left Ear: External ear normal.      Nose: Nose normal.   Eyes:      Conjunctiva/sclera: Conjunctivae normal.      Pupils: Pupils are equal, round, and reactive to light. Neck:      Thyroid: No thyromegaly. Vascular: No carotid bruit, hepatojugular reflux or JVD. Cardiovascular:      Rate and Rhythm: Normal rate and regular rhythm. Heart sounds: Normal heart sounds. Pulmonary:      Effort: Pulmonary effort is normal.      Breath sounds: Normal breath sounds. Abdominal:      General: Bowel sounds are normal.      Palpations: Abdomen is soft. Musculoskeletal:         General: Normal range of motion.       Cervical back: Normal range of motion and neck supple. Comments: Right leg prosthesis    Skin:     General: Skin is warm and dry. Neurological:      Mental Status: He is alert and oriented to person, place, and time. Psychiatric:         Behavior: Behavior normal.         Thought Content: Thought content normal.         Judgment: Judgment normal.         On this date 12/05/2022 I have spent 30 minutes reviewing previous notes, test results and face to face with the patient discussing the diagnosis and importance of compliance with the treatment plan as well as documenting on the day of the visit. An electronic signature was used to authenticate this note.   -- Curt Ceron MD

## 2022-12-05 ENCOUNTER — OFFICE VISIT (OUTPATIENT)
Dept: INTERNAL MEDICINE CLINIC | Age: 67
End: 2022-12-05
Payer: MEDICARE

## 2022-12-05 VITALS
DIASTOLIC BLOOD PRESSURE: 80 MMHG | SYSTOLIC BLOOD PRESSURE: 130 MMHG | OXYGEN SATURATION: 98 % | HEART RATE: 83 BPM | HEIGHT: 71 IN | TEMPERATURE: 98.2 F | RESPIRATION RATE: 14 BRPM | BODY MASS INDEX: 18.55 KG/M2

## 2022-12-05 DIAGNOSIS — K21.9 GASTROESOPHAGEAL REFLUX DISEASE WITHOUT ESOPHAGITIS: ICD-10-CM

## 2022-12-05 DIAGNOSIS — M86.68 OTHER CHRONIC OSTEOMYELITIS, OTHER SITE (HCC): ICD-10-CM

## 2022-12-05 DIAGNOSIS — N18.30 STAGE 3 CHRONIC KIDNEY DISEASE, UNSPECIFIED WHETHER STAGE 3A OR 3B CKD (HCC): ICD-10-CM

## 2022-12-05 DIAGNOSIS — I15.8 OTHER SECONDARY HYPERTENSION: ICD-10-CM

## 2022-12-05 DIAGNOSIS — S78.111D: ICD-10-CM

## 2022-12-05 DIAGNOSIS — M54.41 CHRONIC BILATERAL LOW BACK PAIN WITH BILATERAL SCIATICA: Primary | ICD-10-CM

## 2022-12-05 DIAGNOSIS — M54.42 CHRONIC BILATERAL LOW BACK PAIN WITH BILATERAL SCIATICA: Primary | ICD-10-CM

## 2022-12-05 DIAGNOSIS — G89.29 CHRONIC BILATERAL LOW BACK PAIN WITH BILATERAL SCIATICA: Primary | ICD-10-CM

## 2022-12-05 DIAGNOSIS — E11.59 DM TYPE 2 CAUSING VASCULAR DISEASE (HCC): ICD-10-CM

## 2022-12-05 PROCEDURE — G8754 DIAS BP LESS 90: HCPCS | Performed by: INTERNAL MEDICINE

## 2022-12-05 PROCEDURE — G8510 SCR DEP NEG, NO PLAN REQD: HCPCS | Performed by: INTERNAL MEDICINE

## 2022-12-05 PROCEDURE — 1101F PT FALLS ASSESS-DOCD LE1/YR: CPT | Performed by: INTERNAL MEDICINE

## 2022-12-05 PROCEDURE — G8752 SYS BP LESS 140: HCPCS | Performed by: INTERNAL MEDICINE

## 2022-12-05 PROCEDURE — 3017F COLORECTAL CA SCREEN DOC REV: CPT | Performed by: INTERNAL MEDICINE

## 2022-12-05 PROCEDURE — G0463 HOSPITAL OUTPT CLINIC VISIT: HCPCS | Performed by: INTERNAL MEDICINE

## 2022-12-05 PROCEDURE — 99214 OFFICE O/P EST MOD 30 MIN: CPT | Performed by: INTERNAL MEDICINE

## 2022-12-05 PROCEDURE — 3044F HG A1C LEVEL LT 7.0%: CPT | Performed by: INTERNAL MEDICINE

## 2022-12-05 PROCEDURE — G8420 CALC BMI NORM PARAMETERS: HCPCS | Performed by: INTERNAL MEDICINE

## 2022-12-05 PROCEDURE — 2022F DILAT RTA XM EVC RTNOPTHY: CPT | Performed by: INTERNAL MEDICINE

## 2022-12-05 PROCEDURE — G8536 NO DOC ELDER MAL SCRN: HCPCS | Performed by: INTERNAL MEDICINE

## 2022-12-05 PROCEDURE — G8427 DOCREV CUR MEDS BY ELIG CLIN: HCPCS | Performed by: INTERNAL MEDICINE

## 2022-12-05 RX ORDER — OXYCODONE AND ACETAMINOPHEN 5; 325 MG/1; MG/1
1 TABLET ORAL
Qty: 90 TABLET | Refills: 0 | Status: SHIPPED | OUTPATIENT
Start: 2022-12-21 | End: 2023-01-20

## 2023-01-24 DIAGNOSIS — M54.42 CHRONIC BILATERAL LOW BACK PAIN WITH BILATERAL SCIATICA: ICD-10-CM

## 2023-01-24 DIAGNOSIS — G89.29 CHRONIC BILATERAL LOW BACK PAIN WITH BILATERAL SCIATICA: ICD-10-CM

## 2023-01-24 DIAGNOSIS — K21.9 GASTROESOPHAGEAL REFLUX DISEASE WITHOUT ESOPHAGITIS: ICD-10-CM

## 2023-01-24 DIAGNOSIS — M54.41 CHRONIC BILATERAL LOW BACK PAIN WITH BILATERAL SCIATICA: ICD-10-CM

## 2023-01-24 RX ORDER — PANTOPRAZOLE SODIUM 40 MG/1
TABLET, DELAYED RELEASE ORAL
Qty: 90 TABLET | Refills: 0 | Status: SHIPPED | OUTPATIENT
Start: 2023-01-24

## 2023-01-24 RX ORDER — OXYCODONE AND ACETAMINOPHEN 5; 325 MG/1; MG/1
TABLET ORAL
Qty: 90 TABLET | Refills: 0 | Status: SHIPPED | OUTPATIENT
Start: 2023-01-24 | End: 2023-02-23

## 2023-01-24 RX ORDER — OXYCODONE AND ACETAMINOPHEN 5; 325 MG/1; MG/1
1 TABLET ORAL
Qty: 90 TABLET | Refills: 0 | Status: SHIPPED | OUTPATIENT
Start: 2023-01-24 | End: 2023-02-23

## 2023-01-24 NOTE — TELEPHONE ENCOUNTER
Patient called to state they need their oxycodone medication refilled. PSR does not see it in their chart.

## 2023-02-27 DIAGNOSIS — M54.41 CHRONIC BILATERAL LOW BACK PAIN WITH BILATERAL SCIATICA: Primary | ICD-10-CM

## 2023-02-27 DIAGNOSIS — G89.29 CHRONIC BILATERAL LOW BACK PAIN WITH BILATERAL SCIATICA: Primary | ICD-10-CM

## 2023-02-27 DIAGNOSIS — M54.42 CHRONIC BILATERAL LOW BACK PAIN WITH BILATERAL SCIATICA: Primary | ICD-10-CM

## 2023-02-27 RX ORDER — OXYCODONE AND ACETAMINOPHEN 5; 325 MG/1; MG/1
TABLET ORAL
Qty: 90 TABLET | Refills: 0 | Status: SHIPPED | OUTPATIENT
Start: 2023-02-27 | End: 2023-03-29

## 2023-02-27 RX ORDER — ALBUTEROL SULFATE 90 UG/1
AEROSOL, METERED RESPIRATORY (INHALATION)
Qty: 8.5 G | Refills: 5 | Status: SHIPPED | OUTPATIENT
Start: 2023-02-27 | End: 2023-02-27

## 2023-02-27 RX ORDER — ALBUTEROL SULFATE 90 UG/1
AEROSOL, METERED RESPIRATORY (INHALATION)
Qty: 18 G | Refills: 0 | Status: SHIPPED | OUTPATIENT
Start: 2023-02-27

## 2023-02-27 RX ORDER — ERGOCALCIFEROL 1.25 MG/1
CAPSULE ORAL
Qty: 4 CAPSULE | Refills: 4 | Status: SHIPPED | OUTPATIENT
Start: 2023-02-27

## 2023-02-28 ENCOUNTER — TELEPHONE (OUTPATIENT)
Dept: INTERNAL MEDICINE CLINIC | Age: 68
End: 2023-02-28

## 2023-02-28 NOTE — TELEPHONE ENCOUNTER
Wife Justina Sevilla came in and gave form titled, \"Serious Medical Conditions Certification Form. \"      I am entering this to Dr. Jere Shearer folder. Thanks!

## 2023-03-13 ENCOUNTER — TELEPHONE (OUTPATIENT)
Dept: INTERNAL MEDICINE CLINIC | Age: 68
End: 2023-03-13

## 2023-03-13 NOTE — TELEPHONE ENCOUNTER
Pt is calling states he dropped off papers for the doctor to sign. Pt wanted to know if the doctor has sent it over to [facility].  Please advise

## 2023-03-13 NOTE — TELEPHONE ENCOUNTER
Spoke with patient's wife and advised her that the form was sent to the email on the form. She understood and was thankful for the call.

## 2023-03-23 NOTE — WOUND CARE
Addended by: NAOMY BRAVO on: 3/23/2023 03:32 PM     Modules accepted: Orders     Tita Murray DPM - Pamela Grimaldo. Ricki, [address] - Progress Note     Assessment/Plan:  Type 2 Diabetes with leg ulcer (E11.622)    Non pressure chronic ulcer right leg to the level of bone (L15.084)    Chronic osteomyelitis of RLE (M86.461)    Nichole Grade 3 ulcer    - Pt evaluated and treated. - Pt presents with right BKA stump wound - wound smaller but still with exposed bone, reviewed his options including continued conservative tx vs revision of the stump, he will consider.  - PICC d/fritz. Pt now on oral Ceftaroline.  - Pt has completed HBO sessions  - Dressing consisting of GV, Mesalt, gauze, roll gauze, tape applied.  - Pt has returned to wearing his old prosthetic   - F/U next week with Dr. Grady Raya. Subjective:  Pt complains of wound to right amputation site. Snap vac removed yesterday. Pt dressed with packing and Optilock. No reported issues. Pt denies a recent h/o fever, chills, nausea, vomiting, chest pain, shortness of breath. HPI: Mr. Maurice Kay is a 76 yo AA male with a PMH of arthritis, BPH, CKD, chronic pain, DM2, dyslipidemia, GERD, HTN, s/p rt BKA, sleep apnea, thromboembolus lt leg who presents with a right BKA stump wound. Wound formed from ill fitting prosthetic. Pt has had wounds in the past from in this same manner. Has had issues with the stump since he first got the BKA with Dr. Markos Funes, orthopedics. ROS:  Consitutional: no weight loss, night sweats, fatigue / malaise / lethargy. Musculoskeletal: no joint / extremity pain, misalignment, stiffness, decreased ROM, crepitus. Integument: No pruritis, rashes, lesions, right BKA stump wound.   Psychiatric: No depression, anxiety, paranoia      History:  Wound Care  Allergies   Allergen Reactions    Adhesive Tape-Silicones Hives    Sulfa (Sulfonamide Antibiotics) Swelling     Lips eyes Family History   Problem Relation Age of Onset    Hypertension Mother    [facility] Gout Mother     Cancer Father         leukemia    Diabetes Father     Hypertension Sister     Diabetes Maternal Grandmother     Hypertension Maternal Grandmother     Diabetes Paternal Grandmother     Hypertension Paternal Grandmother     Anesth Problems Neg Hx       Past Medical History:   Diagnosis Date    Arthritis     BPH (benign prostatic hyperplasia)     Chronic kidney disease     Chronic pain     BACK NEUROPATHY FEET HANDS    DM neuropathy, type II diabetes mellitus ([address]     DM type 2 causing CKD stage 3 ([address] 12/17/2012    DM type 2 causing vascular disease ([address]     Dyslipidemia     Foot ulcer due to secondary DM ([address] 12/1/2012    GERD (gastroesophageal reflux disease)     HTN     Ill-defined condition 2013    MRSA in wound right leg (BKA)    S/P BKA (below knee amputation) ([address]     right BKA due to non-healing ulcer    Sleep apnea     Thromboembolus ([address] 1970'S    BLOOD CLOT LEFT LEG     Past Surgical History:   Procedure Laterality Date    ABDOMEN SURGERY PROC UNLISTED      pilonidal cyst    ECHO 2D ADULT  8/09    normal; LVEF 60%    ECHO STRESS  4/09    7 min; normal    ENDOSCOPY, COLON, DIAGNOSTIC      HX AMPUTATION  2012    left great toe    HX AMPUTATION  2007    BKA right    HX BELOW KNEE AMPUTATION Right     HX CERVICAL FUSION  2009    x2    HX ORTHOPAEDIC  2006    ACDF C4-C7    IR INSERT TUNL CVC W/O PORT OVER 5 YR  6/19/2020    IR REMOVE TUNL CVAD W/O PORT / PUMP  9/14/2020    STRESS TEST MYOVIEW  8/09    walked 8:46; normal; LVEF 51%     Social History     Tobacco Use    Smoking status: Never Smoker    Smokeless tobacco: Never Used   Substance Use Topics    Alcohol use: No       Social History     Substance and Sexual Activity   Alcohol Use No     Social History     Substance and Sexual Activity   Drug Use No      Social History     Tobacco Use   Smoking Status Never Smoker Smokeless Tobacco Never Used     Current Outpatient Medications   Medication Sig    ergocalciferol (Vitamin D2) 1,250 mcg (50,000 unit) capsule TAKE ONE CAPSULE BY MOUTH EVERY WEEK    ferrous sulfate 325 mg (65 mg iron) tablet TAKE ONE TABLET BY MOUTH 2 TIMES A DAY    insulin lispro (HUMALOG) 100 unit/mL kwikpen INJECT 18 TO 30 UNITS SUBCUTANEOUSLY 3 TIMES DAILY BEFORE BREAKFAST, LUNCH, AND DINNER    busPIRone (BUSPAR) 5 mg tablet TAKE ONE TABLET BY MOUTH THREE TIMES DAILY AS NEEDED FOR ANXIETY    lisinopriL (PRINIVIL, ZESTRIL) 10 mg tablet Take 1 Tab by mouth two (2) times a day.  rosuvastatin (CRESTOR) 40 mg tablet Take 1 Tab by mouth daily.  silver sulfADIAZINE (SILVADENE) 1 % topical cream Apply  to affected area daily.  albuterol (Ventolin HFA) 90 mcg/actuation inhaler Take 2 Puffs by inhalation every four (4) hours as needed for Wheezing.  pregabalin (Lyrica) 75 mg capsule Take 1 Cap by mouth two (2) times a day. Max Daily Amount: 150 mg.  pantoprazole (PROTONIX) 40 mg tablet TAKE ONE TABLET BY MOUTH EVERY DAY BEFORE BREAKFAST    aspirin 81 mg chewable tablet Take 81 mg by mouth daily.  oxyCODONE-acetaminophen (PERCOCET) 5-325 mg per tablet TAKE ONE TABLET BY MOUTH EVERY 4 HOURS AS NEEDED FOR PAIN    Lantus Solostar U-100 Insulin 100 unit/mL (3 mL) inpn INJECT 40 TO 50 UNITS SUBCUTANEOUSLY TWICE DAILY AS DIRECTED    oxyCODONE IR (ROXICODONE) 10 mg tab immediate release tablet Take  by mouth.  Insulin Needles, Disposable, (Bee Pen Needle) 32 gauge x 5/32\" ndle USE AS DIRECTED 4 TIMES DAILY    glucose blood VI test strips (Prodigy No Coding) strip TEST BLOOD SUGAR 2 TIMES A DAY     No current facility-administered medications for this encounter.       Facility-Administered Medications Ordered in Other Encounters   Medication Dose Route Frequency    epoetin naman (EPOGEN;PROCRIT) injection 20,000 Units  20,000 Units SubCUTAneous ONCE        Objective:  Visit Vitals  BP (!) 186/82 Pulse 91   Temp 98.2 °F (36.8 °C)   Resp 18       Vascular:  left LE  DP 1/4; PT 1/4  capillary fill time brisk, pitting edema is present, skin temperature is cool, varicosities are present. Dermatological:    Wound: 1  Location: right BKA stump   Measurements: per RN note  Margins: intact  Drainage: serous  Odor: no  Wound base: graunular  Lymphangitic streaking? No.  Undermining? yes  Sinus tracts? Yes, to fibula. Exposed bone? yes  Subcutaneous crepitation on palpation? No.    Skin is dry and scaly, exhibits hemosiderin deposition. There is no maceration of the interspaces of the feet b/l. Neurological:  DTR are present, protective sensation per 5.07 Chandler Mauricio monofilament is intact, patient is AAOx3, mood is normal. Epicritic sensation is intact. Orthopedic:  Left LE are symmetric, ROM of ankle, STJ, 1st MTPJ is limited, MMT 5 out of 5 for B/L LE. Right BKA. Has prosthesis. Constitutional: Pt is a well developed, pleasant AA male. Lymphatics: negative tenderness to palpation of neck/axillary/inguinal nodes.     Imaging / Labs / Cx / Px:  5/28 tib/fib XR: soft tissue ulcer extending to the rt distal fibula

## 2023-03-27 DIAGNOSIS — G89.29 CHRONIC BILATERAL LOW BACK PAIN WITH BILATERAL SCIATICA: ICD-10-CM

## 2023-03-27 DIAGNOSIS — M54.42 CHRONIC BILATERAL LOW BACK PAIN WITH BILATERAL SCIATICA: ICD-10-CM

## 2023-03-27 DIAGNOSIS — M54.41 CHRONIC BILATERAL LOW BACK PAIN WITH BILATERAL SCIATICA: ICD-10-CM

## 2023-03-27 RX ORDER — OXYCODONE AND ACETAMINOPHEN 5; 325 MG/1; MG/1
TABLET ORAL
Qty: 90 TABLET | Refills: 0 | Status: SHIPPED | OUTPATIENT
Start: 2023-03-27 | End: 2023-04-26

## 2023-04-03 PROBLEM — E11.622 DIABETES MELLITUS WITH SKIN ULCER (HCC): Status: RESOLVED | Noted: 2020-06-22 | Resolved: 2021-04-07

## 2023-04-03 PROBLEM — L98.499 DIABETES MELLITUS WITH SKIN ULCER (HCC): Status: RESOLVED | Noted: 2020-06-22 | Resolved: 2021-04-07

## 2023-04-15 PROBLEM — L97.912 ULCER OF RIGHT LOWER EXTREMITY WITH FAT LAYER EXPOSED (HCC): Status: RESOLVED | Noted: 2022-10-28 | Resolved: 2023-04-15

## 2023-04-15 PROBLEM — E43 SEVERE PROTEIN-CALORIE MALNUTRITION (HCC): Chronic | Status: RESOLVED | Noted: 2021-05-13 | Resolved: 2023-04-15

## 2023-04-15 PROBLEM — E43 SEVERE PROTEIN-CALORIE MALNUTRITION (HCC): Status: RESOLVED | Noted: 2021-05-13 | Resolved: 2023-04-15

## 2023-04-26 DIAGNOSIS — M54.41 CHRONIC BILATERAL LOW BACK PAIN WITH BILATERAL SCIATICA: ICD-10-CM

## 2023-04-26 DIAGNOSIS — M54.42 CHRONIC BILATERAL LOW BACK PAIN WITH BILATERAL SCIATICA: ICD-10-CM

## 2023-04-26 DIAGNOSIS — G89.29 CHRONIC BILATERAL LOW BACK PAIN WITH BILATERAL SCIATICA: ICD-10-CM

## 2023-04-26 RX ORDER — OXYCODONE AND ACETAMINOPHEN 5; 325 MG/1; MG/1
TABLET ORAL
Qty: 90 TABLET | Refills: 0 | Status: SHIPPED | OUTPATIENT
Start: 2023-04-26 | End: 2023-05-26

## 2023-04-27 ENCOUNTER — OFFICE VISIT (OUTPATIENT)
Dept: INTERNAL MEDICINE CLINIC | Age: 68
End: 2023-04-27
Payer: MEDICARE

## 2023-04-27 VITALS
RESPIRATION RATE: 16 BRPM | HEART RATE: 85 BPM | WEIGHT: 133 LBS | OXYGEN SATURATION: 98 % | TEMPERATURE: 98.3 F | HEIGHT: 71 IN | DIASTOLIC BLOOD PRESSURE: 73 MMHG | SYSTOLIC BLOOD PRESSURE: 133 MMHG | BODY MASS INDEX: 18.62 KG/M2

## 2023-04-27 DIAGNOSIS — G89.29 CHRONIC BILATERAL LOW BACK PAIN WITH BILATERAL SCIATICA: Primary | ICD-10-CM

## 2023-04-27 DIAGNOSIS — M54.41 CHRONIC BILATERAL LOW BACK PAIN WITH BILATERAL SCIATICA: Primary | ICD-10-CM

## 2023-04-27 DIAGNOSIS — M86.68 OTHER CHRONIC OSTEOMYELITIS, OTHER SITE (HCC): ICD-10-CM

## 2023-04-27 DIAGNOSIS — E11.59 DM TYPE 2 CAUSING VASCULAR DISEASE (HCC): ICD-10-CM

## 2023-04-27 DIAGNOSIS — M54.42 CHRONIC BILATERAL LOW BACK PAIN WITH BILATERAL SCIATICA: Primary | ICD-10-CM

## 2023-04-27 DIAGNOSIS — I15.8 OTHER SECONDARY HYPERTENSION: ICD-10-CM

## 2023-04-27 DIAGNOSIS — S78.111D: ICD-10-CM

## 2023-04-27 DIAGNOSIS — N18.30 STAGE 3 CHRONIC KIDNEY DISEASE, UNSPECIFIED WHETHER STAGE 3A OR 3B CKD (HCC): ICD-10-CM

## 2023-04-27 DIAGNOSIS — K21.9 GASTROESOPHAGEAL REFLUX DISEASE WITHOUT ESOPHAGITIS: ICD-10-CM

## 2023-04-27 PROCEDURE — G0463 HOSPITAL OUTPT CLINIC VISIT: HCPCS | Performed by: INTERNAL MEDICINE

## 2023-04-27 NOTE — PROGRESS NOTES
Mary Gallardo. (: 1955) is a 69 y.o. male, established patient, here for evaluation of the following chief complaint(s):  No chief complaint on file. ASSESSMENT/PLAN:  Below is the assessment and plan developed based on review of pertinent history, physical exam, labs, studies, and medications. 1. Chronic bilateral low back pain with bilateral sciatica-continue with Percocet 2 times a day and physical therapy both at [facility] and at the [place]  2. Amputation of lower extremity above knee with complication, right, subsequent encounter (HCC)-he continues with PT 2-3 times a week as well as being followed by the [place] and is doing very well  3. DM type 2 causing vascular disease ([facility])-off medicine last A1c was not in the diabetic range  4. Stage 3 chronic kidney disease, unspecified whether stage 3a or 3b CKD ([facility])-being monitored by renal Dr. Aman Mayorga continue vitamin D once a week  5. Gastroesophageal reflux disease without esophagitis-continue Protonix daily  6. Other secondary hypertension-at goal on amlodipine  7. Other chronic osteomyelitis, other site ([facility])-continue with chronic doxycycline twice a day      No follow-ups on file.   Percocet 1-2 times a day   -continue with PT 2-3 times a week as well as being followed up by the [place] for his evaluation for his  prosthesis  Off medicine stable  Vitamin D once a week, followed by Daysi Oneal  Protonix   Amlodipine  Doxy bid    SUBJECTIVE/OBJECTIVE:  HPI  Back pain-we manage this back pain with Percocet daily; still using every day two times a day and sometimes less - the cold weather makes him feel stiffer;  Seeing  at [facility] and ordering an MRI      Anemia he continues to get EPO infusions to help with his blood count- so far the last couple of times did not need it as blood count ; has not needed ; getting retacrit injection          Amputation he has been working on getting a new prosthesis; he gained some weight and got a new prosthesis and he had some PT with it and learning to walk with walker - two times a week at [place] and at the [place] ; he started with fitting at the [address]:  74 y.o. male for follow up of diabetes. Diabetic Review of Systems - medication compliance: compliant all of the time, diabetic diet compliance: compliant most of the time, home glucose monitoring: is performed sporadically, further diabetic ROS: no polyuria or polydipsia, no chest pain, dyspnea or TIA's,  Other symptoms and concerns: His last A1c was 5.5 but he needs repeat today. Chronic kidney disease-he is followed by Dr. Milady King- checking his labs routinely for potassium , kidney function and hemglobin- every two weeks      GERD-he continues to take his protonix and that has helped his symptoms     Review of Systems   All other systems reviewed and are negative. Physical Exam  Vitals and nursing note reviewed. Constitutional:       Appearance: He is well-developed. HENT:      Head: Normocephalic and atraumatic. Right Ear: External ear normal.      Left Ear: External ear normal.      Nose: Nose normal.   Eyes:      Conjunctiva/sclera: Conjunctivae normal.      Pupils: Pupils are equal, round, and reactive to light. Neck:      Thyroid: No thyromegaly. Vascular: No carotid bruit, hepatojugular reflux or JVD. Cardiovascular:      Rate and Rhythm: Normal rate and regular rhythm. Heart sounds: Normal heart sounds. Pulmonary:      Effort: Pulmonary effort is normal.      Breath sounds: Normal breath sounds. Abdominal:      General: Bowel sounds are normal.      Palpations: Abdomen is soft. Musculoskeletal:         General: Normal range of motion. Cervical back: Normal range of motion and neck supple. Skin:     General: Skin is warm and dry. Neurological:      Mental Status: He is alert and oriented to person, place, and time. Psychiatric:         Behavior: Behavior normal.         Thought Content:  Thought content normal. Judgment: Judgment normal.         On this date 04/27/2023 I have spent 30 minutes reviewing previous notes, test results and face to face with the patient discussing the diagnosis and importance of compliance with the treatment plan as well as documenting on the day of the visit. An electronic signature was used to authenticate this note.   -- Cornelius Tineo MD

## 2023-04-28 LAB
ALBUMIN SERPL-MCNC: 3.3 G/DL (ref 3.5–5)
ALBUMIN/GLOB SERPL: 0.9 (ref 1.1–2.2)
ALP SERPL-CCNC: 107 U/L (ref 45–117)
ALT SERPL-CCNC: 31 U/L (ref 12–78)
ANION GAP SERPL CALC-SCNC: 3 MMOL/L (ref 5–15)
AST SERPL-CCNC: 19 U/L (ref 15–37)
BILIRUB SERPL-MCNC: 0.3 MG/DL (ref 0.2–1)
BUN SERPL-MCNC: 40 MG/DL (ref 6–20)
BUN/CREAT SERPL: 13 (ref 12–20)
CALCIUM SERPL-MCNC: 8.3 MG/DL (ref 8.5–10.1)
CHLORIDE SERPL-SCNC: 119 MMOL/L (ref 97–108)
CHOLEST SERPL-MCNC: 176 MG/DL
CO2 SERPL-SCNC: 21 MMOL/L (ref 21–32)
CREAT SERPL-MCNC: 3.09 MG/DL (ref 0.7–1.3)
EST. AVERAGE GLUCOSE BLD GHB EST-MCNC: 111 MG/DL
GLOBULIN SER CALC-MCNC: 3.5 G/DL (ref 2–4)
GLUCOSE SERPL-MCNC: 106 MG/DL (ref 65–100)
HBA1C MFR BLD: 5.5 % (ref 4–5.6)
HDLC SERPL-MCNC: 48 MG/DL
HDLC SERPL: 3.7 (ref 0–5)
LDLC SERPL CALC-MCNC: 84.4 MG/DL (ref 0–100)
POTASSIUM SERPL-SCNC: 6.2 MMOL/L (ref 3.5–5.1)
PROT SERPL-MCNC: 6.8 G/DL (ref 6.4–8.2)
SODIUM SERPL-SCNC: 143 MMOL/L (ref 136–145)
TRIGL SERPL-MCNC: 218 MG/DL (ref ?–150)
VLDLC SERPL CALC-MCNC: 43.6 MG/DL

## 2023-04-29 NOTE — PROGRESS NOTES
I am not sure if you get these results if I share it this way- will also fax them to you- but this is a change in our mutual patient

## 2023-05-23 DIAGNOSIS — G89.29 CHRONIC BILATERAL LOW BACK PAIN WITHOUT SCIATICA: Primary | ICD-10-CM

## 2023-05-23 DIAGNOSIS — M54.50 CHRONIC BILATERAL LOW BACK PAIN WITHOUT SCIATICA: Primary | ICD-10-CM

## 2023-05-23 RX ORDER — ALBUTEROL SULFATE 90 UG/1
AEROSOL, METERED RESPIRATORY (INHALATION)
Qty: 8.5 G | Refills: 1 | Status: SHIPPED | OUTPATIENT
Start: 2023-05-23 | End: 2023-06-27 | Stop reason: SDUPTHER

## 2023-05-23 RX ORDER — OXYCODONE HYDROCHLORIDE AND ACETAMINOPHEN 5; 325 MG/1; MG/1
TABLET ORAL
Qty: 90 TABLET | Refills: 0 | Status: SHIPPED | OUTPATIENT
Start: 2023-05-23 | End: 2023-05-26

## 2023-05-23 RX ORDER — ERGOCALCIFEROL 1.25 MG/1
CAPSULE ORAL
Qty: 4 CAPSULE | Refills: 1 | Status: SHIPPED | OUTPATIENT
Start: 2023-05-23 | End: 2023-06-27 | Stop reason: SDUPTHER

## 2023-06-27 ENCOUNTER — OFFICE VISIT (OUTPATIENT)
Age: 68
End: 2023-06-27

## 2023-06-27 VITALS
WEIGHT: 138 LBS | SYSTOLIC BLOOD PRESSURE: 150 MMHG | BODY MASS INDEX: 19.32 KG/M2 | RESPIRATION RATE: 15 BRPM | OXYGEN SATURATION: 99 % | TEMPERATURE: 97.9 F | DIASTOLIC BLOOD PRESSURE: 67 MMHG | HEIGHT: 71 IN | HEART RATE: 87 BPM

## 2023-06-27 DIAGNOSIS — K21.9 GASTRO-ESOPHAGEAL REFLUX DISEASE WITHOUT ESOPHAGITIS: ICD-10-CM

## 2023-06-27 DIAGNOSIS — M86.68 OTHER CHRONIC OSTEOMYELITIS, OTHER SITE (HCC): ICD-10-CM

## 2023-06-27 DIAGNOSIS — G89.29 CHRONIC BILATERAL LOW BACK PAIN WITHOUT SCIATICA: Primary | ICD-10-CM

## 2023-06-27 DIAGNOSIS — E11.59 TYPE 2 DIABETES MELLITUS WITH OTHER CIRCULATORY COMPLICATIONS (HCC): ICD-10-CM

## 2023-06-27 DIAGNOSIS — I15.8 OTHER SECONDARY HYPERTENSION: ICD-10-CM

## 2023-06-27 DIAGNOSIS — N18.32 STAGE 3B CHRONIC KIDNEY DISEASE (HCC): ICD-10-CM

## 2023-06-27 DIAGNOSIS — M54.50 CHRONIC BILATERAL LOW BACK PAIN WITHOUT SCIATICA: Primary | ICD-10-CM

## 2023-06-27 DIAGNOSIS — S78.111D: ICD-10-CM

## 2023-06-27 PROCEDURE — 3044F HG A1C LEVEL LT 7.0%: CPT | Performed by: INTERNAL MEDICINE

## 2023-06-27 PROCEDURE — 99214 OFFICE O/P EST MOD 30 MIN: CPT | Performed by: INTERNAL MEDICINE

## 2023-06-27 PROCEDURE — 3078F DIAST BP <80 MM HG: CPT | Performed by: INTERNAL MEDICINE

## 2023-06-27 PROCEDURE — 3077F SYST BP >= 140 MM HG: CPT | Performed by: INTERNAL MEDICINE

## 2023-06-27 PROCEDURE — 1123F ACP DISCUSS/DSCN MKR DOCD: CPT | Performed by: INTERNAL MEDICINE

## 2023-06-27 RX ORDER — ALBUTEROL SULFATE 90 UG/1
AEROSOL, METERED RESPIRATORY (INHALATION)
Qty: 8.5 G | Refills: 1 | Status: SHIPPED | OUTPATIENT
Start: 2023-06-27

## 2023-06-27 RX ORDER — OXYCODONE HYDROCHLORIDE AND ACETAMINOPHEN 5; 325 MG/1; MG/1
1 TABLET ORAL EVERY 8 HOURS PRN
Qty: 90 TABLET | Refills: 0 | Status: SHIPPED | OUTPATIENT
Start: 2023-06-27 | End: 2023-07-27

## 2023-06-27 RX ORDER — OXYCODONE HYDROCHLORIDE AND ACETAMINOPHEN 5; 325 MG/1; MG/1
TABLET ORAL
Qty: 90 TABLET | Refills: 0 | OUTPATIENT
Start: 2023-06-27

## 2023-06-27 RX ORDER — ALBUTEROL SULFATE 90 UG/1
AEROSOL, METERED RESPIRATORY (INHALATION)
Qty: 8.5 G | Refills: 0 | OUTPATIENT
Start: 2023-06-27

## 2023-06-27 RX ORDER — ERGOCALCIFEROL 1.25 MG/1
CAPSULE ORAL
Qty: 4 CAPSULE | Refills: 1 | Status: SHIPPED | OUTPATIENT
Start: 2023-06-27

## 2023-06-27 RX ORDER — ERGOCALCIFEROL 1.25 MG/1
CAPSULE ORAL
Qty: 4 CAPSULE | Refills: 0 | OUTPATIENT
Start: 2023-06-27

## 2023-07-24 NOTE — TELEPHONE ENCOUNTER
Analilia with [facility] is requesting to speak with the nurse, states they need to know if PCP would suggest a cardiac clearance after completing patient's EKG on 06/16 , states they want to start patient on Monday and they need  A call back today regarding this matter since they are closed Friday , tmrw.        She can be reached at [phone]

## 2023-07-26 ENCOUNTER — TELEPHONE (OUTPATIENT)
Age: 68
End: 2023-07-26

## 2023-07-26 DIAGNOSIS — M54.50 CHRONIC BILATERAL LOW BACK PAIN WITHOUT SCIATICA: ICD-10-CM

## 2023-07-26 DIAGNOSIS — G89.29 CHRONIC BILATERAL LOW BACK PAIN WITHOUT SCIATICA: ICD-10-CM

## 2023-07-26 RX ORDER — OXYCODONE HYDROCHLORIDE AND ACETAMINOPHEN 5; 325 MG/1; MG/1
TABLET ORAL
Qty: 90 TABLET | Refills: 0 | Status: SHIPPED | OUTPATIENT
Start: 2023-07-26 | End: 2023-08-25

## 2023-07-26 NOTE — TELEPHONE ENCOUNTER
Refill rx request:    oxyCODONE-acetaminophen (PERCOCET) 5-325 MG per tablet    Send to:  [facility] - [facility], [address] [address] [phone] - f [phone]

## 2023-08-23 DIAGNOSIS — M54.50 CHRONIC BILATERAL LOW BACK PAIN WITHOUT SCIATICA: ICD-10-CM

## 2023-08-23 DIAGNOSIS — G89.29 CHRONIC BILATERAL LOW BACK PAIN WITHOUT SCIATICA: ICD-10-CM

## 2023-08-23 RX ORDER — OXYCODONE HYDROCHLORIDE AND ACETAMINOPHEN 5; 325 MG/1; MG/1
TABLET ORAL
Qty: 90 TABLET | Refills: 0 | Status: SHIPPED | OUTPATIENT
Start: 2023-08-23 | End: 2023-09-22

## 2023-08-29 NOTE — Clinical Note
Good afternoon Dr. Kendrick Blakely,  I spoke with Mr.& Mrs Blake regarding JASON MADISON, he report he left [facility] before completion of his IV antibiotic therapy. He was discharged on 6/7/2021, and his Daptomycin is due to be completed until 6/19/2021. [facility]) will be following patient they will be out to see patient tomorrow to resume IV therapy. Patient has scheduled an appointment with you on Friday 6/11/20201 his wife reports patient has a sacral wound and  they need a logan lift to assist with transfers.      Thank you,   Yuko Leslie LPN Care Coordinator Patient : Peace Hernandez Age: 54 year old Sex: female   MRN: [id number] Encounter Date: 8/29/2023    History     Chief Complaint   Patient presents with   • Dental Problem       HPI    Peace Hernandez is a 54 year old with a PMH of HIV, substance abuse, and dental caries presents to the emergency department with a complaint of a dental abscess. Patient states that she has had this for about 3 days and states that the pain has been getting worse. Denies any drainage from it. No problems swallowing. Patient states that she has had some issues with eating due to the dental pain. Rates the pain a 10/10. Has tried some ibuprofen for the pain but denies relief. States that she thinks it might be due to her cavities. Denies any fevers or chills. Mentions that she has not been to the dentist in a while.       Allergies   Allergen Reactions   • Sulfa Antibiotics SWELLING       No current facility-administered medications for this encounter.     Current Outpatient Medications   Medication Sig   • penicillin v potassium (VEETID) 500 MG tablet Take 1 tablet by mouth 4 times daily for 7 days.   • albuterol 108 (90 Base) MCG/ACT inhaler Inhale 2 puffs into the lungs every 4 hours as needed for Wheezing.   • predniSONE (DELTASONE) 20 MG tablet Take 2 tablets by mouth daily.   • divalproex (DEPAKOTE ER) 500 MG 24 hr ER tablet TAKE 1 TABLET BY MOUTH 2 TIMES DAILY   • pantoprazole (PROTONIX) 40 MG tablet TAKE 1 TABLET BY MOUTH ONCE NIGHTLY   • QUEtiapine (SEROquel) 100 MG tablet TAKE 1 TABLET BY MOUTH ONCE NIGHTLY   • sertraline (ZOLOFT) 25 MG tablet Take 1 tablet by mouth once daily in the morning.   • traZODone (DESYREL) 100 MG tablet TAKE 1 TABLET BY MOUTH ONCE NIGHTLY   • amLODIPine (NORVASC) 10 MG tablet Take 1 tablet by mouth daily.   • fluticasone (FLONASE) 50 MCG/ACT nasal spray Spray 1 spray in each nostril daily.   • sodium chloride (OCEAN) 0.65 % nasal spray Spray 1 spray in each nostril as needed for Congestion.   • ibuprofen  (MOTRIN) 400 MG tablet Take 400 mg by mouth every 6 hours as needed for Pain.   • acetaminophen (TYLENOL) 500 MG tablet Take 1 tablet by mouth every 6 hours as needed for Pain.   • ibuprofen (ADVIL) 100 MG tablet Take 100 mg by mouth every 6 hours as needed for Pain (For pain).   • bictegravir-emtricitab-tenofov (BIKTARVY) -25 MG per tablet Take 1 tablet by mouth daily.   • ipratropium-albuterol (DUONEB) 0.5-2.5 (3) MG/3ML nebulizer solution Take 3 mLs by nebulization daily as needed for Wheezing. Use once daily as needed   • tiotropium (SPIRIVA HANDIHALER) 18 MCG capsule for inhaler Place 1 capsule into inhaler and inhale daily.   • ferrous sulfate 325 (65 FE) MG tablet Take 1 tablet by mouth daily (with breakfast).   • Chlorhexidine Gluconate 20 % Solution Take 1 ounce by mouth swish and spit with a cap full 3 times daily.   • Menthol (Cepacol Sore Throat) 5.4 MG Lozenge Place one in mouth and let dissolve every 3-4 hours as needed.   • budesonide-formoterol (SYMBICORT) 160-4.5 MCG/ACT inhaler Inhale 2 puffs into the lungs 2 times daily.   • montelukast (Singulair) 10 MG tablet Take 1 tablet by mouth every morning.       Past Medical History:   Diagnosis Date   • Bipolar I disorder, most recent episode depressed (CMD)    • COPD (chronic obstructive pulmonary disease) (CMD)    • GERD (gastroesophageal reflux disease) 03/05/2015   • Heart murmur     patient reported.7/9/15.  Echo: EF 65%.No valvular abn.   • History of pneumonia     Asthma exacerbations with Multiple hospitalizations in 2015, including Hx ICU,BIPAP,Intubation. Last hosp. 8/9/15 w/ PNA @[place].   • HIV (human immunodeficiency virus infection) (CMD)    • Hx Asthma with COPD with exacerbation     Asthma exacerbations with Multiple hospitalizations in 2015, including Hx ICU,BIPAP,Intubation. Last hosp. 8/9/15 w/ PNA @[place].   • Hx Cleft palate Repair     patient reported   • Hx Sinusitis    • Hx Substance abuse     recovering from crack/ cocaine  abuse.  She hasn't had any use until last night and took \"one hit\".  She has used crack since she was 21 years old, mostly on weekends.No IVDA.   • Hypertension     7/9/15.  Echo: EF 65%.No valvular abn.   • RAD (reactive airway disease) -Asthma/COPD[J45.909]     Multiple hospitalizations in 2015, including Hx ICU,BIPAP,Intubation. Last hosp. 8/9/15 w/ PNA @[place].   • Staphylococcus aureus infection        Past Surgical History:   Procedure Laterality Date   • Colonoscopy diagnostic  10/12/2021    Stephani screening, no bx recall 10 years   • Esophagogastroduodenoscopy transoral flex diag  10/12/2021    Stephani abnormal. Gastritis   • Other surgical history      surgery for cleft palate   • Tubal ligation  1990    patient reported       Family History   Problem Relation Age of Onset   • Other Mother         never met mother   • Stroke Sister    • Diabetes Brother    • Cancer Brother    • Diabetes Brother    • Diabetes Brother        Social History     Tobacco Use   • Smoking status: Never   • Smokeless tobacco: Never   Substance Use Topics   • Alcohol use: Not Currently     Comment: 2.5 months sober   • Drug use: Not Currently     Comment: 2.5 months sober       Review of Systems     Review of Systems   Constitutional: Negative for chills and fever.   HENT: Positive for dental problem and facial swelling (dental abscess). Negative for sore throat and trouble swallowing.         Dental pain     Respiratory: Negative for shortness of breath.    Cardiovascular: Negative for chest pain.       Physical Exam     ED Triage Vitals [08/29/23 1310]   ED Triage Vitals Group      Temp 98.1 °F (36.7 °C)      Heart Rate 86      Resp 18      BP (!) 161/101      SpO2 100 %      EtCO2 mmHg       Height       Weight       Weight Scale Used       BMI (Calculated)       IBW/kg (Calculated)        Physical Exam  Constitutional:       General: She is not in acute distress.     Appearance: Normal appearance. She is not toxic-appearing.   HENT:       Mouth/Throat:      Mouth: Mucous membranes are moist.      Pharynx: No oropharyngeal exudate or posterior oropharyngeal erythema (. ).      Comments: erythema and very slight swelling below the 27th tooth. No drainage visible. Mild R sided facial swelling and submental lymphadenopathy.   Cardiovascular:      Rate and Rhythm: Normal rate.      Heart sounds: Normal heart sounds. No murmur heard.     No friction rub. No gallop.   Pulmonary:      Effort: Pulmonary effort is normal. No respiratory distress.      Breath sounds: Normal breath sounds.   Neurological:      Mental Status: She is alert.   Psychiatric:         Behavior: Behavior normal.           Procedures     Procedures    Lab Results     No results found for this visit on 08/29/23.    EKG     No EKG performed    Radiology Results     Imaging Results    None         ED Medications     ED Medication Orders (From admission, onward)    None          ED Course     Vitals:    08/29/23 1310   BP: (!) 161/101   BP Location: LUE - Left upper extremity   Patient Position: Sitting/High-Barnett's   Pulse: 86   Resp: 18   Temp: 98.1 °F (36.7 °C)   TempSrc: Oral   SpO2: 100%            No cardiac monitor or imaging reviewed      Consults                  MDM                  Patient is a 54 year old with a PMH of HIV, substance abuse, and dental caries presenting with a complaint of dental pain that she believes is from a dental abscess.  My differential diagnosis includes but is not limited to dental abscess vs canker sore vs dental caries vs cellulitis vs others. No ED work up is required. Patient advised that the swelling is not large enough for drainage done in the ED. No evidence of cellulitis. Mild R sided facial swelling and submental lymphadenopathy without overlying erythema.     Patient will not need admission as she is stable. Patient is able to swallow and is afebrile. Patient discharged with antibiotics and advised to return if she has a fever or worsening  symptoms.     Patient's blood pressure of 161/101 was brought up to the patient. Discussed that this could be due to the pain that she is having. Discussed that she should still get this checked with PCP.    All questions answered.     Demetria Aceves PA-C      Does the Patient have sepsis: NO     Critical Care       No Critical Care        Disposition       Clinical Impression and Diagnosis  3:02 PM       ED Diagnoses     Diagnosis Comment Associated Orders       Final diagnoses    Pain, dental -- --    Facial swelling -- --          Follow Up:  Donal Chappell, DDS  [address]  [facility] [address]  [phone]    Schedule an appointment as soon as possible for a visit             Summary of your Discharge Medications      Take these Medications      Details   penicillin v potassium 500 MG tablet  Commonly known as: VEETID   Take 1 tablet by mouth 4 times daily for 7 days.            Pt is discharged to home/self care in stable condition.                Discharge 8/29/2023  2:42 PM  Peace Hernandez discharge to home/self care.                       Demetria Aceves PA-C  08/29/23 1506

## 2023-09-26 DIAGNOSIS — G89.29 CHRONIC BILATERAL LOW BACK PAIN WITHOUT SCIATICA: Primary | ICD-10-CM

## 2023-09-26 DIAGNOSIS — M54.50 CHRONIC BILATERAL LOW BACK PAIN WITHOUT SCIATICA: Primary | ICD-10-CM

## 2023-09-26 RX ORDER — ERGOCALCIFEROL 1.25 MG/1
CAPSULE ORAL
Qty: 4 CAPSULE | Refills: 1 | Status: SHIPPED | OUTPATIENT
Start: 2023-09-26 | End: 2023-11-24

## 2023-09-26 RX ORDER — OXYCODONE HYDROCHLORIDE AND ACETAMINOPHEN 5; 325 MG/1; MG/1
TABLET ORAL
Qty: 90 TABLET | Refills: 0 | Status: SHIPPED | OUTPATIENT
Start: 2023-09-26 | End: 2023-10-26

## 2023-10-26 DIAGNOSIS — G89.29 CHRONIC BILATERAL LOW BACK PAIN WITHOUT SCIATICA: ICD-10-CM

## 2023-10-26 DIAGNOSIS — M54.50 CHRONIC BILATERAL LOW BACK PAIN WITHOUT SCIATICA: ICD-10-CM

## 2023-10-26 RX ORDER — OXYCODONE HYDROCHLORIDE AND ACETAMINOPHEN 5; 325 MG/1; MG/1
TABLET ORAL
Qty: 90 TABLET | Refills: 0 | Status: SHIPPED | OUTPATIENT
Start: 2023-10-26 | End: 2023-11-25

## 2023-10-26 RX ORDER — ALBUTEROL SULFATE 90 UG/1
AEROSOL, METERED RESPIRATORY (INHALATION)
Qty: 8.5 G | Refills: 0 | Status: SHIPPED | OUTPATIENT
Start: 2023-10-26

## 2023-11-15 ENCOUNTER — TELEPHONE (OUTPATIENT)
Age: 68
End: 2023-11-15

## 2023-11-15 NOTE — TELEPHONE ENCOUNTER
Discharge Instructions  Dr. Colunga    Activity:  -You need ADULT supervision for 24 hours after surgery.  -Rest for the remainder of the day  - You may have swelling in your hand.   - Keep hand/arm elevated above the level of your heart as much as possible for the next 48 hours  - Leave dressings alone until seen by your hand therapist or Dr. Colunga in his office.  Keep dressings clean and dry, you must cover to shower.   - To prevent finger stiffness and swelling, gently open and close your fingers frequently  - DO NOT use your surgical hand for lifting. Carry objects in your arms rather than your hands  - Take deep breaths and cough deeply every 2 hours for 24 hours, while awake.  - Until you are back to your normal activity level, it is important to maintain good circulation by being up and about at home, but not in excess.    Pain Relief:  - Your doctor has prescribed hydrocodone/acetaminophen (Norco)for pain relief. Take as directed. You have had numbing medication injected into your hand and you should anticipate that when the numbness wears off (8-24 hours or longer) you will have pain, so take pain medications prior to the numbness wearing off.  - This medication may make you dizzy and drowsy. Do not drive or drink alcohol while taking this medication.  -Take this medication with a full glass of water and with a carbohydrate rich snack to prevent nausea.    What to expect:  - A small amount of bloody drainage is normal  - You may have bruising and discoloration in your hand.    Problems to Watch For: (Call Dr. Colunga if you are having these symptoms [phone] someone will answer 24 hours a day)  - SEVERE pain not relieved by pain medication  -Temperature over 101 degrees F.  If you have a mild temperature greater than 99 but less than 101 this means you need to cough and deep breath to prevent pneumonia.  -Heavy bleeding that soaks dressing  - Redness, swelling or pus at incision site once dressings  Patient is faxing paperwork from [employer], its a medical certificate, he needs the Dr to fill it out and fax it back to the number on the paperwork, if this isn't done today he says, he could lose power and he has electronic devices he uses for medical purposes.      Florinda Mcghee [phone] taken down  - Excessive swelling or inability to move fingers  -Numbness or change in color or temperature of fingers that is NOT relieved by ice or elevating hand above heart.

## 2023-11-24 DIAGNOSIS — G89.29 CHRONIC BILATERAL LOW BACK PAIN WITHOUT SCIATICA: ICD-10-CM

## 2023-11-24 DIAGNOSIS — M54.50 CHRONIC BILATERAL LOW BACK PAIN WITHOUT SCIATICA: ICD-10-CM

## 2023-11-24 RX ORDER — ERGOCALCIFEROL 1.25 MG/1
CAPSULE ORAL
Qty: 4 CAPSULE | Refills: 0 | Status: SHIPPED | OUTPATIENT
Start: 2023-11-24

## 2023-11-24 RX ORDER — ALBUTEROL SULFATE 90 UG/1
AEROSOL, METERED RESPIRATORY (INHALATION)
Qty: 8.5 G | Refills: 0 | Status: SHIPPED | OUTPATIENT
Start: 2023-11-24

## 2023-11-24 RX ORDER — OXYCODONE HYDROCHLORIDE AND ACETAMINOPHEN 5; 325 MG/1; MG/1
TABLET ORAL
Qty: 90 TABLET | Refills: 0 | Status: SHIPPED | OUTPATIENT
Start: 2023-11-24 | End: 2023-12-24

## 2023-11-24 NOTE — TELEPHONE ENCOUNTER
Chief Complaint   Patient presents with    Medication Refill       Requested Prescriptions     Pending Prescriptions Disp Refills    oxyCODONE-acetaminophen (PERCOCET) 5-325 MG per tablet [Pharmacy Med Name: OXYCODONE-ACETAMINOPHEN 5-325] 90 tablet 0     Sig: TAKE ONE TABLET BY MOUTH EVERY 8 HOURS AS NEEDED FOR PAIN FOR UP TO 30 DAYS    vitamin D (ERGOCALCIFEROL) 1.25 MG (48067 UT) CAPS capsule [Pharmacy Med Name: VIT D2 1.25 MG (50,000 UNIT)] 4 capsule 0     Sig: TAKE ONE CAPSULE BY MOUTH EVERY WEEK    albuterol sulfate HFA (PROVENTIL;VENTOLIN;PROAIR) 108 (90 Base) MCG/ACT inhaler [Pharmacy Med Name: ALBUTEROL HFA 90 MCG INHALER] 8.5 g 0     Sig: TAKE 2 PUFFS BY INHALATION EVERY 4 HOURS AS NEEDED FOR WHEEZING       Allergies:   Allergies   Allergen Reactions    Adhesive Tape Hives    Sulfa Antibiotics Swelling     Lips eyes    Gabapentin Anxiety     Anxiety w/ hallucinations       Last visit with clinic:  6/27/2023   Next visit with clinic: Visit date not found     Last visit with this provider: 6/27/2023   Next Visit with this provider: Visit date not found    Signed by Pierrette [facility]  11/24/23  11:13 AM

## 2023-12-26 DIAGNOSIS — M54.50 CHRONIC BILATERAL LOW BACK PAIN WITHOUT SCIATICA: Primary | ICD-10-CM

## 2023-12-26 DIAGNOSIS — G89.29 CHRONIC BILATERAL LOW BACK PAIN WITHOUT SCIATICA: Primary | ICD-10-CM

## 2023-12-27 NOTE — TELEPHONE ENCOUNTER
Last appt 6/27/2023   Requested Prescriptions     Pending Prescriptions Disp Refills    oxyCODONE-acetaminophen (PERCOCET) 5-325 MG per tablet [Pharmacy Med Name: OXYCODONE-ACETAMINOPHEN 5-325] 90 tablet 0     Sig: TAKE ONE TABLET BY MOUTH EVERY 8 HOURS AS NEEDED FOR PAIN FOR UP TO 30 DAYS    albuterol sulfate HFA (PROVENTIL;VENTOLIN;PROAIR) 108 (90 Base) MCG/ACT inhaler [Pharmacy Med Name: ALBUTEROL HFA 90 MCG INHALER] 8.5 g 0     Sig: TAKE 2 PUFFS BY INHALATION EVERY 4 HOURS AS NEEDED FOR WHEEZING    vitamin D (ERGOCALCIFEROL) 1.25 MG (95523 UT) CAPS capsule [Pharmacy Med Name: VIT D2 1.25 MG (50,000 UNIT)] 4 capsule 0     Sig: TAKE ONE CAPSULE BY MOUTH EVERY WEEK      Next Visit date not found

## 2023-12-28 RX ORDER — OXYCODONE HYDROCHLORIDE AND ACETAMINOPHEN 5; 325 MG/1; MG/1
1 TABLET ORAL EVERY 8 HOURS PRN
Qty: 90 TABLET | Refills: 0 | Status: SHIPPED | OUTPATIENT
Start: 2023-12-28 | End: 2024-01-27

## 2023-12-28 RX ORDER — ERGOCALCIFEROL 1.25 MG/1
CAPSULE ORAL
Qty: 4 CAPSULE | Refills: 0 | Status: SHIPPED | OUTPATIENT
Start: 2023-12-28

## 2023-12-28 RX ORDER — ALBUTEROL SULFATE 90 UG/1
AEROSOL, METERED RESPIRATORY (INHALATION)
Qty: 8.5 G | Refills: 0 | Status: SHIPPED | OUTPATIENT
Start: 2023-12-28

## 2024-01-16 ENCOUNTER — TELEPHONE (OUTPATIENT)
Age: 69
End: 2024-01-16

## 2024-01-16 NOTE — TELEPHONE ENCOUNTER
----- Message from Ej Avalos sent at 1/15/2024 10:55 AM EST -----  Subject: Appointment Request    Reason for Call: Established Patient Appointment needed: Routine Medicare   AWV    QUESTIONS    Reason for appointment request? No appointments available during search     Additional Information for Provider? Pt is calling in to get his MEDICARE   AWV rescheduled due to transportation not working today due to holiday. Pt   would like a call back.   ---------------------------------------------------------------------------  --------------  CALL BACK INFO  [phone]; OK to leave message on voicemail  ---------------------------------------------------------------------------  --------------  SCRIPT ANSWERS

## 2024-01-18 NOTE — TELEPHONE ENCOUNTER
Pt calling states he wants to make sure we are faxing over the forms that need to be filled out so he can get his prosthetic leg tomorrow. Pt states if they forms are not faxed over by today he will not be able to get his prosthetic leg. Pt also states he would like a call back from the nurse as well.  Please call back and advise
Spoke with patient and advised him that the completed form has been sent to [employer]. Advised patient to tell them to give us more time in the future to get forms completed. Pt understood and was thankful for the call.
[facility]

## 2024-01-24 DIAGNOSIS — M54.50 CHRONIC BILATERAL LOW BACK PAIN WITHOUT SCIATICA: ICD-10-CM

## 2024-01-24 DIAGNOSIS — G89.29 CHRONIC BILATERAL LOW BACK PAIN WITHOUT SCIATICA: ICD-10-CM

## 2024-01-24 RX ORDER — OXYCODONE HYDROCHLORIDE AND ACETAMINOPHEN 5; 325 MG/1; MG/1
1 TABLET ORAL EVERY 6 HOURS PRN
Qty: 90 TABLET | Refills: 0 | Status: SHIPPED | OUTPATIENT
Start: 2024-01-24 | End: 2024-02-23

## 2024-01-24 RX ORDER — ALBUTEROL SULFATE 90 UG/1
AEROSOL, METERED RESPIRATORY (INHALATION)
Qty: 8.5 G | Refills: 3 | Status: SHIPPED | OUTPATIENT
Start: 2024-01-24

## 2024-01-24 RX ORDER — ERGOCALCIFEROL 1.25 MG/1
CAPSULE ORAL
Qty: 4 CAPSULE | Refills: 3 | Status: SHIPPED | OUTPATIENT
Start: 2024-01-24

## 2024-01-24 NOTE — TELEPHONE ENCOUNTER
Medication refill requested received for Albuterol Sulfate HFA inhaler, Vitamin D 50,000 Units and Percocet message routed to provider.

## 2024-02-26 DIAGNOSIS — G89.29 CHRONIC BILATERAL LOW BACK PAIN WITH BILATERAL SCIATICA: Primary | ICD-10-CM

## 2024-02-26 DIAGNOSIS — M54.41 CHRONIC BILATERAL LOW BACK PAIN WITH BILATERAL SCIATICA: Primary | ICD-10-CM

## 2024-02-26 DIAGNOSIS — M54.42 CHRONIC BILATERAL LOW BACK PAIN WITH BILATERAL SCIATICA: Primary | ICD-10-CM

## 2024-02-26 RX ORDER — OXYCODONE HYDROCHLORIDE AND ACETAMINOPHEN 5; 325 MG/1; MG/1
1 TABLET ORAL EVERY 6 HOURS PRN
Qty: 90 TABLET | Refills: 0 | Status: SHIPPED | OUTPATIENT
Start: 2024-02-26 | End: 2024-03-27

## 2024-03-26 DIAGNOSIS — M54.41 CHRONIC BILATERAL LOW BACK PAIN WITH BILATERAL SCIATICA: ICD-10-CM

## 2024-03-26 DIAGNOSIS — G89.29 CHRONIC BILATERAL LOW BACK PAIN WITH BILATERAL SCIATICA: ICD-10-CM

## 2024-03-26 DIAGNOSIS — M54.42 CHRONIC BILATERAL LOW BACK PAIN WITH BILATERAL SCIATICA: ICD-10-CM

## 2024-03-26 RX ORDER — OXYCODONE HYDROCHLORIDE AND ACETAMINOPHEN 5; 325 MG/1; MG/1
1 TABLET ORAL EVERY 6 HOURS PRN
Qty: 90 TABLET | Refills: 0 | Status: SHIPPED | OUTPATIENT
Start: 2024-03-26 | End: 2024-04-25

## 2024-03-26 RX ORDER — SILVER SULFADIAZINE 1 %
CREAM (GRAM) TOPICAL DAILY
Qty: 60 G | Refills: 0 | Status: SHIPPED | OUTPATIENT
Start: 2024-03-26

## 2024-04-14 NOTE — WOUND CARE
Doyle Duron DPM - Opal Young. [facility], [facility]     Assessment/Plan:  Type 2 Diabetes with leg ulcer (E11.622)    Non pressure chronic ulcer right leg to the level of bone (B80.983)    Chronic osteomyelitis of RLE (M86.461)    Nichole Grade 3 ulcer    - Pt evaluated and treated. - Pt presents with right BKA stump wound. Stagnant.  - Preformed wound debridement. See procedure note below  - Pt receiving abx via PICC  - Pt has started HBO therapy  - Dressing consisting of mesalt packing  - F/u approval for wound VAC  - Pt has returned to wearing his old prosthetic   - Pt in the process of getting a wheelchair. He needs an evaluation per wheelchair supplier. Pt trying to get from PCP, but will inquire if this evaluation can be done by DPM.  - F/U 1 week. Subjective:  Pt complains of wound to right amputation site. At present he is packing the wound with mesalt and receiving HBO therapy. Pt denies a recent h/o fever, chills, nausea, vomiting, chest pain, shortness of breath. HPI: Mr. Cynthia Mcguire is a 74 yo AA male with a PMH of arthritis, BPH, CKD, chronic pain, DM2, dyslipidemia, GERD, HTN, s/p rt BKA, sleep apnea, thromboembolus lt leg who presents with a right BKA stump wound. Wound formed from ill fitting prosthetic. Pt has had wounds in the past from in this same manner. ROS:  Consitutional: no weight loss, night sweats, fatigue / malaise / lethargy. Musculoskeletal: no joint / extremity pain, misalignment, stiffness, decreased ROM, crepitus. Integument: No pruritis, rashes, lesions, right BKA stump wound.   Psychiatric: No depression, anxiety, paranoia      History:  Wound Care  Allergies   Allergen Reactions    Adhesive Tape-Silicones Hives    Sulfa (Sulfonamide Antibiotics) Swelling     Lips eyes     Family History   Problem Relation Age of Onset    Hypertension Mother    [facility] Gout Mother     Cancer Father         leukemia    Diabetes Father     Hypertension Sister     Diabetes Maternal Grandmother     Hypertension Maternal Grandmother     Diabetes Paternal Grandmother     Hypertension Paternal Grandmother     Anesth Problems Neg Hx       Past Medical History:   Diagnosis Date    Arthritis     BPH (benign prostatic hyperplasia)     Chronic kidney disease     Chronic pain     BACK NEUROPATHY FEET HANDS    DM neuropathy, type II diabetes mellitus ([address]     DM type 2 causing CKD stage 3 ([address] 12/17/2012    DM type 2 causing vascular disease ([address]     Dyslipidemia     Foot ulcer due to secondary DM ([address] 12/1/2012    GERD (gastroesophageal reflux disease)     HTN     Ill-defined condition 2013    MRSA in wound right leg (BKA)    S/P BKA (below knee amputation) ([address]     right BKA due to non-healing ulcer    Sleep apnea     Thromboembolus ([address] 1970'S    BLOOD CLOT LEFT LEG     Past Surgical History:   Procedure Laterality Date    ABDOMEN SURGERY PROC UNLISTED      pilonidal cyst    ECHO 2D ADULT  8/09    normal; LVEF 60%    ECHO STRESS  4/09    7 min; normal    ENDOSCOPY, COLON, DIAGNOSTIC      HX AMPUTATION  2012    left great toe    HX AMPUTATION  2007    BKA right    HX BELOW KNEE AMPUTATION Right     HX CERVICAL FUSION  2009    x2    HX ORTHOPAEDIC  2006    ACDF C4-C7    IR INSERT TUNL CVC W/O PORT OVER 5 YR  6/19/2020    STRESS TEST MYOVIEW  8/09    walked 8:46; normal; LVEF 51%     Social History     Tobacco Use    Smoking status: Never Smoker    Smokeless tobacco: Never Used   Substance Use Topics    Alcohol use: No       Social History     Substance and Sexual Activity   Alcohol Use No     Social History     Substance and Sexual Activity   Drug Use No      Social History     Tobacco Use   Smoking Status Never Smoker   Smokeless Tobacco Never Used     Current Outpatient Medications   Medication Sig    oxyCODONE-acetaminophen (Percocet) 5-325 mg per tablet Take 1 Tab by mouth every four (4) hours as needed for Pain for up to 30 days. Max Daily Amount: 6 Tabs.  lisinopriL (PRINIVIL, ZESTRIL) 10 mg tablet Take 1 Tab by mouth two (2) times a day.  busPIRone (BUSPAR) 5 mg tablet Take 1 Tab by mouth three (3) times daily as needed (anxiety).  glucose blood VI test strips (Prodigy No Coding) strip TEST BLOOD SUGAR 2 TIMES A DAY    rosuvastatin (CRESTOR) 40 mg tablet Take 1 Tab by mouth daily.  silver sulfADIAZINE (SILVADENE) 1 % topical cream Apply  to affected area daily.  albuterol (Ventolin HFA) 90 mcg/actuation inhaler Take 2 Puffs by inhalation every four (4) hours as needed for Wheezing.  pregabalin (Lyrica) 75 mg capsule Take 1 Cap by mouth two (2) times a day. Max Daily Amount: 150 mg.    ergocalciferol (Vitamin D2) 1,250 mcg (50,000 unit) capsule TAKE ONE CAPSULE BY MOUTH EVERY WEEK    Lantus Solostar U-100 Insulin 100 unit/mL (3 mL) inpn INJECT 40 TO 50 UNITS SUBCUTANEOUSLY TWICE DAILY AS DIRECTED    insulin lispro (HUMALOG) 100 unit/mL kwikpen INJECT 18 TO 30 UNITS SUBCUTANEOUSLY 3 TIMES DAILY BEFORE BREAKFAST, LUNCH, AND DINNER    pantoprazole (PROTONIX) 40 mg tablet TAKE ONE TABLET BY MOUTH EVERY DAY BEFORE BREAKFAST    Insulin Needles, Disposable, (OLIVIA PEN NEEDLE) 32 gauge x 5/32\" ndle USE AS DIRECTED 4 TIMES DAILY    ferrous sulfate 325 mg (65 mg iron) tablet TAKE ONE TABLET BY MOUTH 2 TIMES A DAY    aspirin 81 mg chewable tablet Take 81 mg by mouth daily. No current facility-administered medications for this encounter. Objective: There were no vitals taken for this visit. Vascular:  left LE  DP 1/4; PT 1/4  capillary fill time brisk, pitting edema is present, skin temperature is cool, varicosities are present.     Dermatological:    Wound: 1  Location: right BKA stump   Measurements: per RN note  Margins: intact  Drainage: serous  Odor: no  Wound base: graunular  Lymphangitic streaking? No.  Undermining? yes  Sinus tracts? No.  Exposed bone? yes  Subcutaneous crepitation on palpation? No.    Skin is dry and scaly, exhibits hemosiderin deposition. There is no maceration of the interspaces of the feet b/l. Neurological:  DTR are present, protective sensation per 5.07 Bowen Mauricio monofilament is intact, patient is AAOx3, mood is normal. Epicritic sensation is intact. Orthopedic:  Left LE are symmetric, ROM of ankle, STJ, 1st MTPJ is limited, MMT 5 out of 5 for B/L LE. Right BKA. Has prosthesis. Constitutional: Pt is a well developed, pleasant AA male. Lymphatics: negative tenderness to palpation of neck/axillary/inguinal nodes. Imaging / Labs / Cx / Px:  5/28 tib/fib XR: soft tissue ulcer extending to the rt distal fibula    Procedure Note:  Excisional debridement through level of subcutaneous fat. Location / Ulcer: left leg  Indication: to remove non-viable tissue from wound bed. Consent in chart. Anesthesia: 4% xylocaine gel  Instrument: roncheur and curette   Bleeding: minimal  Hemostasis: pressure  Pre-Procedure Pain: 1  Post-Procedure Pain: 2  Area debrided < 20 cm sq. Pre-Debridement measurements: see nursing notes  Post-Debridement measurements: see nursing notes  This is part of a series of staged procedures in an attempt at limb salvage. Alert and oriented to person, place and time

## 2024-04-15 ENCOUNTER — OFFICE VISIT (OUTPATIENT)
Age: 69
End: 2024-04-15
Payer: MEDICARE

## 2024-04-15 VITALS
BODY MASS INDEX: 18.59 KG/M2 | WEIGHT: 132.8 LBS | TEMPERATURE: 97.5 F | HEART RATE: 83 BPM | HEIGHT: 71 IN | DIASTOLIC BLOOD PRESSURE: 78 MMHG | OXYGEN SATURATION: 98 % | SYSTOLIC BLOOD PRESSURE: 136 MMHG | RESPIRATION RATE: 16 BRPM

## 2024-04-15 DIAGNOSIS — E11.59 TYPE 2 DIABETES MELLITUS WITH OTHER CIRCULATORY COMPLICATIONS (HCC): ICD-10-CM

## 2024-04-15 DIAGNOSIS — M54.41 CHRONIC BILATERAL LOW BACK PAIN WITH BILATERAL SCIATICA: ICD-10-CM

## 2024-04-15 DIAGNOSIS — K21.9 GASTRO-ESOPHAGEAL REFLUX DISEASE WITHOUT ESOPHAGITIS: ICD-10-CM

## 2024-04-15 DIAGNOSIS — M86.68 OTHER CHRONIC OSTEOMYELITIS, OTHER SITE (HCC): ICD-10-CM

## 2024-04-15 DIAGNOSIS — I15.8 OTHER SECONDARY HYPERTENSION: ICD-10-CM

## 2024-04-15 DIAGNOSIS — Z00.00 MEDICARE ANNUAL WELLNESS VISIT, SUBSEQUENT: Primary | ICD-10-CM

## 2024-04-15 DIAGNOSIS — M54.42 CHRONIC BILATERAL LOW BACK PAIN WITH BILATERAL SCIATICA: ICD-10-CM

## 2024-04-15 DIAGNOSIS — L30.9 ECZEMA, UNSPECIFIED TYPE: ICD-10-CM

## 2024-04-15 DIAGNOSIS — S78.111D: ICD-10-CM

## 2024-04-15 DIAGNOSIS — G89.29 CHRONIC BILATERAL LOW BACK PAIN WITH BILATERAL SCIATICA: ICD-10-CM

## 2024-04-15 DIAGNOSIS — N18.32 STAGE 3B CHRONIC KIDNEY DISEASE (HCC): ICD-10-CM

## 2024-04-15 PROCEDURE — 3075F SYST BP GE 130 - 139MM HG: CPT | Performed by: INTERNAL MEDICINE

## 2024-04-15 PROCEDURE — 99214 OFFICE O/P EST MOD 30 MIN: CPT | Performed by: INTERNAL MEDICINE

## 2024-04-15 PROCEDURE — 1123F ACP DISCUSS/DSCN MKR DOCD: CPT | Performed by: INTERNAL MEDICINE

## 2024-04-15 PROCEDURE — G0439 PPPS, SUBSEQ VISIT: HCPCS | Performed by: INTERNAL MEDICINE

## 2024-04-15 PROCEDURE — G8420 CALC BMI NORM PARAMETERS: HCPCS | Performed by: INTERNAL MEDICINE

## 2024-04-15 PROCEDURE — 3046F HEMOGLOBIN A1C LEVEL >9.0%: CPT | Performed by: INTERNAL MEDICINE

## 2024-04-15 PROCEDURE — 2022F DILAT RTA XM EVC RTNOPTHY: CPT | Performed by: INTERNAL MEDICINE

## 2024-04-15 PROCEDURE — 1036F TOBACCO NON-USER: CPT | Performed by: INTERNAL MEDICINE

## 2024-04-15 PROCEDURE — 3017F COLORECTAL CA SCREEN DOC REV: CPT | Performed by: INTERNAL MEDICINE

## 2024-04-15 PROCEDURE — G8427 DOCREV CUR MEDS BY ELIG CLIN: HCPCS | Performed by: INTERNAL MEDICINE

## 2024-04-15 PROCEDURE — 3078F DIAST BP <80 MM HG: CPT | Performed by: INTERNAL MEDICINE

## 2024-04-15 RX ORDER — TRIAMCINOLONE ACETONIDE 0.25 MG/G
OINTMENT TOPICAL
Qty: 80 G | Refills: 5 | Status: SHIPPED | OUTPATIENT
Start: 2024-04-15 | End: 2024-04-22

## 2024-04-15 RX ORDER — LISINOPRIL 20 MG/1
TABLET ORAL
COMMUNITY
Start: 2024-03-04

## 2024-04-15 SDOH — ECONOMIC STABILITY: FOOD INSECURITY: WITHIN THE PAST 12 MONTHS, THE FOOD YOU BOUGHT JUST DIDN'T LAST AND YOU DIDN'T HAVE MONEY TO GET MORE.: NEVER TRUE

## 2024-04-15 SDOH — ECONOMIC STABILITY: INCOME INSECURITY: HOW HARD IS IT FOR YOU TO PAY FOR THE VERY BASICS LIKE FOOD, HOUSING, MEDICAL CARE, AND HEATING?: NOT HARD AT ALL

## 2024-04-15 SDOH — ECONOMIC STABILITY: FOOD INSECURITY: WITHIN THE PAST 12 MONTHS, YOU WORRIED THAT YOUR FOOD WOULD RUN OUT BEFORE YOU GOT MONEY TO BUY MORE.: NEVER TRUE

## 2024-04-15 SDOH — ECONOMIC STABILITY: HOUSING INSECURITY
IN THE LAST 12 MONTHS, WAS THERE A TIME WHEN YOU DID NOT HAVE A STEADY PLACE TO SLEEP OR SLEPT IN A SHELTER (INCLUDING NOW)?: NO

## 2024-04-15 ASSESSMENT — PATIENT HEALTH QUESTIONNAIRE - PHQ9
SUM OF ALL RESPONSES TO PHQ9 QUESTIONS 1 & 2: 0
SUM OF ALL RESPONSES TO PHQ QUESTIONS 1-9: 0
SUM OF ALL RESPONSES TO PHQ QUESTIONS 1-9: 0
1. LITTLE INTEREST OR PLEASURE IN DOING THINGS: NOT AT ALL
2. FEELING DOWN, DEPRESSED OR HOPELESS: NOT AT ALL
SUM OF ALL RESPONSES TO PHQ QUESTIONS 1-9: 0
SUM OF ALL RESPONSES TO PHQ QUESTIONS 1-9: 0

## 2024-04-15 ASSESSMENT — LIFESTYLE VARIABLES
HOW OFTEN DO YOU HAVE A DRINK CONTAINING ALCOHOL: NEVER
HOW MANY STANDARD DRINKS CONTAINING ALCOHOL DO YOU HAVE ON A TYPICAL DAY: PATIENT DOES NOT DRINK

## 2024-04-15 NOTE — PROGRESS NOTES
Gold Weathres Jr. (:  1955) is a 68 y.o. male,Established patient, here for evaluation of the following chief complaint(s):  Medicare AWV (Rash on left leg; painful; started around 3-4 months ago)         ASSESSMENT/PLAN:  1. Medicare annual wellness visit, subsequent  2. Chronic bilateral low back pain with bilateral sciatica-continue with Percocet 2 times a day  3. Amputation of lower extremity above knee with complication, right, subsequent encounter (HCC)-he is a prosthesis he continues with physical therapy at [facility]  4. Type 2 diabetes mellitus with other circulatory complications (HCC)-off medicines A1c with renal has been normal  5. Stage 3b chronic kidney disease (HCC)-mom ordered by Dr. Degroot continue with Procrit as needed for low hemoglobin as well as vitamin D once a week and he is monitoring his proteinuria GFR  6. Gastro-esophageal reflux disease without esophagitis-continue with Protonix  7. Other secondary hypertension-rating amlodipine and back on lisinopril to help with proteinuria  8. Other chronic osteomyelitis, other site (HCC)-per ID continue with doxycycline once a day  9. Eczema, unspecified type-right mended CeraVe dry skin ointment and trial of triamcinolone ointment but not to cover the entire body only areas that have a flare  -     triamcinolone (KENALOG) 0.025 % ointment; Apply topically 2 times daily., Disp-80 g, R-5, Normal      No follow-ups on file.   Percocet 1-2 times a day   -cannot do PT 2-3 times a week as well as [facility]  Off medicine stable  Vitamin D once a week, followed by Gavin; procrit  depending on hgb  Protonix   Amlodipine, lisinopril   Doxy  daily       Subjective   SUBJECTIVE/OBJECTIVE:  HPI    Back pain-we manage this back pain with Percocet daily; still using every day two times a day and sometimes less - the cold weather makes him feel stiffer;  Seeing  at [facility] and

## 2024-04-15 NOTE — PROGRESS NOTES
\"Have you been to the ER, urgent care clinic since your last visit?  Hospitalized since your last visit?\"    NO    “Have you seen or consulted any other health care providers outside of [facility] since your last visit?”    NO            Click Here for Release of Records Request

## 2024-04-16 RX ORDER — PANTOPRAZOLE SODIUM 40 MG/1
40 TABLET, DELAYED RELEASE ORAL EVERY MORNING
Qty: 90 TABLET | Refills: 1 | Status: SHIPPED | OUTPATIENT
Start: 2024-04-16

## 2024-04-25 DIAGNOSIS — M54.41 CHRONIC BILATERAL LOW BACK PAIN WITH BILATERAL SCIATICA: ICD-10-CM

## 2024-04-25 DIAGNOSIS — G89.29 CHRONIC BILATERAL LOW BACK PAIN WITH BILATERAL SCIATICA: ICD-10-CM

## 2024-04-25 DIAGNOSIS — M54.42 CHRONIC BILATERAL LOW BACK PAIN WITH BILATERAL SCIATICA: ICD-10-CM

## 2024-04-25 RX ORDER — OXYCODONE HYDROCHLORIDE AND ACETAMINOPHEN 5; 325 MG/1; MG/1
1 TABLET ORAL EVERY 6 HOURS PRN
Qty: 90 TABLET | Refills: 0 | Status: SHIPPED | OUTPATIENT
Start: 2024-04-25 | End: 2024-05-25

## 2024-04-25 RX ORDER — ERGOCALCIFEROL 1.25 MG/1
CAPSULE ORAL
Qty: 4 CAPSULE | Refills: 0 | Status: SHIPPED | OUTPATIENT
Start: 2024-04-25

## 2024-05-22 DIAGNOSIS — M54.41 CHRONIC BILATERAL LOW BACK PAIN WITH BILATERAL SCIATICA: ICD-10-CM

## 2024-05-22 DIAGNOSIS — M54.42 CHRONIC BILATERAL LOW BACK PAIN WITH BILATERAL SCIATICA: ICD-10-CM

## 2024-05-22 DIAGNOSIS — G89.29 CHRONIC BILATERAL LOW BACK PAIN WITH BILATERAL SCIATICA: ICD-10-CM

## 2024-05-23 RX ORDER — SILVER SULFADIAZINE 1 %
CREAM (GRAM) TOPICAL DAILY
Qty: 60 G | Refills: 0 | Status: SHIPPED | OUTPATIENT
Start: 2024-05-23

## 2024-05-23 RX ORDER — OXYCODONE HYDROCHLORIDE AND ACETAMINOPHEN 5; 325 MG/1; MG/1
1 TABLET ORAL EVERY 6 HOURS PRN
Qty: 90 TABLET | Refills: 0 | Status: SHIPPED | OUTPATIENT
Start: 2024-05-23 | End: 2024-06-22

## 2024-06-27 DIAGNOSIS — G89.29 CHRONIC BILATERAL LOW BACK PAIN WITHOUT SCIATICA: Primary | ICD-10-CM

## 2024-06-27 DIAGNOSIS — M54.50 CHRONIC BILATERAL LOW BACK PAIN WITHOUT SCIATICA: Primary | ICD-10-CM

## 2024-06-27 RX ORDER — OXYCODONE HYDROCHLORIDE AND ACETAMINOPHEN 5; 325 MG/1; MG/1
1 TABLET ORAL EVERY 6 HOURS PRN
Qty: 90 TABLET | Refills: 0 | Status: SHIPPED | OUTPATIENT
Start: 2024-06-27 | End: 2024-07-27

## 2024-06-27 RX ORDER — ERGOCALCIFEROL 1.25 MG/1
CAPSULE ORAL
Qty: 4 CAPSULE | Refills: 2 | Status: SHIPPED | OUTPATIENT
Start: 2024-06-27

## 2024-07-24 DIAGNOSIS — G89.29 CHRONIC BILATERAL LOW BACK PAIN WITHOUT SCIATICA: ICD-10-CM

## 2024-07-24 DIAGNOSIS — M54.50 CHRONIC BILATERAL LOW BACK PAIN WITHOUT SCIATICA: ICD-10-CM

## 2024-07-24 RX ORDER — OXYCODONE HYDROCHLORIDE AND ACETAMINOPHEN 5; 325 MG/1; MG/1
1 TABLET ORAL EVERY 6 HOURS PRN
Qty: 90 TABLET | Refills: 0 | Status: SHIPPED | OUTPATIENT
Start: 2024-07-24 | End: 2024-08-23

## 2024-07-24 RX ORDER — ALBUTEROL SULFATE 90 UG/1
AEROSOL, METERED RESPIRATORY (INHALATION)
Qty: 8.5 G | Refills: 1 | Status: SHIPPED | OUTPATIENT
Start: 2024-07-24

## 2024-08-07 ENCOUNTER — ANESTHESIA EVENT (OUTPATIENT)
Facility: HOSPITAL | Age: 69
End: 2024-08-07
Payer: MEDICARE

## 2024-08-07 ENCOUNTER — ANESTHESIA (OUTPATIENT)
Facility: HOSPITAL | Age: 69
End: 2024-08-07
Payer: MEDICARE

## 2024-08-07 ENCOUNTER — HOSPITAL ENCOUNTER (OUTPATIENT)
Facility: HOSPITAL | Age: 69
Setting detail: OUTPATIENT SURGERY
Discharge: HOME | End: 2024-08-07
Attending: SPECIALIST | Admitting: SPECIALIST
Payer: MEDICARE

## 2024-08-07 VITALS
BODY MASS INDEX: 18.61 KG/M2 | HEART RATE: 75 BPM | HEIGHT: 71 IN | WEIGHT: 132.94 LBS | TEMPERATURE: 97.9 F | SYSTOLIC BLOOD PRESSURE: 140 MMHG | OXYGEN SATURATION: 99 % | RESPIRATION RATE: 14 BRPM | DIASTOLIC BLOOD PRESSURE: 69 MMHG

## 2024-08-07 LAB
GLUCOSE BLD STRIP.AUTO-MCNC: 89 MG/DL (ref 65–117)
SERVICE CMNT-IMP: NORMAL

## 2024-08-07 PROCEDURE — 7100000011 HC PHASE II RECOVERY - ADDTL 15 MIN: Performed by: SPECIALIST

## 2024-08-07 PROCEDURE — 88312 SPECIAL STAINS GROUP 1: CPT

## 2024-08-07 PROCEDURE — 3700000001 HC ADD 15 MINUTES (ANESTHESIA): Performed by: SPECIALIST

## 2024-08-07 PROCEDURE — 3700000000 HC ANESTHESIA ATTENDED CARE: Performed by: SPECIALIST

## 2024-08-07 PROCEDURE — 2720000010 HC SURG SUPPLY STERILE: Performed by: SPECIALIST

## 2024-08-07 PROCEDURE — 3600007512: Performed by: SPECIALIST

## 2024-08-07 PROCEDURE — 2500000003 HC RX 250 WO HCPCS: Performed by: NURSE ANESTHETIST, CERTIFIED REGISTERED

## 2024-08-07 PROCEDURE — 7100000010 HC PHASE II RECOVERY - FIRST 15 MIN: Performed by: SPECIALIST

## 2024-08-07 PROCEDURE — 88305 TISSUE EXAM BY PATHOLOGIST: CPT

## 2024-08-07 PROCEDURE — 6360000002 HC RX W HCPCS: Performed by: NURSE ANESTHETIST, CERTIFIED REGISTERED

## 2024-08-07 PROCEDURE — 3600007502: Performed by: SPECIALIST

## 2024-08-07 PROCEDURE — 82962 GLUCOSE BLOOD TEST: CPT

## 2024-08-07 PROCEDURE — 2709999900 HC NON-CHARGEABLE SUPPLY: Performed by: SPECIALIST

## 2024-08-07 RX ORDER — PROPOFOL 10 MG/ML
INJECTION, EMULSION INTRAVENOUS PRN
Status: DISCONTINUED | OUTPATIENT
Start: 2024-08-07 | End: 2024-08-07 | Stop reason: SDUPTHER

## 2024-08-07 RX ORDER — LIDOCAINE HYDROCHLORIDE 20 MG/ML
INJECTION, SOLUTION EPIDURAL; INFILTRATION; INTRACAUDAL; PERINEURAL PRN
Status: DISCONTINUED | OUTPATIENT
Start: 2024-08-07 | End: 2024-08-07 | Stop reason: SDUPTHER

## 2024-08-07 RX ORDER — SODIUM CHLORIDE 0.9 % (FLUSH) 0.9 %
5-40 SYRINGE (ML) INJECTION PRN
Status: DISCONTINUED | OUTPATIENT
Start: 2024-08-07 | End: 2024-08-07 | Stop reason: HOSPADM

## 2024-08-07 RX ORDER — SODIUM CHLORIDE 9 MG/ML
INJECTION, SOLUTION INTRAVENOUS CONTINUOUS
Status: DISCONTINUED | OUTPATIENT
Start: 2024-08-07 | End: 2024-08-07 | Stop reason: HOSPADM

## 2024-08-07 RX ADMIN — PROPOFOL 20 MG: 10 INJECTION, EMULSION INTRAVENOUS at 11:21

## 2024-08-07 RX ADMIN — PROPOFOL 20 MG: 10 INJECTION, EMULSION INTRAVENOUS at 11:19

## 2024-08-07 RX ADMIN — LIDOCAINE HYDROCHLORIDE 45 MG: 20 INJECTION, SOLUTION EPIDURAL; INFILTRATION; INTRACAUDAL at 11:16

## 2024-08-07 RX ADMIN — LIDOCAINE HYDROCHLORIDE 10 MG: 20 INJECTION, SOLUTION EPIDURAL; INFILTRATION; INTRACAUDAL at 11:19

## 2024-08-07 RX ADMIN — PROPOFOL 20 MG: 10 INJECTION, EMULSION INTRAVENOUS at 11:22

## 2024-08-07 RX ADMIN — LIDOCAINE HYDROCHLORIDE 10 MG: 20 INJECTION, SOLUTION EPIDURAL; INFILTRATION; INTRACAUDAL at 11:21

## 2024-08-07 RX ADMIN — LIDOCAINE HYDROCHLORIDE 10 MG: 20 INJECTION, SOLUTION EPIDURAL; INFILTRATION; INTRACAUDAL at 11:22

## 2024-08-07 RX ADMIN — PROPOFOL 90 MG: 10 INJECTION, EMULSION INTRAVENOUS at 11:16

## 2024-08-07 ASSESSMENT — PAIN - FUNCTIONAL ASSESSMENT: PAIN_FUNCTIONAL_ASSESSMENT: 0-10

## 2024-08-07 NOTE — PERIOP NOTE
CRE balloon dilatation of the esophagus   18 mm Balloon inflated to 3 ATMs and held for 30 seconds.  18 mm Balloon inflated to 4.5 ATMs and held for 30 seconds.  20 mm Balloon inflated to 6 ATMs and held for 30 seconds.    No subcutaneous crepitus of the chest or cervical region was noted post dilatation.

## 2024-08-07 NOTE — H&P
Date: 2024 3:00 PM   Patient Name: Gold Calloway.   Account #: [id number]    Gender: Male    (age): 1955 (68)      Provider: Cristi Foreman MD      Referring Physician: Sheri Moise MD  [address] [address] [phone] (phone)  [phone] (fax)      Chief Complaint: Trouble swallowing.         History of Present Illness:   68 M with orthopedic problems, s/p BKA, non healing wound led to AKA, and spinal surgery who had EGD  with esophageal ring.  He notes the symptoms present then have returned. Describes solid food hanging up and he feels that at the suprasternal notch.  Has not had to vomit an un-passed food bolus.  We negotiated repeat upper GI endoscopy for diagnosis, potential therapy (dilation).       Past Medical History      Medical Conditions: Anemia  Arthritis  Daily activites, walking, moving from bed to chair?right leg B.K.A.  Diabetes Mellitus  High blood pressure  High cholesterol  Kidney disease  MRSA  Sleep apnea   Dx Studies: Colonoscopy  Peg Tube, 2021, Peg Tube   Medications: albuterol sulfate 90 mcg/actuation Take 2 puffs in mouth every four hours as needed  docusate sodium 100 mg Take 1 tablet by mouth twice a day  lisinopril 10 mg Take 1 tablet by mouth once a day  oxycodone-acetaminophen 5-325 mg Take 1 tablet by mouth once a day as needed  pantoprazole 40 mg Take 1 tablet by mouth once a day  rosuvastatin 40 mg Take 1 tablet by mouth once a day  Vitamin D2 1,250 mcg (50,000 unit) Take 1 capsule by mouth once a day   Allergies: Adhesive Tape  Sulfa (Sulfonamide Antibiotics)   Immunizations: Influenza, seasonal, injectable, 2024  pneumococcal polysaccharide PPV23, 10/01/2023  Influenza vaccination, 10/01/2020  Influenza, seasonal, injectable, 10/01/2019  COVID Vaccine      Social History      Alcohol: None   Tobacco: Never smoker   Drugs: None   Exercise: Walking 1-3 miles 1x times a day.Exercise 3 or more times

## 2024-08-07 NOTE — ANESTHESIA POSTPROCEDURE EVALUATION
Department of Anesthesiology  Postprocedure Note    Patient: Gold Weathers Jr.  MRN: [id number]  YOB: 1955  Date of evaluation: 8/7/2024    Procedure Summary       Date: 08/07/24 Room / Location: [place] [address] / [facility] ENDOSCOPY    Anesthesia Start: 1112 Anesthesia Stop: 1125    Procedures:       ESOPHAGOGASTRODUODENOSCOPY (EGD) (Upper GI Region)      ESOPHAGOGASTRODUODENOSCOPY BIOPSY (Upper GI Region)      ESOPHAGOGASTRODUODENOSCOPY DILATION BALLOON (Upper GI Region) Diagnosis:       Esophageal dysphagia      (Esophageal dysphagia [R13.19])    Surgeons: Cristi Foreman MD Responsible Provider: Bobby Galindo DO    Anesthesia Type: TIVA ASA Status: 3            Anesthesia Type: TIVA    Candy Phase I: Candy Score: 10    Candy Phase II: Candy Score: 10    Anesthesia Post Evaluation    Patient location during evaluation: PACU  Patient participation: complete - patient participated  Level of consciousness: awake  Pain score: 0  Airway patency: patent  Nausea & Vomiting: no vomiting and no nausea  Cardiovascular status: hemodynamically stable  Respiratory status: acceptable  Hydration status: stable  Comments: Patient seen and examined.  Ready for discharge from PACU.  Pain management: adequate    No notable events documented.

## 2024-08-07 NOTE — PERIOP NOTE
Received recovery report from anesthesia team, see anesthesia note. Abdomen remains soft and non-tender post-procedure. Pt has no complaints at this time and tolerated procedure well. Endoscope was pre-cleaned at the bedside by Ignacio Martin immediately following procedure. Post recovery report given to Dee Dee Blue RN.

## 2024-08-07 NOTE — ANESTHESIA PRE PROCEDURE
Department of Anesthesiology  Preprocedure Note       Name:  Gold Weathers Jr.   Age:  68 y.o.  :  1955                                          MRN:  [id number]         Date:  2024      Surgeon: Surgeon(s):  Cristi Foreman MD    Procedure: Procedure(s):  ESOPHAGOGASTRODUODENOSCOPY (EGD)    Medications prior to admission:   Prior to Admission medications    Medication Sig Start Date End Date Taking? Authorizing Provider   albuterol sulfate HFA (PROVENTIL;VENTOLIN;PROAIR) 108 (90 Base) MCG/ACT inhaler TAKE 2 PUFFS BY INHALATION EVERY 4 HOURS AS NEEDED FOR WHEEZING 24   Charlotte Silva MD   oxyCODONE-acetaminophen (PERCOCET) 5-325 MG per tablet Take 1 tablet by mouth every 6 hours as needed for Pain for up to 30 days. Max Daily Amount: 4 tablets 24  Sheri Moise MD   vitamin D (ERGOCALCIFEROL) 1.25 MG (11109 UT) CAPS capsule TAKE ONE CAPSULE BY MOUTH EVERY WEEK 24   Sheri Moise MD   SSD 1 % cream APPLY TO AFFECTED AREA DAILY 24   Sheri Moise MD   pantoprazole (PROTONIX) 40 MG tablet TAKE ONE TABLET BY MOUTH EVERY MORNING 24   Sheri Moise MD   lisinopril (PRINIVIL;ZESTRIL) 20 MG tablet  3/4/24   Provider, MD Romeo   amLODIPine (NORVASC) 5 MG tablet Take by mouth daily    Automatic Reconciliation, Ar   aspirin 81 MG chewable tablet Take by mouth daily  Patient not taking: Reported on 2024    Automatic Reconciliation, Ar   collagenase 250 UNIT/GM ointment APPLY THIN LAYER TO AFFECTED AREA ONCE DAILY AS PER WOUND CARE ORDERS 3/31/22   Automatic Reconciliation, Ar   loperamide (IMODIUM) 2 MG capsule Take by mouth as needed    Automatic Reconciliation, Ar   sodium zirconium cyclosilicate (LOKELMA) 5 g PACK oral suspension MIX CONTENTS OF 1 PACKET WITH 5 MLS OF WATER AND TAKE ONCE A DAY 2 HOURS BEFORE OR 2 HOURS AFTER OTHER MEDS 22   Automatic Reconciliation, Ar       Current medications:    Current Facility-Administered

## 2024-08-07 NOTE — OP NOTE
[facility]  [facility]  Cristi Foreman MD  [phone]      2024    Esophagogastroduodenoscopy (EGD) Procedure Note  Gold Weathers Jr.  : 1955  [facility] Medical Record Number: [id number]      Indications:   Dysphagia, history of esophageal ring.    Referring Physician:  Sheri Moise MD  Anesthesia/Sedation:  Conscious sedation/deep sedation/monitored anesthesia -- see notes.  Endoscopist:  Dr. Cristi Foreman  Complications:  None  Estimated Blood Loss:  None    Permit:  The indications, risks, benefits and alternatives were reviewed with the patient or their decision maker who was provided an opportunity to ask questions and all questions were answered.  The specific risks of esophagogastroduodenoscopy with conscious sedation were reviewed, including but not limited to anesthetic complication, bleeding, adverse drug reaction, missed lesion, infection, IV site reactions, and intestinal perforation which would lead to the need for surgical repair.  Alternatives to EGD including radiographic imaging, observation without testing, or laboratory testing were reviewed as well as the limitations of those alternatives discussed.  After considering the options and having all their questions answered, the patient or their decision maker provided both verbal and written consent to proceed.       Procedure in Detail:  After obtaining informed consent, positioning of the patient in the left lateral decubitus position, and conduction of a pre-procedure pause or \"time out\" the endoscope was introduced into the mouth and advanced to the duodenum.  A careful inspection was made, and findings or interventions are described below.    Findings:   Esophagus:Acquired esophageal ring at EG junction; cold forceps biopsies taken then 18-20mm balloon used to dilate this region.  Stomach: normal    Duodenum/jejunum:

## 2024-08-07 NOTE — H&P
Pre-Endoscopy H&P Update  Chief complaint/HPI/ROS:  The indication for the procedure, the patient's history and the patient's current medications are reviewed prior to the procedure and that data is reported on the H&P to which this document is attached.  Any significant complaints with regard to organ systems will be noted, and if not mentioned then a review of systems is not contributory.  Past Medical History:   Diagnosis Date    Arthritis     BPH (benign prostatic hyperplasia)     Chronic kidney disease     Chronic pain     BACK NEUROPATHY FEET HANDS    DM neuropathy, type II diabetes mellitus (HCC)     DM type 2 causing CKD stage 3 (HCC) 12/17/2012    DM type 2 causing vascular disease (HCC)     Dyslipidemia     Foot ulcer due to secondary DM (HCC) 12/1/2012    GERD (gastroesophageal reflux disease)     History of esophageal dilatation     Ill-defined condition 2013    MRSA in wound right leg (BKA)    S/P BKA (below knee amputation) (HCC)     right BKA due to non-healing ulcer    Sleep apnea     Doesnt use CPAP, patient hasnt been retested since weight loss    Thromboembolus (HCC) 1970'S    BLOOD CLOT LEFT LEG      Past Surgical History:   Procedure Laterality Date    AMPUTATION  2012    left great toe    AMPUTATION  2007    BKA right    CERVICAL FUSION  2009    x2    COLONOSCOPY      COLONOSCOPY N/A 3/30/2021    COLONOSCOPY performed by Cristi Foreman MD at [facility] ENDOSCOPY    ECHO 2D ADULT  8/09    normal; LVEF 60%    ECHO STRESS  4/09    7 min; normal    FLEXIBLE SIGMOIDOSCOPY N/A 2/24/2021    SIGMOIDOSCOPY FLEXIBLE performed by Cristi Foreman MD at [facility] ENDOSCOPY    IR REMOVAL TUNNELED CVC WO PORT PUMP  9/14/2020    IR REMOVAL TUNNELED CVC WO PORT PUMP  7/15/2021    IR TUNNELED CATHETER PLACEMENT GREATER THAN 5 YEARS  6/19/2020    IR TUNNELED CATHETER PLACEMENT GREATER THAN 5 YEARS 6/19/2020 [facility] RAD ANGIO IR    IR TUNNELED CATHETER PLACEMENT GREATER THAN 5 YEARS  5/18/2021    IR TUNNELED CATHETER

## 2024-08-27 NOTE — PROGRESS NOTES
these efforts, he still struggles with lower back pain and is working on improving his muscle mass.    He is not on any medication for diabetes as his blood sugar levels have been stable. He does not monitor his blood sugar at home but undergoes lab tests every 2 weeks when he receives his Procrit injections.    He underwent an endoscopy performed by Dr. Foreman, during which the ring in the upper part of his esophagus was dilated. He continues to take Protonix for this condition.    His blood pressure is well-controlled with amlodipine and lisinopril.    He had issues with eczema during his last visit, which have improved with the use of CeraVe lotion. He is seeking a solution for his acne, having tried over-the-counter products without success.    He underwent cataract surgery in both eyes last month, which has significantly improved his vision.    He is due for his influenza vaccine in 10/2024 and needs to receive the shingles vaccine. He has chosen not to continue with the COVID-19 vaccines.    FAMILY HISTORY  His mother has kidney disease and is on dialysis.     Review of Systems   All other systems reviewed and are negative.         Objective   Physical Exam  Vitals and nursing note reviewed.   Constitutional:       Appearance: Normal appearance.   HENT:      Head: Normocephalic and atraumatic.      Right Ear: Tympanic membrane and external ear normal.      Left Ear: Tympanic membrane and external ear normal.      Nose: Nose normal.      Mouth/Throat:      Mouth: Mucous membranes are moist.   Eyes:      Extraocular Movements: Extraocular movements intact.      Conjunctiva/sclera: Conjunctivae normal.   Cardiovascular:      Rate and Rhythm: Normal rate and regular rhythm.   Pulmonary:      Effort: Pulmonary effort is normal.      Breath sounds: Normal breath sounds.   Abdominal:      General: Bowel sounds are normal.      Palpations: Abdomen is soft.   Musculoskeletal:         General: Normal range of motion.       Cervical back: Normal range of motion.   Skin:     General: Skin is warm.   Neurological:      General: No focal deficit present.      Mental Status: He is alert and oriented to person, place, and time. Mental status is at baseline.   Psychiatric:         Mood and Affect: Mood normal.         Behavior: Behavior normal.         Thought Content: Thought content normal.         Judgment: Judgment normal.            On this date 8/28/2024 I have spent 40 minutes reviewing previous notes, test results and face to face with the patient discussing the diagnosis and importance of compliance with the treatment plan as well as documenting on the day of the visit.    The patient (or guardian, if applicable) and other individuals in attendance with the patient were advised that Artificial Intelligence will be utilized during this visit to record, process the conversation to generate a clinical note, and support improvement of the AI technology. The patient (or guardian, if applicable) and other individuals in attendance at the appointment consented to the use of AI, including the recording.                 An electronic signature was used to authenticate this note.    --Sheri Moise MD

## 2024-08-28 ENCOUNTER — OFFICE VISIT (OUTPATIENT)
Age: 69
End: 2024-08-28
Payer: MEDICARE

## 2024-08-28 VITALS
HEART RATE: 81 BPM | RESPIRATION RATE: 16 BRPM | DIASTOLIC BLOOD PRESSURE: 72 MMHG | WEIGHT: 130 LBS | TEMPERATURE: 98 F | OXYGEN SATURATION: 98 % | SYSTOLIC BLOOD PRESSURE: 125 MMHG | BODY MASS INDEX: 18.2 KG/M2 | HEIGHT: 71 IN

## 2024-08-28 DIAGNOSIS — I15.8 OTHER SECONDARY HYPERTENSION: ICD-10-CM

## 2024-08-28 DIAGNOSIS — E11.59 TYPE 2 DIABETES MELLITUS WITH OTHER CIRCULATORY COMPLICATIONS (HCC): ICD-10-CM

## 2024-08-28 DIAGNOSIS — L30.9 ECZEMA, UNSPECIFIED TYPE: ICD-10-CM

## 2024-08-28 DIAGNOSIS — K21.9 GASTRO-ESOPHAGEAL REFLUX DISEASE WITHOUT ESOPHAGITIS: ICD-10-CM

## 2024-08-28 DIAGNOSIS — M54.50 CHRONIC BILATERAL LOW BACK PAIN WITHOUT SCIATICA: Primary | ICD-10-CM

## 2024-08-28 DIAGNOSIS — N18.32 STAGE 3B CHRONIC KIDNEY DISEASE (HCC): ICD-10-CM

## 2024-08-28 DIAGNOSIS — M86.68 OTHER CHRONIC OSTEOMYELITIS, OTHER SITE (HCC): ICD-10-CM

## 2024-08-28 DIAGNOSIS — G89.29 CHRONIC BILATERAL LOW BACK PAIN WITHOUT SCIATICA: Primary | ICD-10-CM

## 2024-08-28 DIAGNOSIS — S78.111D: ICD-10-CM

## 2024-08-28 PROCEDURE — 3017F COLORECTAL CA SCREEN DOC REV: CPT | Performed by: INTERNAL MEDICINE

## 2024-08-28 PROCEDURE — 1036F TOBACCO NON-USER: CPT | Performed by: INTERNAL MEDICINE

## 2024-08-28 PROCEDURE — 99214 OFFICE O/P EST MOD 30 MIN: CPT | Performed by: INTERNAL MEDICINE

## 2024-08-28 PROCEDURE — 2022F DILAT RTA XM EVC RTNOPTHY: CPT | Performed by: INTERNAL MEDICINE

## 2024-08-28 PROCEDURE — 3074F SYST BP LT 130 MM HG: CPT | Performed by: INTERNAL MEDICINE

## 2024-08-28 PROCEDURE — G8419 CALC BMI OUT NRM PARAM NOF/U: HCPCS | Performed by: INTERNAL MEDICINE

## 2024-08-28 PROCEDURE — 3046F HEMOGLOBIN A1C LEVEL >9.0%: CPT | Performed by: INTERNAL MEDICINE

## 2024-08-28 PROCEDURE — 1123F ACP DISCUSS/DSCN MKR DOCD: CPT | Performed by: INTERNAL MEDICINE

## 2024-08-28 PROCEDURE — 3078F DIAST BP <80 MM HG: CPT | Performed by: INTERNAL MEDICINE

## 2024-08-28 PROCEDURE — G8428 CUR MEDS NOT DOCUMENT: HCPCS | Performed by: INTERNAL MEDICINE

## 2024-08-28 RX ORDER — OXYCODONE AND ACETAMINOPHEN 5; 325 MG/1; MG/1
1 TABLET ORAL EVERY 8 HOURS PRN
Qty: 90 TABLET | Refills: 0 | Status: SHIPPED | OUTPATIENT
Start: 2024-08-28 | End: 2024-09-27

## 2024-08-29 LAB
CHOLEST SERPL-MCNC: 209 MG/DL
EST. AVERAGE GLUCOSE BLD GHB EST-MCNC: 97 MG/DL
HBA1C MFR BLD: 5 % (ref 4–5.6)
HDLC SERPL-MCNC: 55 MG/DL
HDLC SERPL: 3.8 (ref 0–5)
LDLC SERPL CALC-MCNC: 105 MG/DL (ref 0–100)
TRIGL SERPL-MCNC: 245 MG/DL
VLDLC SERPL CALC-MCNC: 49 MG/DL

## 2024-09-25 DIAGNOSIS — M54.50 CHRONIC BILATERAL LOW BACK PAIN WITHOUT SCIATICA: ICD-10-CM

## 2024-09-25 DIAGNOSIS — G89.29 CHRONIC BILATERAL LOW BACK PAIN WITHOUT SCIATICA: ICD-10-CM

## 2024-09-25 RX ORDER — OXYCODONE AND ACETAMINOPHEN 5; 325 MG/1; MG/1
1 TABLET ORAL EVERY 8 HOURS PRN
Qty: 90 TABLET | Refills: 0 | Status: SHIPPED | OUTPATIENT
Start: 2024-09-25 | End: 2024-10-25

## 2024-09-25 RX ORDER — ALBUTEROL SULFATE 90 UG/1
INHALANT RESPIRATORY (INHALATION)
Qty: 8.5 G | Refills: 1 | Status: SHIPPED | OUTPATIENT
Start: 2024-09-25

## 2024-10-24 DIAGNOSIS — M54.50 CHRONIC BILATERAL LOW BACK PAIN WITHOUT SCIATICA: ICD-10-CM

## 2024-10-24 DIAGNOSIS — G89.29 CHRONIC BILATERAL LOW BACK PAIN WITHOUT SCIATICA: ICD-10-CM

## 2024-10-24 RX ORDER — ERGOCALCIFEROL 1.25 MG/1
CAPSULE, LIQUID FILLED ORAL
Qty: 4 CAPSULE | Refills: 2 | Status: SHIPPED | OUTPATIENT
Start: 2024-10-24

## 2024-10-24 RX ORDER — OXYCODONE AND ACETAMINOPHEN 5; 325 MG/1; MG/1
1 TABLET ORAL EVERY 8 HOURS PRN
Qty: 90 TABLET | Refills: 0 | Status: SHIPPED | OUTPATIENT
Start: 2024-10-24 | End: 2024-11-23

## 2024-11-15 DIAGNOSIS — K21.9 GASTRO-ESOPHAGEAL REFLUX DISEASE WITHOUT ESOPHAGITIS: Primary | ICD-10-CM

## 2024-11-15 RX ORDER — PANTOPRAZOLE SODIUM 40 MG/1
40 TABLET, DELAYED RELEASE ORAL EVERY MORNING
Qty: 90 TABLET | Refills: 1 | Status: SHIPPED | OUTPATIENT
Start: 2024-11-15

## 2024-11-22 ENCOUNTER — TELEPHONE (OUTPATIENT)
Facility: CLINIC | Age: 69
End: 2024-11-22

## 2024-11-22 NOTE — TELEPHONE ENCOUNTER
Patient called stating he wants to speak with a nurse about the [employer] form he dropped off on 11/4     Call Gold [phone]

## 2024-11-22 NOTE — TELEPHONE ENCOUNTER
11/22/2024 at 3:27 pm--This user attempted to call the patient but he did not answer, so I left a detailed message stating that we did not receive that form and it is not in his chart anywhere. So I asked if patient he could please either bring us the form again or email it to us or fax it to us. Either way I requested that patient please call our office back and left the office phone number.

## 2024-11-27 RX ORDER — ALBUTEROL SULFATE 90 UG/1
INHALANT RESPIRATORY (INHALATION)
Qty: 8.5 G | Refills: 1 | Status: SHIPPED | OUTPATIENT
Start: 2024-11-27

## 2024-11-29 DIAGNOSIS — M54.50 CHRONIC BILATERAL LOW BACK PAIN WITHOUT SCIATICA: ICD-10-CM

## 2024-11-29 DIAGNOSIS — G89.29 CHRONIC BILATERAL LOW BACK PAIN WITHOUT SCIATICA: ICD-10-CM

## 2024-11-29 RX ORDER — OXYCODONE AND ACETAMINOPHEN 5; 325 MG/1; MG/1
1 TABLET ORAL EVERY 8 HOURS PRN
Qty: 90 TABLET | Refills: 0 | Status: SHIPPED | OUTPATIENT
Start: 2024-11-29 | End: 2024-12-29

## 2024-12-24 DIAGNOSIS — M54.50 CHRONIC BILATERAL LOW BACK PAIN WITHOUT SCIATICA: ICD-10-CM

## 2024-12-24 DIAGNOSIS — G89.29 CHRONIC BILATERAL LOW BACK PAIN WITHOUT SCIATICA: ICD-10-CM

## 2024-12-24 RX ORDER — OXYCODONE AND ACETAMINOPHEN 5; 325 MG/1; MG/1
1 TABLET ORAL EVERY 8 HOURS PRN
Qty: 90 TABLET | Refills: 0 | Status: SHIPPED | OUTPATIENT
Start: 2024-12-28 | End: 2025-01-27

## 2025-01-27 DIAGNOSIS — G89.29 CHRONIC BILATERAL LOW BACK PAIN WITHOUT SCIATICA: ICD-10-CM

## 2025-01-27 DIAGNOSIS — M54.50 CHRONIC BILATERAL LOW BACK PAIN WITHOUT SCIATICA: ICD-10-CM

## 2025-01-27 RX ORDER — ALBUTEROL SULFATE 90 UG/1
INHALANT RESPIRATORY (INHALATION)
Qty: 8.5 G | Refills: 1 | Status: SHIPPED | OUTPATIENT
Start: 2025-01-27

## 2025-01-27 RX ORDER — OXYCODONE AND ACETAMINOPHEN 5; 325 MG/1; MG/1
1 TABLET ORAL EVERY 8 HOURS PRN
Qty: 90 TABLET | Refills: 0 | Status: SHIPPED | OUTPATIENT
Start: 2025-01-27 | End: 2025-02-26

## 2025-01-27 RX ORDER — ERGOCALCIFEROL 1.25 MG/1
CAPSULE, LIQUID FILLED ORAL
Qty: 4 CAPSULE | Refills: 2 | Status: SHIPPED | OUTPATIENT
Start: 2025-01-27

## 2025-01-27 NOTE — TELEPHONE ENCOUNTER
Patient calling about medication refill request - states the pharmacy has not gotten this request yet, I informed patient it may not have been seen by provider yet - last seen 08/28/2024

## 2025-01-28 ENCOUNTER — TELEPHONE (OUTPATIENT)
Facility: CLINIC | Age: 70
End: 2025-01-28

## 2025-01-28 NOTE — TELEPHONE ENCOUNTER
Attempted to contact patient regarding upcoming Medicare wellness appointment and completion of HRA questionnaire. LVM for patient to please return call at  [phone]

## 2025-01-29 ENCOUNTER — TELEPHONE (OUTPATIENT)
Facility: CLINIC | Age: 70
End: 2025-01-29

## 2025-01-29 NOTE — PROGRESS NOTES
Gold Weathers Jr. (:  1955) is a 69 y.o. male,Established patient, here for evaluation of the following chief complaint(s):  Medicare AWV (Patient is not fasting; pt states he went to urgent care on 2025, diagnosed with UTI)     Diagnosis Orders   1. Medicare annual wellness visit, subsequent        2. Chronic bilateral low back pain without sciatica        3. 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 Type 2 diabetes mellitus with other circulatory complications (HCC)        5. Stage 3b chronic kidney disease (HCC)        6. Other secondary hypertension        7. Other chronic osteomyelitis, other site        8. Eczema, unspecified type             Percocet 1-2 times a day    PT 2-3 times a week as well as [facility]  Off medicine stable  Vitamin D once a week, followed by Konstantin garcia  depending on hgb; sodium bicarb, lokemla for high potassium  Protonix   Amlodipine, lisinopril   Doxy  daily   Cerave      Assessment & Plan  1. Urinary tract infection (UTI).  He was diagnosed with a UTI at urgent care and started on Keflex, which has been helping. He reports no issues with urinary frequency or flow. He will continue his current antibiotic regimen and follow up with his nephrologist.    2. Back pain.  He is currently taking Percocet 1-2 times a day for back pain. He will continue with his current pain management regimen.    3. Amputation.  He is attending physical therapy twice a week at [facility] in [place] and reports that his prosthesis is fitting well. He will continue with physical therapy to improve mobility and balance.    4. Diabetes mellitus.  His last A1c was 5, indicating good control. He is not currently on any diabetes medications. He will continue monitoring his diet, especially protein and potassium intake, due to kidney concerns. An A1c test will be

## 2025-01-30 ENCOUNTER — TELEPHONE (OUTPATIENT)
Facility: CLINIC | Age: 70
End: 2025-01-30

## 2025-01-30 ENCOUNTER — OFFICE VISIT (OUTPATIENT)
Facility: CLINIC | Age: 70
End: 2025-01-30

## 2025-01-30 VITALS
TEMPERATURE: 98.5 F | BODY MASS INDEX: 18.13 KG/M2 | HEIGHT: 71 IN | HEART RATE: 84 BPM | DIASTOLIC BLOOD PRESSURE: 57 MMHG | SYSTOLIC BLOOD PRESSURE: 114 MMHG | OXYGEN SATURATION: 98 % | RESPIRATION RATE: 16 BRPM

## 2025-01-30 DIAGNOSIS — E11.59 TYPE 2 DIABETES MELLITUS WITH OTHER CIRCULATORY COMPLICATIONS (HCC): ICD-10-CM

## 2025-01-30 DIAGNOSIS — L30.9 ECZEMA, UNSPECIFIED TYPE: ICD-10-CM

## 2025-01-30 DIAGNOSIS — S78.111D: ICD-10-CM

## 2025-01-30 DIAGNOSIS — N18.32 STAGE 3B CHRONIC KIDNEY DISEASE (HCC): ICD-10-CM

## 2025-01-30 DIAGNOSIS — M54.50 CHRONIC BILATERAL LOW BACK PAIN WITHOUT SCIATICA: ICD-10-CM

## 2025-01-30 DIAGNOSIS — G89.29 CHRONIC BILATERAL LOW BACK PAIN WITHOUT SCIATICA: ICD-10-CM

## 2025-01-30 DIAGNOSIS — M86.68 OTHER CHRONIC OSTEOMYELITIS, OTHER SITE: ICD-10-CM

## 2025-01-30 DIAGNOSIS — I15.8 OTHER SECONDARY HYPERTENSION: ICD-10-CM

## 2025-01-30 DIAGNOSIS — Z00.00 MEDICARE ANNUAL WELLNESS VISIT, SUBSEQUENT: Primary | ICD-10-CM

## 2025-01-30 RX ORDER — CEPHALEXIN 500 MG/1
CAPSULE ORAL
COMMUNITY
Start: 2025-01-24

## 2025-01-30 SDOH — ECONOMIC STABILITY: FOOD INSECURITY: WITHIN THE PAST 12 MONTHS, THE FOOD YOU BOUGHT JUST DIDN'T LAST AND YOU DIDN'T HAVE MONEY TO GET MORE.: NEVER TRUE

## 2025-01-30 SDOH — HEALTH STABILITY: PHYSICAL HEALTH: ON AVERAGE, HOW MANY MINUTES DO YOU ENGAGE IN EXERCISE AT THIS LEVEL?: 40 MIN

## 2025-01-30 SDOH — ECONOMIC STABILITY: FOOD INSECURITY: WITHIN THE PAST 12 MONTHS, YOU WORRIED THAT YOUR FOOD WOULD RUN OUT BEFORE YOU GOT MONEY TO BUY MORE.: NEVER TRUE

## 2025-01-30 SDOH — ECONOMIC STABILITY: INCOME INSECURITY: IN THE LAST 12 MONTHS, WAS THERE A TIME WHEN YOU WERE NOT ABLE TO PAY THE MORTGAGE OR RENT ON TIME?: NO

## 2025-01-30 SDOH — ECONOMIC STABILITY: TRANSPORTATION INSECURITY
IN THE PAST 12 MONTHS, HAS THE LACK OF TRANSPORTATION KEPT YOU FROM MEDICAL APPOINTMENTS OR FROM GETTING MEDICATIONS?: NO

## 2025-01-30 SDOH — ECONOMIC STABILITY: TRANSPORTATION INSECURITY
IN THE PAST 12 MONTHS, HAS LACK OF TRANSPORTATION KEPT YOU FROM MEETINGS, WORK, OR FROM GETTING THINGS NEEDED FOR DAILY LIVING?: NO

## 2025-01-30 SDOH — HEALTH STABILITY: PHYSICAL HEALTH: ON AVERAGE, HOW MANY DAYS PER WEEK DO YOU ENGAGE IN MODERATE TO STRENUOUS EXERCISE (LIKE A BRISK WALK)?: 7 DAYS

## 2025-01-30 ASSESSMENT — LIFESTYLE VARIABLES
HOW OFTEN DO YOU HAVE A DRINK CONTAINING ALCOHOL: 1
HOW OFTEN DO YOU HAVE A DRINK CONTAINING ALCOHOL: NEVER
HOW MANY STANDARD DRINKS CONTAINING ALCOHOL DO YOU HAVE ON A TYPICAL DAY: PATIENT DOES NOT DRINK
HOW OFTEN DO YOU HAVE SIX OR MORE DRINKS ON ONE OCCASION: 1
HOW MANY STANDARD DRINKS CONTAINING ALCOHOL DO YOU HAVE ON A TYPICAL DAY: 0

## 2025-01-30 ASSESSMENT — PATIENT HEALTH QUESTIONNAIRE - PHQ9
1. LITTLE INTEREST OR PLEASURE IN DOING THINGS: NOT AT ALL
SUM OF ALL RESPONSES TO PHQ9 QUESTIONS 1 & 2: 0
SUM OF ALL RESPONSES TO PHQ QUESTIONS 1-9: 0
2. FEELING DOWN, DEPRESSED OR HOPELESS: NOT AT ALL
SUM OF ALL RESPONSES TO PHQ QUESTIONS 1-9: 0

## 2025-01-30 NOTE — PROGRESS NOTES
\"Have you been to the ER, urgent care clinic since your last visit?  Hospitalized since your last visit?\"    YES - When: approximately 1  weeks ago.  Where and Why: [facility]; UTI.    “Have you seen or consulted any other health care providers outside our system since your last visit?”    NO      “Have you had a diabetic eye exam?”    YES - Where: [facility] Nurse/CMA to request most recent records if not in the chart     Date of last diabetic eye exam: 9/18/2018

## 2025-01-31 LAB
CHOLEST SERPL-MCNC: 148 MG/DL
EST. AVERAGE GLUCOSE BLD GHB EST-MCNC: ABNORMAL MG/DL
HBA1C MFR BLD: <3.8 % (ref 4–5.6)
HDLC SERPL-MCNC: 32 MG/DL
HDLC SERPL: 4.6 (ref 0–5)
LDLC SERPL CALC-MCNC: 81.8 MG/DL (ref 0–100)
TRIGL SERPL-MCNC: 171 MG/DL
VLDLC SERPL CALC-MCNC: 34.2 MG/DL

## 2025-03-03 DIAGNOSIS — G89.29 CHRONIC BILATERAL LOW BACK PAIN WITHOUT SCIATICA: ICD-10-CM

## 2025-03-03 DIAGNOSIS — M54.50 CHRONIC BILATERAL LOW BACK PAIN WITHOUT SCIATICA: ICD-10-CM

## 2025-03-03 RX ORDER — OXYCODONE AND ACETAMINOPHEN 5; 325 MG/1; MG/1
1 TABLET ORAL EVERY 8 HOURS PRN
Qty: 90 TABLET | Refills: 0 | Status: SHIPPED | OUTPATIENT
Start: 2025-03-03 | End: 2025-04-02

## 2025-03-26 DIAGNOSIS — G89.29 CHRONIC BILATERAL LOW BACK PAIN WITHOUT SCIATICA: ICD-10-CM

## 2025-03-26 DIAGNOSIS — M54.50 CHRONIC BILATERAL LOW BACK PAIN WITHOUT SCIATICA: ICD-10-CM

## 2025-03-26 RX ORDER — OXYCODONE AND ACETAMINOPHEN 5; 325 MG/1; MG/1
1 TABLET ORAL EVERY 8 HOURS PRN
Qty: 90 TABLET | Refills: 0 | Status: SHIPPED | OUTPATIENT
Start: 2025-03-26 | End: 2025-04-25

## 2025-04-25 DIAGNOSIS — G89.29 CHRONIC BILATERAL LOW BACK PAIN WITHOUT SCIATICA: ICD-10-CM

## 2025-04-25 DIAGNOSIS — M54.50 CHRONIC BILATERAL LOW BACK PAIN WITHOUT SCIATICA: ICD-10-CM

## 2025-04-25 RX ORDER — OXYCODONE AND ACETAMINOPHEN 5; 325 MG/1; MG/1
1 TABLET ORAL EVERY 8 HOURS PRN
Qty: 90 TABLET | Refills: 0 | Status: SHIPPED | OUTPATIENT
Start: 2025-04-25 | End: 2025-05-25

## 2025-04-29 ENCOUNTER — TELEPHONE (OUTPATIENT)
Facility: CLINIC | Age: 70
End: 2025-04-29

## 2025-04-29 RX ORDER — ALBUTEROL SULFATE 90 UG/1
INHALANT RESPIRATORY (INHALATION)
Qty: 8.5 G | Refills: 1 | Status: SHIPPED | OUTPATIENT
Start: 2025-04-29

## 2025-04-29 NOTE — TELEPHONE ENCOUNTER
Percentage  Rel % 2.3   Basophils Automated Percentage  Rel % 0.8   nRBC, Absolute  <=0.0 k/mcL 0   nRBC  0.0 - 0.4 /100wbc 0.1

## 2025-04-29 NOTE — TELEPHONE ENCOUNTER
Patient called needing the Serious Medical Condition Certification Form  from [employer] filled out, I printed the document for the patient along with the email to send the form, the account holders name is (Milton Weathers) and account number is [id number] - needs to be sent no later than May 6th, the document was placed in the Dr's mailbox today.

## 2025-05-25 NOTE — PROGRESS NOTES
Gold Weathers Jr. (:  1955) is a 69 y.o. male,Established patient, here for evaluation of the following chief complaint(s):  Back Pain (Follow- up on back pain, refills)          Diagnosis Orders   1. Chronic bilateral low back pain without sciatica        2. Amputation of lower extremity above knee with complication, right, subsequent encounter        3. Type 2 diabetes mellitus with other circulatory complications (HCC)        4. Stage 3b chronic kidney disease (HCC)        5. Other secondary hypertension        6. Other chronic osteomyelitis, other site (HCC)        7. Eczema, unspecified type               Percocet 1-2 times a day    PT 2-3 times a week as well as [facility]  [facility]  Off medicine stable  Vitamin D once a week, followed by Gavin; darbepoetin  depending on hgb; sodium bicarb, lokemla for high potassium  Protonix   Amlodipine, lisinopril   Doxy  daily   Cerave    Assessment & Plan  1. Back pain.  - Currently taking Percocet 1-2 times a day for pain management.  - Prescription for Percocet issued today and sent to the pharmacy.  - Continues physical therapy twice a week at [facility].    2. Stage 3 kidney disease.  - No recent appointment with nephrologist due to cancellation; plans to schedule soon.  - Under care of Dr. Kahn, informed of eligibility for kidney transplant if necessary.  - On vitamin D and receives darbepoetin injections once a month based on hemoglobin levels.  - Takes Lokelma every other day or twice a week, depending on diet, to manage potassium levels.    3. Diabetes.  - Diabetes well-managed, last A1c <3.8.  - Not on any diabetes medications, maintaining a balanced diet.    4. Neuropathy.  - Expressed interest in resuming Lyrica for neuropathy; advised against due to potential interactions with oxycodone and cognitive effects.  - Will continue to monitor symptoms.    5. Hypertension.  - Blood pressure readings within

## 2025-05-28 ENCOUNTER — OFFICE VISIT (OUTPATIENT)
Facility: CLINIC | Age: 70
End: 2025-05-28
Payer: OTHER GOVERNMENT

## 2025-05-28 VITALS
HEIGHT: 71 IN | BODY MASS INDEX: 19.6 KG/M2 | DIASTOLIC BLOOD PRESSURE: 75 MMHG | WEIGHT: 140 LBS | SYSTOLIC BLOOD PRESSURE: 152 MMHG | OXYGEN SATURATION: 99 % | RESPIRATION RATE: 16 BRPM | TEMPERATURE: 97.6 F | HEART RATE: 82 BPM

## 2025-05-28 DIAGNOSIS — G89.29 CHRONIC BILATERAL LOW BACK PAIN WITHOUT SCIATICA: ICD-10-CM

## 2025-05-28 DIAGNOSIS — S78.111D: ICD-10-CM

## 2025-05-28 DIAGNOSIS — G89.29 CHRONIC BILATERAL LOW BACK PAIN WITHOUT SCIATICA: Primary | ICD-10-CM

## 2025-05-28 DIAGNOSIS — M86.68 OTHER CHRONIC OSTEOMYELITIS, OTHER SITE (HCC): ICD-10-CM

## 2025-05-28 DIAGNOSIS — M54.50 CHRONIC BILATERAL LOW BACK PAIN WITHOUT SCIATICA: ICD-10-CM

## 2025-05-28 DIAGNOSIS — I15.8 OTHER SECONDARY HYPERTENSION: ICD-10-CM

## 2025-05-28 DIAGNOSIS — E11.59 TYPE 2 DIABETES MELLITUS WITH OTHER CIRCULATORY COMPLICATIONS (HCC): ICD-10-CM

## 2025-05-28 DIAGNOSIS — L30.9 ECZEMA, UNSPECIFIED TYPE: ICD-10-CM

## 2025-05-28 DIAGNOSIS — M54.50 CHRONIC BILATERAL LOW BACK PAIN WITHOUT SCIATICA: Primary | ICD-10-CM

## 2025-05-28 DIAGNOSIS — N18.32 STAGE 3B CHRONIC KIDNEY DISEASE (HCC): ICD-10-CM

## 2025-05-28 PROCEDURE — 3046F HEMOGLOBIN A1C LEVEL >9.0%: CPT | Performed by: INTERNAL MEDICINE

## 2025-05-28 PROCEDURE — 1036F TOBACCO NON-USER: CPT | Performed by: INTERNAL MEDICINE

## 2025-05-28 PROCEDURE — 3078F DIAST BP <80 MM HG: CPT | Performed by: INTERNAL MEDICINE

## 2025-05-28 PROCEDURE — 2022F DILAT RTA XM EVC RTNOPTHY: CPT | Performed by: INTERNAL MEDICINE

## 2025-05-28 PROCEDURE — G8427 DOCREV CUR MEDS BY ELIG CLIN: HCPCS | Performed by: INTERNAL MEDICINE

## 2025-05-28 PROCEDURE — 99214 OFFICE O/P EST MOD 30 MIN: CPT | Performed by: INTERNAL MEDICINE

## 2025-05-28 PROCEDURE — 3077F SYST BP >= 140 MM HG: CPT | Performed by: INTERNAL MEDICINE

## 2025-05-28 PROCEDURE — 1123F ACP DISCUSS/DSCN MKR DOCD: CPT | Performed by: INTERNAL MEDICINE

## 2025-05-28 PROCEDURE — G8420 CALC BMI NORM PARAMETERS: HCPCS | Performed by: INTERNAL MEDICINE

## 2025-05-28 PROCEDURE — 3017F COLORECTAL CA SCREEN DOC REV: CPT | Performed by: INTERNAL MEDICINE

## 2025-05-28 RX ORDER — OXYCODONE AND ACETAMINOPHEN 5; 325 MG/1; MG/1
1 TABLET ORAL EVERY 8 HOURS PRN
Qty: 90 TABLET | Refills: 0 | OUTPATIENT
Start: 2025-05-28

## 2025-05-28 RX ORDER — OXYCODONE AND ACETAMINOPHEN 5; 325 MG/1; MG/1
1 TABLET ORAL EVERY 8 HOURS PRN
Qty: 90 TABLET | Refills: 0 | Status: SHIPPED | OUTPATIENT
Start: 2025-05-28 | End: 2025-06-27

## 2025-06-27 DIAGNOSIS — G89.29 CHRONIC BILATERAL LOW BACK PAIN WITHOUT SCIATICA: ICD-10-CM

## 2025-06-27 DIAGNOSIS — M54.50 CHRONIC BILATERAL LOW BACK PAIN WITHOUT SCIATICA: ICD-10-CM

## 2025-06-27 RX ORDER — OXYCODONE AND ACETAMINOPHEN 5; 325 MG/1; MG/1
1 TABLET ORAL EVERY 8 HOURS PRN
Qty: 90 TABLET | Refills: 0 | Status: SHIPPED | OUTPATIENT
Start: 2025-06-27 | End: 2025-07-27

## 2025-06-27 RX ORDER — ALBUTEROL SULFATE 90 UG/1
INHALANT RESPIRATORY (INHALATION)
Qty: 8.5 G | Refills: 1 | Status: SHIPPED | OUTPATIENT
Start: 2025-06-27

## 2025-06-27 NOTE — TELEPHONE ENCOUNTER
The patient is requesting a call back to discuss a refill status. [phone]   oxyCODONE-acetaminophen (PERCOCET) 5-325 MG per tablet     [facility] [address] [place] [phone] - f [phone]

## 2025-06-27 NOTE — TELEPHONE ENCOUNTER
Last visit 5/28/25  Follow up 9/24/25    CMP done externally 3/6/25  CBC done externally 5/23/25

## 2025-07-28 DIAGNOSIS — G89.29 CHRONIC BILATERAL LOW BACK PAIN WITHOUT SCIATICA: ICD-10-CM

## 2025-07-28 DIAGNOSIS — M54.50 CHRONIC BILATERAL LOW BACK PAIN WITHOUT SCIATICA: ICD-10-CM

## 2025-07-28 DIAGNOSIS — K21.9 GASTRO-ESOPHAGEAL REFLUX DISEASE WITHOUT ESOPHAGITIS: ICD-10-CM

## 2025-07-28 RX ORDER — PANTOPRAZOLE SODIUM 40 MG/1
40 TABLET, DELAYED RELEASE ORAL EVERY MORNING
Qty: 90 TABLET | Refills: 0 | Status: SHIPPED | OUTPATIENT
Start: 2025-07-28

## 2025-07-28 RX ORDER — OXYCODONE AND ACETAMINOPHEN 5; 325 MG/1; MG/1
1 TABLET ORAL EVERY 8 HOURS PRN
Qty: 90 TABLET | Refills: 0 | Status: SHIPPED | OUTPATIENT
Start: 2025-07-28 | End: 2025-08-27

## 2025-07-28 RX ORDER — ERGOCALCIFEROL 1.25 MG/1
CAPSULE, LIQUID FILLED ORAL
Qty: 4 CAPSULE | Refills: 0 | Status: SHIPPED | OUTPATIENT
Start: 2025-07-28

## 2025-08-27 DIAGNOSIS — M54.50 CHRONIC BILATERAL LOW BACK PAIN WITHOUT SCIATICA: ICD-10-CM

## 2025-08-27 DIAGNOSIS — G89.29 CHRONIC BILATERAL LOW BACK PAIN WITHOUT SCIATICA: ICD-10-CM

## 2025-08-27 RX ORDER — OXYCODONE AND ACETAMINOPHEN 5; 325 MG/1; MG/1
1 TABLET ORAL EVERY 8 HOURS PRN
Qty: 90 TABLET | Refills: 0 | Status: SHIPPED | OUTPATIENT
Start: 2025-08-27 | End: 2025-09-26

## (undated) DEVICE — TUBING HYDR IRR --

## (undated) DEVICE — (D)SENSOR RMFG 02 PULS OXMTR -- DISC BY MFR USE ITEM 133445

## (undated) DEVICE — ESOPHAGEAL BALLOON DILATATION CATHETER: Brand: CRE FIXED WIRE

## (undated) DEVICE — IV STRT KT 3282] LSL INDUSTRIES INC]

## (undated) DEVICE — ELECTRODE,RADIOTRANSLUCENT,FOAM,3PK: Brand: MEDLINE

## (undated) DEVICE — CONTAINER SPEC 20 ML LID NEUT BUFF FORMALIN 10 % POLYPR STS

## (undated) DEVICE — FORCEPS BX L240CM JAW DIA2.8MM L CAP W/ NDL MIC MESH TOOTH

## (undated) DEVICE — BITEBLOCK ENDOSCP 60FR MAXI WHT POLYETH STURDY W/ VELC WVN

## (undated) DEVICE — BAG BELONG PT PERS CLEAR HANDL

## (undated) DEVICE — 1200 GUARD II KIT W/5MM TUBE W/O VAC TUBE: Brand: GUARDIAN

## (undated) DEVICE — NDL FLTR TIP 5 MIC 18GX1.5IN --

## (undated) DEVICE — Device

## (undated) DEVICE — CATHETER IV 22GA L1IN OD0.8382-0.9144MM ID0.6096-0.6858MM 382523

## (undated) DEVICE — KIT COLON W/ 1.1OZ LUB AND 2 END

## (undated) DEVICE — CATH IV AUTOGRD BC PNK 20GA 25 -- INSYTE

## (undated) DEVICE — SYR 5ML 1/5 GRAD LL NSAF LF --

## (undated) DEVICE — CANNULA CUSH AD W/ 14FT TBG

## (undated) DEVICE — SYRINGE 50ML E/T

## (undated) DEVICE — SIMPLICITY FLUFF UNDERPAD 23X36, MODERATE: Brand: SIMPLICITY

## (undated) DEVICE — SOLIDIFIER MEDC 1200ML -- CONVERT TO 356117

## (undated) DEVICE — BASIN EMSIS 16OZ GRAPHITE PLAS KID SHP MOLD GRAD FOR ORAL

## (undated) DEVICE — CANN NASAL O2 CAPNOGRAPHY AD -- FILTERLINE

## (undated) DEVICE — BAG SPEC BIOHZRD 10 X 10 IN --

## (undated) DEVICE — BLUNTFILL WITH FILTER: Brand: MONOJECT

## (undated) DEVICE — ESOPHAGEAL/COLONIC/BILIARY WIREGUIDED BALLOON DILATATION CATHETER: Brand: CRE™ PRO

## (undated) DEVICE — SYR ASSEMB INFL BLLN 60ML --

## (undated) DEVICE — KENDALL RADIOLUCENT FOAM MONITORING ELECTRODE -RECTANGULAR SHAPE: Brand: KENDALL

## (undated) DEVICE — 3M™ CUROS™ DISINFECTING CAP FOR NEEDLELESS CONNECTORS 270/CARTON 20 CARTONS/CASE CFF1-270: Brand: CUROS™

## (undated) DEVICE — SET ADMIN 16ML TBNG L100IN 2 Y INJ SITE IV PIGGY BK DISP

## (undated) DEVICE — CUFF ADULT 1 PC 1 VINYL DISP --

## (undated) DEVICE — BLUNTFILL: Brand: MONOJECT

## (undated) DEVICE — SOLIDIFIER FLD 2OZ 1500CC N DISINF IN BTL DISP SAFESORB

## (undated) DEVICE — SYRINGE MED 5ML STD CLR PLAS LUERLOCK TIP N CTRL DISP

## (undated) DEVICE — SET GRAV CK VLV NEEDLESS ST 3 GANGED 4WAY STPCOCK HI FLO 10

## (undated) DEVICE — CATH KT GASTMY PEG PSH 20FR --

## (undated) DEVICE — SYRINGE MEDICAL 3ML CLEAR PLASTIC STANDARD NON CONTROL LUERLOCK TIP DISPOSABLE

## (undated) DEVICE — CUFF RMFG BP INF SZ 11 DISP -- LAWSON OEM ITEM 238915

## (undated) DEVICE — SET ADMIN 16ML TBNG L100IN 2 Y INJ SITE IV PIGGY BK DISP (ORDER IN MULIPLES OF 48)